# Patient Record
Sex: MALE | Race: WHITE | NOT HISPANIC OR LATINO | Employment: OTHER | URBAN - METROPOLITAN AREA
[De-identification: names, ages, dates, MRNs, and addresses within clinical notes are randomized per-mention and may not be internally consistent; named-entity substitution may affect disease eponyms.]

---

## 2017-01-23 RX ORDER — MELOXICAM 15 MG/1
TABLET ORAL
Qty: 90 TABLET | Refills: 0 | Status: SHIPPED | OUTPATIENT
Start: 2017-01-23 | End: 2017-02-09 | Stop reason: SDUPTHER

## 2017-02-09 ENCOUNTER — PATIENT MESSAGE (OUTPATIENT)
Dept: ORTHOPEDICS | Facility: CLINIC | Age: 72
End: 2017-02-09

## 2017-02-09 DIAGNOSIS — M75.82 TENDONITIS OF LEFT ROTATOR CUFF: Primary | ICD-10-CM

## 2017-02-09 RX ORDER — MELOXICAM 15 MG/1
15 TABLET ORAL DAILY
Qty: 90 TABLET | Refills: 0 | Status: SHIPPED | OUTPATIENT
Start: 2017-02-09 | End: 2017-02-20 | Stop reason: SDUPTHER

## 2017-02-20 DIAGNOSIS — M75.82 TENDONITIS OF LEFT ROTATOR CUFF: ICD-10-CM

## 2017-02-21 RX ORDER — MELOXICAM 15 MG/1
15 TABLET ORAL DAILY
Qty: 90 TABLET | Refills: 0 | Status: SHIPPED | OUTPATIENT
Start: 2017-02-21 | End: 2017-08-04 | Stop reason: SDUPTHER

## 2017-02-23 ENCOUNTER — TELEPHONE (OUTPATIENT)
Dept: FAMILY MEDICINE | Facility: CLINIC | Age: 72
End: 2017-02-23

## 2017-02-23 NOTE — TELEPHONE ENCOUNTER
----- Message from Bridgett Sinclair sent at 2/22/2017  1:03 PM CST -----  Contact: Bernie from [facility] [phone]  Suni called for orders for home Spirometer test to be done.

## 2017-02-23 NOTE — TELEPHONE ENCOUNTER
Per Suni with [facility] need orders for spirometry for [facility]. No med necessity req. Forwarded to Dr Winters.

## 2017-05-08 RX ORDER — TERAZOSIN 10 MG/1
CAPSULE ORAL
Qty: 90 CAPSULE | Refills: 1 | Status: SHIPPED | OUTPATIENT
Start: 2017-05-08 | End: 2017-08-16 | Stop reason: SDUPTHER

## 2017-08-02 ENCOUNTER — TELEPHONE (OUTPATIENT)
Dept: FAMILY MEDICINE | Facility: CLINIC | Age: 72
End: 2017-08-02

## 2017-08-02 DIAGNOSIS — I10 ESSENTIAL HYPERTENSION: Primary | ICD-10-CM

## 2017-08-02 DIAGNOSIS — Z12.5 SCREENING FOR PROSTATE CANCER: ICD-10-CM

## 2017-08-02 NOTE — TELEPHONE ENCOUNTER
Spoke with pt and scheduled labs for tomorrow 08/03/2017 and he is scheduled for follow up with Dr. Winters on 08/16/2017.

## 2017-08-02 NOTE — TELEPHONE ENCOUNTER
Pt came in for appt today 20 min late. Rescheduled this as he would like to have his labs prior to his check up. Please review and order. We will call pt to set up labwork.

## 2017-08-03 ENCOUNTER — LAB VISIT (OUTPATIENT)
Dept: LAB | Facility: HOSPITAL | Age: 72
End: 2017-08-03
Attending: FAMILY MEDICINE
Payer: MEDICARE

## 2017-08-03 DIAGNOSIS — I10 ESSENTIAL HYPERTENSION: ICD-10-CM

## 2017-08-03 DIAGNOSIS — Z12.5 SCREENING FOR PROSTATE CANCER: ICD-10-CM

## 2017-08-03 LAB
ALBUMIN SERPL BCP-MCNC: 3.9 G/DL
ALP SERPL-CCNC: 60 U/L
ALT SERPL W/O P-5'-P-CCNC: 29 U/L
ANION GAP SERPL CALC-SCNC: 9 MMOL/L
AST SERPL-CCNC: 25 U/L
BILIRUB SERPL-MCNC: 0.8 MG/DL
BUN SERPL-MCNC: 23 MG/DL
CALCIUM SERPL-MCNC: 9.3 MG/DL
CHLORIDE SERPL-SCNC: 110 MMOL/L
CHOLEST/HDLC SERPL: 3.7 {RATIO}
CO2 SERPL-SCNC: 22 MMOL/L
COMPLEXED PSA SERPL-MCNC: 7.1 NG/ML
CREAT SERPL-MCNC: 1 MG/DL
ERYTHROCYTE [DISTWIDTH] IN BLOOD BY AUTOMATED COUNT: 13.4 %
EST. GFR  (AFRICAN AMERICAN): >60 ML/MIN/1.73 M^2
EST. GFR  (NON AFRICAN AMERICAN): >60 ML/MIN/1.73 M^2
GLUCOSE SERPL-MCNC: 102 MG/DL
HCT VFR BLD AUTO: 42.9 %
HDL/CHOLESTEROL RATIO: 27.4 %
HDLC SERPL-MCNC: 157 MG/DL
HDLC SERPL-MCNC: 43 MG/DL
HGB BLD-MCNC: 14.7 G/DL
LDLC SERPL CALC-MCNC: 98.6 MG/DL
MCH RBC QN AUTO: 30.6 PG
MCHC RBC AUTO-ENTMCNC: 34.3 G/DL
MCV RBC AUTO: 89 FL
NONHDLC SERPL-MCNC: 114 MG/DL
PLATELET # BLD AUTO: 224 K/UL
PMV BLD AUTO: 10.6 FL
POTASSIUM SERPL-SCNC: 4.3 MMOL/L
PROT SERPL-MCNC: 7.1 G/DL
RBC # BLD AUTO: 4.81 M/UL
SODIUM SERPL-SCNC: 141 MMOL/L
TRIGL SERPL-MCNC: 77 MG/DL
WBC # BLD AUTO: 5.5 K/UL

## 2017-08-03 PROCEDURE — 36415 COLL VENOUS BLD VENIPUNCTURE: CPT | Mod: PO

## 2017-08-03 PROCEDURE — 84153 ASSAY OF PSA TOTAL: CPT

## 2017-08-03 PROCEDURE — 80053 COMPREHEN METABOLIC PANEL: CPT

## 2017-08-03 PROCEDURE — 85027 COMPLETE CBC AUTOMATED: CPT

## 2017-08-03 PROCEDURE — 80061 LIPID PANEL: CPT

## 2017-08-04 DIAGNOSIS — M75.82 TENDONITIS OF LEFT ROTATOR CUFF: ICD-10-CM

## 2017-08-04 RX ORDER — MELOXICAM 15 MG/1
15 TABLET ORAL DAILY
Qty: 90 TABLET | Refills: 0 | Status: SHIPPED | OUTPATIENT
Start: 2017-08-04 | End: 2017-10-09

## 2017-08-16 ENCOUNTER — TELEPHONE (OUTPATIENT)
Dept: FAMILY MEDICINE | Facility: CLINIC | Age: 72
End: 2017-08-16

## 2017-08-16 ENCOUNTER — OFFICE VISIT (OUTPATIENT)
Dept: FAMILY MEDICINE | Facility: CLINIC | Age: 72
End: 2017-08-16
Payer: MEDICARE

## 2017-08-16 VITALS
HEIGHT: 71 IN | TEMPERATURE: 98 F | DIASTOLIC BLOOD PRESSURE: 76 MMHG | WEIGHT: 297.81 LBS | BODY MASS INDEX: 41.69 KG/M2 | HEART RATE: 60 BPM | SYSTOLIC BLOOD PRESSURE: 132 MMHG

## 2017-08-16 DIAGNOSIS — N41.1 CHRONIC PROSTATITIS: ICD-10-CM

## 2017-08-16 DIAGNOSIS — M54.50 CHRONIC MIDLINE LOW BACK PAIN WITHOUT SCIATICA: ICD-10-CM

## 2017-08-16 DIAGNOSIS — G47.33 OSA (OBSTRUCTIVE SLEEP APNEA): ICD-10-CM

## 2017-08-16 DIAGNOSIS — G89.29 CHRONIC MIDLINE LOW BACK PAIN WITHOUT SCIATICA: ICD-10-CM

## 2017-08-16 DIAGNOSIS — E66.01 MORBID OBESITY WITH BMI OF 40.0-44.9, ADULT: ICD-10-CM

## 2017-08-16 DIAGNOSIS — Z00.00 HEALTH MAINTENANCE EXAMINATION: Primary | ICD-10-CM

## 2017-08-16 DIAGNOSIS — N40.1 BPH ASSOCIATED WITH NOCTURIA: ICD-10-CM

## 2017-08-16 DIAGNOSIS — R35.1 BPH ASSOCIATED WITH NOCTURIA: ICD-10-CM

## 2017-08-16 PROCEDURE — 99499 UNLISTED E&M SERVICE: CPT | Mod: S$PBB,,, | Performed by: FAMILY MEDICINE

## 2017-08-16 PROCEDURE — 99999 PR PBB SHADOW E&M-EST. PATIENT-LVL III: CPT | Mod: PBBFAC,,, | Performed by: FAMILY MEDICINE

## 2017-08-16 PROCEDURE — 99397 PER PM REEVAL EST PAT 65+ YR: CPT | Mod: S$GLB,,, | Performed by: FAMILY MEDICINE

## 2017-08-16 RX ORDER — DOXYCYCLINE HYCLATE 100 MG/1
100 TABLET, DELAYED RELEASE ORAL 2 TIMES DAILY
Qty: 42 TABLET | Refills: 0 | Status: SHIPPED | OUTPATIENT
Start: 2017-08-16 | End: 2017-10-12

## 2017-08-16 RX ORDER — DOXYCYCLINE 100 MG/1
100 TABLET ORAL 2 TIMES DAILY
Qty: 42 TABLET | Refills: 0 | Status: SHIPPED | OUTPATIENT
Start: 2017-08-16 | End: 2017-08-16 | Stop reason: CLARIF

## 2017-08-16 NOTE — PROGRESS NOTES
"Subjective:       Patient ID: Chinedu Roque is a 71 y.o. male.    Chief Complaint: Annual Exam (Annual check up. Lab done.)    He is here for an annual exam and follow-up of hypertension.  He has BPH and a rising PSA.  He does relate a recent episode where he had urgency and was only able to urinate a small amount.  Those symptoms resolved spontaneously.  He does have variable nocturia anywhere from one to 3 times at night.      Review of Systems   Constitutional: Negative for fatigue, fever and unexpected weight change.   HENT: Negative.    Eyes: Negative for visual disturbance.   Respiratory: Negative for cough, shortness of breath and wheezing.         Obstructive sleep apnea on CPAP.  It does help with daytime sleepiness.   Cardiovascular: Negative for chest pain, palpitations and leg swelling.   Gastrointestinal: Negative for abdominal pain, blood in stool and diarrhea.   Genitourinary: Positive for decreased urine volume and urgency. Negative for difficulty urinating and hematuria.   Musculoskeletal: Positive for back pain (chronic low back pain.  It has been helped by rhizotomy in the past.  He has a follow-up appointment as his back pain is recurring.  He also complains of bilateral knee pain.).   Skin:        No neoplasms    Neurological: Negative for weakness and numbness.       Objective:     Blood pressure 132/76, pulse 60, temperature 97.9 °F (36.6 °C), temperature source Oral, height 5' 11" (1.803 m), weight 135.1 kg (297 lb 13.5 oz).      Physical Exam   Constitutional: He appears well-developed and well-nourished.   No distress   HENT:   Nose clear. TM's wnl. No oral lesions.    Eyes: Conjunctivae and EOM are normal. Pupils are equal, round, and reactive to light.   Neck: Normal range of motion. No thyromegaly present.   Cardiovascular: Regular rhythm and normal heart sounds.    Pulmonary/Chest: Effort normal and breath sounds normal. He has no wheezes. He has no rales.   Abdominal: Soft. Bowel " sounds are normal. He exhibits no mass. There is no tenderness.   Genitourinary:   Genitourinary Comments: His prostate is enlarged and a bit soft.  Nontender.   Lymphadenopathy:     He has no cervical adenopathy.   Neurological: He is alert.   Skin:   No rash or lesions       Assessment:       1. Health maintenance examination    2. Morbid obesity with BMI of 40.0-44.9, adult    3. Chronic midline low back pain without sciatica    4. ADRIANNE (obstructive sleep apnea)    5. BPH associated with nocturia    6. Chronic prostatitis        Plan:       We discussed weight loss.  Trial of doxycycline 100 mg twice a day for 3 weeks to help with BPH and possible chronic prostatitis symptoms.

## 2017-08-16 NOTE — TELEPHONE ENCOUNTER
----- Message from Liliana Guevara sent at 8/16/2017 10:54 AM CDT -----  [facility] [phone] is calling concerning Doxycycline 100 mg. The doctor wrote for Monohydiarte and the insurance will only pay for the Hyclate. Please re-submit to:      [facility]/pharmacy #[id number] - [place] - [address] [address]  [address] [address]  Phone: [phone] Fax: [phone]

## 2017-09-21 ENCOUNTER — TELEPHONE (OUTPATIENT)
Dept: PAIN MEDICINE | Facility: CLINIC | Age: 72
End: 2017-09-21

## 2017-09-21 NOTE — TELEPHONE ENCOUNTER
----- Message from Kristin Martin sent at 9/21/2017  9:28 AM CDT -----  Contact: Patient  Chinedu, patient [phone] cell. Calling to book an appointment to have the nerves in his back burned again. Lower right back. Please advise. Thanks.

## 2017-09-27 ENCOUNTER — OFFICE VISIT (OUTPATIENT)
Dept: PAIN MEDICINE | Facility: CLINIC | Age: 72
End: 2017-09-27
Payer: MEDICARE

## 2017-09-27 VITALS
HEART RATE: 72 BPM | BODY MASS INDEX: 42.4 KG/M2 | SYSTOLIC BLOOD PRESSURE: 130 MMHG | DIASTOLIC BLOOD PRESSURE: 74 MMHG | TEMPERATURE: 98 F | RESPIRATION RATE: 18 BRPM | WEIGHT: 304 LBS

## 2017-09-27 DIAGNOSIS — M47.816 LUMBAR FACET ARTHROPATHY: ICD-10-CM

## 2017-09-27 DIAGNOSIS — M47.816 FACET ARTHROPATHY, LUMBAR: ICD-10-CM

## 2017-09-27 DIAGNOSIS — M47.816 FACET HYPERTROPHY OF LUMBAR REGION: Primary | ICD-10-CM

## 2017-09-27 DIAGNOSIS — M47.816 FACET ARTHRITIS OF LUMBAR REGION: ICD-10-CM

## 2017-09-27 DIAGNOSIS — M51.36 DDD (DEGENERATIVE DISC DISEASE), LUMBAR: ICD-10-CM

## 2017-09-27 DIAGNOSIS — M48.061 LUMBAR STENOSIS: ICD-10-CM

## 2017-09-27 PROCEDURE — 99213 OFFICE O/P EST LOW 20 MIN: CPT | Mod: S$GLB,,, | Performed by: PHYSICIAN ASSISTANT

## 2017-09-27 PROCEDURE — 3075F SYST BP GE 130 - 139MM HG: CPT | Mod: S$GLB,,, | Performed by: PHYSICIAN ASSISTANT

## 2017-09-27 PROCEDURE — 1125F AMNT PAIN NOTED PAIN PRSNT: CPT | Mod: S$GLB,,, | Performed by: PHYSICIAN ASSISTANT

## 2017-09-27 PROCEDURE — 3078F DIAST BP <80 MM HG: CPT | Mod: S$GLB,,, | Performed by: PHYSICIAN ASSISTANT

## 2017-09-27 PROCEDURE — 1159F MED LIST DOCD IN RCRD: CPT | Mod: S$GLB,,, | Performed by: PHYSICIAN ASSISTANT

## 2017-09-27 PROCEDURE — 1157F ADVNC CARE PLAN IN RCRD: CPT | Mod: S$GLB,,, | Performed by: PHYSICIAN ASSISTANT

## 2017-09-27 PROCEDURE — 99999 PR PBB SHADOW E&M-EST. PATIENT-LVL IV: CPT | Mod: PBBFAC,,, | Performed by: PHYSICIAN ASSISTANT

## 2017-09-27 PROCEDURE — 3008F BODY MASS INDEX DOCD: CPT | Mod: S$GLB,,, | Performed by: PHYSICIAN ASSISTANT

## 2017-09-27 RX ORDER — DOXYCYCLINE HYCLATE 100 MG/1
100 TABLET, DELAYED RELEASE ORAL 2 TIMES DAILY
Qty: 42 TABLET | Refills: 0 | OUTPATIENT
Start: 2017-09-27

## 2017-09-27 RX ORDER — SODIUM CHLORIDE, SODIUM LACTATE, POTASSIUM CHLORIDE, CALCIUM CHLORIDE 600; 310; 30; 20 MG/100ML; MG/100ML; MG/100ML; MG/100ML
INJECTION, SOLUTION INTRAVENOUS CONTINUOUS
Status: CANCELLED | OUTPATIENT
Start: 2017-10-17

## 2017-09-27 NOTE — PROGRESS NOTES
This note was completed with dictation software and grammatical errors may exist.    CC:Back pain    HPI: The patient is a 71-year-old man with a history of hypertension, obesity and low back pain who presents in referral from Dr. Winters for chronic right low back pain.He returns in follow-up today with back pain.  He states that he did well for about one year and reports having increasing pain over the past couple months.  The pain is located in the right low back and he describes as dull, worse with bending and improved with sitting.  He denies weakness, numbness, bladder or bowel content.  His other complaint is bilateral knee pain.    Pain intervention history: He done physical therapy in January, 2014 with moderate relief but not sustained.  He takes Relafen, tramadol, baclofen and chlorzoxazone as needed with mild relief.  He had undergone what sounds like a series of epidurals several years ago with no major relief. The patient is status post a right side L2/3, L3/4, L4/5 and L5/S1 facet joint injection on 10/28/14 with 50% relief of his back pain for 1 month.  He is status post radiofrequency ablation of the right L1, 2, 3, 4 and 5 medial branch nerves on 3/18/15 with 75% relief.  He is status post C7-T1 cervical interlaminar epidural steroid injection on 5/11/15 with 70% relief.  He is status post right L1, 2, 3, 4 and 5 medial branch radiofrequency ablation on 7/5/16 with 50% relief.     ROS:He reports back pain.  Balance of review of systems is negative.    Medical, surgical, family and social history reviewed elsewhere in record.    Medications/Allergies: See med card    Vitals:    09/27/17 1311   BP: 130/74   Pulse: 72   Resp: 18   Temp: 97.9 °F (36.6 °C)   TempSrc: Oral   Weight: (!) 137.9 kg (304 lb 0.2 oz)   PainSc:   6   PainLoc: Back         Physical exam:  Gen: A and O x3, pleasant, well-groomed  Skin: No rashes or obvious lesions  HEENT: PERRLA, no obvious deformities on ears or in canals.   CVS:  Regular rate and rhythm, normal S1 and S2, no murmurs.  Resp: Clear to auscultation bilaterally, no wheezes or rales.  Abdomen: Soft, NT/ND, normal bowel sounds present.  Musculoskeletal:  No antalgic gait.     Neuro:  Upper extremities: 5/5 strength bilaterally   Lower extremities: 5/5 strength bilaterally  Reflexes: Brachioradialis 2+, Bicep 2+, Tricep 2+. Patellar 2+, Achilles 2+.  Sensory: Intact and symmetrical to light touch and pinprick in C2-T1 dermatomes bilaterally. Intact and symmetrical to light touch and pinprick in L2-S1 dermatomes bilaterally.    Lumbar spine:  Lumbar spine:Range of motion is moderately reduced with flexion, extension and oblique extension with increased right low back pain during each maneuver, especially right oblique extension and extension.  Ramakrishna's test causes no increased pain on either side.    Supine straight leg raise negative bilaterally.  Internal and external rotation of the hip causes no increased pain on either side.  Myofascial exam: No tenderness to palpation across the lumbar paraspinous muscles.      Imagin14 Xray L-spine  There is diffuse osteopenia. Mild to moderate chronic compression fracture with anterior wedging and evidence of vertebroplasty at L2. minimal left convex curvature in the upper lumbar region. No spondylolisthesis. Mild concavity of the superior endplate of L4 which could be small compression fracture or Schmorl's node. No additional fracture.   There is moderate degenerative change within the lumbar spine with anterior osteophyte formation most prominent at L4-L5 and L2- L3 and L1 - L2, also present in the lower thoracic region with flowing ossification anteriorly suggesting diffuse idiopathic sclerotic hyperostosis. There is also mild decreased intervertebral disk space height and endplate sclerosis the lower thoracic and upper lumbar regions. Due to the degree of osseous mineralization, it is difficult to evaluate for any possible pars  defects. Moderate sclerosis suggesting facet arthropathy in the lumbar spine present and there is mild sclerosis, likely degenerative in nature involving the sacroiliac joints.    10/7/14 MRI lumbar spine: At L1/2 there is mild spinal stenosis secondary to facet hypertrophy and disc bulging.  At L2/3 there is minimal spinal stenosis secondary to retropulsion of L2 compression fracture, appearance of prior kyphoplasty present.  There is minimal disc bulging but there is also some facet hypertrophy at this level.  At L3/4 there is facet and ligamentum hypertrophy, minimal disc bulging causing mild spinal stenosis and neuroforaminal narrowing on the left greater than the right side.  At L4/5 there is moderate hypertrophic facet and ligamentum hypertrophy with mild left foraminal narrowing compared to the right side.  There is no spinal stenosis at this level.  At L5/S1 there is a broad-based disc bulge that extends to the left side more than the right side along with asymmetric left sided facet hypertrophy and ligamentum flavum hypertrophy causing moderate left foraminal stenosis.    4/15/15 outside open MRI cervical spine Premier  C3-4 posterior disc bulge producing mild bilateral foraminal narrowing  C4-5 posterior disc bulge producing mild bilateral foraminal narrowing, possible tiny annular tear in the posterior disc, anterior thecal sac deformity with no evidence of spinal stenosis  C5-6 circumferential disc bulge producing moderate to severe bilateral foraminal narrowing, effacement of the bilateral lateral recesses worse to the right, anterior thecal sac deformity with effacement of the anterior subarachnoid space, mild spinal canal stenosis  C6-7 circumferential disc bulge producing moderate to severe bilateral foraminal narrowing with mild spinal canal stenosis  C7-T1 not completely included but appears to have a circumferential disc bulge with shallow midline disc protrusion with possible mild spinal canal  stenosis and bilateral foraminal narrowing      Assessment:  The patient is a 71-year-old man with a history of hypertension, obesity and low back pain who presents in referral from Dr. Winters for chronic right low back pain.   1. Facet hypertrophy of lumbar region     2. DDD (degenerative disc disease), lumbar     3. Lumbar stenosis           Plan:  1.  I will schedule patient to repeat right L1, 2, 3, 4 and 5 medial branch radio frequency ablation.  He has done well with this in the past.  2.  I will have him follow-up with Dr. Joe for his bilateral knee pain.  3.  Follow-up in 4 weeks post procedure sooner as needed.

## 2017-10-02 DIAGNOSIS — M25.569 KNEE PAIN, UNSPECIFIED CHRONICITY, UNSPECIFIED LATERALITY: Primary | ICD-10-CM

## 2017-10-03 ENCOUNTER — OFFICE VISIT (OUTPATIENT)
Dept: ORTHOPEDICS | Facility: CLINIC | Age: 72
End: 2017-10-03
Payer: MEDICARE

## 2017-10-03 ENCOUNTER — HOSPITAL ENCOUNTER (OUTPATIENT)
Dept: RADIOLOGY | Facility: HOSPITAL | Age: 72
Discharge: HOME | End: 2017-10-03
Attending: ORTHOPAEDIC SURGERY
Payer: MEDICARE

## 2017-10-03 VITALS
SYSTOLIC BLOOD PRESSURE: 141 MMHG | HEART RATE: 54 BPM | WEIGHT: 304 LBS | DIASTOLIC BLOOD PRESSURE: 83 MMHG | BODY MASS INDEX: 42.56 KG/M2 | HEIGHT: 71 IN

## 2017-10-03 DIAGNOSIS — M25.561 ACUTE PAIN OF BOTH KNEES: ICD-10-CM

## 2017-10-03 DIAGNOSIS — M25.569 KNEE PAIN, UNSPECIFIED CHRONICITY, UNSPECIFIED LATERALITY: ICD-10-CM

## 2017-10-03 DIAGNOSIS — M21.161 GENU VARUM OF BOTH LOWER EXTREMITIES: ICD-10-CM

## 2017-10-03 DIAGNOSIS — M17.0 PRIMARY OSTEOARTHRITIS OF BOTH KNEES: Primary | ICD-10-CM

## 2017-10-03 DIAGNOSIS — M21.162 GENU VARUM OF BOTH LOWER EXTREMITIES: ICD-10-CM

## 2017-10-03 DIAGNOSIS — M25.562 ACUTE PAIN OF BOTH KNEES: ICD-10-CM

## 2017-10-03 PROCEDURE — 73564 X-RAY EXAM KNEE 4 OR MORE: CPT | Mod: TC,50,PO

## 2017-10-03 PROCEDURE — 20610 DRAIN/INJ JOINT/BURSA W/O US: CPT | Mod: 50,S$GLB,, | Performed by: ORTHOPAEDIC SURGERY

## 2017-10-03 PROCEDURE — 73564 X-RAY EXAM KNEE 4 OR MORE: CPT | Mod: 26,59,LT, | Performed by: RADIOLOGY

## 2017-10-03 PROCEDURE — 99213 OFFICE O/P EST LOW 20 MIN: CPT | Mod: 25,S$GLB,, | Performed by: ORTHOPAEDIC SURGERY

## 2017-10-03 PROCEDURE — 73564 X-RAY EXAM KNEE 4 OR MORE: CPT | Mod: 26,RT,, | Performed by: RADIOLOGY

## 2017-10-03 PROCEDURE — 99999 PR PBB SHADOW E&M-EST. PATIENT-LVL III: CPT | Mod: PBBFAC,,, | Performed by: ORTHOPAEDIC SURGERY

## 2017-10-03 RX ORDER — TRIAMCINOLONE ACETONIDE 40 MG/ML
80 INJECTION, SUSPENSION INTRA-ARTICULAR; INTRAMUSCULAR
Status: DISCONTINUED | OUTPATIENT
Start: 2017-10-03 | End: 2017-10-03 | Stop reason: HOSPADM

## 2017-10-03 RX ADMIN — TRIAMCINOLONE ACETONIDE 80 MG: 40 INJECTION, SUSPENSION INTRA-ARTICULAR; INTRAMUSCULAR at 09:10

## 2017-10-03 NOTE — PROGRESS NOTES
Subjective:          Chief Complaint: Chinedu Roque is a 71 y.o. male who had concerns including Pain of the Right Knee and Pain of the Left Knee.  Ortho New Patient Questionnaire 10/2/2017   What part of the body brings you in for your visit today?  Knee     Mr. Roque is here for evaluation of his bilateral knee pain that has been present for some time now. He has had multiple surgeries in the remote past. He has had injections in the past as well. Pain is increased with walking; stairs and rising from a seated position.      Leg Pain    The pain is present in the left knee. This is a recurrent problem. The current episode started more than 1 month ago. There has been no history of extremity trauma. The injury was the result of a action while exercising. The problem occurs daily. The problem has been unchanged. The quality of the pain is described as aching. The pain is at a severity of 4/10. Associated symptoms include an inability to bear weight, joint locking, a limited range of motion and stiffness. Pertinent negatives include no fever, itching, numbness or tingling. The symptoms are aggravated by walking. He has tried brace/corset for the symptoms. The treatment provided no relief. Physical therapy was not tried.        Past Medical History:   Diagnosis Date    Anticoagulant long-term use     ASA 81 mg    Arthritis     cervical    BPH (benign prostatic hyperplasia) 3/2/2012    Chronic left shoulder pain     Compression fracture of L2     DDD (degenerative disc disease), lumbar     HTN (hypertension) 3/2/2012    Low back pain     Morbid obesity with BMI of 40.0-44.9, adult 3/18/2014    Seasonal allergies     Sleep apnea     compliant with CPAP    Stroke 3/1986       Past Surgical History:   Procedure Laterality Date    APPENDECTOMY      EPIDURAL BLOCK INJECTION  2015    cervical    Facet Steroid injection       Pain management    HERNIA REPAIR      Ventral    KNEE SURGERY      bilateral     RADIOFREQUENCY ABLATION  2015    lumbar nerve    VERTEBROPLASTY         Family History   Problem Relation Age of Onset    COPD Father     Hypertension Brother     Kidney disease Brother     Alzheimer's disease Mother     No Known Problems Daughter     Hypertension Son     Diabetes Son      pre-diabetic    No Known Problems Daughter     No Known Problems Daughter          Current Outpatient Prescriptions:     albuterol 90 mcg/actuation inhaler, Inhale 2 puffs into the lungs every 4 (four) hours as needed for Wheezing., Disp: 1 Inhaler, Rfl: 5    aspirin (ECOTRIN) 81 MG EC tablet, Take 81 mg by mouth once daily.  , Disp: , Rfl:     azelastine (ASTELIN) 137 mcg (0.1 %) nasal spray, 2 sprays (274 mcg total) by Nasal route 2 (two) times daily as needed for Rhinitis., Disp: 30 mL, Rfl: 5    cetirizine (ZYRTEC) 10 MG tablet, Take 10 mg by mouth once daily., Disp: , Rfl:     doxycycline (DORYX) 100 MG EC tablet, Take 1 tablet (100 mg total) by mouth 2 (two) times daily., Disp: 42 tablet, Rfl: 0    glucosamine-chondroitin 500-400 mg tablet, Take 1 tablet by mouth 2 (two) times daily. , Disp: , Rfl:     losartan (COZAAR) 100 MG tablet, Take 1 tablet (100 mg total) by mouth once daily., Disp: 90 tablet, Rfl: 3    meloxicam (MOBIC) 15 MG tablet, TAKE 1 TABLET (15 MG TOTAL) BY MOUTH ONCE DAILY., Disp: 90 tablet, Rfl: 0    metoprolol tartrate (LOPRESSOR) 25 MG tablet, Take 1 tablet (25 mg total) by mouth 2 (two) times daily., Disp: 180 tablet, Rfl: 3    terazosin (HYTRIN) 10 MG capsule, Take 1 capsule (10 mg total) by mouth every evening., Disp: 90 capsule, Rfl: 3    tramadol (ULTRAM) 50 mg tablet, Take 50 mg by mouth every 6 (six) hours as needed for Pain., Disp: , Rfl:     Review of patient's allergies indicates:  No Known Allergies    Vitals:    10/03/17 0911   BP: (!) 141/83   Pulse: (!) 54       Review of Systems   Constitution: Negative for diaphoresis and fever.   HENT: Negative for ear pain, hearing  loss, nosebleeds and tinnitus.    Eyes: Negative for discharge, pain, redness and visual disturbance.   Cardiovascular: Negative for chest pain and palpitations.   Respiratory: Negative for cough, shortness of breath and wheezing.    Endocrine: Negative for polydipsia and polyuria.   Skin: Negative for itching and rash.   Musculoskeletal: Positive for back pain, joint pain, myalgias and stiffness. Negative for joint swelling and neck pain.   Gastrointestinal: Negative for constipation, diarrhea, nausea and vomiting.   Genitourinary: Negative for frequency, hematuria and urgency.   Neurological: Negative for dizziness, headaches, numbness, seizures and tingling.   Psychiatric/Behavioral: The patient is not nervous/anxious.    Allergic/Immunologic: Negative for environmental allergies.   All other systems reviewed and are negative.                  Objective:        General: Chinedu is well-developed, well-nourished, appears stated age, in no acute distress, alert and oriented to time, place and person.     General    Vitals reviewed.  Constitutional: He is oriented to person, place, and time. He appears well-developed and well-nourished. No distress.   HENT:   Head: Normocephalic and atraumatic.   Nose: Nose normal.   Eyes: Pupils are equal, round, and reactive to light.   Cardiovascular: Normal rate.    Pulmonary/Chest: Effort normal.   Neurological: He is alert and oriented to person, place, and time.   Psychiatric: He has a normal mood and affect. His behavior is normal. Judgment and thought content normal.     General Musculoskeletal Exam   Gait: antalgic       Right Knee Exam     Inspection   Erythema: absent  Scars: present  Swelling: absent  Effusion: effusion  Deformity: deformity  Bruising: absent    Tenderness   The patient is tender to palpation of the medial joint line, medial retinaculum and patella.    Crepitus   The patient has crepitus of the patella.    Range of Motion   Extension: 5   Flexion: 130      Tests   Ligament Examination Lachman: normal (-1 to 2mm) PCL-Posterior Drawer: normal (0 to 2mm)     MCL - Valgus: abnormal - grade I  LCL - Varus: abnormal - grade I  Patella   Patellar Apprehension: negative  Passive Patellar Tilt: neutral  Patellar Tracking: normal  Patellar Glide (quadrants): Lateral - 1   Medial - 2  Q-Angle at 90 degrees: normal  Patellar Grind: positive    Other   Muscle Tightness: hamstring tightness and quadriceps tightness  Sensation: normal    Left Knee Exam     Inspection   Erythema: absent  Scars: present  Swelling: absent  Effusion: absent  Deformity: present  Bruising: absent    Tenderness   The patient tender to palpation of the patella, medial retinaculum and medial joint line.    Crepitus   The patient has crepitus of the patella.    Range of Motion   Extension: 5   Flexion: 130     Tests   Stability Lachman: normal (-1 to 2mm) PCL-Posterior Drawer: normal (0 to 2mm)  MCL - Valgus: abnormal - grade I  LCL - Varus: abnormal - grade I  Patella   Patellar Apprehension: negative  Passive Patellar Tilt: neutral  Patellar Tracking: normal  Patellar Glide (Quadrants): Lateral - 1 Medial - 2  Q-Angle at 90 degrees: normal  Patellar Grind: positive    Other   Muscle Tightness: hamstring tightness and quadriceps tightness  Sensation: normal    Muscle Strength   Right Lower Extremity   Hip Abduction: 5/5   Quadriceps:  5/5   Hamstrin/5   Left Lower Extremity   Hip Abduction: 5/5   Quadriceps:  5/5   Hamstrin/5     Vascular Exam     Right Pulses  Dorsalis Pedis:      2+  Posterior Tibial:      2+        Left Pulses  Dorsalis Pedis:      2+  Posterior Tibial:      2+        Edema  Right Lower Leg: absent  Left Lower Leg: absent        Current and previous radiographic studies and results were reviewed with the patient:   The bones are osteopenic.  There is tricompartmental osteophyte formation observed in both knees.  There is moderate-severe right and moderate left medial  tibiofemoral joint space narrowing with AP standing positioning.  There is severe bilateral medial tibiofemoral joint space narrowing with PA flexion positioning of the knees.  There is left lateral patellofemoral joint space narrowing.  No left knee joint effusion.  There is a small right knee joint effusion.  There are remote imaging sequelae of Osgood-Schlatter disease at the left and right anterior tibial tubercle.  There are vascular calcifications present within the left and right popliteal fossa.      Assessment:       Encounter Diagnoses   Name Primary?    Acute pain of both knees     Primary osteoarthritis of both knees Yes    Genu varum of both lower extremities           Plan:         The patient was counseled today regarding his diagnostic study results and the different treatment options. I spent >50% of the time discussing conservative vs. Operative options with the patient as well as risks and benefits of the different options.  Bilateral knee injections  PT  Continue with Meloxicam  F/U in 6 weeks                Answers for HPI/ROS submitted by the patient on 10/2/2017   Leg pain  activity change: No  unexpected weight change: No  trouble swallowing: No  chest tightness: No  blood in stool: No  difficulty urinating: No  arthralgias: Yes  confusion: No  dysphoric mood: No  appetite change : No  sleep disturbance: No  IMMUNOCOMPROMISED: No  sinus pressure : No  food allergies: No  Radiating Pain: No

## 2017-10-03 NOTE — PROCEDURES
Large Joint Aspiration/Injection  Date/Time: 10/3/2017 9:54 AM  Performed by: IMANI GALAN  Authorized by: IMANI GALAN     Consent Done?:  Yes (Verbal)  Indications:  Pain  Procedure site marked: Yes    Timeout: Prior to procedure the correct patient, procedure, and site was verified      Location:  Knee  Site:  R knee and L knee  Prep: Patient was prepped and draped in usual sterile fashion    Ultrasonic Guidance for needle placement: No  Needle size:  22 G  Approach:  Anterolateral  Medications:  80 mg triamcinolone acetonide 40 mg/mL; 80 mg triamcinolone acetonide 40 mg/mL  Patient tolerance:  Patient tolerated the procedure well with no immediate complications

## 2017-10-06 ENCOUNTER — PATIENT MESSAGE (OUTPATIENT)
Dept: ORTHOPEDICS | Facility: CLINIC | Age: 72
End: 2017-10-06

## 2017-10-09 DIAGNOSIS — M25.569 KNEE PAIN, UNSPECIFIED CHRONICITY, UNSPECIFIED LATERALITY: Primary | ICD-10-CM

## 2017-10-09 RX ORDER — METHYLPREDNISOLONE 4 MG/1
TABLET ORAL
Qty: 1 PACKAGE | Refills: 0 | Status: SHIPPED | OUTPATIENT
Start: 2017-10-09 | End: 2017-10-12

## 2017-10-09 RX ORDER — TRAMADOL HYDROCHLORIDE 50 MG/1
50 TABLET ORAL EVERY 8 HOURS PRN
Qty: 90 TABLET | Refills: 0 | Status: SHIPPED | OUTPATIENT
Start: 2017-10-09 | End: 2020-05-18

## 2017-10-09 RX ORDER — NAPROXEN 500 MG/1
500 TABLET ORAL 2 TIMES DAILY WITH MEALS
Qty: 60 TABLET | Refills: 0 | Status: SHIPPED | OUTPATIENT
Start: 2017-10-09 | End: 2017-10-12

## 2017-10-09 NOTE — TELEPHONE ENCOUNTER
PT is not authorized by insurance yet was placed for [facility].  Patient got no relief from injection not even for a day.  Has no mechanical symptoms only pain.  Right knee I worse 7/10.  No relief with ice or NSAID.

## 2017-10-10 ENCOUNTER — PATIENT MESSAGE (OUTPATIENT)
Dept: SURGERY | Facility: HOSPITAL | Age: 72
End: 2017-10-10

## 2017-10-12 DIAGNOSIS — M51.36 DDD (DEGENERATIVE DISC DISEASE), LUMBAR: Primary | ICD-10-CM

## 2017-10-17 ENCOUNTER — HOSPITAL ENCOUNTER (OUTPATIENT)
Facility: HOSPITAL | Age: 72
Discharge: HOME | End: 2017-10-17
Attending: ANESTHESIOLOGY | Admitting: ANESTHESIOLOGY
Payer: MEDICARE

## 2017-10-17 ENCOUNTER — SURGERY (OUTPATIENT)
Age: 72
End: 2017-10-17

## 2017-10-17 ENCOUNTER — HOSPITAL ENCOUNTER (OUTPATIENT)
Dept: RADIOLOGY | Facility: HOSPITAL | Age: 72
Discharge: HOME | End: 2017-10-17
Attending: ANESTHESIOLOGY | Admitting: ANESTHESIOLOGY
Payer: MEDICARE

## 2017-10-17 DIAGNOSIS — M47.816 FACET ARTHROPATHY, LUMBAR: ICD-10-CM

## 2017-10-17 DIAGNOSIS — M47.816 FACET HYPERTROPHY OF LUMBAR REGION: Primary | ICD-10-CM

## 2017-10-17 DIAGNOSIS — M51.36 DDD (DEGENERATIVE DISC DISEASE), LUMBAR: ICD-10-CM

## 2017-10-17 DIAGNOSIS — M47.816 LUMBAR FACET ARTHROPATHY: ICD-10-CM

## 2017-10-17 PROCEDURE — 25000003 PHARM REV CODE 250: Mod: PO | Performed by: ANESTHESIOLOGY

## 2017-10-17 PROCEDURE — 64635 DESTROY LUMB/SAC FACET JNT: CPT | Mod: RT,,, | Performed by: ANESTHESIOLOGY

## 2017-10-17 PROCEDURE — 99152 MOD SED SAME PHYS/QHP 5/>YRS: CPT | Mod: ,,, | Performed by: ANESTHESIOLOGY

## 2017-10-17 PROCEDURE — 76000 FLUOROSCOPY <1 HR PHYS/QHP: CPT | Mod: TC,PO

## 2017-10-17 PROCEDURE — 64636 DESTROY L/S FACET JNT ADDL: CPT | Mod: PO | Performed by: ANESTHESIOLOGY

## 2017-10-17 PROCEDURE — 64636 DESTROY L/S FACET JNT ADDL: CPT | Mod: RT,,, | Performed by: ANESTHESIOLOGY

## 2017-10-17 PROCEDURE — 63600175 PHARM REV CODE 636 W HCPCS: Mod: PO | Performed by: ANESTHESIOLOGY

## 2017-10-17 PROCEDURE — 64635 DESTROY LUMB/SAC FACET JNT: CPT | Mod: PO | Performed by: ANESTHESIOLOGY

## 2017-10-17 RX ORDER — LIDOCAINE HYDROCHLORIDE 20 MG/ML
INJECTION, SOLUTION EPIDURAL; INFILTRATION; INTRACAUDAL; PERINEURAL
Status: DISCONTINUED | OUTPATIENT
Start: 2017-10-17 | End: 2017-10-17 | Stop reason: HOSPADM

## 2017-10-17 RX ORDER — LIDOCAINE HYDROCHLORIDE 10 MG/ML
1 INJECTION, SOLUTION EPIDURAL; INFILTRATION; INTRACAUDAL; PERINEURAL ONCE
Status: COMPLETED | OUTPATIENT
Start: 2017-10-17 | End: 2017-10-17

## 2017-10-17 RX ORDER — MIDAZOLAM HYDROCHLORIDE 2 MG/2ML
INJECTION, SOLUTION INTRAMUSCULAR; INTRAVENOUS
Status: DISCONTINUED | OUTPATIENT
Start: 2017-10-17 | End: 2017-10-17 | Stop reason: HOSPADM

## 2017-10-17 RX ORDER — FENTANYL CITRATE 50 UG/ML
INJECTION, SOLUTION INTRAMUSCULAR; INTRAVENOUS
Status: DISCONTINUED | OUTPATIENT
Start: 2017-10-17 | End: 2017-10-17 | Stop reason: HOSPADM

## 2017-10-17 RX ORDER — LIDOCAINE HYDROCHLORIDE 10 MG/ML
INJECTION, SOLUTION EPIDURAL; INFILTRATION; INTRACAUDAL; PERINEURAL
Status: DISCONTINUED | OUTPATIENT
Start: 2017-10-17 | End: 2017-10-17 | Stop reason: HOSPADM

## 2017-10-17 RX ORDER — METHYLPREDNISOLONE ACETATE 40 MG/ML
INJECTION, SUSPENSION INTRA-ARTICULAR; INTRALESIONAL; INTRAMUSCULAR; SOFT TISSUE
Status: DISCONTINUED | OUTPATIENT
Start: 2017-10-17 | End: 2017-10-17 | Stop reason: HOSPADM

## 2017-10-17 RX ORDER — SODIUM CHLORIDE, SODIUM LACTATE, POTASSIUM CHLORIDE, CALCIUM CHLORIDE 600; 310; 30; 20 MG/100ML; MG/100ML; MG/100ML; MG/100ML
INJECTION, SOLUTION INTRAVENOUS CONTINUOUS
Status: DISCONTINUED | OUTPATIENT
Start: 2017-10-17 | End: 2017-10-17

## 2017-10-17 RX ADMIN — LIDOCAINE HYDROCHLORIDE: 10 INJECTION, SOLUTION EPIDURAL; INFILTRATION; INTRACAUDAL; PERINEURAL at 12:10

## 2017-10-17 RX ADMIN — FENTANYL CITRATE 25 MCG: 50 INJECTION, SOLUTION INTRAMUSCULAR; INTRAVENOUS at 12:10

## 2017-10-17 RX ADMIN — LIDOCAINE HYDROCHLORIDE 4 ML: 20 INJECTION, SOLUTION EPIDURAL; INFILTRATION; INTRACAUDAL; PERINEURAL at 12:10

## 2017-10-17 RX ADMIN — MIDAZOLAM HYDROCHLORIDE 2 MG: 1 INJECTION, SOLUTION INTRAMUSCULAR; INTRAVENOUS at 12:10

## 2017-10-17 RX ADMIN — METHYLPREDNISOLONE ACETATE 40 MG: 40 INJECTION, SUSPENSION INTRA-ARTICULAR; INTRALESIONAL; INTRAMUSCULAR; SOFT TISSUE at 12:10

## 2017-10-17 RX ADMIN — LIDOCAINE HYDROCHLORIDE 10 ML: 10 INJECTION, SOLUTION EPIDURAL; INFILTRATION; INTRACAUDAL; PERINEURAL at 12:10

## 2017-10-17 NOTE — PLAN OF CARE
Patient tolerating oral liquids without difficulty. No apparent signs and/or symptoms of distress noted at this time. No complaints voiced at this time. Discharge instructions reviewed with patient/family/friend with good verbal feedback received. Patient ready for discharge

## 2017-10-17 NOTE — DISCHARGE INSTRUCTIONS
Home care instructions  Apply ice pack to the injection site for 20 minutes periods for the first 24 hrs for soreness/discomfort at injection site DO NOT USE HEAT FOR 24 HOURS  Keep site clean and dry for 24 hours, remove bandaid when desired  Do not drive until tomorrow  Take care when walking after a lumbar injection  Avoid strenuous activities for 2 days  Make take 2 weeks to feel the full effects   Resume home medication as prescribed today  Resume Aspirin, Plavix, or Coumadin the day after the procedure unless otherwise instructed.    SEE IMMEDIATE MEDICAL HELP FOR:  Severe increase in your usual pain or appearance of new pain  Prolonged or increasing weakness or numbness in the legs or arms  Drainage, redness, active bleeding, or increased swelling at the injection site  Temperature over 100.0 degrees F.  Headache that increases when your head is upright and decreases when you lie flat    CALL 911 OR GO DIRECTLY TO EMERGENCY DEPARTMENT FOR:  Shortness of breath, chest pain, or problems breathing          Recovery After Procedural Sedation (Adult)  You have been given medicine by vein to make you sleep during your surgery. This may have included both a pain medicine and sleeping medicine. Most of the effects have worn off. But you may still have some drowsiness for the next 6 to 8 hours.  Home care  Follow these guidelines when you get home:  · For the next 8 hours, you should be watched by a responsible adult. This person should make sure your condition is not getting worse.  · Don't drink any alcohol for the next 24 hours.  · Don't drive, operate dangerous machinery, or make important business or personal decisions during the next 24 hours.  Note: Your healthcare provider may tell you not to take any medicine by mouth for pain or sleep in the next 4 hours. These medicines may react with the medicines you were given in the hospital. This could cause a much stronger response than usual.  Follow-up care  Follow  up with your healthcare provider if you are not alert and back to your usual level of activity within 12 hours.  When to seek medical advice  Call your healthcare provider right away if any of these occur:  · Drowsiness gets worse  · Weakness or dizziness gets worse  · Repeated vomiting  · You can't be awakened   Date Last Reviewed: 10/18/2016  © 7762-0610 [employer]. [address] [place] [address], [place] [id number]. All rights reserved. This information is not intended as a substitute for professional medical care. Always follow your healthcare professional's instructions.

## 2017-10-17 NOTE — OP NOTE
PROCEDURE DATE: 10/17/2017    PROCEDURE:  Radiofrequency ablation of the right L1,2,3,4,5 medial branch nerves on the right-side utilizing fluoroscopy    DIAGNOSIS:  Lumbar facet hypertrophy    Post op Diagnosis: Same    PHYSICIAN: Riley Segura MD    MEDICATIONS INJECTED:  From a mixture of 4ml of 2% lidocaine and 40mg of methylprednisone, 1ml of this solution was injected at each level.    LOCAL ANESTHETIC USED: Lidocaine 1%, 4 ml given at each site.    SEDATION MEDICATIONS: 4mg versed, 25mcg fentanyl    ESTIMATED BLOOD LOSS:  none    COMPLICATIONS:  none    TECHNIQUE:  A time out was taken to identify patient and procedure side prior to starting the procedure. Laying in a prone position, the patient was prepped and draped in the usual sterile fashion using ChloraPrep and sterile towels.  The levels were determined under fluoroscopic guidance and then marked.  Local anesthetic was given by raising a wheal at the skin over each site and then infiltrated approximately 2cm deeper.  A 20-gauge  100 mm Handseeing Information RF needle was introduced to the anatomic location of the right L1,2,3,4,5 medial branch nerves.  Motor stimulation up to 2 Volts at each level confirmed no motor nerve involvement.  Impedance was less than 800 ohms at each level. The above noted medication was then injected slowly.  Ablation was performed per level utilizing [place] radiofrequency generator 80°C for 90 seconds. The patient tolerated the procedure well.     The patient was monitored after the procedure.  Patient was given post procedure and discharge instructions to follow at home.  The patient was discharged in a stable condition

## 2017-10-17 NOTE — DISCHARGE SUMMARY
[facility]  Discharge Note  Short Stay    Admit Date: 10/17/2017    Discharge Date: 10/17/2017    Attending Physician: Riley Segura MD     Discharge Provider: Riley Segura    Diagnoses:  Active Hospital Problems    Diagnosis  POA    *Lumbar facet arthropathy [M12.88]  Yes      Resolved Hospital Problems    Diagnosis Date Resolved POA   No resolved problems to display.       Discharged Condition: good    Final Diagnoses: Facet arthropathy, lumbar [M12.88]    Disposition: Home or Self Care    Hospital Course: no complications, uneventful    Outcome of Hospitalization, Treatment, Procedure, or Surgery:  Patient was admitted for outpatient procedure. The patient underwent procedure without complications and are discharged home    Follow up/Patient Instructions:  Follow up as scheduled/Patient has received instructions and follow up date    Medications:  Continue previous medications      Discharge Procedure Orders  Diet general     Activity as tolerated     Call MD for:  temperature >100.4     Call MD for:  severe uncontrolled pain     Call MD for:  redness, tenderness, or signs of infection (pain, swelling, redness, odor or green/yellow discharge around incision site)     Call MD for:  severe persistent headache     No dressing needed           Discharge Procedure Orders (must include Diet, Follow-up, Activity):    Discharge Procedure Orders (must include Diet, Follow-up, Activity)  Diet general     Activity as tolerated     Call MD for:  temperature >100.4     Call MD for:  severe uncontrolled pain     Call MD for:  redness, tenderness, or signs of infection (pain, swelling, redness, odor or green/yellow discharge around incision site)     Call MD for:  severe persistent headache     No dressing needed

## 2017-10-18 VITALS
OXYGEN SATURATION: 96 % | WEIGHT: 300 LBS | BODY MASS INDEX: 42 KG/M2 | HEART RATE: 65 BPM | SYSTOLIC BLOOD PRESSURE: 131 MMHG | RESPIRATION RATE: 16 BRPM | DIASTOLIC BLOOD PRESSURE: 71 MMHG | TEMPERATURE: 98 F | HEIGHT: 71 IN

## 2017-10-30 RX ORDER — TERAZOSIN 10 MG/1
CAPSULE ORAL
Qty: 90 CAPSULE | Refills: 1 | Status: SHIPPED | OUTPATIENT
Start: 2017-10-30 | End: 2017-11-22 | Stop reason: SDUPTHER

## 2017-10-30 RX ORDER — METOPROLOL TARTRATE 25 MG/1
TABLET, FILM COATED ORAL
Qty: 180 TABLET | Refills: 2 | Status: SHIPPED | OUTPATIENT
Start: 2017-10-30 | End: 2018-07-20 | Stop reason: SDUPTHER

## 2017-11-06 ENCOUNTER — CLINICAL SUPPORT (OUTPATIENT)
Dept: REHABILITATION | Facility: HOSPITAL | Age: 72
End: 2017-11-06
Attending: ORTHOPAEDIC SURGERY
Payer: MEDICARE

## 2017-11-06 DIAGNOSIS — R26.9 GAIT ABNORMALITY: ICD-10-CM

## 2017-11-06 PROCEDURE — G8978 MOBILITY CURRENT STATUS: HCPCS | Mod: CK,PN | Performed by: PHYSICAL THERAPIST

## 2017-11-06 PROCEDURE — 97162 PT EVAL MOD COMPLEX 30 MIN: CPT | Mod: PN | Performed by: PHYSICAL THERAPIST

## 2017-11-06 PROCEDURE — G8979 MOBILITY GOAL STATUS: HCPCS | Mod: CK,PN | Performed by: PHYSICAL THERAPIST

## 2017-11-06 NOTE — PLAN OF CARE
OUTPATIENT PHYSICAL THERAPY  PHYSICAL THERAPY EVALUATION    Name: Chinedu Roque  Clinic Number: [id number]    Diagnosis:   Encounter Diagnosis   Name Primary?   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; quad and hip weakness subjectively  Please eval and treat for bilateral knee pain/swelling control; ROM; strengthening and return to function  Bilateral knee injections given today  PT treatment diagnosis: Impaired functional mobility with knee pain, gait abnormality  Past Medical History:   Diagnosis Date    Anticoagulant long-term use     ASA 81 mg    Arthritis     cervical    BPH (benign prostatic hyperplasia) 3/2/2012    Chronic left shoulder pain     Compression fracture of L2     DDD (degenerative disc disease), lumbar     HTN (hypertension) 3/2/2012    Low back pain     Morbid obesity with BMI of 40.0-44.9, adult 3/18/2014    Seasonal allergies     Sleep apnea     compliant with CPAP    Stroke 3/1986     Current Outpatient Prescriptions   Medication Sig    albuterol 90 mcg/actuation inhaler Inhale 2 puffs into the lungs every 4 (four) hours as needed for Wheezing.    aspirin (ECOTRIN) 81 MG EC tablet Take 81 mg by mouth once daily.      azelastine (ASTELIN) 137 mcg (0.1 %) nasal spray 2 sprays (274 mcg total) by Nasal route 2 (two) times daily as needed for Rhinitis.    cetirizine (ZYRTEC) 10 MG tablet Take 10 mg by mouth once daily.    glucosamine-chondroitin 500-400 mg tablet Take 1 tablet by mouth 2 (two) times daily.     losartan (COZAAR) 100 MG tablet Take 1 tablet (100 mg total) by mouth once daily.    metoprolol tartrate (LOPRESSOR) 25 MG tablet TAKE 1 TABLET TWICE DAILY    terazosin (HYTRIN) 10 MG capsule Take 1 capsule (10 mg total) by mouth every evening.    terazosin (HYTRIN) 10 MG capsule TAKE ONE CAPSULE BY MOUTH EVERY EVENING    tramadol (ULTRAM) 50 mg tablet Take 1  tablet (50 mg total) by mouth every 8 (eight) hours as needed for Pain.     No current facility-administered medications for this visit.      Review of patient's allergies indicates:  No Known Allergies    History   Precautions: none noted. standard  Prior Therapy: low back pain in 2014  Nutrition:  Overweight  History of Present Illness:  Patient reported having bilateral knee pain with squatting, prolong standing/pivoting. Last flare up: 10/03/2017  Red Flags  Patient reports no history of trauma including MVA/fall; past or present cancer; fever, chills, night sweats; unexplained weight change in past 3 months; recent infection; ongoing/persistent night pain; change in bowel/bladder, and saddle anesthesia.  Chief complaint: bilateral knee pain  Social History: one story  Place of Residence (Steps/Adaptations): none noted.  Functional Deficits Leading to Referral/Nature of Injury: gait abnormality, bilateral (R>L), hip weakness, impaired transfer skills  Previous functional status includes: difficulty with cedrick/doffing LE clothing/socks  Exercise routine prior to onset : none noted  DME owned:   Work: Retired                     Job description includes:  None noted    Subjective     Pts goals: Increase mobility and be temi to put socks on.    Pain:  5/10  Onset of pain /Mechanism of Onset: 10/03/2017  Chief complaint:  R>L knee pain  Radicular symptoms:  -  Aggravating factors:  Prolong seated tasks  Easing factors:  Rest, exercises    Objective     Mental status :alert  Posture Alignment :slouched posture  Sensation: Light Touch: Intact     RANGE OF MOTION--LOWER EXTREMITIES   (R) LE: limited as follows: knee flexion 108 degrees  Hamstrings Length: 42 degrees  Ankle Dorsiflexion:   8 degrees   (L) LE: WFLs  Hamstrings Length: 40 degrees  Ankle Dorsiflexion: 5 degrees    L/E MMT Right Left Pain/Dysfunction with Movement   Hip Flexion 4/5 4/5          Hip Abduction 4-/5 4/5    Hip Adduction 4+/5 4+/5     Hip IR 4/5 4/5    Hip ER 4-/5 4/5    Knee Flexion 4-/5 4+/5    Knee Extension 4+/5 4+/5    Ankle DF 4+/5 4+/5    Ankle PF 5/5 5/5    Ankle Inversion 5/5 5/5    Ankle Eversion 5/5 5/5      Joint Mobility: (hypomobile, normal, hypermobile)  Patella: hypomobile patella (R) compared to left side    -gastroc/soleus and hamstring tissue extensibility dysfunction    Special test:  GAIT: Chinedu ambulates 50 feet with no assistive device with independently.     GAIT DEVIATIONS: decreased joni, decrease step length to RLE    Transfers:  See sit to stand test  OTHER TESTS:     Evaluation    Timed Up and Go sec    Single Limb Stance R LE     Single Limb Stance L LE     Self Selected Walking Speed  m/sec    30 second Chair Rise 10 completed Low back, decrease forward leaning     Minimum standards/norms:    TUG:  < 13.5 seconds/ Males 7.3 sec, Females 8.1 sec  SLS:  26-29 sec  Ages 20-69  Self Selected Walking Speed:  .4-.8m/sec  Limited community ambulator                                                   .8- 1.2  Community ambulator                                                    1.2 m/sec and above  Able to safely cross streets  30 Second Chair Rise:  Ages 60-64  Males 14-19   Females  12-17      Pt/family was provided educational information, including: role of PT, goals for PT, scheduling - pt verbalized understanding. Discussed insurance limitations with pt.     Exercises were reviewed and pt was able to demonstrate them prior to the end of the session. Pt received a written copy of exercises to perform at home.  See exercises under note tab: patient instructions.  Pt has no cultural, educational or language barriers to learning provided.    Treatment today also included:   CMS Impairment/Limitation/Restriction for FOTO Knee Survey  Status Limitation G-Code CMS Severity Modifier  Intake 45% 55% Current Status CK - At least 40 percent but less than 60 percent  Predicted 55% 45% Goal Status+ CK - At least 40 percent  but less than 60 percent  Assessment   This is a 71 y.o. male referred to outpatient physical therapy and presents with a medical diagnosis of   Encounter Diagnosis   Name Primary?    Gait abnormality     and demonstrates limitations as described in the problem list. Pt will benefit from physical therapy services in order to maximize pain free and/or functional use of bilateral lower extremities. The following goals were discussed with the patient and patient is in agreement with them as to be addressed in the treatment plan.   History  Co-morbidities and personal factors that may impact the plan of care Examination  Body Structures and Functions, activity limitations and participation restrictions that may impact the plan of care    Clinical Presentation   Co-morbidities:   high BMI        Personal Factors:   no deficits Body Regions:   back  lower extremities  trunk    Body Systems:   gross symmetry  ROM  strength  balance  gait  transfers  transitions  motor control        Participation Restrictions:   -community ambulation  -home management  -cedrick/doffing LE clothing     Activity limitations:   Learning and applying knowledge  no deficits    General Tasks and Commands  no deficits    Communication  no deficits    Mobility  lifting and carrying objects  walking    Self care  caring for body parts (brushing teeth, shaving, grooming)    Domestic Life  doing house work (cleaning house, washing dishes, laundry)    Interactions/Relationships  no deficits    Life Areas  no deficits    Community and Social Life  no deficits         stable and uncomplicated                      low   moderate  high Decision Making/ Complexity Score:  moderate       Problem List: pain, decreased ROM, decreased flexibility, decreased strength, postural imbalance, decreased balance and stability, decreased motor control, antalgic gait, inability to participate fully in vocation pursuits and decreased ability to fully participate in  "recreational/sports related activities.    Short Term Goals:  4 weeks  1. Pt will present with increased right knee AROM flexion to 115 degrees.   2. Pt will present with increased hip MMT by one half grade for increased ease with functional ADLs.  3. Pt will report decreased pain to </= 3/10  4. Pt will improve sit to stand transfers up to 12 times in 30" for LE mobility purposes.    Long Term Goals: 8 weeks  1. Patient will ambulate community distances independently with right knee motion: 0-115 degrees with < 5/10 pain.  2. Patient will demonstrate hip/knee MMT 4+ or > for ambulatory tasks.  3. Patient will demonstrate sit to stands 12+ in 30" for mobility purposes.  4. Patient will demonstrate LE donning/doffing independently with < difficulty.  5. Pt will be independent with HEP and self management of symptoms.     Plan     Certification Period:  11/06/2017 to 01/01/2018    Outpatient physical therapy 1-2 times week for 8 to include: Gait Training, Group Therapy, Manual Therapy, Moist Heat/ Ice, Neuromuscular Re-ed, Patient Education, Therapeutic Activites and Therapeutic Exercise Cont PT for  8 weeks. Pt may be seen by PTA as part of the rehabilitation team.     Sandeep Meier, PT, DPT      I certify the need for these services furnished under this plan of treatment and while under my care.  ____________________________________ Physician/Referring Practitioner   Date of Signature    "

## 2017-11-06 NOTE — PATIENT INSTRUCTIONS
Strengthening: Quadriceps Set    Tighten muscles on top of thighs by pushing knees down into surface. You can also put a towel roll under ankle for additional extension purposes. Hold 3 seconds.  Repeat 10 times each leg per set. Do 3 sets per session. Do 3 sessions per day.      Heel Slides    With towel around left heel, gently pull knee up with towel until stretch is felt. Hold 3 seconds.  Repeat 10 times right leg per set. Do 1 sets per session. Do 1 sessions per day.      Straight Leg Raise     With right leg straight, other leg bent, raise straight leg until knees are even. Slowly lower. Roll on your side and repeat lifting top leg up, on other side, lifting bottom leg up, and on stomach kicking back (squeezing your rear end before you kick back).  Repeat 10 times each leg per set. Do 2 sets per session. Do 1 sessions per day.       Strengthening: Hip Abductor - Resisted    Lie flat with band looped around both legs above knees, and push thighs apart, ensuring right and left move together.  Repeat 10 times per set. Do 3 sets per session. Do 3 sessions per day.      Clam Shells    Lie on side with knees bent. Raise top leg, keeping knee bent and ankles together. Do not let your hips roll back as your raise your leg.  Repeat 10 times each leg per set. Do 3 sets per session. Do 3 sessions per day.

## 2017-11-09 ENCOUNTER — TELEPHONE (OUTPATIENT)
Dept: FAMILY MEDICINE | Facility: CLINIC | Age: 72
End: 2017-11-09

## 2017-11-09 NOTE — PROGRESS NOTES
"Name: Chinedu Roque  Clinic Number: [id number]  Date of Treatment: 11/10/2017   Diagnosis:   Encounter Diagnosis   Name Primary?    Gait abnormality        Visit number: 2   Start date: 11/06/2017  POC End date: 01/01/2018    Time in: 9:00  Time Out: 10:00  Total Treatment Time: 55  1:1 Time: 30    Precautions: Hx of CVA, compression fx of L2, HTN    Subjective:    Chinedu reports continued stiffness to B knees, R more than L. Discomfort at posterior knees primarily. Patient reports their pain to be 4/10 on a 0-10 scale with 0 being no pain and 10 being the worst pain imaginable.    Objective    Chinedu received therapeutic exercises to develop strength, ROM and flexibility for 55 minutes including:     Rec Bike x 5 min level 1  Standing gastroc stretch on incline L 2  B quad set 20x5"  R heel slide with strap 10 x 5"  Sup hip add squeeze 20 x 5"  Sup bridge with TA set 2x10x3"  SL clam bilateral with RTB 2x10  SL reverse clam bilateral 2x10  SL hip abd (R) 2x10    Written Home Exercises Provided: Patient given verbal and written instruction for additional bridges, reverse clams, and hip add squeeze. See in Notes section.    Pt demo good understanding of the education provided. Chinedu demonstrated good return demonstration of activities.     Assessment:     Chinedu tolerated treatment well without onset of soreness or pain. He continues with mild stiffness of B knees R > L and weakness of gluteals, abdominals, and B LE's. Relief of R knee stiffness following heel slides. Rates pain post treatment at 2/10 with decreased stiffness overall.   Pt will continue to benefit from skilled PT intervention. Medical Necessity is demonstrated by:  Pain limits function of effected part for some activities, Unable to participate fully in daily activities and Weakness.    Patient is making good progress towards established goals.        Plan:  Continue with established Plan of Care towards PT goals.     "

## 2017-11-10 ENCOUNTER — CLINICAL SUPPORT (OUTPATIENT)
Dept: REHABILITATION | Facility: HOSPITAL | Age: 72
End: 2017-11-10
Attending: ORTHOPAEDIC SURGERY
Payer: MEDICARE

## 2017-11-10 DIAGNOSIS — R26.9 GAIT ABNORMALITY: ICD-10-CM

## 2017-11-10 PROCEDURE — 97110 THERAPEUTIC EXERCISES: CPT | Mod: PN

## 2017-11-10 NOTE — PATIENT INSTRUCTIONS
Pelvic Squeeze / Protected Hip: Adduction / Abduction        Lie on back, folded pillow between knees. Contract inner muscles to set pelvis and push knees into ball. Hold __5_ seconds. Relax, Repeat.  Repeat __10_ times. Do _2__ sessions per day.    Copyright © [employer]. All rights reserved.   Clam        Lie on side, legs bent 90°. Keep knees together and lift top foot into air. Return to other foot and repeat. Perform on both legs.   Repeat __10__ times. Do _2___ sessions per day.     https://pm.[employer]end[employer] 1699.gAuto/148     Copyright © [employer]. All rights reserved.   Bridging        Lie on back with feet shoulder width apart. Lift hips toward the ceiling keeping stomach muscles and buttocks squeezed tight. Hold __2__ seconds.  Repeat __10__ times. Do __2__ sessions per day.     https://hdtMEDIA.vendome 1699.us/356     Copyright © [employer]. All rights reserved.

## 2017-11-14 ENCOUNTER — CLINICAL SUPPORT (OUTPATIENT)
Dept: REHABILITATION | Facility: HOSPITAL | Age: 72
End: 2017-11-14
Attending: ORTHOPAEDIC SURGERY
Payer: MEDICARE

## 2017-11-14 ENCOUNTER — OFFICE VISIT (OUTPATIENT)
Dept: ORTHOPEDICS | Facility: CLINIC | Age: 72
End: 2017-11-14
Payer: MEDICARE

## 2017-11-14 VITALS
WEIGHT: 300 LBS | HEART RATE: 53 BPM | SYSTOLIC BLOOD PRESSURE: 130 MMHG | DIASTOLIC BLOOD PRESSURE: 79 MMHG | BODY MASS INDEX: 42 KG/M2 | HEIGHT: 71 IN

## 2017-11-14 DIAGNOSIS — M25.562 CHRONIC PAIN OF BOTH KNEES: ICD-10-CM

## 2017-11-14 DIAGNOSIS — M17.0 PRIMARY OSTEOARTHRITIS OF BOTH KNEES: Primary | ICD-10-CM

## 2017-11-14 DIAGNOSIS — M25.561 CHRONIC PAIN OF BOTH KNEES: ICD-10-CM

## 2017-11-14 DIAGNOSIS — R26.9 GAIT ABNORMALITY: ICD-10-CM

## 2017-11-14 DIAGNOSIS — M21.162 GENU VARUM OF BOTH LOWER EXTREMITIES: ICD-10-CM

## 2017-11-14 DIAGNOSIS — M21.161 GENU VARUM OF BOTH LOWER EXTREMITIES: ICD-10-CM

## 2017-11-14 DIAGNOSIS — G89.29 CHRONIC PAIN OF BOTH KNEES: ICD-10-CM

## 2017-11-14 PROCEDURE — 99213 OFFICE O/P EST LOW 20 MIN: CPT | Mod: S$GLB,,, | Performed by: ORTHOPAEDIC SURGERY

## 2017-11-14 PROCEDURE — 99999 PR PBB SHADOW E&M-EST. PATIENT-LVL III: CPT | Mod: PBBFAC,,, | Performed by: ORTHOPAEDIC SURGERY

## 2017-11-14 PROCEDURE — 97110 THERAPEUTIC EXERCISES: CPT | Mod: PN

## 2017-11-14 NOTE — PATIENT INSTRUCTIONS
Hamstring Stretch, Seated        Sit with one leg extended with heel down and toes in air. Sit tall and gently lean forward (keeping low back straight) until a stretch is felt in the back of the thigh. Hold, taking slow, deep, relaxed breaths. Repeat on both legs.  Hold for __7__ breaths. Repeat __3__ times each leg. Perform __3__ times a day.    Copyright © I. All rights reserved.

## 2017-11-14 NOTE — PROGRESS NOTES
"Name: Chinedu Roque  Clinic Number: [id number]  Date of Treatment: 11/14/2017   Diagnosis:   Encounter Diagnosis   Name Primary?    Gait abnormality        Visit number: 3   Start date: 11/06/2017  POC End date: 01/01/2018    Time in: 2:00  Time Out: 3:00  Total Treatment Time: 55  1:1 Time: 55    Precautions: Hx of CVA, compression fx of L2, HTN    Subjective:    Chinedu reports having continued pain and stiffness in B knees. Saw Dr. Joe earlier today, with discussion on possible gel injections into B knees and eventual knee replacements due to arthritic changes in his knees. Patient reports their pain to be 4/10 on a 0-10 scale with 0 being no pain and 10 being the worst pain imaginable.    Objective    Chinedu received therapeutic exercises to develop strength, ROM and flexibility for 55 minutes including:     Rec Bike x 5 min level 1  Standing gastroc stretch on incline L 2,  3 x 7 breath cycles  Seated HS stretch 3x7 breath cycles, each leg  B quad set (with towel under knees) 20x5"  R heel slide with strap 10 x 5"  Sup hip add squeeze 20 x 5"  Sup glut squeeze with LE's straight and in hooklying 10 x 5" each  Sup bridge with TA set 10x3" (vc for glute activation and avoidance of pain)  SL clam bilateral with RTB 2x10  SL reverse clam bilateral 2x10  SL hip abd (R) 2x10    Written Home Exercises Provided: Pt tp continue with current written HEP.    Pt demo good understanding of the education provided. Chinedu demonstrated good return demonstration of activities.     Assessment:     Pt demonstrated good tolerance to treatment this date without increase of pain or soreness in the low back and B knees. Difficulty with glute activation with first attempt of bridges, which improved following glute sets and pt was able to perform supine bridges without onset of pain. Continued weakness of core, B hips, and gluteals, and will benefit from continued treatment.   Pt will continue to benefit from skilled PT " intervention. Medical Necessity is demonstrated by:  Pain limits function of effected part for some activities, Unable to participate fully in daily activities and Weakness.    Patient is making good progress towards established goals.        Plan:  Continue with established Plan of Care towards PT goals.

## 2017-11-14 NOTE — PROGRESS NOTES
Subjective:          Chief Complaint: Chinedu Roque is a 71 y.o. male who had concerns including Pain of the Right Knee and Pain of the Left Knee.  Ortho New Patient Questionnaire 10/2/2017   What part of the body brings you in for your visit today?  Knee     Mr. Roque is here for evaluation of his bilateral knee pain that has been present for some time now. He has had multiple surgeries in the remote past. He has had injections in the past as well. Pain is increased with walking; stairs and rising from a seated position. Most recent injection did not help      Leg Pain    The pain is present in the left knee. This is a recurrent problem. The current episode started more than 1 month ago. There has been no history of extremity trauma. The injury was the result of a action while exercising. The problem occurs daily. The problem has been unchanged. The quality of the pain is described as aching. The pain is at a severity of 4/10. Associated symptoms include an inability to bear weight, joint locking, a limited range of motion and stiffness. Pertinent negatives include no fever, itching, numbness or tingling. The symptoms are aggravated by walking. He has tried brace/corset for the symptoms. The treatment provided no relief. Physical therapy was not tried.  Pain   Associated symptoms include myalgias. Pertinent negatives include no chest pain, coughing, diaphoresis, fever, headaches, joint swelling, nausea, neck pain, numbness, rash, vomiting or weakness.         Past Medical History:   Diagnosis Date    Anticoagulant long-term use     ASA 81 mg    Arthritis     cervical    BPH (benign prostatic hyperplasia) 3/2/2012    Chronic left shoulder pain     Compression fracture of L2     DDD (degenerative disc disease), lumbar     HTN (hypertension) 3/2/2012    Low back pain     Morbid obesity with BMI of 40.0-44.9, adult 3/18/2014    Seasonal allergies     Sleep apnea     compliant with CPAP    Stroke 3/1986        Past Surgical History:   Procedure Laterality Date    APPENDECTOMY      EPIDURAL BLOCK INJECTION  2015    cervical    Facet Steroid injection       Pain management    HERNIA REPAIR      [facility]    KNEE SURGERY      bilateral    RADIOFREQUENCY ABLATION  2015    lumbar nerve    VERTEBROPLASTY         Family History   Problem Relation Age of Onset    COPD Father     Hypertension Brother     Kidney disease Brother     Alzheimer's disease Mother     No Known Problems Daughter     Hypertension Son     Diabetes Son      pre-diabetic    No Known Problems Daughter     No Known Problems Daughter          Current Outpatient Prescriptions:     albuterol 90 mcg/actuation inhaler, Inhale 2 puffs into the lungs every 4 (four) hours as needed for Wheezing., Disp: 1 Inhaler, Rfl: 5    aspirin (ECOTRIN) 81 MG EC tablet, Take 81 mg by mouth once daily.  , Disp: , Rfl:     azelastine (ASTELIN) 137 mcg (0.1 %) nasal spray, 2 sprays (274 mcg total) by Nasal route 2 (two) times daily as needed for Rhinitis., Disp: 30 mL, Rfl: 5    cetirizine (ZYRTEC) 10 MG tablet, Take 10 mg by mouth once daily., Disp: , Rfl:     glucosamine-chondroitin 500-400 mg tablet, Take 1 tablet by mouth 2 (two) times daily. , Disp: , Rfl:     losartan (COZAAR) 100 MG tablet, Take 1 tablet (100 mg total) by mouth once daily., Disp: 90 tablet, Rfl: 3    metoprolol tartrate (LOPRESSOR) 25 MG tablet, TAKE 1 TABLET TWICE DAILY, Disp: 180 tablet, Rfl: 2    terazosin (HYTRIN) 10 MG capsule, Take 1 capsule (10 mg total) by mouth every evening., Disp: 90 capsule, Rfl: 3    terazosin (HYTRIN) 10 MG capsule, TAKE ONE CAPSULE BY MOUTH EVERY EVENING, Disp: 90 capsule, Rfl: 1    tramadol (ULTRAM) 50 mg tablet, Take 1 tablet (50 mg total) by mouth every 8 (eight) hours as needed for Pain., Disp: 90 tablet, Rfl: 0    Review of patient's allergies indicates:  No Known Allergies    Vitals:    11/14/17 0915   BP: 130/79   Pulse: (!) 53       Review of  Systems   Constitution: Negative for diaphoresis, fever and weakness.   HENT: Negative for ear pain, hearing loss, nosebleeds and tinnitus.    Eyes: Negative for discharge, pain, redness and visual disturbance.   Cardiovascular: Negative for chest pain and palpitations.   Respiratory: Negative for cough, shortness of breath and wheezing.    Endocrine: Negative for polydipsia and polyuria.   Skin: Negative for itching and rash.   Musculoskeletal: Positive for back pain, joint pain, myalgias and stiffness. Negative for joint swelling and neck pain.   Gastrointestinal: Negative for constipation, diarrhea, nausea and vomiting.   Genitourinary: Negative for frequency, hematuria and urgency.   Neurological: Negative for dizziness, headaches, numbness, seizures and tingling.   Psychiatric/Behavioral: The patient is not nervous/anxious.    Allergic/Immunologic: Negative for environmental allergies.   All other systems reviewed and are negative.                  Objective:        General: Chinedu is well-developed, well-nourished, appears stated age, in no acute distress, alert and oriented to time, place and person.     General    Vitals reviewed.  Constitutional: He is oriented to person, place, and time. He appears well-developed and well-nourished. No distress.   HENT:   Head: Normocephalic and atraumatic.   Nose: Nose normal.   Eyes: Pupils are equal, round, and reactive to light.   Cardiovascular: Normal rate.    Pulmonary/Chest: Effort normal.   Neurological: He is alert and oriented to person, place, and time.   Psychiatric: He has a normal mood and affect. His behavior is normal. Judgment and thought content normal.     General Musculoskeletal Exam   Gait: antalgic       Right Knee Exam     Inspection   Erythema: absent  Scars: present  Swelling: absent  Effusion: effusion  Deformity: deformity  Bruising: absent    Tenderness   The patient is tender to palpation of the medial joint line, medial retinaculum and  patella.    Crepitus   The patient has crepitus of the patella.    Range of Motion   Extension: 5   Flexion: 130     Tests   Ligament Examination Lachman: normal (-1 to 2mm) PCL-Posterior Drawer: normal (0 to 2mm)     MCL - Valgus: abnormal - grade I  LCL - Varus: abnormal - grade I  Patella   Patellar Apprehension: negative  Passive Patellar Tilt: neutral  Patellar Tracking: normal  Patellar Glide (quadrants): Lateral - 1   Medial - 2  Q-Angle at 90 degrees: normal  Patellar Grind: positive    Other   Muscle Tightness: hamstring tightness and quadriceps tightness  Sensation: normal    Comments:  Relatively stiff varus bilaterally    Left Knee Exam     Inspection   Erythema: absent  Scars: present  Swelling: absent  Effusion: absent  Deformity: present  Bruising: absent    Tenderness   The patient tender to palpation of the patella, medial retinaculum and medial joint line.    Crepitus   The patient has crepitus of the patella.    Range of Motion   Extension: 5   Flexion: 130     Tests   Stability Lachman: normal (-1 to 2mm) PCL-Posterior Drawer: normal (0 to 2mm)  MCL - Valgus: abnormal - grade I  LCL - Varus: abnormal - grade I  Patella   Patellar Apprehension: negative  Passive Patellar Tilt: neutral  Patellar Tracking: normal  Patellar Glide (Quadrants): Lateral - 1 Medial - 2  Q-Angle at 90 degrees: normal  Patellar Grind: positive    Other   Muscle Tightness: hamstring tightness and quadriceps tightness  Sensation: normal    Muscle Strength   Right Lower Extremity   Hip Abduction: 5/5   Quadriceps:  5/5   Hamstrin/5   Left Lower Extremity   Hip Abduction: 5/5   Quadriceps:  5/5   Hamstrin/5     Vascular Exam     Right Pulses  Dorsalis Pedis:      2+  Posterior Tibial:      2+        Left Pulses  Dorsalis Pedis:      2+  Posterior Tibial:      2+        Edema  Right Lower Leg: absent  Left Lower Leg: absent        Current and previous radiographic studies and results were reviewed with the patient:    The bones are osteopenic.  There is tricompartmental osteophyte formation observed in both knees.  There is moderate-severe right and moderate left medial tibiofemoral joint space narrowing with AP standing positioning.  There is severe bilateral medial tibiofemoral joint space narrowing with PA flexion positioning of the knees.  There is left lateral patellofemoral joint space narrowing.  No left knee joint effusion.  There is a small right knee joint effusion.  There are remote imaging sequelae of Osgood-Schlatter disease at the left and right anterior tibial tubercle.  There are vascular calcifications present within the left and right popliteal fossa.      Assessment:       Encounter Diagnoses   Name Primary?    Acute pain of both knees Yes    Primary osteoarthritis of both knees           Plan:         The patient was counseled today regarding his diagnostic study results and the different treatment options. I spent >50% of the time discussing conservative vs. Operative options with the patient as well as risks and benefits of the different options.  PT referral  Will continue with eval and management by pain management  F/U discussed            Answers for HPI/ROS submitted by the patient on 11/13/2017   Leg pain  activity change: No  unexpected weight change: No  rhinorrhea: No  trouble swallowing: No  chest tightness: No  blood in stool: No  difficulty urinating: Yes  arthralgias: Yes  confusion: No  dysphoric mood: No  appetite change : No  sleep disturbance: No  IMMUNOCOMPROMISED: No  sinus pressure : No  food allergies: No  Radiating Pain: No

## 2017-11-17 ENCOUNTER — CLINICAL SUPPORT (OUTPATIENT)
Dept: REHABILITATION | Facility: HOSPITAL | Age: 72
End: 2017-11-17
Attending: ORTHOPAEDIC SURGERY
Payer: MEDICARE

## 2017-11-17 DIAGNOSIS — R26.9 GAIT ABNORMALITY: ICD-10-CM

## 2017-11-17 PROCEDURE — 97110 THERAPEUTIC EXERCISES: CPT | Mod: PN

## 2017-11-17 NOTE — PROGRESS NOTES
"Name: Chinedu Roque  Clinic Number: [id number]  Date of Treatment: 11/17/2017   Diagnosis:   Encounter Diagnosis   Name Primary?    Gait abnormality        Visit number: 4   Start date: 11/06/2017  POC End date: 01/01/2018    Time in: 10:02  Time Out: 10:58  Total Treatment Time: 55  1:1 Time: 25    Precautions: Hx of CVA, compression fx of L2, HTN    Subjective:    Chinedu reports having mild increase of discomfort in the L more than R knee. Patient reports their pain to be 3/10 on a 0-10 scale with 0 being no pain and 10 being the worst pain imaginable.    Objective    Chinedu received therapeutic exercises to develop strength, ROM and flexibility for 55 minutes including:     Rec Bike x 5 min level 1  Standing gastroc stretch on incline L 2,  2x10 breath cycles  Seated HS stretch 2x10 breath cycles, each leg  B quad set (with towel under knees) 20x5"  R heel slide with strap 10 x 5"  Sup hip add squeeze 20 x 5"  Sup glut squeeze with LE's straight and in hooklying 10 x 5" each  Individual glute squeeze 10 x 5" L and R with legs straight  Sup bridge with TA set 2x10x3" (vc for glute activation and avoidance of pain)  SL clam bilateral with RTB 2x10  SL reverse clam bilateral 2x10  SL hip abd (R) 2x10    Written Home Exercises Provided: Pt to continue with current written HEP, emphasis on core and gluteal activation during daily activities for reduced low back pain.    Pt demo good understanding of the education provided. Chinedu demonstrated good return demonstration of activities.     Assessment:     Chinedu tolerated treatment well this date overall. Improved glute activation with supine exercises, especially bridges. No complaints of increased pain or soreness in the low back and B knees. Decreased stiffness in the back yet continued pain reported. He presents with continued weakness of core, B hips gluteals yet is making slow improvements. Pt will benefit from continued treatment.   Pt will continue to benefit " from skilled PT intervention. Medical Necessity is demonstrated by:  Pain limits function of effected part for some activities, Unable to participate fully in daily activities and Weakness.    Patient is making good progress towards established goals.        Plan:  Continue with established Plan of Care towards PT goals.

## 2017-11-20 NOTE — PROGRESS NOTES
"Name: Chinedu Roque  Clinic Number: [id number]  Date of Treatment: 11/21/2017   Diagnosis:   Encounter Diagnosis   Name Primary?    Gait abnormality        Visit number: 5   Start date: 11/06/2017  POC End date: 01/01/2018    Time in: 9:05  Time Out: 10:02  Total Treatment Time: 55  1:1 Time: 30    Precautions: Hx of CVA, compression fx of L2, HTN    Subjective:    Chinedu reports increased low back pain this date. States pain in his knees is more tolerable. Patient reports their lumbar pain to be 4/10 on a 0-10 scale with 0 being no pain and 10 being the worst pain imaginable.    Objective    Chinedu received therapeutic exercises to develop strength, ROM and flexibility for 55 minutes including:     Rec Bike x 7 min level 3  Standing gastroc stretch on incline L 2,  2x10 breath cycles  Standing glut squeeze 20 x 5" each  Standing Individual glute squeeze 10 x 5" L and R 15 x 5"  Seated HS stretch 2x10 breath cycles, each leg  Standing mini squat with UE assist 2x10  B quad set (with towel under knees) 20x5"  R heel slide with strap 10 x 5"  Sup hip add squeeze 20 x 5"  Sup bridge with TA set 2x10x3" (vc for glute activation and avoidance of pain)  SL clam bilateral with RTB 3x10  SL reverse clam bilateral 3x10  SL hip abd (R) 2x10    Written Home Exercises Provided: Pt to continue with current written HEP, emphasis on core and gluteal activation during daily activities for reduced low back pain.    Pt demo good understanding of the education provided. Chinedu demonstrated good return demonstration of activities.     Assessment:     Pt with good tolerance to treatment without increased pain however reports continued lumbar pain post treatment. Strength and mobility of B LEs slowly improving yet continues with weakness of core and gluteals. No pain rating given post treatment. Pt will benefit from continued treatment.   Pt will continue to benefit from skilled PT intervention. Medical Necessity is demonstrated by:  " Pain limits function of effected part for some activities, Unable to participate fully in daily activities and Weakness.    Patient is making good progress towards established goals.        Plan:  Continue with established Plan of Care towards PT goals.

## 2017-11-21 ENCOUNTER — CLINICAL SUPPORT (OUTPATIENT)
Dept: REHABILITATION | Facility: HOSPITAL | Age: 72
End: 2017-11-21
Attending: ORTHOPAEDIC SURGERY
Payer: MEDICARE

## 2017-11-21 ENCOUNTER — OFFICE VISIT (OUTPATIENT)
Dept: PAIN MEDICINE | Facility: CLINIC | Age: 72
End: 2017-11-21
Payer: MEDICARE

## 2017-11-21 VITALS
RESPIRATION RATE: 18 BRPM | TEMPERATURE: 98 F | BODY MASS INDEX: 41.23 KG/M2 | DIASTOLIC BLOOD PRESSURE: 74 MMHG | WEIGHT: 295.63 LBS | HEART RATE: 76 BPM | SYSTOLIC BLOOD PRESSURE: 130 MMHG

## 2017-11-21 DIAGNOSIS — R26.9 GAIT ABNORMALITY: ICD-10-CM

## 2017-11-21 DIAGNOSIS — M47.816 FACET HYPERTROPHY OF LUMBAR REGION: Primary | ICD-10-CM

## 2017-11-21 DIAGNOSIS — M51.36 DDD (DEGENERATIVE DISC DISEASE), LUMBAR: ICD-10-CM

## 2017-11-21 PROCEDURE — 97110 THERAPEUTIC EXERCISES: CPT | Mod: PN

## 2017-11-21 PROCEDURE — 99999 PR PBB SHADOW E&M-EST. PATIENT-LVL III: CPT | Mod: PBBFAC,,, | Performed by: PHYSICIAN ASSISTANT

## 2017-11-21 PROCEDURE — 99213 OFFICE O/P EST LOW 20 MIN: CPT | Mod: S$GLB,,, | Performed by: PHYSICIAN ASSISTANT

## 2017-11-21 NOTE — PROGRESS NOTES
This note was completed with dictation software and grammatical errors may exist.    CC:Back pain    HPI: The patient is a 71-year-old man with a history of hypertension, obesity and low back pain who presents in referral from Dr. Winters for chronic right low back pain.  He is status post right L1, 2, 3, 4 and 5 medial branch radiofrequency ablation on 10/17/17 with about 50% relief.  He complains of right low back pain radiating along the right lateral waist.  He is currently doing physical therapy for his back and knees.  The pain is worse with bending and walking, improved with sitting.  He has some random pain at night as well.  He denies weakness, numbness, bladder or bowel incontinence.    Pain intervention history: He done physical therapy in January, 2014 with moderate relief but not sustained.  He takes Relafen, tramadol, baclofen and chlorzoxazone as needed with mild relief.  He had undergone what sounds like a series of epidurals several years ago with no major relief. The patient is status post a right side L2/3, L3/4, L4/5 and L5/S1 facet joint injection on 10/28/14 with 50% relief of his back pain for 1 month.  He is status post radiofrequency ablation of the right L1, 2, 3, 4 and 5 medial branch nerves on 3/18/15 with 75% relief.  He is status post C7-T1 cervical interlaminar epidural steroid injection on 5/11/15 with 70% relief.  He is status post right L1, 2, 3, 4 and 5 medial branch radiofrequency ablation on 7/5/16 with 50% relief.   He is status post right L1, 2, 3, 4 and 5 medial branch radiofrequency ablation on 10/17/17 with about 50% relief.    ROS:He reports back pain.  Balance of review of systems is negative.    Medical, surgical, family and social history reviewed elsewhere in record.    Medications/Allergies: See med card    Vitals:    11/21/17 1259   BP: 130/74   Pulse: 76   Resp: 18   Temp: 97.7 °F (36.5 °C)   TempSrc: Oral   Weight: 134.1 kg (295 lb 10.2 oz)   PainSc:   4   PainLoc:  Back         Physical exam:  Gen: A and O x3, pleasant, well-groomed  Skin: No rashes or obvious lesions  HEENT: PERRLA, no obvious deformities on ears or in canals.   CVS: Regular rate and rhythm, normal S1 and S2, no murmurs.  Resp: Clear to auscultation bilaterally, no wheezes or rales.  Abdomen: Soft, NT/ND, normal bowel sounds present.  Musculoskeletal:  No antalgic gait.     Neuro:  Upper extremities: 5/5 strength bilaterally   Lower extremities: 5/5 strength bilaterally  Reflexes: Brachioradialis 2+, Bicep 2+, Tricep 2+. Patellar 2+, Achilles 2+.  Sensory: Intact and symmetrical to light touch and pinprick in C2-T1 dermatomes bilaterally. Intact and symmetrical to light touch and pinprick in L2-S1 dermatomes bilaterally.    Lumbar spine:  Lumbar spine:Range of motion is moderately reduced with flexion, extension and oblique extension with increased right low back pain during each maneuver, especially right oblique extension and extension.  Ramakrishna's test causes no increased pain on either side.    Supine straight leg raise negative bilaterally.  Internal and external rotation of the hip causes no increased pain on either side.  Myofascial exam: No tenderness to palpation across the lumbar paraspinous muscles.      Imagin14 Xray L-spine  There is diffuse osteopenia. Mild to moderate chronic compression fracture with anterior wedging and evidence of vertebroplasty at L2. minimal left convex curvature in the upper lumbar region. No spondylolisthesis. Mild concavity of the superior endplate of L4 which could be small compression fracture or Schmorl's node. No additional fracture.   There is moderate degenerative change within the lumbar spine with anterior osteophyte formation most prominent at L4-L5 and L2- L3 and L1 - L2, also present in the lower thoracic region with flowing ossification anteriorly suggesting diffuse idiopathic sclerotic hyperostosis. There is also mild decreased intervertebral disk  space height and endplate sclerosis the lower thoracic and upper lumbar regions. Due to the degree of osseous mineralization, it is difficult to evaluate for any possible pars defects. Moderate sclerosis suggesting facet arthropathy in the lumbar spine present and there is mild sclerosis, likely degenerative in nature involving the sacroiliac joints.    10/7/14 MRI lumbar spine: At L1/2 there is mild spinal stenosis secondary to facet hypertrophy and disc bulging.  At L2/3 there is minimal spinal stenosis secondary to retropulsion of L2 compression fracture, appearance of prior kyphoplasty present.  There is minimal disc bulging but there is also some facet hypertrophy at this level.  At L3/4 there is facet and ligamentum hypertrophy, minimal disc bulging causing mild spinal stenosis and neuroforaminal narrowing on the left greater than the right side.  At L4/5 there is moderate hypertrophic facet and ligamentum hypertrophy with mild left foraminal narrowing compared to the right side.  There is no spinal stenosis at this level.  At L5/S1 there is a broad-based disc bulge that extends to the left side more than the right side along with asymmetric left sided facet hypertrophy and ligamentum flavum hypertrophy causing moderate left foraminal stenosis.    4/15/15 outside open MRI cervical spine [facility]  C3-4 posterior disc bulge producing mild bilateral foraminal narrowing  C4-5 posterior disc bulge producing mild bilateral foraminal narrowing, possible tiny annular tear in the posterior disc, anterior thecal sac deformity with no evidence of spinal stenosis  C5-6 circumferential disc bulge producing moderate to severe bilateral foraminal narrowing, effacement of the bilateral lateral recesses worse to the right, anterior thecal sac deformity with effacement of the anterior subarachnoid space, mild spinal canal stenosis  C6-7 circumferential disc bulge producing moderate to severe bilateral foraminal narrowing with  mild spinal canal stenosis  C7-T1 not completely included but appears to have a circumferential disc bulge with shallow midline disc protrusion with possible mild spinal canal stenosis and bilateral foraminal narrowing      Assessment:  The patient is a 71-year-old man with a history of hypertension, obesity and low back pain who presents in referral from Dr. Winters for chronic right low back pain.   1. Facet hypertrophy of lumbar region     2. DDD (degenerative disc disease), lumbar           Plan:  1.  The patient had relief following the right L1, 2, 3, 4 and 5 medial branch RFA but states that it is not feel as though it helped as much as it did in the past.  He will give this another few weeks and call me if he does not continue to improve.  If he calls, I will order a new lumbar spine MRI and he would like to have this done at [facility].  We may then consider epidural steroid injections.  Otherwise if he continues to improve, he will follow-up on an as-needed basis.

## 2017-11-22 ENCOUNTER — OFFICE VISIT (OUTPATIENT)
Dept: FAMILY MEDICINE | Facility: CLINIC | Age: 72
End: 2017-11-22
Payer: MEDICARE

## 2017-11-22 VITALS
SYSTOLIC BLOOD PRESSURE: 138 MMHG | HEIGHT: 71 IN | WEIGHT: 296.31 LBS | DIASTOLIC BLOOD PRESSURE: 80 MMHG | BODY MASS INDEX: 41.48 KG/M2 | TEMPERATURE: 98 F

## 2017-11-22 DIAGNOSIS — E66.01 MORBID OBESITY WITH BMI OF 40.0-44.9, ADULT: Primary | ICD-10-CM

## 2017-11-22 DIAGNOSIS — N40.1 BENIGN PROSTATIC HYPERPLASIA WITH NOCTURIA: ICD-10-CM

## 2017-11-22 DIAGNOSIS — N41.1 CHRONIC PROSTATITIS: ICD-10-CM

## 2017-11-22 DIAGNOSIS — I10 ESSENTIAL HYPERTENSION: ICD-10-CM

## 2017-11-22 DIAGNOSIS — R35.1 BENIGN PROSTATIC HYPERPLASIA WITH NOCTURIA: ICD-10-CM

## 2017-11-22 LAB
BILIRUB UR QL STRIP: NEGATIVE
CLARITY UR REFRACT.AUTO: CLEAR
COLOR UR AUTO: YELLOW
GLUCOSE UR QL STRIP: NEGATIVE
HGB UR QL STRIP: ABNORMAL
KETONES UR QL STRIP: NEGATIVE
LEUKOCYTE ESTERASE UR QL STRIP: NEGATIVE
MICROSCOPIC COMMENT: NORMAL
NITRITE UR QL STRIP: NEGATIVE
PH UR STRIP: 6 [PH] (ref 5–8)
PROT UR QL STRIP: NEGATIVE
RBC #/AREA URNS AUTO: 2 /HPF (ref 0–4)
SP GR UR STRIP: 1.01 (ref 1–1.03)
URN SPEC COLLECT METH UR: ABNORMAL
UROBILINOGEN UR STRIP-ACNC: NEGATIVE EU/DL
WBC #/AREA URNS AUTO: 0 /HPF (ref 0–5)

## 2017-11-22 PROCEDURE — 81001 URINALYSIS AUTO W/SCOPE: CPT

## 2017-11-22 PROCEDURE — 99214 OFFICE O/P EST MOD 30 MIN: CPT | Mod: S$GLB,,, | Performed by: FAMILY MEDICINE

## 2017-11-22 PROCEDURE — 99999 PR PBB SHADOW E&M-EST. PATIENT-LVL III: CPT | Mod: PBBFAC,,, | Performed by: FAMILY MEDICINE

## 2017-11-22 PROCEDURE — 99499 UNLISTED E&M SERVICE: CPT | Mod: S$PBB,,, | Performed by: FAMILY MEDICINE

## 2017-11-22 PROCEDURE — 87086 URINE CULTURE/COLONY COUNT: CPT

## 2017-11-22 RX ORDER — SULFAMETHOXAZOLE AND TRIMETHOPRIM 800; 160 MG/1; MG/1
1 TABLET ORAL 2 TIMES DAILY
Qty: 60 TABLET | Refills: 0 | Status: SHIPPED | OUTPATIENT
Start: 2017-11-22 | End: 2017-11-29

## 2017-11-22 RX ORDER — FINASTERIDE 5 MG/1
5 TABLET, FILM COATED ORAL DAILY
Qty: 90 TABLET | Refills: 3 | Status: SHIPPED | OUTPATIENT
Start: 2017-11-22 | End: 2018-11-09 | Stop reason: SDUPTHER

## 2017-11-22 NOTE — PROGRESS NOTES
"Subjective:       Patient ID: Chinedu Roque is a 71 y.o. male.    Chief Complaint: Nocturia (Urinary frequency mainly at night. Minimal output. No burn w/ urination. )    2 weeks of urinary odor. Increased frequency and nocturia 3-4 per night. Weak stream. No dysuria or pelvic pain. No nausea or fever. Similar sx in Aug improved with Doxy for 3 weeks. Hx of BPH and takes Hytrin 10 mg. BP has been ok at home. Brings arm and wrist cuff. Arm correlates. Wrist reads 157/93.       Review of Systems   Constitutional: Negative for fever.   Respiratory: Negative for shortness of breath.    Cardiovascular: Negative for chest pain.   Musculoskeletal: Positive for back pain.        Back improved with rhizotomy but still some pain.        Objective:     Blood pressure 138/80, temperature 98.4 °F (36.9 °C), temperature source Oral, height 5' 11" (1.803 m), weight 134.4 kg (296 lb 4.8 oz).      Physical Exam   Constitutional:   Obese in no distress.    Cardiovascular: Normal rate and regular rhythm.    Pulmonary/Chest: Effort normal and breath sounds normal.   Abdominal: Soft. Bowel sounds are normal. There is no tenderness.   Genitourinary:   Genitourinary Comments: Prostate enlarged and sl boggy. Not tender. Hard to reach. No nodules.        Assessment:       1. Morbid obesity with BMI of 40.0-44.9, adult    2. Essential hypertension    3. Benign prostatic hyperplasia with nocturia    4. Chronic prostatitis        Plan:       Trial bactrim ds bid 1 month. Proscar 5 mg. May need urology eval. Monitor bp. Reviewed recent lab.      "

## 2017-11-24 ENCOUNTER — CLINICAL SUPPORT (OUTPATIENT)
Dept: REHABILITATION | Facility: HOSPITAL | Age: 72
End: 2017-11-24
Attending: ORTHOPAEDIC SURGERY
Payer: MEDICARE

## 2017-11-24 DIAGNOSIS — R26.9 GAIT ABNORMALITY: ICD-10-CM

## 2017-11-24 LAB — BACTERIA UR CULT: NORMAL

## 2017-11-24 PROCEDURE — 97110 THERAPEUTIC EXERCISES: CPT | Mod: PN

## 2017-11-24 NOTE — PROGRESS NOTES
"Name: Chinedu Roque  Clinic Number: [id number]  Date of Treatment: 11/24/2017   Diagnosis:   Encounter Diagnosis   Name Primary?    Gait abnormality        Visit number: 6  Start date: 11/06/2017  POC End date: 01/01/2018    Time in: 10:02  Time Out: 11:00  Total Treatment Time: 55  1:1 Time: 30    Precautions: Hx of CVA, compression fx of L2, HTN    Subjective:    Chinedu reports increased low back pain this date. States pain in his knees is more tolerable. Patient reports their lumbar pain to be mild/10 on a 0-10 scale with 0 being no pain and 10 being the worst pain imaginable.    Objective    Chinedu received therapeutic exercises to develop strength, ROM and flexibility for 55 minutes including:     Rec Bike x 5 min level 3  Seated HS stretch 2x10 breath cycles, each leg  Standing gastroc stretch on incline L 2,  2x10 breath cycles  Standing glut squeeze 20 x 5" each  Standing Individual glute squeeze 10 x 5" L and R 15 x 5"  Standing mini squat with UE assist 3x10  Supine SAQ with red bolster 3x10 alternating  B quad set (with towel under knees) 20x5"  R heel slide with strap 10 x 5"  Sup hip add squeeze 20 x 5"  Sup bridge with TA set 2x10x3" (vc for glute activation and avoidance of pain)  SL clam bilateral with RTB 3x10  SL reverse clam bilateral 3x10 (NP)  SL hip abd (R) 2x10    Written Home Exercises Provided: Pt to continue with current written HEP, emphasis on core and gluteal activation during daily activities for reduced low back pain.    Pt demo good understanding of the education provided. Chinedu demonstrated good return demonstration of activities.     Assessment:     Chinedu demonstrated overall good tolerance to treatment this date without onset of pain. Relief felt in posterior R LE following SAQ on bolster. Continued but decreased lumbar pain reported post treatment. LE and hip strength is slowly improving. Pt will benefit from continued treatment.   Pt will continue to benefit from skilled PT " intervention. Medical Necessity is demonstrated by:  Pain limits function of effected part for some activities, Unable to participate fully in daily activities and Weakness.    Patient is making good progress towards established goals.        Plan:  Continue with established Plan of Care towards PT goals.

## 2017-11-28 ENCOUNTER — CLINICAL SUPPORT (OUTPATIENT)
Dept: REHABILITATION | Facility: HOSPITAL | Age: 72
End: 2017-11-28
Attending: ORTHOPAEDIC SURGERY
Payer: MEDICARE

## 2017-11-28 ENCOUNTER — PATIENT MESSAGE (OUTPATIENT)
Dept: FAMILY MEDICINE | Facility: CLINIC | Age: 72
End: 2017-11-28

## 2017-11-28 DIAGNOSIS — R26.9 GAIT ABNORMALITY: ICD-10-CM

## 2017-11-28 PROCEDURE — 97110 THERAPEUTIC EXERCISES: CPT | Mod: PN | Performed by: PHYSICAL THERAPIST

## 2017-11-28 NOTE — PROGRESS NOTES
"Name: Chinedu Roque  Clinic Number: [id number]  Date of Treatment: 11/28/2017   Diagnosis:   Encounter Diagnosis   Name Primary?    Gait abnormality        Visit number: 6  Start date: 11/06/2017  POC End date: 01/01/2018    Time in: 1000  Time Out: 1100  Total Treatment Time: 55    Precautions: Hx of CVA, compression fx of L2, HTN    Subjective:    Chinedu reports having bilateral knee stiffness and may have to see the MD due to prostate issues.  Patient reports their lumbar pain to be mild/10 on a 0-10 scale with 0 being no pain and 10 being the worst pain imaginable.    Objective    Chinedu received therapeutic exercises to develop strength, ROM and flexibility for 55 minutes including:     Rec Bike x 5 min level 3  Seated HS stretch 2x10 breath cycles, each leg  Standing gastroc stretch on incline L 2,  2x10 breath cycles    B quad set (with towel under knees) 3/10  R heel slide with strap 10 x 5"  Sup hip add squeeze 20 x 5"  Sup bridge with TA set 2x10x3" (vc for glute activation and avoidance of pain)  SL clam bilateral with RTB 3x10    -shuttle:  Bilateral 4b 3/10, single 2b 3/10    Chair rise testing:     Written Home Exercises Provided: Pt to continue with current written HEP, emphasis on core and gluteal activation during daily activities for reduced low back pain.    Pt demo good understanding of the education provided. Chinedu demonstrated good return demonstration of activities.     Assessment:   -AROM: right knee flexion= 110 degrees  -sit to stand:  11 times/30"  -gait abnormality    Chinedu demonstrated overall good tolerance to treatment this date without onset of pain. Relief felt in posterior R LE following SAQ on bolster. Continued but decreased lumbar pain reported post treatment. LE and hip strength is slowly improving. Pt will benefit from continued treatment.   Pt will continue to benefit from skilled PT intervention. Medical Necessity is demonstrated by:  Pain limits function of effected " "part for some activities, Unable to participate fully in daily activities and Weakness.    Patient is making good progress towards established goals.  Short Term Goals:  4 weeks  1. Pt will present with increased right knee AROM flexion to 115 degrees.   2. Pt will present with increased hip MMT by one half grade for increased ease with functional ADLs. Ongoing  3. Pt will report decreased pain to </= 3/10. Ongoing  4. Pt will improve sit to stand transfers up to 12 times in 30" for LE mobility purposes.    Plan:  Sandeep Meier PTPT/PTA conference(face to face) on 11/28/2017 with Gloria Chambers on POC/progression.    Sandeep Meier PT    Continue with established Plan of Care towards PT goals.   "

## 2017-11-29 ENCOUNTER — INITIAL CONSULT (OUTPATIENT)
Dept: UROLOGY | Facility: CLINIC | Age: 72
End: 2017-11-29
Payer: MEDICARE

## 2017-11-29 ENCOUNTER — OFFICE VISIT (OUTPATIENT)
Dept: FAMILY MEDICINE | Facility: CLINIC | Age: 72
End: 2017-11-29
Payer: MEDICARE

## 2017-11-29 VITALS
HEART RATE: 63 BPM | HEIGHT: 71 IN | SYSTOLIC BLOOD PRESSURE: 143 MMHG | BODY MASS INDEX: 41.05 KG/M2 | DIASTOLIC BLOOD PRESSURE: 81 MMHG | WEIGHT: 293.19 LBS

## 2017-11-29 VITALS
WEIGHT: 292.31 LBS | HEIGHT: 71 IN | BODY MASS INDEX: 40.92 KG/M2 | TEMPERATURE: 98 F | DIASTOLIC BLOOD PRESSURE: 78 MMHG | SYSTOLIC BLOOD PRESSURE: 138 MMHG

## 2017-11-29 DIAGNOSIS — R33.9 URINARY RETENTION: ICD-10-CM

## 2017-11-29 DIAGNOSIS — R35.0 INCREASED URINARY FREQUENCY: ICD-10-CM

## 2017-11-29 DIAGNOSIS — R35.1 BENIGN PROSTATIC HYPERPLASIA WITH NOCTURIA: Primary | ICD-10-CM

## 2017-11-29 DIAGNOSIS — R39.15 URINARY URGENCY: ICD-10-CM

## 2017-11-29 DIAGNOSIS — R33.9 URINARY RETENTION: Primary | ICD-10-CM

## 2017-11-29 DIAGNOSIS — N40.1 BENIGN PROSTATIC HYPERPLASIA WITH NOCTURIA: Primary | ICD-10-CM

## 2017-11-29 DIAGNOSIS — N13.8 ENLARGED PROSTATE WITH URINARY OBSTRUCTION: ICD-10-CM

## 2017-11-29 DIAGNOSIS — N40.1 ENLARGED PROSTATE WITH URINARY OBSTRUCTION: ICD-10-CM

## 2017-11-29 LAB
BILIRUB SERPL-MCNC: ABNORMAL MG/DL
BLOOD URINE, POC: ABNORMAL
COLOR, POC UA: YELLOW
GLUCOSE UR QL STRIP: ABNORMAL
KETONES UR QL STRIP: ABNORMAL
LEUKOCYTE ESTERASE URINE, POC: ABNORMAL
NITRITE, POC UA: ABNORMAL
PH, POC UA: 5
PROTEIN, POC: ABNORMAL
SPECIFIC GRAVITY, POC UA: 1.01
UROBILINOGEN, POC UA: ABNORMAL

## 2017-11-29 PROCEDURE — 99999 PR PBB SHADOW E&M-EST. PATIENT-LVL III: CPT | Mod: PBBFAC,,, | Performed by: UROLOGY

## 2017-11-29 PROCEDURE — 99999 PR PBB SHADOW E&M-EST. PATIENT-LVL IV: CPT | Mod: PBBFAC,,, | Performed by: FAMILY MEDICINE

## 2017-11-29 PROCEDURE — 99204 OFFICE O/P NEW MOD 45 MIN: CPT | Mod: 25,S$GLB,, | Performed by: UROLOGY

## 2017-11-29 PROCEDURE — 99213 OFFICE O/P EST LOW 20 MIN: CPT | Mod: S$GLB,,, | Performed by: FAMILY MEDICINE

## 2017-11-29 PROCEDURE — 81002 URINALYSIS NONAUTO W/O SCOPE: CPT | Mod: S$GLB,,, | Performed by: UROLOGY

## 2017-11-29 RX ORDER — TAMSULOSIN HYDROCHLORIDE 0.4 MG/1
0.4 CAPSULE ORAL DAILY
Qty: 30 CAPSULE | Refills: 11 | Status: SHIPPED | OUTPATIENT
Start: 2017-11-29 | End: 2018-10-30 | Stop reason: SDUPTHER

## 2017-11-29 RX ORDER — TERAZOSIN 10 MG/1
20 CAPSULE ORAL NIGHTLY
Qty: 180 CAPSULE | Refills: 3 | Status: SHIPPED | OUTPATIENT
Start: 2017-11-29 | End: 2018-10-31 | Stop reason: ALTCHOICE

## 2017-11-29 NOTE — PROGRESS NOTES
Subjective:       Patient ID: Chinedu Roque is a 71 y.o. male.    Chief Complaint: Urinary Retention    HPI     71 with difficulty emptying.  He complains of worsening urinary frequency and urgency.  He had painful urination and bad urine odor.  Seen by Dr. Winters.  Given a course of Bactrim.  Urine culture is negative.  This has been a long term problem but getting worse.  He takes Hytrin 10mg for years.  He denies gross hematuria.  Also given addition of Finasteride last week.  He has a long history of elevated PSA and previous distant benign biopsy.  He has no family history of prostate cancer.  Urine dipstick shows negative for nitrites, leukocytes, glucose, protein, positive for trace blood.   ml.  Cath placed an drained 600 cc    Component PSA, SCREEN   Latest Ref Rng & Units 0.00 - 4.00 ng/mL   8/3/2017 7.1 (H)   6/25/2016 6.7 (H)   4/23/2016 7.9 (H)   4/11/2015 5.3 (H)   4/2/2014 5.4 (H)   3/28/2013 5.41 (H)   9/8/2011 6.0 (H)   3/23/2011 5.63 (H)   3/22/2010 4.5 (H)     Past Medical History:   Diagnosis Date    Anticoagulant long-term use     ASA 81 mg    Arthritis     cervical    BPH (benign prostatic hyperplasia) 3/2/2012    Chronic left shoulder pain     Compression fracture of L2     DDD (degenerative disc disease), lumbar     HTN (hypertension) 3/2/2012    Low back pain     Morbid obesity with BMI of 40.0-44.9, adult 3/18/2014    Seasonal allergies     Sleep apnea     compliant with CPAP    Stroke 3/1986     Past Surgical History:   Procedure Laterality Date    APPENDECTOMY      EPIDURAL BLOCK INJECTION  2015    cervical    Facet Steroid injection       Pain management    HERNIA REPAIR      [facility]    KNEE SURGERY      bilateral    RADIOFREQUENCY ABLATION  2015    lumbar nerve    VERTEBROPLASTY         Current Outpatient Prescriptions:     albuterol 90 mcg/actuation inhaler, Inhale 2 puffs into the lungs every 4 (four) hours as needed for Wheezing., Disp: 1 Inhaler, Rfl:  5    aspirin (ECOTRIN) 81 MG EC tablet, Take 81 mg by mouth once daily.  , Disp: , Rfl:     azelastine (ASTELIN) 137 mcg (0.1 %) nasal spray, 2 sprays (274 mcg total) by Nasal route 2 (two) times daily as needed for Rhinitis., Disp: 30 mL, Rfl: 5    cetirizine (ZYRTEC) 10 MG tablet, Take 10 mg by mouth once daily., Disp: , Rfl:     finasteride (PROSCAR) 5 mg tablet, Take 1 tablet (5 mg total) by mouth once daily., Disp: 90 tablet, Rfl: 3    glucosamine-chondroitin 500-400 mg tablet, Take 1 tablet by mouth 2 (two) times daily. , Disp: , Rfl:     losartan (COZAAR) 100 MG tablet, Take 1 tablet (100 mg total) by mouth once daily., Disp: 90 tablet, Rfl: 3    metoprolol tartrate (LOPRESSOR) 25 MG tablet, TAKE 1 TABLET TWICE DAILY, Disp: 180 tablet, Rfl: 2    sulfamethoxazole-trimethoprim 800-160mg (BACTRIM DS) 800-160 mg Tab, Take 1 tablet by mouth 2 (two) times daily., Disp: 60 tablet, Rfl: 0    terazosin (HYTRIN) 10 MG capsule, Take 2 capsules (20 mg total) by mouth every evening., Disp: 180 capsule, Rfl: 3    tramadol (ULTRAM) 50 mg tablet, Take 1 tablet (50 mg total) by mouth every 8 (eight) hours as needed for Pain., Disp: 90 tablet, Rfl: 0    tamsulosin (FLOMAX) 0.4 mg Cp24, Take 1 capsule (0.4 mg total) by mouth once daily., Disp: 30 capsule, Rfl: 11        Review of Systems   Constitutional: Negative for fever.   Eyes: Negative for visual disturbance.   Respiratory: Negative for shortness of breath.    Cardiovascular: Negative for chest pain.   Gastrointestinal: Negative for nausea.   Genitourinary: Positive for dysuria and frequency. Negative for hematuria.   Musculoskeletal: Negative for gait problem.   Skin: Negative for rash.   Neurological: Negative for seizures.   Psychiatric/Behavioral: Negative for confusion.       Objective:      Physical Exam   Constitutional: He is oriented to person, place, and time. He appears well-developed and well-nourished.   HENT:   Head: Normocephalic and atraumatic.    Eyes: Conjunctivae are normal.   Cardiovascular: Normal rate.    Pulmonary/Chest: Effort normal.   Abdominal: Soft. He exhibits no distension and no mass. There is no tenderness.   Genitourinary: Penis normal.   Musculoskeletal: Normal range of motion. He exhibits no edema.   Neurological: He is alert and oriented to person, place, and time.   Skin: Skin is warm and dry. No rash noted.   Psychiatric: He has a normal mood and affect.   Vitals reviewed.      Assessment:       1. Urinary retention    2. Enlarged prostate with urinary obstruction    3. Urinary urgency    4. Increased urinary frequency        Plan:       Urinary retention  -     POCT URINE DIPSTICK WITHOUT MICROSCOPE    Enlarged prostate with urinary obstruction    Urinary urgency    Increased urinary frequency    Other orders  -     tamsulosin (FLOMAX) 0.4 mg Cp24; Take 1 capsule (0.4 mg total) by mouth once daily.  Dispense: 30 capsule; Refill: 11      Thakkar in today.  Complete Bactrim.  Add Flomax.  RTC next week for voiding trial

## 2017-11-29 NOTE — TELEPHONE ENCOUNTER
----- Message from Danielle Huerta sent at 11/29/2017 10:24 AM CST -----  Contact: Vida- wife  Chinedu is now at a point to he is having trouble voiding. Please call back for an appointment today. Please call back [phone],.  Thanks!

## 2017-11-29 NOTE — LETTER
November 29, 2017      Tevin Winters MD  [address]  [facility] [address]           [facility]  [address] [facility] [address] [place] [address]  Phone: [phone]          Patient: Chinedu Roque   MR Number: [id number]   YOB: 1945   Date of Visit: 11/29/2017       Dear Dr. Tevin Winters:    Thank you for referring Chinedu Roque to me for evaluation. Attached you will find relevant portions of my assessment and plan of care.    If you have questions, please do not hesitate to call me. I look forward to following Chinedu Roque along with you.    Sincerely,    ZENON Rangel MD    Enclosure  CC:  No Recipients    If you would like to receive this communication electronically, please contact [email or web address] or [phone] to request more information on [employer] Link access.    For providers and/or their staff who would like to refer a patient to [facility], please contact us through our one-stop-shop provider referral line, [facility], at [phone].    If you feel you have received this communication in error or would no longer like to receive these types of communications, please e-mail [email or web address]

## 2017-11-29 NOTE — PROGRESS NOTES
"Subjective:       Patient ID: Chinedu Roque is a 71 y.o. male.    Chief Complaint: Urinary Retention (Minimal urination x 24. Pt has to strain to urinate. Pt d/c'd Bactrim last night as this may be a side effect)    Follow-up BPH with frequent urination with small amounts.  He felt on the verge of not being able to urinate at all 2 days ago.  He is not having any urinary pain and no fever.  He is having to urinate every 1-2 hours during the day and every hour at night.  He was started on Bactrim last week.  He had a little bit of redness and itching to his penis but that resolved.  He was also started on finasteride.  He has been taking terazosin 10 mg for quite some time.      Review of Systems   Constitutional: Negative for fever and unexpected weight change.   Respiratory: Negative for shortness of breath, wheezing and stridor.    Cardiovascular: Negative for chest pain and leg swelling.       Objective:     Blood pressure 138/78, temperature 98.3 °F (36.8 °C), temperature source Oral, height 5' 11" (1.803 m), weight 132.6 kg (292 lb 5.3 oz).      Physical Exam   Constitutional:   He is overweight and in no distress.   Cardiovascular: Normal rate and regular rhythm.    Pulmonary/Chest: Effort normal and breath sounds normal.       Assessment:       1. Benign prostatic hyperplasia with nocturia    2. Urinary retention        Plan:       Increase terazosin to 20 mg at night.  Continue Bactrim.  Pelvic ultrasound and urology consult.  CMP and PSA and CBC ordered.  We discussed precautions and possible reason to visit the emergency room.     "

## 2017-11-30 ENCOUNTER — TELEPHONE (OUTPATIENT)
Dept: UROLOGY | Facility: CLINIC | Age: 72
End: 2017-11-30

## 2017-11-30 NOTE — TELEPHONE ENCOUNTER
----- Message from Bridgett Sinclair sent at 11/30/2017 11:48 AM CST -----  Contact: pt [phone]  Call placed to pod patient  Called to say his cathater is too short and pulling he is asking for a call back from the nurse.

## 2017-11-30 NOTE — TELEPHONE ENCOUNTER
Reviewed how to change from overnight bag to leg bag with wife.  She also reports that urine appears dark, encouraged to increase fluid intake.

## 2017-11-30 NOTE — TELEPHONE ENCOUNTER
----- Message from Dara Johnson sent at 11/30/2017  9:21 AM CST -----  Call [phone] ... Pt was seen yesterday / having a problem with changing cath bag

## 2017-11-30 NOTE — TELEPHONE ENCOUNTER
Spoke to pt and instructed him to not put the catheter into the state lock on his leg. That way it is not pulling on his penis. I also informed him about the ky jelly he can put on the tip of his penis where the catheter goes in to help with it being able to glide.

## 2017-12-01 ENCOUNTER — TELEPHONE (OUTPATIENT)
Dept: UROLOGY | Facility: CLINIC | Age: 72
End: 2017-12-01

## 2017-12-01 NOTE — TELEPHONE ENCOUNTER
----- Message from Michael Ortiz sent at 12/1/2017 10:36 AM CST -----  Contact: self   Patient want to speak with a nurse regarding his catheter that was put in last week, please call back at [phone] (home)

## 2017-12-01 NOTE — TELEPHONE ENCOUNTER
Spoke to pt and instructed him not to push the connection tubing of the catheter and the leg bag or night bag on so tight. Pt is stating it is hard to change the bags. Pt verbalized understanding. I instructed him not to put neosporin on the conection end of the leg bag to where it goes into the catheter. Pt verbalized understanding.

## 2017-12-06 ENCOUNTER — CLINICAL SUPPORT (OUTPATIENT)
Dept: UROLOGY | Facility: CLINIC | Age: 72
End: 2017-12-06
Payer: MEDICARE

## 2017-12-06 ENCOUNTER — OFFICE VISIT (OUTPATIENT)
Dept: UROLOGY | Facility: CLINIC | Age: 72
End: 2017-12-06
Payer: MEDICARE

## 2017-12-06 VITALS
WEIGHT: 287.69 LBS | DIASTOLIC BLOOD PRESSURE: 80 MMHG | HEIGHT: 71 IN | HEART RATE: 80 BPM | BODY MASS INDEX: 40.27 KG/M2 | SYSTOLIC BLOOD PRESSURE: 130 MMHG

## 2017-12-06 DIAGNOSIS — R33.9 URINARY RETENTION: Primary | ICD-10-CM

## 2017-12-06 DIAGNOSIS — N13.8 ENLARGED PROSTATE WITH URINARY OBSTRUCTION: ICD-10-CM

## 2017-12-06 DIAGNOSIS — N40.1 ENLARGED PROSTATE WITH URINARY OBSTRUCTION: ICD-10-CM

## 2017-12-06 DIAGNOSIS — R97.20 ELEVATED PSA: ICD-10-CM

## 2017-12-06 LAB — POC RESIDUAL URINE VOLUME: 72 ML (ref 0–100)

## 2017-12-06 PROCEDURE — 99999 PR PBB SHADOW E&M-EST. PATIENT-LVL III: CPT | Mod: PBBFAC,,, | Performed by: UROLOGY

## 2017-12-06 PROCEDURE — 99999 PR PBB SHADOW E&M-EST. PATIENT-LVL II: CPT | Mod: PBBFAC,,,

## 2017-12-06 PROCEDURE — 99213 OFFICE O/P EST LOW 20 MIN: CPT | Mod: S$GLB,,, | Performed by: UROLOGY

## 2017-12-06 PROCEDURE — 51798 US URINE CAPACITY MEASURE: CPT | Mod: S$GLB,,, | Performed by: UROLOGY

## 2017-12-06 NOTE — PROGRESS NOTES
Subjective:       Patient ID: Chinedu Roque is a 71 y.o. male.    Chief Complaint: Follow-up and Urinary Retention    HPI     71 with difficulty emptying.  Had urinary retention last week. Cath placed an drained 600 cc.  Thakkar was removed this morning and he has voiding 3 times.  PVR is 72 ml.  He is taking Flomax, finasteride and Bactrim.  He has a history of elevated PSA but his most recent PSA is acutely elevated to 22.1.  Blood drawn before catheterization.      Review of Systems   Constitutional: Negative for fever.   Genitourinary: Negative for dysuria and hematuria.       Objective:      Physical Exam   Constitutional: He is oriented to person, place, and time. He appears well-developed and well-nourished.   Pulmonary/Chest: Effort normal.   Abdominal: Soft.   Neurological: He is alert and oriented to person, place, and time.   Skin: No rash noted.   Psychiatric: He has a normal mood and affect.   Vitals reviewed.      Assessment:       1. Urinary retention    2. Enlarged prostate with urinary obstruction    3. Elevated PSA        Plan:       Urinary retention  -     POCT Bladder Scan    Enlarged prostate with urinary obstruction    Elevated PSA  -     Prostate Specific Antigen, Diagnostic; Future; Expected date: 12/27/2017      f/u 2-3 weeks for repeat PVR and will repeat PSA at that time.  Complete Bactrim and continue Flomax and Finasteride.

## 2017-12-06 NOTE — PROGRESS NOTES
Pt arrived to office for a voiding trial today per dr orders. Pt was placed on the table and draped appropriately. The syringe removed 10 mL of sterile water from the catheter balloon. The catheter was then removed and the pt was able to sit up and get dressed. Pt tolerated procedure well.  Pt was instructed not to drink a lot and try to urinate ever 2 hours. Pt verbalized understanding. Pt has an office visit scheduled for this afternoon.

## 2017-12-12 ENCOUNTER — CLINICAL SUPPORT (OUTPATIENT)
Dept: REHABILITATION | Facility: HOSPITAL | Age: 72
End: 2017-12-12
Attending: ORTHOPAEDIC SURGERY
Payer: MEDICARE

## 2017-12-12 DIAGNOSIS — R26.9 GAIT ABNORMALITY: ICD-10-CM

## 2017-12-12 PROCEDURE — 97110 THERAPEUTIC EXERCISES: CPT | Mod: PN

## 2017-12-12 NOTE — PROGRESS NOTES
"Name: Chinedu Roque  Clinic Number: [id number]  Date of Treatment: 12/12/2017   Diagnosis:   Encounter Diagnosis   Name Primary?    Gait abnormality        Visit number: 8  Start date: 11/06/2017  POC End date: 01/01/2018    Time in: 10:00  Time Out: 11:00  Total Treatment Time: 55    Precautions: Hx of CVA, compression fx of L2, HTN    Subjective:    Chinedu reports mild increase of low back stiffness. States he has also been having some prostate issues, and may need several restroom breaks this visit. Patient reports their lumbar and knee pain to be mild/10 on a 0-10 scale with 0 being no pain and 10 being the worst pain imaginable.    Objective    Chinedu received therapeutic exercises to develop strength, ROM and flexibility for 55 minutes including:     Rec Bike x 5 min level 3  Seated HS stretch 2x10 breath cycles, each leg  Standing gastroc stretch on incline L 2,  2x10 breath cycles  Sidesteps in // bars with YTB x 3 laps  B quad set (with towel under knees) 3/10  R heel slide with strap 10 x 5"  Sup hip add squeeze 20 x 5"  Sup bridge with TA set 2x10x3" (vc for glute activation and avoidance of pain)  SL clam bilateral with RTB 3x10    -shuttle:  Bilateral 4b 3/10, single 2b 3/10    Written Home Exercises Provided: Pt to continue with current written HEP, emphasis on core and gluteal activation during daily activities for reduced low back pain.    Pt demo good understanding of the education provided. Chinedu demonstrated good return demonstration of activities.     Assessment:   Chinedu tolerated treatment well this date without onset of pain or soreness in B knees. B hip strength is slowly improving with sidelying exercises and was able to tolerate sidesteps in // bars. Mild weakness of quads with shuttle and glut med with clamshells. Good LE stability with dynamic movement of sidesteps.   Pt will continue to benefit from skilled PT intervention. Medical Necessity is demonstrated by:  Pain limits function " "of effected part for some activities, Unable to participate fully in daily activities and Weakness.    Patient is making good progress towards established goals.  Short Term Goals:  4 weeks  1. Pt will present with increased right knee AROM flexion to 115 degrees.   2. Pt will present with increased hip MMT by one half grade for increased ease with functional ADLs. Ongoing  3. Pt will report decreased pain to </= 3/10. Ongoing  4. Pt will improve sit to stand transfers up to 12 times in 30" for LE mobility purposes.    Plan:    Continue with established Plan of Care towards PT goals.   "

## 2017-12-15 ENCOUNTER — CLINICAL SUPPORT (OUTPATIENT)
Dept: REHABILITATION | Facility: HOSPITAL | Age: 72
End: 2017-12-15
Attending: ORTHOPAEDIC SURGERY
Payer: MEDICARE

## 2017-12-15 DIAGNOSIS — R26.9 GAIT ABNORMALITY: ICD-10-CM

## 2017-12-15 PROCEDURE — 97110 THERAPEUTIC EXERCISES: CPT | Mod: PN | Performed by: PHYSICAL THERAPIST

## 2017-12-15 RX ORDER — LOSARTAN POTASSIUM 100 MG/1
100 TABLET ORAL DAILY
Qty: 90 TABLET | Refills: 3 | Status: SHIPPED | OUTPATIENT
Start: 2017-12-15 | End: 2018-12-01 | Stop reason: SDUPTHER

## 2017-12-15 NOTE — PROGRESS NOTES
"Name: Chinedu Roque  Clinic Number: [id number]  Date of Treatment: 12/15/2017   Diagnosis:   Encounter Diagnosis   Name Primary?    Gait abnormality        Visit number: 9  Start date: 11/06/2017  POC End date: 01/01/2018    Time in: 1000  Time Out: 1055  Total Treatment Time: 50    Precautions: Hx of CVA, compression fx of L2, HTN    Subjective:    Chinedu reported f/u with MD the other day for prostate problems. Patient reports their lumbar and knee pain to be mild/10 on a 0-10 scale with 0 being no pain and 10 being the worst pain imaginable.    Objective    Chinedu received therapeutic exercises to develop strength, ROM and flexibility for 50 minutes including: (30 minutes one on one)    Rec Bike x 5 min level 3  Seated HS stretch 2x10 breath cycles, each leg  Standing gastroc stretch on incline L 2,  2x10 breath cycles  B quad set (with towel under knees) 3/10  R heel slide with strap 10 x 5"  Sup hip add squeeze 20 x 5"  SL clam bilateral with BTB 3 x10    -shuttle:  Bilateral 4b 3/10 with hip band (green band), single 2b 3/10    Written Home Exercises Provided: Pt to continue with current written HEP, emphasis on core and gluteal activation during daily activities for reduced low back pain.    Pt demo good understanding of the education provided. Chinedu demonstrated good return demonstration of activities.     Assessment:   - Resolving prostate pain noted.  - No increase in s/s.    Pt will continue to benefit from skilled PT intervention. Medical Necessity is demonstrated by:  Pain limits function of effected part for some activities, Unable to participate fully in daily activities and Weakness.    Patient is making good progress towards established goals.  Short Term Goals:  4 weeks  1. Pt will present with increased right knee AROM flexion to 115 degrees.   2. Pt will present with increased hip MMT by one half grade for increased ease with functional ADLs. Ongoing  3. Pt will report decreased pain to </= " "3/10. Ongoing  4. Pt will improve sit to stand transfers up to 12 times in 30" for LE mobility purposes.    Plan:    Continue with established Plan of Care towards PT goals.   "

## 2017-12-19 ENCOUNTER — CLINICAL SUPPORT (OUTPATIENT)
Dept: REHABILITATION | Facility: HOSPITAL | Age: 72
End: 2017-12-19
Attending: ORTHOPAEDIC SURGERY
Payer: MEDICARE

## 2017-12-19 DIAGNOSIS — R26.9 GAIT ABNORMALITY: ICD-10-CM

## 2017-12-19 PROCEDURE — 97110 THERAPEUTIC EXERCISES: CPT | Mod: PN

## 2017-12-19 NOTE — PROGRESS NOTES
"Name: Chinedu Roque  Clinic Number: [id number]  Date of Treatment: 12/19/2017   Diagnosis:   Encounter Diagnosis   Name Primary?    Gait abnormality        Visit number: 10  Start date: 11/06/2017  POC End date: 01/01/2018    Time in: 1:57  Time Out: 2:54  Total Treatment Time: 55    Precautions: Hx of CVA, compression fx of L2, HTN    Subjective:    Chinedu reported f/u with MD the other day for prostate problems. Patient reports their lumbar and knee pain to be mild/10 on a 0-10 scale with 0 being no pain and 10 being the worst pain imaginable.    Objective    Chinedu received therapeutic exercises to develop strength, ROM and flexibility for 55 minutes including:     Rec Bike x 5 min level 3  Seated HS stretch 2x10 breath cycles, each leg  Seated lumbar flexion stretch toward center and toward L, 5 x 10" each  Standing gastroc stretch on incline L 2,  2x10 breath cycles  Sidesteps in gym x 25 feet each direction with YTB  B quad set (with towel under knees) 20 x 5" each  R heel slide with strap 10 x 5"  Sup hip add squeeze 20 x 5"  SL clam bilateral with BTB 3 x10    -shuttle:  Bilateral 4b 3/10 with hip band (green band), single 2b 3/10    Written Home Exercises Provided: Pt to continue with current written HEP, may perform lumbar flexion stretch at home with use of wheeled office chair for movement.    Pt demo good understanding of the education provided. Chinedu demonstrated good return demonstration of activities.     Assessment:   Chinedu demonstrated good tolerance to treatment this date without increased soreness or pain. Relief of lumbar pain following seated lumbar flexion stretch. Muscle fatigue of R LE noted post treatment. LE and core strength slowly improving. Requires occasional verbal cues for TA activation with SL exercises and resisted sidesteps.  Pt will continue to benefit from skilled PT intervention. Medical Necessity is demonstrated by:  Pain limits function of effected part for some " "activities, Unable to participate fully in daily activities and Weakness.    Patient is making good progress towards established goals.  Short Term Goals:  4 weeks  1. Pt will present with increased right knee AROM flexion to 115 degrees.   2. Pt will present with increased hip MMT by one half grade for increased ease with functional ADLs. Ongoing  3. Pt will report decreased pain to </= 3/10. Ongoing  4. Pt will improve sit to stand transfers up to 12 times in 30" for LE mobility purposes.    Plan:    Continue with established Plan of Care towards PT goals.   "

## 2017-12-20 ENCOUNTER — OFFICE VISIT (OUTPATIENT)
Dept: UROLOGY | Facility: CLINIC | Age: 72
End: 2017-12-20
Payer: MEDICARE

## 2017-12-20 VITALS
WEIGHT: 290.13 LBS | HEART RATE: 68 BPM | BODY MASS INDEX: 40.62 KG/M2 | DIASTOLIC BLOOD PRESSURE: 75 MMHG | HEIGHT: 71 IN | RESPIRATION RATE: 16 BRPM | SYSTOLIC BLOOD PRESSURE: 132 MMHG

## 2017-12-20 DIAGNOSIS — N40.1 ENLARGED PROSTATE WITH URINARY OBSTRUCTION: ICD-10-CM

## 2017-12-20 DIAGNOSIS — R97.20 ELEVATED PSA: ICD-10-CM

## 2017-12-20 DIAGNOSIS — N13.8 ENLARGED PROSTATE WITH URINARY OBSTRUCTION: ICD-10-CM

## 2017-12-20 DIAGNOSIS — R33.9 URINARY RETENTION: Primary | ICD-10-CM

## 2017-12-20 LAB
BILIRUB SERPL-MCNC: NEGATIVE MG/DL
BLOOD URINE, POC: NEGATIVE
COLOR, POC UA: YELLOW
GLUCOSE UR QL STRIP: NEGATIVE
KETONES UR QL STRIP: NEGATIVE
LEUKOCYTE ESTERASE URINE, POC: NEGATIVE
NITRITE, POC UA: NEGATIVE
PH, POC UA: 5
POC RESIDUAL URINE VOLUME: 214 ML (ref 0–100)
PROTEIN, POC: NEGATIVE
SPECIFIC GRAVITY, POC UA: 1
UROBILINOGEN, POC UA: NEGATIVE

## 2017-12-20 PROCEDURE — 99213 OFFICE O/P EST LOW 20 MIN: CPT | Mod: 25,S$GLB,, | Performed by: UROLOGY

## 2017-12-20 PROCEDURE — 51798 US URINE CAPACITY MEASURE: CPT | Mod: S$GLB,,, | Performed by: UROLOGY

## 2017-12-20 PROCEDURE — 99999 PR PBB SHADOW E&M-EST. PATIENT-LVL III: CPT | Mod: PBBFAC,,, | Performed by: UROLOGY

## 2017-12-20 PROCEDURE — 81002 URINALYSIS NONAUTO W/O SCOPE: CPT | Mod: S$GLB,,, | Performed by: UROLOGY

## 2017-12-20 NOTE — PROGRESS NOTES
Subjective:       Patient ID: Chinedu Roque is a 71 y.o. male.    Chief Complaint: Urinary Retention    HPI     71 with difficulty emptying.  Had urinary retention last month. Cath placed an drained 600 cc.  Thakkar was removed 2 weeks ago and he says he is now voiding without difficulty and is back to baseline PVR is 214 ml.  He is taking Flomax, finasteride and is nearly completed a course of Bactrim.  He has a history of elevated PSA but his most recent PSA is acutely elevated to 22.1 but the blood was drawn before catheterization.      Review of Systems   Constitutional: Negative for fever.   Genitourinary: Negative for dysuria and hematuria.       Objective:      Physical Exam   Constitutional: He is oriented to person, place, and time. He appears well-developed and well-nourished.   Pulmonary/Chest: Effort normal.   Abdominal: Soft.   Neurological: He is alert and oriented to person, place, and time.   Skin: No rash noted.   Psychiatric: He has a normal mood and affect.   Vitals reviewed.      Assessment:       1. Urinary retention    2. Enlarged prostate with urinary obstruction    3. Elevated PSA        Plan:       Urinary retention  -     POCT URINE DIPSTICK WITHOUT MICROSCOPE  -     POCT Bladder Scan    Enlarged prostate with urinary obstruction    Elevated PSA      Repeat PSA.  RTC 6 months

## 2017-12-26 ENCOUNTER — CLINICAL SUPPORT (OUTPATIENT)
Dept: REHABILITATION | Facility: HOSPITAL | Age: 72
End: 2017-12-26
Attending: ORTHOPAEDIC SURGERY
Payer: MEDICARE

## 2017-12-26 DIAGNOSIS — R26.9 GAIT ABNORMALITY: ICD-10-CM

## 2017-12-26 PROCEDURE — 97110 THERAPEUTIC EXERCISES: CPT | Mod: PN

## 2017-12-26 NOTE — PROGRESS NOTES
"Name: Chinedu Roque  Clinic Number: [id number]  Date of Treatment: 12/26/2017   Diagnosis:   Encounter Diagnosis   Name Primary?    Gait abnormality        Visit number: 11  Start date: 11/06/2017  POC End date: 01/01/2018    Time in: 9:59  Time Out: 11:00  Total Treatment Time: 55    Precautions: Hx of CVA, compression fx of L2, HTN    Subjective:    Chinedu reports continued stiffness and pain in the R knee as well as pain in the low back. Patient reports their lumbar pain at mild but constant, and knee pain to be 5/10 on a 0-10 scale with 0 being no pain and 10 being the worst pain imaginable.    Objective    Chinedu received therapeutic exercises to develop strength, ROM and flexibility for 55 minutes including:     Rec Bike x 5 min level 3, seat 12  Standing gastroc stretch on incline L 2,  2x10 breath cycles  Sidesteps in gym x 25 feet each direction with YTB  -shuttle: Bilateral 4b 3/10 with hip band (green band), single 2.5b 3/10 (alternating)  Seated HS stretch 2x10 breath cycles, each leg  Seated lumbar flexion stretch toward center and toward L, 5 x 3 breath cyclels each  B quad set (with towel under knees) 20 x 5" each  R heel slide with strap 2 x 10 x 5"  Sup hip add squeeze 20 x 5"  SL clam bilateral with BTB 3 x10    Written Home Exercises Provided: Pt to continue with current written HEP    Pt demo good understanding of the education provided. Chinedu demonstrated good return demonstration of activities.     Assessment:   Patient with overall good tolerance to treatment this date without increased soreness or pain. Decreased stiffness of R knee reported post tx. Core and R LE strength continues to improve with supine and standing exercises.  Pt will continue to benefit from skilled PT intervention. Medical Necessity is demonstrated by:  Pain limits function of effected part for some activities, Unable to participate fully in daily activities and Weakness.    Patient is making good progress towards " "established goals.  Short Term Goals:  4 weeks  1. Pt will present with increased right knee AROM flexion to 115 degrees.   2. Pt will present with increased hip MMT by one half grade for increased ease with functional ADLs. Ongoing  3. Pt will report decreased pain to </= 3/10. Ongoing  4. Pt will improve sit to stand transfers up to 12 times in 30" for LE mobility purposes.    Plan:    Continue with established Plan of Care towards PT goals.   "

## 2017-12-29 ENCOUNTER — CLINICAL SUPPORT (OUTPATIENT)
Dept: REHABILITATION | Facility: HOSPITAL | Age: 72
End: 2017-12-29
Attending: ORTHOPAEDIC SURGERY
Payer: MEDICARE

## 2017-12-29 DIAGNOSIS — R26.9 GAIT ABNORMALITY: ICD-10-CM

## 2017-12-29 PROCEDURE — 97110 THERAPEUTIC EXERCISES: CPT | Mod: PN

## 2017-12-29 NOTE — PROGRESS NOTES
"Name: Chinedu Roque  Clinic Number: [id number]  Date of Treatment: 12/29/2017   Diagnosis:   Encounter Diagnosis   Name Primary?    Gait abnormality        Visit number: 12  Start date: 11/06/2017  POC End date: 01/01/2018    Time in: 10:01  Time Out: 11:00  Total Treatment Time: 55  1:1 Time: 30    Precautions: Hx of CVA, compression fx of L2, HTN    Subjective:    Chinedu reports continued R knee pain but states it is not much worse than usual. No change in low back symptoms as well. He rates his current R knee pain to be 5/10 on a 0-10 scale with 0 being no pain and 10 being the worst pain imaginable.    Objective    Chinedu received therapeutic exercises to develop strength, ROM and flexibility for 55 minutes including:     Rec Bike x 5 min level 3, seat 12  Standing gastroc stretch on incline L 2,  2x10 breath cycles  Sidesteps in gym 2 x 25 feet each direction with YTB  -shuttle: Bilateral 4b 3/10, single 2.5b 3/10 (alternating)  Seated HS stretch 2x10 breath cycles, each leg  Seated lumbar flexion stretch with stability ball, toward center and toward L, 5 x 5 breath cycles each  B quad set (with towel under knees) 20 x 5" each  R heel slide with strap 2 x 10 x 5" (NP)  Sup hip add squeeze 20 x 5"  SL clam bilateral with BTB 3 x10    Written Home Exercises Provided: Pt to continue with current written HEP    Pt demo good understanding of the education provided. Chinedu demonstrated good return demonstration of activities.     Assessment:   Chinedu tolerated treatment well overall this date without onset of pain or soreness. Continued pain reported in R knee without relief. Good R quad activation during quad sets. Relief of lumbar pain noted following seated lumbar flexion stretch with stability ball. Mild B hip and core weakness continues however is improving slowly.   Pt will continue to benefit from skilled PT intervention. Medical Necessity is demonstrated by:  Pain limits function of effected part for some " "activities, Unable to participate fully in daily activities and Weakness.    Patient is making good progress towards established goals.  Short Term Goals:  4 weeks  1. Pt will present with increased right knee AROM flexion to 115 degrees.   2. Pt will present with increased hip MMT by one half grade for increased ease with functional ADLs. Ongoing  3. Pt will report decreased pain to </= 3/10. Ongoing  4. Pt will improve sit to stand transfers up to 12 times in 30" for LE mobility purposes.    Plan:    Continue with established Plan of Care towards PT goals.   "

## 2018-01-03 ENCOUNTER — LAB VISIT (OUTPATIENT)
Dept: LAB | Facility: HOSPITAL | Age: 73
End: 2018-01-03
Attending: UROLOGY
Payer: MEDICARE

## 2018-01-03 DIAGNOSIS — R97.20 ELEVATED PSA: ICD-10-CM

## 2018-01-03 LAB — COMPLEXED PSA SERPL-MCNC: 4.6 NG/ML

## 2018-01-03 PROCEDURE — 36415 COLL VENOUS BLD VENIPUNCTURE: CPT | Mod: PO

## 2018-01-03 PROCEDURE — 84153 ASSAY OF PSA TOTAL: CPT

## 2018-01-04 ENCOUNTER — TELEPHONE (OUTPATIENT)
Dept: ORTHOPEDICS | Facility: CLINIC | Age: 73
End: 2018-01-04

## 2018-01-04 DIAGNOSIS — M17.0 PRIMARY OSTEOARTHRITIS OF BOTH KNEES: ICD-10-CM

## 2018-01-04 DIAGNOSIS — G89.29 CHRONIC PAIN OF BOTH KNEES: Primary | ICD-10-CM

## 2018-01-04 DIAGNOSIS — M25.561 CHRONIC PAIN OF BOTH KNEES: Primary | ICD-10-CM

## 2018-01-04 DIAGNOSIS — M21.161 GENU VARUM OF BOTH LOWER EXTREMITIES: ICD-10-CM

## 2018-01-04 DIAGNOSIS — M21.162 GENU VARUM OF BOTH LOWER EXTREMITIES: ICD-10-CM

## 2018-01-04 DIAGNOSIS — M25.562 CHRONIC PAIN OF BOTH KNEES: Primary | ICD-10-CM

## 2018-04-23 RX ORDER — TERAZOSIN 10 MG/1
CAPSULE ORAL
Qty: 90 CAPSULE | Refills: 1 | Status: SHIPPED | OUTPATIENT
Start: 2018-04-23 | End: 2018-06-04

## 2018-04-30 ENCOUNTER — OFFICE VISIT (OUTPATIENT)
Dept: ORTHOPEDICS | Facility: CLINIC | Age: 73
End: 2018-04-30
Payer: MEDICARE

## 2018-04-30 VITALS — HEIGHT: 71 IN | BODY MASS INDEX: 40.6 KG/M2 | WEIGHT: 290 LBS

## 2018-04-30 DIAGNOSIS — M25.561 CHRONIC PAIN OF BOTH KNEES: ICD-10-CM

## 2018-04-30 DIAGNOSIS — G89.29 CHRONIC PAIN OF BOTH KNEES: ICD-10-CM

## 2018-04-30 DIAGNOSIS — R97.20 ELEVATED PSA: Primary | ICD-10-CM

## 2018-04-30 DIAGNOSIS — M17.0 PRIMARY OSTEOARTHRITIS OF BOTH KNEES: ICD-10-CM

## 2018-04-30 DIAGNOSIS — M25.562 CHRONIC PAIN OF BOTH KNEES: ICD-10-CM

## 2018-04-30 PROCEDURE — 99999 PR PBB SHADOW E&M-EST. PATIENT-LVL II: CPT | Mod: PBBFAC,,, | Performed by: ORTHOPAEDIC SURGERY

## 2018-04-30 PROCEDURE — 99499 UNLISTED E&M SERVICE: CPT | Mod: S$GLB,,, | Performed by: ORTHOPAEDIC SURGERY

## 2018-04-30 PROCEDURE — 99214 OFFICE O/P EST MOD 30 MIN: CPT | Mod: S$GLB,,, | Performed by: ORTHOPAEDIC SURGERY

## 2018-04-30 NOTE — PROGRESS NOTES
HISTORY OF PRESENT ILLNESS:  Chinedu Roque, 72 years old, complaining of   bilateral knee pain, has had this now for years off and on.  Had a cortisone   shot several months ago without much relief.  Aching pain, up to 5/10 on the   pain scale, made worse with activity and relieved with rest.    Exam shows tenderness at the joint line.  No signs of infection.  No   instability.  Does have well-healed scars from previous surgeries.    X-rays show degenerative changes of the knee with joint space narrowing.    ASSESSMENT:  Bilateral knee arthrosis.    PLAN:  We will schedule him for bilateral Euflexxa injections and get this done   once this is approved.      PBB/HN  dd: 04/30/2018 14:52:13 (CDT)  td: 05/01/2018 09:59:11 (CDT)  Doc ID   #[id number]  Job ID #[id number]    CC:     Further History  Aching pain  Worse with activity  Relieved with rest  No other associated symptoms  No other radiation    Further Exam  Alert and oriented  Pleasant  Contralateral limb has appropriate range of motion for age and condition  Contralateral limb has appropriate strength for age and condition  Contralateral limb has appropriate stability  for age and condition  No adenopathy  Pulses are appropriate for current condition  Skin is intact        Chief Complaint    Chief Complaint   Patient presents with    Left Knee - Pain    Right Knee - Pain       HPI  Chinedu Roque is a 72 y.o.  male who presents with       Past Medical History  Past Medical History:   Diagnosis Date    Anticoagulant long-term use     ASA 81 mg    Arthritis     cervical    BPH (benign prostatic hyperplasia) 3/2/2012    Chronic left shoulder pain     Compression fracture of L2     DDD (degenerative disc disease), lumbar     HTN (hypertension) 3/2/2012    Low back pain     Morbid obesity with BMI of 40.0-44.9, adult 3/18/2014    Seasonal allergies     Sleep apnea     compliant with CPAP    Stroke 3/1986       Past Surgical History  Past Surgical History:    Procedure Laterality Date    APPENDECTOMY      EPIDURAL BLOCK INJECTION  2015    cervical    Facet Steroid injection       Pain management    HERNIA REPAIR      Ventral    KNEE SURGERY      bilateral    RADIOFREQUENCY ABLATION  2015    lumbar nerve    VERTEBROPLASTY         Medications  Current Outpatient Prescriptions   Medication Sig    albuterol 90 mcg/actuation inhaler Inhale 2 puffs into the lungs every 4 (four) hours as needed for Wheezing.    aspirin (ECOTRIN) 81 MG EC tablet Take 81 mg by mouth once daily.      azelastine (ASTELIN) 137 mcg (0.1 %) nasal spray 2 sprays (274 mcg total) by Nasal route 2 (two) times daily as needed for Rhinitis.    cetirizine (ZYRTEC) 10 MG tablet Take 10 mg by mouth once daily.    finasteride (PROSCAR) 5 mg tablet Take 1 tablet (5 mg total) by mouth once daily.    glucosamine-chondroitin 500-400 mg tablet Take 1 tablet by mouth 2 (two) times daily.     losartan (COZAAR) 100 MG tablet TAKE 1 TABLET (100 MG TOTAL) BY MOUTH ONCE DAILY.    metoprolol tartrate (LOPRESSOR) 25 MG tablet TAKE 1 TABLET TWICE DAILY    tamsulosin (FLOMAX) 0.4 mg Cp24 Take 1 capsule (0.4 mg total) by mouth once daily.    terazosin (HYTRIN) 10 MG capsule Take 2 capsules (20 mg total) by mouth every evening.    terazosin (HYTRIN) 10 MG capsule TAKE ONE CAPSULE BY MOUTH EVERY EVENING    tramadol (ULTRAM) 50 mg tablet Take 1 tablet (50 mg total) by mouth every 8 (eight) hours as needed for Pain.     No current facility-administered medications for this visit.        Allergies  Review of patient's allergies indicates:  No Known Allergies    Family History  Family History   Problem Relation Age of Onset    COPD Father     Hypertension Brother     Kidney disease Brother     Alzheimer's disease Mother     No Known Problems Daughter     Hypertension Son     Diabetes Son      pre-diabetic    No Known Problems Daughter     No Known Problems Daughter        Social History  Social History      Social History    Marital status:      Spouse name: N/A    Number of children: N/A    Years of education: N/A     Occupational History    Not on file.     Social History Main Topics    Smoking status: Former Smoker     Packs/day: 1.50     Years: 24.00     Start date: 10/21/1959     Quit date: 3/19/1983    Smokeless tobacco: Never Used    Alcohol use No    Drug use: No    Sexual activity: Not on file     Other Topics Concern    Not on file     Social History Narrative    Retired - Worked for the [employer].                Review of Systems     Constitutional: Negative    HENT: Negative  Eyes: Negative  Respiratory: Negative  Cardiovascular: Negative  Musculoskeletal: HPI  Skin: Negative  Neurological: Negative  Hematological: Negative  Endocrine: Negative                 Physical Exam    There were no vitals filed for this visit.  Body mass index is 40.45 kg/m².  Physical Examination:     General appearance -  well appearing, and in no distress  Mental status - awake  Neck - supple  Chest -  symmetric air entry  Heart - normal rate   Abdomen - soft      Assessment     1. Chronic pain of both knees    2. Primary osteoarthritis of both knees          Plan

## 2018-05-02 NOTE — PROGRESS NOTES
Patient, Chinedu Roque (MRN #[id number]), presented with a recorded BMI of 40.45 kg/m^2 consistent with the definition of morbid obesity (ICD-10 E66.01). The patient's morbid obesity was monitored, evaluated, addressed and/or treated. This addendum to the medical record is made on 05/02/2018.

## 2018-05-14 ENCOUNTER — OFFICE VISIT (OUTPATIENT)
Dept: ORTHOPEDICS | Facility: CLINIC | Age: 73
End: 2018-05-14
Payer: MEDICARE

## 2018-05-14 VITALS — HEIGHT: 71 IN | WEIGHT: 290 LBS | BODY MASS INDEX: 40.6 KG/M2

## 2018-05-14 DIAGNOSIS — M17.11 OSTEOARTHRITIS OF RIGHT KNEE, UNSPECIFIED OSTEOARTHRITIS TYPE: ICD-10-CM

## 2018-05-14 DIAGNOSIS — M25.562 CHRONIC PAIN OF BOTH KNEES: ICD-10-CM

## 2018-05-14 DIAGNOSIS — M25.561 CHRONIC PAIN OF BOTH KNEES: ICD-10-CM

## 2018-05-14 DIAGNOSIS — G89.29 CHRONIC PAIN OF BOTH KNEES: ICD-10-CM

## 2018-05-14 DIAGNOSIS — M17.12 OSTEOARTHRITIS OF LEFT KNEE, UNSPECIFIED OSTEOARTHRITIS TYPE: Primary | ICD-10-CM

## 2018-05-14 PROCEDURE — 99999 PR PBB SHADOW E&M-EST. PATIENT-LVL III: CPT | Mod: PBBFAC,,, | Performed by: ORTHOPAEDIC SURGERY

## 2018-05-14 PROCEDURE — 20610 DRAIN/INJ JOINT/BURSA W/O US: CPT | Mod: 50,S$GLB,, | Performed by: ORTHOPAEDIC SURGERY

## 2018-05-14 PROCEDURE — 99499 UNLISTED E&M SERVICE: CPT | Mod: S$GLB,,, | Performed by: ORTHOPAEDIC SURGERY

## 2018-05-14 NOTE — PROCEDURES
Large Joint Aspiration/Injection  Date/Time: 5/14/2018 10:46 AM  Performed by: DALILA DOVE  Authorized by: DALILA DOVE     Consent Done?:  Yes (Verbal)  Indications:  Pain  Procedure site marked: Yes      Location:  Knee  Site:  R knee and L knee  Ultrasonic Guidance for needle placement: No  Needle size:  22 G  Approach:  Anterolateral  Medications:  20 mg sodium hyaluronate (EUFLEXXA) 10 mg/mL(mw 2.4 -3.6 million); 20 mg sodium hyaluronate (EUFLEXXA) 10 mg/mL(mw 2.4 -3.6 million)  Patient tolerance:  Patient tolerated the procedure well with no immediate complications

## 2018-05-21 ENCOUNTER — OFFICE VISIT (OUTPATIENT)
Dept: ORTHOPEDICS | Facility: CLINIC | Age: 73
End: 2018-05-21
Payer: MEDICARE

## 2018-05-21 VITALS — HEIGHT: 71 IN | BODY MASS INDEX: 40.6 KG/M2 | WEIGHT: 290 LBS

## 2018-05-21 DIAGNOSIS — M17.0 PRIMARY OSTEOARTHRITIS OF BOTH KNEES: ICD-10-CM

## 2018-05-21 DIAGNOSIS — G89.29 CHRONIC PAIN OF BOTH KNEES: Primary | ICD-10-CM

## 2018-05-21 DIAGNOSIS — M25.562 CHRONIC PAIN OF BOTH KNEES: Primary | ICD-10-CM

## 2018-05-21 DIAGNOSIS — M25.561 CHRONIC PAIN OF BOTH KNEES: Primary | ICD-10-CM

## 2018-05-21 PROCEDURE — 99999 PR PBB SHADOW E&M-EST. PATIENT-LVL III: CPT | Mod: PBBFAC,,, | Performed by: ORTHOPAEDIC SURGERY

## 2018-05-21 PROCEDURE — 20610 DRAIN/INJ JOINT/BURSA W/O US: CPT | Mod: 50,S$GLB,, | Performed by: ORTHOPAEDIC SURGERY

## 2018-05-21 PROCEDURE — 99499 UNLISTED E&M SERVICE: CPT | Mod: S$GLB,,, | Performed by: ORTHOPAEDIC SURGERY

## 2018-05-21 NOTE — PROCEDURES
Large Joint Aspiration/Injection  Date/Time: 5/21/2018 11:16 AM  Performed by: DALILA DOVE  Authorized by: DALILA DOVE     Consent Done?:  Yes (Verbal)  Indications:  Pain  Procedure site marked: Yes      Location:  Knee  Site:  R knee and L knee  Ultrasonic Guidance for needle placement: No  Needle size:  22 G  Approach:  Anterolateral  Medications:  20 mg sodium hyaluronate (EUFLEXXA) 10 mg/mL(mw 2.4 -3.6 million); 20 mg sodium hyaluronate (EUFLEXXA) 10 mg/mL(mw 2.4 -3.6 million)  Patient tolerance:  Patient tolerated the procedure well with no immediate complications

## 2018-05-28 ENCOUNTER — OFFICE VISIT (OUTPATIENT)
Dept: ORTHOPEDICS | Facility: CLINIC | Age: 73
End: 2018-05-28
Payer: MEDICARE

## 2018-05-28 VITALS — WEIGHT: 290 LBS | HEIGHT: 71 IN | BODY MASS INDEX: 40.6 KG/M2

## 2018-05-28 DIAGNOSIS — M25.562 CHRONIC PAIN OF BOTH KNEES: Primary | ICD-10-CM

## 2018-05-28 DIAGNOSIS — M17.0 PRIMARY OSTEOARTHRITIS OF BOTH KNEES: ICD-10-CM

## 2018-05-28 DIAGNOSIS — M25.561 CHRONIC PAIN OF BOTH KNEES: Primary | ICD-10-CM

## 2018-05-28 DIAGNOSIS — G89.29 CHRONIC PAIN OF BOTH KNEES: Primary | ICD-10-CM

## 2018-05-28 PROCEDURE — 99499 UNLISTED E&M SERVICE: CPT | Mod: S$GLB,,, | Performed by: ORTHOPAEDIC SURGERY

## 2018-05-28 PROCEDURE — 20610 DRAIN/INJ JOINT/BURSA W/O US: CPT | Mod: 50,S$GLB,, | Performed by: ORTHOPAEDIC SURGERY

## 2018-05-28 PROCEDURE — 99999 PR PBB SHADOW E&M-EST. PATIENT-LVL III: CPT | Mod: PBBFAC,,, | Performed by: ORTHOPAEDIC SURGERY

## 2018-05-28 PROCEDURE — 97760 ORTHOTIC MGMT&TRAING 1ST ENC: CPT | Mod: GP,S$GLB,, | Performed by: ORTHOPAEDIC SURGERY

## 2018-05-28 NOTE — PROGRESS NOTES
We performed a custom orthotic/brace fitting, adjusting and training with the patient. The patient demonstrated understanding and proper care. This was performed for 15 minutes.

## 2018-05-28 NOTE — PROCEDURES
Large Joint Aspiration/Injection  Date/Time: 5/28/2018 10:29 AM  Performed by: DALILA DOVE  Authorized by: DALILA DOVE     Consent Done?:  Yes (Verbal)  Indications:  Pain  Procedure site marked: Yes      Location:  Knee  Site:  R knee and L knee  Ultrasonic Guidance for needle placement: No  Needle size:  22 G  Approach:  Anterolateral  Medications:  20 mg sodium hyaluronate (EUFLEXXA) 10 mg/mL(mw 2.4 -3.6 million); 20 mg sodium hyaluronate (EUFLEXXA) 10 mg/mL(mw 2.4 -3.6 million)  Patient tolerance:  Patient tolerated the procedure well with no immediate complications

## 2018-05-31 ENCOUNTER — LAB VISIT (OUTPATIENT)
Dept: LAB | Facility: HOSPITAL | Age: 73
End: 2018-05-31
Attending: UROLOGY
Payer: MEDICARE

## 2018-05-31 DIAGNOSIS — R97.20 ELEVATED PSA: ICD-10-CM

## 2018-05-31 LAB — COMPLEXED PSA SERPL-MCNC: 4.1 NG/ML

## 2018-05-31 PROCEDURE — 36415 COLL VENOUS BLD VENIPUNCTURE: CPT | Mod: PO

## 2018-05-31 PROCEDURE — 84153 ASSAY OF PSA TOTAL: CPT

## 2018-06-04 ENCOUNTER — OFFICE VISIT (OUTPATIENT)
Dept: UROLOGY | Facility: CLINIC | Age: 73
End: 2018-06-04
Payer: MEDICARE

## 2018-06-04 VITALS
SYSTOLIC BLOOD PRESSURE: 146 MMHG | HEIGHT: 71 IN | HEART RATE: 71 BPM | DIASTOLIC BLOOD PRESSURE: 86 MMHG | BODY MASS INDEX: 42.28 KG/M2 | WEIGHT: 302 LBS

## 2018-06-04 DIAGNOSIS — R33.9 URINARY RETENTION: Primary | ICD-10-CM

## 2018-06-04 DIAGNOSIS — N40.1 ENLARGED PROSTATE WITH URINARY OBSTRUCTION: ICD-10-CM

## 2018-06-04 DIAGNOSIS — R97.20 ELEVATED PSA: ICD-10-CM

## 2018-06-04 DIAGNOSIS — N13.8 ENLARGED PROSTATE WITH URINARY OBSTRUCTION: ICD-10-CM

## 2018-06-04 LAB
BILIRUB SERPL-MCNC: NORMAL MG/DL
BLOOD URINE, POC: NORMAL
COLOR, POC UA: YELLOW
GLUCOSE UR QL STRIP: NORMAL
KETONES UR QL STRIP: NORMAL
LEUKOCYTE ESTERASE URINE, POC: NORMAL
NITRITE, POC UA: NORMAL
PH, POC UA: 6
PROTEIN, POC: NORMAL
SPECIFIC GRAVITY, POC UA: 1.02
UROBILINOGEN, POC UA: NORMAL

## 2018-06-04 PROCEDURE — 3077F SYST BP >= 140 MM HG: CPT | Mod: CPTII,S$GLB,, | Performed by: UROLOGY

## 2018-06-04 PROCEDURE — 99999 PR PBB SHADOW E&M-EST. PATIENT-LVL III: CPT | Mod: PBBFAC,,, | Performed by: UROLOGY

## 2018-06-04 PROCEDURE — 99214 OFFICE O/P EST MOD 30 MIN: CPT | Mod: 25,S$GLB,, | Performed by: UROLOGY

## 2018-06-04 PROCEDURE — 81002 URINALYSIS NONAUTO W/O SCOPE: CPT | Mod: S$GLB,,, | Performed by: UROLOGY

## 2018-06-04 PROCEDURE — 3079F DIAST BP 80-89 MM HG: CPT | Mod: CPTII,S$GLB,, | Performed by: UROLOGY

## 2018-06-04 NOTE — PROGRESS NOTES
Subjective:       Patient ID: Chinedu Roque is a 72 y.o. male.    Chief Complaint: Follow-up    HPI     72 with BPH and elevated PSA.  He had urinary retention last year. Cath placed an drained 600 cc.  He is now taking Flomax and Finasteride and he is without difficulty.  He has no bothersome symptoms.  He has a history of elevated PSA but his most recent PSA is decreased to 4.1 and likely reflects the addition of Finasteride.      Urine dipstick shows negative for all components.  PVR 98 ml    Component PSA, SCREEN PSA DIAGNOSTIC   Latest Ref Rng & Units 0.00 - 4.00 ng/mL 0.00 - 4.00 ng/mL   5/31/2018  4.1 (H)   1/3/2018  4.6 (H)   11/29/2017  22.1 (H)   8/3/2017 7.1 (H)    6/25/2016  6.7 (H)   4/23/2016 7.9 (H)    4/11/2015 5.3 (H)    4/2/2014  5.4 (H)   3/28/2013 5.41 (H)        Current Outpatient Prescriptions:     aspirin (ECOTRIN) 81 MG EC tablet, Take 81 mg by mouth once daily.  , Disp: , Rfl:     finasteride (PROSCAR) 5 mg tablet, Take 1 tablet (5 mg total) by mouth once daily., Disp: 90 tablet, Rfl: 3    losartan (COZAAR) 100 MG tablet, TAKE 1 TABLET (100 MG TOTAL) BY MOUTH ONCE DAILY., Disp: 90 tablet, Rfl: 3    metoprolol tartrate (LOPRESSOR) 25 MG tablet, TAKE 1 TABLET TWICE DAILY, Disp: 180 tablet, Rfl: 2    tamsulosin (FLOMAX) 0.4 mg Cp24, Take 1 capsule (0.4 mg total) by mouth once daily., Disp: 30 capsule, Rfl: 11    terazosin (HYTRIN) 10 MG capsule, Take 2 capsules (20 mg total) by mouth every evening. (Patient taking differently: Take 20 mg by mouth every evening. ), Disp: 180 capsule, Rfl: 3    tramadol (ULTRAM) 50 mg tablet, Take 1 tablet (50 mg total) by mouth every 8 (eight) hours as needed for Pain., Disp: 90 tablet, Rfl: 0      Review of Systems   Constitutional: Negative for fever.   Genitourinary: Negative for dysuria and hematuria.       Objective:      Physical Exam   Constitutional: He is oriented to person, place, and time. He appears well-developed and well-nourished.    HENT:   Head: Normocephalic and atraumatic.   Eyes: Conjunctivae are normal.   Cardiovascular: Normal rate.    Pulmonary/Chest: Effort normal.   Genitourinary: Rectal exam shows no mass and anal tone normal. Prostate is enlarged (50g, s/s/a). Prostate is not tender.   Musculoskeletal: Normal range of motion.   Neurological: He is alert and oriented to person, place, and time.   Skin: Skin is warm and dry. No rash noted.   Psychiatric: He has a normal mood and affect.   Vitals reviewed.      Assessment:       1. Urinary retention    2. Enlarged prostate with urinary obstruction    3. Elevated PSA        Plan:       Urinary retention  -     POCT URINE DIPSTICK WITHOUT MICROSCOPE    Enlarged prostate with urinary obstruction    Elevated PSA      Continue Flomax and Finasteride.  RTC 6 months with repeat PSA.

## 2018-07-20 RX ORDER — METOPROLOL TARTRATE 25 MG/1
TABLET, FILM COATED ORAL
Qty: 180 TABLET | Refills: 0 | Status: SHIPPED | OUTPATIENT
Start: 2018-07-20 | End: 2018-10-15 | Stop reason: SDUPTHER

## 2018-08-28 DIAGNOSIS — M25.512 BILATERAL SHOULDER PAIN, UNSPECIFIED CHRONICITY: Primary | ICD-10-CM

## 2018-08-28 DIAGNOSIS — M25.511 BILATERAL SHOULDER PAIN, UNSPECIFIED CHRONICITY: Primary | ICD-10-CM

## 2018-08-31 ENCOUNTER — OFFICE VISIT (OUTPATIENT)
Dept: ORTHOPEDICS | Facility: CLINIC | Age: 73
End: 2018-08-31
Payer: MEDICARE

## 2018-08-31 ENCOUNTER — HOSPITAL ENCOUNTER (OUTPATIENT)
Dept: RADIOLOGY | Facility: HOSPITAL | Age: 73
Discharge: HOME | End: 2018-08-31
Attending: ORTHOPAEDIC SURGERY
Payer: MEDICARE

## 2018-08-31 VITALS — HEIGHT: 71 IN | BODY MASS INDEX: 42.28 KG/M2 | WEIGHT: 302 LBS

## 2018-08-31 DIAGNOSIS — M25.511 CHRONIC PAIN OF BOTH SHOULDERS: Primary | ICD-10-CM

## 2018-08-31 DIAGNOSIS — M77.8 SHOULDER TENDONITIS, RIGHT: ICD-10-CM

## 2018-08-31 DIAGNOSIS — M25.512 BILATERAL SHOULDER PAIN, UNSPECIFIED CHRONICITY: ICD-10-CM

## 2018-08-31 DIAGNOSIS — M77.8 SHOULDER TENDONITIS, LEFT: ICD-10-CM

## 2018-08-31 DIAGNOSIS — M19.011 PRIMARY OSTEOARTHRITIS OF BOTH SHOULDERS: ICD-10-CM

## 2018-08-31 DIAGNOSIS — G89.29 CHRONIC PAIN OF BOTH SHOULDERS: Primary | ICD-10-CM

## 2018-08-31 DIAGNOSIS — M25.512 CHRONIC PAIN OF BOTH SHOULDERS: Primary | ICD-10-CM

## 2018-08-31 DIAGNOSIS — M25.511 BILATERAL SHOULDER PAIN, UNSPECIFIED CHRONICITY: ICD-10-CM

## 2018-08-31 DIAGNOSIS — M19.012 PRIMARY OSTEOARTHRITIS OF BOTH SHOULDERS: ICD-10-CM

## 2018-08-31 PROCEDURE — 73030 X-RAY EXAM OF SHOULDER: CPT | Mod: 26,LT,, | Performed by: RADIOLOGY

## 2018-08-31 PROCEDURE — 99999 PR PBB SHADOW E&M-EST. PATIENT-LVL II: CPT | Mod: PBBFAC,,, | Performed by: ORTHOPAEDIC SURGERY

## 2018-08-31 PROCEDURE — 20610 DRAIN/INJ JOINT/BURSA W/O US: CPT | Mod: PBBFAC,PN | Performed by: ORTHOPAEDIC SURGERY

## 2018-08-31 PROCEDURE — 99214 OFFICE O/P EST MOD 30 MIN: CPT | Mod: 25,S$PBB,, | Performed by: ORTHOPAEDIC SURGERY

## 2018-08-31 PROCEDURE — 73030 X-RAY EXAM OF SHOULDER: CPT | Mod: TC,50,PO

## 2018-08-31 PROCEDURE — 73030 X-RAY EXAM OF SHOULDER: CPT | Mod: 26,RT,, | Performed by: RADIOLOGY

## 2018-08-31 RX ORDER — TRIAMCINOLONE ACETONIDE 40 MG/ML
80 INJECTION, SUSPENSION INTRA-ARTICULAR; INTRAMUSCULAR
Status: DISCONTINUED | OUTPATIENT
Start: 2018-08-31 | End: 2018-08-31 | Stop reason: HOSPADM

## 2018-08-31 RX ADMIN — TRIAMCINOLONE ACETONIDE 80 MG: 40 INJECTION, SUSPENSION INTRA-ARTICULAR; INTRAMUSCULAR at 10:08

## 2018-08-31 NOTE — PROCEDURES
Large Joint Aspiration/Injection: L subacromial bursa  Date/Time: 8/31/2018 10:34 AM  Performed by: Michelet Covarrubias MD  Authorized by: Michelet Covarrubias MD     Consent Done?:  Yes (Verbal)  Indications:  Pain  Procedure site marked: Yes    Timeout: Prior to procedure the correct patient, procedure, and site was verified      Location:  Shoulder  Site:  L subacromial bursa  Prep: Patient was prepped and draped in usual sterile fashion    Ultrasonic Guidance for needle placement: No  Needle size:  21 G  Approach:  Posterior  Medications:  80 mg triamcinolone acetonide 40 mg/mL  Patient tolerance:  Patient tolerated the procedure well with no immediate complications

## 2018-08-31 NOTE — PROGRESS NOTES
Chinedu Roque, 72 years old, complaining of bilateral shoulder pain, had an   injection a few years ago into his left shoulder, responded well, requesting an   injection today.  Rates the pain is up to 8/10 on the pain scale.    PHYSICAL EXAMINATION:  Shows positive Neer and Avalos impingement sign.  Cuff   strength is weak and painful.    X-rays show degenerative changes.    ASSESSMENT:  Bilateral shoulder pain, degenerative disease.    PLAN:  Kenalog injection to both shoulders.  Encouraged strengthening exercise   over time.  Follow up as needed.      PBB/HN  dd: 08/31/2018 11:11:43 (CDT)  td: 09/01/2018 05:06:44 (CDT)  Doc ID   #[id number]  Job ID #[id number]    CC:     Further History  Aching pain  Worse with activity  Relieved with rest  No other associated symptoms  No other radiation    Further Exam  Alert and oriented  Pleasant  Contralateral limb has appropriate range of motion for age and condition  Contralateral limb has appropriate strength for age and condition  Contralateral limb has appropriate stability  for age and condition  No adenopathy  Pulses are appropriate for current condition  Skin is intact        Chief Complaint    Chief Complaint   Patient presents with    Right Shoulder - Pain    Left Shoulder - Pain       HPI  Chinedu Roque is a 72 y.o.  male who presents with       Past Medical History  Past Medical History:   Diagnosis Date    Anticoagulant long-term use     ASA 81 mg    Arthritis     cervical    BPH (benign prostatic hyperplasia) 3/2/2012    Chronic left shoulder pain     Compression fracture of L2     DDD (degenerative disc disease), lumbar     HTN (hypertension) 3/2/2012    Low back pain     Morbid obesity with BMI of 40.0-44.9, adult 3/18/2014    Seasonal allergies     Sleep apnea     compliant with CPAP    Stroke 3/1986       Past Surgical History  Past Surgical History:   Procedure Laterality Date    APPENDECTOMY      EPIDURAL BLOCK INJECTION  2015    cervical     Facet Steroid injection       Pain management    HERNIA REPAIR      Ventral    KNEE SURGERY      bilateral    RADIOFREQUENCY ABLATION  2015    lumbar nerve    VERTEBROPLASTY         Medications  Current Outpatient Medications   Medication Sig    aspirin (ECOTRIN) 81 MG EC tablet Take 81 mg by mouth once daily.      finasteride (PROSCAR) 5 mg tablet Take 1 tablet (5 mg total) by mouth once daily.    losartan (COZAAR) 100 MG tablet TAKE 1 TABLET (100 MG TOTAL) BY MOUTH ONCE DAILY.    metoprolol tartrate (LOPRESSOR) 25 MG tablet TAKE 1 TABLET TWICE DAILY    tamsulosin (FLOMAX) 0.4 mg Cp24 Take 1 capsule (0.4 mg total) by mouth once daily.    terazosin (HYTRIN) 10 MG capsule Take 2 capsules (20 mg total) by mouth every evening. (Patient taking differently: Take 20 mg by mouth every evening. )    tramadol (ULTRAM) 50 mg tablet Take 1 tablet (50 mg total) by mouth every 8 (eight) hours as needed for Pain.     No current facility-administered medications for this visit.      Facility-Administered Medications Ordered in Other Visits   Medication    sodium hyaluronate (EUFLEXXA) 10 mg/mL(mw 2.4 -3.6 million) Syrg 20 mg    sodium hyaluronate (EUFLEXXA) 10 mg/mL(mw 2.4 -3.6 million) Syrg 20 mg       Allergies  Review of patient's allergies indicates:  No Known Allergies    Family History  Family History   Problem Relation Age of Onset    COPD Father     Hypertension Brother     Kidney disease Brother     Alzheimer's disease Mother     No Known Problems Daughter     Hypertension Son     Diabetes Son         pre-diabetic    No Known Problems Daughter     No Known Problems Daughter        Social History  Social History     Socioeconomic History    Marital status:      Spouse name: Not on file    Number of children: Not on file    Years of education: Not on file    Highest education level: Not on file   Social Needs    Financial resource strain: Not on file    Food insecurity - worry: Not on  file    Food insecurity - inability: Not on file    Transportation needs - medical: Not on file    Transportation needs - non-medical: Not on file   Occupational History    Not on file   Tobacco Use    Smoking status: Former Smoker     Packs/day: 1.50     Years: 24.00     Pack years: 36.00     Start date: 10/21/1959     Last attempt to quit: 3/19/1983     Years since quittin.4    Smokeless tobacco: Never Used   Substance and Sexual Activity    Alcohol use: No    Drug use: No    Sexual activity: Not on file   Other Topics Concern    Not on file   Social History Narrative    Retired - Worked for the [email or web address].                Review of Systems     Constitutional: Negative    HENT: Negative  Eyes: Negative  Respiratory: Negative  Cardiovascular: Negative  Musculoskeletal: HPI  Skin: Negative  Neurological: Negative  Hematological: Negative  Endocrine: Negative                 Physical Exam    There were no vitals filed for this visit.  Body mass index is 42.12 kg/m².  Physical Examination:     General appearance -  well appearing, and in no distress  Mental status - awake  Neck - supple  Chest -  symmetric air entry  Heart - normal rate   Abdomen - soft      Assessment     1. Chronic pain of both shoulders    2. Primary osteoarthritis of both shoulders    3. Shoulder tendonitis, left    4. Shoulder tendonitis, right          Plan

## 2018-08-31 NOTE — PROCEDURES
Large Joint Aspiration/Injection: R subacromial bursa  Date/Time: 8/31/2018 10:14 AM  Performed by: Michelet Covarrubias MD  Authorized by: Michelet Covarrubias MD     Consent Done?:  Yes (Verbal)  Indications:  Pain  Procedure site marked: Yes    Timeout: Prior to procedure the correct patient, procedure, and site was verified      Location:  Shoulder  Site:  R subacromial bursa  Prep: Patient was prepped and draped in usual sterile fashion    Ultrasonic Guidance for needle placement: No  Needle size:  21 G  Approach:  Posterior  Medications:  80 mg triamcinolone acetonide 40 mg/mL  Patient tolerance:  Patient tolerated the procedure well with no immediate complications

## 2018-10-03 ENCOUNTER — OFFICE VISIT (OUTPATIENT)
Dept: FAMILY MEDICINE | Facility: CLINIC | Age: 73
End: 2018-10-03
Payer: MEDICARE

## 2018-10-03 VITALS
SYSTOLIC BLOOD PRESSURE: 136 MMHG | RESPIRATION RATE: 18 BRPM | TEMPERATURE: 98 F | BODY MASS INDEX: 41.63 KG/M2 | OXYGEN SATURATION: 96 % | HEIGHT: 71 IN | DIASTOLIC BLOOD PRESSURE: 76 MMHG | HEART RATE: 83 BPM | WEIGHT: 297.38 LBS

## 2018-10-03 DIAGNOSIS — B34.9 VIRAL ILLNESS: ICD-10-CM

## 2018-10-03 DIAGNOSIS — I10 ESSENTIAL HYPERTENSION: ICD-10-CM

## 2018-10-03 DIAGNOSIS — E66.01 MORBID OBESITY WITH BMI OF 40.0-44.9, ADULT: ICD-10-CM

## 2018-10-03 DIAGNOSIS — E78.5 HYPERLIPIDEMIA, UNSPECIFIED HYPERLIPIDEMIA TYPE: ICD-10-CM

## 2018-10-03 DIAGNOSIS — R05.9 COUGH: Primary | ICD-10-CM

## 2018-10-03 DIAGNOSIS — Z86.73 HISTORY OF STROKE: ICD-10-CM

## 2018-10-03 DIAGNOSIS — J42 CHRONIC BRONCHITIS, UNSPECIFIED CHRONIC BRONCHITIS TYPE: ICD-10-CM

## 2018-10-03 PROCEDURE — 99214 OFFICE O/P EST MOD 30 MIN: CPT | Mod: PBBFAC,PO | Performed by: NURSE PRACTITIONER

## 2018-10-03 PROCEDURE — 99499 UNLISTED E&M SERVICE: CPT | Mod: S$GLB,,, | Performed by: NURSE PRACTITIONER

## 2018-10-03 PROCEDURE — 1101F PT FALLS ASSESS-DOCD LE1/YR: CPT | Mod: CPTII,,, | Performed by: NURSE PRACTITIONER

## 2018-10-03 PROCEDURE — 99999 PR PBB SHADOW E&M-EST. PATIENT-LVL IV: CPT | Mod: PBBFAC,,, | Performed by: NURSE PRACTITIONER

## 2018-10-03 PROCEDURE — 99214 OFFICE O/P EST MOD 30 MIN: CPT | Mod: S$PBB,,, | Performed by: NURSE PRACTITIONER

## 2018-10-03 PROCEDURE — 3078F DIAST BP <80 MM HG: CPT | Mod: CPTII,,, | Performed by: NURSE PRACTITIONER

## 2018-10-03 PROCEDURE — 3075F SYST BP GE 130 - 139MM HG: CPT | Mod: CPTII,,, | Performed by: NURSE PRACTITIONER

## 2018-10-03 NOTE — PROGRESS NOTES
Subjective:       Patient ID: Chinedu Roque is a 72 y.o. male.    Chief Complaint: Cough (productive, green yellow, taking mucinex not helping, 3 or 4 days) and Headache (3 or 4 days, chest discomfort, a little weak)    Here today with cough   Patient of Dr Winters and this is the first time I am seeing him in clinic.      Cough   This is a new problem. The current episode started in the past 7 days. The problem has been gradually worsening. The cough is productive of sputum and productive of purulent sputum. Associated symptoms include headaches, nasal congestion, postnasal drip and rhinorrhea. Pertinent negatives include no chest pain, chills, ear congestion, ear pain, fever, heartburn, hemoptysis, myalgias, rash, sore throat, shortness of breath, sweats, weight loss or wheezing. The symptoms are aggravated by lying down. Treatments tried: mucinex      Vitals:    10/03/18 1241   BP: 136/76   Pulse: 83   Resp: 18   Temp: 97.7 °F (36.5 °C)     Review of Systems   Constitutional: Positive for fatigue. Negative for activity change, appetite change, chills, diaphoresis, fever and weight loss.   HENT: Positive for congestion, postnasal drip and rhinorrhea. Negative for dental problem, drooling, ear pain, hearing loss, mouth sores, nosebleeds, sinus pressure, sinus pain, sneezing and sore throat.    Eyes: Negative.    Respiratory: Positive for cough. Negative for hemoptysis, choking, chest tightness, shortness of breath and wheezing.    Cardiovascular: Negative.  Negative for chest pain.   Gastrointestinal: Negative.  Negative for abdominal pain, diarrhea, heartburn and nausea.   Endocrine: Negative.    Genitourinary: Negative.  Negative for dysuria and hematuria.   Musculoskeletal: Negative.  Negative for arthralgias, back pain, gait problem, joint swelling and myalgias.   Skin: Negative.  Negative for color change and rash.   Allergic/Immunologic: Negative.    Neurological: Positive for headaches. Negative for  numbness.   Hematological: Negative.    Psychiatric/Behavioral: Negative.        Past Medical History:   Diagnosis Date    Anticoagulant long-term use     ASA 81 mg    Arthritis     cervical    BPH (benign prostatic hyperplasia) 3/2/2012    Chronic left shoulder pain     Compression fracture of L2     DDD (degenerative disc disease), lumbar     HTN (hypertension) 3/2/2012    Low back pain     Morbid obesity with BMI of 40.0-44.9, adult 3/18/2014    Seasonal allergies     Sleep apnea     compliant with CPAP    Stroke 3/1986    Stroke 3/2/2012    1986      Objective:      Physical Exam   Constitutional: He is oriented to person, place, and time. He appears well-developed and well-nourished.   HENT:   Head: Normocephalic and atraumatic.   Right Ear: Hearing, tympanic membrane, external ear and ear canal normal.   Left Ear: Hearing, tympanic membrane, external ear and ear canal normal.   Nose: Mucosal edema and rhinorrhea present. Right sinus exhibits no maxillary sinus tenderness and no frontal sinus tenderness. Left sinus exhibits no maxillary sinus tenderness and no frontal sinus tenderness.   Mouth/Throat: Uvula is midline, oropharynx is clear and moist and mucous membranes are normal.   Eyes: Conjunctivae and EOM are normal. Pupils are equal, round, and reactive to light.   Neck: Normal range of motion. Neck supple.   Cardiovascular: Normal rate, regular rhythm, normal heart sounds and intact distal pulses. Exam reveals no friction rub.   No murmur heard.  Pulmonary/Chest: Effort normal and breath sounds normal. No stridor. No respiratory distress. He has no wheezes.   Abdominal: Soft. Bowel sounds are normal. He exhibits no distension. There is no tenderness.   Musculoskeletal: Normal range of motion.   Neurological: He is alert and oriented to person, place, and time.   Skin: Skin is warm and dry.   Psychiatric: He has a normal mood and affect. His behavior is normal. Judgment and thought content  normal.   Nursing note and vitals reviewed.      Assessment:       1. Cough    2. Viral illness    3. Essential hypertension    4. Hyperlipidemia, unspecified hyperlipidemia type    5. Morbid obesity with BMI of 40.0-44.9, adult    6. History of stroke    7. Chronic bronchitis, unspecified chronic bronchitis type        Plan:       Cough  Viral illness  Discussed to continue the mucinex   Call if any issues worsen     Essential hypertension  Stable on meds     Hyperlipidemia, unspecified hyperlipidemia type  Stable on meds     Morbid obesity with BMI of 40.0-44.9, adult  Monitored     History of stroke  Stable - monitoring chol and HTN     Chronic bronchitis, unspecified chronic bronchitis type  Stable             Increase water intake - continue mucinex  Any changes in symptoms in 48 hr -call back

## 2018-10-15 DIAGNOSIS — I10 ESSENTIAL HYPERTENSION: Primary | ICD-10-CM

## 2018-10-15 RX ORDER — TERAZOSIN 10 MG/1
CAPSULE ORAL
Qty: 90 CAPSULE | Refills: 0 | Status: SHIPPED | OUTPATIENT
Start: 2018-10-15 | End: 2018-10-23 | Stop reason: SDUPTHER

## 2018-10-16 RX ORDER — METOPROLOL TARTRATE 25 MG/1
TABLET, FILM COATED ORAL
Qty: 180 TABLET | Refills: 0 | Status: SHIPPED | OUTPATIENT
Start: 2018-10-16 | End: 2018-10-23 | Stop reason: SDUPTHER

## 2018-10-19 ENCOUNTER — LAB VISIT (OUTPATIENT)
Dept: LAB | Facility: HOSPITAL | Age: 73
End: 2018-10-19
Attending: FAMILY MEDICINE
Payer: MEDICARE

## 2018-10-19 DIAGNOSIS — I10 ESSENTIAL HYPERTENSION: ICD-10-CM

## 2018-10-19 LAB
ALBUMIN SERPL BCP-MCNC: 3.8 G/DL
ALP SERPL-CCNC: 59 U/L
ALT SERPL W/O P-5'-P-CCNC: 35 U/L
ANION GAP SERPL CALC-SCNC: 10 MMOL/L
AST SERPL-CCNC: 29 U/L
BILIRUB SERPL-MCNC: 0.8 MG/DL
BUN SERPL-MCNC: 15 MG/DL
CALCIUM SERPL-MCNC: 8.7 MG/DL
CHLORIDE SERPL-SCNC: 105 MMOL/L
CHOLEST SERPL-MCNC: 188 MG/DL
CHOLEST/HDLC SERPL: 3.8 {RATIO}
CO2 SERPL-SCNC: 22 MMOL/L
CREAT SERPL-MCNC: 1 MG/DL
EST. GFR  (AFRICAN AMERICAN): >60 ML/MIN/1.73 M^2
EST. GFR  (NON AFRICAN AMERICAN): >60 ML/MIN/1.73 M^2
GLUCOSE SERPL-MCNC: 80 MG/DL
HDLC SERPL-MCNC: 49 MG/DL
HDLC SERPL: 26.1 %
LDLC SERPL CALC-MCNC: 118.8 MG/DL
NONHDLC SERPL-MCNC: 139 MG/DL
POTASSIUM SERPL-SCNC: 3.9 MMOL/L
PROT SERPL-MCNC: 7.1 G/DL
SODIUM SERPL-SCNC: 137 MMOL/L
TRIGL SERPL-MCNC: 101 MG/DL

## 2018-10-19 PROCEDURE — 80061 LIPID PANEL: CPT

## 2018-10-19 PROCEDURE — 36415 COLL VENOUS BLD VENIPUNCTURE: CPT | Mod: PN

## 2018-10-19 PROCEDURE — 80053 COMPREHEN METABOLIC PANEL: CPT

## 2018-10-24 RX ORDER — TERAZOSIN 10 MG/1
CAPSULE ORAL
Qty: 90 CAPSULE | Refills: 0 | Status: SHIPPED | OUTPATIENT
Start: 2018-10-24 | End: 2018-10-31 | Stop reason: SDUPTHER

## 2018-10-24 RX ORDER — METOPROLOL TARTRATE 25 MG/1
TABLET, FILM COATED ORAL
Qty: 180 TABLET | Refills: 0 | Status: SHIPPED | OUTPATIENT
Start: 2018-10-24 | End: 2018-10-31 | Stop reason: ALTCHOICE

## 2018-10-30 RX ORDER — TAMSULOSIN HYDROCHLORIDE 0.4 MG/1
0.4 CAPSULE ORAL DAILY
Qty: 30 CAPSULE | Refills: 11 | Status: SHIPPED | OUTPATIENT
Start: 2018-10-30 | End: 2019-12-03 | Stop reason: SDUPTHER

## 2018-10-31 ENCOUNTER — OFFICE VISIT (OUTPATIENT)
Dept: FAMILY MEDICINE | Facility: CLINIC | Age: 73
End: 2018-10-31
Payer: MEDICARE

## 2018-10-31 VITALS
BODY MASS INDEX: 42.62 KG/M2 | HEART RATE: 80 BPM | HEIGHT: 71 IN | OXYGEN SATURATION: 96 % | TEMPERATURE: 98 F | WEIGHT: 304.44 LBS

## 2018-10-31 DIAGNOSIS — E66.01 MORBID OBESITY WITH BMI OF 40.0-44.9, ADULT: ICD-10-CM

## 2018-10-31 DIAGNOSIS — R35.1 BENIGN PROSTATIC HYPERPLASIA WITH NOCTURIA: ICD-10-CM

## 2018-10-31 DIAGNOSIS — I10 ESSENTIAL HYPERTENSION: ICD-10-CM

## 2018-10-31 DIAGNOSIS — Z00.00 HEALTH MAINTENANCE EXAMINATION: Primary | ICD-10-CM

## 2018-10-31 DIAGNOSIS — Z12.11 COLON CANCER SCREENING: ICD-10-CM

## 2018-10-31 DIAGNOSIS — N40.1 BENIGN PROSTATIC HYPERPLASIA WITH NOCTURIA: ICD-10-CM

## 2018-10-31 PROBLEM — M25.562 ACUTE PAIN OF BOTH KNEES: Status: RESOLVED | Noted: 2017-10-03 | Resolved: 2018-10-31

## 2018-10-31 PROBLEM — M25.561 ACUTE PAIN OF BOTH KNEES: Status: RESOLVED | Noted: 2017-10-03 | Resolved: 2018-10-31

## 2018-10-31 PROCEDURE — 99215 OFFICE O/P EST HI 40 MIN: CPT | Mod: PBBFAC,PN | Performed by: FAMILY MEDICINE

## 2018-10-31 PROCEDURE — 99214 OFFICE O/P EST MOD 30 MIN: CPT | Mod: S$PBB,,, | Performed by: FAMILY MEDICINE

## 2018-10-31 PROCEDURE — 99999 PR PBB SHADOW E&M-EST. PATIENT-LVL V: CPT | Mod: PBBFAC,,, | Performed by: FAMILY MEDICINE

## 2018-10-31 RX ORDER — METOPROLOL SUCCINATE 100 MG/1
100 TABLET, EXTENDED RELEASE ORAL DAILY
Qty: 90 TABLET | Refills: 3 | Status: SHIPPED | OUTPATIENT
Start: 2018-10-31 | End: 2019-10-10 | Stop reason: SDUPTHER

## 2018-10-31 NOTE — PROGRESS NOTES
"Subjective:       Patient ID: Chinedu Roque is a 72 y.o. male.    Chief Complaint: Annual Exam    Annual exam.  He has hypertension.  Home blood pressures running 148/75.  He has gained some weight.  He had a visit early this month for an upper respiratory infection.  His cough lasted about 1 and half weeks.  He has BPH with trouble urinating.  He is on Flomax but also continues on to raise is an.  I am not sure if that is wise.  His blood pressure has not been well controlled.  I reviewed his diet.  For breakfast he has raisin bran, milk, banana.  Sometimes he eats lunch at the [employer].  He does not have [place] very often.  He likes meat and potatoes for supper.  He does like vegetables as well.  He has some follow-up appointment scheduled with Dr. Segura as well as for sleep medicine.      Review of Systems   Constitutional: Positive for unexpected weight change. Negative for fatigue and fever.   HENT: Negative.    Eyes: Negative for visual disturbance.   Respiratory: Negative for cough, shortness of breath and wheezing.    Cardiovascular: Negative for chest pain, palpitations and leg swelling.   Gastrointestinal: Negative for abdominal pain, blood in stool and diarrhea.   Genitourinary: Negative for difficulty urinating and hematuria.   Musculoskeletal: Positive for arthralgias (He had both shoulders injected in August.  His left 1 is doing okay but he is still bothered by the right shoulder pain).   Skin:        No neoplasms    Neurological: Negative for weakness and numbness.       Objective:     Pulse 80, temperature 98 °F (36.7 °C), temperature source Oral, height 5' 11" (1.803 m), weight (!) 138.1 kg (304 lb 7.3 oz), SpO2 96 %.      Physical Exam   Constitutional:   He is morbidly obese and in no distress   Neck: No thyromegaly present.   Cardiovascular: Normal rate and regular rhythm.   No carotid bruits.  No heart murmur.   Pulmonary/Chest: Effort normal and breath sounds normal. No respiratory " distress.   Musculoskeletal: He exhibits edema (1+).   Lymphadenopathy:     He has no cervical adenopathy.   Neurological: He is alert.       Assessment:       1. Health maintenance examination    2. Colon cancer screening    3. Essential hypertension    4. Benign prostatic hyperplasia with nocturia    5. Morbid obesity with BMI of 40.0-44.9, adult        Plan:       Stop terazocin.  Increase metoprolol to extended release 100 mg.  Colonoscopy for follow-up of tubular adenoma.    follow-up later this year. I reviewed his lab work with him.

## 2018-11-09 RX ORDER — FINASTERIDE 5 MG/1
5 TABLET, FILM COATED ORAL DAILY
Qty: 90 TABLET | Refills: 3 | Status: SHIPPED | OUTPATIENT
Start: 2018-11-09 | End: 2019-10-29 | Stop reason: SDUPTHER

## 2018-11-12 ENCOUNTER — OFFICE VISIT (OUTPATIENT)
Dept: ORTHOPEDICS | Facility: CLINIC | Age: 73
End: 2018-11-12
Payer: MEDICARE

## 2018-11-12 VITALS — WEIGHT: 300 LBS | BODY MASS INDEX: 42 KG/M2 | HEIGHT: 71 IN

## 2018-11-12 DIAGNOSIS — M25.511 CHRONIC PAIN OF BOTH SHOULDERS: Primary | ICD-10-CM

## 2018-11-12 DIAGNOSIS — M77.8 SHOULDER TENDONITIS, RIGHT: ICD-10-CM

## 2018-11-12 DIAGNOSIS — M25.512 CHRONIC PAIN OF BOTH SHOULDERS: Primary | ICD-10-CM

## 2018-11-12 DIAGNOSIS — M77.8 SHOULDER TENDONITIS, LEFT: ICD-10-CM

## 2018-11-12 DIAGNOSIS — G89.29 CHRONIC PAIN OF BOTH SHOULDERS: Primary | ICD-10-CM

## 2018-11-12 PROCEDURE — 1101F PT FALLS ASSESS-DOCD LE1/YR: CPT | Mod: CPTII,S$GLB,, | Performed by: ORTHOPAEDIC SURGERY

## 2018-11-12 PROCEDURE — 99214 OFFICE O/P EST MOD 30 MIN: CPT | Mod: 25,S$GLB,, | Performed by: ORTHOPAEDIC SURGERY

## 2018-11-12 PROCEDURE — 20610 DRAIN/INJ JOINT/BURSA W/O US: CPT | Mod: RT,S$GLB,, | Performed by: ORTHOPAEDIC SURGERY

## 2018-11-12 PROCEDURE — 99999 PR PBB SHADOW E&M-EST. PATIENT-LVL II: CPT | Mod: PBBFAC,,, | Performed by: ORTHOPAEDIC SURGERY

## 2018-11-12 RX ORDER — TRIAMCINOLONE ACETONIDE 40 MG/ML
80 INJECTION, SUSPENSION INTRA-ARTICULAR; INTRAMUSCULAR
Status: DISCONTINUED | OUTPATIENT
Start: 2018-11-12 | End: 2018-11-12 | Stop reason: HOSPADM

## 2018-11-12 RX ADMIN — TRIAMCINOLONE ACETONIDE 80 MG: 40 INJECTION, SUSPENSION INTRA-ARTICULAR; INTRAMUSCULAR at 01:11

## 2018-11-12 NOTE — PROGRESS NOTES
72 years old, recurrent pain in his right shoulder.  We have given him an   injection a few months ago with only short-term relief in the right shoulder.    Left shoulder seems to be doing well from a previous injection in the past,   difficulty with overactivity.     Exam today shows positive Neer and Avalos impingement sign, weak and painful   cuff strength.    ASSESSMENT:  Cuff tendinitis, probable cuff tear in this 72-year-old.    PLAN:  Kenalog injection into the subacromial space of the right shoulder, home   exercise program.  Follow up as needed.      PBB/HN  dd: 11/12/2018 13:27:58 (CST)  td: 11/13/2018 01:44:28 (CST)  Doc ID   #[id number]  Job ID #[id number]    CC:     Further History  Aching pain  Worse with activity  Relieved with rest  No other associated symptoms  No other radiation    Further Exam  Alert and oriented  Pleasant  Contralateral limb has appropriate range of motion for age and condition  Contralateral limb has appropriate strength for age and condition  Contralateral limb has appropriate stability  for age and condition  No adenopathy  Pulses are appropriate for current condition  Skin is intact        Chief Complaint    Chief Complaint   Patient presents with    Right Shoulder - Pain    Left Shoulder - Pain       HPI  Chinedu Roque is a 72 y.o.  male who presents with       Past Medical History  Past Medical History:   Diagnosis Date    Anticoagulant long-term use     ASA 81 mg    Arthritis     cervical    BPH (benign prostatic hyperplasia) 3/2/2012    Chronic left shoulder pain     Compression fracture of L2     DDD (degenerative disc disease), lumbar     HTN (hypertension) 3/2/2012    Low back pain     Morbid obesity with BMI of 40.0-44.9, adult 3/18/2014    Seasonal allergies     Sleep apnea     compliant with CPAP    Stroke 3/1986    Stroke 3/2/2012    1986        Past Surgical History  Past Surgical History:   Procedure Laterality Date    APPENDECTOMY      COLONOSCOPY  N/A 3/26/2013    Performed [facility] ENDO    EPIDURAL BLOCK INJECTION  2015    cervical    Facet Steroid injection       Pain management    HERNIA REPAIR      [facility]    INJECTION-FACET R. Lumbar L2/3, L3/4, L4/5, L5/S1 Right 10/28/2014    [facility] Riley [facility] at [facility] OR    INJECTION-STEROID-EPIDURAL-CERVICAL N/A 5/11/2015    [facility] OR    KNEE SURGERY      bilateral    RADIOFREQUENCY ABLATION  2015    lumbar nerve    RADIOFREQUENCY THERMOCOAGULATION (RFTC)-NERVE-MEDIAN BRANCH-LUMBAR L1,2,3,4,5 Right 7/5/2016    [facility] OR    RADIOFREQUENCY THERMOCOAGULATION (RFTC)-NERVE-MEDIAN BRANCH-LUMBAR L1,2,3,4,5 Right 3/18/2015    [facility] OR    RADIOFREQUENCY THERMOCOAGULATION (RFTC)-NERVE-MEDIAN BRANCH-LUMBAR L1,L2,L3,L4,L5 Right 10/17/2017    [facility] OR    VERTEBROPLASTY         Medications  Current Outpatient Medications   Medication Sig    aspirin (ECOTRIN) 81 MG EC tablet Take 81 mg by mouth once daily.      finasteride (PROSCAR) 5 mg tablet TAKE 1 TABLET (5 MG TOTAL) BY MOUTH ONCE DAILY.    FLUZONE HIGH-DOSE 2018-19, PF, 180 mcg/0.5 mL vaccine ADM 0.5ML IM UTD    losartan (COZAAR) 100 MG tablet TAKE 1 TABLET (100 MG TOTAL) BY MOUTH ONCE DAILY.    metoprolol succinate (TOPROL-XL) 100 MG 24 hr tablet Take 1 tablet (100 mg total) by mouth once daily.    tamsulosin (FLOMAX) 0.4 mg Cap TAKE 1 CAPSULE (0.4 MG TOTAL) BY MOUTH ONCE DAILY.    tramadol (ULTRAM) 50 mg tablet Take 1 tablet (50 mg total) by mouth every 8 (eight) hours as needed for Pain.     No current facility-administered medications for this visit.      Facility-Administered Medications Ordered in Other Visits   Medication    sodium hyaluronate (EUFLEXXA) 10 mg/mL(mw 2.4 -3.6 million) Syrg 20 mg    sodium hyaluronate (EUFLEXXA) 10 mg/mL(mw 2.4 -3.6 million) Syrg  20 mg       Allergies  Review of patient's allergies indicates:  No Known Allergies    Family History  Family History   Problem Relation Age of Onset    COPD Father     Hypertension Brother     Kidney disease Brother     Alzheimer's disease Mother     No Known Problems Daughter     Hypertension Son     Diabetes Son         pre-diabetic    No Known Problems Daughter     No Known Problems Daughter        Social History  Social History     Socioeconomic History    Marital status:      Spouse name: Not on file    Number of children: Not on file    Years of education: Not on file    Highest education level: Not on file   Social Needs    Financial resource strain: Not on file    Food insecurity - worry: Not on file    Food insecurity - inability: Not on file    Transportation needs - medical: Not on file    Transportation needs - non-medical: Not on file   Occupational History    Not on file   Tobacco Use    Smoking status: Former Smoker     Packs/day: 1.50     Years: 24.00     Pack years: 36.00     Start date: 10/21/1959     Last attempt to quit: 3/19/1983     Years since quittin.6    Smokeless tobacco: Never Used   Substance and Sexual Activity    Alcohol use: No    Drug use: No    Sexual activity: Not on file   Other Topics Concern    Not on file   Social History Narrative    Retired - Worked for the [employer].                Review of Systems     Constitutional: Negative    HENT: Negative  Eyes: Negative  Respiratory: Negative  Cardiovascular: Negative  Musculoskeletal: HPI  Skin: Negative  Neurological: Negative  Hematological: Negative  Endocrine: Negative                 Physical Exam    There were no vitals filed for this visit.  Body mass index is 41.84 kg/m².  Physical Examination:     General appearance -  well appearing, and in no distress  Mental status - awake  Neck - supple  Chest -  symmetric air entry  Heart - normal rate   Abdomen - soft      Assessment     1. Chronic  pain of both shoulders    2. Shoulder tendonitis, right    3. Shoulder tendonitis, left          Plan

## 2018-11-12 NOTE — PROCEDURES
Large Joint Aspiration/Injection: R subacromial bursa  Date/Time: 11/12/2018 1:23 PM  Performed by: Michelet Covarrubias MD  Authorized by: Michelet Covarrubias MD     Consent Done?:  Yes (Verbal)  Indications:  Pain  Procedure site marked: Yes    Timeout: Prior to procedure the correct patient, procedure, and site was verified      Location:  Shoulder  Site:  R subacromial bursa  Prep: Patient was prepped and draped in usual sterile fashion    Ultrasonic Guidance for needle placement: No  Needle size:  21 G  Approach:  Posterior  Medications:  80 mg triamcinolone acetonide 40 mg/mL  Patient tolerance:  Patient tolerated the procedure well with no immediate complications

## 2018-11-18 ENCOUNTER — TELEPHONE (OUTPATIENT)
Dept: GASTROENTEROLOGY | Facility: CLINIC | Age: 73
End: 2018-11-18

## 2018-12-01 RX ORDER — LOSARTAN POTASSIUM 100 MG/1
100 TABLET ORAL DAILY
Qty: 90 TABLET | Refills: 3 | Status: SHIPPED | OUTPATIENT
Start: 2018-12-01 | End: 2019-12-24 | Stop reason: SDUPTHER

## 2018-12-03 ENCOUNTER — OFFICE VISIT (OUTPATIENT)
Dept: UROLOGY | Facility: CLINIC | Age: 73
End: 2018-12-03
Payer: MEDICARE

## 2018-12-03 ENCOUNTER — LAB VISIT (OUTPATIENT)
Dept: LAB | Facility: HOSPITAL | Age: 73
End: 2018-12-03
Attending: UROLOGY
Payer: MEDICARE

## 2018-12-03 VITALS — HEIGHT: 71 IN | BODY MASS INDEX: 41.06 KG/M2 | WEIGHT: 293.31 LBS

## 2018-12-03 DIAGNOSIS — R97.20 ELEVATED PSA: ICD-10-CM

## 2018-12-03 DIAGNOSIS — R97.20 ELEVATED PSA: Primary | ICD-10-CM

## 2018-12-03 DIAGNOSIS — R35.1 NOCTURIA MORE THAN TWICE PER NIGHT: ICD-10-CM

## 2018-12-03 DIAGNOSIS — N13.8 ENLARGED PROSTATE WITH URINARY OBSTRUCTION: ICD-10-CM

## 2018-12-03 DIAGNOSIS — N40.1 ENLARGED PROSTATE WITH URINARY OBSTRUCTION: ICD-10-CM

## 2018-12-03 LAB
BILIRUB SERPL-MCNC: ABNORMAL MG/DL
BLOOD URINE, POC: ABNORMAL
COLOR, POC UA: YELLOW
COMPLEXED PSA SERPL-MCNC: 4.2 NG/ML
GLUCOSE UR QL STRIP: ABNORMAL
KETONES UR QL STRIP: ABNORMAL
LEUKOCYTE ESTERASE URINE, POC: ABNORMAL
NITRITE, POC UA: ABNORMAL
PH, POC UA: 7.5
PROTEIN, POC: ABNORMAL
SPECIFIC GRAVITY, POC UA: 1.01
UROBILINOGEN, POC UA: ABNORMAL

## 2018-12-03 PROCEDURE — 84153 ASSAY OF PSA TOTAL: CPT

## 2018-12-03 PROCEDURE — 1101F PT FALLS ASSESS-DOCD LE1/YR: CPT | Mod: CPTII,S$GLB,, | Performed by: UROLOGY

## 2018-12-03 PROCEDURE — 99214 OFFICE O/P EST MOD 30 MIN: CPT | Mod: 25,S$GLB,, | Performed by: UROLOGY

## 2018-12-03 PROCEDURE — 81002 URINALYSIS NONAUTO W/O SCOPE: CPT | Mod: S$GLB,,, | Performed by: UROLOGY

## 2018-12-03 PROCEDURE — 36415 COLL VENOUS BLD VENIPUNCTURE: CPT | Mod: PO

## 2018-12-03 PROCEDURE — 99999 PR PBB SHADOW E&M-EST. PATIENT-LVL II: CPT | Mod: PBBFAC,,, | Performed by: UROLOGY

## 2018-12-03 NOTE — PROGRESS NOTES
Subjective:       Patient ID: Chinedu Roque is a 72 y.o. male.    Chief Complaint: Follow-up    HPI     72 with BPH and elevated PSA.  He had urinary retention last year.  Cath placed and drained 600 cc of urine.  He is now taking Flomax and Finasteride and he is voiding without difficulty.  He has no bothersome symptoms.   He has nocturia x2-3.  He has a history of elevated PSA but his most recent PSA is decreased to 4.1 and likely reflects the addition of Finasteride.   Nocturia every 2 hours.  Daytime symptoms are not as bad.      Urine dipstick shows negative for all components.    Review of Systems   Constitutional: Negative for fever.   Genitourinary: Negative for dysuria and hematuria.       Objective:      Physical Exam   Constitutional: He is oriented to person, place, and time. He appears well-developed and well-nourished.   HENT:   Head: Normocephalic and atraumatic.   Eyes: Conjunctivae are normal.   Cardiovascular: Normal rate.   Pulmonary/Chest: Effort normal.   Genitourinary: Rectal exam shows no mass and anal tone normal. Prostate is enlarged (50g, s/s/a). Prostate is not tender.   Musculoskeletal: Normal range of motion.   Neurological: He is alert and oriented to person, place, and time.   Skin: Skin is warm and dry. No rash noted.   Psychiatric: He has a normal mood and affect.   Vitals reviewed.      Assessment:       1. Elevated PSA    2. Enlarged prostate with urinary obstruction    3. Nocturia more than twice per night        Plan:       Elevated PSA  -     POCT urine dipstick without microscope  -     Prostate Specific Antigen, Diagnostic; Future; Expected date: 12/03/2018    Enlarged prostate with urinary obstruction    Nocturia more than twice per night      Continue Flomax and Finasteride.  Update PSA today.

## 2018-12-07 ENCOUNTER — TELEPHONE (OUTPATIENT)
Dept: FAMILY MEDICINE | Facility: CLINIC | Age: 73
End: 2018-12-07

## 2018-12-07 NOTE — TELEPHONE ENCOUNTER
----- Message from Michael Ortiz sent at 12/7/2018  1:19 PM CST -----  Contact: [facility], Pt need another mask and tune for cpap machine, please send over a medical necessity form and clinical notes and order. Please fax to [phone] or call back at [phone]

## 2018-12-19 ENCOUNTER — OFFICE VISIT (OUTPATIENT)
Dept: FAMILY MEDICINE | Facility: CLINIC | Age: 73
End: 2018-12-19
Payer: MEDICARE

## 2018-12-19 VITALS
DIASTOLIC BLOOD PRESSURE: 72 MMHG | BODY MASS INDEX: 41.51 KG/M2 | HEART RATE: 74 BPM | TEMPERATURE: 98 F | HEIGHT: 71 IN | WEIGHT: 296.5 LBS | SYSTOLIC BLOOD PRESSURE: 132 MMHG

## 2018-12-19 DIAGNOSIS — E66.01 MORBID OBESITY WITH BMI OF 40.0-44.9, ADULT: ICD-10-CM

## 2018-12-19 DIAGNOSIS — I10 ESSENTIAL HYPERTENSION: Primary | ICD-10-CM

## 2018-12-19 DIAGNOSIS — M54.50 CHRONIC MIDLINE LOW BACK PAIN WITHOUT SCIATICA: ICD-10-CM

## 2018-12-19 DIAGNOSIS — G89.29 CHRONIC MIDLINE LOW BACK PAIN WITHOUT SCIATICA: ICD-10-CM

## 2018-12-19 DIAGNOSIS — N40.1 BENIGN PROSTATIC HYPERPLASIA WITH NOCTURIA: ICD-10-CM

## 2018-12-19 DIAGNOSIS — R35.1 BENIGN PROSTATIC HYPERPLASIA WITH NOCTURIA: ICD-10-CM

## 2018-12-19 PROCEDURE — 99999 PR PBB SHADOW E&M-EST. PATIENT-LVL III: CPT | Mod: PBBFAC,,, | Performed by: FAMILY MEDICINE

## 2018-12-19 PROCEDURE — 99214 OFFICE O/P EST MOD 30 MIN: CPT | Mod: S$GLB,,, | Performed by: FAMILY MEDICINE

## 2018-12-19 PROCEDURE — 1101F PT FALLS ASSESS-DOCD LE1/YR: CPT | Mod: CPTII,S$GLB,, | Performed by: FAMILY MEDICINE

## 2018-12-19 PROCEDURE — 3078F DIAST BP <80 MM HG: CPT | Mod: CPTII,S$GLB,, | Performed by: FAMILY MEDICINE

## 2018-12-19 PROCEDURE — 3075F SYST BP GE 130 - 139MM HG: CPT | Mod: CPTII,S$GLB,, | Performed by: FAMILY MEDICINE

## 2018-12-19 NOTE — PROGRESS NOTES
"Subjective:       Patient ID: Chinedu Roque is a 72 y.o. male.    Chief Complaint: Hypertension (HTN f/u)    I had increased his metoprolol XL to 100 mg.  He brings in a log of blood pressure readings ranging between 120 and 139.  He continues with BPH with urinary frequency and nocturia.  He plans to follow-up with urology again next year.  His physical activity is restricted by back pain. He plans to follow up with Dr. Segura.  He also has plans for right rotator cuff surgery.  He is due for colonoscopy to follow up on tubular adenoma.  He will schedule that early next year.      Review of Systems   Constitutional: Positive for unexpected weight change.   Respiratory: Negative for shortness of breath and wheezing.    Cardiovascular: Negative for chest pain, palpitations and leg swelling.   Musculoskeletal: Positive for arthralgias and back pain.       Objective:     Blood pressure 132/72, pulse 74, temperature 98.2 °F (36.8 °C), temperature source Oral, height 5' 11" (1.803 m), weight 134.5 kg (296 lb 8.3 oz).      Physical Exam   Constitutional:   He is overweight and in no distress.   Cardiovascular: Normal rate and regular rhythm.   Pulmonary/Chest: Effort normal and breath sounds normal.   Musculoskeletal: He exhibits no edema.   Neurological: He is alert.       Assessment:       1. Essential hypertension    2. Benign prostatic hyperplasia with nocturia    3. Chronic midline low back pain without sciatica    4. Morbid obesity with BMI of 40.0-44.9, adult        Plan:       We discussed losing weight.    continue metoprolol  mg.  Follow up with me in 3 months.  "

## 2019-01-25 ENCOUNTER — OFFICE VISIT (OUTPATIENT)
Dept: ORTHOPEDICS | Facility: CLINIC | Age: 74
End: 2019-01-25
Payer: MEDICARE

## 2019-01-25 VITALS — BODY MASS INDEX: 41.44 KG/M2 | WEIGHT: 296 LBS | HEIGHT: 71 IN

## 2019-01-25 DIAGNOSIS — M77.8 SHOULDER TENDONITIS, RIGHT: Primary | ICD-10-CM

## 2019-01-25 DIAGNOSIS — M25.511 CHRONIC RIGHT SHOULDER PAIN: ICD-10-CM

## 2019-01-25 DIAGNOSIS — G89.29 CHRONIC RIGHT SHOULDER PAIN: ICD-10-CM

## 2019-01-25 PROCEDURE — 99999 PR PBB SHADOW E&M-EST. PATIENT-LVL III: CPT | Mod: PBBFAC,,, | Performed by: ORTHOPAEDIC SURGERY

## 2019-01-25 PROCEDURE — 99213 PR OFFICE/OUTPT VISIT, EST, LEVL III, 20-29 MIN: ICD-10-PCS | Mod: S$GLB,,, | Performed by: ORTHOPAEDIC SURGERY

## 2019-01-25 PROCEDURE — 99999 PR PBB SHADOW E&M-EST. PATIENT-LVL III: ICD-10-PCS | Mod: PBBFAC,,, | Performed by: ORTHOPAEDIC SURGERY

## 2019-01-25 PROCEDURE — 1101F PT FALLS ASSESS-DOCD LE1/YR: CPT | Mod: CPTII,S$GLB,, | Performed by: ORTHOPAEDIC SURGERY

## 2019-01-25 PROCEDURE — 99213 OFFICE O/P EST LOW 20 MIN: CPT | Mod: S$GLB,,, | Performed by: ORTHOPAEDIC SURGERY

## 2019-01-25 PROCEDURE — 99499 RISK ADDL DX/OHS AUDIT: ICD-10-PCS | Mod: S$GLB,,, | Performed by: ORTHOPAEDIC SURGERY

## 2019-01-25 PROCEDURE — 99499 UNLISTED E&M SERVICE: CPT | Mod: S$GLB,,, | Performed by: ORTHOPAEDIC SURGERY

## 2019-01-25 PROCEDURE — 1101F PR PT FALLS ASSESS DOC 0-1 FALLS W/OUT INJ PAST YR: ICD-10-PCS | Mod: CPTII,S$GLB,, | Performed by: ORTHOPAEDIC SURGERY

## 2019-01-25 NOTE — PROGRESS NOTES
Patient, Chinedu Roque (MRN #[id number]), presented with a recorded BMI of 41.28 kg/m^2 consistent with the definition of morbid obesity (ICD-10 E66.01). The patient's morbid obesity was monitored, evaluated, addressed and/or treated. This addendum to the medical record is made on 01/25/2019.

## 2019-01-25 NOTE — PROGRESS NOTES
HISTORY OF PRESENT ILLNESS:  Mr. Roque is 73 years old, right shoulder pain,   injections that we gave him lasted for a week's time into that right shoulder.    Presumably he has a rotator cuff tear.  At this point, we are going to see if we   can get symptoms improved with therapy, try and avoid surgery in this   73-year-old, get him set up with physical therapy for rotator cuff tear which is   his likely diagnosis.  If he fails this course, we will go ahead and get an MRI   of his shoulder and then consider arthroscopic rotator cuff repair.      MARIA T/CAMILLE  dd: 01/25/2019 14:00:46 (CST)  td: 01/26/2019 05:10:14 (CST)  Doc ID   #[id number]  Job ID #[id number]    CC:

## 2019-02-27 ENCOUNTER — OFFICE VISIT (OUTPATIENT)
Dept: FAMILY MEDICINE | Facility: CLINIC | Age: 74
End: 2019-02-27
Payer: MEDICARE

## 2019-02-27 VITALS
TEMPERATURE: 99 F | SYSTOLIC BLOOD PRESSURE: 152 MMHG | DIASTOLIC BLOOD PRESSURE: 84 MMHG | HEART RATE: 70 BPM | BODY MASS INDEX: 42.27 KG/M2 | WEIGHT: 301.94 LBS | HEIGHT: 71 IN

## 2019-02-27 DIAGNOSIS — I87.2 VENOUS INSUFFICIENCY OF BOTH LOWER EXTREMITIES: ICD-10-CM

## 2019-02-27 DIAGNOSIS — L02.419 CELLULITIS AND ABSCESS OF LEG: Primary | ICD-10-CM

## 2019-02-27 DIAGNOSIS — L03.119 CELLULITIS AND ABSCESS OF LEG: Primary | ICD-10-CM

## 2019-02-27 DIAGNOSIS — E66.01 MORBID OBESITY WITH BMI OF 40.0-44.9, ADULT: ICD-10-CM

## 2019-02-27 PROCEDURE — 99213 OFFICE O/P EST LOW 20 MIN: CPT | Mod: S$GLB,,, | Performed by: FAMILY MEDICINE

## 2019-02-27 PROCEDURE — 99999 PR PBB SHADOW E&M-EST. PATIENT-LVL III: ICD-10-PCS | Mod: PBBFAC,,, | Performed by: FAMILY MEDICINE

## 2019-02-27 PROCEDURE — 99499 RISK ADDL DX/OHS AUDIT: ICD-10-PCS | Mod: S$GLB,,, | Performed by: FAMILY MEDICINE

## 2019-02-27 PROCEDURE — 99499 UNLISTED E&M SERVICE: CPT | Mod: S$GLB,,, | Performed by: FAMILY MEDICINE

## 2019-02-27 PROCEDURE — 3079F DIAST BP 80-89 MM HG: CPT | Mod: CPTII,S$GLB,, | Performed by: FAMILY MEDICINE

## 2019-02-27 PROCEDURE — 99999 PR PBB SHADOW E&M-EST. PATIENT-LVL III: CPT | Mod: PBBFAC,,, | Performed by: FAMILY MEDICINE

## 2019-02-27 PROCEDURE — 3077F PR MOST RECENT SYSTOLIC BLOOD PRESSURE >= 140 MM HG: ICD-10-PCS | Mod: CPTII,S$GLB,, | Performed by: FAMILY MEDICINE

## 2019-02-27 PROCEDURE — 99213 PR OFFICE/OUTPT VISIT, EST, LEVL III, 20-29 MIN: ICD-10-PCS | Mod: S$GLB,,, | Performed by: FAMILY MEDICINE

## 2019-02-27 PROCEDURE — 1101F PT FALLS ASSESS-DOCD LE1/YR: CPT | Mod: CPTII,S$GLB,, | Performed by: FAMILY MEDICINE

## 2019-02-27 PROCEDURE — 3077F SYST BP >= 140 MM HG: CPT | Mod: CPTII,S$GLB,, | Performed by: FAMILY MEDICINE

## 2019-02-27 PROCEDURE — 1101F PR PT FALLS ASSESS DOC 0-1 FALLS W/OUT INJ PAST YR: ICD-10-PCS | Mod: CPTII,S$GLB,, | Performed by: FAMILY MEDICINE

## 2019-02-27 PROCEDURE — 3079F PR MOST RECENT DIASTOLIC BLOOD PRESSURE 80-89 MM HG: ICD-10-PCS | Mod: CPTII,S$GLB,, | Performed by: FAMILY MEDICINE

## 2019-02-27 RX ORDER — AMOXICILLIN AND CLAVULANATE POTASSIUM 875; 125 MG/1; MG/1
1 TABLET, FILM COATED ORAL 2 TIMES DAILY
Qty: 14 TABLET | Refills: 0 | Status: SHIPPED | OUTPATIENT
Start: 2019-02-27 | End: 2019-03-06

## 2019-02-27 NOTE — PROGRESS NOTES
"Subjective:       Patient ID: Chinedu Roque is a 73 y.o. male.    Chief Complaint: Laceration (Poss infection puncture from rooster)    Mr Che presents complaining of R-shin pain and swelling for last two days, since being scratched by Rooster the night before last (2/25). Reports pain, swelling, tenderness around site and tenderness on shin. Denies arthralgias, Fever/chills.       Review of Systems   Constitutional: Negative for activity change and unexpected weight change.   HENT: Negative for hearing loss, rhinorrhea, sore throat and trouble swallowing.    Eyes: Negative for discharge and visual disturbance.   Respiratory: Negative for chest tightness and wheezing.    Cardiovascular: Negative for chest pain and palpitations.   Gastrointestinal: Negative for blood in stool, constipation, diarrhea and vomiting.   Endocrine: Negative for polydipsia and polyuria.   Genitourinary: Negative for difficulty urinating, hematuria and urgency.   Musculoskeletal: Negative for joint swelling and neck pain.   Skin: Positive for wound (1cm abrasion on dorsal aspect of Tibia).   Neurological: Negative for weakness and headaches.   Psychiatric/Behavioral: Negative for confusion and dysphoric mood.       Objective:     Blood pressure (!) 152/84, pulse 70, temperature 98.7 °F (37.1 °C), temperature source Oral, height 5' 11" (1.803 m), weight (!) 137 kg (301 lb 14.7 oz).      Physical Exam   Constitutional: He is oriented to person, place, and time. He appears well-developed and well-nourished. No distress.   HENT:   Head: Normocephalic and atraumatic.   Neck: Normal range of motion. Neck supple. No JVD present.   Cardiovascular: Normal rate, regular rhythm, normal heart sounds and intact distal pulses.   No murmur heard.  Pulmonary/Chest: Effort normal and breath sounds normal. No respiratory distress. He has no wheezes. He exhibits no tenderness.   Abdominal: Soft. Bowel sounds are normal. There is no tenderness. "   Musculoskeletal: Normal range of motion. He exhibits no edema or deformity.   Neurological: He is alert and oriented to person, place, and time.   Skin: Skin is warm and dry. No rash noted. He is not diaphoretic. There is erythema (Distal R-lower extremity diffusely erythematous).            Assessment:       1. Cellulitis and abscess of leg    2. Morbid obesity with BMI of 40.0-44.9, adult    3. Venous insufficiency of both lower extremities        Plan:       Rooster Scratch: Cellulitis of leg  - Augmentin 875 BID X7days

## 2019-04-01 ENCOUNTER — OFFICE VISIT (OUTPATIENT)
Dept: PAIN MEDICINE | Facility: CLINIC | Age: 74
End: 2019-04-01
Payer: MEDICARE

## 2019-04-01 VITALS
RESPIRATION RATE: 18 BRPM | WEIGHT: 303.81 LBS | OXYGEN SATURATION: 96 % | DIASTOLIC BLOOD PRESSURE: 75 MMHG | TEMPERATURE: 96 F | HEART RATE: 70 BPM | BODY MASS INDEX: 42.37 KG/M2 | SYSTOLIC BLOOD PRESSURE: 151 MMHG

## 2019-04-01 DIAGNOSIS — M51.36 DDD (DEGENERATIVE DISC DISEASE), LUMBAR: ICD-10-CM

## 2019-04-01 DIAGNOSIS — M47.816 LUMBAR SPONDYLOSIS: Primary | ICD-10-CM

## 2019-04-01 PROCEDURE — 99999 PR PBB SHADOW E&M-EST. PATIENT-LVL IV: CPT | Mod: PBBFAC,,, | Performed by: PHYSICIAN ASSISTANT

## 2019-04-01 PROCEDURE — 1101F PT FALLS ASSESS-DOCD LE1/YR: CPT | Mod: CPTII,S$GLB,, | Performed by: PHYSICIAN ASSISTANT

## 2019-04-01 PROCEDURE — 99999 PR PBB SHADOW E&M-EST. PATIENT-LVL IV: ICD-10-PCS | Mod: PBBFAC,,, | Performed by: PHYSICIAN ASSISTANT

## 2019-04-01 PROCEDURE — 3078F PR MOST RECENT DIASTOLIC BLOOD PRESSURE < 80 MM HG: ICD-10-PCS | Mod: CPTII,S$GLB,, | Performed by: PHYSICIAN ASSISTANT

## 2019-04-01 PROCEDURE — 1101F PR PT FALLS ASSESS DOC 0-1 FALLS W/OUT INJ PAST YR: ICD-10-PCS | Mod: CPTII,S$GLB,, | Performed by: PHYSICIAN ASSISTANT

## 2019-04-01 PROCEDURE — 3077F PR MOST RECENT SYSTOLIC BLOOD PRESSURE >= 140 MM HG: ICD-10-PCS | Mod: CPTII,S$GLB,, | Performed by: PHYSICIAN ASSISTANT

## 2019-04-01 PROCEDURE — 3078F DIAST BP <80 MM HG: CPT | Mod: CPTII,S$GLB,, | Performed by: PHYSICIAN ASSISTANT

## 2019-04-01 PROCEDURE — 99213 OFFICE O/P EST LOW 20 MIN: CPT | Mod: S$GLB,,, | Performed by: PHYSICIAN ASSISTANT

## 2019-04-01 PROCEDURE — 99213 PR OFFICE/OUTPT VISIT, EST, LEVL III, 20-29 MIN: ICD-10-PCS | Mod: S$GLB,,, | Performed by: PHYSICIAN ASSISTANT

## 2019-04-01 PROCEDURE — 3077F SYST BP >= 140 MM HG: CPT | Mod: CPTII,S$GLB,, | Performed by: PHYSICIAN ASSISTANT

## 2019-04-01 RX ORDER — SODIUM CHLORIDE, SODIUM LACTATE, POTASSIUM CHLORIDE, CALCIUM CHLORIDE 600; 310; 30; 20 MG/100ML; MG/100ML; MG/100ML; MG/100ML
INJECTION, SOLUTION INTRAVENOUS CONTINUOUS
Status: CANCELLED | OUTPATIENT
Start: 2019-04-11

## 2019-04-01 NOTE — H&P (VIEW-ONLY)
This note was completed with dictation software and grammatical errors may exist.    CC:Back pain    HPI: The patient is a 73-year-old man with a history of hypertension, obesity and low back pain who presents in referral from Dr. Winters for chronic right low back pain.  He returns in follow-up today with right low back pain. Reports having excellent relief following the last radiofrequency ablation giving him a little over 1 year of relief.  His pain returned in November and is located in the right lumbar region radiating laterally along the right iliac crest.  He describes his pain as dull and aching, sometimes sharp.  He states that is worse with standing and improved with sitting.  He denies weakness, numbness, bladder or bowel incontinence.    Pain intervention history: He done physical therapy in January, 2014 with moderate relief but not sustained.  He takes Relafen, tramadol, baclofen and chlorzoxazone as needed with mild relief.  He had undergone what sounds like a series of epidurals several years ago with no major relief. The patient is status post a right side L2/3, L3/4, L4/5 and L5/S1 facet joint injection on 10/28/14 with 50% relief of his back pain for 1 month.  He is status post radiofrequency ablation of the right L1, 2, 3, 4 and 5 medial branch nerves on 3/18/15 with 75% relief.  He is status post C7-T1 cervical interlaminar epidural steroid injection on 5/11/15 with 70% relief.  He is status post right L1, 2, 3, 4 and 5 medial branch radiofrequency ablation on 7/5/16 with 50% relief.   He is status post right L1, 2, 3, 4 and 5 medial branch radiofrequency ablation on 10/17/17 with about 50% relief additionally, later reported almost complete relief lasting a year.    ROS:He reports back pain.  Balance of review of systems is negative.    Medical, surgical, family and social history reviewed elsewhere in record.    Medications/Allergies: See med card    Vitals:    04/01/19 1015   BP: (!) 151/75    Pulse: 70   Resp: 18   Temp: 96.4 °F (35.8 °C)   TempSrc: Oral   SpO2: 96%   Weight: (!) 137.8 kg (303 lb 12.7 oz)   PainSc:   6   PainLoc: Back         Physical exam:  Gen: A and O x3, pleasant, well-groomed  Skin: No rashes or obvious lesions  HEENT: PERRLA, no obvious deformities on ears or in canals.   CVS: Regular rate and rhythm, normal S1 and S2, no murmurs.  Resp: Clear to auscultation bilaterally, no wheezes or rales.  Abdomen: Soft, NT/ND, normal bowel sounds present.  Musculoskeletal:  No antalgic gait.     Neuro:  Upper extremities: 5/5 strength bilaterally   Lower extremities: 5/5 strength bilaterally  Reflexes: Brachioradialis 2+, Bicep 2+, Tricep 2+. Patellar 2+, Achilles 2+.  Sensory: Intact and symmetrical to light touch and pinprick in C2-T1 dermatomes bilaterally. Intact and symmetrical to light touch and pinprick in L2-S1 dermatomes bilaterally.    Lumbar spine:  Lumbar spine:Range of motion is moderately reduced with flexion, extension and oblique extension with increased right low back pain during each maneuver, especially right oblique extension and extension.  Ramakrishna's test causes no increased pain on either side.    Supine straight leg raise negative bilaterally.  Internal and external rotation of the hip causes no increased pain on either side.  Myofascial exam: No tenderness to palpation across the lumbar paraspinous muscles.      Imagin14 Xray L-spine  There is diffuse osteopenia. Mild to moderate chronic compression fracture with anterior wedging and evidence of vertebroplasty at L2. minimal left convex curvature in the upper lumbar region. No spondylolisthesis. Mild concavity of the superior endplate of L4 which could be small compression fracture or Schmorl's node. No additional fracture.   There is moderate degenerative change within the lumbar spine with anterior osteophyte formation most prominent at L4-L5 and L2- L3 and L1 - L2, also present in the lower thoracic region  with flowing ossification anteriorly suggesting diffuse idiopathic sclerotic hyperostosis. There is also mild decreased intervertebral disk space height and endplate sclerosis the lower thoracic and upper lumbar regions. Due to the degree of osseous mineralization, it is difficult to evaluate for any possible pars defects. Moderate sclerosis suggesting facet arthropathy in the lumbar spine present and there is mild sclerosis, likely degenerative in nature involving the sacroiliac joints.    10/7/14 MRI lumbar spine: At L1/2 there is mild spinal stenosis secondary to facet hypertrophy and disc bulging.  At L2/3 there is minimal spinal stenosis secondary to retropulsion of L2 compression fracture, appearance of prior kyphoplasty present.  There is minimal disc bulging but there is also some facet hypertrophy at this level.  At L3/4 there is facet and ligamentum hypertrophy, minimal disc bulging causing mild spinal stenosis and neuroforaminal narrowing on the left greater than the right side.  At L4/5 there is moderate hypertrophic facet and ligamentum hypertrophy with mild left foraminal narrowing compared to the right side.  There is no spinal stenosis at this level.  At L5/S1 there is a broad-based disc bulge that extends to the left side more than the right side along with asymmetric left sided facet hypertrophy and ligamentum flavum hypertrophy causing moderate left foraminal stenosis.    4/15/15 outside open MRI cervical spine Premier  C3-4 posterior disc bulge producing mild bilateral foraminal narrowing  C4-5 posterior disc bulge producing mild bilateral foraminal narrowing, possible tiny annular tear in the posterior disc, anterior thecal sac deformity with no evidence of spinal stenosis  C5-6 circumferential disc bulge producing moderate to severe bilateral foraminal narrowing, effacement of the bilateral lateral recesses worse to the right, anterior thecal sac deformity with effacement of the anterior  subarachnoid space, mild spinal canal stenosis  C6-7 circumferential disc bulge producing moderate to severe bilateral foraminal narrowing with mild spinal canal stenosis  C7-T1 not completely included but appears to have a circumferential disc bulge with shallow midline disc protrusion with possible mild spinal canal stenosis and bilateral foraminal narrowing      Assessment:  The patient is a 73-year-old man with a history of hypertension, obesity and low back pain who presents in referral from Dr. Winters for chronic right low back pain.   1. Lumbar spondylosis     2. DDD (degenerative disc disease), lumbar           Plan:  1.  I will schedule the patient for right L1, 2, 3, 4 and 5 medial branch radiofrequency ablation.  He has done well with this in the past.  2.  Follow-up in 4 weeks postprocedure sooner as needed.

## 2019-04-10 ENCOUNTER — PATIENT MESSAGE (OUTPATIENT)
Dept: SURGERY | Facility: HOSPITAL | Age: 74
End: 2019-04-10

## 2019-04-10 DIAGNOSIS — M51.36 DDD (DEGENERATIVE DISC DISEASE), LUMBAR: Primary | ICD-10-CM

## 2019-04-10 RX ORDER — ACETAMINOPHEN 500 MG
500 TABLET ORAL EVERY 6 HOURS PRN
COMMUNITY

## 2019-04-11 ENCOUNTER — HOSPITAL ENCOUNTER (OUTPATIENT)
Dept: RADIOLOGY | Facility: HOSPITAL | Age: 74
Discharge: HOME | End: 2019-04-11
Attending: ANESTHESIOLOGY | Admitting: ANESTHESIOLOGY
Payer: MEDICARE

## 2019-04-11 ENCOUNTER — HOSPITAL ENCOUNTER (OUTPATIENT)
Facility: HOSPITAL | Age: 74
Discharge: HOME | End: 2019-04-11
Attending: ANESTHESIOLOGY | Admitting: ANESTHESIOLOGY
Payer: MEDICARE

## 2019-04-11 DIAGNOSIS — M47.816 LUMBAR SPONDYLOSIS: Primary | ICD-10-CM

## 2019-04-11 DIAGNOSIS — M51.36 DDD (DEGENERATIVE DISC DISEASE), LUMBAR: ICD-10-CM

## 2019-04-11 PROCEDURE — 76000 FLUOROSCOPY <1 HR PHYS/QHP: CPT | Mod: TC,PO

## 2019-04-11 PROCEDURE — 64635 DESTROY LUMB/SAC FACET JNT: CPT | Mod: ,,, | Performed by: ANESTHESIOLOGY

## 2019-04-11 PROCEDURE — 64636 DESTROY L/S FACET JNT ADDL: CPT | Mod: ,,, | Performed by: ANESTHESIOLOGY

## 2019-04-11 PROCEDURE — 64636 DESTROY L/S FACET JNT ADDL: CPT | Mod: 50,PO | Performed by: ANESTHESIOLOGY

## 2019-04-11 PROCEDURE — 63600175 PHARM REV CODE 636 W HCPCS: Mod: PO | Performed by: ANESTHESIOLOGY

## 2019-04-11 PROCEDURE — 64635 DESTROY LUMB/SAC FACET JNT: CPT | Mod: PO | Performed by: ANESTHESIOLOGY

## 2019-04-11 PROCEDURE — 64636 PR DESTROY L/S FACET JNT ADDL: ICD-10-PCS | Mod: ,,, | Performed by: ANESTHESIOLOGY

## 2019-04-11 PROCEDURE — 99152 PR MOD CONSCIOUS SEDATION, SAME PHYS, 5+ YRS, FIRST 15 MIN: ICD-10-PCS | Mod: ,,, | Performed by: ANESTHESIOLOGY

## 2019-04-11 PROCEDURE — 64635 PR DESTROY LUMB/SAC FACET JNT: ICD-10-PCS | Mod: ,,, | Performed by: ANESTHESIOLOGY

## 2019-04-11 PROCEDURE — 25000003 PHARM REV CODE 250: Mod: PO | Performed by: ANESTHESIOLOGY

## 2019-04-11 PROCEDURE — 99152 MOD SED SAME PHYS/QHP 5/>YRS: CPT | Mod: ,,, | Performed by: ANESTHESIOLOGY

## 2019-04-11 RX ORDER — METHYLPREDNISOLONE ACETATE 40 MG/ML
INJECTION, SUSPENSION INTRA-ARTICULAR; INTRALESIONAL; INTRAMUSCULAR; SOFT TISSUE
Status: DISCONTINUED | OUTPATIENT
Start: 2019-04-11 | End: 2019-04-11 | Stop reason: HOSPADM

## 2019-04-11 RX ORDER — LIDOCAINE HYDROCHLORIDE 20 MG/ML
INJECTION, SOLUTION EPIDURAL; INFILTRATION; INTRACAUDAL; PERINEURAL
Status: DISCONTINUED | OUTPATIENT
Start: 2019-04-11 | End: 2019-04-11 | Stop reason: HOSPADM

## 2019-04-11 RX ORDER — MIDAZOLAM HYDROCHLORIDE 2 MG/2ML
INJECTION, SOLUTION INTRAMUSCULAR; INTRAVENOUS
Status: DISCONTINUED | OUTPATIENT
Start: 2019-04-11 | End: 2019-04-11 | Stop reason: HOSPADM

## 2019-04-11 RX ORDER — LIDOCAINE HYDROCHLORIDE 10 MG/ML
INJECTION, SOLUTION EPIDURAL; INFILTRATION; INTRACAUDAL; PERINEURAL
Status: DISCONTINUED | OUTPATIENT
Start: 2019-04-11 | End: 2019-04-11 | Stop reason: HOSPADM

## 2019-04-11 RX ORDER — SODIUM CHLORIDE, SODIUM LACTATE, POTASSIUM CHLORIDE, CALCIUM CHLORIDE 600; 310; 30; 20 MG/100ML; MG/100ML; MG/100ML; MG/100ML
INJECTION, SOLUTION INTRAVENOUS CONTINUOUS
Status: DISCONTINUED | OUTPATIENT
Start: 2019-04-11 | End: 2019-04-11 | Stop reason: HOSPADM

## 2019-04-11 RX ORDER — LIDOCAINE HYDROCHLORIDE 10 MG/ML
1 INJECTION INFILTRATION; PERINEURAL ONCE
Status: COMPLETED | OUTPATIENT
Start: 2019-04-11 | End: 2019-04-11

## 2019-04-11 RX ORDER — FENTANYL CITRATE 50 UG/ML
INJECTION, SOLUTION INTRAMUSCULAR; INTRAVENOUS
Status: DISCONTINUED | OUTPATIENT
Start: 2019-04-11 | End: 2019-04-11 | Stop reason: HOSPADM

## 2019-04-11 RX ADMIN — SODIUM CHLORIDE, SODIUM LACTATE, POTASSIUM CHLORIDE, AND CALCIUM CHLORIDE: .6; .31; .03; .02 INJECTION, SOLUTION INTRAVENOUS at 12:04

## 2019-04-11 RX ADMIN — LIDOCAINE HYDROCHLORIDE: 10 INJECTION, SOLUTION EPIDURAL; INFILTRATION; INTRACAUDAL; PERINEURAL at 12:04

## 2019-04-11 NOTE — DISCHARGE INSTRUCTIONS
Home care instructions  Apply ice pack to the injection site for 20 minutes periods for the first 24 hrs for soreness/discomfort at injection site DO NOT USE HEAT FOR 24 HOURS  Keep site clean and dry for 24 hours, remove bandaid when desired  Do not drive until tomorrow  Take care when walking after a lumbar injection  Avoid strenuous activities for 2 days  Make take 2 weeks to feel the full effects   Resume home medication as prescribed today  Resume Aspirin, Plavix, or Coumadin the day after the procedure unless otherwise instructed.    SEE IMMEDIATE MEDICAL HELP FOR:  Severe increase in your usual pain or appearance of new pain  Prolonged or increasing weakness or numbness in the legs or arms  Drainage, redness, active bleeding, or increased swelling at the injection site  Temperature over 100.0 degrees F.  Headache that increases when your head is upright and decreases when you lie flat    CALL 911 OR GO DIRECTLY TO EMERGENCY DEPARTMENT FOR:  Shortness of breath, chest pain, or problems breathing          Recovery After Procedural Sedation (Adult)  You have been given medicine by vein to make you sleep during your surgery. This may have included both a pain medicine and sleeping medicine. Most of the effects have worn off. But you may still have some drowsiness for the next 6 to 8 hours.  Home care  Follow these guidelines when you get home:  · For the next 8 hours, you should be watched by a responsible adult. This person should make sure your condition is not getting worse.  · Don't drink any alcohol for the next 24 hours.  · Don't drive, operate dangerous machinery, or make important business or personal decisions during the next 24 hours.  Note: Your healthcare provider may tell you not to take any medicine by mouth for pain or sleep in the next 4 hours. These medicines may react with the medicines you were given in the hospital. This could cause a much stronger response than usual.  Follow-up care  Follow  up with your healthcare provider if you are not alert and back to your usual level of activity within 12 hours.  When to seek medical advice  Call your healthcare provider right away if any of these occur:  · Drowsiness gets worse  · Weakness or dizziness gets worse  · Repeated vomiting  · You can't be awakened   Date Last Reviewed: 10/18/2016  © 3696-8281 [employer]. [address] [place] [address], [place] [id number]. All rights reserved. This information is not intended as a substitute for professional medical care. Always follow your healthcare professional's instructions.

## 2019-04-11 NOTE — OP NOTE
PROCEDURE DATE: 4/11/2019    PROCEDURE:  Radiofrequency ablation of the right L1,2,3,4,5 medial branch nerves on the right-side utilizing fluoroscopy    DIAGNOSIS:  Lumbar spondylosis    Post op Diagnosis: Same    PHYSICIAN: Riley Segura MD    MEDICATIONS INJECTED:  From a mixture of 4ml of 2% lidocaine and 40mg of methylprednisone, 1ml of this solution was injected at each level.    LOCAL ANESTHETIC USED: Lidocaine 1%, 4 ml given at each site.    SEDATION MEDICATIONS: 4mg versed, 25mcg fentanyl    ESTIMATED BLOOD LOSS:  none    COMPLICATIONS:  none    TECHNIQUE:  A time out was taken to identify patient and procedure side prior to starting the procedure. Laying in a prone position, the patient was prepped and draped in the usual sterile fashion using ChloraPrep and sterile towels.  The levels were determined under fluoroscopic guidance and then marked.  Local anesthetic was given by raising a wheal at the skin over each site and then infiltrated approximately 2cm deeper.  A 20-gauge  100 mm [employer] RF needle was introduced to the anatomic location of the right L1,2,3,4,5 medial branch nerves.  Motor stimulation up to 2 Volts at each level confirmed no motor nerve involvement.  Impedance was less than 800 ohms at each level. The above noted medication was then injected slowly.  Ablation was performed per level utilizing Rasheed radiofrequency generator 80°C for 90 seconds. The patient tolerated the procedure well.     The patient was monitored after the procedure.  Patient was given post procedure and discharge instructions to follow at home.  The patient was discharged in a stable condition

## 2019-04-11 NOTE — DISCHARGE SUMMARY
[facility]  Discharge Note  Short Stay    Admit Date: 4/11/2019    Discharge Date: 4/11/2019    Attending Physician: Riley Sgeura MD     Discharge Provider: Riley Segura    Diagnoses:  Active Hospital Problems    Diagnosis  POA    *Lumbar spondylosis [M47.816]  Yes      Resolved Hospital Problems   No resolved problems to display.       Discharged Condition: good    Final Diagnoses: Lumbar spondylosis [M47.816]    Disposition: Home or Self Care    Hospital Course: no complications, uneventful    Outcome of Hospitalization, Treatment, Procedure, or Surgery:  Patient was admitted for outpatient procedure. The patient underwent procedure without complications and are discharged home    Follow up/Patient Instructions:  Follow up as scheduled in Pain Management clinic in 3-4 weeks/Patient has received instructions and follow up date and time    Medications:  Continue previous medications    Discharge Procedure Orders   Call MD for:  temperature >100.4     Call MD for:  severe uncontrolled pain     Call MD for:  redness, tenderness, or signs of infection (pain, swelling, redness, odor or green/yellow discharge around incision site)     Call MD for:  severe persistent headache     No dressing needed         Discharge Procedure Orders (must include Diet, Follow-up, Activity):   Discharge Procedure Orders (must include Diet, Follow-up, Activity)   Call MD for:  temperature >100.4     Call MD for:  severe uncontrolled pain     Call MD for:  redness, tenderness, or signs of infection (pain, swelling, redness, odor or green/yellow discharge around incision site)     Call MD for:  severe persistent headache     No dressing needed

## 2019-04-12 VITALS
HEIGHT: 71 IN | RESPIRATION RATE: 16 BRPM | SYSTOLIC BLOOD PRESSURE: 139 MMHG | HEART RATE: 66 BPM | TEMPERATURE: 98 F | WEIGHT: 300 LBS | BODY MASS INDEX: 42 KG/M2 | DIASTOLIC BLOOD PRESSURE: 82 MMHG | OXYGEN SATURATION: 95 %

## 2019-05-08 ENCOUNTER — OFFICE VISIT (OUTPATIENT)
Dept: PAIN MEDICINE | Facility: CLINIC | Age: 74
End: 2019-05-08
Payer: MEDICARE

## 2019-05-08 VITALS
DIASTOLIC BLOOD PRESSURE: 84 MMHG | HEIGHT: 71 IN | HEART RATE: 69 BPM | SYSTOLIC BLOOD PRESSURE: 130 MMHG | WEIGHT: 304.69 LBS | BODY MASS INDEX: 42.65 KG/M2

## 2019-05-08 DIAGNOSIS — M47.816 LUMBAR SPONDYLOSIS: ICD-10-CM

## 2019-05-08 DIAGNOSIS — M51.36 DDD (DEGENERATIVE DISC DISEASE), LUMBAR: Primary | ICD-10-CM

## 2019-05-08 PROCEDURE — 99999 PR PBB SHADOW E&M-EST. PATIENT-LVL III: ICD-10-PCS | Mod: PBBFAC,,, | Performed by: PHYSICIAN ASSISTANT

## 2019-05-08 PROCEDURE — 99213 PR OFFICE/OUTPT VISIT, EST, LEVL III, 20-29 MIN: ICD-10-PCS | Mod: S$GLB,,, | Performed by: PHYSICIAN ASSISTANT

## 2019-05-08 PROCEDURE — 99213 OFFICE O/P EST LOW 20 MIN: CPT | Mod: S$GLB,,, | Performed by: PHYSICIAN ASSISTANT

## 2019-05-08 PROCEDURE — 1101F PR PT FALLS ASSESS DOC 0-1 FALLS W/OUT INJ PAST YR: ICD-10-PCS | Mod: CPTII,S$GLB,, | Performed by: PHYSICIAN ASSISTANT

## 2019-05-08 PROCEDURE — 3079F PR MOST RECENT DIASTOLIC BLOOD PRESSURE 80-89 MM HG: ICD-10-PCS | Mod: CPTII,S$GLB,, | Performed by: PHYSICIAN ASSISTANT

## 2019-05-08 PROCEDURE — 3079F DIAST BP 80-89 MM HG: CPT | Mod: CPTII,S$GLB,, | Performed by: PHYSICIAN ASSISTANT

## 2019-05-08 PROCEDURE — 1101F PT FALLS ASSESS-DOCD LE1/YR: CPT | Mod: CPTII,S$GLB,, | Performed by: PHYSICIAN ASSISTANT

## 2019-05-08 PROCEDURE — 3075F SYST BP GE 130 - 139MM HG: CPT | Mod: CPTII,S$GLB,, | Performed by: PHYSICIAN ASSISTANT

## 2019-05-08 PROCEDURE — 99999 PR PBB SHADOW E&M-EST. PATIENT-LVL III: CPT | Mod: PBBFAC,,, | Performed by: PHYSICIAN ASSISTANT

## 2019-05-08 PROCEDURE — 3075F PR MOST RECENT SYSTOLIC BLOOD PRESS GE 130-139MM HG: ICD-10-PCS | Mod: CPTII,S$GLB,, | Performed by: PHYSICIAN ASSISTANT

## 2019-05-08 NOTE — H&P (VIEW-ONLY)
This note was completed with dictation software and grammatical errors may exist.    CC:Back pain    HPI: The patient is a 73-year-old man with a history of hypertension, obesity and low back pain who presents in referral from Dr. Winters for chronic right low back pain.  He is status post right L1, 2, 3, 4 and 5 medial branch radiofrequency ablation on 04/11/2019 with mild relief.  He does not feel that the ablation helped like it did in the past.  He continues to have sharp right low back pain radiating across the right upper buttock and right iliac crest.  This is significantly worse at night if he had an active day.  He does have some relief with ice.  He denies weakness, numbness, bladder or bowel incontinence.    Pain intervention history: He done physical therapy in January, 2014 with moderate relief but not sustained.  He takes Relafen, tramadol, baclofen and chlorzoxazone as needed with mild relief.  He had undergone what sounds like a series of epidurals several years ago with no major relief. The patient is status post a right side L2/3, L3/4, L4/5 and L5/S1 facet joint injection on 10/28/14 with 50% relief of his back pain for 1 month.  He is status post radiofrequency ablation of the right L1, 2, 3, 4 and 5 medial branch nerves on 3/18/15 with 75% relief.  He is status post C7-T1 cervical interlaminar epidural steroid injection on 5/11/15 with 70% relief.  He is status post right L1, 2, 3, 4 and 5 medial branch radiofrequency ablation on 7/5/16 with 50% relief.   He is status post right L1, 2, 3, 4 and 5 medial branch radiofrequency ablation on 10/17/17 with about 50% relief additionally, later reported almost complete relief lasting a year.  He is status post right L1, 2, 3, 4 and 5 medial branch radiofrequency ablation on 04/11/2019 with mild relief.     ROS:He reports back pain.  Balance of review of systems is negative.    Medical, surgical, family and social history reviewed elsewhere in  "record.    Medications/Allergies: See med card    Vitals:    19 1319   BP: 130/84   Pulse: 69   Weight: (!) 138.2 kg (304 lb 10.8 oz)   Height: 5' 11" (1.803 m)   PainSc:   5   PainLoc: Back         Physical exam:  Gen: A and O x3, pleasant, well-groomed  Skin: No rashes or obvious lesions  HEENT: PERRLA, no obvious deformities on ears or in canals.   CVS: Regular rate and rhythm, normal S1 and S2, no murmurs.  Resp: Clear to auscultation bilaterally, no wheezes or rales.  Abdomen: Soft, NT/ND, normal bowel sounds present.  Musculoskeletal:  No antalgic gait.     Neuro:  Upper extremities: 5/5 strength bilaterally   Lower extremities: 5/5 strength bilaterally  Reflexes: Brachioradialis 2+, Bicep 2+, Tricep 2+. Patellar 2+, Achilles 2+.  Sensory: Intact and symmetrical to light touch and pinprick in C2-T1 dermatomes bilaterally. Intact and symmetrical to light touch and pinprick in L2-S1 dermatomes bilaterally.    Lumbar spine:  Lumbar spine:Range of motion is moderately reduced with flexion, extension and oblique extension with increased right low back pain during each maneuver, especially right oblique extension and extension.  Ramakrishna's test causes no increased pain on either side.    Supine straight leg raise negative bilaterally.  Internal and external rotation of the hip causes no increased pain on either side.  Myofascial exam: No tenderness to palpation across the lumbar paraspinous muscles.      Imagin14 Xray L-spine  There is diffuse osteopenia. Mild to moderate chronic compression fracture with anterior wedging and evidence of vertebroplasty at L2. minimal left convex curvature in the upper lumbar region. No spondylolisthesis. Mild concavity of the superior endplate of L4 which could be small compression fracture or Schmorl's node. No additional fracture.   There is moderate degenerative change within the lumbar spine with anterior osteophyte formation most prominent at L4-L5 and L2- L3 and " L1 - L2, also present in the lower thoracic region with flowing ossification anteriorly suggesting diffuse idiopathic sclerotic hyperostosis. There is also mild decreased intervertebral disk space height and endplate sclerosis the lower thoracic and upper lumbar regions. Due to the degree of osseous mineralization, it is difficult to evaluate for any possible pars defects. Moderate sclerosis suggesting facet arthropathy in the lumbar spine present and there is mild sclerosis, likely degenerative in nature involving the sacroiliac joints.    10/7/14 MRI lumbar spine: At L1/2 there is mild spinal stenosis secondary to facet hypertrophy and disc bulging.  At L2/3 there is minimal spinal stenosis secondary to retropulsion of L2 compression fracture, appearance of prior kyphoplasty present.  There is minimal disc bulging but there is also some facet hypertrophy at this level.  At L3/4 there is facet and ligamentum hypertrophy, minimal disc bulging causing mild spinal stenosis and neuroforaminal narrowing on the left greater than the right side.  At L4/5 there is moderate hypertrophic facet and ligamentum hypertrophy with mild left foraminal narrowing compared to the right side.  There is no spinal stenosis at this level.  At L5/S1 there is a broad-based disc bulge that extends to the left side more than the right side along with asymmetric left sided facet hypertrophy and ligamentum flavum hypertrophy causing moderate left foraminal stenosis.    4/15/15 outside open MRI cervical spine Premier  C3-4 posterior disc bulge producing mild bilateral foraminal narrowing  C4-5 posterior disc bulge producing mild bilateral foraminal narrowing, possible tiny annular tear in the posterior disc, anterior thecal sac deformity with no evidence of spinal stenosis  C5-6 circumferential disc bulge producing moderate to severe bilateral foraminal narrowing, effacement of the bilateral lateral recesses worse to the right, anterior thecal  sac deformity with effacement of the anterior subarachnoid space, mild spinal canal stenosis  C6-7 circumferential disc bulge producing moderate to severe bilateral foraminal narrowing with mild spinal canal stenosis  C7-T1 not completely included but appears to have a circumferential disc bulge with shallow midline disc protrusion with possible mild spinal canal stenosis and bilateral foraminal narrowing      Assessment:  The patient is a 73-year-old man with a history of hypertension, obesity and low back pain who presents in referral from Dr. Winters for chronic right low back pain.   1. DDD (degenerative disc disease), lumbar  MRI Lumbar Spine Without Contrast    MRI Lumbar Spine Without Contrast   2. Lumbar spondylosis           Plan:  1.  Since he did not have relief and had as been several years since he had an MRI I will order a new lumbar spine MRI for further evaluation.  He would like this to be open and he will have it done at [facility].  He will drop off the disc and we will call him with results and recommendations.  Consideration can be made for an LEATHA and/or physical therapy.  He had done some physical therapy at our facility in [place] without any significant relief a couple years ago.

## 2019-05-08 NOTE — PROGRESS NOTES
This note was completed with dictation software and grammatical errors may exist.    CC:Back pain    HPI: The patient is a 73-year-old man with a history of hypertension, obesity and low back pain who presents in referral from Dr. Winters for chronic right low back pain.  He is status post right L1, 2, 3, 4 and 5 medial branch radiofrequency ablation on 04/11/2019 with mild relief.  He does not feel that the ablation helped like it did in the past.  He continues to have sharp right low back pain radiating across the right upper buttock and right iliac crest.  This is significantly worse at night if he had an active day.  He does have some relief with ice.  He denies weakness, numbness, bladder or bowel incontinence.    Pain intervention history: He done physical therapy in January, 2014 with moderate relief but not sustained.  He takes Relafen, tramadol, baclofen and chlorzoxazone as needed with mild relief.  He had undergone what sounds like a series of epidurals several years ago with no major relief. The patient is status post a right side L2/3, L3/4, L4/5 and L5/S1 facet joint injection on 10/28/14 with 50% relief of his back pain for 1 month.  He is status post radiofrequency ablation of the right L1, 2, 3, 4 and 5 medial branch nerves on 3/18/15 with 75% relief.  He is status post C7-T1 cervical interlaminar epidural steroid injection on 5/11/15 with 70% relief.  He is status post right L1, 2, 3, 4 and 5 medial branch radiofrequency ablation on 7/5/16 with 50% relief.   He is status post right L1, 2, 3, 4 and 5 medial branch radiofrequency ablation on 10/17/17 with about 50% relief additionally, later reported almost complete relief lasting a year.  He is status post right L1, 2, 3, 4 and 5 medial branch radiofrequency ablation on 04/11/2019 with mild relief.     ROS:He reports back pain.  Balance of review of systems is negative.    Medical, surgical, family and social history reviewed elsewhere in  "record.    Medications/Allergies: See med card    Vitals:    19 1319   BP: 130/84   Pulse: 69   Weight: (!) 138.2 kg (304 lb 10.8 oz)   Height: 5' 11" (1.803 m)   PainSc:   5   PainLoc: Back         Physical exam:  Gen: A and O x3, pleasant, well-groomed  Skin: No rashes or obvious lesions  HEENT: PERRLA, no obvious deformities on ears or in canals.   CVS: Regular rate and rhythm, normal S1 and S2, no murmurs.  Resp: Clear to auscultation bilaterally, no wheezes or rales.  Abdomen: Soft, NT/ND, normal bowel sounds present.  Musculoskeletal:  No antalgic gait.     Neuro:  Upper extremities: 5/5 strength bilaterally   Lower extremities: 5/5 strength bilaterally  Reflexes: Brachioradialis 2+, Bicep 2+, Tricep 2+. Patellar 2+, Achilles 2+.  Sensory: Intact and symmetrical to light touch and pinprick in C2-T1 dermatomes bilaterally. Intact and symmetrical to light touch and pinprick in L2-S1 dermatomes bilaterally.    Lumbar spine:  Lumbar spine:Range of motion is moderately reduced with flexion, extension and oblique extension with increased right low back pain during each maneuver, especially right oblique extension and extension.  Ramakrishna's test causes no increased pain on either side.    Supine straight leg raise negative bilaterally.  Internal and external rotation of the hip causes no increased pain on either side.  Myofascial exam: No tenderness to palpation across the lumbar paraspinous muscles.      Imagin14 Xray L-spine  There is diffuse osteopenia. Mild to moderate chronic compression fracture with anterior wedging and evidence of vertebroplasty at L2. minimal left convex curvature in the upper lumbar region. No spondylolisthesis. Mild concavity of the superior endplate of L4 which could be small compression fracture or Schmorl's node. No additional fracture.   There is moderate degenerative change within the lumbar spine with anterior osteophyte formation most prominent at L4-L5 and L2- L3 and " L1 - L2, also present in the lower thoracic region with flowing ossification anteriorly suggesting diffuse idiopathic sclerotic hyperostosis. There is also mild decreased intervertebral disk space height and endplate sclerosis the lower thoracic and upper lumbar regions. Due to the degree of osseous mineralization, it is difficult to evaluate for any possible pars defects. Moderate sclerosis suggesting facet arthropathy in the lumbar spine present and there is mild sclerosis, likely degenerative in nature involving the sacroiliac joints.    10/7/14 MRI lumbar spine: At L1/2 there is mild spinal stenosis secondary to facet hypertrophy and disc bulging.  At L2/3 there is minimal spinal stenosis secondary to retropulsion of L2 compression fracture, appearance of prior kyphoplasty present.  There is minimal disc bulging but there is also some facet hypertrophy at this level.  At L3/4 there is facet and ligamentum hypertrophy, minimal disc bulging causing mild spinal stenosis and neuroforaminal narrowing on the left greater than the right side.  At L4/5 there is moderate hypertrophic facet and ligamentum hypertrophy with mild left foraminal narrowing compared to the right side.  There is no spinal stenosis at this level.  At L5/S1 there is a broad-based disc bulge that extends to the left side more than the right side along with asymmetric left sided facet hypertrophy and ligamentum flavum hypertrophy causing moderate left foraminal stenosis.    4/15/15 outside open MRI cervical spine Premier  C3-4 posterior disc bulge producing mild bilateral foraminal narrowing  C4-5 posterior disc bulge producing mild bilateral foraminal narrowing, possible tiny annular tear in the posterior disc, anterior thecal sac deformity with no evidence of spinal stenosis  C5-6 circumferential disc bulge producing moderate to severe bilateral foraminal narrowing, effacement of the bilateral lateral recesses worse to the right, anterior thecal  sac deformity with effacement of the anterior subarachnoid space, mild spinal canal stenosis  C6-7 circumferential disc bulge producing moderate to severe bilateral foraminal narrowing with mild spinal canal stenosis  C7-T1 not completely included but appears to have a circumferential disc bulge with shallow midline disc protrusion with possible mild spinal canal stenosis and bilateral foraminal narrowing      Assessment:  The patient is a 73-year-old man with a history of hypertension, obesity and low back pain who presents in referral from Dr. Winters for chronic right low back pain.   1. DDD (degenerative disc disease), lumbar  MRI Lumbar Spine Without Contrast    MRI Lumbar Spine Without Contrast   2. Lumbar spondylosis           Plan:  1.  Since he did not have relief and had as been several years since he had an MRI I will order a new lumbar spine MRI for further evaluation.  He would like this to be open and he will have it done at [facility].  He will drop off the disc and we will call him with results and recommendations.  Consideration can be made for an LEATHA and/or physical therapy.  He had done some physical therapy at our facility in [place] without any significant relief a couple years ago.

## 2019-05-20 ENCOUNTER — TELEPHONE (OUTPATIENT)
Dept: PAIN MEDICINE | Facility: CLINIC | Age: 74
End: 2019-05-20

## 2019-05-20 NOTE — TELEPHONE ENCOUNTER
----- Message from Gypsy Blevins sent at 5/20/2019 10:45 AM CDT -----  Contact: patient  Type: Needs Medical Advice    Who Called:  patient  Best Call Back Number:[phone]  Additional Information: Patient states he would like to discuss the outcome of proceure he had last month.  Patient states the office was suppose to call and check on him but no one has, Please call to advise. Thanks!

## 2019-05-22 ENCOUNTER — TELEPHONE (OUTPATIENT)
Dept: PAIN MEDICINE | Facility: CLINIC | Age: 74
End: 2019-05-22

## 2019-05-22 DIAGNOSIS — M54.16 LUMBAR RADICULOPATHY: Primary | ICD-10-CM

## 2019-05-22 RX ORDER — ALPRAZOLAM 0.5 MG/1
1 TABLET, ORALLY DISINTEGRATING ORAL ONCE AS NEEDED
Status: CANCELLED | OUTPATIENT
Start: 2019-06-05 | End: 2030-10-31

## 2019-05-22 NOTE — TELEPHONE ENCOUNTER
----- Message from Martin Tamayo sent at 5/22/2019  3:50 PM CDT -----  Contact: Patient  Type:  Patient Returning Call    Who Called:  Patient  Who Left Message for Patient:  Asia  Does the patient know what this is regarding?:  MRI Results  Best Call Back Number:  [phone]

## 2019-05-22 NOTE — TELEPHONE ENCOUNTER
Please let the patient know I reviewed his new lumbar spine MRI and he has right-sided foraminal stenosis at L2-3, L3-4, L4-5 and L5-S1 any of which that could be causing his symptoms.  I suggest scheduling an L5/S1 interlaminar LEATHA to the right to cover each level with 4 week follow-up.

## 2019-05-22 NOTE — TELEPHONE ENCOUNTER
Spoke with patient and reviewed the MRI results. He verbalized understanding. The lumbar LEATHA has been scheduled for 6/5 with a 4 week follow up. Pre-op instructions were reviewed and sent to patient.

## 2019-06-04 DIAGNOSIS — M51.36 DDD (DEGENERATIVE DISC DISEASE), LUMBAR: Primary | ICD-10-CM

## 2019-06-05 ENCOUNTER — HOSPITAL ENCOUNTER (OUTPATIENT)
Facility: HOSPITAL | Age: 74
Discharge: HOME | End: 2019-06-05
Attending: ANESTHESIOLOGY | Admitting: ANESTHESIOLOGY
Payer: MEDICARE

## 2019-06-05 ENCOUNTER — HOSPITAL ENCOUNTER (OUTPATIENT)
Dept: RADIOLOGY | Facility: HOSPITAL | Age: 74
Discharge: HOME | End: 2019-06-05
Attending: ANESTHESIOLOGY | Admitting: ANESTHESIOLOGY
Payer: MEDICARE

## 2019-06-05 DIAGNOSIS — M54.16 LUMBAR RADICULOPATHY: Primary | ICD-10-CM

## 2019-06-05 DIAGNOSIS — M51.36 DDD (DEGENERATIVE DISC DISEASE), LUMBAR: ICD-10-CM

## 2019-06-05 PROCEDURE — 62323 NJX INTERLAMINAR LMBR/SAC: CPT | Mod: PO | Performed by: ANESTHESIOLOGY

## 2019-06-05 PROCEDURE — 76000 FLUOROSCOPY <1 HR PHYS/QHP: CPT | Mod: TC,PO

## 2019-06-05 PROCEDURE — 62323 NJX INTERLAMINAR LMBR/SAC: CPT | Mod: ,,, | Performed by: ANESTHESIOLOGY

## 2019-06-05 PROCEDURE — 25000003 PHARM REV CODE 250: Mod: PO | Performed by: ANESTHESIOLOGY

## 2019-06-05 PROCEDURE — 63600175 PHARM REV CODE 636 W HCPCS: Mod: PO | Performed by: ANESTHESIOLOGY

## 2019-06-05 PROCEDURE — 62323 PR INJ LUMBAR/SACRAL, W/IMAGING GUIDANCE: ICD-10-PCS | Mod: ,,, | Performed by: ANESTHESIOLOGY

## 2019-06-05 PROCEDURE — A4216 STERILE WATER/SALINE, 10 ML: HCPCS | Mod: PO | Performed by: ANESTHESIOLOGY

## 2019-06-05 PROCEDURE — 25500020 PHARM REV CODE 255: Mod: PO | Performed by: ANESTHESIOLOGY

## 2019-06-05 RX ORDER — SODIUM CHLORIDE 9 MG/ML
INJECTION, SOLUTION INTRAMUSCULAR; INTRAVENOUS; SUBCUTANEOUS
Status: DISCONTINUED | OUTPATIENT
Start: 2019-06-05 | End: 2019-06-05 | Stop reason: HOSPADM

## 2019-06-05 RX ORDER — LIDOCAINE HYDROCHLORIDE 10 MG/ML
INJECTION, SOLUTION EPIDURAL; INFILTRATION; INTRACAUDAL; PERINEURAL
Status: DISCONTINUED | OUTPATIENT
Start: 2019-06-05 | End: 2019-06-05 | Stop reason: HOSPADM

## 2019-06-05 RX ORDER — ALPRAZOLAM 0.5 MG/1
1 TABLET, ORALLY DISINTEGRATING ORAL ONCE AS NEEDED
Status: DISCONTINUED | OUTPATIENT
Start: 2019-06-05 | End: 2019-06-05 | Stop reason: HOSPADM

## 2019-06-05 RX ORDER — METHYLPREDNISOLONE ACETATE 80 MG/ML
INJECTION, SUSPENSION INTRA-ARTICULAR; INTRALESIONAL; INTRAMUSCULAR; SOFT TISSUE
Status: DISCONTINUED | OUTPATIENT
Start: 2019-06-05 | End: 2019-06-05 | Stop reason: HOSPADM

## 2019-06-05 NOTE — OP NOTE

## 2019-06-06 VITALS
OXYGEN SATURATION: 96 % | WEIGHT: 300 LBS | DIASTOLIC BLOOD PRESSURE: 84 MMHG | TEMPERATURE: 97 F | HEART RATE: 69 BPM | SYSTOLIC BLOOD PRESSURE: 153 MMHG | HEIGHT: 71 IN | RESPIRATION RATE: 16 BRPM | BODY MASS INDEX: 42 KG/M2

## 2019-07-03 ENCOUNTER — OFFICE VISIT (OUTPATIENT)
Dept: PAIN MEDICINE | Facility: CLINIC | Age: 74
End: 2019-07-03
Payer: MEDICARE

## 2019-07-03 VITALS
DIASTOLIC BLOOD PRESSURE: 70 MMHG | TEMPERATURE: 96 F | WEIGHT: 306.31 LBS | RESPIRATION RATE: 18 BRPM | BODY MASS INDEX: 42.72 KG/M2 | OXYGEN SATURATION: 96 % | SYSTOLIC BLOOD PRESSURE: 124 MMHG | HEART RATE: 67 BPM

## 2019-07-03 DIAGNOSIS — M51.36 DDD (DEGENERATIVE DISC DISEASE), LUMBAR: ICD-10-CM

## 2019-07-03 DIAGNOSIS — M54.16 LUMBAR RADICULOPATHY: Primary | ICD-10-CM

## 2019-07-03 PROCEDURE — 3074F SYST BP LT 130 MM HG: CPT | Mod: CPTII,S$GLB,, | Performed by: PHYSICIAN ASSISTANT

## 2019-07-03 PROCEDURE — 1101F PT FALLS ASSESS-DOCD LE1/YR: CPT | Mod: CPTII,S$GLB,, | Performed by: PHYSICIAN ASSISTANT

## 2019-07-03 PROCEDURE — 99212 OFFICE O/P EST SF 10 MIN: CPT | Mod: S$GLB,,, | Performed by: PHYSICIAN ASSISTANT

## 2019-07-03 PROCEDURE — 99212 PR OFFICE/OUTPT VISIT, EST, LEVL II, 10-19 MIN: ICD-10-PCS | Mod: S$GLB,,, | Performed by: PHYSICIAN ASSISTANT

## 2019-07-03 PROCEDURE — 3074F PR MOST RECENT SYSTOLIC BLOOD PRESSURE < 130 MM HG: ICD-10-PCS | Mod: CPTII,S$GLB,, | Performed by: PHYSICIAN ASSISTANT

## 2019-07-03 PROCEDURE — 3078F PR MOST RECENT DIASTOLIC BLOOD PRESSURE < 80 MM HG: ICD-10-PCS | Mod: CPTII,S$GLB,, | Performed by: PHYSICIAN ASSISTANT

## 2019-07-03 PROCEDURE — 99999 PR PBB SHADOW E&M-EST. PATIENT-LVL III: CPT | Mod: PBBFAC,,, | Performed by: PHYSICIAN ASSISTANT

## 2019-07-03 PROCEDURE — 3078F DIAST BP <80 MM HG: CPT | Mod: CPTII,S$GLB,, | Performed by: PHYSICIAN ASSISTANT

## 2019-07-03 PROCEDURE — 1101F PR PT FALLS ASSESS DOC 0-1 FALLS W/OUT INJ PAST YR: ICD-10-PCS | Mod: CPTII,S$GLB,, | Performed by: PHYSICIAN ASSISTANT

## 2019-07-03 PROCEDURE — 99999 PR PBB SHADOW E&M-EST. PATIENT-LVL III: ICD-10-PCS | Mod: PBBFAC,,, | Performed by: PHYSICIAN ASSISTANT

## 2019-07-03 NOTE — PROGRESS NOTES
This note was completed with dictation software and grammatical errors may exist.    CC:Back pain    HPI: The patient is a 73-year-old man with a history of hypertension, obesity and low back pain who presents in referral from Dr. Winters for chronic right low back pain.  He is status post L5/S1 interlaminar epidural steroid injection on 06/05/2019 with 80% relief.  He is pleased with the results and reports that he only has right low back pain if he overdoes it.  He denies weakness, numbness, bladder or bowel incontinence.    Pain intervention history: He done physical therapy in January, 2014 with moderate relief but not sustained.  He takes Relafen, tramadol, baclofen and chlorzoxazone as needed with mild relief.  He had undergone what sounds like a series of epidurals several years ago with no major relief. The patient is status post a right side L2/3, L3/4, L4/5 and L5/S1 facet joint injection on 10/28/14 with 50% relief of his back pain for 1 month.  He is status post radiofrequency ablation of the right L1, 2, 3, 4 and 5 medial branch nerves on 3/18/15 with 75% relief.  He is status post C7-T1 cervical interlaminar epidural steroid injection on 5/11/15 with 70% relief.  He is status post right L1, 2, 3, 4 and 5 medial branch radiofrequency ablation on 7/5/16 with 50% relief.   He is status post right L1, 2, 3, 4 and 5 medial branch radiofrequency ablation on 10/17/17 with about 50% relief additionally, later reported almost complete relief lasting a year.  He is status post right L1, 2, 3, 4 and 5 medial branch radiofrequency ablation on 04/11/2019 with mild relief.   He is status post L5/S1 interlaminar epidural steroid injection on 06/05/2019 with 80% relief.    ROS:He reports back pain.  Balance of review of systems is negative.    Medical, surgical, family and social history reviewed elsewhere in record.    Medications/Allergies: See med card    Vitals:    07/03/19 1318   BP: 124/70   Pulse: 67   Resp: 18    Temp: 96.4 °F (35.8 °C)   TempSrc: Oral   SpO2: 96%   Weight: (!) 139 kg (306 lb 5.3 oz)   PainSc:   4   PainLoc: Back         Physical exam:  Gen: A and O x3, pleasant, well-groomed  Skin: No rashes or obvious lesions  HEENT: PERRLA, no obvious deformities on ears or in canals.   CVS: Regular rate and rhythm, normal S1 and S2, no murmurs.  Resp: Clear to auscultation bilaterally, no wheezes or rales.  Abdomen: Soft, NT/ND, normal bowel sounds present.  Musculoskeletal:  No antalgic gait.     Neuro:  Upper extremities: 5/5 strength bilaterally   Lower extremities: 5/5 strength bilaterally  Reflexes: Brachioradialis 2+, Bicep 2+, Tricep 2+. Patellar 2+, Achilles 2+.  Sensory: Intact and symmetrical to light touch and pinprick in C2-T1 dermatomes bilaterally. Intact and symmetrical to light touch and pinprick in L2-S1 dermatomes bilaterally.    Lumbar spine:  Lumbar spine:Range of motion is moderately reduced with flexion, extension and oblique extension without increased pain.  Ramakrishna's test causes no increased pain on either side.    Supine straight leg raise negative bilaterally.  Internal and external rotation of the hip causes no increased pain on either side.  Myofascial exam: No tenderness to palpation across the lumbar paraspinous muscles.      Imagin14 Xray L-spine  There is diffuse osteopenia. Mild to moderate chronic compression fracture with anterior wedging and evidence of vertebroplasty at L2. minimal left convex curvature in the upper lumbar region. No spondylolisthesis. Mild concavity of the superior endplate of L4 which could be small compression fracture or Schmorl's node. No additional fracture.   There is moderate degenerative change within the lumbar spine with anterior osteophyte formation most prominent at L4-L5 and L2- L3 and L1 - L2, also present in the lower thoracic region with flowing ossification anteriorly suggesting diffuse idiopathic sclerotic hyperostosis. There is also mild  decreased intervertebral disk space height and endplate sclerosis the lower thoracic and upper lumbar regions. Due to the degree of osseous mineralization, it is difficult to evaluate for any possible pars defects. Moderate sclerosis suggesting facet arthropathy in the lumbar spine present and there is mild sclerosis, likely degenerative in nature involving the sacroiliac joints.    10/7/14 MRI lumbar spine: At L1/2 there is mild spinal stenosis secondary to facet hypertrophy and disc bulging.  At L2/3 there is minimal spinal stenosis secondary to retropulsion of L2 compression fracture, appearance of prior kyphoplasty present.  There is minimal disc bulging but there is also some facet hypertrophy at this level.  At L3/4 there is facet and ligamentum hypertrophy, minimal disc bulging causing mild spinal stenosis and neuroforaminal narrowing on the left greater than the right side.  At L4/5 there is moderate hypertrophic facet and ligamentum hypertrophy with mild left foraminal narrowing compared to the right side.  There is no spinal stenosis at this level.  At L5/S1 there is a broad-based disc bulge that extends to the left side more than the right side along with asymmetric left sided facet hypertrophy and ligamentum flavum hypertrophy causing moderate left foraminal stenosis.    4/15/15 outside open MRI cervical spine Premier  C3-4 posterior disc bulge producing mild bilateral foraminal narrowing  C4-5 posterior disc bulge producing mild bilateral foraminal narrowing, possible tiny annular tear in the posterior disc, anterior thecal sac deformity with no evidence of spinal stenosis  C5-6 circumferential disc bulge producing moderate to severe bilateral foraminal narrowing, effacement of the bilateral lateral recesses worse to the right, anterior thecal sac deformity with effacement of the anterior subarachnoid space, mild spinal canal stenosis  C6-7 circumferential disc bulge producing moderate to severe  bilateral foraminal narrowing with mild spinal canal stenosis  C7-T1 not completely included but appears to have a circumferential disc bulge with shallow midline disc protrusion with possible mild spinal canal stenosis and bilateral foraminal narrowing      Assessment:  The patient is a 73-year-old man with a history of hypertension, obesity and low back pain who presents in referral from Dr. Winters for chronic right low back pain.   1. Lumbar radiculopathy     2. DDD (degenerative disc disease), lumbar           Plan:  1.  The patient almost complete relief following the L5/S1 interlaminar LEATHA to the right.  This can be repeated in the future if necessary.  He will follow-up as needed.

## 2019-07-24 ENCOUNTER — PATIENT MESSAGE (OUTPATIENT)
Dept: UROLOGY | Facility: CLINIC | Age: 74
End: 2019-07-24

## 2019-07-24 DIAGNOSIS — R97.20 ELEVATED PSA: Primary | ICD-10-CM

## 2019-07-26 ENCOUNTER — OFFICE VISIT (OUTPATIENT)
Dept: UROLOGY | Facility: CLINIC | Age: 74
End: 2019-07-26
Payer: MEDICARE

## 2019-07-26 ENCOUNTER — LAB VISIT (OUTPATIENT)
Dept: LAB | Facility: HOSPITAL | Age: 74
End: 2019-07-26
Attending: UROLOGY
Payer: MEDICARE

## 2019-07-26 VITALS
SYSTOLIC BLOOD PRESSURE: 129 MMHG | HEIGHT: 71 IN | WEIGHT: 306.69 LBS | DIASTOLIC BLOOD PRESSURE: 84 MMHG | BODY MASS INDEX: 42.94 KG/M2 | HEART RATE: 68 BPM

## 2019-07-26 DIAGNOSIS — R97.20 ELEVATED PSA: ICD-10-CM

## 2019-07-26 DIAGNOSIS — R97.20 ELEVATED PSA: Primary | ICD-10-CM

## 2019-07-26 DIAGNOSIS — N30.90 CYSTITIS WITHOUT HEMATURIA: ICD-10-CM

## 2019-07-26 LAB
BILIRUB SERPL-MCNC: ABNORMAL MG/DL
BLOOD URINE, POC: ABNORMAL
COLOR, POC UA: ABNORMAL
COMPLEXED PSA SERPL-MCNC: 3.7 NG/ML (ref 0–4)
GLUCOSE UR QL STRIP: ABNORMAL
KETONES UR QL STRIP: ABNORMAL
LEUKOCYTE ESTERASE URINE, POC: ABNORMAL
NITRITE, POC UA: ABNORMAL
PH, POC UA: 5.5
PROTEIN, POC: ABNORMAL
SPECIFIC GRAVITY, POC UA: 1010
UROBILINOGEN, POC UA: ABNORMAL

## 2019-07-26 PROCEDURE — 81002 URINALYSIS NONAUTO W/O SCOPE: CPT | Mod: S$GLB,,, | Performed by: UROLOGY

## 2019-07-26 PROCEDURE — 99214 PR OFFICE/OUTPT VISIT, EST, LEVL IV, 30-39 MIN: ICD-10-PCS | Mod: 25,S$GLB,, | Performed by: UROLOGY

## 2019-07-26 PROCEDURE — 3074F PR MOST RECENT SYSTOLIC BLOOD PRESSURE < 130 MM HG: ICD-10-PCS | Mod: CPTII,S$GLB,, | Performed by: UROLOGY

## 2019-07-26 PROCEDURE — 99214 OFFICE O/P EST MOD 30 MIN: CPT | Mod: 25,S$GLB,, | Performed by: UROLOGY

## 2019-07-26 PROCEDURE — 84153 ASSAY OF PSA TOTAL: CPT

## 2019-07-26 PROCEDURE — 1101F PR PT FALLS ASSESS DOC 0-1 FALLS W/OUT INJ PAST YR: ICD-10-PCS | Mod: CPTII,S$GLB,, | Performed by: UROLOGY

## 2019-07-26 PROCEDURE — 87086 URINE CULTURE/COLONY COUNT: CPT

## 2019-07-26 PROCEDURE — 99999 PR PBB SHADOW E&M-EST. PATIENT-LVL III: CPT | Mod: PBBFAC,,, | Performed by: UROLOGY

## 2019-07-26 PROCEDURE — 3079F DIAST BP 80-89 MM HG: CPT | Mod: CPTII,S$GLB,, | Performed by: UROLOGY

## 2019-07-26 PROCEDURE — 87088 URINE BACTERIA CULTURE: CPT

## 2019-07-26 PROCEDURE — 99999 PR PBB SHADOW E&M-EST. PATIENT-LVL III: ICD-10-PCS | Mod: PBBFAC,,, | Performed by: UROLOGY

## 2019-07-26 PROCEDURE — 3079F PR MOST RECENT DIASTOLIC BLOOD PRESSURE 80-89 MM HG: ICD-10-PCS | Mod: CPTII,S$GLB,, | Performed by: UROLOGY

## 2019-07-26 PROCEDURE — 3074F SYST BP LT 130 MM HG: CPT | Mod: CPTII,S$GLB,, | Performed by: UROLOGY

## 2019-07-26 PROCEDURE — 81002 POCT URINE DIPSTICK WITHOUT MICROSCOPE: ICD-10-PCS | Mod: S$GLB,,, | Performed by: UROLOGY

## 2019-07-26 PROCEDURE — 36415 COLL VENOUS BLD VENIPUNCTURE: CPT | Mod: PO

## 2019-07-26 PROCEDURE — 1101F PT FALLS ASSESS-DOCD LE1/YR: CPT | Mod: CPTII,S$GLB,, | Performed by: UROLOGY

## 2019-07-26 RX ORDER — CIPROFLOXACIN 500 MG/1
500 TABLET ORAL 2 TIMES DAILY
Qty: 20 TABLET | Refills: 0 | Status: SHIPPED | OUTPATIENT
Start: 2019-07-26 | End: 2019-08-05

## 2019-07-26 NOTE — PROGRESS NOTES
Subjective:       Patient ID: Chinedu Roque is a 73 y.o. male.    Chief Complaint: Elevated PSA (6 month follow up)    HPI     72 with BPH and elevated PSA.  He had urinary retention last year. Cath placed an drained 600 cc.  He is now taking Flomax and Finasteride and he is without difficulty.  He has no bothersome symptoms.  He has a history of elevated PSA but his most recent PSA is decreased to 4.1 and likely reflects the addition of Finasteride.    PSA today is still pending.  He complains of a bad urine odor.  He denies hematuria and dysuria.  His urine suggested infection.  Urine dipstick shows negative for red blood cells, glucose, protein, positive for nitrites, trace leukocytes.    Component PSA, SCREEN PSA DIAGNOSTIC   Latest Ref Rng & Units 0.00 - 4.00 ng/mL 0.00 - 4.00 ng/mL   5/31/2018   4.1 (H)   1/3/2018   4.6 (H)   11/29/2017   22.1 (H)   8/3/2017 7.1 (H)     6/25/2016   6.7 (H)   4/23/2016 7.9 (H)     4/11/2015 5.3 (H)     4/2/2014   5.4 (H)   3/28/2013 5.41 (H)         Review of Systems   Constitutional: Negative for fever.   Genitourinary: Negative for dysuria and hematuria.       Objective:      Physical Exam   Constitutional: He is oriented to person, place, and time. He appears well-developed and well-nourished.   HENT:   Head: Normocephalic and atraumatic.   Eyes: Conjunctivae are normal.   Cardiovascular: Normal rate.   Pulmonary/Chest: Effort normal.   Genitourinary: Rectal exam shows no mass and anal tone normal. Prostate is enlarged (40g, s/s/a). Prostate is not tender.   Musculoskeletal: Normal range of motion.   Neurological: He is alert and oriented to person, place, and time.   Skin: Skin is warm and dry. No rash noted.   Psychiatric: He has a normal mood and affect.   Vitals reviewed.      Assessment:       1. Elevated PSA    2. Cystitis without hematuria        Plan:       Elevated PSA  -     POCT URINE DIPSTICK WITHOUT MICROSCOPE    Cystitis without hematuria  -     Urine  culture    Other orders  -     ciprofloxacin HCl (CIPRO) 500 MG tablet; Take 1 tablet (500 mg total) by mouth 2 (two) times daily. for 10 days  Dispense: 20 tablet; Refill: 0

## 2019-07-28 LAB — BACTERIA UR CULT: ABNORMAL

## 2019-08-27 ENCOUNTER — PATIENT MESSAGE (OUTPATIENT)
Dept: FAMILY MEDICINE | Facility: CLINIC | Age: 74
End: 2019-08-27

## 2019-09-03 ENCOUNTER — PATIENT MESSAGE (OUTPATIENT)
Dept: PAIN MEDICINE | Facility: CLINIC | Age: 74
End: 2019-09-03

## 2019-09-04 ENCOUNTER — PATIENT MESSAGE (OUTPATIENT)
Dept: PAIN MEDICINE | Facility: CLINIC | Age: 74
End: 2019-09-04

## 2019-09-04 DIAGNOSIS — M54.16 LUMBAR RADICULOPATHY: Primary | ICD-10-CM

## 2019-09-04 RX ORDER — ALPRAZOLAM 0.5 MG/1
1 TABLET, ORALLY DISINTEGRATING ORAL ONCE AS NEEDED
Status: CANCELLED | OUTPATIENT
Start: 2019-09-13 | End: 2031-02-08

## 2019-09-04 NOTE — TELEPHONE ENCOUNTER
Spoke with the patient and he takes aspirin as a preventative and questionnaire reviewed with the patient.

## 2019-09-12 ENCOUNTER — PATIENT MESSAGE (OUTPATIENT)
Dept: SURGERY | Facility: HOSPITAL | Age: 74
End: 2019-09-12

## 2019-09-12 DIAGNOSIS — M51.36 DDD (DEGENERATIVE DISC DISEASE), LUMBAR: Primary | ICD-10-CM

## 2019-09-13 ENCOUNTER — HOSPITAL ENCOUNTER (OUTPATIENT)
Facility: HOSPITAL | Age: 74
Discharge: HOME | End: 2019-09-13
Attending: ANESTHESIOLOGY | Admitting: ANESTHESIOLOGY
Payer: MEDICARE

## 2019-09-13 ENCOUNTER — HOSPITAL ENCOUNTER (OUTPATIENT)
Dept: RADIOLOGY | Facility: HOSPITAL | Age: 74
Discharge: HOME | End: 2019-09-13
Attending: ANESTHESIOLOGY | Admitting: ANESTHESIOLOGY
Payer: MEDICARE

## 2019-09-13 DIAGNOSIS — M51.36 DDD (DEGENERATIVE DISC DISEASE), LUMBAR: ICD-10-CM

## 2019-09-13 DIAGNOSIS — M54.16 LUMBAR RADICULOPATHY: Primary | ICD-10-CM

## 2019-09-13 PROCEDURE — A4216 STERILE WATER/SALINE, 10 ML: HCPCS | Mod: PO | Performed by: ANESTHESIOLOGY

## 2019-09-13 PROCEDURE — 62323 NJX INTERLAMINAR LMBR/SAC: CPT | Mod: PO | Performed by: ANESTHESIOLOGY

## 2019-09-13 PROCEDURE — 62323 PR INJ LUMBAR/SACRAL, W/IMAGING GUIDANCE: ICD-10-PCS | Mod: ,,, | Performed by: ANESTHESIOLOGY

## 2019-09-13 PROCEDURE — 25500020 PHARM REV CODE 255: Mod: PO | Performed by: ANESTHESIOLOGY

## 2019-09-13 PROCEDURE — 25000003 PHARM REV CODE 250: Mod: PO | Performed by: ANESTHESIOLOGY

## 2019-09-13 PROCEDURE — 76000 FLUOROSCOPY <1 HR PHYS/QHP: CPT | Mod: TC,PO

## 2019-09-13 PROCEDURE — 62323 NJX INTERLAMINAR LMBR/SAC: CPT | Mod: ,,, | Performed by: ANESTHESIOLOGY

## 2019-09-13 PROCEDURE — 63600175 PHARM REV CODE 636 W HCPCS: Mod: PO | Performed by: ANESTHESIOLOGY

## 2019-09-13 RX ORDER — METHYLPREDNISOLONE ACETATE 80 MG/ML
INJECTION, SUSPENSION INTRA-ARTICULAR; INTRALESIONAL; INTRAMUSCULAR; SOFT TISSUE
Status: DISCONTINUED | OUTPATIENT
Start: 2019-09-13 | End: 2019-09-13 | Stop reason: HOSPADM

## 2019-09-13 RX ORDER — ALPRAZOLAM 0.5 MG/1
1 TABLET, ORALLY DISINTEGRATING ORAL ONCE AS NEEDED
Status: DISCONTINUED | OUTPATIENT
Start: 2019-09-13 | End: 2019-09-13 | Stop reason: HOSPADM

## 2019-09-13 RX ORDER — LIDOCAINE HYDROCHLORIDE 10 MG/ML
INJECTION, SOLUTION EPIDURAL; INFILTRATION; INTRACAUDAL; PERINEURAL
Status: DISCONTINUED | OUTPATIENT
Start: 2019-09-13 | End: 2019-09-13 | Stop reason: HOSPADM

## 2019-09-13 RX ORDER — SODIUM CHLORIDE 9 MG/ML
INJECTION, SOLUTION INTRAMUSCULAR; INTRAVENOUS; SUBCUTANEOUS
Status: DISCONTINUED | OUTPATIENT
Start: 2019-09-13 | End: 2019-09-13 | Stop reason: HOSPADM

## 2019-09-13 NOTE — H&P
CC: Back pain    HPI: The patient is a 72yo man with a history of lumbar radiculopathy here for L5/S1 LEATHA. There are no major changes in history and physical from 7/3/19.    Past Medical History:   Diagnosis Date    Anticoagulant long-term use     ASA 81 mg    Arthritis     cervical    BPH (benign prostatic hyperplasia) 3/2/2012    Chronic left shoulder pain     Compression fracture of L2     DDD (degenerative disc disease), lumbar     HTN (hypertension) 3/2/2012    Low back pain     Morbid obesity with BMI of 40.0-44.9, adult 3/18/2014    Seasonal allergies     Sleep apnea     compliant with CPAP    Stroke 3/1986    Stroke 3/2/2012    1986        Past Surgical History:   Procedure Laterality Date    APPENDECTOMY      COLONOSCOPY N/A 3/26/2013    [facility]formed [facility]ello [facility]    EPIDURAL BLOCK INJECTION  2015    cervical    Facet Steroid injection       Pain management    HERNIA REPAIR      [facility]    INJECTION-FACET R. Lumbar L2/3, L3/4, L4/5, L5/S1 Right 10/28/2014    [facility] Riley [facility] at [facility] OR    INJECTION-STEROID-EPIDURAL-CERVICAL N/A 5/11/2015    [facility]    Injection-steroid-epidural-lumbar L5/S1 interlaminar N/A 6/5/2019    [facility] OR    KNEE SURGERY      bilateral    RADIOFREQUENCY ABLATION  2015    lumbar nerve    Radiofrequency Ablation, Nerve, Spinal, Lumbar, Medial Branch, L1,2,3,4,5 Right 4/11/2019    [facility]    RADIOFREQUENCY THERMOCOAGULATION (RFTC)-NERVE-MEDIAN BRANCH-LUMBAR L1,2,3,4,5 Right 7/5/2016    [facility] OR    RADIOFREQUENCY THERMOCOAGULATION (RFTC)-NERVE-MEDIAN BRANCH-LUMBAR L1,2,3,4,5 Right 3/18/2015    [facility] OR    RADIOFREQUENCY THERMOCOAGULATION (RFTC)-NERVE-MEDIAN BRANCH-LUMBAR L1,L2,L3,L4,L5 Right 10/17/2017    [facility] Riley CASTLE  [facility]    VERTEBROPLASTY         Family History   Problem Relation Age of Onset    COPD Father     Hypertension Brother     Kidney disease Brother     Alzheimer's disease Mother     No Known Problems Daughter     Hypertension Son     Diabetes Son         pre-diabetic    No Known Problems Daughter     No Known Problems Daughter        Social History     Socioeconomic History    Marital status:      Spouse name: Not on file    Number of children: Not on file    Years of education: Not on file    Highest education level: Not on file   Occupational History    Not on file   Social Needs    Financial resource strain: Not on file    Food insecurity:     Worry: Not on file     Inability: Not on file    Transportation needs:     Medical: Not on file     Non-medical: Not on file   Tobacco Use    Smoking status: Former Smoker     Packs/day: 1.50     Years: 24.00     Pack years: 36.00     Start date: 10/21/1959     Last attempt to quit: 3/19/1983     Years since quittin.5    Smokeless tobacco: Never Used   Substance and Sexual Activity    Alcohol use: No    Drug use: No    Sexual activity: Not on file   Lifestyle    Physical activity:     Days per week: Not on file     Minutes per session: Not on file    Stress: Not on file   Relationships    Social connections:     Talks on phone: Not on file     Gets together: Not on file     Attends [employer] service: Not on file     Active member of club or organization: Not on file     Attends meetings of clubs or organizations: Not on file     Relationship status: Not on file   Other Topics Concern    Not on file   Social History Narrative    Retired - Worked for the ra[employer].        No current facility-administered medications for this encounter.      Facility-Administered Medications Ordered in Other Encounters   Medication Dose Route Frequency Provider Last Rate Last Dose    sodium hyaluronate (EUFLEXXA) 10 mg/mL(mw 2.4 -3.6  "million) Syrg 20 mg  20 mg Intra-articular  Michelet Covarrubias MD   20 mg at 05/14/18 1046    sodium hyaluronate (EUFLEXXA) 10 mg/mL(mw 2.4 -3.6 million) Syrg 20 mg  20 mg Intra-articular  Michelet Covarrubias MD   20 mg at 05/14/18 1046       Review of patient's allergies indicates:  No Known Allergies    Vitals:    09/13/19 1509   BP: (!) 167/83   Pulse: 74   Resp: 18   Temp: 97.7 °F (36.5 °C)   TempSrc: Skin   SpO2: 96%   Weight: 136.1 kg (300 lb)   Height: 5' 11" (1.803 m)       REVIEW OF SYSTEMS:     GENERAL: No weight loss, malaise or fevers.  HEENT:  No recent changes in vision or hearing  NECK: Negative for lumps, no difficulty with swallowing.  RESPIRATORY: Negative for cough, wheezing or shortness of breath, patient denies any recent URI.  CARDIOVASCULAR: Negative for chest pain, leg swelling or palpitations.  GI: Negative for abdominal discomfort, blood in stools or black stools or change in bowel habits.  MUSCULOSKELETAL: See HPI.  SKIN: Negative for lesions, rash, and itching.  PSYCH: No suicidal or homicidal ideations, no current mood disturbances.  HEMATOLOGY/LYMPHOLOGY: Negative for prolonged bleeding, bruising easily or swollen nodes. Patient is not currently taking any anti-coagulants  ENDO: No history of diabetes or thyroid dysfunction  NEURO: No history of syncope, paralysis, seizures or tremors.All other reviewed and negative other than HPI.    Physical exam:  Gen: A and O x3, pleasant, well-groomed  Skin: No rashes or obvious lesions  HEENT: PERRLA, no obvious deformities on ears or in canals. No thyroid masses, trachea midline, no palpable lymph nodes in neck, axilla.  CVS: Regular rate and rhythm, normal S1 and S2, no murmurs.  Resp: Clear to auscultation bilaterally.  Abdomen: Soft, NT/ND, normal bowel sounds present.  Musculoskeletal/Neuro: Moving all extremities    Assessment:  Lumbar radiculopathy    Plan:  LEATHA    "

## 2019-09-13 NOTE — OP NOTE

## 2019-09-13 NOTE — DISCHARGE SUMMARY
[facility]  Discharge Note  Short Stay    Admit Date: 9/13/2019    Discharge Date: 9/13/2019    Attending Physician: Riley Segura MD     Discharge Provider: Riley Segura    Diagnoses:  Active Hospital Problems    Diagnosis  POA    *Lumbar radiculopathy [M54.16]  Yes      Resolved Hospital Problems   No resolved problems to display.       Discharged Condition: good    Final Diagnoses: Lumbar radiculopathy [M54.16]    Disposition: Home or Self Care    Hospital Course: no complications, uneventful    Outcome of Hospitalization, Treatment, Procedure, or Surgery:  Patient was admitted for outpatient procedure. The patient underwent procedure without complications and are discharged home    Follow up/Patient Instructions:  Follow up as scheduled in Pain Management clinic in 3-4 weeks/Patient has received instructions and follow up date and time    Medications:  Continue previous medications    Discharge Procedure Orders   Call MD for:  temperature >100.4     Call MD for:  severe uncontrolled pain     Call MD for:  redness, tenderness, or signs of infection (pain, swelling, redness, odor or green/yellow discharge around incision site)     Call MD for:  severe persistent headache     No dressing needed         Discharge Procedure Orders (must include Diet, Follow-up, Activity):   Discharge Procedure Orders (must include Diet, Follow-up, Activity)   Call MD for:  temperature >100.4     Call MD for:  severe uncontrolled pain     Call MD for:  redness, tenderness, or signs of infection (pain, swelling, redness, odor or green/yellow discharge around incision site)     Call MD for:  severe persistent headache     No dressing needed

## 2019-09-16 VITALS
TEMPERATURE: 98 F | RESPIRATION RATE: 16 BRPM | HEIGHT: 71 IN | WEIGHT: 300 LBS | SYSTOLIC BLOOD PRESSURE: 158 MMHG | HEART RATE: 70 BPM | OXYGEN SATURATION: 98 % | DIASTOLIC BLOOD PRESSURE: 80 MMHG | BODY MASS INDEX: 42 KG/M2

## 2019-10-10 RX ORDER — METOPROLOL SUCCINATE 100 MG/1
TABLET, EXTENDED RELEASE ORAL
Qty: 90 TABLET | Refills: 0 | Status: SHIPPED | OUTPATIENT
Start: 2019-10-10 | End: 2020-01-14

## 2019-10-29 RX ORDER — FINASTERIDE 5 MG/1
5 TABLET, FILM COATED ORAL DAILY
Qty: 90 TABLET | Refills: 0 | Status: SHIPPED | OUTPATIENT
Start: 2019-10-29 | End: 2020-01-23

## 2019-10-29 NOTE — TELEPHONE ENCOUNTER
Please approve once.   Pt last seen 2/27/19  Last refill date 11/9/18  Natan msg sent to pt advising that he is due for f/u.

## 2019-11-12 ENCOUNTER — PATIENT MESSAGE (OUTPATIENT)
Dept: UROLOGY | Facility: CLINIC | Age: 74
End: 2019-11-12

## 2019-11-13 ENCOUNTER — PATIENT MESSAGE (OUTPATIENT)
Dept: UROLOGY | Facility: CLINIC | Age: 74
End: 2019-11-13

## 2019-11-20 ENCOUNTER — LAB VISIT (OUTPATIENT)
Dept: LAB | Facility: HOSPITAL | Age: 74
End: 2019-11-20
Attending: FAMILY MEDICINE
Payer: MEDICARE

## 2019-11-20 ENCOUNTER — OFFICE VISIT (OUTPATIENT)
Dept: FAMILY MEDICINE | Facility: CLINIC | Age: 74
End: 2019-11-20
Payer: MEDICARE

## 2019-11-20 VITALS
BODY MASS INDEX: 43.49 KG/M2 | SYSTOLIC BLOOD PRESSURE: 136 MMHG | HEART RATE: 72 BPM | DIASTOLIC BLOOD PRESSURE: 84 MMHG | HEIGHT: 71 IN | TEMPERATURE: 98 F | WEIGHT: 310.63 LBS

## 2019-11-20 DIAGNOSIS — J30.1 SEASONAL ALLERGIC RHINITIS DUE TO POLLEN: Primary | ICD-10-CM

## 2019-11-20 DIAGNOSIS — N40.1 BENIGN PROSTATIC HYPERPLASIA WITH NOCTURIA: ICD-10-CM

## 2019-11-20 DIAGNOSIS — R35.1 BENIGN PROSTATIC HYPERPLASIA WITH NOCTURIA: ICD-10-CM

## 2019-11-20 DIAGNOSIS — I10 ESSENTIAL HYPERTENSION: ICD-10-CM

## 2019-11-20 DIAGNOSIS — N41.0 ACUTE PROSTATITIS: ICD-10-CM

## 2019-11-20 DIAGNOSIS — G47.33 OSA (OBSTRUCTIVE SLEEP APNEA): ICD-10-CM

## 2019-11-20 LAB
ALBUMIN SERPL BCP-MCNC: 3.7 G/DL (ref 3.5–5.2)
ALP SERPL-CCNC: 55 U/L (ref 55–135)
ALT SERPL W/O P-5'-P-CCNC: 32 U/L (ref 10–44)
ANION GAP SERPL CALC-SCNC: 7 MMOL/L (ref 8–16)
AST SERPL-CCNC: 26 U/L (ref 10–40)
BILIRUB SERPL-MCNC: 0.8 MG/DL (ref 0.1–1)
BUN SERPL-MCNC: 14 MG/DL (ref 8–23)
CALCIUM SERPL-MCNC: 8.9 MG/DL (ref 8.7–10.5)
CHLORIDE SERPL-SCNC: 108 MMOL/L (ref 95–110)
CHOLEST SERPL-MCNC: 167 MG/DL (ref 120–199)
CHOLEST/HDLC SERPL: 4 {RATIO} (ref 2–5)
CO2 SERPL-SCNC: 25 MMOL/L (ref 23–29)
CREAT SERPL-MCNC: 1.1 MG/DL (ref 0.5–1.4)
ERYTHROCYTE [DISTWIDTH] IN BLOOD BY AUTOMATED COUNT: 12.9 % (ref 11.5–14.5)
EST. GFR  (AFRICAN AMERICAN): >60 ML/MIN/1.73 M^2
EST. GFR  (NON AFRICAN AMERICAN): >60 ML/MIN/1.73 M^2
GLUCOSE SERPL-MCNC: 96 MG/DL (ref 70–110)
HCT VFR BLD AUTO: 45.8 % (ref 40–54)
HDLC SERPL-MCNC: 42 MG/DL (ref 40–75)
HDLC SERPL: 25.1 % (ref 20–50)
HGB BLD-MCNC: 14.6 G/DL (ref 14–18)
LDLC SERPL CALC-MCNC: 104.2 MG/DL (ref 63–159)
MCH RBC QN AUTO: 30.4 PG (ref 27–31)
MCHC RBC AUTO-ENTMCNC: 31.9 G/DL (ref 32–36)
MCV RBC AUTO: 95 FL (ref 82–98)
NONHDLC SERPL-MCNC: 125 MG/DL
PLATELET # BLD AUTO: 261 K/UL (ref 150–350)
PMV BLD AUTO: 10.2 FL (ref 9.2–12.9)
POTASSIUM SERPL-SCNC: 4.4 MMOL/L (ref 3.5–5.1)
PROT SERPL-MCNC: 6.6 G/DL (ref 6–8.4)
RBC # BLD AUTO: 4.81 M/UL (ref 4.6–6.2)
SODIUM SERPL-SCNC: 140 MMOL/L (ref 136–145)
TRIGL SERPL-MCNC: 104 MG/DL (ref 30–150)
WBC # BLD AUTO: 7.05 K/UL (ref 3.9–12.7)

## 2019-11-20 PROCEDURE — 36415 COLL VENOUS BLD VENIPUNCTURE: CPT | Mod: PN

## 2019-11-20 PROCEDURE — 1101F PR PT FALLS ASSESS DOC 0-1 FALLS W/OUT INJ PAST YR: ICD-10-PCS | Mod: CPTII,S$GLB,, | Performed by: FAMILY MEDICINE

## 2019-11-20 PROCEDURE — 99999 PR PBB SHADOW E&M-EST. PATIENT-LVL III: CPT | Mod: PBBFAC,,, | Performed by: FAMILY MEDICINE

## 2019-11-20 PROCEDURE — 1159F MED LIST DOCD IN RCRD: CPT | Mod: S$GLB,,, | Performed by: FAMILY MEDICINE

## 2019-11-20 PROCEDURE — 1159F PR MEDICATION LIST DOCUMENTED IN MEDICAL RECORD: ICD-10-PCS | Mod: S$GLB,,, | Performed by: FAMILY MEDICINE

## 2019-11-20 PROCEDURE — 80061 LIPID PANEL: CPT

## 2019-11-20 PROCEDURE — 3079F DIAST BP 80-89 MM HG: CPT | Mod: CPTII,S$GLB,, | Performed by: FAMILY MEDICINE

## 2019-11-20 PROCEDURE — 80053 COMPREHEN METABOLIC PANEL: CPT

## 2019-11-20 PROCEDURE — 3079F PR MOST RECENT DIASTOLIC BLOOD PRESSURE 80-89 MM HG: ICD-10-PCS | Mod: CPTII,S$GLB,, | Performed by: FAMILY MEDICINE

## 2019-11-20 PROCEDURE — 99999 PR PBB SHADOW E&M-EST. PATIENT-LVL III: ICD-10-PCS | Mod: PBBFAC,,, | Performed by: FAMILY MEDICINE

## 2019-11-20 PROCEDURE — 99214 PR OFFICE/OUTPT VISIT, EST, LEVL IV, 30-39 MIN: ICD-10-PCS | Mod: S$GLB,,, | Performed by: FAMILY MEDICINE

## 2019-11-20 PROCEDURE — 3075F PR MOST RECENT SYSTOLIC BLOOD PRESS GE 130-139MM HG: ICD-10-PCS | Mod: CPTII,S$GLB,, | Performed by: FAMILY MEDICINE

## 2019-11-20 PROCEDURE — 1125F AMNT PAIN NOTED PAIN PRSNT: CPT | Mod: S$GLB,,, | Performed by: FAMILY MEDICINE

## 2019-11-20 PROCEDURE — 99214 OFFICE O/P EST MOD 30 MIN: CPT | Mod: S$GLB,,, | Performed by: FAMILY MEDICINE

## 2019-11-20 PROCEDURE — 1101F PT FALLS ASSESS-DOCD LE1/YR: CPT | Mod: CPTII,S$GLB,, | Performed by: FAMILY MEDICINE

## 2019-11-20 PROCEDURE — 1125F PR PAIN SEVERITY QUANTIFIED, PAIN PRESENT: ICD-10-PCS | Mod: S$GLB,,, | Performed by: FAMILY MEDICINE

## 2019-11-20 PROCEDURE — 3075F SYST BP GE 130 - 139MM HG: CPT | Mod: CPTII,S$GLB,, | Performed by: FAMILY MEDICINE

## 2019-11-20 PROCEDURE — 85027 COMPLETE CBC AUTOMATED: CPT

## 2019-11-20 RX ORDER — SULFAMETHOXAZOLE AND TRIMETHOPRIM 800; 160 MG/1; MG/1
1 TABLET ORAL 2 TIMES DAILY
Qty: 60 TABLET | Refills: 0 | Status: SHIPPED | OUTPATIENT
Start: 2019-11-20 | End: 2020-09-24 | Stop reason: ALTCHOICE

## 2019-11-20 NOTE — PROGRESS NOTES
"Subjective:       Patient ID: Chinedu Roque is a 73 y.o. male.    Chief Complaint: Urinary Frequency (Urinary frequency w/ odor x 1 mo)      Two weeks of stuffy nose with postnasal drip.  It interferes with his use of his CPAP.  He also has a 1 month history of 0 0 to err in his urine and some urgency.  His nocturia of 3 times per night is stable.  He takes Proscar and Flomax.  His blood pressure has usually been well controlled.    Review of Systems   Constitutional: Positive for unexpected weight change ( he weighs more than he has previously). Negative for fever.   HENT: Positive for congestion and postnasal drip. Negative for sneezing.    Respiratory: Negative for cough.    Gastrointestinal: Negative for abdominal pain.   Genitourinary: Positive for urgency. Negative for dysuria, frequency and testicular pain.   Musculoskeletal: Positive for back pain.       Objective:     Blood pressure 136/84, pulse 72, temperature 98.2 °F (36.8 °C), temperature source Oral, height 5' 11" (1.803 m), weight (!) 140.9 kg (310 lb 10.1 oz).      Physical Exam   Constitutional:   He is overweight and in no distress.   HENT:   Nasal congestion worse on the right.  Sinus is nontender.  TMs are clear.  Throat is normal.   Cardiovascular: Normal rate and regular rhythm.   Pulmonary/Chest: Effort normal and breath sounds normal. No respiratory distress. He has no wheezes.   Genitourinary:   Genitourinary Comments: Prostate is hard to reach but seems to be a little tender and boggy.   Neurological: He is alert.       Assessment:       1. Seasonal allergic rhinitis due to pollen    2. Essential hypertension    3. Benign prostatic hyperplasia with nocturia    4. ADRIANNE (obstructive sleep apnea)    5. Acute prostatitis        Plan:        Bactrim DS b.i.d. For possible low-grade prostatitis.  Zyrtec plus Flonase for nasal symptoms.  Call back if not improved.     "

## 2019-11-20 NOTE — PATIENT INSTRUCTIONS
Zyrtec 10 mg or allegra 180 mg daily for allergy  Also Flonase 2 sprays each nostril daily  Take bactrim DS twice a day for a month.

## 2019-11-25 ENCOUNTER — OFFICE VISIT (OUTPATIENT)
Dept: FAMILY MEDICINE | Facility: CLINIC | Age: 74
End: 2019-11-25
Payer: MEDICARE

## 2019-11-25 VITALS
HEIGHT: 71 IN | RESPIRATION RATE: 16 BRPM | HEART RATE: 74 BPM | SYSTOLIC BLOOD PRESSURE: 110 MMHG | BODY MASS INDEX: 43.24 KG/M2 | WEIGHT: 308.88 LBS | DIASTOLIC BLOOD PRESSURE: 80 MMHG | TEMPERATURE: 98 F

## 2019-11-25 DIAGNOSIS — T30.4 CHEMICAL BURN: Primary | ICD-10-CM

## 2019-11-25 DIAGNOSIS — S39.012A STRAIN OF LUMBAR PARASPINOUS MUSCLE, INITIAL ENCOUNTER: ICD-10-CM

## 2019-11-25 PROCEDURE — 1125F PR PAIN SEVERITY QUANTIFIED, PAIN PRESENT: ICD-10-PCS | Mod: S$GLB,,, | Performed by: FAMILY MEDICINE

## 2019-11-25 PROCEDURE — 1159F PR MEDICATION LIST DOCUMENTED IN MEDICAL RECORD: ICD-10-PCS | Mod: S$GLB,,, | Performed by: FAMILY MEDICINE

## 2019-11-25 PROCEDURE — 3079F PR MOST RECENT DIASTOLIC BLOOD PRESSURE 80-89 MM HG: ICD-10-PCS | Mod: CPTII,S$GLB,, | Performed by: FAMILY MEDICINE

## 2019-11-25 PROCEDURE — 99999 PR PBB SHADOW E&M-EST. PATIENT-LVL IV: ICD-10-PCS | Mod: PBBFAC,,, | Performed by: FAMILY MEDICINE

## 2019-11-25 PROCEDURE — 3079F DIAST BP 80-89 MM HG: CPT | Mod: CPTII,S$GLB,, | Performed by: FAMILY MEDICINE

## 2019-11-25 PROCEDURE — 1159F MED LIST DOCD IN RCRD: CPT | Mod: S$GLB,,, | Performed by: FAMILY MEDICINE

## 2019-11-25 PROCEDURE — 1101F PR PT FALLS ASSESS DOC 0-1 FALLS W/OUT INJ PAST YR: ICD-10-PCS | Mod: CPTII,S$GLB,, | Performed by: FAMILY MEDICINE

## 2019-11-25 PROCEDURE — 99214 PR OFFICE/OUTPT VISIT, EST, LEVL IV, 30-39 MIN: ICD-10-PCS | Mod: S$GLB,,, | Performed by: FAMILY MEDICINE

## 2019-11-25 PROCEDURE — 3074F SYST BP LT 130 MM HG: CPT | Mod: CPTII,S$GLB,, | Performed by: FAMILY MEDICINE

## 2019-11-25 PROCEDURE — 1101F PT FALLS ASSESS-DOCD LE1/YR: CPT | Mod: CPTII,S$GLB,, | Performed by: FAMILY MEDICINE

## 2019-11-25 PROCEDURE — 99214 OFFICE O/P EST MOD 30 MIN: CPT | Mod: S$GLB,,, | Performed by: FAMILY MEDICINE

## 2019-11-25 PROCEDURE — 99999 PR PBB SHADOW E&M-EST. PATIENT-LVL IV: CPT | Mod: PBBFAC,,, | Performed by: FAMILY MEDICINE

## 2019-11-25 PROCEDURE — 1125F AMNT PAIN NOTED PAIN PRSNT: CPT | Mod: S$GLB,,, | Performed by: FAMILY MEDICINE

## 2019-11-25 PROCEDURE — 3074F PR MOST RECENT SYSTOLIC BLOOD PRESSURE < 130 MM HG: ICD-10-PCS | Mod: CPTII,S$GLB,, | Performed by: FAMILY MEDICINE
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
## 2019-11-25 NOTE — PATIENT INSTRUCTIONS
Continue with triple antibiotic ointment for now  If it looks like yeast and is itching, then switch to lamisil cream.

## 2019-12-04 RX ORDER — TAMSULOSIN HYDROCHLORIDE 0.4 MG/1
CAPSULE ORAL
Qty: 90 CAPSULE | Refills: 3 | Status: SHIPPED | OUTPATIENT
Start: 2019-12-04 | End: 2020-12-07

## 2019-12-24 RX ORDER — LOSARTAN POTASSIUM 100 MG/1
100 TABLET ORAL DAILY
Qty: 90 TABLET | Refills: 3 | Status: SHIPPED | OUTPATIENT
Start: 2019-12-24 | End: 2020-12-17

## 2020-01-06 ENCOUNTER — PATIENT MESSAGE (OUTPATIENT)
Dept: UROLOGY | Facility: CLINIC | Age: 75
End: 2020-01-06

## 2020-01-06 DIAGNOSIS — R97.20 ELEVATED PSA: Primary | ICD-10-CM

## 2020-01-07 ENCOUNTER — LAB VISIT (OUTPATIENT)
Dept: LAB | Facility: HOSPITAL | Age: 75
End: 2020-01-07
Attending: UROLOGY
Payer: MEDICARE

## 2020-01-07 DIAGNOSIS — R97.20 ELEVATED PSA: ICD-10-CM

## 2020-01-07 LAB — COMPLEXED PSA SERPL-MCNC: 3.3 NG/ML (ref 0–4)

## 2020-01-07 PROCEDURE — 36415 COLL VENOUS BLD VENIPUNCTURE: CPT | Mod: PO

## 2020-01-07 PROCEDURE — 84153 ASSAY OF PSA TOTAL: CPT

## 2020-01-14 RX ORDER — METOPROLOL SUCCINATE 100 MG/1
TABLET, EXTENDED RELEASE ORAL
Qty: 90 TABLET | Refills: 3 | Status: SHIPPED | OUTPATIENT
Start: 2020-01-14 | End: 2021-01-03

## 2020-01-23 RX ORDER — FINASTERIDE 5 MG/1
TABLET, FILM COATED ORAL
Qty: 90 TABLET | Refills: 3 | Status: SHIPPED | OUTPATIENT
Start: 2020-01-23 | End: 2021-01-11

## 2020-05-04 ENCOUNTER — PATIENT MESSAGE (OUTPATIENT)
Dept: PAIN MEDICINE | Facility: CLINIC | Age: 75
End: 2020-05-04

## 2020-05-06 ENCOUNTER — PATIENT MESSAGE (OUTPATIENT)
Dept: ADMINISTRATIVE | Facility: HOSPITAL | Age: 75
End: 2020-05-06

## 2020-05-14 ENCOUNTER — PATIENT OUTREACH (OUTPATIENT)
Dept: ADMINISTRATIVE | Facility: OTHER | Age: 75
End: 2020-05-14

## 2020-05-18 ENCOUNTER — TELEPHONE (OUTPATIENT)
Dept: PAIN MEDICINE | Facility: CLINIC | Age: 75
End: 2020-05-18

## 2020-05-18 ENCOUNTER — OFFICE VISIT (OUTPATIENT)
Dept: PAIN MEDICINE | Facility: CLINIC | Age: 75
End: 2020-05-18
Payer: MEDICARE

## 2020-05-18 DIAGNOSIS — M51.36 DDD (DEGENERATIVE DISC DISEASE), LUMBAR: ICD-10-CM

## 2020-05-18 DIAGNOSIS — M54.16 LUMBAR RADICULOPATHY: Primary | ICD-10-CM

## 2020-05-18 DIAGNOSIS — Z13.9 SCREENING PROCEDURE: Primary | ICD-10-CM

## 2020-05-18 DIAGNOSIS — M25.569 KNEE PAIN, UNSPECIFIED CHRONICITY, UNSPECIFIED LATERALITY: ICD-10-CM

## 2020-05-18 PROCEDURE — 1159F MED LIST DOCD IN RCRD: CPT | Mod: 95,,, | Performed by: PHYSICIAN ASSISTANT

## 2020-05-18 PROCEDURE — 99499 UNLISTED E&M SERVICE: CPT | Mod: 95,,, | Performed by: PHYSICIAN ASSISTANT

## 2020-05-18 PROCEDURE — 1101F PT FALLS ASSESS-DOCD LE1/YR: CPT | Mod: CPTII,95,, | Performed by: PHYSICIAN ASSISTANT

## 2020-05-18 PROCEDURE — 99214 OFFICE O/P EST MOD 30 MIN: CPT | Mod: 95,,, | Performed by: PHYSICIAN ASSISTANT

## 2020-05-18 PROCEDURE — 1159F PR MEDICATION LIST DOCUMENTED IN MEDICAL RECORD: ICD-10-PCS | Mod: 95,,, | Performed by: PHYSICIAN ASSISTANT

## 2020-05-18 PROCEDURE — 99499 RISK ADDL DX/OHS AUDIT: ICD-10-PCS | Mod: 95,,, | Performed by: PHYSICIAN ASSISTANT

## 2020-05-18 PROCEDURE — 1101F PR PT FALLS ASSESS DOC 0-1 FALLS W/OUT INJ PAST YR: ICD-10-PCS | Mod: CPTII,95,, | Performed by: PHYSICIAN ASSISTANT

## 2020-05-18 PROCEDURE — 99214 PR OFFICE/OUTPT VISIT, EST, LEVL IV, 30-39 MIN: ICD-10-PCS | Mod: 95,,, | Performed by: PHYSICIAN ASSISTANT

## 2020-05-18 RX ORDER — TRAMADOL HYDROCHLORIDE 50 MG/1
50 TABLET ORAL DAILY PRN
Qty: 30 TABLET | Refills: 0 | Status: SHIPPED | OUTPATIENT
Start: 2020-05-18 | End: 2022-08-20

## 2020-05-18 NOTE — TELEPHONE ENCOUNTER
Virtual visit completed.    I have reviewed the [place] Board of [employer]acy website and there are no abberancies.      Pt requested Rx for tramadol.  Last prescribed 2017, takes very rarely, took last one this month. Please send to pharmacy if you approve.

## 2020-05-18 NOTE — PROGRESS NOTES
This note was completed with dictation software and grammatical errors may exist.    The patient location is: [facility]  The chief complaint leading to consultation is: back pain  Visit type: Virtual visit with synchronous audio and video  Total time spent with patient: 12 minutes  Each patient to whom he or she provides medical services by telemedicine is:  (1) informed of the relationship between the physician and patient and the respective role of any other health care provider with respect to management of the patient; and (2) notified that he or she may decline to receive medical services by telemedicine and may withdraw from such care at any time.    CC:Back pain    HPI: The patient is a 74-year-old man with a history of hypertension, obesity and low back pain who presents in referral from Dr. Winters for chronic right low back pain.  He is status post L5/S1 interlaminar epidural steroid injection on 09/13/2019 with 50% relief lasting about 6 months.  He now reports worsening right low back pain greater than left low back pain.  He does have some radiation to the lower posterior thighs and behind his knees.  His leg pain is worse when his back pain is worse.  He describes the pain as aching, worse with any activity, standing and walking.  He has usually complete relief with sitting.  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  He denies bladder or bowel incontinence.    Pain intervention history: He done physical therapy in January, 2014 with moderate relief but not sustained.  He takes Relafen, tramadol, baclofen and chlorzoxazone as needed with mild relief.  He had undergone what sounds like a series of epidurals several years ago with no major relief. The patient is status post a right side L2/3, L3/4, L4/5 and L5/S1 facet joint injection on 10/28/14 with 50% relief of his back pain for 1 month.  He is status post radiofrequency ablation of the right L1, 2, 3,  4 and 5 medial branch nerves on 3/18/15 with 75% relief.  He is status post C7-T1 cervical interlaminar epidural steroid injection on 5/11/15 with 70% relief.  He is status post right L1, 2, 3, 4 and 5 medial branch radiofrequency ablation on 16 with 50% relief.   He is status post right L1, 2, 3, 4 and 5 medial branch radiofrequency ablation on 10/17/17 with about 50% relief additionally, later reported almost complete relief lasting a year.  He is status post right L1, 2, 3, 4 and 5 medial branch radiofrequency ablation on 2019 with mild relief.   He is status post L5/S1 interlaminar epidural steroid injection on 2019 with 80% relief.  He is status post L5/S1 interlaminar epidural steroid injection on 2019 with 50% relief lasting about 6 months.    ROS:He reports back pain.  Balance of review of systems is negative.    Medical, surgical, family and social history reviewed elsewhere in record.    Medications/Allergies: See med card    There were no vitals filed for this visit.      Physical exam:  Gen: A and O x3, pleasant, well-groomed  HEENT: PERRLA  Resp:  No increased work of breathing.    Imagin14 Xray L-spine  There is diffuse osteopenia. Mild to moderate chronic compression fracture with anterior wedging and evidence of vertebroplasty at L2. minimal left convex curvature in the upper lumbar region. No spondylolisthesis. Mild concavity of the superior endplate of L4 which could be small compression fracture or Schmorl's node. No additional fracture.   There is moderate degenerative change within the lumbar spine with anterior osteophyte formation most prominent at L4-L5 and L2- L3 and L1 - L2, also present in the lower thoracic region with flowing ossification anteriorly suggesting diffuse idiopathic sclerotic hyperostosis. There is also mild decreased intervertebral disk space height and endplate sclerosis the lower thoracic and upper lumbar regions. Due to the degree of osseous  mineralization, it is difficult to evaluate for any possible pars defects. Moderate sclerosis suggesting facet arthropathy in the lumbar spine present and there is mild sclerosis, likely degenerative in nature involving the sacroiliac joints.    10/7/14 MRI lumbar spine: At L1/2 there is mild spinal stenosis secondary to facet hypertrophy and disc bulging.  At L2/3 there is minimal spinal stenosis secondary to retropulsion of L2 compression fracture, appearance of prior kyphoplasty present.  There is minimal disc bulging but there is also some facet hypertrophy at this level.  At L3/4 there is facet and ligamentum hypertrophy, minimal disc bulging causing mild spinal stenosis and neuroforaminal narrowing on the left greater than the right side.  At L4/5 there is moderate hypertrophic facet and ligamentum hypertrophy with mild left foraminal narrowing compared to the right side.  There is no spinal stenosis at this level.  At L5/S1 there is a broad-based disc bulge that extends to the left side more than the right side along with asymmetric left sided facet hypertrophy and ligamentum flavum hypertrophy causing moderate left foraminal stenosis.    4/15/15 outside open MRI cervical spine Premier  C3-4 posterior disc bulge producing mild bilateral foraminal narrowing  C4-5 posterior disc bulge producing mild bilateral foraminal narrowing, possible tiny annular tear in the posterior disc, anterior thecal sac deformity with no evidence of spinal stenosis  C5-6 circumferential disc bulge producing moderate to severe bilateral foraminal narrowing, effacement of the bilateral lateral recesses worse to the right, anterior thecal sac deformity with effacement of the anterior subarachnoid space, mild spinal canal stenosis  C6-7 circumferential disc bulge producing moderate to severe bilateral foraminal narrowing with mild spinal canal stenosis  C7-T1 not completely included but appears to have a circumferential disc bulge with  shallow midline disc protrusion with possible mild spinal canal stenosis and bilateral foraminal narrowing      Assessment:  The patient is a 74-year-old man with a history of hypertension, obesity and low back pain who presents in referral from Dr. Winters for chronic right low back pain.   1. Lumbar radiculopathy     2. DDD (degenerative disc disease), lumbar           Plan:  1.  I will schedule the patient for an L5/S1 interlaminar epidural steroid injection to the right.  He has done well with this in the past.  It can be repeated if he has some relief but not sufficient.  He is a high risk patient with hypertension.  He takes daily aspirin and we will obtain approval to hold this medication.  2.  Follow-up in 4 weeks postprocedure or sooner as needed.

## 2020-05-18 NOTE — H&P (VIEW-ONLY)
This note was completed with dictation software and grammatical errors may exist.    The patient location is: [facility]  The chief complaint leading to consultation is: back pain  Visit type: Virtual visit with synchronous audio and video  Total time spent with patient: 12 minutes  Each patient to whom he or she provides medical services by telemedicine is:  (1) informed of the relationship between the physician and patient and the respective role of any other health care provider with respect to management of the patient; and (2) notified that he or she may decline to receive medical services by telemedicine and may withdraw from such care at any time.    CC:Back pain    HPI: The patient is a 74-year-old man with a history of hypertension, obesity and low back pain who presents in referral from Dr. Winters for chronic right low back pain.  He is status post L5/S1 interlaminar epidural steroid injection on 09/13/2019 with 50% relief lasting about 6 months.  He now reports worsening right low back pain greater than left low back pain.  He does have some radiation to the lower posterior thighs and behind his knees.  His leg pain is worse when his back pain is worse.  He describes the pain as aching, worse with any activity, standing and walking.  He has usually complete relief with sitting.  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  He denies bladder or bowel incontinence.    Pain intervention history: He done physical therapy in January, 2014 with moderate relief but not sustained.  He takes Relafen, tramadol, baclofen and chlorzoxazone as needed with mild relief.  He had undergone what sounds like a series of epidurals several years ago with no major relief. The patient is status post a right side L2/3, L3/4, L4/5 and L5/S1 facet joint injection on 10/28/14 with 50% relief of his back pain for 1 month.  He is status post radiofrequency ablation of the right L1, 2, 3,  4 and 5 medial branch nerves on 3/18/15 with 75% relief.  He is status post C7-T1 cervical interlaminar epidural steroid injection on 5/11/15 with 70% relief.  He is status post right L1, 2, 3, 4 and 5 medial branch radiofrequency ablation on 16 with 50% relief.   He is status post right L1, 2, 3, 4 and 5 medial branch radiofrequency ablation on 10/17/17 with about 50% relief additionally, later reported almost complete relief lasting a year.  He is status post right L1, 2, 3, 4 and 5 medial branch radiofrequency ablation on 2019 with mild relief.   He is status post L5/S1 interlaminar epidural steroid injection on 2019 with 80% relief.  He is status post L5/S1 interlaminar epidural steroid injection on 2019 with 50% relief lasting about 6 months.    ROS:He reports back pain.  Balance of review of systems is negative.    Medical, surgical, family and social history reviewed elsewhere in record.    Medications/Allergies: See med card    There were no vitals filed for this visit.      Physical exam:  Gen: A and O x3, pleasant, well-groomed  HEENT: PERRLA  Resp:  No increased work of breathing.    Imagin14 Xray L-spine  There is diffuse osteopenia. Mild to moderate chronic compression fracture with anterior wedging and evidence of vertebroplasty at L2. minimal left convex curvature in the upper lumbar region. No spondylolisthesis. Mild concavity of the superior endplate of L4 which could be small compression fracture or Schmorl's node. No additional fracture.   There is moderate degenerative change within the lumbar spine with anterior osteophyte formation most prominent at L4-L5 and L2- L3 and L1 - L2, also present in the lower thoracic region with flowing ossification anteriorly suggesting diffuse idiopathic sclerotic hyperostosis. There is also mild decreased intervertebral disk space height and endplate sclerosis the lower thoracic and upper lumbar regions. Due to the degree of osseous  mineralization, it is difficult to evaluate for any possible pars defects. Moderate sclerosis suggesting facet arthropathy in the lumbar spine present and there is mild sclerosis, likely degenerative in nature involving the sacroiliac joints.    10/7/14 MRI lumbar spine: At L1/2 there is mild spinal stenosis secondary to facet hypertrophy and disc bulging.  At L2/3 there is minimal spinal stenosis secondary to retropulsion of L2 compression fracture, appearance of prior kyphoplasty present.  There is minimal disc bulging but there is also some facet hypertrophy at this level.  At L3/4 there is facet and ligamentum hypertrophy, minimal disc bulging causing mild spinal stenosis and neuroforaminal narrowing on the left greater than the right side.  At L4/5 there is moderate hypertrophic facet and ligamentum hypertrophy with mild left foraminal narrowing compared to the right side.  There is no spinal stenosis at this level.  At L5/S1 there is a broad-based disc bulge that extends to the left side more than the right side along with asymmetric left sided facet hypertrophy and ligamentum flavum hypertrophy causing moderate left foraminal stenosis.    4/15/15 outside open MRI cervical spine Premier  C3-4 posterior disc bulge producing mild bilateral foraminal narrowing  C4-5 posterior disc bulge producing mild bilateral foraminal narrowing, possible tiny annular tear in the posterior disc, anterior thecal sac deformity with no evidence of spinal stenosis  C5-6 circumferential disc bulge producing moderate to severe bilateral foraminal narrowing, effacement of the bilateral lateral recesses worse to the right, anterior thecal sac deformity with effacement of the anterior subarachnoid space, mild spinal canal stenosis  C6-7 circumferential disc bulge producing moderate to severe bilateral foraminal narrowing with mild spinal canal stenosis  C7-T1 not completely included but appears to have a circumferential disc bulge with  shallow midline disc protrusion with possible mild spinal canal stenosis and bilateral foraminal narrowing      Assessment:  The patient is a 74-year-old man with a history of hypertension, obesity and low back pain who presents in referral from Dr. Winters for chronic right low back pain.   1. Lumbar radiculopathy     2. DDD (degenerative disc disease), lumbar           Plan:  1.  I will schedule the patient for an L5/S1 interlaminar epidural steroid injection to the right.  He has done well with this in the past.  It can be repeated if he has some relief but not sufficient.  He is a high risk patient with hypertension.  He takes daily aspirin and we will obtain approval to hold this medication.  2.  Follow-up in 4 weeks postprocedure or sooner as needed.

## 2020-05-19 DIAGNOSIS — M54.16 LUMBAR RADICULOPATHY: Primary | ICD-10-CM

## 2020-05-19 RX ORDER — ALPRAZOLAM 0.5 MG/1
1 TABLET, ORALLY DISINTEGRATING ORAL ONCE AS NEEDED
Status: CANCELLED | OUTPATIENT
Start: 2020-06-02 | End: 2031-10-29

## 2020-05-19 NOTE — TELEPHONE ENCOUNTER
Patient is scheduled for a lumbar epidural steroid injection with Dr. Segura on 6/2 and will need to stop aspirin 7 days before. Please advise if this is okay. Thanks.

## 2020-05-19 NOTE — TELEPHONE ENCOUNTER
Spoke with patient and the procedure has been scheduled for 6/2. Pre-op instructions were reviewed with patient.

## 2020-05-31 ENCOUNTER — LAB VISIT (OUTPATIENT)
Dept: FAMILY MEDICINE | Facility: CLINIC | Age: 75
End: 2020-05-31
Payer: MEDICARE

## 2020-05-31 DIAGNOSIS — Z13.9 SCREENING PROCEDURE: ICD-10-CM

## 2020-05-31 PROCEDURE — U0003 INFECTIOUS AGENT DETECTION BY NUCLEIC ACID (DNA OR RNA); SEVERE ACUTE RESPIRATORY SYNDROME CORONAVIRUS 2 (SARS-COV-2) (CORONAVIRUS DISEASE [COVID-19]), AMPLIFIED PROBE TECHNIQUE, MAKING USE OF HIGH THROUGHPUT TECHNOLOGIES AS DESCRIBED BY CMS-2020-01-R: HCPCS

## 2020-06-01 LAB — SARS-COV-2 RNA RESP QL NAA+PROBE: NOT DETECTED

## 2020-06-02 ENCOUNTER — HOSPITAL ENCOUNTER (OUTPATIENT)
Dept: RADIOLOGY | Facility: HOSPITAL | Age: 75
Discharge: HOME | End: 2020-06-02
Attending: ANESTHESIOLOGY | Admitting: ANESTHESIOLOGY
Payer: MEDICARE

## 2020-06-02 ENCOUNTER — HOSPITAL ENCOUNTER (OUTPATIENT)
Facility: HOSPITAL | Age: 75
Discharge: HOME | End: 2020-06-02
Attending: ANESTHESIOLOGY | Admitting: ANESTHESIOLOGY
Payer: MEDICARE

## 2020-06-02 DIAGNOSIS — M54.16 LUMBAR RADICULOPATHY: Primary | ICD-10-CM

## 2020-06-02 DIAGNOSIS — M47.817 LUMBOSACRAL SPONDYLOSIS WITHOUT MYELOPATHY: ICD-10-CM

## 2020-06-02 PROCEDURE — 63600175 PHARM REV CODE 636 W HCPCS: Mod: PO | Performed by: ANESTHESIOLOGY

## 2020-06-02 PROCEDURE — 62323 NJX INTERLAMINAR LMBR/SAC: CPT | Mod: ,,, | Performed by: ANESTHESIOLOGY

## 2020-06-02 PROCEDURE — 62323 NJX INTERLAMINAR LMBR/SAC: CPT | Mod: PO | Performed by: ANESTHESIOLOGY

## 2020-06-02 PROCEDURE — A4216 STERILE WATER/SALINE, 10 ML: HCPCS | Mod: PO | Performed by: ANESTHESIOLOGY

## 2020-06-02 PROCEDURE — 62323 PR INJ LUMBAR/SACRAL, W/IMAGING GUIDANCE: ICD-10-PCS | Mod: ,,, | Performed by: ANESTHESIOLOGY

## 2020-06-02 PROCEDURE — 76000 FLUOROSCOPY <1 HR PHYS/QHP: CPT | Mod: TC,PO

## 2020-06-02 PROCEDURE — 25000003 PHARM REV CODE 250: Mod: PO | Performed by: ANESTHESIOLOGY

## 2020-06-02 PROCEDURE — 25500020 PHARM REV CODE 255: Mod: PO | Performed by: ANESTHESIOLOGY

## 2020-06-02 RX ORDER — SODIUM CHLORIDE 9 MG/ML
INJECTION, SOLUTION INTRAMUSCULAR; INTRAVENOUS; SUBCUTANEOUS
Status: DISCONTINUED | OUTPATIENT
Start: 2020-06-02 | End: 2020-06-02 | Stop reason: HOSPADM

## 2020-06-02 RX ORDER — LIDOCAINE HYDROCHLORIDE 10 MG/ML
INJECTION, SOLUTION EPIDURAL; INFILTRATION; INTRACAUDAL; PERINEURAL
Status: DISCONTINUED | OUTPATIENT
Start: 2020-06-02 | End: 2020-06-02 | Stop reason: HOSPADM

## 2020-06-02 RX ORDER — ALPRAZOLAM 0.5 MG/1
1 TABLET, ORALLY DISINTEGRATING ORAL ONCE AS NEEDED
Status: DISCONTINUED | OUTPATIENT
Start: 2020-06-02 | End: 2020-06-02 | Stop reason: HOSPADM

## 2020-06-02 RX ORDER — METHYLPREDNISOLONE ACETATE 80 MG/ML
INJECTION, SUSPENSION INTRA-ARTICULAR; INTRALESIONAL; INTRAMUSCULAR; SOFT TISSUE
Status: DISCONTINUED | OUTPATIENT
Start: 2020-06-02 | End: 2020-06-02 | Stop reason: HOSPADM

## 2020-06-02 NOTE — OP NOTE

## 2020-06-02 NOTE — INTERVAL H&P NOTE
The patient has been examined and the H&P has been reviewed:    I concur with the findings and no changes have occurred since H&P was written.    Anesthesia/Surgery risks, benefits and alternative options discussed and understood by patient/family.    ASA 2, mallampati 2        There are no hospital problems to display for this patient.

## 2020-06-02 NOTE — DISCHARGE SUMMARY
[facility]  Discharge Note  Short Stay    Admit Date: 6/2/2020    Discharge Date: 6/2/2020    Attending Physician: Riley Segura MD     Discharge Provider: Riley Segura    Diagnoses:  Active Hospital Problems    Diagnosis  POA    *Lumbar radiculopathy [M54.16]  Yes      Resolved Hospital Problems   No resolved problems to display.       Discharged Condition: good    Final Diagnoses: Lumbar radiculopathy [M54.16]    Disposition: Home or Self Care    Hospital Course: no complications, uneventful    Outcome of Hospitalization, Treatment, Procedure, or Surgery:  Patient was admitted for outpatient procedure. The patient underwent procedure without complications and are discharged home    Follow up/Patient Instructions:  Follow up as scheduled in Pain Management clinic in 3-4 weeks/Patient has received instructions and follow up date and time    Medications:  Continue previous medications    Discharge Procedure Orders   Call MD for:  temperature >100.4     Call MD for:  severe uncontrolled pain     Call MD for:  redness, tenderness, or signs of infection (pain, swelling, redness, odor or green/yellow discharge around incision site)     Call MD for:  severe persistent headache     No dressing needed         Discharge Procedure Orders (must include Diet, Follow-up, Activity):   Discharge Procedure Orders (must include Diet, Follow-up, Activity)   Call MD for:  temperature >100.4     Call MD for:  severe uncontrolled pain     Call MD for:  redness, tenderness, or signs of infection (pain, swelling, redness, odor or green/yellow discharge around incision site)     Call MD for:  severe persistent headache     No dressing needed

## 2020-06-03 VITALS
DIASTOLIC BLOOD PRESSURE: 74 MMHG | RESPIRATION RATE: 18 BRPM | WEIGHT: 300 LBS | HEIGHT: 71 IN | SYSTOLIC BLOOD PRESSURE: 145 MMHG | BODY MASS INDEX: 42 KG/M2 | TEMPERATURE: 98 F | HEART RATE: 74 BPM | OXYGEN SATURATION: 96 %

## 2020-09-22 ENCOUNTER — PATIENT MESSAGE (OUTPATIENT)
Dept: RESEARCH | Facility: OTHER | Age: 75
End: 2020-09-22

## 2020-09-24 ENCOUNTER — OFFICE VISIT (OUTPATIENT)
Dept: FAMILY MEDICINE | Facility: CLINIC | Age: 75
End: 2020-09-24
Payer: MEDICARE

## 2020-09-24 VITALS
SYSTOLIC BLOOD PRESSURE: 130 MMHG | TEMPERATURE: 98 F | BODY MASS INDEX: 43.69 KG/M2 | HEIGHT: 71 IN | OXYGEN SATURATION: 95 % | WEIGHT: 312.06 LBS | DIASTOLIC BLOOD PRESSURE: 88 MMHG | HEART RATE: 72 BPM

## 2020-09-24 DIAGNOSIS — N41.1 CHRONIC PROSTATITIS: ICD-10-CM

## 2020-09-24 DIAGNOSIS — R39.9 LOWER URINARY TRACT SYMPTOMS: Primary | ICD-10-CM

## 2020-09-24 DIAGNOSIS — Z23 IMMUNIZATION DUE: ICD-10-CM

## 2020-09-24 PROCEDURE — 99499 RISK ADDL DX/OHS AUDIT: ICD-10-PCS | Mod: S$GLB,,, | Performed by: FAMILY MEDICINE

## 2020-09-24 PROCEDURE — 90694 VACC AIIV4 NO PRSRV 0.5ML IM: CPT | Mod: S$GLB,,, | Performed by: FAMILY MEDICINE

## 2020-09-24 PROCEDURE — 99999 PR PBB SHADOW E&M-EST. PATIENT-LVL IV: ICD-10-PCS | Mod: PBBFAC,,, | Performed by: FAMILY MEDICINE

## 2020-09-24 PROCEDURE — 99214 PR OFFICE/OUTPT VISIT, EST, LEVL IV, 30-39 MIN: ICD-10-PCS | Mod: 25,S$GLB,, | Performed by: FAMILY MEDICINE

## 2020-09-24 PROCEDURE — 1159F PR MEDICATION LIST DOCUMENTED IN MEDICAL RECORD: ICD-10-PCS | Mod: S$GLB,,, | Performed by: FAMILY MEDICINE

## 2020-09-24 PROCEDURE — 3075F SYST BP GE 130 - 139MM HG: CPT | Mod: CPTII,S$GLB,, | Performed by: FAMILY MEDICINE

## 2020-09-24 PROCEDURE — 99499 UNLISTED E&M SERVICE: CPT | Mod: S$GLB,,, | Performed by: FAMILY MEDICINE

## 2020-09-24 PROCEDURE — 3079F PR MOST RECENT DIASTOLIC BLOOD PRESSURE 80-89 MM HG: ICD-10-PCS | Mod: CPTII,S$GLB,, | Performed by: FAMILY MEDICINE

## 2020-09-24 PROCEDURE — G0008 PR ADMIN INFLUENZA VIRUS VAC: ICD-10-PCS | Mod: S$GLB,,, | Performed by: FAMILY MEDICINE

## 2020-09-24 PROCEDURE — 1159F MED LIST DOCD IN RCRD: CPT | Mod: S$GLB,,, | Performed by: FAMILY MEDICINE

## 2020-09-24 PROCEDURE — 1125F AMNT PAIN NOTED PAIN PRSNT: CPT | Mod: S$GLB,,, | Performed by: FAMILY MEDICINE

## 2020-09-24 PROCEDURE — 1101F PR PT FALLS ASSESS DOC 0-1 FALLS W/OUT INJ PAST YR: ICD-10-PCS | Mod: CPTII,S$GLB,, | Performed by: FAMILY MEDICINE

## 2020-09-24 PROCEDURE — 3079F DIAST BP 80-89 MM HG: CPT | Mod: CPTII,S$GLB,, | Performed by: FAMILY MEDICINE

## 2020-09-24 PROCEDURE — 1101F PT FALLS ASSESS-DOCD LE1/YR: CPT | Mod: CPTII,S$GLB,, | Performed by: FAMILY MEDICINE

## 2020-09-24 PROCEDURE — G0008 ADMIN INFLUENZA VIRUS VAC: HCPCS | Mod: S$GLB,,, | Performed by: FAMILY MEDICINE

## 2020-09-24 PROCEDURE — 3075F PR MOST RECENT SYSTOLIC BLOOD PRESS GE 130-139MM HG: ICD-10-PCS | Mod: CPTII,S$GLB,, | Performed by: FAMILY MEDICINE

## 2020-09-24 PROCEDURE — 99999 PR PBB SHADOW E&M-EST. PATIENT-LVL IV: CPT | Mod: PBBFAC,,, | Performed by: FAMILY MEDICINE

## 2020-09-24 PROCEDURE — 90694 FLU VACCINE - QUADRIVALENT - ADJUVANTED: ICD-10-PCS | Mod: S$GLB,,, | Performed by: FAMILY MEDICINE

## 2020-09-24 PROCEDURE — 99214 OFFICE O/P EST MOD 30 MIN: CPT | Mod: 25,S$GLB,, | Performed by: FAMILY MEDICINE

## 2020-09-24 PROCEDURE — 1125F PR PAIN SEVERITY QUANTIFIED, PAIN PRESENT: ICD-10-PCS | Mod: S$GLB,,, | Performed by: FAMILY MEDICINE

## 2020-09-24 RX ORDER — CIPROFLOXACIN 500 MG/1
500 TABLET ORAL EVERY 12 HOURS
Qty: 28 TABLET | Refills: 0 | Status: SHIPPED | OUTPATIENT
Start: 2020-09-24 | End: 2020-11-12

## 2020-09-24 NOTE — PROGRESS NOTES
Patient, Chinedu Roque (MRN #[id number]), presented with a recorded BMI of 43.52 kg/m^2 consistent with the definition of morbid obesity (ICD-10 E66.01). The patient's morbid obesity was monitored, evaluated, addressed and/or treated. This addendum to the medical record is made on 09/24/2020.

## 2020-09-24 NOTE — PROGRESS NOTES
THIS DOCUMENT WAS MADE IN PART WITH VOICE RECOGNITION SOFTWARE.  OCCASIONALLY THIS SOFTWARE WILL MISINTERPRET WORDS OR PHRASES.    Assessment and Plan:    1. Lower urinary tract symptoms  Likely secondary to 2.    2. Chronic prostatitis  Treat with 2 weeks ciprofloxacin, call if no improvement, low threshold to switch antibiotic or referred to urology  ER precautions given  - ciprofloxacin HCl (CIPRO) 500 MG tablet; Take 1 tablet (500 mg total) by mouth every 12 (twelve) hours.  Dispense: 28 tablet; Refill: 0    3. Immunization due  Influenza vaccine today        ______________________________________________________________________  Subjective:    Chief Complaint:  Chief Complaint   Patient presents with    Urinary Tract Infection     odor and slight burning ocurring x 1 month     Labs Only     pt would like to check his psa         HPI:  Chinedu is a 74 y.o. year old     Lower urinary tract symptoms  Patient complains of frequency, dysuria, hesitancy, incomplete voiding symptoms  Denies any fever, nausea, vomiting  History of chronic prostatitis with occasional flare    Health maintenance  Due for influenza vaccine    Past Medical History:  Past Medical History:   Diagnosis Date    Anticoagulant long-term use     ASA 81 mg    Arthritis     cervical    BPH (benign prostatic hyperplasia) 3/2/2012    Chronic left shoulder pain     Compression fracture of L2     DDD (degenerative disc disease), lumbar     HTN (hypertension) 3/2/2012    Hx of colonic polyps 2013    Low back pain     Morbid obesity with BMI of 40.0-44.9, adult 3/18/2014    Seasonal allergies     Sleep apnea     compliant with CPAP    Stroke 3/1986    Stroke 3/2/2012    1986        Past Surgical History:  Past Surgical History:   Procedure Laterality Date    APPENDECTOMY      EPIDURAL BLOCK INJECTION  2015    cervical    EPIDURAL STEROID INJECTION INTO LUMBAR SPINE N/A 6/5/2019    Procedure: Injection-steroid-epidural-lumbar L5/S1  interlaminar;  Surgeon: Riley Segura MD;  Location: [facility] OR;  Service: Pain Management;  Laterality: N/A;    EPIDURAL STEROID INJECTION INTO LUMBAR SPINE N/A 9/13/2019    Procedure: Injection-steroid-epidural-lumbar;  Surgeon: Riley Segura MD;  Location: [facility] OR;  Service: Pain Management;  Laterality: N/A;  L5/S1 interlaminar to the right    EPIDURAL STEROID INJECTION INTO LUMBAR SPINE N/A 6/2/2020    Procedure: Injection-steroid-epidural-lumbar L5/S1 to right;  Surgeon: Riley Segura MD;  Location: [facility] OR;  Service: Pain Management;  Laterality: N/A;    Facet Steroid injection       Pain management    HERNIA REPAIR      [facility]    KNEE SURGERY      bilateral    RADIOFREQUENCY ABLATION  2015    lumbar nerve    RADIOFREQUENCY ABLATION OF LUMBAR MEDIAL BRANCH NERVE AT SINGLE LEVEL Right 4/11/2019    Procedure: Radiofrequency Ablation, Nerve, Spinal, Lumbar, Medial Branch, L1,2,3,4,5;  Surgeon: Riley Segura MD;  Location: [facility] OR;  Service: Pain Management;  Laterality: Right;    VERTEBROPLASTY         Family History:  Family History   Problem Relation Age of Onset    COPD Father     Hypertension Brother     Kidney disease Brother     Alzheimer's disease Mother     No Known Problems Daughter     Hypertension Son     Diabetes Son         pre-diabetic    No Known Problems Daughter     No Known Problems Daughter        Social History:  Social History     Socioeconomic History    Marital status:      Spouse name: Not on file    Number of children: Not on file    Years of education: Not on file    Highest education level: Not on file   Occupational History    Not on file   Social Needs    Financial resource strain: Not on file    Food insecurity     Worry: Not on file     Inability: Not on file    Transportation needs     Medical: Not on file     Non-medical: Not on file   Tobacco Use    Smoking status: Former Smoker     Packs/day: 1.50     Years: 24.00     Pack  years: 36.00     Start date: 10/21/1959     Quit date: 3/19/1983     Years since quittin.5    Smokeless tobacco: Never Used   Substance and Sexual Activity    Alcohol use: No    Drug use: No    Sexual activity: Not on file   Lifestyle    Physical activity     Days per week: Not on file     Minutes per session: Not on file    Stress: Not on file   Relationships    Social connections     Talks on phone: Not on file     Gets together: Not on file     Attends Nondenominational service: Not on file     Active member of club or organization: Not on file     Attends meetings of clubs or organizations: Not on file     Relationship status: Not on file   Other Topics Concern    Not on file   Social History Narrative    Retired - Worked for the [employer].        Medications:  Current Outpatient Medications on File Prior to Visit   Medication Sig Dispense Refill    acetaminophen (TYLENOL) 500 MG tablet Take 1,000 mg by mouth every 6 (six) hours as needed for Pain.      aspirin (ECOTRIN) 81 MG EC tablet Take 81 mg by mouth once daily.        finasteride (PROSCAR) 5 mg tablet TAKE 1 TABLET BY MOUTH EVERY DAY 90 tablet 3    losartan (COZAAR) 100 MG tablet TAKE 1 TABLET (100 MG TOTAL) BY MOUTH ONCE DAILY. 90 tablet 3    metoprolol succinate (TOPROL-XL) 100 MG 24 hr tablet TAKE 1 TABLET BY MOUTH EVERY DAY 90 tablet 3    tamsulosin (FLOMAX) 0.4 mg Cap TAKE 1 CAPSULE BY MOUTH EVERY DAY 90 capsule 3    traMADoL (ULTRAM) 50 mg tablet Take 1 tablet (50 mg total) by mouth daily as needed for Pain. 30 tablet 0    [DISCONTINUED] sulfamethoxazole-trimethoprim 800-160mg (BACTRIM DS) 800-160 mg Tab Take 1 tablet by mouth 2 (two) times daily. 60 tablet 0     Current Facility-Administered Medications on File Prior to Visit   Medication Dose Route Frequency Provider Last Rate Last Dose    sodium hyaluronate (EUFLEXXA) 10 mg/mL(mw 2.4 -3.6 million) Syrg 20 mg  20 mg Intra-articular  Michelet Covarrubias MD   20 mg at 18 1043     "sodium hyaluronate (EUFLEXXA) 10 mg/mL(mw 2.4 -3.6 million) Syrg 20 mg  20 mg Intra-articular  Michelet Covarrubias MD   20 mg at 05/14/18 1046       Allergies:  Patient has no known allergies.    Immunizations:  Immunization History   Administered Date(s) Administered    Influenza 01/04/2008, 10/23/2009, 09/17/2013    Influenza (FLUAD) - Quadrivalent - Adjuvanted - PF *Preferred* (65+) 09/24/2020    Influenza - High Dose - PF (65 years and older) 11/10/2010, 11/01/2011, 10/23/2012, 11/06/2014, 10/06/2015, 10/25/2017, 10/03/2018, 10/19/2019    Influenza - Trivalent (ADULT) 01/04/2008, 10/23/2009, 09/17/2013    Influenza Split 09/17/2013    Pneumococcal Conjugate - 13 Valent 04/25/2016    Pneumococcal Polysaccharide - 23 Valent 10/23/2009    Tdap 11/24/2012    Zoster 11/06/2014       Review of Systems:  Review of Systems   Genitourinary: Positive for difficulty urinating, dysuria, frequency and urgency.   All other systems reviewed and are negative.      Objective:    Vitals:  Vitals:    09/24/20 1503   BP: 130/88   Pulse: 72   Temp: 98.1 °F (36.7 °C)   TempSrc: Temporal   SpO2: 95%   Weight: (!) 141.6 kg (312 lb 1 oz)   Height: 5' 11" (1.803 m)   PainSc:   4   PainLoc: Back       Physical Exam  Constitutional:       Appearance: He is well-developed.   HENT:      Head: Normocephalic and atraumatic.   Neck:      Musculoskeletal: Normal range of motion.   Pulmonary:      Effort: Pulmonary effort is normal. No respiratory distress.   Psychiatric:         Behavior: Behavior normal.         Thought Content: Thought content normal.         Judgment: Judgment normal.         Data:  No previous labs, imaging, or notes available.        Miguel Wellington MD  Family Medicine    "

## 2020-10-26 ENCOUNTER — OFFICE VISIT (OUTPATIENT)
Dept: PAIN MEDICINE | Facility: CLINIC | Age: 75
End: 2020-10-26
Payer: MEDICARE

## 2020-10-26 ENCOUNTER — TELEPHONE (OUTPATIENT)
Dept: PAIN MEDICINE | Facility: CLINIC | Age: 75
End: 2020-10-26

## 2020-10-26 VITALS
WEIGHT: 314.5 LBS | SYSTOLIC BLOOD PRESSURE: 149 MMHG | RESPIRATION RATE: 20 BRPM | HEART RATE: 60 BPM | BODY MASS INDEX: 43.86 KG/M2 | DIASTOLIC BLOOD PRESSURE: 69 MMHG | OXYGEN SATURATION: 95 % | TEMPERATURE: 97 F

## 2020-10-26 DIAGNOSIS — Z11.9 SCREENING EXAMINATION FOR INFECTIOUS DISEASE: ICD-10-CM

## 2020-10-26 DIAGNOSIS — M47.816 LUMBAR SPONDYLOSIS: ICD-10-CM

## 2020-10-26 DIAGNOSIS — M54.16 LUMBAR RADICULOPATHY: Primary | ICD-10-CM

## 2020-10-26 DIAGNOSIS — M51.36 DDD (DEGENERATIVE DISC DISEASE), LUMBAR: ICD-10-CM

## 2020-10-26 PROCEDURE — 1101F PR PT FALLS ASSESS DOC 0-1 FALLS W/OUT INJ PAST YR: ICD-10-PCS | Mod: CPTII,S$GLB,, | Performed by: PHYSICIAN ASSISTANT

## 2020-10-26 PROCEDURE — 99999 PR PBB SHADOW E&M-EST. PATIENT-LVL V: CPT | Mod: PBBFAC,,, | Performed by: PHYSICIAN ASSISTANT

## 2020-10-26 PROCEDURE — 1159F MED LIST DOCD IN RCRD: CPT | Mod: S$GLB,,, | Performed by: PHYSICIAN ASSISTANT

## 2020-10-26 PROCEDURE — 1101F PT FALLS ASSESS-DOCD LE1/YR: CPT | Mod: CPTII,S$GLB,, | Performed by: PHYSICIAN ASSISTANT

## 2020-10-26 PROCEDURE — 3077F SYST BP >= 140 MM HG: CPT | Mod: CPTII,S$GLB,, | Performed by: PHYSICIAN ASSISTANT

## 2020-10-26 PROCEDURE — 1125F AMNT PAIN NOTED PAIN PRSNT: CPT | Mod: S$GLB,,, | Performed by: PHYSICIAN ASSISTANT

## 2020-10-26 PROCEDURE — 1159F PR MEDICATION LIST DOCUMENTED IN MEDICAL RECORD: ICD-10-PCS | Mod: S$GLB,,, | Performed by: PHYSICIAN ASSISTANT

## 2020-10-26 PROCEDURE — 1125F PR PAIN SEVERITY QUANTIFIED, PAIN PRESENT: ICD-10-PCS | Mod: S$GLB,,, | Performed by: PHYSICIAN ASSISTANT

## 2020-10-26 PROCEDURE — 3078F PR MOST RECENT DIASTOLIC BLOOD PRESSURE < 80 MM HG: ICD-10-PCS | Mod: CPTII,S$GLB,, | Performed by: PHYSICIAN ASSISTANT

## 2020-10-26 PROCEDURE — 99214 OFFICE O/P EST MOD 30 MIN: CPT | Mod: S$GLB,,, | Performed by: PHYSICIAN ASSISTANT

## 2020-10-26 PROCEDURE — 99999 PR PBB SHADOW E&M-EST. PATIENT-LVL V: ICD-10-PCS | Mod: PBBFAC,,, | Performed by: PHYSICIAN ASSISTANT

## 2020-10-26 PROCEDURE — 3078F DIAST BP <80 MM HG: CPT | Mod: CPTII,S$GLB,, | Performed by: PHYSICIAN ASSISTANT

## 2020-10-26 PROCEDURE — 3077F PR MOST RECENT SYSTOLIC BLOOD PRESSURE >= 140 MM HG: ICD-10-PCS | Mod: CPTII,S$GLB,, | Performed by: PHYSICIAN ASSISTANT

## 2020-10-26 PROCEDURE — 99214 PR OFFICE/OUTPT VISIT, EST, LEVL IV, 30-39 MIN: ICD-10-PCS | Mod: S$GLB,,, | Performed by: PHYSICIAN ASSISTANT

## 2020-10-26 RX ORDER — ALPRAZOLAM 0.5 MG/1
1 TABLET, ORALLY DISINTEGRATING ORAL ONCE AS NEEDED
Status: CANCELLED | OUTPATIENT
Start: 2020-11-13 | End: 2032-04-10

## 2020-10-26 NOTE — TELEPHONE ENCOUNTER
Patient is scheduled for an epidural steroid injection with Dr. Segura and need to hold aspirin for 7 days before procedure. Please advise if this is okay. Thank you.

## 2020-10-29 NOTE — H&P (VIEW-ONLY)
This note was completed with dictation software and grammatical errors may exist.    CC:Back pain    HPI: The patient is a 74-year-old man with a history of hypertension, obesity and low back pain who presents in referral from Dr. Winters for chronic right low back pain.  He is status post L5/S1 interlaminar epidural steroid injection to the right on 06/02/2020 with minimal relief.  He complains of right low back and upper gluteal pain.  He describes the pain as sharp, worse with  activity and bending, improved with Tylenol at night and with sitting.  He denies weakness or numbness, no bladder or bowel incontinence.    Pain intervention history: He done physical therapy in January, 2014 with moderate relief but not sustained.  He takes Relafen, tramadol, baclofen and chlorzoxazone as needed with mild relief.  He had undergone what sounds like a series of epidurals several years ago with no major relief. The patient is status post a right side L2/3, L3/4, L4/5 and L5/S1 facet joint injection on 10/28/14 with 50% relief of his back pain for 1 month.  He is status post radiofrequency ablation of the right L1, 2, 3, 4 and 5 medial branch nerves on 3/18/15 with 75% relief.  He is status post C7-T1 cervical interlaminar epidural steroid injection on 5/11/15 with 70% relief.  He is status post right L1, 2, 3, 4 and 5 medial branch radiofrequency ablation on 7/5/16 with 50% relief.   He is status post right L1, 2, 3, 4 and 5 medial branch radiofrequency ablation on 10/17/17 with about 50% relief additionally, later reported almost complete relief lasting a year.  He is status post right L1, 2, 3, 4 and 5 medial branch radiofrequency ablation on 04/11/2019 with mild relief.   He is status post L5/S1 interlaminar epidural steroid injection on 06/05/2019 with 80% relief.  He is status post L5/S1 interlaminar epidural steroid injection on 09/13/2019 with 50% relief lasting about 6 months.  He is status post L5/S1 interlaminar  epidural steroid injection to the right on 2020 with minimal relief.    ROS:He reports back pain.  Balance of review of systems is negative.    Medical, surgical, family and social history reviewed elsewhere in record.    Medications/Allergies: See med card    Vitals:    10/26/20 1312   BP: (!) 149/69   Pulse: 60   Resp: 20   Temp: 97.4 °F (36.3 °C)   TempSrc: Temporal   SpO2: 95%   Weight: (!) 142.7 kg (314 lb 7.8 oz)   PainSc:   6   PainLoc: Back         Physical exam:  Gen: A and O x3, pleasant, well-groomed  Skin: No rashes or obvious lesions  HEENT: PERRLA, no obvious deformities on ears or in canals.   CVS: Regular rate and rhythm, normal S1 and S2, no murmurs.  Resp: Clear to auscultation bilaterally, no wheezes or rales.  Abdomen: Soft, NT/ND, normal bowel sounds present.  Musculoskeletal:  No antalgic gait.      Neuro:  Upper extremities: 5/5 strength bilaterally   Lower extremities: 5/5 strength bilaterally  Reflexes: Brachioradialis 2+, Bicep 2+, Tricep 2+. Patellar 2+, Achilles 2+.  Sensory: Intact and symmetrical to light touch and pinprick in C2-T1 dermatomes bilaterally. Intact and symmetrical to light touch and pinprick in L2-S1 dermatomes bilaterally.     Lumbar spine:  Lumbar spine:Range of motion is moderately reduced with flexion, extension and oblique extension with increased right low back pain during flexion.  Ramakrishna's test causes no increased pain on either side.    Supine straight leg raise causes right low back pain only.  Internal and external rotation of the hip causes no increased pain on either side.  Myofascial exam: No tenderness to palpation across the lumbar paraspinous muscles.    Imagin14 Xray L-spine  There is diffuse osteopenia. Mild to moderate chronic compression fracture with anterior wedging and evidence of vertebroplasty at L2. minimal left convex curvature in the upper lumbar region. No spondylolisthesis. Mild concavity of the superior endplate of L4 which  could be small compression fracture or Schmorl's node. No additional fracture.   There is moderate degenerative change within the lumbar spine with anterior osteophyte formation most prominent at L4-L5 and L2- L3 and L1 - L2, also present in the lower thoracic region with flowing ossification anteriorly suggesting diffuse idiopathic sclerotic hyperostosis. There is also mild decreased intervertebral disk space height and endplate sclerosis the lower thoracic and upper lumbar regions. Due to the degree of osseous mineralization, it is difficult to evaluate for any possible pars defects. Moderate sclerosis suggesting facet arthropathy in the lumbar spine present and there is mild sclerosis, likely degenerative in nature involving the sacroiliac joints.    10/7/14 MRI lumbar spine: At L1/2 there is mild spinal stenosis secondary to facet hypertrophy and disc bulging.  At L2/3 there is minimal spinal stenosis secondary to retropulsion of L2 compression fracture, appearance of prior kyphoplasty present.  There is minimal disc bulging but there is also some facet hypertrophy at this level.  At L3/4 there is facet and ligamentum hypertrophy, minimal disc bulging causing mild spinal stenosis and neuroforaminal narrowing on the left greater than the right side.  At L4/5 there is moderate hypertrophic facet and ligamentum hypertrophy with mild left foraminal narrowing compared to the right side.  There is no spinal stenosis at this level.  At L5/S1 there is a broad-based disc bulge that extends to the left side more than the right side along with asymmetric left sided facet hypertrophy and ligamentum flavum hypertrophy causing moderate left foraminal stenosis.    4/15/15 outside open MRI cervical spine Premier  C3-4 posterior disc bulge producing mild bilateral foraminal narrowing  C4-5 posterior disc bulge producing mild bilateral foraminal narrowing, possible tiny annular tear in the posterior disc, anterior thecal sac  deformity with no evidence of spinal stenosis  C5-6 circumferential disc bulge producing moderate to severe bilateral foraminal narrowing, effacement of the bilateral lateral recesses worse to the right, anterior thecal sac deformity with effacement of the anterior subarachnoid space, mild spinal canal stenosis  C6-7 circumferential disc bulge producing moderate to severe bilateral foraminal narrowing with mild spinal canal stenosis  C7-T1 not completely included but appears to have a circumferential disc bulge with shallow midline disc protrusion with possible mild spinal canal stenosis and bilateral foraminal narrowing      Assessment:  The patient is a 74-year-old man with a history of hypertension, obesity and low back pain who presents in referral from Dr. Winters for chronic right low back pain.   1. Lumbar radiculopathy  Vital signs    Verify informed consent    Notify physician     Notify physician     Notify physician (specify)    Diet NPO    Case Request Operating Room: Injection,steroid,epidural,transforaminal approach, l3/4 and l5/s1    Place in Outpatient    alprazolam ODT dissolvable tablet 1 mg   2. DDD (degenerative disc disease), lumbar     3. Lumbar spondylosis     4. Screening examination for infectious disease  COVID-19 Routine Screening         Plan:  1.  The patient has not had relief with physical therapy, radiofrequency ablation or interlaminar epidural steroid injection.  We discussed that we can try 1 more kind of procedure and I will schedule him for a right L3/4 and L5/S1 transforaminal epidural steroid injection where he has foraminal stenosis.  If this does not provide relief we may consider neurosurgical evaluation and lastly spinal cord stimulation.  2.  Follow-up in 4 weeks postprocedure sooner as needed.    Greater than 50% of this 25 minutes visit was spent counseling the patient.

## 2020-11-10 ENCOUNTER — LAB VISIT (OUTPATIENT)
Dept: FAMILY MEDICINE | Facility: CLINIC | Age: 75
End: 2020-11-10
Payer: MEDICARE

## 2020-11-10 DIAGNOSIS — Z11.9 SCREENING EXAMINATION FOR INFECTIOUS DISEASE: ICD-10-CM

## 2020-11-10 PROCEDURE — U0003 INFECTIOUS AGENT DETECTION BY NUCLEIC ACID (DNA OR RNA); SEVERE ACUTE RESPIRATORY SYNDROME CORONAVIRUS 2 (SARS-COV-2) (CORONAVIRUS DISEASE [COVID-19]), AMPLIFIED PROBE TECHNIQUE, MAKING USE OF HIGH THROUGHPUT TECHNOLOGIES AS DESCRIBED BY CMS-2020-01-R: HCPCS

## 2020-11-11 LAB — SARS-COV-2 RNA RESP QL NAA+PROBE: NOT DETECTED

## 2020-11-13 ENCOUNTER — HOSPITAL ENCOUNTER (OUTPATIENT)
Facility: HOSPITAL | Age: 75
Discharge: HOME | End: 2020-11-13
Attending: ANESTHESIOLOGY | Admitting: ANESTHESIOLOGY
Payer: MEDICARE

## 2020-11-13 ENCOUNTER — HOSPITAL ENCOUNTER (OUTPATIENT)
Dept: RADIOLOGY | Facility: HOSPITAL | Age: 75
Discharge: HOME | End: 2020-11-13
Attending: ANESTHESIOLOGY | Admitting: ANESTHESIOLOGY
Payer: MEDICARE

## 2020-11-13 DIAGNOSIS — M51.36 DDD (DEGENERATIVE DISC DISEASE), LUMBAR: ICD-10-CM

## 2020-11-13 DIAGNOSIS — M54.16 LUMBAR RADICULOPATHY: Primary | ICD-10-CM

## 2020-11-13 PROCEDURE — 76000 FLUOROSCOPY <1 HR PHYS/QHP: CPT | Mod: TC,PO

## 2020-11-13 PROCEDURE — 64484 NJX AA&/STRD TFRM EPI L/S EA: CPT | Mod: PO | Performed by: ANESTHESIOLOGY

## 2020-11-13 PROCEDURE — 25500020 PHARM REV CODE 255: Mod: PO | Performed by: ANESTHESIOLOGY

## 2020-11-13 PROCEDURE — 64484 PRA INJECT ANES/STEROID FORAMEN LUMBAR/SACRAL W IMG GUIDE ,EA ADD LEVEL: ICD-10-PCS | Mod: RT,,, | Performed by: ANESTHESIOLOGY

## 2020-11-13 PROCEDURE — 64484 NJX AA&/STRD TFRM EPI L/S EA: CPT | Mod: RT,,, | Performed by: ANESTHESIOLOGY

## 2020-11-13 PROCEDURE — 64483 NJX AA&/STRD TFRM EPI L/S 1: CPT | Mod: PO | Performed by: ANESTHESIOLOGY

## 2020-11-13 PROCEDURE — 64483 PR EPIDURAL INJ, ANES/STEROID, TRANSFORAMINAL, LUMB/SACR, SNGL LEVL: ICD-10-PCS | Mod: RT,,, | Performed by: ANESTHESIOLOGY

## 2020-11-13 PROCEDURE — 25000003 PHARM REV CODE 250: Mod: PO | Performed by: ANESTHESIOLOGY

## 2020-11-13 PROCEDURE — 64483 NJX AA&/STRD TFRM EPI L/S 1: CPT | Mod: RT,,, | Performed by: ANESTHESIOLOGY

## 2020-11-13 PROCEDURE — 63600175 PHARM REV CODE 636 W HCPCS: Mod: PO | Performed by: ANESTHESIOLOGY

## 2020-11-13 RX ORDER — METHYLPREDNISOLONE ACETATE 80 MG/ML
INJECTION, SUSPENSION INTRA-ARTICULAR; INTRALESIONAL; INTRAMUSCULAR; SOFT TISSUE
Status: DISCONTINUED | OUTPATIENT
Start: 2020-11-13 | End: 2020-11-13 | Stop reason: HOSPADM

## 2020-11-13 RX ORDER — ALPRAZOLAM 0.5 MG/1
1 TABLET, ORALLY DISINTEGRATING ORAL ONCE AS NEEDED
Status: DISCONTINUED | OUTPATIENT
Start: 2020-11-13 | End: 2020-11-13 | Stop reason: HOSPADM

## 2020-11-13 RX ORDER — BUPIVACAINE HYDROCHLORIDE 2.5 MG/ML
INJECTION, SOLUTION EPIDURAL; INFILTRATION; INTRACAUDAL
Status: DISCONTINUED | OUTPATIENT
Start: 2020-11-13 | End: 2020-11-13 | Stop reason: HOSPADM

## 2020-11-13 RX ORDER — LIDOCAINE HYDROCHLORIDE 10 MG/ML
INJECTION, SOLUTION EPIDURAL; INFILTRATION; INTRACAUDAL; PERINEURAL
Status: DISCONTINUED | OUTPATIENT
Start: 2020-11-13 | End: 2020-11-13 | Stop reason: HOSPADM

## 2020-11-13 NOTE — DISCHARGE SUMMARY
[facility]  Discharge Note  Short Stay    Procedure(s) (LRB):  Injection,steroid,epidural,transforaminal approach, l3/4 and l5/s1 (Right)    OUTCOME: Patient tolerated treatment/procedure well without complication and is now ready for discharge.    DISPOSITION: Home or Self Care    FINAL DIAGNOSIS:  Lumbar radiculopathy    FOLLOWUP: In clinic    DISCHARGE INSTRUCTIONS:    Discharge Procedure Orders   No dressing needed

## 2020-11-13 NOTE — OP NOTE
PROCEDURE DATE: 11/13/2020    PROCEDURE: Right L3/4 and L5/S1 transforaminal epidural steroid injection under fluoroscopy    DIAGNOSIS: Lumbar  Radiculopathy    Post op diagnosis: Same    PHYSICIAN: Riley Segura MD    MEDICATIONS INJECTED:  Methylprednisolone 40mg (0.5ml) and 1.5ml 0.25% bupivicaine at each nerve root.     LOCAL ANESTHETIC INJECTED:  Lidocaine 1%. 4 ml per site.    SEDATION MEDICATIONS: none    ESTIMATED BLOOD LOSS:  nonen    COMPLICATIONS:  none    TECHNIQUE:   A time-out was taken to identify patient and procedure side prior to starting the procedure. The patient was placed in a prone position, prepped and draped in the usual sterile fashion using ChloraPrep and sterile towels.  The area to be injected was determined under fluoroscopic guidance in AP and oblique view.  Local anesthetic was given by raising a wheal and going down to the hub of a 25-gauge 1.5 inch needle.  In oblique view, a 5 inch 22-gauge bent-tip spinal needle was introduced towards 6 oclock position of the pedicle of each above named nerve root level.  The needle was walked medially then hinged into the neural foramen and position was confirmed in AP and lateral views.  Omnipaque contrast dye was injected to confirm appropriate placement and that there was no vascular uptake.  After negative aspiration for blood or CSF, the medication was then injected. This was performed at the right L3/4 and L5/S1 level(s). The patient tolerated the procedure well.    The patient was monitored after the procedure.  Patient was given post procedure and discharge instructions to follow at home. The patient was discharged in a stable condition.

## 2020-11-16 VITALS
SYSTOLIC BLOOD PRESSURE: 157 MMHG | RESPIRATION RATE: 16 BRPM | TEMPERATURE: 98 F | DIASTOLIC BLOOD PRESSURE: 74 MMHG | HEART RATE: 70 BPM | HEIGHT: 71 IN | BODY MASS INDEX: 43.4 KG/M2 | WEIGHT: 310 LBS | OXYGEN SATURATION: 94 %

## 2020-12-04 ENCOUNTER — OFFICE VISIT (OUTPATIENT)
Dept: PAIN MEDICINE | Facility: CLINIC | Age: 75
End: 2020-12-04
Payer: MEDICARE

## 2020-12-04 VITALS
BODY MASS INDEX: 43.11 KG/M2 | WEIGHT: 309.06 LBS | TEMPERATURE: 98 F | OXYGEN SATURATION: 96 % | DIASTOLIC BLOOD PRESSURE: 68 MMHG | HEART RATE: 64 BPM | RESPIRATION RATE: 18 BRPM | SYSTOLIC BLOOD PRESSURE: 144 MMHG

## 2020-12-04 DIAGNOSIS — M51.36 DDD (DEGENERATIVE DISC DISEASE), LUMBAR: Primary | ICD-10-CM

## 2020-12-04 DIAGNOSIS — G89.4 CHRONIC PAIN DISORDER: ICD-10-CM

## 2020-12-04 DIAGNOSIS — M54.16 LUMBAR RADICULITIS: ICD-10-CM

## 2020-12-04 PROCEDURE — 1125F PR PAIN SEVERITY QUANTIFIED, PAIN PRESENT: ICD-10-PCS | Mod: S$GLB,,, | Performed by: PHYSICIAN ASSISTANT

## 2020-12-04 PROCEDURE — 1157F PR ADVANCE CARE PLAN OR EQUIV PRESENT IN MEDICAL RECORD: ICD-10-PCS | Mod: S$GLB,,, | Performed by: PHYSICIAN ASSISTANT

## 2020-12-04 PROCEDURE — 99999 PR PBB SHADOW E&M-EST. PATIENT-LVL V: CPT | Mod: PBBFAC,,, | Performed by: PHYSICIAN ASSISTANT

## 2020-12-04 PROCEDURE — 1159F PR MEDICATION LIST DOCUMENTED IN MEDICAL RECORD: ICD-10-PCS | Mod: S$GLB,,, | Performed by: PHYSICIAN ASSISTANT

## 2020-12-04 PROCEDURE — 3288F FALL RISK ASSESSMENT DOCD: CPT | Mod: CPTII,S$GLB,, | Performed by: PHYSICIAN ASSISTANT

## 2020-12-04 PROCEDURE — 1101F PT FALLS ASSESS-DOCD LE1/YR: CPT | Mod: CPTII,S$GLB,, | Performed by: PHYSICIAN ASSISTANT

## 2020-12-04 PROCEDURE — 99213 OFFICE O/P EST LOW 20 MIN: CPT | Mod: S$GLB,,, | Performed by: PHYSICIAN ASSISTANT

## 2020-12-04 PROCEDURE — 1125F AMNT PAIN NOTED PAIN PRSNT: CPT | Mod: S$GLB,,, | Performed by: PHYSICIAN ASSISTANT

## 2020-12-04 PROCEDURE — 3288F PR FALLS RISK ASSESSMENT DOCUMENTED: ICD-10-PCS | Mod: CPTII,S$GLB,, | Performed by: PHYSICIAN ASSISTANT

## 2020-12-04 PROCEDURE — 1157F ADVNC CARE PLAN IN RCRD: CPT | Mod: S$GLB,,, | Performed by: PHYSICIAN ASSISTANT

## 2020-12-04 PROCEDURE — 99999 PR PBB SHADOW E&M-EST. PATIENT-LVL V: ICD-10-PCS | Mod: PBBFAC,,, | Performed by: PHYSICIAN ASSISTANT

## 2020-12-04 PROCEDURE — 3078F PR MOST RECENT DIASTOLIC BLOOD PRESSURE < 80 MM HG: ICD-10-PCS | Mod: CPTII,S$GLB,, | Performed by: PHYSICIAN ASSISTANT

## 2020-12-04 PROCEDURE — 99213 PR OFFICE/OUTPT VISIT, EST, LEVL III, 20-29 MIN: ICD-10-PCS | Mod: S$GLB,,, | Performed by: PHYSICIAN ASSISTANT

## 2020-12-04 PROCEDURE — 3078F DIAST BP <80 MM HG: CPT | Mod: CPTII,S$GLB,, | Performed by: PHYSICIAN ASSISTANT

## 2020-12-04 PROCEDURE — 3077F PR MOST RECENT SYSTOLIC BLOOD PRESSURE >= 140 MM HG: ICD-10-PCS | Mod: CPTII,S$GLB,, | Performed by: PHYSICIAN ASSISTANT

## 2020-12-04 PROCEDURE — 1159F MED LIST DOCD IN RCRD: CPT | Mod: S$GLB,,, | Performed by: PHYSICIAN ASSISTANT

## 2020-12-04 PROCEDURE — 3077F SYST BP >= 140 MM HG: CPT | Mod: CPTII,S$GLB,, | Performed by: PHYSICIAN ASSISTANT

## 2020-12-04 PROCEDURE — 1101F PR PT FALLS ASSESS DOC 0-1 FALLS W/OUT INJ PAST YR: ICD-10-PCS | Mod: CPTII,S$GLB,, | Performed by: PHYSICIAN ASSISTANT

## 2020-12-07 NOTE — PROGRESS NOTES
This note was completed with dictation software and grammatical errors may exist.    CC:Back pain    HPI: The patient is a 74-year-old man with a history of hypertension, obesity and low back pain who presents in referral from Dr. Winters for chronic right low back pain.  He is status post right L3/4 and L5/S1 transforaminal epidural steroid injection on 11/13/2020 with 0% relief.  He now complains of bilateral low back pain with radiation into his left greater than right buttocks.  He is not feel that the procedures this year have helped him at all and states that he cannot do anything because of the pain.  He does have some relief at night and with sitting but he cannot do any activity without severe pain.  He denies weakness, numbness, bladder or bowel incontinence.    Pain intervention history: He done physical therapy in January, 2014 with moderate relief but not sustained.  He takes Relafen, tramadol, baclofen and chlorzoxazone as needed with mild relief.  He had undergone what sounds like a series of epidurals several years ago with no major relief. The patient is status post a right side L2/3, L3/4, L4/5 and L5/S1 facet joint injection on 10/28/14 with 50% relief of his back pain for 1 month.  He is status post radiofrequency ablation of the right L1, 2, 3, 4 and 5 medial branch nerves on 3/18/15 with 75% relief.  He is status post C7-T1 cervical interlaminar epidural steroid injection on 5/11/15 with 70% relief.  He is status post right L1, 2, 3, 4 and 5 medial branch radiofrequency ablation on 7/5/16 with 50% relief.   He is status post right L1, 2, 3, 4 and 5 medial branch radiofrequency ablation on 10/17/17 with about 50% relief additionally, later reported almost complete relief lasting a year.  He is status post right L1, 2, 3, 4 and 5 medial branch radiofrequency ablation on 04/11/2019 with mild relief.   He is status post L5/S1 interlaminar epidural steroid injection on 06/05/2019 with 80% relief.  He  is status post L5/S1 interlaminar epidural steroid injection on 2019 with 50% relief lasting about 6 months.  He is status post L5/S1 interlaminar epidural steroid injection to the right on 2020 with minimal relief.  He is status post right L3/4 and L5/S1 transforaminal epidural steroid injection on 2020 with 0% relief.     ROS:He reports back pain.  Balance of review of systems is negative.    Medical, surgical, family and social history reviewed elsewhere in record.    Medications/Allergies: See med card    Vitals:    20 1402   BP: (!) 144/68   Pulse: 64   Resp: 18   Temp: 97.7 °F (36.5 °C)   TempSrc: Temporal   SpO2: 96%   Weight: (!) 140.2 kg (309 lb 1.4 oz)   PainSc:   6   PainLoc: Back         Physical exam:  Gen: A and O x3, pleasant, well-groomed  Skin: No rashes or obvious lesions  HEENT: PERRLA, no obvious deformities on ears or in canals.   CVS: Regular rate and rhythm, normal S1 and S2, no murmurs.  Resp: Clear to auscultation bilaterally, no wheezes or rales.  Abdomen: Soft, NT/ND, normal bowel sounds present.  Musculoskeletal:  No antalgic gait.      Neuro:  Upper extremities: 5/5 strength bilaterally   Lower extremities: 5/5 strength bilaterally  Reflexes: Brachioradialis 2+, Bicep 2+, Tricep 2+. Patellar 2+, Achilles 2+.  Sensory: Intact and symmetrical to light touch and pinprick in C2-T1 dermatomes bilaterally. Intact and symmetrical to light touch and pinprick in L2-S1 dermatomes bilaterally.     Lumbar spine:  Lumbar spine:Range of motion is moderately reduced with flexion, extension and oblique extension with increased low back pain during flexion.  Ramakrishna's test causes no increased pain on either side.    Supine straight leg raise causes low back pain only on the corresponding side.  Internal and external rotation of the hip causes no increased pain on either side.  Myofascial exam: No tenderness to palpation across the lumbar paraspinous muscles.    Imagin14  Xray L-spine  There is diffuse osteopenia. Mild to moderate chronic compression fracture with anterior wedging and evidence of vertebroplasty at L2. minimal left convex curvature in the upper lumbar region. No spondylolisthesis. Mild concavity of the superior endplate of L4 which could be small compression fracture or Schmorl's node. No additional fracture.   There is moderate degenerative change within the lumbar spine with anterior osteophyte formation most prominent at L4-L5 and L2- L3 and L1 - L2, also present in the lower thoracic region with flowing ossification anteriorly suggesting diffuse idiopathic sclerotic hyperostosis. There is also mild decreased intervertebral disk space height and endplate sclerosis the lower thoracic and upper lumbar regions. Due to the degree of osseous mineralization, it is difficult to evaluate for any possible pars defects. Moderate sclerosis suggesting facet arthropathy in the lumbar spine present and there is mild sclerosis, likely degenerative in nature involving the sacroiliac joints.    10/7/14 MRI lumbar spine: At L1/2 there is mild spinal stenosis secondary to facet hypertrophy and disc bulging.  At L2/3 there is minimal spinal stenosis secondary to retropulsion of L2 compression fracture, appearance of prior kyphoplasty present.  There is minimal disc bulging but there is also some facet hypertrophy at this level.  At L3/4 there is facet and ligamentum hypertrophy, minimal disc bulging causing mild spinal stenosis and neuroforaminal narrowing on the left greater than the right side.  At L4/5 there is moderate hypertrophic facet and ligamentum hypertrophy with mild left foraminal narrowing compared to the right side.  There is no spinal stenosis at this level.  At L5/S1 there is a broad-based disc bulge that extends to the left side more than the right side along with asymmetric left sided facet hypertrophy and ligamentum flavum hypertrophy causing moderate left foraminal  stenosis.    4/15/15 outside open MRI cervical spine Premier  C3-4 posterior disc bulge producing mild bilateral foraminal narrowing  C4-5 posterior disc bulge producing mild bilateral foraminal narrowing, possible tiny annular tear in the posterior disc, anterior thecal sac deformity with no evidence of spinal stenosis  C5-6 circumferential disc bulge producing moderate to severe bilateral foraminal narrowing, effacement of the bilateral lateral recesses worse to the right, anterior thecal sac deformity with effacement of the anterior subarachnoid space, mild spinal canal stenosis  C6-7 circumferential disc bulge producing moderate to severe bilateral foraminal narrowing with mild spinal canal stenosis  C7-T1 not completely included but appears to have a circumferential disc bulge with shallow midline disc protrusion with possible mild spinal canal stenosis and bilateral foraminal narrowing      Assessment:  The patient is a 74-year-old man with a history of hypertension, obesity and low back pain who presents in referral from Dr. Winters for chronic right low back pain.   1. DDD (degenerative disc disease), lumbar  MRI Lumbar Spine Without Contrast    MRI Lumbar Spine Without Contrast    Ambulatory referral/consult to Neurosurgery   2. Chronic pain disorder     3. Lumbar radiculitis           Plan:  1.  Since he has not had relief with conservative treatment I am going to update his lumbar spine MRI.  He would like to have this done at [facility] so we will send the orders.  I have also placed a referral to Neurosurgery.  If an operation is not recommended he may do well with spinal cord stimulation and I have given him information from [facility].  If we proceed with a trial I will order a back brace with lateral support and a psychiatric evaluation.  We discussed all the risks involved and he will contact me after he sees Neurosurgery.

## 2020-12-14 ENCOUNTER — OFFICE VISIT (OUTPATIENT)
Dept: FAMILY MEDICINE | Facility: CLINIC | Age: 75
End: 2020-12-14
Payer: MEDICARE

## 2020-12-14 VITALS
WEIGHT: 308 LBS | TEMPERATURE: 98 F | BODY MASS INDEX: 43.12 KG/M2 | HEIGHT: 71 IN | HEART RATE: 68 BPM | SYSTOLIC BLOOD PRESSURE: 138 MMHG | DIASTOLIC BLOOD PRESSURE: 84 MMHG | RESPIRATION RATE: 16 BRPM

## 2020-12-14 DIAGNOSIS — I10 ESSENTIAL HYPERTENSION: ICD-10-CM

## 2020-12-14 DIAGNOSIS — R97.20 ELEVATED PSA: ICD-10-CM

## 2020-12-14 DIAGNOSIS — N41.9 PROSTATITIS, UNSPECIFIED PROSTATITIS TYPE: Primary | ICD-10-CM

## 2020-12-14 PROCEDURE — 99213 OFFICE O/P EST LOW 20 MIN: CPT | Mod: S$GLB,,, | Performed by: FAMILY MEDICINE

## 2020-12-14 PROCEDURE — 99213 PR OFFICE/OUTPT VISIT, EST, LEVL III, 20-29 MIN: ICD-10-PCS | Mod: S$GLB,,, | Performed by: FAMILY MEDICINE

## 2020-12-14 PROCEDURE — 1159F PR MEDICATION LIST DOCUMENTED IN MEDICAL RECORD: ICD-10-PCS | Mod: S$GLB,,, | Performed by: FAMILY MEDICINE

## 2020-12-14 PROCEDURE — 3075F SYST BP GE 130 - 139MM HG: CPT | Mod: CPTII,S$GLB,, | Performed by: FAMILY MEDICINE

## 2020-12-14 PROCEDURE — 1159F MED LIST DOCD IN RCRD: CPT | Mod: S$GLB,,, | Performed by: FAMILY MEDICINE

## 2020-12-14 PROCEDURE — 3079F DIAST BP 80-89 MM HG: CPT | Mod: CPTII,S$GLB,, | Performed by: FAMILY MEDICINE

## 2020-12-14 PROCEDURE — 3288F PR FALLS RISK ASSESSMENT DOCUMENTED: ICD-10-PCS | Mod: CPTII,S$GLB,, | Performed by: FAMILY MEDICINE

## 2020-12-14 PROCEDURE — 1157F PR ADVANCE CARE PLAN OR EQUIV PRESENT IN MEDICAL RECORD: ICD-10-PCS | Mod: S$GLB,,, | Performed by: FAMILY MEDICINE

## 2020-12-14 PROCEDURE — 3075F PR MOST RECENT SYSTOLIC BLOOD PRESS GE 130-139MM HG: ICD-10-PCS | Mod: CPTII,S$GLB,, | Performed by: FAMILY MEDICINE

## 2020-12-14 PROCEDURE — 1157F ADVNC CARE PLAN IN RCRD: CPT | Mod: S$GLB,,, | Performed by: FAMILY MEDICINE

## 2020-12-14 PROCEDURE — 1101F PR PT FALLS ASSESS DOC 0-1 FALLS W/OUT INJ PAST YR: ICD-10-PCS | Mod: CPTII,S$GLB,, | Performed by: FAMILY MEDICINE

## 2020-12-14 PROCEDURE — 1101F PT FALLS ASSESS-DOCD LE1/YR: CPT | Mod: CPTII,S$GLB,, | Performed by: FAMILY MEDICINE

## 2020-12-14 PROCEDURE — 1125F PR PAIN SEVERITY QUANTIFIED, PAIN PRESENT: ICD-10-PCS | Mod: S$GLB,,, | Performed by: FAMILY MEDICINE

## 2020-12-14 PROCEDURE — 3079F PR MOST RECENT DIASTOLIC BLOOD PRESSURE 80-89 MM HG: ICD-10-PCS | Mod: CPTII,S$GLB,, | Performed by: FAMILY MEDICINE

## 2020-12-14 PROCEDURE — 1125F AMNT PAIN NOTED PAIN PRSNT: CPT | Mod: S$GLB,,, | Performed by: FAMILY MEDICINE

## 2020-12-14 PROCEDURE — 3288F FALL RISK ASSESSMENT DOCD: CPT | Mod: CPTII,S$GLB,, | Performed by: FAMILY MEDICINE

## 2020-12-14 RX ORDER — CIPROFLOXACIN 500 MG/1
500 TABLET ORAL 2 TIMES DAILY
Qty: 60 TABLET | Refills: 0 | Status: SHIPPED | OUTPATIENT
Start: 2020-12-14 | End: 2021-01-13

## 2020-12-14 NOTE — PROGRESS NOTES
"Subjective:       Patient ID: Chinedu Roque is a 74 y.o. male.    Chief Complaint:  Dysuria  One week of frequent urination with urgency.  Urinating every 2 hr during the day and 2-3 times at night.  He also notes an odor.  He has a past history of prostatitis.  He was last treated with Cipro for 2 weeks in September.  He has been treated with Bactrim in the past.  He says 1 of the treatments in the past in work as well.  No fever.  He is currently being evaluated for ongoing back pain.  He has had LEATHA.  He has an MRI and neuro surgery consult scheduled.  He is under lot of stress because his wife has breast cancer.    Dysuria   This is a recurrent problem. The current episode started in the past 7 days. The problem occurs intermittently. The problem has been unchanged. The quality of the pain is described as burning. The pain is at a severity of 1/10. The patient is experiencing no pain. There has been no fever. He is not sexually active. There is no history of pyelonephritis. Associated symptoms include flank pain and urgency. He has tried increased fluids for the symptoms. The treatment provided no relief. His past medical history is significant for hypertension and recurrent UTIs.     Review of Systems   Constitutional: Negative for fever.   Genitourinary: Positive for dysuria, flank pain and urgency.         Objective:     Blood pressure 138/84, pulse 68, temperature 97.5 °F (36.4 °C), temperature source Skin, resp. rate 16, height 5' 11" (1.803 m), weight (!) 139.7 kg (307 lb 15.7 oz).      Physical Exam  Constitutional:       General: He is not in acute distress.     Appearance: He is obese.   Cardiovascular:      Rate and Rhythm: Normal rate and regular rhythm.      Heart sounds: No murmur.   Pulmonary:      Effort: No respiratory distress.      Breath sounds: No wheezing.   Genitourinary:     Comments: I can reach the lower part of the prostate which is slightly tender but not boggy.  I feel no " nodules.  Neurological:      Mental Status: He is alert.         Assessment:       1. Prostatitis, unspecified prostatitis type    2. Elevated PSA    3. Essential hypertension        Plan:       Cipro 500 b.i.d. times 30 days.  UA and culture today.

## 2020-12-21 ENCOUNTER — OFFICE VISIT (OUTPATIENT)
Dept: NEUROSURGERY | Facility: CLINIC | Age: 75
End: 2020-12-21
Payer: MEDICARE

## 2020-12-21 VITALS
SYSTOLIC BLOOD PRESSURE: 146 MMHG | HEIGHT: 71 IN | WEIGHT: 307 LBS | BODY MASS INDEX: 42.98 KG/M2 | DIASTOLIC BLOOD PRESSURE: 84 MMHG | HEART RATE: 68 BPM

## 2020-12-21 DIAGNOSIS — M54.16 LUMBAR RADICULOPATHY: Primary | ICD-10-CM

## 2020-12-21 PROCEDURE — 3288F PR FALLS RISK ASSESSMENT DOCUMENTED: ICD-10-PCS | Mod: CPTII,S$GLB,, | Performed by: NEUROLOGICAL SURGERY

## 2020-12-21 PROCEDURE — 1159F MED LIST DOCD IN RCRD: CPT | Mod: S$GLB,,, | Performed by: NEUROLOGICAL SURGERY

## 2020-12-21 PROCEDURE — 99204 OFFICE O/P NEW MOD 45 MIN: CPT | Mod: S$GLB,,, | Performed by: NEUROLOGICAL SURGERY

## 2020-12-21 PROCEDURE — 1125F PR PAIN SEVERITY QUANTIFIED, PAIN PRESENT: ICD-10-PCS | Mod: S$GLB,,, | Performed by: NEUROLOGICAL SURGERY

## 2020-12-21 PROCEDURE — 1159F PR MEDICATION LIST DOCUMENTED IN MEDICAL RECORD: ICD-10-PCS | Mod: S$GLB,,, | Performed by: NEUROLOGICAL SURGERY

## 2020-12-21 PROCEDURE — 1101F PR PT FALLS ASSESS DOC 0-1 FALLS W/OUT INJ PAST YR: ICD-10-PCS | Mod: CPTII,S$GLB,, | Performed by: NEUROLOGICAL SURGERY

## 2020-12-21 PROCEDURE — 3079F DIAST BP 80-89 MM HG: CPT | Mod: CPTII,S$GLB,, | Performed by: NEUROLOGICAL SURGERY

## 2020-12-21 PROCEDURE — 3079F PR MOST RECENT DIASTOLIC BLOOD PRESSURE 80-89 MM HG: ICD-10-PCS | Mod: CPTII,S$GLB,, | Performed by: NEUROLOGICAL SURGERY

## 2020-12-21 PROCEDURE — 1157F ADVNC CARE PLAN IN RCRD: CPT | Mod: S$GLB,,, | Performed by: NEUROLOGICAL SURGERY

## 2020-12-21 PROCEDURE — 99204 PR OFFICE/OUTPT VISIT, NEW, LEVL IV, 45-59 MIN: ICD-10-PCS | Mod: S$GLB,,, | Performed by: NEUROLOGICAL SURGERY

## 2020-12-21 PROCEDURE — 3288F FALL RISK ASSESSMENT DOCD: CPT | Mod: CPTII,S$GLB,, | Performed by: NEUROLOGICAL SURGERY

## 2020-12-21 PROCEDURE — 3077F SYST BP >= 140 MM HG: CPT | Mod: CPTII,S$GLB,, | Performed by: NEUROLOGICAL SURGERY

## 2020-12-21 PROCEDURE — 1125F AMNT PAIN NOTED PAIN PRSNT: CPT | Mod: S$GLB,,, | Performed by: NEUROLOGICAL SURGERY

## 2020-12-21 PROCEDURE — 1157F PR ADVANCE CARE PLAN OR EQUIV PRESENT IN MEDICAL RECORD: ICD-10-PCS | Mod: S$GLB,,, | Performed by: NEUROLOGICAL SURGERY

## 2020-12-21 PROCEDURE — 3077F PR MOST RECENT SYSTOLIC BLOOD PRESSURE >= 140 MM HG: ICD-10-PCS | Mod: CPTII,S$GLB,, | Performed by: NEUROLOGICAL SURGERY

## 2020-12-21 PROCEDURE — 99999 PR PBB SHADOW E&M-EST. PATIENT-LVL III: ICD-10-PCS | Mod: PBBFAC,,, | Performed by: NEUROLOGICAL SURGERY

## 2020-12-21 PROCEDURE — 1101F PT FALLS ASSESS-DOCD LE1/YR: CPT | Mod: CPTII,S$GLB,, | Performed by: NEUROLOGICAL SURGERY

## 2020-12-21 PROCEDURE — 99999 PR PBB SHADOW E&M-EST. PATIENT-LVL III: CPT | Mod: PBBFAC,,, | Performed by: NEUROLOGICAL SURGERY

## 2020-12-21 NOTE — PROGRESS NOTES
Neurosurgery History & Physical    Patient ID: Chinedu Roque is a 75 y.o. male.    Chief Complaint   Patient presents with    Back Pain     lower back pain since 2016. Did nothing to trigger back pain. Lower back and sometimes L buttocks but not down L leg. Soreness feeling       Review of Systems   Constitutional: Negative for chills, diaphoresis, fatigue and fever.   HENT: Negative for congestion, hearing loss, rhinorrhea and sore throat.    Eyes: Negative for photophobia, pain, redness and visual disturbance.   Respiratory: Negative for cough, chest tightness, shortness of breath and wheezing.    Cardiovascular: Negative for chest pain, palpitations and leg swelling.   Gastrointestinal: Negative for abdominal distention, abdominal pain, constipation, diarrhea, nausea and vomiting.   Genitourinary: Negative for difficulty urinating, dysuria, frequency, hematuria and urgency.   Musculoskeletal: Positive for back pain. Negative for gait problem, myalgias, neck pain and neck stiffness.   Skin: Negative for pallor and rash.   Neurological: Negative for dizziness, seizures, speech difficulty, weakness, light-headedness, numbness and headaches.   Psychiatric/Behavioral: Negative for confusion, hallucinations and sleep disturbance.       Past Medical History:   Diagnosis Date    Anticoagulant long-term use     ASA 81 mg    Arthritis     cervical    BPH (benign prostatic hyperplasia) 3/2/2012    Chronic left shoulder pain     Compression fracture of L2     DDD (degenerative disc disease), lumbar     HTN (hypertension) 3/2/2012    Hx of colonic polyps 2013    Low back pain     Morbid obesity with BMI of 40.0-44.9, adult 3/18/2014    Seasonal allergies     Sleep apnea     compliant with CPAP    Stroke 3/1986     Social History     Socioeconomic History    Marital status:      Spouse name: Not on file    Number of children: Not on file    Years of education: Not on file    Highest education level:  Not on file   Occupational History    Not on file   Social Needs    Financial resource strain: Not hard at all    Food insecurity     Worry: Never true     Inability: Never true    Transportation needs     Medical: No     Non-medical: No   Tobacco Use    Smoking status: Former Smoker     Packs/day: 1.50     Years: 24.00     Pack years: 36.00     Start date: 10/21/1959     Quit date: 3/19/1983     Years since quittin.7    Smokeless tobacco: Never Used   Substance and Sexual Activity    Alcohol use: No     Frequency: Never     Binge frequency: Never    Drug use: No    Sexual activity: Not on file   Lifestyle    Physical activity     Days per week: Not on file     Minutes per session: Not on file    Stress: Only a little   Relationships    Social connections     Talks on phone: Twice a week     Gets together: Once a week     Attends Ep[employer] service: Not on file     Active member of club or organization: Yes     Attends meetings of clubs or organizations: More than 4 times per year     Relationship status:    Other Topics Concern    Not on file   Social History Narrative    Retired - Worked for the [email or web address].      Family History   Problem Relation Age of Onset    COPD Father     Hypertension Brother     Kidney disease Brother     Alzheimer's disease Mother     No Known Problems Daughter     Hypertension Son     Diabetes Son         pre-diabetic    No Known Problems Daughter     No Known Problems Daughter      Review of patient's allergies indicates:  No Known Allergies    Current Outpatient Medications:     acetaminophen (TYLENOL) 500 MG tablet, Take 1,000 mg by mouth every 6 (six) hours as needed for Pain., Disp: , Rfl:     aspirin (ECOTRIN) 81 MG EC tablet, Take 81 mg by mouth once daily.  , Disp: , Rfl:     ciprofloxacin HCl (CIPRO) 500 MG tablet, Take 1 tablet (500 mg total) by mouth 2 (two) times daily., Disp: 60 tablet, Rfl: 0    finasteride (PROSCAR) 5 mg tablet, TAKE 1  "TABLET BY MOUTH EVERY DAY, Disp: 90 tablet, Rfl: 3    losartan (COZAAR) 100 MG tablet, TAKE 1 TABLET BY MOUTH EVERY DAY, Disp: 90 tablet, Rfl: 3    metoprolol succinate (TOPROL-XL) 100 MG 24 hr tablet, TAKE 1 TABLET BY MOUTH EVERY DAY, Disp: 90 tablet, Rfl: 3    tamsulosin (FLOMAX) 0.4 mg Cap, TAKE 1 CAPSULE BY MOUTH EVERY DAY, Disp: 90 capsule, Rfl: 3    traMADoL (ULTRAM) 50 mg tablet, Take 1 tablet (50 mg total) by mouth daily as needed for Pain., Disp: 30 tablet, Rfl: 0  No current facility-administered medications for this visit.     Facility-Administered Medications Ordered in Other Visits:     sodium hyaluronate (EUFLEXXA) 10 mg/mL(mw 2.4 -3.6 million) Syrg 20 mg, 20 mg, Intra-articular, , Michelet Covarrubias MD, 20 mg at 05/14/18 1046    sodium hyaluronate (EUFLEXXA) 10 mg/mL(mw 2.4 -3.6 million) Syrg 20 mg, 20 mg, Intra-articular, , Michelet Covarrubias MD, 20 mg at 05/14/18 1046  Blood pressure (!) 146/84, pulse 68, height 5' 11" (1.803 m), weight (!) 139.3 kg (307 lb).      Neurologic Exam     Mental Status   Oriented to person, place, and time.   Oriented to person.   Oriented to place.   Oriented to time.   Follows 3 step commands.   Attention: normal. Concentration: normal.   Speech: speech is normal   Level of consciousness: alert  Knowledge: consistent with education.   Able to name object. Able to read. Able to repeat. Able to write. Normal comprehension.      Cranial Nerves      CN II   Visual acuity: normal  Right visual field deficit: none  Left visual field deficit: none      CN III, IV, VI   Pupils are equal, round, and reactive to light.  Right pupil: Size: 3 mm. Shape: regular. Reactivity: brisk. Consensual response: intact.   Left pupil: Size: 3 mm. Shape: regular. Reactivity: brisk. Consensual response: intact.   CN III: no CN III palsy  CN VI: no CN VI palsy  Nystagmus: none   Diplopia: none  Ophthalmoparesis: none  Conjugate gaze: present     CN V   Right facial sensation deficit: " none  Left facial sensation deficit: none     CN VII   Right facial weakness: none  Left facial weakness: none     CN VIII   Hearing: intact     CN IX, X   CN IX normal.   CN X normal.      CN XI   Right sternocleidomastoid strength: normal  Left sternocleidomastoid strength: normal  Right trapezius strength: normal  Left trapezius strength: normal     CN XII   Fasciculations: absent  Tongue deviation: none     Motor Exam   Muscle bulk: normal  Overall muscle tone: normal  Right arm pronator drift: absent  Left arm pronator drift: absent     Strength   Right deltoid: 5/5  Left deltoid: 5/5  Right biceps: 5/5  Left biceps: 5/5  Right triceps: 5/5  Left triceps: 5/5  Right wrist flexion: 5/5  Left wrist flexion: 5/5  Right wrist extension: 5/5  Left wrist extension: 5/5  Right interossei: 5/5  Left interossei: 5/5  Right iliopsoas: 5/5  Left iliopsoas: 5/5  Right quadriceps: 5/5  Left quadriceps: 5/5  Right hamstrin/5  Left hamstrin/5  Right anterior tibial: 5/5  Left anterior tibial: 5/5  Right posterior tibial: 5/5  Left posterior tibial: 5/5  Right peroneal: 5/5  Left peroneal: 5/5  Right gastroc: 5/5  Left gastroc: 5/5     Sensory Exam   Right arm light touch: normal  Left arm light touch: normal  Right leg light touch: normal  Left leg light touch: normal     Gait, Coordination, and Reflexes      Gait  Gait: normal      Coordination   Romberg: negative  Finger to nose coordination: normal  Heel to shin coordination: normal  Tandem walking coordination: normal     Tremor   Resting tremor: absent  Intention tremor: absent  Action tremor: absent     Reflexes   Right brachioradialis: 2+  Left brachioradialis: 1+  Right biceps: 2+  Left biceps: 1+  Right triceps: 2+  Left triceps: 1+  Right patellar: 3+  Left patellar: 3+  Right achilles: 0  Left achilles: 0  Right Lindquist: absent  Left Lindquist: absent  Right ankle clonus: absent  Left ankle clonus: absent  Right plantar: normal  Left plantar:  normal         Physical Exam  Constitutional: Oriented to person, place, and time. Appears well-developed and well-nourished.   HENT:   Head: Normocephalic and atraumatic.   Eyes: Pupils are equal, round, and reactive to light.   Neck: Normal range of motion. Neck supple.   Cardiovascular: Normal rate.    Pulmonary/Chest: Effort normal.   Musculoskeletal: Normal range of motion. Exhibits no edema.   Neurological: Alert and oriented to person, place, and time. Normal Finger-Nose-Finger Test, a normal Heel to Shin Test, a normal Romberg Test and a normal Tandem Gait Test. Gait normal.   Reflex Scores:       Tricep reflexes are 2+ on the right side and 1+ on the left side.       Bicep reflexes are 2+ on the right side and 1+ on the left side.       Brachioradialis reflexes are 2+ on the right side and 1+ on the left side.       Patellar reflexes are 3+ on the right side and 3+ on the left side.       Achilles reflexes are 0 on the right side and 0 on the left side.  Skin: Skin is warm, dry and intact.   Psychiatric: Normal mood and affect. Speech is normal and behavior is normal. Judgment and thought content normal.   Nursing note and vitals reviewed.    Provider dictation:  I reviewed the imaging.   MRI Lumbar spine shows multi-level spondylosis with no severe central stenosis. There is left L5-S1 facet hypertrophy causing lateral recess stenosis.     The patient is a 75 year old male who presents for neurosurgical consultation referred by THIAGO Altamirano. Patient reports chronic axial lower back pain. He underwent multiple ESIs with Dr. Segura over the years. Patient reports following most recent LEATHA the pain has started shooting into the left buttock. He denies any improvement in lower back pain since most recent injection. The pain is worse with standing/walking and he will take tylenol with mild relief. Denies lower extremity numbness/tingling or weakness. Back pain > buttock pain. Of note, patient has  a history of lumbar compression fracture and kyphoplasty in 2006.     On exam patient has full strength and no sensory deficits. There is patella hyperreflexia, but no other signs of myelopathy on exam.     His axial lower back pain is most likely myofascial and he would not benefit from a fusion. The left sided buttock pain most likely due to left L5-S1 stenosis, but this is mild and has only been present a few weeks. Patient is not interested in surgical decompression for this at this time. He will follow-up with Dr. Segura for further conservative management.     1. Lumbar radiculopathy

## 2020-12-21 NOTE — PROGRESS NOTES
I have seen the patient, reviewed the Advanced Practice Provider's history and physical, assessment and plan. I have personally interviewed and examined the patient at bedside and interpreted the relevant imaging and lab work and I agree with the findings. I personally performed the documented services. See below for any additional comments.    Many years of chronic axial LBP treateed with multiple pain interventions and PT with transient improvement. Recently. Pain has started to radiate to left buttock but not farther. No numbness/paresthesias or weakness. Axial pain is much greater than buttock pain.     On exam, has patellar hyperreflexi but no other signs or symptoms of myelopathy. Denies neck or thoracic pain or upper extremity symptoms. No weakness or numbness. Obese.    MRI shows multilevel spondylosis without major central stenosis or deformity. Left L5-S1 facet hypertrophy and lateral recess stenosis.    I discussed that his axial low back pain is most likely primarily myofascial and would not benefit from a long segment fusion.  However, his left-sided buttock radiation is likely secondary to left L5-S1 stenosis.  The radicular symptoms are not severe and have only been present for a few weeks therefore the patient is not interested in any decompression surgery at this point.  He will follow up with Dr. Segura.

## 2020-12-21 NOTE — LETTER
December 29, 2020      THIAGO Aguayo  [address] [facility] [address] [place] [address]           [facility]  [address] [place] [address]  Phone: [phone]  Fax: [phone]          Patient: Chinedu Roque   MR Number: [id number]   YOB: 1945   Date of Visit: 12/21/2020       Dear Martin Tay:    Thank you for referring Chinedu Roque to me for evaluation. Attached you will find relevant portions of my assessment and plan of care.    If you have questions, please do not hesitate to call me. I look forward to following Chinedu Roque along with you.    Sincerely,    Marjorie Pereira MD    Enclosure  CC:  No Recipients    If you would like to receive this communication electronically, please contact [email or web address] or [phone] to request more information on [employer] Link access.    For providers and/or their staff who would like to refer a patient to [facility], please contact us through our one-stop-shop provider referral line, [facility], at [phone].    If you feel you have received this communication in error or would no longer like to receive these types of communications, please e-mail [email or web address]

## 2020-12-26 ENCOUNTER — PATIENT MESSAGE (OUTPATIENT)
Dept: PAIN MEDICINE | Facility: CLINIC | Age: 75
End: 2020-12-26

## 2020-12-29 DIAGNOSIS — M54.16 LUMBAR RADICULOPATHY: ICD-10-CM

## 2020-12-29 DIAGNOSIS — G89.4 CHRONIC PAIN SYNDROME: Primary | ICD-10-CM

## 2021-01-01 ENCOUNTER — PATIENT MESSAGE (OUTPATIENT)
Dept: PAIN MEDICINE | Facility: CLINIC | Age: 76
End: 2021-01-01

## 2021-01-04 ENCOUNTER — PATIENT MESSAGE (OUTPATIENT)
Dept: PAIN MEDICINE | Facility: CLINIC | Age: 76
End: 2021-01-04

## 2021-01-10 ENCOUNTER — IMMUNIZATION (OUTPATIENT)
Dept: FAMILY MEDICINE | Facility: CLINIC | Age: 76
End: 2021-01-10
Payer: MEDICARE

## 2021-01-10 DIAGNOSIS — Z23 NEED FOR VACCINATION: ICD-10-CM

## 2021-01-10 PROCEDURE — 91300 COVID-19, MRNA, LNP-S, PF, 30 MCG/0.3 ML DOSE VACCINE: CPT | Mod: PBBFAC | Performed by: FAMILY MEDICINE

## 2021-01-14 ENCOUNTER — OFFICE VISIT (OUTPATIENT)
Dept: PSYCHIATRY | Facility: CLINIC | Age: 76
End: 2021-01-14
Payer: MEDICARE

## 2021-01-14 DIAGNOSIS — M51.36 DDD (DEGENERATIVE DISC DISEASE), LUMBAR: ICD-10-CM

## 2021-01-14 DIAGNOSIS — E78.5 HYPERLIPIDEMIA, UNSPECIFIED HYPERLIPIDEMIA TYPE: ICD-10-CM

## 2021-01-14 DIAGNOSIS — G89.4 CHRONIC PAIN SYNDROME: ICD-10-CM

## 2021-01-14 DIAGNOSIS — M54.16 LUMBAR RADICULOPATHY: ICD-10-CM

## 2021-01-14 DIAGNOSIS — I10 ESSENTIAL HYPERTENSION: ICD-10-CM

## 2021-01-14 DIAGNOSIS — Z01.818 PREOP EXAMINATION: Primary | ICD-10-CM

## 2021-01-14 DIAGNOSIS — G47.33 OSA (OBSTRUCTIVE SLEEP APNEA): ICD-10-CM

## 2021-01-14 PROCEDURE — 90791 PR PSYCHIATRIC DIAGNOSTIC EVALUATION: ICD-10-PCS | Mod: 95,,, | Performed by: PSYCHOLOGIST

## 2021-01-14 PROCEDURE — 90791 PSYCH DIAGNOSTIC EVALUATION: CPT | Mod: 95,,, | Performed by: PSYCHOLOGIST

## 2021-01-14 PROCEDURE — 1157F ADVNC CARE PLAN IN RCRD: CPT | Mod: 95,,, | Performed by: PSYCHOLOGIST

## 2021-01-14 PROCEDURE — 1157F PR ADVANCE CARE PLAN OR EQUIV PRESENT IN MEDICAL RECORD: ICD-10-PCS | Mod: 95,,, | Performed by: PSYCHOLOGIST

## 2021-01-15 ENCOUNTER — PATIENT MESSAGE (OUTPATIENT)
Dept: PAIN MEDICINE | Facility: CLINIC | Age: 76
End: 2021-01-15

## 2021-01-15 ENCOUNTER — TELEPHONE (OUTPATIENT)
Dept: PAIN MEDICINE | Facility: CLINIC | Age: 76
End: 2021-01-15

## 2021-01-19 ENCOUNTER — CLINICAL SUPPORT (OUTPATIENT)
Dept: PAIN MEDICINE | Facility: CLINIC | Age: 76
End: 2021-01-19
Payer: MEDICARE

## 2021-01-19 DIAGNOSIS — M51.36 DDD (DEGENERATIVE DISC DISEASE), LUMBAR: ICD-10-CM

## 2021-01-19 DIAGNOSIS — G89.4 CHRONIC PAIN SYNDROME: Primary | ICD-10-CM

## 2021-01-19 DIAGNOSIS — Z13.9 SCREENING PROCEDURE: ICD-10-CM

## 2021-01-19 DIAGNOSIS — Z11.9 SCREENING EXAMINATION FOR INFECTIOUS DISEASE: Primary | ICD-10-CM

## 2021-01-19 DIAGNOSIS — G89.4 CHRONIC PAIN SYNDROME: ICD-10-CM

## 2021-01-19 PROCEDURE — 99999 PR PBB SHADOW E&M-EST. PATIENT-LVL I: ICD-10-PCS | Mod: PBBFAC,,, | Performed by: NURSE PRACTITIONER

## 2021-01-19 PROCEDURE — 99999 PR PBB SHADOW E&M-EST. PATIENT-LVL I: CPT | Mod: PBBFAC,,, | Performed by: NURSE PRACTITIONER

## 2021-01-19 PROCEDURE — 87081 CULTURE SCREEN ONLY: CPT

## 2021-01-19 RX ORDER — SODIUM CHLORIDE, SODIUM LACTATE, POTASSIUM CHLORIDE, CALCIUM CHLORIDE 600; 310; 30; 20 MG/100ML; MG/100ML; MG/100ML; MG/100ML
INJECTION, SOLUTION INTRAVENOUS CONTINUOUS
Status: CANCELLED | OUTPATIENT
Start: 2021-02-10

## 2021-01-21 LAB — MRSA SPEC QL CULT: NORMAL

## 2021-01-28 ENCOUNTER — LAB VISIT (OUTPATIENT)
Dept: LAB | Facility: HOSPITAL | Age: 76
End: 2021-01-28
Attending: FAMILY MEDICINE
Payer: MEDICARE

## 2021-01-28 DIAGNOSIS — R97.20 ELEVATED PSA: ICD-10-CM

## 2021-01-28 DIAGNOSIS — I10 ESSENTIAL HYPERTENSION: ICD-10-CM

## 2021-01-28 LAB
ALBUMIN SERPL BCP-MCNC: 3.9 G/DL (ref 3.5–5.2)
ALP SERPL-CCNC: 58 U/L (ref 55–135)
ALT SERPL W/O P-5'-P-CCNC: 32 U/L (ref 10–44)
ANION GAP SERPL CALC-SCNC: 6 MMOL/L (ref 8–16)
AST SERPL-CCNC: 27 U/L (ref 10–40)
BILIRUB SERPL-MCNC: 0.8 MG/DL (ref 0.1–1)
BUN SERPL-MCNC: 17 MG/DL (ref 8–23)
CALCIUM SERPL-MCNC: 8.8 MG/DL (ref 8.7–10.5)
CHLORIDE SERPL-SCNC: 107 MMOL/L (ref 95–110)
CHOLEST SERPL-MCNC: 158 MG/DL (ref 120–199)
CHOLEST/HDLC SERPL: 3.5 {RATIO} (ref 2–5)
CO2 SERPL-SCNC: 27 MMOL/L (ref 23–29)
CREAT SERPL-MCNC: 0.9 MG/DL (ref 0.5–1.4)
EST. GFR  (AFRICAN AMERICAN): >60 ML/MIN/1.73 M^2
EST. GFR  (NON AFRICAN AMERICAN): >60 ML/MIN/1.73 M^2
GLUCOSE SERPL-MCNC: 71 MG/DL (ref 70–110)
HDLC SERPL-MCNC: 45 MG/DL (ref 40–75)
HDLC SERPL: 28.5 % (ref 20–50)
LDLC SERPL CALC-MCNC: 98 MG/DL (ref 63–159)
NONHDLC SERPL-MCNC: 113 MG/DL
POTASSIUM SERPL-SCNC: 4.1 MMOL/L (ref 3.5–5.1)
PROT SERPL-MCNC: 6.8 G/DL (ref 6–8.4)
SODIUM SERPL-SCNC: 140 MMOL/L (ref 136–145)
TRIGL SERPL-MCNC: 75 MG/DL (ref 30–150)

## 2021-01-28 PROCEDURE — 36415 COLL VENOUS BLD VENIPUNCTURE: CPT | Mod: PN

## 2021-01-28 PROCEDURE — 84153 ASSAY OF PSA TOTAL: CPT

## 2021-01-28 PROCEDURE — 80053 COMPREHEN METABOLIC PANEL: CPT

## 2021-01-28 PROCEDURE — 80061 LIPID PANEL: CPT

## 2021-01-29 LAB — COMPLEXED PSA SERPL-MCNC: 2.4 NG/ML (ref 0–4)

## 2021-01-31 ENCOUNTER — IMMUNIZATION (OUTPATIENT)
Dept: FAMILY MEDICINE | Facility: CLINIC | Age: 76
End: 2021-01-31
Payer: MEDICARE

## 2021-01-31 DIAGNOSIS — Z23 NEED FOR VACCINATION: Primary | ICD-10-CM

## 2021-01-31 PROCEDURE — 0002A COVID-19, MRNA, LNP-S, PF, 30 MCG/0.3 ML DOSE VACCINE: CPT | Mod: PBBFAC | Performed by: INTERNAL MEDICINE

## 2021-01-31 PROCEDURE — 91300 COVID-19, MRNA, LNP-S, PF, 30 MCG/0.3 ML DOSE VACCINE: CPT | Mod: PBBFAC | Performed by: INTERNAL MEDICINE

## 2021-02-02 NOTE — TELEPHONE ENCOUNTER
----- Message from Mayito Viveros sent at 5/22/2019  3:30 PM CDT -----  Contact: pt  Type:  Patient Returning Call    Who Called:  pt  Who Left Message for Patient:  Asia  Does the patient know what this is regarding?:  MRI results   Best Call Back Number:  [phone]  Additional Information:      
Done.   
2 seconds or less

## 2021-02-04 ENCOUNTER — TELEPHONE (OUTPATIENT)
Dept: PAIN MEDICINE | Facility: CLINIC | Age: 76
End: 2021-02-04

## 2021-02-07 ENCOUNTER — LAB VISIT (OUTPATIENT)
Dept: FAMILY MEDICINE | Facility: CLINIC | Age: 76
End: 2021-02-07
Payer: MEDICARE

## 2021-02-07 DIAGNOSIS — Z13.9 SCREENING PROCEDURE: ICD-10-CM

## 2021-02-07 PROCEDURE — U0003 INFECTIOUS AGENT DETECTION BY NUCLEIC ACID (DNA OR RNA); SEVERE ACUTE RESPIRATORY SYNDROME CORONAVIRUS 2 (SARS-COV-2) (CORONAVIRUS DISEASE [COVID-19]), AMPLIFIED PROBE TECHNIQUE, MAKING USE OF HIGH THROUGHPUT TECHNOLOGIES AS DESCRIBED BY CMS-2020-01-R: HCPCS

## 2021-02-08 LAB — SARS-COV-2 RNA RESP QL NAA+PROBE: NOT DETECTED

## 2021-02-09 ENCOUNTER — ANESTHESIA EVENT (OUTPATIENT)
Dept: SURGERY | Facility: HOSPITAL | Age: 76
End: 2021-02-09
Payer: MEDICARE

## 2021-02-10 ENCOUNTER — HOSPITAL ENCOUNTER (OUTPATIENT)
Dept: RADIOLOGY | Facility: HOSPITAL | Age: 76
Discharge: HOME | End: 2021-02-10
Attending: ANESTHESIOLOGY | Admitting: ANESTHESIOLOGY
Payer: MEDICARE

## 2021-02-10 ENCOUNTER — ANESTHESIA (OUTPATIENT)
Dept: SURGERY | Facility: HOSPITAL | Age: 76
End: 2021-02-10
Payer: MEDICARE

## 2021-02-10 ENCOUNTER — HOSPITAL ENCOUNTER (OUTPATIENT)
Facility: HOSPITAL | Age: 76
Discharge: HOME | End: 2021-02-10
Attending: ANESTHESIOLOGY | Admitting: ANESTHESIOLOGY
Payer: MEDICARE

## 2021-02-10 VITALS
HEART RATE: 66 BPM | OXYGEN SATURATION: 96 % | WEIGHT: 307 LBS | DIASTOLIC BLOOD PRESSURE: 74 MMHG | TEMPERATURE: 98 F | BODY MASS INDEX: 42.98 KG/M2 | HEIGHT: 71 IN | RESPIRATION RATE: 16 BRPM | SYSTOLIC BLOOD PRESSURE: 132 MMHG

## 2021-02-10 DIAGNOSIS — M51.36 DDD (DEGENERATIVE DISC DISEASE), LUMBAR: ICD-10-CM

## 2021-02-10 DIAGNOSIS — M47.812 CERVICAL SPONDYLOSIS: ICD-10-CM

## 2021-02-10 DIAGNOSIS — M54.16 LUMBAR RADICULOPATHY: Primary | ICD-10-CM

## 2021-02-10 DIAGNOSIS — G89.4 CHRONIC PAIN SYNDROME: ICD-10-CM

## 2021-02-10 PROCEDURE — 95971 PR ANALYZE NEUROSTIM,SIMPLE/PROG: ICD-10-PCS | Mod: 59,,, | Performed by: ANESTHESIOLOGY

## 2021-02-10 PROCEDURE — C1897 LEAD, NEUROSTIM TEST KIT: HCPCS | Mod: PO | Performed by: ANESTHESIOLOGY

## 2021-02-10 PROCEDURE — 63600175 PHARM REV CODE 636 W HCPCS: Mod: PO | Performed by: ANESTHESIOLOGY

## 2021-02-10 PROCEDURE — 63650 PR PERCUT IMPLNT NEUROELECT,EPIDURAL: ICD-10-PCS | Mod: ,,, | Performed by: ANESTHESIOLOGY

## 2021-02-10 PROCEDURE — 36000707: Mod: PO | Performed by: ANESTHESIOLOGY

## 2021-02-10 PROCEDURE — 76000 FLUOROSCOPY <1 HR PHYS/QHP: CPT | Mod: TC,PO

## 2021-02-10 PROCEDURE — C1778 LEAD, NEUROSTIMULATOR: HCPCS | Mod: PO | Performed by: ANESTHESIOLOGY

## 2021-02-10 PROCEDURE — D9220A PRA ANESTHESIA: ICD-10-PCS | Mod: CRNA,,, | Performed by: NURSE ANESTHETIST, CERTIFIED REGISTERED

## 2021-02-10 PROCEDURE — 37000008 HC ANESTHESIA 1ST 15 MINUTES: Mod: PO | Performed by: ANESTHESIOLOGY

## 2021-02-10 PROCEDURE — 95971 ALYS SMPL SP/PN NPGT W/PRGRM: CPT | Mod: 59,,, | Performed by: ANESTHESIOLOGY

## 2021-02-10 PROCEDURE — D9220A PRA ANESTHESIA: Mod: CRNA,,, | Performed by: NURSE ANESTHETIST, CERTIFIED REGISTERED

## 2021-02-10 PROCEDURE — D9220A PRA ANESTHESIA: Mod: ANES,,, | Performed by: ANESTHESIOLOGY

## 2021-02-10 PROCEDURE — 25000003 PHARM REV CODE 250: Mod: PO | Performed by: NURSE ANESTHETIST, CERTIFIED REGISTERED

## 2021-02-10 PROCEDURE — 25000003 PHARM REV CODE 250: Mod: PO | Performed by: ANESTHESIOLOGY

## 2021-02-10 PROCEDURE — D9220A PRA ANESTHESIA: ICD-10-PCS | Mod: ANES,,, | Performed by: ANESTHESIOLOGY

## 2021-02-10 PROCEDURE — 71000015 HC POSTOP RECOV 1ST HR: Mod: PO | Performed by: ANESTHESIOLOGY

## 2021-02-10 PROCEDURE — 27201423 OPTIME MED/SURG SUP & DEVICES STERILE SUPPLY: Mod: PO | Performed by: ANESTHESIOLOGY

## 2021-02-10 PROCEDURE — 71000033 HC RECOVERY, INTIAL HOUR: Mod: PO | Performed by: ANESTHESIOLOGY

## 2021-02-10 PROCEDURE — 36000706: Mod: PO | Performed by: ANESTHESIOLOGY

## 2021-02-10 PROCEDURE — 63650 IMPLANT NEUROELECTRODES: CPT | Mod: ,,, | Performed by: ANESTHESIOLOGY

## 2021-02-10 PROCEDURE — 37000009 HC ANESTHESIA EA ADD 15 MINS: Mod: PO | Performed by: ANESTHESIOLOGY

## 2021-02-10 PROCEDURE — 63600175 PHARM REV CODE 636 W HCPCS: Mod: PO | Performed by: NURSE ANESTHETIST, CERTIFIED REGISTERED

## 2021-02-10 DEVICE — KIT LEAD TRIAL OCTRODE 60CM: Type: IMPLANTABLE DEVICE | Site: BACK | Status: FUNCTIONAL

## 2021-02-10 DEVICE — LEAD NEROSTIMLTR INFINION 50CM: Type: IMPLANTABLE DEVICE | Site: BACK | Status: FUNCTIONAL

## 2021-02-10 RX ORDER — PROPOFOL 10 MG/ML
VIAL (ML) INTRAVENOUS CONTINUOUS PRN
Status: DISCONTINUED | OUTPATIENT
Start: 2021-02-10 | End: 2021-02-10

## 2021-02-10 RX ORDER — OXYCODONE HYDROCHLORIDE 5 MG/1
5 TABLET ORAL
Status: DISCONTINUED | OUTPATIENT
Start: 2021-02-10 | End: 2021-02-10 | Stop reason: HOSPADM

## 2021-02-10 RX ORDER — ONDANSETRON 2 MG/ML
INJECTION INTRAMUSCULAR; INTRAVENOUS
Status: DISCONTINUED | OUTPATIENT
Start: 2021-02-10 | End: 2021-02-10

## 2021-02-10 RX ORDER — DIPHENHYDRAMINE HYDROCHLORIDE 50 MG/ML
25 INJECTION INTRAMUSCULAR; INTRAVENOUS EVERY 6 HOURS PRN
Status: DISCONTINUED | OUTPATIENT
Start: 2021-02-10 | End: 2021-02-10 | Stop reason: HOSPADM

## 2021-02-10 RX ORDER — LIDOCAINE HYDROCHLORIDE 10 MG/ML
1 INJECTION, SOLUTION EPIDURAL; INFILTRATION; INTRACAUDAL; PERINEURAL ONCE
Status: DISCONTINUED | OUTPATIENT
Start: 2021-02-10 | End: 2021-02-10 | Stop reason: HOSPADM

## 2021-02-10 RX ORDER — MEPERIDINE HYDROCHLORIDE 50 MG/ML
12.5 INJECTION INTRAMUSCULAR; INTRAVENOUS; SUBCUTANEOUS ONCE AS NEEDED
Status: DISCONTINUED | OUTPATIENT
Start: 2021-02-10 | End: 2021-02-10 | Stop reason: HOSPADM

## 2021-02-10 RX ORDER — MIDAZOLAM HYDROCHLORIDE 1 MG/ML
INJECTION INTRAMUSCULAR; INTRAVENOUS
Status: DISCONTINUED | OUTPATIENT
Start: 2021-02-10 | End: 2021-02-10

## 2021-02-10 RX ORDER — CEFAZOLIN SODIUM 2 G/50ML
2 SOLUTION INTRAVENOUS
Status: COMPLETED | OUTPATIENT
Start: 2021-02-10 | End: 2021-02-10

## 2021-02-10 RX ORDER — LIDOCAINE HCL/PF 100 MG/5ML
SYRINGE (ML) INTRAVENOUS
Status: DISCONTINUED | OUTPATIENT
Start: 2021-02-10 | End: 2021-02-10

## 2021-02-10 RX ORDER — SODIUM CHLORIDE 0.9 % (FLUSH) 0.9 %
3 SYRINGE (ML) INJECTION
Status: DISCONTINUED | OUTPATIENT
Start: 2021-02-10 | End: 2021-02-10 | Stop reason: HOSPADM

## 2021-02-10 RX ORDER — LIDOCAINE HYDROCHLORIDE 10 MG/ML
INJECTION, SOLUTION EPIDURAL; INFILTRATION; INTRACAUDAL; PERINEURAL
Status: DISCONTINUED | OUTPATIENT
Start: 2021-02-10 | End: 2021-02-10 | Stop reason: HOSPADM

## 2021-02-10 RX ORDER — FENTANYL CITRATE 50 UG/ML
INJECTION, SOLUTION INTRAMUSCULAR; INTRAVENOUS
Status: DISCONTINUED | OUTPATIENT
Start: 2021-02-10 | End: 2021-02-10

## 2021-02-10 RX ORDER — FENTANYL CITRATE 50 UG/ML
25 INJECTION, SOLUTION INTRAMUSCULAR; INTRAVENOUS EVERY 5 MIN PRN
Status: DISCONTINUED | OUTPATIENT
Start: 2021-02-10 | End: 2021-02-10 | Stop reason: HOSPADM

## 2021-02-10 RX ORDER — KETAMINE HYDROCHLORIDE 100 MG/ML
INJECTION, SOLUTION INTRAMUSCULAR; INTRAVENOUS
Status: DISCONTINUED | OUTPATIENT
Start: 2021-02-10 | End: 2021-02-10

## 2021-02-10 RX ORDER — SODIUM CHLORIDE, SODIUM LACTATE, POTASSIUM CHLORIDE, CALCIUM CHLORIDE 600; 310; 30; 20 MG/100ML; MG/100ML; MG/100ML; MG/100ML
INJECTION, SOLUTION INTRAVENOUS CONTINUOUS
Status: DISCONTINUED | OUTPATIENT
Start: 2021-02-10 | End: 2021-02-10 | Stop reason: HOSPADM

## 2021-02-10 RX ORDER — HYDROMORPHONE HYDROCHLORIDE 2 MG/ML
0.2 INJECTION, SOLUTION INTRAMUSCULAR; INTRAVENOUS; SUBCUTANEOUS EVERY 5 MIN PRN
Status: DISCONTINUED | OUTPATIENT
Start: 2021-02-10 | End: 2021-02-10 | Stop reason: HOSPADM

## 2021-02-10 RX ADMIN — FENTANYL CITRATE 50 MCG: 50 INJECTION, SOLUTION INTRAMUSCULAR; INTRAVENOUS at 12:02

## 2021-02-10 RX ADMIN — PROPOFOL 50 MCG/KG/MIN: 10 INJECTION, EMULSION INTRAVENOUS at 12:02

## 2021-02-10 RX ADMIN — ONDANSETRON 4 MG: 2 INJECTION, SOLUTION INTRAMUSCULAR; INTRAVENOUS at 12:02

## 2021-02-10 RX ADMIN — LIDOCAINE HYDROCHLORIDE 50 MG: 20 INJECTION PARENTERAL at 12:02

## 2021-02-10 RX ADMIN — CEFAZOLIN SODIUM 2 G: 2 SOLUTION INTRAVENOUS at 12:02

## 2021-02-10 RX ADMIN — SODIUM CHLORIDE, SODIUM LACTATE, POTASSIUM CHLORIDE, AND CALCIUM CHLORIDE: .6; .31; .03; .02 INJECTION, SOLUTION INTRAVENOUS at 11:02

## 2021-02-10 RX ADMIN — KETAMINE HYDROCHLORIDE 20 MG: 100 INJECTION, SOLUTION, CONCENTRATE INTRAMUSCULAR; INTRAVENOUS at 12:02

## 2021-02-10 RX ADMIN — MIDAZOLAM HYDROCHLORIDE 2 MG: 1 INJECTION, SOLUTION INTRAMUSCULAR; INTRAVENOUS at 12:02

## 2021-02-10 RX ADMIN — CEFAZOLIN SODIUM 1 G: 2 SOLUTION INTRAVENOUS at 12:02

## 2021-02-15 ENCOUNTER — OFFICE VISIT (OUTPATIENT)
Dept: PAIN MEDICINE | Facility: CLINIC | Age: 76
End: 2021-02-15
Payer: MEDICARE

## 2021-02-15 VITALS
HEART RATE: 72 BPM | RESPIRATION RATE: 18 BRPM | BODY MASS INDEX: 43 KG/M2 | DIASTOLIC BLOOD PRESSURE: 78 MMHG | WEIGHT: 308.31 LBS | SYSTOLIC BLOOD PRESSURE: 136 MMHG | OXYGEN SATURATION: 97 % | TEMPERATURE: 98 F

## 2021-02-15 DIAGNOSIS — Z01.818 PRE-OP TESTING: ICD-10-CM

## 2021-02-15 DIAGNOSIS — G89.4 CHRONIC PAIN SYNDROME: Primary | ICD-10-CM

## 2021-02-15 DIAGNOSIS — M54.16 LUMBAR RADICULITIS: ICD-10-CM

## 2021-02-15 PROCEDURE — 3288F PR FALLS RISK ASSESSMENT DOCUMENTED: ICD-10-PCS | Mod: CPTII,S$GLB,, | Performed by: ANESTHESIOLOGY

## 2021-02-15 PROCEDURE — 99999 PR PBB SHADOW E&M-EST. PATIENT-LVL V: ICD-10-PCS | Mod: PBBFAC,,, | Performed by: ANESTHESIOLOGY

## 2021-02-15 PROCEDURE — 1125F AMNT PAIN NOTED PAIN PRSNT: CPT | Mod: S$GLB,,, | Performed by: ANESTHESIOLOGY

## 2021-02-15 PROCEDURE — 1157F ADVNC CARE PLAN IN RCRD: CPT | Mod: S$GLB,,, | Performed by: ANESTHESIOLOGY

## 2021-02-15 PROCEDURE — 1125F PR PAIN SEVERITY QUANTIFIED, PAIN PRESENT: ICD-10-PCS | Mod: S$GLB,,, | Performed by: ANESTHESIOLOGY

## 2021-02-15 PROCEDURE — 99999 PR PBB SHADOW E&M-EST. PATIENT-LVL V: CPT | Mod: PBBFAC,,, | Performed by: ANESTHESIOLOGY

## 2021-02-15 PROCEDURE — 99024 PR POST-OP FOLLOW-UP VISIT: ICD-10-PCS | Mod: S$GLB,,, | Performed by: ANESTHESIOLOGY

## 2021-02-15 PROCEDURE — 1101F PT FALLS ASSESS-DOCD LE1/YR: CPT | Mod: CPTII,S$GLB,, | Performed by: ANESTHESIOLOGY

## 2021-02-15 PROCEDURE — 1157F PR ADVANCE CARE PLAN OR EQUIV PRESENT IN MEDICAL RECORD: ICD-10-PCS | Mod: S$GLB,,, | Performed by: ANESTHESIOLOGY

## 2021-02-15 PROCEDURE — 99024 POSTOP FOLLOW-UP VISIT: CPT | Mod: S$GLB,,, | Performed by: ANESTHESIOLOGY

## 2021-02-15 PROCEDURE — 3288F FALL RISK ASSESSMENT DOCD: CPT | Mod: CPTII,S$GLB,, | Performed by: ANESTHESIOLOGY

## 2021-02-15 PROCEDURE — 1101F PR PT FALLS ASSESS DOC 0-1 FALLS W/OUT INJ PAST YR: ICD-10-PCS | Mod: CPTII,S$GLB,, | Performed by: ANESTHESIOLOGY

## 2021-02-15 RX ORDER — CHLORHEXIDINE GLUCONATE 40 MG/ML
SOLUTION TOPICAL
Qty: 120 ML | Refills: 0 | Status: SHIPPED | OUTPATIENT
Start: 2021-02-15 | End: 2021-03-16

## 2021-02-15 RX ORDER — SODIUM CHLORIDE, SODIUM LACTATE, POTASSIUM CHLORIDE, CALCIUM CHLORIDE 600; 310; 30; 20 MG/100ML; MG/100ML; MG/100ML; MG/100ML
INJECTION, SOLUTION INTRAVENOUS CONTINUOUS
Status: CANCELLED | OUTPATIENT
Start: 2021-03-01

## 2021-02-26 ENCOUNTER — ANESTHESIA EVENT (OUTPATIENT)
Dept: SURGERY | Facility: HOSPITAL | Age: 76
End: 2021-02-26
Payer: MEDICARE

## 2021-02-26 ENCOUNTER — LAB VISIT (OUTPATIENT)
Dept: FAMILY MEDICINE | Facility: CLINIC | Age: 76
End: 2021-02-26
Payer: MEDICARE

## 2021-02-26 DIAGNOSIS — Z01.818 PRE-OP TESTING: ICD-10-CM

## 2021-02-26 PROCEDURE — U0005 INFEC AGEN DETEC AMPLI PROBE: HCPCS

## 2021-02-26 PROCEDURE — U0003 INFECTIOUS AGENT DETECTION BY NUCLEIC ACID (DNA OR RNA); SEVERE ACUTE RESPIRATORY SYNDROME CORONAVIRUS 2 (SARS-COV-2) (CORONAVIRUS DISEASE [COVID-19]), AMPLIFIED PROBE TECHNIQUE, MAKING USE OF HIGH THROUGHPUT TECHNOLOGIES AS DESCRIBED BY CMS-2020-01-R: HCPCS

## 2021-02-27 LAB — SARS-COV-2 RNA RESP QL NAA+PROBE: NOT DETECTED

## 2021-03-01 ENCOUNTER — ANESTHESIA (OUTPATIENT)
Dept: SURGERY | Facility: HOSPITAL | Age: 76
End: 2021-03-01
Payer: MEDICARE

## 2021-03-01 ENCOUNTER — HOSPITAL ENCOUNTER (OUTPATIENT)
Dept: RADIOLOGY | Facility: HOSPITAL | Age: 76
Discharge: HOME | End: 2021-03-01
Attending: ANESTHESIOLOGY | Admitting: ANESTHESIOLOGY
Payer: MEDICARE

## 2021-03-01 ENCOUNTER — HOSPITAL ENCOUNTER (OUTPATIENT)
Facility: HOSPITAL | Age: 76
Discharge: HOME | End: 2021-03-01
Attending: ANESTHESIOLOGY | Admitting: ANESTHESIOLOGY
Payer: MEDICARE

## 2021-03-01 DIAGNOSIS — G89.4 CHRONIC PAIN SYNDROME: Primary | ICD-10-CM

## 2021-03-01 DIAGNOSIS — M54.16 LUMBAR RADICULITIS: ICD-10-CM

## 2021-03-01 DIAGNOSIS — G89.4 CHRONIC PAIN SYNDROME: ICD-10-CM

## 2021-03-01 PROCEDURE — 25000003 PHARM REV CODE 250: Mod: PO | Performed by: ANESTHESIOLOGY

## 2021-03-01 PROCEDURE — D9220A PRA ANESTHESIA: ICD-10-PCS | Mod: ANES,,, | Performed by: ANESTHESIOLOGY

## 2021-03-01 PROCEDURE — 27201423 OPTIME MED/SURG SUP & DEVICES STERILE SUPPLY: Mod: PO | Performed by: ANESTHESIOLOGY

## 2021-03-01 PROCEDURE — 37000008 HC ANESTHESIA 1ST 15 MINUTES: Mod: PO | Performed by: ANESTHESIOLOGY

## 2021-03-01 PROCEDURE — C1787 PATIENT PROGR, NEUROSTIM: HCPCS | Mod: PO | Performed by: ANESTHESIOLOGY

## 2021-03-01 PROCEDURE — 63600175 PHARM REV CODE 636 W HCPCS: Mod: PO | Performed by: NURSE ANESTHETIST, CERTIFIED REGISTERED

## 2021-03-01 PROCEDURE — D9220A PRA ANESTHESIA: Mod: CRNA,,, | Performed by: NURSE ANESTHETIST, CERTIFIED REGISTERED

## 2021-03-01 PROCEDURE — 63600175 PHARM REV CODE 636 W HCPCS: Mod: PO | Performed by: ANESTHESIOLOGY

## 2021-03-01 PROCEDURE — 63685 INS/RPLC SPI NPG/RCVR POCKET: CPT | Mod: 59,,, | Performed by: ANESTHESIOLOGY

## 2021-03-01 PROCEDURE — 37000009 HC ANESTHESIA EA ADD 15 MINS: Mod: PO | Performed by: ANESTHESIOLOGY

## 2021-03-01 PROCEDURE — D9220A PRA ANESTHESIA: Mod: ANES,,, | Performed by: ANESTHESIOLOGY

## 2021-03-01 PROCEDURE — 27800903 OPTIME MED/SURG SUP & DEVICES OTHER IMPLANTS: Mod: PO | Performed by: ANESTHESIOLOGY

## 2021-03-01 PROCEDURE — 36000706: Mod: PO | Performed by: ANESTHESIOLOGY

## 2021-03-01 PROCEDURE — C1713 ANCHOR/SCREW BN/BN,TIS/BN: HCPCS | Mod: PO | Performed by: ANESTHESIOLOGY

## 2021-03-01 PROCEDURE — C1778 LEAD, NEUROSTIMULATOR: HCPCS | Mod: PO | Performed by: ANESTHESIOLOGY

## 2021-03-01 PROCEDURE — 63650 PR PERCUT IMPLNT NEUROELECT,EPIDURAL: ICD-10-PCS | Mod: ,,, | Performed by: ANESTHESIOLOGY

## 2021-03-01 PROCEDURE — 63685 PR IMPLANT SPINAL NEUROSTIM/RECEIVER: ICD-10-PCS | Mod: 59,,, | Performed by: ANESTHESIOLOGY

## 2021-03-01 PROCEDURE — D9220A PRA ANESTHESIA: ICD-10-PCS | Mod: CRNA,,, | Performed by: NURSE ANESTHETIST, CERTIFIED REGISTERED

## 2021-03-01 PROCEDURE — 25000003 PHARM REV CODE 250: Mod: PO | Performed by: NURSE ANESTHETIST, CERTIFIED REGISTERED

## 2021-03-01 PROCEDURE — C1822 GEN, NEURO, HF, RECHG BAT: HCPCS | Mod: PO | Performed by: ANESTHESIOLOGY

## 2021-03-01 PROCEDURE — 76000 FLUOROSCOPY <1 HR PHYS/QHP: CPT | Mod: TC,PO

## 2021-03-01 PROCEDURE — 63650 IMPLANT NEUROELECTRODES: CPT | Mod: ,,, | Performed by: ANESTHESIOLOGY

## 2021-03-01 PROCEDURE — C1820 GENERATOR NEURO RECHG BAT SY: HCPCS | Mod: PO | Performed by: ANESTHESIOLOGY

## 2021-03-01 PROCEDURE — 36000707: Mod: PO | Performed by: ANESTHESIOLOGY

## 2021-03-01 PROCEDURE — 71000015 HC POSTOP RECOV 1ST HR: Mod: PO | Performed by: ANESTHESIOLOGY

## 2021-03-01 DEVICE — ANCHOR LEAD NEROSTIMLTR CLIK X: Type: IMPLANTABLE DEVICE | Site: BACK | Status: FUNCTIONAL

## 2021-03-01 DEVICE — KIT SPECTRA WAVEWRITER IPG: Type: IMPLANTABLE DEVICE | Site: BACK | Status: FUNCTIONAL

## 2021-03-01 DEVICE — LEAD ST LINEAR 70CM: Type: IMPLANTABLE DEVICE | Site: BACK | Status: FUNCTIONAL

## 2021-03-01 RX ORDER — ONDANSETRON 2 MG/ML
INJECTION INTRAMUSCULAR; INTRAVENOUS
Status: DISCONTINUED | OUTPATIENT
Start: 2021-03-01 | End: 2021-03-01

## 2021-03-01 RX ORDER — BUPIVACAINE HYDROCHLORIDE 2.5 MG/ML
INJECTION, SOLUTION EPIDURAL; INFILTRATION; INTRACAUDAL
Status: DISCONTINUED | OUTPATIENT
Start: 2021-03-01 | End: 2021-03-01 | Stop reason: HOSPADM

## 2021-03-01 RX ORDER — LIDOCAINE HYDROCHLORIDE AND EPINEPHRINE 5; 5 MG/ML; UG/ML
INJECTION, SOLUTION INFILTRATION; PERINEURAL
Status: DISCONTINUED | OUTPATIENT
Start: 2021-03-01 | End: 2021-03-01 | Stop reason: HOSPADM

## 2021-03-01 RX ORDER — CEFAZOLIN SODIUM 2 G/50ML
2 SOLUTION INTRAVENOUS
Status: COMPLETED | OUTPATIENT
Start: 2021-03-01 | End: 2021-03-01

## 2021-03-01 RX ORDER — FENTANYL CITRATE 50 UG/ML
INJECTION, SOLUTION INTRAMUSCULAR; INTRAVENOUS
Status: DISCONTINUED | OUTPATIENT
Start: 2021-03-01 | End: 2021-03-01

## 2021-03-01 RX ORDER — PROPOFOL 10 MG/ML
VIAL (ML) INTRAVENOUS CONTINUOUS PRN
Status: DISCONTINUED | OUTPATIENT
Start: 2021-03-01 | End: 2021-03-01

## 2021-03-01 RX ORDER — KETAMINE HYDROCHLORIDE 100 MG/ML
INJECTION, SOLUTION INTRAMUSCULAR; INTRAVENOUS
Status: DISCONTINUED | OUTPATIENT
Start: 2021-03-01 | End: 2021-03-01

## 2021-03-01 RX ORDER — HYDROCODONE BITARTRATE AND ACETAMINOPHEN 7.5; 325 MG/1; MG/1
1 TABLET ORAL EVERY 6 HOURS PRN
Qty: 20 TABLET | Refills: 0 | Status: SHIPPED | OUTPATIENT
Start: 2021-03-01 | End: 2021-03-24

## 2021-03-01 RX ORDER — SODIUM CHLORIDE, SODIUM LACTATE, POTASSIUM CHLORIDE, CALCIUM CHLORIDE 600; 310; 30; 20 MG/100ML; MG/100ML; MG/100ML; MG/100ML
INJECTION, SOLUTION INTRAVENOUS CONTINUOUS
Status: DISCONTINUED | OUTPATIENT
Start: 2021-03-01 | End: 2021-03-01 | Stop reason: HOSPADM

## 2021-03-01 RX ORDER — PROPOFOL 10 MG/ML
VIAL (ML) INTRAVENOUS
Status: DISCONTINUED | OUTPATIENT
Start: 2021-03-01 | End: 2021-03-01

## 2021-03-01 RX ORDER — LIDOCAINE HCL/PF 100 MG/5ML
SYRINGE (ML) INTRAVENOUS
Status: DISCONTINUED | OUTPATIENT
Start: 2021-03-01 | End: 2021-03-01

## 2021-03-01 RX ORDER — LIDOCAINE HYDROCHLORIDE 10 MG/ML
1 INJECTION, SOLUTION EPIDURAL; INFILTRATION; INTRACAUDAL; PERINEURAL ONCE
Status: DISCONTINUED | OUTPATIENT
Start: 2021-03-01 | End: 2021-03-01 | Stop reason: HOSPADM

## 2021-03-01 RX ORDER — MIDAZOLAM HYDROCHLORIDE 1 MG/ML
INJECTION INTRAMUSCULAR; INTRAVENOUS
Status: DISCONTINUED | OUTPATIENT
Start: 2021-03-01 | End: 2021-03-01

## 2021-03-01 RX ADMIN — PROPOFOL 50 MCG/KG/MIN: 10 INJECTION, EMULSION INTRAVENOUS at 12:03

## 2021-03-01 RX ADMIN — MIDAZOLAM HYDROCHLORIDE 2 MG: 1 INJECTION, SOLUTION INTRAMUSCULAR; INTRAVENOUS at 12:03

## 2021-03-01 RX ADMIN — FENTANYL CITRATE 100 MCG: 50 INJECTION, SOLUTION INTRAMUSCULAR; INTRAVENOUS at 02:03

## 2021-03-01 RX ADMIN — SODIUM CHLORIDE, SODIUM LACTATE, POTASSIUM CHLORIDE, AND CALCIUM CHLORIDE: .6; .31; .03; .02 INJECTION, SOLUTION INTRAVENOUS at 12:03

## 2021-03-01 RX ADMIN — FENTANYL CITRATE 50 MCG: 50 INJECTION, SOLUTION INTRAMUSCULAR; INTRAVENOUS at 01:03

## 2021-03-01 RX ADMIN — FENTANYL CITRATE 50 MCG: 50 INJECTION, SOLUTION INTRAMUSCULAR; INTRAVENOUS at 12:03

## 2021-03-01 RX ADMIN — LIDOCAINE HYDROCHLORIDE 100 MG: 20 INJECTION PARENTERAL at 12:03

## 2021-03-01 RX ADMIN — CEFAZOLIN SODIUM 3 G: 2 SOLUTION INTRAVENOUS at 12:03

## 2021-03-01 RX ADMIN — ONDANSETRON 4 MG: 2 INJECTION, SOLUTION INTRAMUSCULAR; INTRAVENOUS at 12:03

## 2021-03-01 RX ADMIN — KETAMINE HYDROCHLORIDE 30 MG: 100 INJECTION, SOLUTION, CONCENTRATE INTRAMUSCULAR; INTRAVENOUS at 12:03

## 2021-03-01 RX ADMIN — PROPOFOL 50 MG: 10 INJECTION, EMULSION INTRAVENOUS at 01:03

## 2021-03-02 VITALS
TEMPERATURE: 98 F | DIASTOLIC BLOOD PRESSURE: 78 MMHG | RESPIRATION RATE: 16 BRPM | HEIGHT: 71 IN | OXYGEN SATURATION: 95 % | BODY MASS INDEX: 43.12 KG/M2 | SYSTOLIC BLOOD PRESSURE: 146 MMHG | HEART RATE: 65 BPM | WEIGHT: 308 LBS

## 2021-03-03 ENCOUNTER — PATIENT MESSAGE (OUTPATIENT)
Dept: PAIN MEDICINE | Facility: CLINIC | Age: 76
End: 2021-03-03

## 2021-03-08 ENCOUNTER — OFFICE VISIT (OUTPATIENT)
Dept: PAIN MEDICINE | Facility: CLINIC | Age: 76
End: 2021-03-08
Payer: MEDICARE

## 2021-03-08 VITALS
DIASTOLIC BLOOD PRESSURE: 81 MMHG | WEIGHT: 306.69 LBS | HEIGHT: 71 IN | BODY MASS INDEX: 42.94 KG/M2 | SYSTOLIC BLOOD PRESSURE: 153 MMHG | HEART RATE: 72 BPM

## 2021-03-08 DIAGNOSIS — G89.4 CHRONIC PAIN SYNDROME: Primary | ICD-10-CM

## 2021-03-08 DIAGNOSIS — M54.16 LUMBAR RADICULITIS: ICD-10-CM

## 2021-03-08 PROCEDURE — 1157F ADVNC CARE PLAN IN RCRD: CPT | Mod: S$GLB,,, | Performed by: PHYSICIAN ASSISTANT

## 2021-03-08 PROCEDURE — 1125F AMNT PAIN NOTED PAIN PRSNT: CPT | Mod: S$GLB,,, | Performed by: PHYSICIAN ASSISTANT

## 2021-03-08 PROCEDURE — 99999 PR PBB SHADOW E&M-EST. PATIENT-LVL III: ICD-10-PCS | Mod: PBBFAC,,, | Performed by: PHYSICIAN ASSISTANT

## 2021-03-08 PROCEDURE — 1101F PT FALLS ASSESS-DOCD LE1/YR: CPT | Mod: CPTII,S$GLB,, | Performed by: PHYSICIAN ASSISTANT

## 2021-03-08 PROCEDURE — 1157F PR ADVANCE CARE PLAN OR EQUIV PRESENT IN MEDICAL RECORD: ICD-10-PCS | Mod: S$GLB,,, | Performed by: PHYSICIAN ASSISTANT

## 2021-03-08 PROCEDURE — 99024 POSTOP FOLLOW-UP VISIT: CPT | Mod: S$GLB,,, | Performed by: PHYSICIAN ASSISTANT

## 2021-03-08 PROCEDURE — 99024 PR POST-OP FOLLOW-UP VISIT: ICD-10-PCS | Mod: S$GLB,,, | Performed by: PHYSICIAN ASSISTANT

## 2021-03-08 PROCEDURE — 1125F PR PAIN SEVERITY QUANTIFIED, PAIN PRESENT: ICD-10-PCS | Mod: S$GLB,,, | Performed by: PHYSICIAN ASSISTANT

## 2021-03-08 PROCEDURE — 1101F PR PT FALLS ASSESS DOC 0-1 FALLS W/OUT INJ PAST YR: ICD-10-PCS | Mod: CPTII,S$GLB,, | Performed by: PHYSICIAN ASSISTANT

## 2021-03-08 PROCEDURE — 99999 PR PBB SHADOW E&M-EST. PATIENT-LVL III: CPT | Mod: PBBFAC,,, | Performed by: PHYSICIAN ASSISTANT

## 2021-03-08 PROCEDURE — 3288F PR FALLS RISK ASSESSMENT DOCUMENTED: ICD-10-PCS | Mod: CPTII,S$GLB,, | Performed by: PHYSICIAN ASSISTANT

## 2021-03-08 PROCEDURE — 3288F FALL RISK ASSESSMENT DOCD: CPT | Mod: CPTII,S$GLB,, | Performed by: PHYSICIAN ASSISTANT

## 2021-03-16 ENCOUNTER — OFFICE VISIT (OUTPATIENT)
Dept: FAMILY MEDICINE | Facility: CLINIC | Age: 76
End: 2021-03-16
Payer: MEDICARE

## 2021-03-16 VITALS
HEIGHT: 71 IN | WEIGHT: 307.56 LBS | HEART RATE: 70 BPM | BODY MASS INDEX: 43.06 KG/M2 | SYSTOLIC BLOOD PRESSURE: 124 MMHG | DIASTOLIC BLOOD PRESSURE: 70 MMHG

## 2021-03-16 DIAGNOSIS — M65.4 RADIAL STYLOID TENOSYNOVITIS (DE QUERVAIN): Primary | ICD-10-CM

## 2021-03-16 PROCEDURE — 3074F SYST BP LT 130 MM HG: CPT | Mod: CPTII,S$GLB,, | Performed by: FAMILY MEDICINE

## 2021-03-16 PROCEDURE — 1101F PR PT FALLS ASSESS DOC 0-1 FALLS W/OUT INJ PAST YR: ICD-10-PCS | Mod: CPTII,S$GLB,, | Performed by: FAMILY MEDICINE

## 2021-03-16 PROCEDURE — 20605 INTERMEDIATE JOINT ASPIRATION/INJECTION: ICD-10-PCS | Mod: RT,S$GLB,, | Performed by: FAMILY MEDICINE

## 2021-03-16 PROCEDURE — 1125F PR PAIN SEVERITY QUANTIFIED, PAIN PRESENT: ICD-10-PCS | Mod: S$GLB,,, | Performed by: FAMILY MEDICINE

## 2021-03-16 PROCEDURE — 1157F ADVNC CARE PLAN IN RCRD: CPT | Mod: S$GLB,,, | Performed by: FAMILY MEDICINE

## 2021-03-16 PROCEDURE — 3288F FALL RISK ASSESSMENT DOCD: CPT | Mod: CPTII,S$GLB,, | Performed by: FAMILY MEDICINE

## 2021-03-16 PROCEDURE — 1101F PT FALLS ASSESS-DOCD LE1/YR: CPT | Mod: CPTII,S$GLB,, | Performed by: FAMILY MEDICINE

## 2021-03-16 PROCEDURE — 1157F PR ADVANCE CARE PLAN OR EQUIV PRESENT IN MEDICAL RECORD: ICD-10-PCS | Mod: S$GLB,,, | Performed by: FAMILY MEDICINE

## 2021-03-16 PROCEDURE — 99213 PR OFFICE/OUTPT VISIT, EST, LEVL III, 20-29 MIN: ICD-10-PCS | Mod: 25,S$GLB,, | Performed by: FAMILY MEDICINE

## 2021-03-16 PROCEDURE — 20605 DRAIN/INJ JOINT/BURSA W/O US: CPT | Mod: RT,S$GLB,, | Performed by: FAMILY MEDICINE

## 2021-03-16 PROCEDURE — 3078F DIAST BP <80 MM HG: CPT | Mod: CPTII,S$GLB,, | Performed by: FAMILY MEDICINE

## 2021-03-16 PROCEDURE — 99213 OFFICE O/P EST LOW 20 MIN: CPT | Mod: 25,S$GLB,, | Performed by: FAMILY MEDICINE

## 2021-03-16 PROCEDURE — 3288F PR FALLS RISK ASSESSMENT DOCUMENTED: ICD-10-PCS | Mod: CPTII,S$GLB,, | Performed by: FAMILY MEDICINE

## 2021-03-16 PROCEDURE — 1159F MED LIST DOCD IN RCRD: CPT | Mod: S$GLB,,, | Performed by: FAMILY MEDICINE

## 2021-03-16 PROCEDURE — 3078F PR MOST RECENT DIASTOLIC BLOOD PRESSURE < 80 MM HG: ICD-10-PCS | Mod: CPTII,S$GLB,, | Performed by: FAMILY MEDICINE

## 2021-03-16 PROCEDURE — 1159F PR MEDICATION LIST DOCUMENTED IN MEDICAL RECORD: ICD-10-PCS | Mod: S$GLB,,, | Performed by: FAMILY MEDICINE

## 2021-03-16 PROCEDURE — 1125F AMNT PAIN NOTED PAIN PRSNT: CPT | Mod: S$GLB,,, | Performed by: FAMILY MEDICINE

## 2021-03-16 PROCEDURE — 3074F PR MOST RECENT SYSTOLIC BLOOD PRESSURE < 130 MM HG: ICD-10-PCS | Mod: CPTII,S$GLB,, | Performed by: FAMILY MEDICINE

## 2021-03-16 RX ORDER — TRIAMCINOLONE ACETONIDE 40 MG/ML
20 INJECTION, SUSPENSION INTRA-ARTICULAR; INTRAMUSCULAR
Status: DISCONTINUED | OUTPATIENT
Start: 2021-03-16 | End: 2021-03-16 | Stop reason: HOSPADM

## 2021-03-16 RX ADMIN — TRIAMCINOLONE ACETONIDE 20 MG: 40 INJECTION, SUSPENSION INTRA-ARTICULAR; INTRAMUSCULAR at 01:03

## 2021-03-24 ENCOUNTER — HOSPITAL ENCOUNTER (OUTPATIENT)
Dept: RADIOLOGY | Facility: HOSPITAL | Age: 76
Discharge: HOME | End: 2021-03-24
Attending: FAMILY MEDICINE
Payer: MEDICARE

## 2021-03-24 ENCOUNTER — OFFICE VISIT (OUTPATIENT)
Dept: FAMILY MEDICINE | Facility: CLINIC | Age: 76
End: 2021-03-24
Payer: MEDICARE

## 2021-03-24 ENCOUNTER — PATIENT MESSAGE (OUTPATIENT)
Dept: FAMILY MEDICINE | Facility: CLINIC | Age: 76
End: 2021-03-24

## 2021-03-24 VITALS
HEART RATE: 66 BPM | DIASTOLIC BLOOD PRESSURE: 84 MMHG | WEIGHT: 307.13 LBS | HEIGHT: 71 IN | BODY MASS INDEX: 43 KG/M2 | SYSTOLIC BLOOD PRESSURE: 138 MMHG

## 2021-03-24 DIAGNOSIS — I10 ESSENTIAL HYPERTENSION: ICD-10-CM

## 2021-03-24 DIAGNOSIS — R09.89 PULSE IRREGULARITY: ICD-10-CM

## 2021-03-24 DIAGNOSIS — M65.4 RADIAL STYLOID TENOSYNOVITIS (DE QUERVAIN): Primary | ICD-10-CM

## 2021-03-24 DIAGNOSIS — M65.4 RADIAL STYLOID TENOSYNOVITIS (DE QUERVAIN): ICD-10-CM

## 2021-03-24 PROCEDURE — 73110 XR WRIST COMPLETE 3 VIEWS RIGHT: ICD-10-PCS | Mod: 26,RT,, | Performed by: RADIOLOGY

## 2021-03-24 PROCEDURE — 3288F FALL RISK ASSESSMENT DOCD: CPT | Mod: CPTII,S$GLB,, | Performed by: FAMILY MEDICINE

## 2021-03-24 PROCEDURE — 3075F SYST BP GE 130 - 139MM HG: CPT | Mod: CPTII,S$GLB,, | Performed by: FAMILY MEDICINE

## 2021-03-24 PROCEDURE — 3079F PR MOST RECENT DIASTOLIC BLOOD PRESSURE 80-89 MM HG: ICD-10-PCS | Mod: CPTII,S$GLB,, | Performed by: FAMILY MEDICINE

## 2021-03-24 PROCEDURE — 1159F MED LIST DOCD IN RCRD: CPT | Mod: S$GLB,,, | Performed by: FAMILY MEDICINE

## 2021-03-24 PROCEDURE — 3075F PR MOST RECENT SYSTOLIC BLOOD PRESS GE 130-139MM HG: ICD-10-PCS | Mod: CPTII,S$GLB,, | Performed by: FAMILY MEDICINE

## 2021-03-24 PROCEDURE — 3079F DIAST BP 80-89 MM HG: CPT | Mod: CPTII,S$GLB,, | Performed by: FAMILY MEDICINE

## 2021-03-24 PROCEDURE — 99214 OFFICE O/P EST MOD 30 MIN: CPT | Mod: S$GLB,,, | Performed by: FAMILY MEDICINE

## 2021-03-24 PROCEDURE — 1157F PR ADVANCE CARE PLAN OR EQUIV PRESENT IN MEDICAL RECORD: ICD-10-PCS | Mod: S$GLB,,, | Performed by: FAMILY MEDICINE

## 2021-03-24 PROCEDURE — 99214 PR OFFICE/OUTPT VISIT, EST, LEVL IV, 30-39 MIN: ICD-10-PCS | Mod: S$GLB,,, | Performed by: FAMILY MEDICINE

## 2021-03-24 PROCEDURE — 3288F PR FALLS RISK ASSESSMENT DOCUMENTED: ICD-10-PCS | Mod: CPTII,S$GLB,, | Performed by: FAMILY MEDICINE

## 2021-03-24 PROCEDURE — 1125F AMNT PAIN NOTED PAIN PRSNT: CPT | Mod: S$GLB,,, | Performed by: FAMILY MEDICINE

## 2021-03-24 PROCEDURE — 1125F PR PAIN SEVERITY QUANTIFIED, PAIN PRESENT: ICD-10-PCS | Mod: S$GLB,,, | Performed by: FAMILY MEDICINE

## 2021-03-24 PROCEDURE — 73110 X-RAY EXAM OF WRIST: CPT | Mod: TC,PN,RT

## 2021-03-24 PROCEDURE — 1159F PR MEDICATION LIST DOCUMENTED IN MEDICAL RECORD: ICD-10-PCS | Mod: S$GLB,,, | Performed by: FAMILY MEDICINE

## 2021-03-24 PROCEDURE — 73110 X-RAY EXAM OF WRIST: CPT | Mod: 26,RT,, | Performed by: RADIOLOGY

## 2021-03-24 PROCEDURE — 93010 ELECTROCARDIOGRAM REPORT: CPT | Mod: S$GLB,,, | Performed by: INTERNAL MEDICINE

## 2021-03-24 PROCEDURE — 1157F ADVNC CARE PLAN IN RCRD: CPT | Mod: S$GLB,,, | Performed by: FAMILY MEDICINE

## 2021-03-24 PROCEDURE — 93010 EKG 12-LEAD: ICD-10-PCS | Mod: S$GLB,,, | Performed by: INTERNAL MEDICINE

## 2021-03-24 PROCEDURE — 1101F PR PT FALLS ASSESS DOC 0-1 FALLS W/OUT INJ PAST YR: ICD-10-PCS | Mod: CPTII,S$GLB,, | Performed by: FAMILY MEDICINE

## 2021-03-24 PROCEDURE — 1101F PT FALLS ASSESS-DOCD LE1/YR: CPT | Mod: CPTII,S$GLB,, | Performed by: FAMILY MEDICINE

## 2021-03-31 ENCOUNTER — PATIENT MESSAGE (OUTPATIENT)
Dept: FAMILY MEDICINE | Facility: CLINIC | Age: 76
End: 2021-03-31

## 2021-04-09 ENCOUNTER — OFFICE VISIT (OUTPATIENT)
Dept: PAIN MEDICINE | Facility: CLINIC | Age: 76
End: 2021-04-09
Payer: MEDICARE

## 2021-04-09 VITALS
WEIGHT: 306.44 LBS | SYSTOLIC BLOOD PRESSURE: 144 MMHG | OXYGEN SATURATION: 97 % | BODY MASS INDEX: 42.74 KG/M2 | HEART RATE: 72 BPM | TEMPERATURE: 97 F | RESPIRATION RATE: 20 BRPM | DIASTOLIC BLOOD PRESSURE: 83 MMHG

## 2021-04-09 DIAGNOSIS — G89.4 CHRONIC PAIN SYNDROME: Primary | ICD-10-CM

## 2021-04-09 DIAGNOSIS — M54.16 LUMBAR RADICULITIS: ICD-10-CM

## 2021-04-09 PROCEDURE — 99999 PR PBB SHADOW E&M-EST. PATIENT-LVL IV: CPT | Mod: PBBFAC,,, | Performed by: PHYSICIAN ASSISTANT

## 2021-04-09 PROCEDURE — 99213 PR OFFICE/OUTPT VISIT, EST, LEVL III, 20-29 MIN: ICD-10-PCS | Mod: S$GLB,,, | Performed by: PHYSICIAN ASSISTANT

## 2021-04-09 PROCEDURE — 1101F PT FALLS ASSESS-DOCD LE1/YR: CPT | Mod: CPTII,S$GLB,, | Performed by: PHYSICIAN ASSISTANT

## 2021-04-09 PROCEDURE — 1159F PR MEDICATION LIST DOCUMENTED IN MEDICAL RECORD: ICD-10-PCS | Mod: S$GLB,,, | Performed by: PHYSICIAN ASSISTANT

## 2021-04-09 PROCEDURE — 3288F PR FALLS RISK ASSESSMENT DOCUMENTED: ICD-10-PCS | Mod: CPTII,S$GLB,, | Performed by: PHYSICIAN ASSISTANT

## 2021-04-09 PROCEDURE — 1125F AMNT PAIN NOTED PAIN PRSNT: CPT | Mod: S$GLB,,, | Performed by: PHYSICIAN ASSISTANT

## 2021-04-09 PROCEDURE — 1125F PR PAIN SEVERITY QUANTIFIED, PAIN PRESENT: ICD-10-PCS | Mod: S$GLB,,, | Performed by: PHYSICIAN ASSISTANT

## 2021-04-09 PROCEDURE — 1157F ADVNC CARE PLAN IN RCRD: CPT | Mod: S$GLB,,, | Performed by: PHYSICIAN ASSISTANT

## 2021-04-09 PROCEDURE — 99213 OFFICE O/P EST LOW 20 MIN: CPT | Mod: S$GLB,,, | Performed by: PHYSICIAN ASSISTANT

## 2021-04-09 PROCEDURE — 1159F MED LIST DOCD IN RCRD: CPT | Mod: S$GLB,,, | Performed by: PHYSICIAN ASSISTANT

## 2021-04-09 PROCEDURE — 1157F PR ADVANCE CARE PLAN OR EQUIV PRESENT IN MEDICAL RECORD: ICD-10-PCS | Mod: S$GLB,,, | Performed by: PHYSICIAN ASSISTANT

## 2021-04-09 PROCEDURE — 99999 PR PBB SHADOW E&M-EST. PATIENT-LVL IV: ICD-10-PCS | Mod: PBBFAC,,, | Performed by: PHYSICIAN ASSISTANT

## 2021-04-09 PROCEDURE — 3288F FALL RISK ASSESSMENT DOCD: CPT | Mod: CPTII,S$GLB,, | Performed by: PHYSICIAN ASSISTANT

## 2021-04-09 PROCEDURE — 1101F PR PT FALLS ASSESS DOC 0-1 FALLS W/OUT INJ PAST YR: ICD-10-PCS | Mod: CPTII,S$GLB,, | Performed by: PHYSICIAN ASSISTANT

## 2021-04-19 ENCOUNTER — OFFICE VISIT (OUTPATIENT)
Dept: ORTHOPEDICS | Facility: CLINIC | Age: 76
End: 2021-04-19
Payer: MEDICARE

## 2021-04-19 VITALS — DIASTOLIC BLOOD PRESSURE: 76 MMHG | HEART RATE: 65 BPM | SYSTOLIC BLOOD PRESSURE: 139 MMHG

## 2021-04-19 DIAGNOSIS — M65.4 RADIAL STYLOID TENOSYNOVITIS (DE QUERVAIN): ICD-10-CM

## 2021-04-19 PROCEDURE — 1101F PT FALLS ASSESS-DOCD LE1/YR: CPT | Mod: CPTII,S$GLB,, | Performed by: ORTHOPAEDIC SURGERY

## 2021-04-19 PROCEDURE — 3288F PR FALLS RISK ASSESSMENT DOCUMENTED: ICD-10-PCS | Mod: CPTII,S$GLB,, | Performed by: ORTHOPAEDIC SURGERY

## 2021-04-19 PROCEDURE — 99203 PR OFFICE/OUTPT VISIT, NEW, LEVL III, 30-44 MIN: ICD-10-PCS | Mod: 25,S$GLB,, | Performed by: ORTHOPAEDIC SURGERY

## 2021-04-19 PROCEDURE — 99999 PR PBB SHADOW E&M-EST. PATIENT-LVL III: CPT | Mod: PBBFAC,,, | Performed by: ORTHOPAEDIC SURGERY

## 2021-04-19 PROCEDURE — 1125F PR PAIN SEVERITY QUANTIFIED, PAIN PRESENT: ICD-10-PCS | Mod: S$GLB,,, | Performed by: ORTHOPAEDIC SURGERY

## 2021-04-19 PROCEDURE — 1157F ADVNC CARE PLAN IN RCRD: CPT | Mod: S$GLB,,, | Performed by: ORTHOPAEDIC SURGERY

## 2021-04-19 PROCEDURE — 1125F AMNT PAIN NOTED PAIN PRSNT: CPT | Mod: S$GLB,,, | Performed by: ORTHOPAEDIC SURGERY

## 2021-04-19 PROCEDURE — 3288F FALL RISK ASSESSMENT DOCD: CPT | Mod: CPTII,S$GLB,, | Performed by: ORTHOPAEDIC SURGERY

## 2021-04-19 PROCEDURE — 1159F PR MEDICATION LIST DOCUMENTED IN MEDICAL RECORD: ICD-10-PCS | Mod: S$GLB,,, | Performed by: ORTHOPAEDIC SURGERY

## 2021-04-19 PROCEDURE — 99203 OFFICE O/P NEW LOW 30 MIN: CPT | Mod: 25,S$GLB,, | Performed by: ORTHOPAEDIC SURGERY

## 2021-04-19 PROCEDURE — 1157F PR ADVANCE CARE PLAN OR EQUIV PRESENT IN MEDICAL RECORD: ICD-10-PCS | Mod: S$GLB,,, | Performed by: ORTHOPAEDIC SURGERY

## 2021-04-19 PROCEDURE — 20550 TENDON SHEATH: ICD-10-PCS | Mod: RT,S$GLB,, | Performed by: ORTHOPAEDIC SURGERY

## 2021-04-19 PROCEDURE — 1101F PR PT FALLS ASSESS DOC 0-1 FALLS W/OUT INJ PAST YR: ICD-10-PCS | Mod: CPTII,S$GLB,, | Performed by: ORTHOPAEDIC SURGERY

## 2021-04-19 PROCEDURE — 20550 NJX 1 TENDON SHEATH/LIGAMENT: CPT | Mod: RT,S$GLB,, | Performed by: ORTHOPAEDIC SURGERY

## 2021-04-19 PROCEDURE — 99999 PR PBB SHADOW E&M-EST. PATIENT-LVL III: ICD-10-PCS | Mod: PBBFAC,,, | Performed by: ORTHOPAEDIC SURGERY

## 2021-04-19 PROCEDURE — 1159F MED LIST DOCD IN RCRD: CPT | Mod: S$GLB,,, | Performed by: ORTHOPAEDIC SURGERY

## 2021-04-19 RX ORDER — TRIAMCINOLONE ACETONIDE 40 MG/ML
40 INJECTION, SUSPENSION INTRA-ARTICULAR; INTRAMUSCULAR
Status: DISCONTINUED | OUTPATIENT
Start: 2021-04-19 | End: 2021-04-19 | Stop reason: HOSPADM

## 2021-04-19 RX ADMIN — TRIAMCINOLONE ACETONIDE 40 MG: 40 INJECTION, SUSPENSION INTRA-ARTICULAR; INTRAMUSCULAR at 01:04

## 2021-05-13 NOTE — DISCHARGE SUMMARY
[facility]  Discharge Note  Short Stay    Admit Date: 6/5/2019    Discharge Date: 6/5/2019    Attending Physician: Riley Segura MD     Discharge Provider: Riley Segura    Diagnoses:  Active Hospital Problems    Diagnosis  POA    *Lumbar radiculopathy [M54.16]  Yes      Resolved Hospital Problems   No resolved problems to display.       Discharged Condition: good    Final Diagnoses: Lumbar radiculopathy [M54.16]    Disposition: Home or Self Care    Hospital Course: no complications, uneventful    Outcome of Hospitalization, Treatment, Procedure, or Surgery:  Patient was admitted for outpatient procedure. The patient underwent procedure without complications and are discharged home    Follow up/Patient Instructions:  Follow up as scheduled in Pain Management clinic in 3-4 weeks/Patient has received instructions and follow up date and time    Medications:  Continue previous medications    Discharge Procedure Orders   Call MD for:  temperature >100.4     Call MD for:  severe uncontrolled pain     Call MD for:  redness, tenderness, or signs of infection (pain, swelling, redness, odor or green/yellow discharge around incision site)     Call MD for:  severe persistent headache     No dressing needed         Discharge Procedure Orders (must include Diet, Follow-up, Activity):   Discharge Procedure Orders (must include Diet, Follow-up, Activity)   Call MD for:  temperature >100.4     Call MD for:  severe uncontrolled pain     Call MD for:  redness, tenderness, or signs of infection (pain, swelling, redness, odor or green/yellow discharge around incision site)     Call MD for:  severe persistent headache     No dressing needed          
x2/wound culture

## 2021-06-06 ENCOUNTER — PATIENT MESSAGE (OUTPATIENT)
Dept: PAIN MEDICINE | Facility: CLINIC | Age: 76
End: 2021-06-06

## 2021-06-08 ENCOUNTER — OFFICE VISIT (OUTPATIENT)
Dept: PAIN MEDICINE | Facility: CLINIC | Age: 76
End: 2021-06-08
Payer: MEDICARE

## 2021-06-08 VITALS
DIASTOLIC BLOOD PRESSURE: 60 MMHG | BODY MASS INDEX: 42.72 KG/M2 | WEIGHT: 306.31 LBS | RESPIRATION RATE: 20 BRPM | HEART RATE: 65 BPM | SYSTOLIC BLOOD PRESSURE: 123 MMHG | OXYGEN SATURATION: 95 % | TEMPERATURE: 98 F

## 2021-06-08 DIAGNOSIS — M54.16 LUMBAR RADICULITIS: ICD-10-CM

## 2021-06-08 DIAGNOSIS — G89.4 CHRONIC PAIN SYNDROME: ICD-10-CM

## 2021-06-08 DIAGNOSIS — M54.16 LUMBAR RADICULOPATHY: Primary | ICD-10-CM

## 2021-06-08 PROCEDURE — 1159F MED LIST DOCD IN RCRD: CPT | Mod: S$GLB,,, | Performed by: PHYSICIAN ASSISTANT

## 2021-06-08 PROCEDURE — 99214 PR OFFICE/OUTPT VISIT, EST, LEVL IV, 30-39 MIN: ICD-10-PCS | Mod: S$GLB,,, | Performed by: PHYSICIAN ASSISTANT

## 2021-06-08 PROCEDURE — 1159F PR MEDICATION LIST DOCUMENTED IN MEDICAL RECORD: ICD-10-PCS | Mod: S$GLB,,, | Performed by: PHYSICIAN ASSISTANT

## 2021-06-08 PROCEDURE — 3288F FALL RISK ASSESSMENT DOCD: CPT | Mod: CPTII,S$GLB,, | Performed by: PHYSICIAN ASSISTANT

## 2021-06-08 PROCEDURE — 99999 PR PBB SHADOW E&M-EST. PATIENT-LVL V: ICD-10-PCS | Mod: PBBFAC,,, | Performed by: PHYSICIAN ASSISTANT

## 2021-06-08 PROCEDURE — 1157F ADVNC CARE PLAN IN RCRD: CPT | Mod: S$GLB,,, | Performed by: PHYSICIAN ASSISTANT

## 2021-06-08 PROCEDURE — 1125F PR PAIN SEVERITY QUANTIFIED, PAIN PRESENT: ICD-10-PCS | Mod: S$GLB,,, | Performed by: PHYSICIAN ASSISTANT

## 2021-06-08 PROCEDURE — 1101F PR PT FALLS ASSESS DOC 0-1 FALLS W/OUT INJ PAST YR: ICD-10-PCS | Mod: CPTII,S$GLB,, | Performed by: PHYSICIAN ASSISTANT

## 2021-06-08 PROCEDURE — 99214 OFFICE O/P EST MOD 30 MIN: CPT | Mod: S$GLB,,, | Performed by: PHYSICIAN ASSISTANT

## 2021-06-08 PROCEDURE — 1157F PR ADVANCE CARE PLAN OR EQUIV PRESENT IN MEDICAL RECORD: ICD-10-PCS | Mod: S$GLB,,, | Performed by: PHYSICIAN ASSISTANT

## 2021-06-08 PROCEDURE — 1125F AMNT PAIN NOTED PAIN PRSNT: CPT | Mod: S$GLB,,, | Performed by: PHYSICIAN ASSISTANT

## 2021-06-08 PROCEDURE — 3288F PR FALLS RISK ASSESSMENT DOCUMENTED: ICD-10-PCS | Mod: CPTII,S$GLB,, | Performed by: PHYSICIAN ASSISTANT

## 2021-06-08 PROCEDURE — 99999 PR PBB SHADOW E&M-EST. PATIENT-LVL V: CPT | Mod: PBBFAC,,, | Performed by: PHYSICIAN ASSISTANT

## 2021-06-08 PROCEDURE — 1101F PT FALLS ASSESS-DOCD LE1/YR: CPT | Mod: CPTII,S$GLB,, | Performed by: PHYSICIAN ASSISTANT

## 2021-06-08 RX ORDER — ALPRAZOLAM 0.5 MG/1
1 TABLET, ORALLY DISINTEGRATING ORAL ONCE AS NEEDED
Status: CANCELLED | OUTPATIENT
Start: 2021-06-24 | End: 2032-11-19

## 2021-06-23 ENCOUNTER — TELEPHONE (OUTPATIENT)
Dept: PAIN MEDICINE | Facility: CLINIC | Age: 76
End: 2021-06-23

## 2021-06-24 ENCOUNTER — HOSPITAL ENCOUNTER (OUTPATIENT)
Facility: HOSPITAL | Age: 76
Discharge: HOME | End: 2021-06-24
Attending: ANESTHESIOLOGY | Admitting: ANESTHESIOLOGY
Payer: MEDICARE

## 2021-06-24 ENCOUNTER — HOSPITAL ENCOUNTER (OUTPATIENT)
Dept: RADIOLOGY | Facility: HOSPITAL | Age: 76
Discharge: HOME | End: 2021-06-24
Attending: ANESTHESIOLOGY
Payer: MEDICARE

## 2021-06-24 DIAGNOSIS — G89.4 CHRONIC PAIN SYNDROME: Primary | ICD-10-CM

## 2021-06-24 DIAGNOSIS — M54.16 LUMBAR RADICULOPATHY: ICD-10-CM

## 2021-06-24 DIAGNOSIS — G89.4 CHRONIC PAIN SYNDROME: ICD-10-CM

## 2021-06-24 PROCEDURE — 25500020 PHARM REV CODE 255: Mod: PO | Performed by: ANESTHESIOLOGY

## 2021-06-24 PROCEDURE — 25000003 PHARM REV CODE 250: Mod: PO | Performed by: ANESTHESIOLOGY

## 2021-06-24 PROCEDURE — 64483 NJX AA&/STRD TFRM EPI L/S 1: CPT | Mod: LT,,, | Performed by: ANESTHESIOLOGY

## 2021-06-24 PROCEDURE — 64483 PR EPIDURAL INJ, ANES/STEROID, TRANSFORAMINAL, LUMB/SACR, SNGL LEVL: ICD-10-PCS | Mod: LT,,, | Performed by: ANESTHESIOLOGY

## 2021-06-24 PROCEDURE — 64483 NJX AA&/STRD TFRM EPI L/S 1: CPT | Mod: PO | Performed by: ANESTHESIOLOGY

## 2021-06-24 PROCEDURE — 63600175 PHARM REV CODE 636 W HCPCS: Mod: PO | Performed by: ANESTHESIOLOGY

## 2021-06-24 PROCEDURE — 76000 FLUOROSCOPY <1 HR PHYS/QHP: CPT | Mod: TC,PO

## 2021-06-24 RX ORDER — ALPRAZOLAM 0.5 MG/1
1 TABLET, ORALLY DISINTEGRATING ORAL ONCE AS NEEDED
Status: COMPLETED | OUTPATIENT
Start: 2021-06-24 | End: 2021-06-24

## 2021-06-24 RX ORDER — BUPIVACAINE HYDROCHLORIDE 2.5 MG/ML
INJECTION, SOLUTION EPIDURAL; INFILTRATION; INTRACAUDAL
Status: DISCONTINUED | OUTPATIENT
Start: 2021-06-24 | End: 2021-06-24 | Stop reason: HOSPADM

## 2021-06-24 RX ORDER — LIDOCAINE HYDROCHLORIDE 10 MG/ML
INJECTION, SOLUTION EPIDURAL; INFILTRATION; INTRACAUDAL; PERINEURAL
Status: DISCONTINUED | OUTPATIENT
Start: 2021-06-24 | End: 2021-06-24 | Stop reason: HOSPADM

## 2021-06-24 RX ORDER — METHYLPREDNISOLONE ACETATE 40 MG/ML
INJECTION, SUSPENSION INTRA-ARTICULAR; INTRALESIONAL; INTRAMUSCULAR; SOFT TISSUE
Status: DISCONTINUED | OUTPATIENT
Start: 2021-06-24 | End: 2021-06-24 | Stop reason: HOSPADM

## 2021-06-24 RX ADMIN — ALPRAZOLAM 1 MG: 0.5 TABLET, ORALLY DISINTEGRATING ORAL at 03:06

## 2021-06-25 VITALS
HEART RATE: 69 BPM | DIASTOLIC BLOOD PRESSURE: 77 MMHG | OXYGEN SATURATION: 95 % | RESPIRATION RATE: 18 BRPM | SYSTOLIC BLOOD PRESSURE: 149 MMHG | TEMPERATURE: 98 F | HEIGHT: 71 IN | BODY MASS INDEX: 42.84 KG/M2 | WEIGHT: 306 LBS

## 2021-07-14 ENCOUNTER — PATIENT OUTREACH (OUTPATIENT)
Dept: ADMINISTRATIVE | Facility: OTHER | Age: 76
End: 2021-07-14

## 2021-07-15 ENCOUNTER — OFFICE VISIT (OUTPATIENT)
Dept: PAIN MEDICINE | Facility: CLINIC | Age: 76
End: 2021-07-15
Payer: MEDICARE

## 2021-07-15 VITALS
DIASTOLIC BLOOD PRESSURE: 67 MMHG | WEIGHT: 308.06 LBS | BODY MASS INDEX: 43.13 KG/M2 | SYSTOLIC BLOOD PRESSURE: 153 MMHG | OXYGEN SATURATION: 96 % | HEIGHT: 71 IN | HEART RATE: 49 BPM

## 2021-07-15 DIAGNOSIS — M54.16 LUMBAR RADICULITIS: ICD-10-CM

## 2021-07-15 DIAGNOSIS — G89.4 CHRONIC PAIN SYNDROME: ICD-10-CM

## 2021-07-15 DIAGNOSIS — M54.16 LUMBAR RADICULOPATHY: Primary | ICD-10-CM

## 2021-07-15 DIAGNOSIS — M51.36 DDD (DEGENERATIVE DISC DISEASE), LUMBAR: ICD-10-CM

## 2021-07-15 PROCEDURE — 99212 OFFICE O/P EST SF 10 MIN: CPT | Mod: S$GLB,,, | Performed by: PHYSICIAN ASSISTANT

## 2021-07-15 PROCEDURE — 1159F PR MEDICATION LIST DOCUMENTED IN MEDICAL RECORD: ICD-10-PCS | Mod: CPTII,S$GLB,, | Performed by: PHYSICIAN ASSISTANT

## 2021-07-15 PROCEDURE — 99999 PR PBB SHADOW E&M-EST. PATIENT-LVL III: ICD-10-PCS | Mod: PBBFAC,,, | Performed by: PHYSICIAN ASSISTANT

## 2021-07-15 PROCEDURE — 1157F ADVNC CARE PLAN IN RCRD: CPT | Mod: CPTII,S$GLB,, | Performed by: PHYSICIAN ASSISTANT

## 2021-07-15 PROCEDURE — 99212 PR OFFICE/OUTPT VISIT, EST, LEVL II, 10-19 MIN: ICD-10-PCS | Mod: S$GLB,,, | Performed by: PHYSICIAN ASSISTANT

## 2021-07-15 PROCEDURE — 3288F PR FALLS RISK ASSESSMENT DOCUMENTED: ICD-10-PCS | Mod: CPTII,S$GLB,, | Performed by: PHYSICIAN ASSISTANT

## 2021-07-15 PROCEDURE — 1157F PR ADVANCE CARE PLAN OR EQUIV PRESENT IN MEDICAL RECORD: ICD-10-PCS | Mod: CPTII,S$GLB,, | Performed by: PHYSICIAN ASSISTANT

## 2021-07-15 PROCEDURE — 99999 PR PBB SHADOW E&M-EST. PATIENT-LVL III: CPT | Mod: PBBFAC,,, | Performed by: PHYSICIAN ASSISTANT

## 2021-07-15 PROCEDURE — 1101F PT FALLS ASSESS-DOCD LE1/YR: CPT | Mod: CPTII,S$GLB,, | Performed by: PHYSICIAN ASSISTANT

## 2021-07-15 PROCEDURE — 1125F AMNT PAIN NOTED PAIN PRSNT: CPT | Mod: CPTII,S$GLB,, | Performed by: PHYSICIAN ASSISTANT

## 2021-07-15 PROCEDURE — 1125F PR PAIN SEVERITY QUANTIFIED, PAIN PRESENT: ICD-10-PCS | Mod: CPTII,S$GLB,, | Performed by: PHYSICIAN ASSISTANT

## 2021-07-15 PROCEDURE — 3288F FALL RISK ASSESSMENT DOCD: CPT | Mod: CPTII,S$GLB,, | Performed by: PHYSICIAN ASSISTANT

## 2021-07-15 PROCEDURE — 1159F MED LIST DOCD IN RCRD: CPT | Mod: CPTII,S$GLB,, | Performed by: PHYSICIAN ASSISTANT

## 2021-07-15 PROCEDURE — 1101F PR PT FALLS ASSESS DOC 0-1 FALLS W/OUT INJ PAST YR: ICD-10-PCS | Mod: CPTII,S$GLB,, | Performed by: PHYSICIAN ASSISTANT

## 2021-09-28 ENCOUNTER — OFFICE VISIT (OUTPATIENT)
Dept: FAMILY MEDICINE | Facility: CLINIC | Age: 76
End: 2021-09-28
Payer: MEDICARE

## 2021-09-28 VITALS
DIASTOLIC BLOOD PRESSURE: 78 MMHG | HEART RATE: 60 BPM | WEIGHT: 303.88 LBS | SYSTOLIC BLOOD PRESSURE: 134 MMHG | HEIGHT: 71 IN | BODY MASS INDEX: 42.54 KG/M2

## 2021-09-28 DIAGNOSIS — Z23 NEED FOR VACCINATION: ICD-10-CM

## 2021-09-28 DIAGNOSIS — M65.4 RADIAL STYLOID TENOSYNOVITIS (DE QUERVAIN): Primary | ICD-10-CM

## 2021-09-28 DIAGNOSIS — I10 ESSENTIAL HYPERTENSION: ICD-10-CM

## 2021-09-28 PROCEDURE — 1159F PR MEDICATION LIST DOCUMENTED IN MEDICAL RECORD: ICD-10-PCS | Mod: CPTII,S$GLB,, | Performed by: FAMILY MEDICINE

## 2021-09-28 PROCEDURE — 1159F MED LIST DOCD IN RCRD: CPT | Mod: CPTII,S$GLB,, | Performed by: FAMILY MEDICINE

## 2021-09-28 PROCEDURE — 3288F PR FALLS RISK ASSESSMENT DOCUMENTED: ICD-10-PCS | Mod: CPTII,S$GLB,, | Performed by: FAMILY MEDICINE

## 2021-09-28 PROCEDURE — 3078F PR MOST RECENT DIASTOLIC BLOOD PRESSURE < 80 MM HG: ICD-10-PCS | Mod: CPTII,S$GLB,, | Performed by: FAMILY MEDICINE

## 2021-09-28 PROCEDURE — 3075F SYST BP GE 130 - 139MM HG: CPT | Mod: CPTII,S$GLB,, | Performed by: FAMILY MEDICINE

## 2021-09-28 PROCEDURE — 99214 PR OFFICE/OUTPT VISIT, EST, LEVL IV, 30-39 MIN: ICD-10-PCS | Mod: 25,S$GLB,, | Performed by: FAMILY MEDICINE

## 2021-09-28 PROCEDURE — 1160F RVW MEDS BY RX/DR IN RCRD: CPT | Mod: CPTII,S$GLB,, | Performed by: FAMILY MEDICINE

## 2021-09-28 PROCEDURE — G0008 FLU VACCINE - HIGH DOSE (65+) PRESERVATIVE FREE IM: ICD-10-PCS | Mod: S$GLB,,, | Performed by: FAMILY MEDICINE

## 2021-09-28 PROCEDURE — 1101F PT FALLS ASSESS-DOCD LE1/YR: CPT | Mod: CPTII,S$GLB,, | Performed by: FAMILY MEDICINE

## 2021-09-28 PROCEDURE — 1125F PR PAIN SEVERITY QUANTIFIED, PAIN PRESENT: ICD-10-PCS | Mod: CPTII,S$GLB,, | Performed by: FAMILY MEDICINE

## 2021-09-28 PROCEDURE — 1157F PR ADVANCE CARE PLAN OR EQUIV PRESENT IN MEDICAL RECORD: ICD-10-PCS | Mod: CPTII,S$GLB,, | Performed by: FAMILY MEDICINE

## 2021-09-28 PROCEDURE — 99499 RISK ADDL DX/OHS AUDIT: ICD-10-PCS | Mod: S$GLB,,, | Performed by: FAMILY MEDICINE

## 2021-09-28 PROCEDURE — 1157F ADVNC CARE PLAN IN RCRD: CPT | Mod: CPTII,S$GLB,, | Performed by: FAMILY MEDICINE

## 2021-09-28 PROCEDURE — 96372 PR INJECTION,THERAP/PROPH/DIAG2ST, IM OR SUBCUT: ICD-10-PCS | Mod: 59,S$GLB,, | Performed by: FAMILY MEDICINE

## 2021-09-28 PROCEDURE — 3288F FALL RISK ASSESSMENT DOCD: CPT | Mod: CPTII,S$GLB,, | Performed by: FAMILY MEDICINE

## 2021-09-28 PROCEDURE — 90662 FLU VACCINE - HIGH DOSE (65+) PRESERVATIVE FREE IM: ICD-10-PCS | Mod: S$GLB,,, | Performed by: FAMILY MEDICINE

## 2021-09-28 PROCEDURE — G0008 ADMIN INFLUENZA VIRUS VAC: HCPCS | Mod: S$GLB,,, | Performed by: FAMILY MEDICINE

## 2021-09-28 PROCEDURE — 1160F PR REVIEW ALL MEDS BY PRESCRIBER/CLIN PHARMACIST DOCUMENTED: ICD-10-PCS | Mod: CPTII,S$GLB,, | Performed by: FAMILY MEDICINE

## 2021-09-28 PROCEDURE — 96372 THER/PROPH/DIAG INJ SC/IM: CPT | Mod: 59,S$GLB,, | Performed by: FAMILY MEDICINE

## 2021-09-28 PROCEDURE — 1125F AMNT PAIN NOTED PAIN PRSNT: CPT | Mod: CPTII,S$GLB,, | Performed by: FAMILY MEDICINE

## 2021-09-28 PROCEDURE — 99999 PR PBB SHADOW E&M-EST. PATIENT-LVL IV: ICD-10-PCS | Mod: PBBFAC,,, | Performed by: FAMILY MEDICINE

## 2021-09-28 PROCEDURE — 3078F DIAST BP <80 MM HG: CPT | Mod: CPTII,S$GLB,, | Performed by: FAMILY MEDICINE

## 2021-09-28 PROCEDURE — 3075F PR MOST RECENT SYSTOLIC BLOOD PRESS GE 130-139MM HG: ICD-10-PCS | Mod: CPTII,S$GLB,, | Performed by: FAMILY MEDICINE

## 2021-09-28 PROCEDURE — 99214 OFFICE O/P EST MOD 30 MIN: CPT | Mod: 25,S$GLB,, | Performed by: FAMILY MEDICINE

## 2021-09-28 PROCEDURE — 1101F PR PT FALLS ASSESS DOC 0-1 FALLS W/OUT INJ PAST YR: ICD-10-PCS | Mod: CPTII,S$GLB,, | Performed by: FAMILY MEDICINE

## 2021-09-28 PROCEDURE — 90662 IIV NO PRSV INCREASED AG IM: CPT | Mod: S$GLB,,, | Performed by: FAMILY MEDICINE

## 2021-09-28 PROCEDURE — 99999 PR PBB SHADOW E&M-EST. PATIENT-LVL IV: CPT | Mod: PBBFAC,,, | Performed by: FAMILY MEDICINE

## 2021-09-28 PROCEDURE — 99499 UNLISTED E&M SERVICE: CPT | Mod: S$GLB,,, | Performed by: FAMILY MEDICINE

## 2021-09-28 RX ORDER — HYDROCHLOROTHIAZIDE 12.5 MG/1
12.5 TABLET ORAL DAILY
Qty: 90 TABLET | Refills: 3 | Status: SHIPPED | OUTPATIENT
Start: 2021-09-28 | End: 2022-09-19

## 2021-09-28 RX ORDER — TRIAMCINOLONE ACETONIDE 40 MG/ML
20 INJECTION, SUSPENSION INTRA-ARTICULAR; INTRAMUSCULAR
Status: DISCONTINUED | OUTPATIENT
Start: 2021-09-28 | End: 2021-09-28 | Stop reason: HOSPADM

## 2021-09-28 RX ADMIN — TRIAMCINOLONE ACETONIDE 20 MG: 40 INJECTION, SUSPENSION INTRA-ARTICULAR; INTRAMUSCULAR at 08:09

## 2021-11-16 ENCOUNTER — IMMUNIZATION (OUTPATIENT)
Dept: FAMILY MEDICINE | Facility: CLINIC | Age: 76
End: 2021-11-16
Payer: MEDICARE

## 2021-11-16 DIAGNOSIS — Z23 NEED FOR VACCINATION: Primary | ICD-10-CM

## 2021-11-16 PROCEDURE — 0003A COVID-19, MRNA, LNP-S, PF, 30 MCG/0.3 ML DOSE VACCINE: CPT | Mod: PBBFAC | Performed by: FAMILY MEDICINE

## 2021-11-16 PROCEDURE — 91300 COVID-19, MRNA, LNP-S, PF, 30 MCG/0.3 ML DOSE VACCINE: CPT | Mod: PBBFAC | Performed by: FAMILY MEDICINE

## 2021-11-29 ENCOUNTER — OFFICE VISIT (OUTPATIENT)
Dept: ORTHOPEDICS | Facility: CLINIC | Age: 76
End: 2021-11-29
Payer: MEDICARE

## 2021-11-29 VITALS — WEIGHT: 303 LBS | HEIGHT: 71 IN | BODY MASS INDEX: 42.42 KG/M2

## 2021-11-29 DIAGNOSIS — M65.4 DE QUERVAIN'S TENOSYNOVITIS, RIGHT: Primary | ICD-10-CM

## 2021-11-29 PROCEDURE — 99213 OFFICE O/P EST LOW 20 MIN: CPT | Mod: 25,S$GLB,, | Performed by: ORTHOPAEDIC SURGERY

## 2021-11-29 PROCEDURE — 99213 PR OFFICE/OUTPT VISIT, EST, LEVL III, 20-29 MIN: ICD-10-PCS | Mod: 25,S$GLB,, | Performed by: ORTHOPAEDIC SURGERY

## 2021-11-29 PROCEDURE — 20550 TENDON SHEATH: ICD-10-PCS | Mod: RT,S$GLB,, | Performed by: ORTHOPAEDIC SURGERY

## 2021-11-29 PROCEDURE — 1157F ADVNC CARE PLAN IN RCRD: CPT | Mod: CPTII,S$GLB,, | Performed by: ORTHOPAEDIC SURGERY

## 2021-11-29 PROCEDURE — 20550 NJX 1 TENDON SHEATH/LIGAMENT: CPT | Mod: RT,S$GLB,, | Performed by: ORTHOPAEDIC SURGERY

## 2021-11-29 PROCEDURE — 99999 PR PBB SHADOW E&M-EST. PATIENT-LVL III: CPT | Mod: PBBFAC,,, | Performed by: ORTHOPAEDIC SURGERY

## 2021-11-29 PROCEDURE — 99999 PR PBB SHADOW E&M-EST. PATIENT-LVL III: ICD-10-PCS | Mod: PBBFAC,,, | Performed by: ORTHOPAEDIC SURGERY

## 2021-11-29 PROCEDURE — 1157F PR ADVANCE CARE PLAN OR EQUIV PRESENT IN MEDICAL RECORD: ICD-10-PCS | Mod: CPTII,S$GLB,, | Performed by: ORTHOPAEDIC SURGERY

## 2021-11-29 RX ORDER — TRIAMCINOLONE ACETONIDE 40 MG/ML
40 INJECTION, SUSPENSION INTRA-ARTICULAR; INTRAMUSCULAR
Status: DISCONTINUED | OUTPATIENT
Start: 2021-11-29 | End: 2021-11-29 | Stop reason: HOSPADM

## 2021-11-29 RX ADMIN — TRIAMCINOLONE ACETONIDE 40 MG: 40 INJECTION, SUSPENSION INTRA-ARTICULAR; INTRAMUSCULAR at 11:11

## 2021-12-23 RX ORDER — METOPROLOL SUCCINATE 100 MG/1
TABLET, EXTENDED RELEASE ORAL
Qty: 90 TABLET | Refills: 3 | Status: SHIPPED | OUTPATIENT
Start: 2021-12-23 | End: 2022-12-23

## 2022-03-07 ENCOUNTER — PATIENT MESSAGE (OUTPATIENT)
Dept: FAMILY MEDICINE | Facility: CLINIC | Age: 77
End: 2022-03-07
Payer: MEDICARE

## 2022-03-08 ENCOUNTER — PATIENT MESSAGE (OUTPATIENT)
Dept: FAMILY MEDICINE | Facility: CLINIC | Age: 77
End: 2022-03-08
Payer: MEDICARE

## 2022-03-08 RX ORDER — FINASTERIDE 5 MG/1
5 TABLET, FILM COATED ORAL DAILY
Qty: 90 TABLET | Refills: 3 | Status: SHIPPED | OUTPATIENT
Start: 2022-03-08 | End: 2023-02-28 | Stop reason: SDUPTHER

## 2022-03-08 NOTE — TELEPHONE ENCOUNTER
Care Due:                  Date            Visit Type   Department     Provider  --------------------------------------------------------------------------------                                MYCHART                              FOLLOWUP/OF  [facility]  Last Visit: 09-      FICE VISIT   MEDICINE       Tevin Winters                              MYCHART                              FOLLOWUP/OF  [facility]  Next Visit: 03-      FICE VISIT   MEDICINE       Tevin Winters                                                            Last  Test          Frequency    Reason                     Performed    Due Date  --------------------------------------------------------------------------------    CMP.........  12 months..  hydroCHLOROthiazide,       01- 01-                             losartan.................    Powered by [employer]. Reference number: [id number].   3/08/2022 8:33:56 AM CST

## 2022-03-09 ENCOUNTER — PATIENT OUTREACH (OUTPATIENT)
Dept: ADMINISTRATIVE | Facility: HOSPITAL | Age: 77
End: 2022-03-09
Payer: MEDICARE

## 2022-03-09 ENCOUNTER — PATIENT MESSAGE (OUTPATIENT)
Dept: ADMINISTRATIVE | Facility: HOSPITAL | Age: 77
End: 2022-03-09
Payer: MEDICARE

## 2022-03-09 NOTE — PROGRESS NOTES
2022 Care Everywhere updates requested and reviewed.  Immunizations reconciled. Media reports reviewed.  Duplicate HM overrides and  orders removed.  Overdue HM topic chart audit and/or requested.  Overdue lab testing linked to upcoming lab appointments if applies.  [facility] and [employer] reviewed   Lab testing       Health Maintenance Due   Topic Date Due    Hepatitis C Screening  Never done    Shingles Vaccine (2 of 3) 2015    Colonoscopy  2018    Lipid Panel  2022

## 2022-03-22 ENCOUNTER — LAB VISIT (OUTPATIENT)
Dept: LAB | Facility: HOSPITAL | Age: 77
End: 2022-03-22
Attending: FAMILY MEDICINE
Payer: MEDICARE

## 2022-03-22 ENCOUNTER — OFFICE VISIT (OUTPATIENT)
Dept: FAMILY MEDICINE | Facility: CLINIC | Age: 77
End: 2022-03-22
Payer: MEDICARE

## 2022-03-22 VITALS
WEIGHT: 314.06 LBS | BODY MASS INDEX: 43.97 KG/M2 | RESPIRATION RATE: 18 BRPM | HEART RATE: 75 BPM | SYSTOLIC BLOOD PRESSURE: 130 MMHG | OXYGEN SATURATION: 96 % | DIASTOLIC BLOOD PRESSURE: 82 MMHG | HEIGHT: 71 IN

## 2022-03-22 DIAGNOSIS — Z12.5 SCREENING FOR PROSTATE CANCER: ICD-10-CM

## 2022-03-22 DIAGNOSIS — I10 ESSENTIAL HYPERTENSION: Primary | ICD-10-CM

## 2022-03-22 DIAGNOSIS — M51.36 DDD (DEGENERATIVE DISC DISEASE), LUMBAR: ICD-10-CM

## 2022-03-22 DIAGNOSIS — G47.33 OSA (OBSTRUCTIVE SLEEP APNEA): ICD-10-CM

## 2022-03-22 DIAGNOSIS — E66.01 MORBID OBESITY WITH BMI OF 40.0-44.9, ADULT: ICD-10-CM

## 2022-03-22 DIAGNOSIS — I10 ESSENTIAL HYPERTENSION: ICD-10-CM

## 2022-03-22 DIAGNOSIS — I44.1 MOBITZ I: ICD-10-CM

## 2022-03-22 DIAGNOSIS — D12.4 ADENOMATOUS POLYP OF DESCENDING COLON: ICD-10-CM

## 2022-03-22 DIAGNOSIS — Z12.11 COLON CANCER SCREENING: ICD-10-CM

## 2022-03-22 DIAGNOSIS — M17.0 PRIMARY OSTEOARTHRITIS OF BOTH KNEES: ICD-10-CM

## 2022-03-22 LAB
ALBUMIN SERPL BCP-MCNC: 3.7 G/DL (ref 3.5–5.2)
ALP SERPL-CCNC: 51 U/L (ref 55–135)
ALT SERPL W/O P-5'-P-CCNC: 24 U/L (ref 10–44)
ANION GAP SERPL CALC-SCNC: 8 MMOL/L (ref 8–16)
AST SERPL-CCNC: 25 U/L (ref 10–40)
BILIRUB SERPL-MCNC: 0.6 MG/DL (ref 0.1–1)
BUN SERPL-MCNC: 13 MG/DL (ref 8–23)
CALCIUM SERPL-MCNC: 9.4 MG/DL (ref 8.7–10.5)
CHLORIDE SERPL-SCNC: 108 MMOL/L (ref 95–110)
CHOLEST SERPL-MCNC: 139 MG/DL (ref 120–199)
CHOLEST/HDLC SERPL: 3.7 {RATIO} (ref 2–5)
CO2 SERPL-SCNC: 26 MMOL/L (ref 23–29)
COMPLEXED PSA SERPL-MCNC: 4.5 NG/ML (ref 0–4)
CREAT SERPL-MCNC: 0.9 MG/DL (ref 0.5–1.4)
ERYTHROCYTE [DISTWIDTH] IN BLOOD BY AUTOMATED COUNT: 12.7 % (ref 11.5–14.5)
EST. GFR  (AFRICAN AMERICAN): >60 ML/MIN/1.73 M^2
EST. GFR  (NON AFRICAN AMERICAN): >60 ML/MIN/1.73 M^2
GLUCOSE SERPL-MCNC: 90 MG/DL (ref 70–110)
HCT VFR BLD AUTO: 46.2 % (ref 40–54)
HDLC SERPL-MCNC: 38 MG/DL (ref 40–75)
HDLC SERPL: 27.3 % (ref 20–50)
HGB BLD-MCNC: 15 G/DL (ref 14–18)
LDLC SERPL CALC-MCNC: 89.2 MG/DL (ref 63–159)
MCH RBC QN AUTO: 31.2 PG (ref 27–31)
MCHC RBC AUTO-ENTMCNC: 32.5 G/DL (ref 32–36)
MCV RBC AUTO: 96 FL (ref 82–98)
NONHDLC SERPL-MCNC: 101 MG/DL
PLATELET # BLD AUTO: 210 K/UL (ref 150–450)
PMV BLD AUTO: 10.4 FL (ref 9.2–12.9)
POTASSIUM SERPL-SCNC: 4.5 MMOL/L (ref 3.5–5.1)
PROT SERPL-MCNC: 6.6 G/DL (ref 6–8.4)
RBC # BLD AUTO: 4.81 M/UL (ref 4.6–6.2)
SODIUM SERPL-SCNC: 142 MMOL/L (ref 136–145)
TRIGL SERPL-MCNC: 59 MG/DL (ref 30–150)
WBC # BLD AUTO: 5.35 K/UL (ref 3.9–12.7)

## 2022-03-22 PROCEDURE — 3075F SYST BP GE 130 - 139MM HG: CPT | Mod: CPTII,S$GLB,, | Performed by: FAMILY MEDICINE

## 2022-03-22 PROCEDURE — 99213 PR OFFICE/OUTPT VISIT, EST, LEVL III, 20-29 MIN: ICD-10-PCS | Mod: S$GLB,,, | Performed by: FAMILY MEDICINE

## 2022-03-22 PROCEDURE — 1157F ADVNC CARE PLAN IN RCRD: CPT | Mod: CPTII,S$GLB,, | Performed by: FAMILY MEDICINE

## 2022-03-22 PROCEDURE — 85027 COMPLETE CBC AUTOMATED: CPT | Performed by: FAMILY MEDICINE

## 2022-03-22 PROCEDURE — 1160F RVW MEDS BY RX/DR IN RCRD: CPT | Mod: CPTII,S$GLB,, | Performed by: FAMILY MEDICINE

## 2022-03-22 PROCEDURE — 1126F AMNT PAIN NOTED NONE PRSNT: CPT | Mod: CPTII,S$GLB,, | Performed by: FAMILY MEDICINE

## 2022-03-22 PROCEDURE — 3079F PR MOST RECENT DIASTOLIC BLOOD PRESSURE 80-89 MM HG: ICD-10-PCS | Mod: CPTII,S$GLB,, | Performed by: FAMILY MEDICINE

## 2022-03-22 PROCEDURE — 36415 COLL VENOUS BLD VENIPUNCTURE: CPT | Mod: PN | Performed by: FAMILY MEDICINE

## 2022-03-22 PROCEDURE — 1126F PR PAIN SEVERITY QUANTIFIED, NO PAIN PRESENT: ICD-10-PCS | Mod: CPTII,S$GLB,, | Performed by: FAMILY MEDICINE

## 2022-03-22 PROCEDURE — 99213 OFFICE O/P EST LOW 20 MIN: CPT | Mod: S$GLB,,, | Performed by: FAMILY MEDICINE

## 2022-03-22 PROCEDURE — 3075F PR MOST RECENT SYSTOLIC BLOOD PRESS GE 130-139MM HG: ICD-10-PCS | Mod: CPTII,S$GLB,, | Performed by: FAMILY MEDICINE

## 2022-03-22 PROCEDURE — 3288F PR FALLS RISK ASSESSMENT DOCUMENTED: ICD-10-PCS | Mod: CPTII,S$GLB,, | Performed by: FAMILY MEDICINE

## 2022-03-22 PROCEDURE — 99999 PR PBB SHADOW E&M-EST. PATIENT-LVL IV: CPT | Mod: PBBFAC,,, | Performed by: FAMILY MEDICINE

## 2022-03-22 PROCEDURE — 1160F PR REVIEW ALL MEDS BY PRESCRIBER/CLIN PHARMACIST DOCUMENTED: ICD-10-PCS | Mod: CPTII,S$GLB,, | Performed by: FAMILY MEDICINE

## 2022-03-22 PROCEDURE — 1101F PT FALLS ASSESS-DOCD LE1/YR: CPT | Mod: CPTII,S$GLB,, | Performed by: FAMILY MEDICINE

## 2022-03-22 PROCEDURE — 99999 PR PBB SHADOW E&M-EST. PATIENT-LVL IV: ICD-10-PCS | Mod: PBBFAC,,, | Performed by: FAMILY MEDICINE

## 2022-03-22 PROCEDURE — 1101F PR PT FALLS ASSESS DOC 0-1 FALLS W/OUT INJ PAST YR: ICD-10-PCS | Mod: CPTII,S$GLB,, | Performed by: FAMILY MEDICINE

## 2022-03-22 PROCEDURE — 1159F MED LIST DOCD IN RCRD: CPT | Mod: CPTII,S$GLB,, | Performed by: FAMILY MEDICINE

## 2022-03-22 PROCEDURE — 3079F DIAST BP 80-89 MM HG: CPT | Mod: CPTII,S$GLB,, | Performed by: FAMILY MEDICINE

## 2022-03-22 PROCEDURE — 84153 ASSAY OF PSA TOTAL: CPT | Performed by: FAMILY MEDICINE

## 2022-03-22 PROCEDURE — 80053 COMPREHEN METABOLIC PANEL: CPT | Performed by: FAMILY MEDICINE

## 2022-03-22 PROCEDURE — 80061 LIPID PANEL: CPT | Performed by: FAMILY MEDICINE

## 2022-03-22 PROCEDURE — 1159F PR MEDICATION LIST DOCUMENTED IN MEDICAL RECORD: ICD-10-PCS | Mod: CPTII,S$GLB,, | Performed by: FAMILY MEDICINE

## 2022-03-22 PROCEDURE — 1157F PR ADVANCE CARE PLAN OR EQUIV PRESENT IN MEDICAL RECORD: ICD-10-PCS | Mod: CPTII,S$GLB,, | Performed by: FAMILY MEDICINE

## 2022-03-22 PROCEDURE — 3288F FALL RISK ASSESSMENT DOCD: CPT | Mod: CPTII,S$GLB,, | Performed by: FAMILY MEDICINE

## 2022-03-22 NOTE — PROGRESS NOTES
"Subjective:       Patient ID: Chinedu Roque is a 76 y.o. male.    Chief Complaint: Follow-up (6 month follow up)    Follow-up of hypertension and morbid obesity with obstructive sleep apnea.  He uses CPAP with pretty good results.  His blood pressure is controlled with metoprolol  mg plus losartan 100 mg plus hydrochlorothiazide 12.5 mg. He has had an elevated PSA in the past.  He has BPH and currently takes finasteride.  He is due for lab work.  He has gained weight.  He has a history of lumbar disc disease and has had spinal injections and currently has an implanted spinal stimulator.  He also has bilateral knee arthritis.  He had a colonoscopy in 2013 with a tubular adenoma.  He does have a previous history of adenomas.    Review of Systems   Constitutional: Positive for unexpected weight change. Negative for activity change, appetite change and fatigue.   Respiratory: Negative for cough and shortness of breath.    Cardiovascular: Negative for chest pain, leg swelling and claudication.   Gastrointestinal: Negative for abdominal pain and blood in stool.   Genitourinary:        Sometimes he gets an urgent need to run to the bathroom.  He is not having much nocturia.  He has seen Urology before.   Musculoskeletal: Positive for back pain.        Previous right DeQuervain's tendinitis has responded to injections.         Objective:     Blood pressure 130/82, pulse 75, resp. rate 18, height 5' 11" (1.803 m), weight (!) 142.4 kg (314 lb 0.7 oz), SpO2 96 %.      Physical Exam  Constitutional:       General: He is not in acute distress.     Appearance: He is obese.   Neck:      Vascular: No carotid bruit.   Cardiovascular:      Rate and Rhythm: Rhythm irregular.      Pulses: Normal pulses.      Heart sounds: No murmur heard.     Comments: He has a history of Mobitz 1.  His current rhythm is irregular.  Pulmonary:      Effort: No respiratory distress.      Breath sounds: No wheezing.   Abdominal:      Palpations: " There is no mass.      Tenderness: There is no abdominal tenderness.   Musculoskeletal:      Right lower leg: Edema (Trace) present.      Left lower leg: Edema ( trace) present.   Lymphadenopathy:      Cervical: No cervical adenopathy.   Neurological:      Mental Status: He is alert.         Assessment:       Problem List Items Addressed This Visit     Essential hypertension - Primary    Relevant Orders    Comprehensive Metabolic Panel    Lipid Panel    CBC Without Differential    Morbid obesity with BMI of 40.0-44.9, adult    DDD (degenerative disc disease), lumbar    ADRIANNE (obstructive sleep apnea)    Primary osteoarthritis of both knees      Other Visit Diagnoses     Mobitz I        Screening for prostate cancer        Relevant Orders    PSA, Screening    Colon cancer screening        Relevant Orders    Case Request Endoscopy: COLONOSCOPY (Completed)    Adenomatous polyp of descending colon        Relevant Orders    Case Request Endoscopy: COLONOSCOPY (Completed)          Plan:       Work on weight loss.  Lab work ordered.  Colonoscopy ordered.  Consider shingles vaccine.

## 2022-03-23 ENCOUNTER — TELEPHONE (OUTPATIENT)
Dept: GASTROENTEROLOGY | Facility: CLINIC | Age: 77
End: 2022-03-23
Payer: MEDICARE

## 2022-05-17 ENCOUNTER — TELEPHONE (OUTPATIENT)
Dept: FAMILY MEDICINE | Facility: CLINIC | Age: 77
End: 2022-05-17
Payer: MEDICARE

## 2022-05-17 NOTE — TELEPHONE ENCOUNTER
Spoke with pt, he is c/o pain on his right side for several months. Using tylenol with some relief. Pain is worse with stretching.  appt scheduled with MD to discuss

## 2022-05-17 NOTE — TELEPHONE ENCOUNTER
----- Message from Cuca Gibson sent at 5/17/2022  6:58 AM CDT -----  Contact: [phone]  Type:  Sooner Apoointment Request    Caller is requesting a sooner appointment.  Caller declined first available appointment listed below.  Caller will not accept being placed on the waitlist and is requesting a message be sent to doctor.  Name of Caller:Portal message  When is the first available appointment?6/24  Symptoms:Pain on right side, pt would like to be seen this week  Would the patient rather a call back or a response via [facility]? [facility]  Best Call Back Number:[phone]

## 2022-06-16 ENCOUNTER — OFFICE VISIT (OUTPATIENT)
Dept: FAMILY MEDICINE | Facility: CLINIC | Age: 77
End: 2022-06-16
Payer: MEDICARE

## 2022-06-16 VITALS
HEIGHT: 71 IN | BODY MASS INDEX: 41.45 KG/M2 | HEART RATE: 77 BPM | OXYGEN SATURATION: 96 % | DIASTOLIC BLOOD PRESSURE: 74 MMHG | SYSTOLIC BLOOD PRESSURE: 132 MMHG | WEIGHT: 296.06 LBS

## 2022-06-16 DIAGNOSIS — R10.9 RIGHT FLANK PAIN: Primary | ICD-10-CM

## 2022-06-16 DIAGNOSIS — B35.1 ONYCHOMYCOSIS: ICD-10-CM

## 2022-06-16 DIAGNOSIS — L60.9 NAIL COMPLAINT: ICD-10-CM

## 2022-06-16 LAB
BILIRUB SERPL-MCNC: NEGATIVE MG/DL
BLOOD URINE, POC: NEGATIVE
CLARITY, POC UA: CLEAR
COLOR, POC UA: ABNORMAL
GLUCOSE UR QL STRIP: NORMAL
KETONES UR QL STRIP: NEGATIVE
LEUKOCYTE ESTERASE URINE, POC: POSITIVE
NITRITE, POC UA: NEGATIVE
PH, POC UA: 5
PROTEIN, POC: ABNORMAL
SPECIFIC GRAVITY, POC UA: 1.02
UROBILINOGEN, POC UA: NORMAL

## 2022-06-16 PROCEDURE — 3288F PR FALLS RISK ASSESSMENT DOCUMENTED: ICD-10-PCS | Mod: CPTII,S$GLB,, | Performed by: FAMILY MEDICINE

## 2022-06-16 PROCEDURE — 3075F SYST BP GE 130 - 139MM HG: CPT | Mod: CPTII,S$GLB,, | Performed by: FAMILY MEDICINE

## 2022-06-16 PROCEDURE — 1101F PR PT FALLS ASSESS DOC 0-1 FALLS W/OUT INJ PAST YR: ICD-10-PCS | Mod: CPTII,S$GLB,, | Performed by: FAMILY MEDICINE

## 2022-06-16 PROCEDURE — 3075F PR MOST RECENT SYSTOLIC BLOOD PRESS GE 130-139MM HG: ICD-10-PCS | Mod: CPTII,S$GLB,, | Performed by: FAMILY MEDICINE

## 2022-06-16 PROCEDURE — 1159F PR MEDICATION LIST DOCUMENTED IN MEDICAL RECORD: ICD-10-PCS | Mod: CPTII,S$GLB,, | Performed by: FAMILY MEDICINE

## 2022-06-16 PROCEDURE — 99999 PR PBB SHADOW E&M-EST. PATIENT-LVL IV: CPT | Mod: PBBFAC,,, | Performed by: FAMILY MEDICINE

## 2022-06-16 PROCEDURE — 3288F FALL RISK ASSESSMENT DOCD: CPT | Mod: CPTII,S$GLB,, | Performed by: FAMILY MEDICINE

## 2022-06-16 PROCEDURE — 3078F DIAST BP <80 MM HG: CPT | Mod: CPTII,S$GLB,, | Performed by: FAMILY MEDICINE

## 2022-06-16 PROCEDURE — 81002 POCT URINE DIPSTICK WITHOUT MICROSCOPE: ICD-10-PCS | Mod: S$GLB,,, | Performed by: FAMILY MEDICINE

## 2022-06-16 PROCEDURE — 1159F MED LIST DOCD IN RCRD: CPT | Mod: CPTII,S$GLB,, | Performed by: FAMILY MEDICINE

## 2022-06-16 PROCEDURE — 1126F PR PAIN SEVERITY QUANTIFIED, NO PAIN PRESENT: ICD-10-PCS | Mod: CPTII,S$GLB,, | Performed by: FAMILY MEDICINE

## 2022-06-16 PROCEDURE — 1157F PR ADVANCE CARE PLAN OR EQUIV PRESENT IN MEDICAL RECORD: ICD-10-PCS | Mod: CPTII,S$GLB,, | Performed by: FAMILY MEDICINE

## 2022-06-16 PROCEDURE — 1160F RVW MEDS BY RX/DR IN RCRD: CPT | Mod: CPTII,S$GLB,, | Performed by: FAMILY MEDICINE

## 2022-06-16 PROCEDURE — 1160F PR REVIEW ALL MEDS BY PRESCRIBER/CLIN PHARMACIST DOCUMENTED: ICD-10-PCS | Mod: CPTII,S$GLB,, | Performed by: FAMILY MEDICINE

## 2022-06-16 PROCEDURE — 1101F PT FALLS ASSESS-DOCD LE1/YR: CPT | Mod: CPTII,S$GLB,, | Performed by: FAMILY MEDICINE

## 2022-06-16 PROCEDURE — 1157F ADVNC CARE PLAN IN RCRD: CPT | Mod: CPTII,S$GLB,, | Performed by: FAMILY MEDICINE

## 2022-06-16 PROCEDURE — 1126F AMNT PAIN NOTED NONE PRSNT: CPT | Mod: CPTII,S$GLB,, | Performed by: FAMILY MEDICINE

## 2022-06-16 PROCEDURE — 99214 OFFICE O/P EST MOD 30 MIN: CPT | Mod: S$GLB,,, | Performed by: FAMILY MEDICINE

## 2022-06-16 PROCEDURE — 99999 PR PBB SHADOW E&M-EST. PATIENT-LVL IV: ICD-10-PCS | Mod: PBBFAC,,, | Performed by: FAMILY MEDICINE

## 2022-06-16 PROCEDURE — 3078F PR MOST RECENT DIASTOLIC BLOOD PRESSURE < 80 MM HG: ICD-10-PCS | Mod: CPTII,S$GLB,, | Performed by: FAMILY MEDICINE

## 2022-06-16 PROCEDURE — 99214 PR OFFICE/OUTPT VISIT, EST, LEVL IV, 30-39 MIN: ICD-10-PCS | Mod: S$GLB,,, | Performed by: FAMILY MEDICINE

## 2022-06-16 PROCEDURE — 81002 URINALYSIS NONAUTO W/O SCOPE: CPT | Mod: S$GLB,,, | Performed by: FAMILY MEDICINE

## 2022-06-16 RX ORDER — NABUMETONE 750 MG/1
750 TABLET, FILM COATED ORAL 2 TIMES DAILY PRN
Qty: 20 TABLET | Refills: 0 | Status: SHIPPED | OUTPATIENT
Start: 2022-06-16 | End: 2022-07-13

## 2022-06-16 RX ORDER — TIZANIDINE 4 MG/1
4 TABLET ORAL EVERY 6 HOURS PRN
Qty: 20 TABLET | Refills: 2 | Status: SHIPPED | OUTPATIENT
Start: 2022-06-16 | End: 2022-07-13 | Stop reason: SDUPTHER

## 2022-06-16 NOTE — PROGRESS NOTES
THIS DOCUMENT WAS MADE IN PART WITH VOICE RECOGNITION SOFTWARE.  OCCASIONALLY THIS SOFTWARE WILL MISINTERPRET WORDS OR PHRASES.    Assessment and Plan:    1. Right flank pain  POCT urine dipstick without microscope    tiZANidine (ZANAFLEX) 4 MG tablet    nabumetone (RELAFEN) 750 MG tablet   2. Onychomycosis  Ambulatory referral/consult to Podiatry   3. Nail complaint  Ambulatory referral/consult to Podiatry       PLAN    Offered physical therapy, patient defers for now  Use anti-inflammatory short-term and muscle relaxer as needed.  Informed patient he should contact me if no resolve in 1 week and we will refer to physical therapy    Referred to Podiatry for nail care      ______________________________________________________________________  Subjective:    Chief Complaint:  Chief Complaint   Patient presents with    Follow-up     Pain right side 2 months        HPI:  Chinedu is a 76 y.o. year old     Past medical history-stroke, lumbar pain, chronic pain, hypertension, dyslipidemia, venous insufficiency, BPH, morbid obesity, cervical pain, allergies, obstructive sleep apnea    Right lower back pain  Duration 2 months; not improving or worsening ; fairly constant   Lying down improves pain  Denies any urinary symptoms   No known inciting event   No fever / focal neurologic defect    Requesting referral to Podiatry for nail care    Past Medical History:  Past Medical History:   Diagnosis Date    Anticoagulant long-term use     ASA 81 mg    Arthritis     cervical    BPH (benign prostatic hyperplasia) 3/2/2012    Chronic left shoulder pain     Compression fracture of L2     DDD (degenerative disc disease), lumbar     HTN (hypertension) 3/2/2012    Hx of colonic polyps 2013    Low back pain     Morbid obesity with BMI of 40.0-44.9, adult 3/18/2014    Seasonal allergies     Sleep apnea     compliant with CPAP    Stroke 3/1986       Past Surgical History:  Past Surgical History:   Procedure Laterality Date     APPENDECTOMY      COLONOSCOPY N/A 6/6/2022    Procedure: COLONOSCOPY;  Surgeon: Jose Bello MD;  [facility];  Service: Endoscopy;  Laterality: N/A;    EPIDURAL BLOCK INJECTION  2015    cervical    EPIDURAL STEROID INJECTION INTO LUMBAR SPINE N/A 6/5/2019    Procedure: Injection-steroid-epidural-lumbar L5/S1 interlaminar;  Surgeon: Riley Segura MD;  Location: [facility] OR;  Service: Pain Management;  Laterality: N/A;    EPIDURAL STEROID INJECTION INTO LUMBAR SPINE N/A 9/13/2019    Procedure: Injection-steroid-epidural-lumbar;  Surgeon: Riley Segura MD;  Location: [facility] OR;  Service: Pain Management;  Laterality: N/A;  L5/S1 interlaminar to the right    EPIDURAL STEROID INJECTION INTO LUMBAR SPINE N/A 6/2/2020    Procedure: Injection-steroid-epidural-lumbar L5/S1 to right;  Surgeon: Riley Segura MD;  Location: [facility] OR;  Service: Pain Management;  Laterality: N/A;    Facet Steroid injection       Pain management    HERNIA REPAIR      [facility]    INSERTION OF DORSAL COLUMN NERVE STIMULATOR FOR TRIAL N/A 2/10/2021    Procedure: INSERTION, NEUROSTIMULATOR, SPINAL CORD, DORSAL COLUMN, FOR TRIAL;  Surgeon: Riley Segura MD;  Location: [facility] OR;  Service: Pain Management;  Laterality: N/A;    INSERTION OF NEUROSTIMULATOR OF DORSAL COLUMN OF SPINAL CORD N/A 3/1/2021    Procedure: INSERTION, NEUROSTIMULATOR, SPINAL CORD, DORSAL COLUMN;  Surgeon: Riley Segura MD;  Location: [facility] OR;  Service: Pain Management;  Laterality: N/A;    KNEE SURGERY      bilateral    RADIOFREQUENCY ABLATION  2015    lumbar nerve    RADIOFREQUENCY ABLATION OF LUMBAR MEDIAL BRANCH NERVE AT SINGLE LEVEL Right 4/11/2019    Procedure: Radiofrequency Ablation, Nerve, Spinal, Lumbar, Medial Branch, L1,2,3,4,5;  Surgeon: Riley Segura MD;  Location: [facility] OR;  Service: Pain Management;  Laterality: Right;    TRANSFORAMINAL EPIDURAL INJECTION OF STEROID Right 11/13/2020    Procedure:  Injection,steroid,epidural,transforaminal approach, l3/4 and l5/s1;  Surgeon: Riley Segura MD;  [facility];  Service: Pain Management;  Laterality: Right;    TRANSFORAMINAL EPIDURAL INJECTION OF STEROID Left 2021    Procedure: Injection,steroid,epidural,transforaminal approach L5/S1;  Surgeon: Riley Segura MD;  Location: [facility] OR;  Service: Pain Management;  Laterality: Left;    VERTEBROPLASTY         Family History:  Family History   Problem Relation Age of Onset    COPD Father     Hypertension Brother     Kidney disease Brother     Alzheimer's disease Mother     No Known Problems Daughter     Hypertension Son     Diabetes Son         pre-diabetic    No Known Problems Daughter     No Known Problems Daughter        Social History:  Social History     Socioeconomic History    Marital status:    Tobacco Use    Smoking status: Former Smoker     Packs/day: 1.50     Years: 24.00     Pack years: 36.00     Start date: 10/21/1959     Quit date: 3/19/1983     Years since quittin.2    Smokeless tobacco: Never Used   Substance and Sexual Activity    Alcohol use: No    Drug use: No   Social History Narrative    Retired - Worked for the [employer].      Social Determinants of Health     Financial Resource Strain: Low Risk     Difficulty of Paying Living Expenses: Not very hard   Food Insecurity: No Food Insecurity    Worried About Running Out of Food in the Last Year: Never true    Ran Out of Food in the Last Year: Never true   Transportation Needs: No Transportation Needs    Lack of Transportation (Medical): No    Lack of Transportation (Non-Medical): No   Physical Activity: Insufficiently Active    Days of Exercise per Week: 6 days    Minutes of Exercise per Session: 20 min   Stress: No Stress Concern Present    Feeling of Stress : Only a little   Social Connections: Unknown    Frequency of Communication with Friends and Family: More than three times a week     Frequency of Social Gatherings with Friends and Family: Three times a week    Active Member of Clubs or Organizations: Yes    Attends Club or Organization Meetings: More than 4 times per year    Marital Status:    Housing Stability: Low Risk     Unable to Pay for Housing in the Last Year: No    Number of Places Lived in the Last Year: 1    Unstable Housing in the Last Year: No       Medications:  Current Outpatient Medications on File Prior to Visit   Medication Sig Dispense Refill    acetaminophen (TYLENOL) 500 MG tablet Take 1,000 mg by mouth every 6 (six) hours as needed for Pain.      aspirin (ECOTRIN) 81 MG EC tablet Take 81 mg by mouth once daily.      finasteride (PROSCAR) 5 mg tablet Take 1 tablet (5 mg total) by mouth once daily. 90 tablet 3    hydroCHLOROthiazide (HYDRODIURIL) 12.5 MG Tab Take 1 tablet (12.5 mg total) by mouth once daily. 90 tablet 3    losartan (COZAAR) 100 MG tablet TAKE 1 TABLET BY MOUTH EVERY DAY 90 tablet 3    metoprolol succinate (TOPROL-XL) 100 MG 24 hr tablet TAKE 1 TABLET BY MOUTH EVERY DAY 90 tablet 3    tamsulosin (FLOMAX) 0.4 mg Cap TAKE 1 CAPSULE BY MOUTH EVERY DAY 90 capsule 3    traMADoL (ULTRAM) 50 mg tablet Take 1 tablet (50 mg total) by mouth daily as needed for Pain. 30 tablet 0     Current Facility-Administered Medications on File Prior to Visit   Medication Dose Route Frequency Provider Last Rate Last Admin    sodium hyaluronate (EUFLEXXA) 10 mg/mL(mw 2.4 -3.6 million) Syrg 20 mg  20 mg Intra-articular  Michelet Covarrubias MD   20 mg at 05/14/18 1046    sodium hyaluronate (EUFLEXXA) 10 mg/mL(mw 2.4 -3.6 million) Syrg 20 mg  20 mg Intra-articular  Michelet Covarrubias MD   20 mg at 05/14/18 1046       Allergies:  Patient has no known allergies.    Immunizations:  Immunization History   Administered Date(s) Administered    COVID-19, MRNA, LN-S, PF (Pfizer) (Purple Cap) 01/10/2021, 01/31/2021, 11/16/2021    Influenza 01/04/2008, 10/23/2009, 09/17/2013  "   Influenza (FLUAD) - Quadrivalent - Adjuvanted - PF *Preferred* (65+) 09/24/2020    Influenza - High Dose - PF (65 years and older) 11/10/2010, 11/01/2011, 10/23/2012, 11/06/2014, 10/06/2015, 10/25/2017, 10/03/2018, 10/19/2019, 09/28/2021    Influenza - Trivalent (ADULT) 01/04/2008, 10/23/2009, 09/17/2013    Influenza Split 09/17/2013    Pneumococcal Conjugate - 13 Valent 04/25/2016    Pneumococcal Polysaccharide - 23 Valent 10/23/2009    Tdap 11/24/2012    Zoster 11/06/2014       Review of Systems:  Review of Systems   All other systems reviewed and are negative.      Objective:    Vitals:  Vitals:    06/16/22 1518   BP: 132/74   Pulse: 77   SpO2: 96%   Weight: 134.3 kg (296 lb 1.2 oz)   Height: 5' 11" (1.803 m)   PainSc: 0-No pain       Physical Exam  Vitals reviewed.   Constitutional:       General: He is not in acute distress.  HENT:      Head: Normocephalic and atraumatic.   Eyes:      Pupils: Pupils are equal, round, and reactive to light.   Cardiovascular:      Rate and Rhythm: Normal rate and regular rhythm.      Heart sounds: No murmur heard.    No friction rub.   Pulmonary:      Effort: Pulmonary effort is normal.      Breath sounds: Normal breath sounds.   Abdominal:      General: Bowel sounds are normal. There is no distension.      Palpations: Abdomen is soft.      Tenderness: There is no abdominal tenderness.   Musculoskeletal:        Arms:       Cervical back: Neck supple.   Skin:     General: Skin is warm and dry.      Findings: No rash.   Psychiatric:         Behavior: Behavior normal.             Miguel Wellington MD  Family Medicine      "

## 2022-06-29 ENCOUNTER — OFFICE VISIT (OUTPATIENT)
Dept: PODIATRY | Facility: CLINIC | Age: 77
End: 2022-06-29
Payer: MEDICARE

## 2022-06-29 DIAGNOSIS — B35.1 ONYCHOMYCOSIS: ICD-10-CM

## 2022-06-29 DIAGNOSIS — I73.9 PERIPHERAL VASCULAR DISEASE: Primary | ICD-10-CM

## 2022-06-29 DIAGNOSIS — L60.9 NAIL COMPLAINT: ICD-10-CM

## 2022-06-29 PROCEDURE — 1159F MED LIST DOCD IN RCRD: CPT | Mod: CPTII,S$GLB,, | Performed by: PODIATRIST

## 2022-06-29 PROCEDURE — 99499 RISK ADDL DX/OHS AUDIT: ICD-10-PCS | Mod: S$GLB,,, | Performed by: PODIATRIST

## 2022-06-29 PROCEDURE — 11721 PR DEBRIDEMENT OF NAILS, 6 OR MORE: ICD-10-PCS | Mod: Q9,S$GLB,, | Performed by: PODIATRIST

## 2022-06-29 PROCEDURE — 1160F RVW MEDS BY RX/DR IN RCRD: CPT | Mod: CPTII,S$GLB,, | Performed by: PODIATRIST

## 2022-06-29 PROCEDURE — 99999 PR PBB SHADOW E&M-EST. PATIENT-LVL III: ICD-10-PCS | Mod: PBBFAC,,, | Performed by: PODIATRIST

## 2022-06-29 PROCEDURE — 1157F ADVNC CARE PLAN IN RCRD: CPT | Mod: CPTII,S$GLB,, | Performed by: PODIATRIST

## 2022-06-29 PROCEDURE — 1160F PR REVIEW ALL MEDS BY PRESCRIBER/CLIN PHARMACIST DOCUMENTED: ICD-10-PCS | Mod: CPTII,S$GLB,, | Performed by: PODIATRIST

## 2022-06-29 PROCEDURE — 1101F PT FALLS ASSESS-DOCD LE1/YR: CPT | Mod: CPTII,S$GLB,, | Performed by: PODIATRIST

## 2022-06-29 PROCEDURE — 99999 PR PBB SHADOW E&M-EST. PATIENT-LVL III: CPT | Mod: PBBFAC,,, | Performed by: PODIATRIST

## 2022-06-29 PROCEDURE — 3288F PR FALLS RISK ASSESSMENT DOCUMENTED: ICD-10-PCS | Mod: CPTII,S$GLB,, | Performed by: PODIATRIST

## 2022-06-29 PROCEDURE — 1125F PR PAIN SEVERITY QUANTIFIED, PAIN PRESENT: ICD-10-PCS | Mod: CPTII,S$GLB,, | Performed by: PODIATRIST

## 2022-06-29 PROCEDURE — 99202 PR OFFICE/OUTPT VISIT, NEW, LEVL II, 15-29 MIN: ICD-10-PCS | Mod: 25,S$GLB,, | Performed by: PODIATRIST

## 2022-06-29 PROCEDURE — 1125F AMNT PAIN NOTED PAIN PRSNT: CPT | Mod: CPTII,S$GLB,, | Performed by: PODIATRIST

## 2022-06-29 PROCEDURE — 11721 DEBRIDE NAIL 6 OR MORE: CPT | Mod: Q9,S$GLB,, | Performed by: PODIATRIST

## 2022-06-29 PROCEDURE — 99202 OFFICE O/P NEW SF 15 MIN: CPT | Mod: 25,S$GLB,, | Performed by: PODIATRIST

## 2022-06-29 PROCEDURE — 1159F PR MEDICATION LIST DOCUMENTED IN MEDICAL RECORD: ICD-10-PCS | Mod: CPTII,S$GLB,, | Performed by: PODIATRIST

## 2022-06-29 PROCEDURE — 3288F FALL RISK ASSESSMENT DOCD: CPT | Mod: CPTII,S$GLB,, | Performed by: PODIATRIST

## 2022-06-29 PROCEDURE — 99499 UNLISTED E&M SERVICE: CPT | Mod: S$GLB,,, | Performed by: PODIATRIST

## 2022-06-29 PROCEDURE — 1157F PR ADVANCE CARE PLAN OR EQUIV PRESENT IN MEDICAL RECORD: ICD-10-PCS | Mod: CPTII,S$GLB,, | Performed by: PODIATRIST

## 2022-06-29 PROCEDURE — 1101F PR PT FALLS ASSESS DOC 0-1 FALLS W/OUT INJ PAST YR: ICD-10-PCS | Mod: CPTII,S$GLB,, | Performed by: PODIATRIST

## 2022-06-29 NOTE — PROGRESS NOTES
Subjective:      Patient ID: Chinedu Roque is a 76 y.o. male.    Chief Complaint: Nail Problem    Chinedu is a 76 y.o. male who presents to the clinic for evaluation and treatment of high risk feet. Chinedu has a past medical history of Anticoagulant long-term use, Arthritis, BPH (benign prostatic hyperplasia) (3/2/2012), Chronic left shoulder pain, Compression fracture of L2, DDD (degenerative disc disease), lumbar, HTN (hypertension) (3/2/2012), colonic polyps (2013), Low back pain, Morbid obesity with BMI of 40.0-44.9, adult (3/18/2014), Seasonal allergies, Sleep apnea, and Stroke (3/1986). The patient's chief complaint is long, thick toenails. This patient has documented high risk feet requiring routine maintenance secondary to peripheral vascular disease.    PCP: Miguel Wellington MD        Current shoe gear:  Casual shoes    Last encounter in this department: Visit date not found    No results found for: HGBA1C      Review of Systems   Constitutional: Negative for chills and fever.   Cardiovascular: Positive for leg swelling. Negative for claudication.   Respiratory: Negative for shortness of breath.    Skin: Positive for nail changes. Negative for itching and rash.   Musculoskeletal: Negative for muscle cramps, muscle weakness and myalgias.   Gastrointestinal: Negative for nausea and vomiting.   Neurological: Positive for numbness. Negative for focal weakness, loss of balance and paresthesias.           Objective:      Physical Exam  Constitutional:       General: He is not in acute distress.     Appearance: He is well-developed. He is not diaphoretic.   Cardiovascular:      Pulses:           Dorsalis pedis pulses are 1+ on the right side and 1+ on the left side.        Posterior tibial pulses are 1+ on the right side and 1+ on the left side.      Comments: 3 sec capillary refill time to toes 1-5 bilateral. Feet are warm to touch proximally with distal cooling b/l. There is no hair growth on the feet and  toes b/l. There is 2+ edema b/l with some varicosities present b/l.     Musculoskeletal:      Comments: Equinus noted b/l ankles with < 10 deg DF noted. MMT 5/5 in DF/PF/Inv/Ev resistance with no reproduction of pain in any direction. Passive range of motion of ankle and pedal joints is painless b/l.     Skin:     General: Skin is warm and dry.      Coloration: Skin is not pale.      Findings: No abrasion, bruising, burn, ecchymosis, erythema, laceration, lesion, petechiae or rash.      Nails: There is no clubbing.      Comments: Skin is thin shiny and atrophic bilateral    Nails 1-5 bilateral are thick 3-4 mm, long 3-6 mm, and discolored with subungual debris     Neurological:      Mental Status: He is alert and oriented to person, place, and time.      Sensory: No sensory deficit.      Motor: No tremor, atrophy or abnormal muscle tone.      Comments: Negative tinel sign bilateral.   Psychiatric:         Behavior: Behavior normal.               Assessment:       Encounter Diagnoses   Name Primary?    Onychomycosis     Nail complaint     Peripheral vascular disease Yes         Plan:       Chinedu was seen today for nail problem.    Diagnoses and all orders for this visit:    Peripheral vascular disease    Onychomycosis  -     Ambulatory referral/consult to Podiatry    Nail complaint  -     Ambulatory referral/consult to Podiatry      I counseled the patient on his conditions, their implications and medical management.        - Shoe inspection. Patient instructed on proper foot hygeine. We discussed wearing proper shoe gear, daily foot inspections, never walking without protective shoe gear, never putting sharp instruments to feet, routine podiatric nail visits every 2-3 months.      - With patient's permission, nails were aggressively reduced and debrided x 10 to their soft tissue attachment mechanically and with electric , removing all offending nail and debris. Patient relates relief following the  procedure. He will continue to monitor the areas daily, inspect his feet, wear protective shoe gear when ambulatory, moisturizer to maintain skin integrity and follow in this office in approximately 2-3 months, sooner pdenise.    Rubio Ramirez DPM

## 2022-07-01 ENCOUNTER — TELEPHONE (OUTPATIENT)
Dept: PRIMARY CARE CLINIC | Facility: CLINIC | Age: 77
End: 2022-07-01

## 2022-07-01 NOTE — TELEPHONE ENCOUNTER
Spoke to patient and he was requesting an appointment with Dr. Browne to re-establish care. Scheduled and verbalized understanding.

## 2022-07-01 NOTE — TELEPHONE ENCOUNTER
----- Message from Kishore Hernandez sent at 7/1/2022  8:08 AM CDT -----  Contact: Pt [phone]  Pt called in regards to getting an roman to [facility] care he states he has seen [place] jimenze [place] please advise

## 2022-07-13 ENCOUNTER — OFFICE VISIT (OUTPATIENT)
Dept: PRIMARY CARE CLINIC | Facility: CLINIC | Age: 77
End: 2022-07-13
Payer: MEDICARE

## 2022-07-13 VITALS
OXYGEN SATURATION: 98 % | SYSTOLIC BLOOD PRESSURE: 134 MMHG | DIASTOLIC BLOOD PRESSURE: 80 MMHG | HEIGHT: 71 IN | RESPIRATION RATE: 18 BRPM | HEART RATE: 68 BPM | BODY MASS INDEX: 41.83 KG/M2 | WEIGHT: 298.81 LBS

## 2022-07-13 DIAGNOSIS — G89.29 CHRONIC MIDLINE LOW BACK PAIN WITHOUT SCIATICA: ICD-10-CM

## 2022-07-13 DIAGNOSIS — I10 ESSENTIAL HYPERTENSION: Chronic | ICD-10-CM

## 2022-07-13 DIAGNOSIS — M51.36 DDD (DEGENERATIVE DISC DISEASE), LUMBAR: Primary | ICD-10-CM

## 2022-07-13 DIAGNOSIS — R97.20 ELEVATED PSA: ICD-10-CM

## 2022-07-13 DIAGNOSIS — M54.50 CHRONIC MIDLINE LOW BACK PAIN WITHOUT SCIATICA: ICD-10-CM

## 2022-07-13 PROCEDURE — 1160F RVW MEDS BY RX/DR IN RCRD: CPT | Mod: CPTII,S$GLB,, | Performed by: FAMILY MEDICINE

## 2022-07-13 PROCEDURE — 1157F ADVNC CARE PLAN IN RCRD: CPT | Mod: CPTII,S$GLB,, | Performed by: FAMILY MEDICINE

## 2022-07-13 PROCEDURE — 99999 PR PBB SHADOW E&M-EST. PATIENT-LVL V: CPT | Mod: PBBFAC,,, | Performed by: FAMILY MEDICINE

## 2022-07-13 PROCEDURE — 99215 OFFICE O/P EST HI 40 MIN: CPT | Mod: S$GLB,,, | Performed by: FAMILY MEDICINE

## 2022-07-13 PROCEDURE — 1157F PR ADVANCE CARE PLAN OR EQUIV PRESENT IN MEDICAL RECORD: ICD-10-PCS | Mod: CPTII,S$GLB,, | Performed by: FAMILY MEDICINE

## 2022-07-13 PROCEDURE — 3079F PR MOST RECENT DIASTOLIC BLOOD PRESSURE 80-89 MM HG: ICD-10-PCS | Mod: CPTII,S$GLB,, | Performed by: FAMILY MEDICINE

## 2022-07-13 PROCEDURE — 1101F PR PT FALLS ASSESS DOC 0-1 FALLS W/OUT INJ PAST YR: ICD-10-PCS | Mod: CPTII,S$GLB,, | Performed by: FAMILY MEDICINE

## 2022-07-13 PROCEDURE — 1160F PR REVIEW ALL MEDS BY PRESCRIBER/CLIN PHARMACIST DOCUMENTED: ICD-10-PCS | Mod: CPTII,S$GLB,, | Performed by: FAMILY MEDICINE

## 2022-07-13 PROCEDURE — 1125F PR PAIN SEVERITY QUANTIFIED, PAIN PRESENT: ICD-10-PCS | Mod: CPTII,S$GLB,, | Performed by: FAMILY MEDICINE

## 2022-07-13 PROCEDURE — 1159F MED LIST DOCD IN RCRD: CPT | Mod: CPTII,S$GLB,, | Performed by: FAMILY MEDICINE

## 2022-07-13 PROCEDURE — 1125F AMNT PAIN NOTED PAIN PRSNT: CPT | Mod: CPTII,S$GLB,, | Performed by: FAMILY MEDICINE

## 2022-07-13 PROCEDURE — 1101F PT FALLS ASSESS-DOCD LE1/YR: CPT | Mod: CPTII,S$GLB,, | Performed by: FAMILY MEDICINE

## 2022-07-13 PROCEDURE — 3288F FALL RISK ASSESSMENT DOCD: CPT | Mod: CPTII,S$GLB,, | Performed by: FAMILY MEDICINE

## 2022-07-13 PROCEDURE — 3079F DIAST BP 80-89 MM HG: CPT | Mod: CPTII,S$GLB,, | Performed by: FAMILY MEDICINE

## 2022-07-13 PROCEDURE — 99999 PR PBB SHADOW E&M-EST. PATIENT-LVL V: ICD-10-PCS | Mod: PBBFAC,,, | Performed by: FAMILY MEDICINE

## 2022-07-13 PROCEDURE — 1159F PR MEDICATION LIST DOCUMENTED IN MEDICAL RECORD: ICD-10-PCS | Mod: CPTII,S$GLB,, | Performed by: FAMILY MEDICINE

## 2022-07-13 PROCEDURE — 3075F SYST BP GE 130 - 139MM HG: CPT | Mod: CPTII,S$GLB,, | Performed by: FAMILY MEDICINE

## 2022-07-13 PROCEDURE — 3075F PR MOST RECENT SYSTOLIC BLOOD PRESS GE 130-139MM HG: ICD-10-PCS | Mod: CPTII,S$GLB,, | Performed by: FAMILY MEDICINE

## 2022-07-13 PROCEDURE — 3288F PR FALLS RISK ASSESSMENT DOCUMENTED: ICD-10-PCS | Mod: CPTII,S$GLB,, | Performed by: FAMILY MEDICINE

## 2022-07-13 PROCEDURE — 99215 PR OFFICE/OUTPT VISIT, EST, LEVL V, 40-54 MIN: ICD-10-PCS | Mod: S$GLB,,, | Performed by: FAMILY MEDICINE

## 2022-07-13 RX ORDER — LIDOCAINE 50 MG/G
1 PATCH TOPICAL DAILY
Qty: 30 PATCH | Refills: 3 | Status: ON HOLD | OUTPATIENT
Start: 2022-07-13 | End: 2022-09-22 | Stop reason: HOSPADM

## 2022-07-13 RX ORDER — CELECOXIB 200 MG/1
200 CAPSULE ORAL 2 TIMES DAILY
Qty: 30 CAPSULE | Refills: 1 | Status: ON HOLD | OUTPATIENT
Start: 2022-07-13 | End: 2022-09-22 | Stop reason: HOSPADM

## 2022-07-13 RX ORDER — TIZANIDINE 4 MG/1
4 TABLET ORAL EVERY 6 HOURS PRN
Qty: 20 TABLET | Refills: 2
Start: 2022-07-13 | End: 2022-07-23

## 2022-07-13 NOTE — ASSESSMENT & PLAN NOTE
Reviewed.  Most recent PSA was lower.  He does follow with Urology.  So not highly suspicious that this is related to his back pain but will monitor and continue to evaluate if not improving.

## 2022-07-13 NOTE — PROGRESS NOTES
THIS DOCUMENT WAS MADE IN PART WITH VOICE RECOGNITION SOFTWARE.  OCCASIONALLY THIS SOFTWARE WILL MISINTERPRET WORDS OR PHRASES.      Primary Care Provider Appointment   [facility] ([facility])  [address], [place] [address]   Ph: [phone]  Fax: [phone]      Patient ID: Chinedu Roque is a 76 y.o. male.    ASSESSMENT/PLAN by Problem List:  Problem List Items Addressed This Visit     Essential hypertension (Chronic)     Chronic condition, initially elevated today.  Repeat was borderline at 134/80.  Patient states home readings are generally in the 120s, may be exacerbated by pain.  He will continue to monitor.  Continue current medications for now           DDD (degenerative disc disease), lumbar - Primary (Chronic)    Relevant Medications    celecoxib (CELEBREX) 200 MG capsule    tiZANidine (ZANAFLEX) 4 MG tablet    LIDOcaine (LIDODERM) 5 %    Other Relevant Orders    Ambulatory referral/consult to Physical/Occupational Therapy    Ambulatory referral/consult to Pain Clinic    Low back pain     Patient has chronic low back pain, multifactorial with degenerative disc disease, history of compression fracture, etc.  he appears to have an acute exacerbation with a significant potent of muscle tightness and localized tenderness in the right mid and lower lumbar region.  He reports significant relief with the lidocaine patches so I will refill.  He had some benefit with NSAIDs but will switch to Celebrex just to reduce the risk of GI insult but still take precautions.  He may continue the tizanidine.  He is agreeable to physical therapy and I do recommend referral back to his pain physician for long-term evaluation.           Relevant Medications    LIDOcaine (LIDODERM) 5 %    Other Relevant Orders    Ambulatory referral/consult to Physical/Occupational Therapy    Ambulatory referral/consult to Pain Clinic    Elevated PSA     Reviewed.  Most recent PSA was lower.  He  does follow with Urology.  So not highly suspicious that this is related to his back pain but will monitor and continue to evaluate if not improving.                 Follow Up:  Six weeks    Fifty-two minutes of total time spent on the encounter, which includes face to face time and non-face to face time preparing to see the patient (eg, review of tests), Obtaining and/or reviewing separately obtained history, Documenting clinical information in the electronic or other health record, Independently interpreting results (not separately reported) and communicating results to the patient/family/caregiver, or Care coordination (not separately reported).    Health Maintenance       Date Due Completion Date    Hepatitis C Screening Never done ---    Shingles Vaccine (2 of 3) 01/01/2015 11/6/2014    COVID-19 Vaccine (4 - Booster for Pfizer series) 02/16/2022 11/16/2021    Influenza Vaccine (1) 09/01/2022 9/28/2021    Override on 10/16/2019: Done (Date is approx, per pt. Done at [facility])    TETANUS VACCINE 11/24/2022 11/24/2012    Lipid Panel 03/22/2023 3/22/2022          Advance Care Planning     Reviewed HPA and living will on file.           Subjective:     Chief Complaint   Patient presents with    Establish Care     I have reviewed the information entered by the ancillary staff regarding the chief complaint as well as the related history.    HPI    Patient is a/an 76 y.o.  male   RISK of ADMIT/ED: 48    Establish care, chronic low back pain but recent exacerbation for several weeks.    He saw Dr. Wellington about a month ago, felt musculoskeletal.  Urinalysis at that time was negative for blood.  And patient has had no gross hematuria, no dysuria.  He has some chronic lower urinary tract symptoms/obstructive voiding symptoms but nothing acute.  Placed on tizanidine and nabumetone.  Mild help but not resolution.  Another exacerbation last week and went to the ER on Friday.  There he states he received a steroid shot and had  'great relief' for a day or so.  But pain return.  He was also given lidocaine patches which have helped some.    Longstanding history of underlying back pain, degenerative disc disease, and associated changes.  He does have an implanted spinal stimulator but states he does not get much relief.  He has consulted with pain management but has not been seen in a while.    I did review his hypertension and other chronic conditions.  He will return for more in-depth review on these in a few weeks.    For complete problem list, past medical history, surgical history, social history, etc., see appropriate section in the electronic medical record    Review of Systems   HENT: Negative.    Respiratory: Negative.    Cardiovascular: Negative.    Gastrointestinal: Negative.    Genitourinary: Negative.    Musculoskeletal: Positive for arthralgias and back pain.   Psychiatric/Behavioral: Negative.        Objective     Physical Exam  Constitutional:       Appearance: He is obese. He is not toxic-appearing.   HENT:      Head: Normocephalic.   Eyes:      Conjunctiva/sclera: Conjunctivae normal.   Cardiovascular:      Comments: Rhythm is mostly regular with occasional pauses/irregularity  Distant heart sounds  Pulmonary:      Effort: Pulmonary effort is normal. No respiratory distress.      Breath sounds: No wheezing.   Musculoskeletal:        Back:       Comments: Limited range of motion, diffuse muscle tightness in the lumbar region.  No visible rashes or deformities.   Neurological:      Mental Status: He is alert.      Deep Tendon Reflexes:      Reflex Scores:       Patellar reflexes are 3+ on the right side and 2+ on the left side.       Achilles reflexes are 2+ on the right side and 2+ on the left side.     Comments: Antalgic gait.  Muscle strength appears grossly normal, no drop foot no visible atrophy       Vitals:    07/13/22 1517   BP: 134/80   BP Location: Left arm   Patient Position: Sitting   BP Method: Large (Manual)  "  Pulse: 68   Resp: 18   SpO2: 98%   Weight: 135.6 kg (298 lb 13.3 oz)   Height: 5' 11" (1.803 m)       RECENT LABS:    Lab Results   Component Value Date    WBC 5.35 03/22/2022    HGB 15.0 03/22/2022    HCT 46.2 03/22/2022     03/22/2022    CHOL 139 03/22/2022    TRIG 59 03/22/2022    HDL 38 (L) 03/22/2022    ALT 24 03/22/2022    AST 25 03/22/2022     03/22/2022    K 4.5 03/22/2022     03/22/2022    CREATININE 0.9 03/22/2022    BUN 13 03/22/2022    CO2 26 03/22/2022    TSH 1.300 04/23/2016    PSA 4.5 (H) 03/22/2022       Results for orders placed or performed in visit on 06/16/22   POCT urine dipstick without microscope   Result Value Ref Range    Color, UA Light Yellow     pH, UA 5     WBC, UA positive     Nitrite, UA negative     Protein, POC trace     Glucose, UA normal     Ketones, UA negative     Urobilinogen, UA normal     Bilirubin, POC negative     Blood, UA negative     Clarity, UA Clear     Spec Grav UA 1.020        "

## 2022-07-13 NOTE — ASSESSMENT & PLAN NOTE
Patient has chronic low back pain, multifactorial with degenerative disc disease, history of compression fracture, etc.  he appears to have an acute exacerbation with a significant potent of muscle tightness and localized tenderness in the right mid and lower lumbar region.  He reports significant relief with the lidocaine patches so I will refill.  He had some benefit with NSAIDs but will switch to Celebrex just to reduce the risk of GI insult but still take precautions.  He may continue the tizanidine.  He is agreeable to physical therapy and I do recommend referral back to his pain physician for long-term evaluation.

## 2022-07-13 NOTE — ASSESSMENT & PLAN NOTE
Chronic condition, initially elevated today.  Repeat was borderline at 134/80.  Patient states home readings are generally in the 120s, may be exacerbated by pain.  He will continue to monitor.  Continue current medications for now

## 2022-07-25 ENCOUNTER — CLINICAL SUPPORT (OUTPATIENT)
Dept: REHABILITATION | Facility: HOSPITAL | Age: 77
End: 2022-07-25
Attending: FAMILY MEDICINE
Payer: MEDICARE

## 2022-07-25 DIAGNOSIS — G89.29 CHRONIC BILATERAL LOW BACK PAIN WITHOUT SCIATICA: Primary | ICD-10-CM

## 2022-07-25 DIAGNOSIS — M25.60 DECREASED RANGE OF MOTION: ICD-10-CM

## 2022-07-25 DIAGNOSIS — M54.50 CHRONIC BILATERAL LOW BACK PAIN WITHOUT SCIATICA: Primary | ICD-10-CM

## 2022-07-25 DIAGNOSIS — M54.50 CHRONIC MIDLINE LOW BACK PAIN WITHOUT SCIATICA: ICD-10-CM

## 2022-07-25 DIAGNOSIS — M51.36 DDD (DEGENERATIVE DISC DISEASE), LUMBAR: ICD-10-CM

## 2022-07-25 DIAGNOSIS — M62.81 MUSCLE WEAKNESS: ICD-10-CM

## 2022-07-25 DIAGNOSIS — G89.29 CHRONIC MIDLINE LOW BACK PAIN WITHOUT SCIATICA: ICD-10-CM

## 2022-07-25 PROBLEM — R26.9 GAIT ABNORMALITY: Status: RESOLVED | Noted: 2017-11-06 | Resolved: 2022-07-25

## 2022-07-25 PROCEDURE — 97162 PT EVAL MOD COMPLEX 30 MIN: CPT | Mod: PN | Performed by: PHYSICAL THERAPIST

## 2022-07-25 PROCEDURE — 97110 THERAPEUTIC EXERCISES: CPT | Mod: PN | Performed by: PHYSICAL THERAPIST

## 2022-07-25 NOTE — PLAN OF CARE
[facility]  Physical Therapy Initial Evaluation    Name: Chinedu Roque  Clinic Number: [id number]    Therapy Diagnosis:   Encounter Diagnoses   Name Primary?    Chronic midline low back pain without sciatica     Chronic bilateral low back pain without sciatica Yes    Muscle weakness     Decreased range of motion     DDD (degenerative disc disease), lumbar      Physician: Hernando Browne, *    Physician Orders: PT Eval and Treat   Medical Diagnosis from Referral:   M54.50,G89.29 (ICD-10-CM) - Chronic midline low back pain without sciatica   M51.36 (ICD-10-CM) - DDD (degenerative disc disease), lumbar     Evaluation Date: 7/25/2022  Authorization Period Expiration: 8-22-22  Plan of Care Expiration: 9-19-22  Progress Note Due: 8-25-22  Visit # / Visits authorized: 1/ 1  FOTO: Issued Visit #: 1    MD Follow up appointment: 8-24-22    Precautions: pain stimulator, hx of L2 compression fracture, hx of RC tear/ shoulder pain L     Time In: 2:00  Time Out: 2:58  Total Billable Time: 58 minutes    Subjective   Date of onset: 2 weeks ago  History of current condition - Chinedu reports: 3 months ago his wife developed cancer and he had to take care of her and strained back lifting her.  Pt states she passed away and still pain. Pt states 2 weeks ago he turned in shower and had severe pain and had to go to ER.  Pt states he then saw Dr. Wellington and he gave medication which helped a little.  Pt states pain medication messed up stomach and made him dizzy.  Went to Dr. Browne and started on muscle relaxers and slowly improving.  Pt states has appt with Dr. Segura to get another epidural.  Pt states long history which was treated over years with epidural shots and 1 years ago in March pain stimulator.  Generally that helped for about 6 months to 1 year.  Has RC tear L shoulder but no surgery or PT he just avoids high reach and no lifting. Pt reports no residuals from stroke.       Medical  History:   Past Medical History:   Diagnosis Date    Anticoagulant long-term use     ASA 81 mg    Arthritis     cervical    BPH (benign prostatic hyperplasia) 3/2/2012    Chronic left shoulder pain     Compression fracture of L2     DDD (degenerative disc disease), lumbar     HTN (hypertension) 3/2/2012    Hx of colonic polyps     Low back pain     Morbid obesity with BMI of 40.0-44.9, adult 3/18/2014    Seasonal allergies     Sleep apnea     compliant with CPAP    Stroke 3/1986       Surgical History:   Chinedu Roque  has a past surgical history that includes Knee surgery; Appendectomy; Vertebroplasty; Hernia repair; Facet Steroid injection ; Epidural block injection (); Radiofrequency ablation (); Radiofrequency ablation of lumbar medial branch nerve at single level (Right, 2019); Epidural steroid injection into lumbar spine (N/A, 2019); Epidural steroid injection into lumbar spine (N/A, 2019); Epidural steroid injection into lumbar spine (N/A, 2020); Transforaminal epidural injection of steroid (Right, 2020); Insertion of dorsal column nerve stimulator for trial (N/A, 2/10/2021); Insertion of neurostimulator of dorsal column of spinal cord (N/A, 3/1/2021); Transforaminal epidural injection of steroid (Left, 2021); and Colonoscopy (N/A, 2022).    Medications:   Chinedu has a current medication list which includes the following prescription(s): acetaminophen, aspirin, celecoxib, diclofenac sodium, finasteride, hydrochlorothiazide, lidocaine, losartan, metoprolol succinate, tamsulosin, and tramadol, and the following Facility-Administered Medications: sodium hyaluronate (euflexxa) and sodium hyaluronate (euflexxa).    Allergies:   Review of patient's allergies indicates:  No Known Allergies     Imaging, MRI studies: unable to see findings    Prior Therapy: had PT in   Social History: lives alone, wife recently .   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 Sleeping position: L side usually sometimes back   Saddle symptoms: neg  Bowel or bladder:  neg    Pts goals: reduce pain    Objective     Postural examination in standing:  - normal lumbar lordosis  - forward head  - forward shoulders  - R hip high  - L shoulder high    Postural examination in sitting:   - normal lumbar lordosis  - forward head  - forward shoulders      Functional assessment:   - walking: decreased trunk mobility with wide base and slow joni   - sit to stand: significant use of hands  - sit to supine: severe difficulty       - supine to sit: severe difficulty   - supine to prone: not tested    Lumbar active range of motion in standing is:  - flexion - 10%                     - extension -  10%                         - left side bending -  10%         - right side bending -  10%          Pelvic positioning AR R     Flexibility testing:  - hamstrings:     90/90 test R 40 L 40           - gastrocnemius:   DF ankle R 10 degrees L 10 degrees  - piriformis: severe tightness pain with crossing leg for stretch position R>L                          - hip flexors: + tightness with limited hip ext SL        Muscle Strength  MMT R L   Hip flexion 3+/5 3+/5   Hip abduction 4-/5 4-/5   Hip extension pain with bridge very min lift          Knee extension 5/5 5/5   Knee flexion 5/5 5/5       Endurance is poor.    Lumbar Special tests:  SLR neg    Palpation: TTP over B SI joints    Joint mobility:  not tested    Sensation: Intact      CMS Impairment/Limitation/Restriction for FOTO lumbar Survey    Therapist reviewed FOTO scores for Chinedu Roque on 7/25/2022.   FOTO documents entered into [employer] - see Media section.    Limitation Score: 60%       TREATMENT   Treatment Time In: 2:35  Treatment Time Out: 2:55  Total Treatment time separate from Evaluation: 20 minutes    Chinedu received therapeutic exercises to develop strength, endurance, ROM, flexibility and core stabilization for 20 minutes including:  Pt has a pain stimulator and hx of compression fx L 2  Performed modified push/pull with pt and able to note slight + change in symptoms.    HSS supine opp knee bent towel around thigh x 5  Pain with positioning for piriformis stretch so hold  Attempted bridge led to pain so held  Glut sets x 10  Hip abd/add supine x 10  Modified push/pull     Pain level 6/10 at end of session    Home Exercises and Patient Education Provided    Education provided:   - HEP  - stretch to point of tightness not pain  - exercise in pain free zone    Written Home Exercises Provided: Yes   Exercises were reviewed and Chinedu was able to demonstrate them prior to the end of the session.  Chinedu demonstrated good  understanding of the education provided.     See EMR under Patient Instructions for exercises provided 7/25/2022.    Assessment   Chinedu is a 76 y.o. male referred to outpatient Physical Therapy with a medical diagnosis of   M54.50,G89.29 (ICD-10-CM) - Chronic midline low back pain without sciatica   M51.36 (ICD-10-CM) - DDD (degenerative disc disease), lumbar     . Pt presents with long history of LBP with severe loss of mobility with pelvic dysfunction with decreased strength    Pt prognosis is Fair.   Pt will benefit from skilled outpatient Physical Therapy to address the deficits stated above and in the chart below, provide pt/family education, and to maximize pt's level of independence.     Plan of care discussed with  patient: Yes  Pt's spiritual, cultural and educational needs considered and patient is agreeable to the plan of care and goals as stated below:     Anticipated Barriers for therapy: pt requested only 1 time per week    Medical Necessity is demonstrated by the following  History  Co-morbidities and personal factors that may impact the plan of care Co-morbidities:   advanced age and high BMI    Personal Factors:   no deficits     moderate   Examination  Body Structures and Functions, activity limitations and participation restrictions that may impact the plan of care Body Regions:   back  lower extremities  trunk    Body Systems:    ROM  strength    Participation Restrictions:   Cutting grass    Activity limitations:   Learning and applying knowledge  no deficits    General Tasks and Commands  no deficits    Communication  no deficits    Mobility  walking    Self care  no deficits    Domestic Life  shopping  cooking  doing house work (cleaning house, washing dishes, laundry)  assisting others    Interactions/Relationships  no deficits    Life Areas  no deficits    Community and Social Life  no deficits         moderate   Clinical Presentation evolving clinical presentation with changing clinical characteristics moderate   Decision Making/ Complexity Score: moderate     GOALS:   Short Term Goals:  4 weeks  Increase range of motion 25%  Increase strength 1/2 muscle grade  Improve postural awareness of pelvis to independently identify dysfunction with min assist from PT  Be able to perform HEP with minimal cueing required    Long Term Goals: 8 weeks  Increase range of motion to 30-50% of full   Improve muscle strength 1 muscle grade  Improve and stabilize proper pelvic positioning  Restore ability to ambulate with normal gait with min pain  Walking for ADL will be restored with min increased pain  Restore ability to stand for ADL with min increased pain  Restore ability to perform sit to stand transfer with mod use of  hands  Restore ability to perform yard work with cutting grass with min increased pain  Restore ability to perform ADL's and household activities independently and with min increased pain    Plan   Plan of care Certification: 7/25/2022 to 9-19-22.    Outpatient Physical Therapy 1-2 times weekly for 8 weeks to include the following interventions: Therapeutic exercises, manual therapy, neuromuscular re-education, therapeutic activities, modalities such as moist heat, ice, ultrasound and electrical stimulation if pain stimulator off will be considered and utilized as needed    Suni Jacome, PT     I certify the need for these services furnished under this plan of treatment and while under my care.    ____________________________________ Physician/Referring Practitioner                                  Date of Signature

## 2022-08-01 ENCOUNTER — TELEPHONE (OUTPATIENT)
Dept: PRIMARY CARE CLINIC | Facility: CLINIC | Age: 77
End: 2022-08-01
Payer: MEDICARE

## 2022-08-01 NOTE — TELEPHONE ENCOUNTER
----- Message from Mali Cash sent at 8/1/2022 11:11 AM CDT -----  Regarding: Call Back  Patient called in regards to scheduling an appointment with Dr. Browne. Patient says he'd eaten a hot potato which has now effected his ability to breathe, & the patient also currently has COVID. Patient wanted an appointment today, but I told him we may not be able to assist him until he can test negative for COVID. A reliable call back number for the patient is [phone]

## 2022-08-19 NOTE — PROGRESS NOTES
[facility]   Physical Therapy Progress and Treatment Note     Name: Chinedu Roque  Clinic Number: [id number]    Therapy Diagnosis:   Encounter Diagnoses   Name Primary?    Chronic bilateral low back pain without sciatica Yes    Muscle weakness     Decreased range of motion      Physician: Hernando Browne, *    Visit Date: 8/22/2022    Physician Orders: PT Eval and Treat   Medical Diagnosis from Referral:   M54.50,G89.29 (ICD-10-CM) - Chronic midline low back pain without sciatica   M51.36 (ICD-10-CM) - DDD (degenerative disc disease), lumbar      Evaluation Date: 7/25/2022  Authorization Period Expiration: 9-22-22  Plan of Care Expiration: 9-19-22  Progress Note Due: 9-19-22  Visit # / Visits authorized: 2/21  FOTO: Issued Visit #: 1     MD Follow up appointment: 8-24-22     Precautions: pain stimulator, hx of L2 compression fracture, hx of RC tear/ shoulder pain L      PTA Visit #: 0/5     Time In: 8:57  Time Out: 10:00  Total Billable Time: 40 Minutes + ES    SUBJECTIVE     Pt reports: he had COVID and has been to ER 2 times since last visit due to back pain.  Pt went to ER this past weekend and got steroid injection and injection gave some relief. Pt states PT never problem. Pt states he continues to work in yard Pt states riding on tractor last time with bumping ride   Pt was not compliant with home exercise program.  Response to previous treatment: no change in pain  Functional change: none    Pain: 9/10 at worst 10/10 at best 5/10  Initial eval Current 7/10, worst 10/10, best 5/10     Location: bilateral LB    OBJECTIVE     Functional assessment:   - walking: decreased trunk mobility with wide base and slow joni   - sit to stand: significant use of hands  - sit to supine: severe difficulty       - supine to sit: severe difficulty   - supine to prone: not tested     Lumbar active range of motion in standing is: same as IE   - flexion - 10%                     - extension  -  10%                         - left side bending -  10%         - right side bending -  10%           Pelvic positioning same as IE AR R     Flexibility testing:  - hamstrings:     90/90 test (NP) at IE R 40 L 40           - gastrocnemius:   DF ankle R 10 degrees L 10 degrees  - piriformis: severe tightness pain with crossing leg for stretch position R>L                          - hip flexors: + tightness with limited hip ext SL         Muscle Strength same as IE   MMT R L   Hip flexion 3+/5 3+/5   Hip abduction 4-/5 4-/5   Hip extension pain with bridge very min lift               Knee extension 5/5 5/5   Knee flexion 5/5 5/5         Endurance is poor.      Palpation: TTP over B SI joints     Joint mobility: not tested        FOTO not performed due to severe pain         Treatment     Chinedu received the treatments listed below:      Chinedu received therapeutic exercises to develop strength, endurance, ROM, flexibility and core stabilization for 20 minutes including:  Pt has a pain stimulator and hx of compression fx L 2  PAIN STIMULATOR IS OFF    Hold DUE TO PAIN TRYING TO GET IN POSITIONHSS supine opp knee bent towel around thigh x 5  Pain with positioning for piriformis stretch so hold  Attempted bridge led to pain so held  Glut sets x 10   Quad sets x 10  Attempted ankle pumps pain so held   TA isometrics supine with leg straight which is more comfortable position for pt   HOLD due to pain Hip abd/add supine x 10  Modified push/pull x 3  Pt had increased pain trying to perform on his own so hold unless absolutely needed       manual therapy techniques: Soft tissue Mobilization were applied to the: iliopsoas R supine and R QL and lumbar paraspinals for 10 minutes, including:  STM    direct contact modalities after being cleared for contraindications: Pain stimulator off  Ultrasound  for pain control and to decrease inflammation @ continuous duty cycle, 1 Mhz, applied to R QL SL, intensity = 1.8 w/cm2 for 8  minutes. 2 min prep and verified where battery was and avoided that area    supervised modalities after being cleared for contradictions: Pain stimulator off  IFC electrical stimulation for pain control and to decrease muscle tightness and was applied to QL SL verified where battery was and avoided that area,   frequency = ,  @ 40% scan,  for 15 minutes with ice SL .       Patient Education and Home Exercises     Home Exercises Provided and Patient Education Provided     Education provided:   - HEP  Instructed pt in spine and pelvic girdle anatomy and biomechanics and effect of exercise to promote normal positioning, motion and to decrease pain and why to try push/pull if having pain  - exercise in pain free zone      Written Home Exercises Provided: yes. Exercises were reviewed and Chinedu was able to demonstrate them prior to the end of the session.  Chinedu demonstrated good  understanding of the education provided. See EMR under Patient Instructions for exercises provided during therapy sessions    ASSESSMENT     Pt has just returned for 2nd visit to PT after IE about 1 month ago.  Pt has not been able to do ex due to severe pain.  Pt with pelvic dysfunction which responded to ex but was unable to perform without assistance from PT due to strain of ex with LUE.  Pt with slight decrease in pain with modalities and manual therapy and may be able to tolerate min isometric ex and understands to try push/push as able clement if feeling pain and unable to get relief.      Chinedu Is not progressing well towards his goals.   Pt prognosis is Fair.     Pt will continue to benefit from skilled outpatient physical therapy to address the goals listed in the initial evaluation, provide pt/family education and to maximize pt's level of independence in the home and community environment.     Pt's spiritual, cultural and educational needs considered and pt agreeable to plan of care and goals.     Anticipated barriers to  physical therapy: pt requested 1 time a week    GOALS:   Short Term Goals:  4 weeks  Increase range of motion 25%  Increase strength 1/2 muscle grade  Improve postural awareness of pelvis to independently identify dysfunction with min assist from PT  Be able to perform HEP with minimal cueing required     Long Term Goals: 8 weeks  Increase range of motion to 30-50% of full   Improve muscle strength 1 muscle grade  Improve and stabilize proper pelvic positioning  Restore ability to ambulate with normal gait with min pain  Walking for ADL will be restored with min increased pain  Restore ability to stand for ADL with min increased pain  Restore ability to perform sit to stand transfer with mod use of hands  Restore ability to perform yard work with cutting grass with min increased pain  Restore ability to perform ADL's and household activities independently and with min increased pain       PLAN   Continue with established plan of care towards PT goals.      Assess response to modification of program      Suni Jacome, PT

## 2022-08-22 ENCOUNTER — CLINICAL SUPPORT (OUTPATIENT)
Dept: REHABILITATION | Facility: HOSPITAL | Age: 77
End: 2022-08-22
Payer: MEDICARE

## 2022-08-22 ENCOUNTER — OFFICE VISIT (OUTPATIENT)
Dept: PRIMARY CARE CLINIC | Facility: CLINIC | Age: 77
End: 2022-08-22
Payer: MEDICARE

## 2022-08-22 VITALS
HEIGHT: 71 IN | BODY MASS INDEX: 41.95 KG/M2 | SYSTOLIC BLOOD PRESSURE: 120 MMHG | HEART RATE: 56 BPM | DIASTOLIC BLOOD PRESSURE: 74 MMHG | WEIGHT: 299.63 LBS

## 2022-08-22 DIAGNOSIS — M54.16 LUMBAR RADICULOPATHY: ICD-10-CM

## 2022-08-22 DIAGNOSIS — M62.81 MUSCLE WEAKNESS: ICD-10-CM

## 2022-08-22 DIAGNOSIS — M54.50 CHRONIC BILATERAL LOW BACK PAIN WITHOUT SCIATICA: Primary | ICD-10-CM

## 2022-08-22 DIAGNOSIS — G89.29 CHRONIC BILATERAL LOW BACK PAIN WITHOUT SCIATICA: Primary | ICD-10-CM

## 2022-08-22 DIAGNOSIS — M47.816 LUMBAR SPONDYLOSIS: Primary | ICD-10-CM

## 2022-08-22 DIAGNOSIS — M25.60 DECREASED RANGE OF MOTION: ICD-10-CM

## 2022-08-22 DIAGNOSIS — M54.9 DORSALGIA, UNSPECIFIED: ICD-10-CM

## 2022-08-22 DIAGNOSIS — I70.0 ABDOMINAL AORTIC ATHEROSCLEROSIS: ICD-10-CM

## 2022-08-22 PROCEDURE — 3078F DIAST BP <80 MM HG: CPT | Mod: CPTII,S$GLB,, | Performed by: FAMILY MEDICINE

## 2022-08-22 PROCEDURE — 99214 PR OFFICE/OUTPT VISIT, EST, LEVL IV, 30-39 MIN: ICD-10-PCS | Mod: S$GLB,,, | Performed by: FAMILY MEDICINE

## 2022-08-22 PROCEDURE — 1125F AMNT PAIN NOTED PAIN PRSNT: CPT | Mod: CPTII,S$GLB,, | Performed by: FAMILY MEDICINE

## 2022-08-22 PROCEDURE — 1159F PR MEDICATION LIST DOCUMENTED IN MEDICAL RECORD: ICD-10-PCS | Mod: CPTII,S$GLB,, | Performed by: FAMILY MEDICINE

## 2022-08-22 PROCEDURE — 97110 THERAPEUTIC EXERCISES: CPT | Mod: PN | Performed by: PHYSICAL THERAPIST

## 2022-08-22 PROCEDURE — 3288F FALL RISK ASSESSMENT DOCD: CPT | Mod: CPTII,S$GLB,, | Performed by: FAMILY MEDICINE

## 2022-08-22 PROCEDURE — 1101F PR PT FALLS ASSESS DOC 0-1 FALLS W/OUT INJ PAST YR: ICD-10-PCS | Mod: CPTII,S$GLB,, | Performed by: FAMILY MEDICINE

## 2022-08-22 PROCEDURE — 99999 PR PBB SHADOW E&M-EST. PATIENT-LVL IV: ICD-10-PCS | Mod: PBBFAC,,, | Performed by: FAMILY MEDICINE

## 2022-08-22 PROCEDURE — 97140 MANUAL THERAPY 1/> REGIONS: CPT | Mod: PN | Performed by: PHYSICAL THERAPIST

## 2022-08-22 PROCEDURE — 99999 PR PBB SHADOW E&M-EST. PATIENT-LVL IV: CPT | Mod: PBBFAC,,, | Performed by: FAMILY MEDICINE

## 2022-08-22 PROCEDURE — 1157F PR ADVANCE CARE PLAN OR EQUIV PRESENT IN MEDICAL RECORD: ICD-10-PCS | Mod: CPTII,S$GLB,, | Performed by: FAMILY MEDICINE

## 2022-08-22 PROCEDURE — 99214 OFFICE O/P EST MOD 30 MIN: CPT | Mod: S$GLB,,, | Performed by: FAMILY MEDICINE

## 2022-08-22 PROCEDURE — 3078F PR MOST RECENT DIASTOLIC BLOOD PRESSURE < 80 MM HG: ICD-10-PCS | Mod: CPTII,S$GLB,, | Performed by: FAMILY MEDICINE

## 2022-08-22 PROCEDURE — 3288F PR FALLS RISK ASSESSMENT DOCUMENTED: ICD-10-PCS | Mod: CPTII,S$GLB,, | Performed by: FAMILY MEDICINE

## 2022-08-22 PROCEDURE — 1160F RVW MEDS BY RX/DR IN RCRD: CPT | Mod: CPTII,S$GLB,, | Performed by: FAMILY MEDICINE

## 2022-08-22 PROCEDURE — 1159F MED LIST DOCD IN RCRD: CPT | Mod: CPTII,S$GLB,, | Performed by: FAMILY MEDICINE

## 2022-08-22 PROCEDURE — 1101F PT FALLS ASSESS-DOCD LE1/YR: CPT | Mod: CPTII,S$GLB,, | Performed by: FAMILY MEDICINE

## 2022-08-22 PROCEDURE — 1157F ADVNC CARE PLAN IN RCRD: CPT | Mod: CPTII,S$GLB,, | Performed by: FAMILY MEDICINE

## 2022-08-22 PROCEDURE — 1160F PR REVIEW ALL MEDS BY PRESCRIBER/CLIN PHARMACIST DOCUMENTED: ICD-10-PCS | Mod: CPTII,S$GLB,, | Performed by: FAMILY MEDICINE

## 2022-08-22 PROCEDURE — 97035 APP MDLTY 1+ULTRASOUND EA 15: CPT | Mod: PN | Performed by: PHYSICAL THERAPIST

## 2022-08-22 PROCEDURE — 97014 ELECTRIC STIMULATION THERAPY: CPT | Mod: PN | Performed by: PHYSICAL THERAPIST

## 2022-08-22 PROCEDURE — 3074F SYST BP LT 130 MM HG: CPT | Mod: CPTII,S$GLB,, | Performed by: FAMILY MEDICINE

## 2022-08-22 PROCEDURE — 3074F PR MOST RECENT SYSTOLIC BLOOD PRESSURE < 130 MM HG: ICD-10-PCS | Mod: CPTII,S$GLB,, | Performed by: FAMILY MEDICINE

## 2022-08-22 PROCEDURE — 1125F PR PAIN SEVERITY QUANTIFIED, PAIN PRESENT: ICD-10-PCS | Mod: CPTII,S$GLB,, | Performed by: FAMILY MEDICINE

## 2022-08-22 NOTE — PLAN OF CARE
[facility]   Physical Therapy Progress and Treatment Note     Name: Chinedu Roque  Clinic Number: [id number]    Therapy Diagnosis:   Encounter Diagnoses   Name Primary?    Chronic bilateral low back pain without sciatica Yes    Muscle weakness     Decreased range of motion      Physician: Hernando Browne, *    Visit Date: 8/22/2022    Physician Orders: PT Eval and Treat   Medical Diagnosis from Referral:   M54.50,G89.29 (ICD-10-CM) - Chronic midline low back pain without sciatica   M51.36 (ICD-10-CM) - DDD (degenerative disc disease), lumbar      Evaluation Date: 7/25/2022  Authorization Period Expiration: 9-22-22  Plan of Care Expiration: 9-19-22  Progress Note Due: 9-19-22  Visit # / Visits authorized: 2/21  FOTO: Issued Visit #: 1     MD Follow up appointment: 8-24-22     Precautions: pain stimulator, hx of L2 compression fracture, hx of RC tear/ shoulder pain L      PTA Visit #: 0/5     Time In: 8:57  Time Out: 10:00  Total Billable Time: 40 Minutes + ES    SUBJECTIVE     Pt reports: he had COVID and has been to ER 2 times since last visit due to back pain.  Pt went to ER this past weekend and got steroid injection and injection gave some relief. Pt states PT never problem. Pt states he continues to work in yard Pt states riding on tractor last time with bumping ride   Pt was not compliant with home exercise program.  Response to previous treatment: no change in pain  Functional change: none    Pain: 9/10 at worst 10/10 at best 5/10  Initial eval Current 7/10, worst 10/10, best 5/10     Location: bilateral LB    OBJECTIVE     Functional assessment:   - walking: decreased trunk mobility with wide base and slow joni   - sit to stand: significant use of hands  - sit to supine: severe difficulty       - supine to sit: severe difficulty   - supine to prone: not tested     Lumbar active range of motion in standing is: same as IE   - flexion - 10%                     - extension  -  10%                         - left side bending -  10%         - right side bending -  10%           Pelvic positioning same as IE AR R     Flexibility testing:  - hamstrings:     90/90 test (NP) at IE R 40 L 40           - gastrocnemius:   DF ankle R 10 degrees L 10 degrees  - piriformis: severe tightness pain with crossing leg for stretch position R>L                          - hip flexors: + tightness with limited hip ext SL         Muscle Strength same as IE   MMT R L   Hip flexion 3+/5 3+/5   Hip abduction 4-/5 4-/5   Hip extension pain with bridge very min lift               Knee extension 5/5 5/5   Knee flexion 5/5 5/5         Endurance is poor.      Palpation: TTP over B SI joints     Joint mobility: not tested        FOTO not performed due to severe pain         Treatment     Chinedu received the treatments listed below:      Chinedu received therapeutic exercises to develop strength, endurance, ROM, flexibility and core stabilization for 20 minutes including:  Pt has a pain stimulator and hx of compression fx L 2  PAIN STIMULATOR IS OFF    Hold DUE TO PAIN TRYING TO GET IN POSITIONHSS supine opp knee bent towel around thigh x 5  Pain with positioning for piriformis stretch so hold  Attempted bridge led to pain so held  Glut sets x 10   Quad sets x 10  Attempted ankle pumps pain so held   TA isometrics supine with leg straight which is more comfortable position for pt   HOLD due to pain Hip abd/add supine x 10  Modified push/pull x 3  Pt had increased pain trying to perform on his own so hold unless absolutely needed       manual therapy techniques: Soft tissue Mobilization were applied to the: iliopsoas R supine and R QL and lumbar paraspinals for 10 minutes, including:  STM    direct contact modalities after being cleared for contraindications: Pain stimulator off  Ultrasound  for pain control and to decrease inflammation @ continuous duty cycle, 1 Mhz, applied to R QL SL, intensity = 1.8 w/cm2 for 8  minutes. 2 min prep and verified where battery was and avoided that area    supervised modalities after being cleared for contradictions: Pain stimulator off  IFC electrical stimulation for pain control and to decrease muscle tightness and was applied to QL SL verified where battery was and avoided that area,   frequency = ,  @ 40% scan,  for 15 minutes with ice SL .       Patient Education and Home Exercises     Home Exercises Provided and Patient Education Provided     Education provided:   - HEP  Instructed pt in spine and pelvic girdle anatomy and biomechanics and effect of exercise to promote normal positioning, motion and to decrease pain and why to try push/pull if having pain  - exercise in pain free zone      Written Home Exercises Provided: yes. Exercises were reviewed and Chinedu was able to demonstrate them prior to the end of the session.  Chinedu demonstrated good  understanding of the education provided. See EMR under Patient Instructions for exercises provided during therapy sessions    ASSESSMENT     Pt has just returned for 2nd visit to PT after IE about 1 month ago.  Pt has not been able to do ex due to severe pain.  Pt with pelvic dysfunction which responded to ex but was unable to perform without assistance from PT due to strain of ex with LUE.  Pt with slight decrease in pain with modalities and manual therapy and may be able to tolerate min isometric ex and understands to try push/push as able clement if feeling pain and unable to get relief.      Chinedu Is not progressing well towards his goals.   Pt prognosis is Fair.     Pt will continue to benefit from skilled outpatient physical therapy to address the goals listed in the initial evaluation, provide pt/family education and to maximize pt's level of independence in the home and community environment.     Pt's spiritual, cultural and educational needs considered and pt agreeable to plan of care and goals.     Anticipated barriers to  physical therapy: pt requested 1 time a week    GOALS:   Short Term Goals:  4 weeks  Increase range of motion 25%  Increase strength 1/2 muscle grade  Improve postural awareness of pelvis to independently identify dysfunction with min assist from PT  Be able to perform HEP with minimal cueing required     Long Term Goals: 8 weeks  Increase range of motion to 30-50% of full   Improve muscle strength 1 muscle grade  Improve and stabilize proper pelvic positioning  Restore ability to ambulate with normal gait with min pain  Walking for ADL will be restored with min increased pain  Restore ability to stand for ADL with min increased pain  Restore ability to perform sit to stand transfer with mod use of hands  Restore ability to perform yard work with cutting grass with min increased pain  Restore ability to perform ADL's and household activities independently and with min increased pain       PLAN   Continue with established plan of care towards PT goals.      Assess response to modification of program      Suni Jacome, PT

## 2022-08-22 NOTE — ASSESSMENT & PLAN NOTE
Reviewed the findings on CT scan.  He remains on aspirin daily.  Currently not on a statin medication.  I think he will benefit from this.  Will discuss in more detail at his next appointment when he is not suffering from his acute symptoms

## 2022-08-22 NOTE — PROGRESS NOTES
THIS DOCUMENT WAS MADE IN PART WITH VOICE RECOGNITION SOFTWARE.  OCCASIONALLY THIS SOFTWARE WILL MISINTERPRET WORDS OR PHRASES.      Primary Care Provider Appointment   [facility] ([facility])  [address], [place] [address]   Ph: [phone]  Fax: [phone]      Patient ID: Chinedu Roque is a 76 y.o. male.    Chinedu was seen today for back pain and abdominal pain.    Diagnoses and all orders for this visit:    Lumbar spondylosis  Dorsalgia, unspecified  -     CT Lumbar Spine Without Contrast; Future  Lumbar radiculopathy  Patient reports sudden worsening of right lower back pain with continuous radiation into the right inguinal area.  He has been to the emergency room twice in the last 2-3 months because of back pain and once in between because of COVID infection.  He has an appointment with his pain physician.  Currently he is taking a pain medicine from the emergency room but he states he does not know what it is he does think the hydrocodone but this is really the only thing I can find he says it helps.  He says tramadol has not helped which she has received previously from Pain Management.      He is in physical therapy but really has only been to a few sessions so far and will continue.      He says the pain gets severe at times and is only relieved by the above-mentioned medication that I believe his hydrocodone.  He cannot have an MRI of his back because of his spinal stimulator.  So I think is reasonable to proceed with a CT scan in advance of his upcoming pain management appointment.    Abdominal aortic atherosclerosis  Reviewed findings on CT scan.  Continue aspirin.  When he is recovered from his acute symptoms I will want to discuss beginning a statin medication.      Follow Up:  Two months      Health Maintenance       Date Due Completion Date    Hepatitis C Screening Never done ---    Shingles Vaccine (2 of 3) 01/01/2015 11/6/2014    COVID-19  "Vaccine (4 - Booster for Pfizer series) 02/16/2022 11/16/2021    Influenza Vaccine (1) 09/01/2022 9/28/2021    Override on 10/16/2019: Done (Date is approx, per pt. Done at [facility])    TETANUS VACCINE 11/24/2022 11/24/2012    Lipid Panel 03/22/2023 3/22/2022          Subjective:     Chief Complaint   Patient presents with    Back Pain     Lower back pain    Abdominal Pain     I have reviewed the information entered by the ancillary staff regarding the chief complaint as well as the related history.    HPI    Patient is a/an 76 y.o.  male   RISK of ADMIT/ED: 54    Only few sessions PT b/c covid, then back to ER    Pain right lower back to lower abdomen and groin  No rash,   CT abdomen reviewed, no acute internal abdominal prior    For complete problem list, past medical history, surgical history, social history, etc., see appropriate section in the electronic medical record    Review of Systems   HENT: Negative.    Respiratory: Negative.    Cardiovascular: Negative.    Musculoskeletal: Positive for arthralgias and back pain.       Objective     Physical Exam  Constitutional:       General: He is not in acute distress.     Appearance: He is well-developed. He is obese. He is not diaphoretic.   HENT:      Head: Normocephalic and atraumatic.   Eyes:      General: No scleral icterus.     Conjunctiva/sclera: Conjunctivae normal.   Cardiovascular:      Rate and Rhythm: Normal rate and regular rhythm.   Pulmonary:      Effort: Pulmonary effort is normal. No respiratory distress.   Abdominal:      Comments: Abdomen is obese but no signs of acute abdomen.   Neurological:      Mental Status: He is alert and oriented to person, place, and time.      Coordination: Coordination normal.   Psychiatric:         Behavior: Behavior normal.       Vitals:    08/22/22 1542   BP: 120/74   BP Location: Right arm   Patient Position: Sitting   BP Method: Large (Manual)   Pulse: (!) 56   Weight: 135.9 kg (299 lb 9.7 oz)   Height: 5' 11" (1.803 " m)       RECENT LABS:    Lab Results   Component Value Date    WBC 5.35 03/22/2022    HGB 15.0 03/22/2022    HCT 46.2 03/22/2022     03/22/2022    CHOL 139 03/22/2022    TRIG 59 03/22/2022    HDL 38 (L) 03/22/2022    ALT 24 03/22/2022    AST 25 03/22/2022     03/22/2022    K 4.5 03/22/2022     03/22/2022    CREATININE 0.9 03/22/2022    BUN 13 03/22/2022    CO2 26 03/22/2022    TSH 1.300 04/23/2016    PSA 4.5 (H) 03/22/2022       Results for orders placed or performed during the hospital encounter of 08/20/22   POCT CBC   Result Value Ref Range    POC WBC 4.7 3.9 - 12.7 K/uL    POC GRA% 55.8 38.0 - 73.0 %    POC MID% 8.5 4.0 - 15.0 %    POC LYM% 35.7 18.0 - 48.0 %    POC Gran# 2.6 1.8 - 7.7 K/uL    POC MID# 0.5 0.3 - 1.0 K/uL    POC LYM# 1.6 1.0 - 4.8 K/uL    POC RBC 4.72 4.60 - 6.20 M/uL    POC HGB 14.8 14.0 - 18.0 g/dL    POC MCV 89.9 82.0 - 98.0 fl    POC HCT 42.4 40.0 - 54.0 %    POC MCH 31.4 (H) 27.0 - 31.0 pg    POC MCHC 35.0 32.0 - 36.0 g/dL    POC RDW% 12.5 11.5 - 14.5 %    POC  150 - 450 K/uL    POC MPV 7.9 (L) 9.2 - 12.9 fl   POCT CMP   Result Value Ref Range    POC Sodium 143 128 - 145 mmol/L    POC Potassium 4.1 3.6 - 5.1 mmol/L    POC Chloride 107 98 - 108 mmol/L    POC CO2 28 18 - 33 mmol/L    POC Glucose 111 73 - 118 mg/dL    POC BUN 14 7 - 22 mg/dL    POC Creatinine 1.0 0.6 - 1.2 mg/dL    Calcium, POC 9.6 8.0 - 10.3 mg/dL    Albumin, POC 3.7 3.3 - 5.5 g/dL    POC ALT 22 10 - 47 U/L    POC AST 27 11 - 38 U/L    Alkaline Phosphatase, POC 61 53 - 128 U/L    POC TOTAL PROTEIN 7.1 6.4 - 8.1 g/dL    POC TOTAL BILIRUBIN 1.1 0.2 - 1.6 mg/dL    POC eGFR >60 61 - 2,000 mL/min    POC Hemolysis 0     Specimen Type BLNK

## 2022-08-31 ENCOUNTER — TELEPHONE (OUTPATIENT)
Dept: PAIN MEDICINE | Facility: CLINIC | Age: 77
End: 2022-08-31
Payer: MEDICARE

## 2022-08-31 ENCOUNTER — OFFICE VISIT (OUTPATIENT)
Dept: PAIN MEDICINE | Facility: CLINIC | Age: 77
End: 2022-08-31
Payer: MEDICARE

## 2022-08-31 ENCOUNTER — HOSPITAL ENCOUNTER (OUTPATIENT)
Dept: RADIOLOGY | Facility: HOSPITAL | Age: 77
Discharge: HOME | End: 2022-08-31
Attending: ANESTHESIOLOGY
Payer: MEDICARE

## 2022-08-31 VITALS
BODY MASS INDEX: 41.79 KG/M2 | HEIGHT: 71 IN | SYSTOLIC BLOOD PRESSURE: 118 MMHG | WEIGHT: 298.5 LBS | HEART RATE: 66 BPM | DIASTOLIC BLOOD PRESSURE: 58 MMHG

## 2022-08-31 DIAGNOSIS — G89.29 CHRONIC MIDLINE LOW BACK PAIN WITHOUT SCIATICA: ICD-10-CM

## 2022-08-31 DIAGNOSIS — M51.36 DDD (DEGENERATIVE DISC DISEASE), LUMBAR: ICD-10-CM

## 2022-08-31 DIAGNOSIS — M79.675 GREAT TOE PAIN, LEFT: ICD-10-CM

## 2022-08-31 DIAGNOSIS — M54.16 RIGHT LUMBAR RADICULOPATHY: ICD-10-CM

## 2022-08-31 DIAGNOSIS — M54.50 CHRONIC MIDLINE LOW BACK PAIN WITHOUT SCIATICA: ICD-10-CM

## 2022-08-31 DIAGNOSIS — M79.675 GREAT TOE PAIN, LEFT: Primary | ICD-10-CM

## 2022-08-31 PROCEDURE — 73660 X-RAY EXAM OF TOE(S): CPT | Mod: 26,LT,, | Performed by: RADIOLOGY

## 2022-08-31 PROCEDURE — 73660 X-RAY EXAM OF TOE(S): CPT | Mod: TC,PO,LT

## 2022-08-31 PROCEDURE — 3078F DIAST BP <80 MM HG: CPT | Mod: CPTII,S$GLB,, | Performed by: ANESTHESIOLOGY

## 2022-08-31 PROCEDURE — 1101F PT FALLS ASSESS-DOCD LE1/YR: CPT | Mod: CPTII,S$GLB,, | Performed by: ANESTHESIOLOGY

## 2022-08-31 PROCEDURE — 1101F PR PT FALLS ASSESS DOC 0-1 FALLS W/OUT INJ PAST YR: ICD-10-PCS | Mod: CPTII,S$GLB,, | Performed by: ANESTHESIOLOGY

## 2022-08-31 PROCEDURE — 3074F SYST BP LT 130 MM HG: CPT | Mod: CPTII,S$GLB,, | Performed by: ANESTHESIOLOGY

## 2022-08-31 PROCEDURE — 99214 OFFICE O/P EST MOD 30 MIN: CPT | Mod: S$GLB,,, | Performed by: ANESTHESIOLOGY

## 2022-08-31 PROCEDURE — 3078F PR MOST RECENT DIASTOLIC BLOOD PRESSURE < 80 MM HG: ICD-10-PCS | Mod: CPTII,S$GLB,, | Performed by: ANESTHESIOLOGY

## 2022-08-31 PROCEDURE — 1125F AMNT PAIN NOTED PAIN PRSNT: CPT | Mod: CPTII,S$GLB,, | Performed by: ANESTHESIOLOGY

## 2022-08-31 PROCEDURE — 3288F PR FALLS RISK ASSESSMENT DOCUMENTED: ICD-10-PCS | Mod: CPTII,S$GLB,, | Performed by: ANESTHESIOLOGY

## 2022-08-31 PROCEDURE — 1125F PR PAIN SEVERITY QUANTIFIED, PAIN PRESENT: ICD-10-PCS | Mod: CPTII,S$GLB,, | Performed by: ANESTHESIOLOGY

## 2022-08-31 PROCEDURE — 3074F PR MOST RECENT SYSTOLIC BLOOD PRESSURE < 130 MM HG: ICD-10-PCS | Mod: CPTII,S$GLB,, | Performed by: ANESTHESIOLOGY

## 2022-08-31 PROCEDURE — 99214 PR OFFICE/OUTPT VISIT, EST, LEVL IV, 30-39 MIN: ICD-10-PCS | Mod: S$GLB,,, | Performed by: ANESTHESIOLOGY

## 2022-08-31 PROCEDURE — 1157F PR ADVANCE CARE PLAN OR EQUIV PRESENT IN MEDICAL RECORD: ICD-10-PCS | Mod: CPTII,S$GLB,, | Performed by: ANESTHESIOLOGY

## 2022-08-31 PROCEDURE — 1159F MED LIST DOCD IN RCRD: CPT | Mod: CPTII,S$GLB,, | Performed by: ANESTHESIOLOGY

## 2022-08-31 PROCEDURE — 99999 PR PBB SHADOW E&M-EST. PATIENT-LVL III: ICD-10-PCS | Mod: PBBFAC,,, | Performed by: ANESTHESIOLOGY

## 2022-08-31 PROCEDURE — 1159F PR MEDICATION LIST DOCUMENTED IN MEDICAL RECORD: ICD-10-PCS | Mod: CPTII,S$GLB,, | Performed by: ANESTHESIOLOGY

## 2022-08-31 PROCEDURE — 73660 XR TOE 2 OR MORE VIEWS LEFT: ICD-10-PCS | Mod: 26,LT,, | Performed by: RADIOLOGY

## 2022-08-31 PROCEDURE — 3288F FALL RISK ASSESSMENT DOCD: CPT | Mod: CPTII,S$GLB,, | Performed by: ANESTHESIOLOGY

## 2022-08-31 PROCEDURE — 99999 PR PBB SHADOW E&M-EST. PATIENT-LVL III: CPT | Mod: PBBFAC,,, | Performed by: ANESTHESIOLOGY

## 2022-08-31 PROCEDURE — 1157F ADVNC CARE PLAN IN RCRD: CPT | Mod: CPTII,S$GLB,, | Performed by: ANESTHESIOLOGY

## 2022-08-31 RX ORDER — HYDROCODONE BITARTRATE AND ACETAMINOPHEN 7.5; 325 MG/1; MG/1
1 TABLET ORAL EVERY 6 HOURS PRN
Qty: 20 TABLET | Refills: 0 | Status: SHIPPED | OUTPATIENT
Start: 2022-08-31 | End: 2022-10-07 | Stop reason: SDUPTHER

## 2022-08-31 RX ORDER — TIZANIDINE 4 MG/1
TABLET ORAL
Status: ON HOLD | COMMUNITY
Start: 2022-08-23 | End: 2022-09-22 | Stop reason: HOSPADM

## 2022-08-31 NOTE — PROGRESS NOTES
This note was completed with dictation software and grammatical errors may exist.    CC:Back pain    HPI: The patient is a 76 -year-old man with a history of hypertension, obesity and low back pain who presents in referral from Dr. Winters for chronic right low back pain.  The patient returns in follow-up today for back pain.  He states that he is currently having pain radiating from the back into the right groin, right leg.  Previously his pain was more located into the left leg and that side is doing fine with the current settings of the spinal cord stimulator.  Never had right leg pain so he does not have coverage of the leg at this point.  He also reports injuring his left great toe recently, has a laceration but more importantly the entire toes purple and he thinks it may be broken.      Pain intervention history: He done physical therapy in January, 2014 with moderate relief but not sustained.  He takes Relafen, tramadol, baclofen and chlorzoxazone as needed with mild relief.  He had undergone what sounds like a series of epidurals several years ago with no major relief. The patient is status post a right side L2/3, L3/4, L4/5 and L5/S1 facet joint injection on 10/28/14 with 50% relief of his back pain for 1 month.  He is status post radiofrequency ablation of the right L1, 2, 3, 4 and 5 medial branch nerves on 3/18/15 with 75% relief.  He is status post C7-T1 cervical interlaminar epidural steroid injection on 5/11/15 with 70% relief.  He is status post right L1, 2, 3, 4 and 5 medial branch radiofrequency ablation on 7/5/16 with 50% relief.   He is status post right L1, 2, 3, 4 and 5 medial branch radiofrequency ablation on 10/17/17 with about 50% relief additionally, later reported almost complete relief lasting a year.  He is status post right L1, 2, 3, 4 and 5 medial branch radiofrequency ablation on 04/11/2019 with mild relief.   He is status post L5/S1 interlaminar epidural steroid injection on 06/05/2019  "with 80% relief.  He is status post L5/S1 interlaminar epidural steroid injection on 09/13/2019 with 50% relief lasting about 6 months.  He is status post L5/S1 interlaminar epidural steroid injection to the right on 06/02/2020 with minimal relief.  He is status post right L3/4 and L5/S1 transforaminal epidural steroid injection on 11/13/2020 with 0% relief.   He is status post left L5/S1 transforaminal epidural steroid injection on 06/24/2021 with 50% relief.     Spine surgeries:    Antineuropathics:  NSAIDs:  Physical therapy:  Antidepressants:  Muscle relaxers:  Opioids:  Antiplatelets/Anticoagulants:            ROS:He reports back pain.  Balance of review of systems is negative.    Medical, surgical, family and social history reviewed elsewhere in record.    Medications/Allergies: See med card    Vitals:    08/31/22 0818   BP: (!) 118/58   Pulse: 66   Weight: 135.4 kg (298 lb 8.1 oz)   Height: 5' 11" (1.803 m)   PainSc: 10-Worst pain ever   PainLoc: Back           Physical exam:  Gen: A and O x3, pleasant, well-groomed  Skin: No rashes or obvious lesions  HEENT: PERRLA, no obvious deformities on ears or in canals.   CVS: Regular rate and rhythm, normal S1 and S2, no murmurs.  Resp: Clear to auscultation bilaterally, no wheezes or rales.  Abdomen: Soft, NT/ND, normal bowel sounds present.  Musculoskeletal:  Does not put full weight on left foot due to left great toe pain    Neuro:  Lower extremities: 5/5 strength bilaterally  Reflexes: Patellar 1+, Achilles 0+ bilaterally.  Sensory:  Intact and symmetrical to light touch and pinprick in L2-S1 dermatomes bilaterally.    Lumbar spine:  Lumbar spine: ROM is moderately reduced with flexion extension and oblique extension with increased pain on both flexion and extension.    Ramakrishna's test causes no increased pain on either side.    Supine straight leg raise is positive for right leg pain.  Internal and external rotation of the hip causes no right groin pain.    " Myofascial exam: No tenderness to palpation across lumbar paraspinous muscles.      Imagin14 Xray L-spine  There is diffuse osteopenia. Mild to moderate chronic compression fracture with anterior wedging and evidence of vertebroplasty at L2. minimal left convex curvature in the upper lumbar region. No spondylolisthesis. Mild concavity of the superior endplate of L4 which could be small compression fracture or Schmorl's node. No additional fracture.   There is moderate degenerative change within the lumbar spine with anterior osteophyte formation most prominent at L4-L5 and L2- L3 and L1 - L2, also present in the lower thoracic region with flowing ossification anteriorly suggesting diffuse idiopathic sclerotic hyperostosis. There is also mild decreased intervertebral disk space height and endplate sclerosis the lower thoracic and upper lumbar regions. Due to the degree of osseous mineralization, it is difficult to evaluate for any possible pars defects. Moderate sclerosis suggesting facet arthropathy in the lumbar spine present and there is mild sclerosis, likely degenerative in nature involving the sacroiliac joints.    10/7/14 MRI lumbar spine: At L1/2 there is mild spinal stenosis secondary to facet hypertrophy and disc bulging.  At L2/3 there is minimal spinal stenosis secondary to retropulsion of L2 compression fracture, appearance of prior kyphoplasty present.  There is minimal disc bulging but there is also some facet hypertrophy at this level.  At L3/4 there is facet and ligamentum hypertrophy, minimal disc bulging causing mild spinal stenosis and neuroforaminal narrowing on the left greater than the right side.  At L4/5 there is moderate hypertrophic facet and ligamentum hypertrophy with mild left foraminal narrowing compared to the right side.  There is no spinal stenosis at this level.  At L5/S1 there is a broad-based disc bulge that extends to the left side more than the right side along with  asymmetric left sided facet hypertrophy and ligamentum flavum hypertrophy causing moderate left foraminal stenosis.    4/15/15 outside open MRI cervical spine Premier  C3-4 posterior disc bulge producing mild bilateral foraminal narrowing  C4-5 posterior disc bulge producing mild bilateral foraminal narrowing, possible tiny annular tear in the posterior disc, anterior thecal sac deformity with no evidence of spinal stenosis  C5-6 circumferential disc bulge producing moderate to severe bilateral foraminal narrowing, effacement of the bilateral lateral recesses worse to the right, anterior thecal sac deformity with effacement of the anterior subarachnoid space, mild spinal canal stenosis  C6-7 circumferential disc bulge producing moderate to severe bilateral foraminal narrowing with mild spinal canal stenosis  C7-T1 not completely included but appears to have a circumferential disc bulge with shallow midline disc protrusion with possible mild spinal canal stenosis and bilateral foraminal narrowing    CT L-spine:  No acute fracture or traumatic subluxation.  Alignment is relatively maintained.  Vertebroplasty changes are at L2.  There is mild, chronic appearing loss of height of the L4 vertebral body.  There is partial osseous fusion at L2-3.  Multilevel facet hypertrophy, osteophyte formation and disc space narrowing are present.   L1-2: Facet hypertrophy with mild right neural foraminal and spinal canal stenosis.   L2-3: Posterior disc osteophyte and facet hypertrophy results in mild to moderate right and mild left neural foraminal stenosis with mild to moderate spinal canal stenosis.   L3-4: Posterior disc osteophyte and facet hypertrophy results in moderate bilateral neural foraminal stenosis with mild to moderate spinal canal stenosis.   L4-5: Broad-based disc osteophyte and facet hypertrophy results in severe left and moderate right neural foraminal stenosis with mild to moderate spinal canal stenosis.   L5-S1:  Posterior disc osteophyte eccentric to the left and facet hypertrophy results in moderate to severe bilateral neural foraminal stenosis with mild to moderate spinal canal stenosis.  Paravertebral soft tissues are normal.  Spinal cord stimulator wires into the spinal canal at the T12-L1 level.    Assessment:  The patient is a 76-year-old man with a history of hypertension, obesity and low back pain who presents in referral from Dr. Winters for chronic right low back pain.   1. Great toe pain, left  X-Ray Toe 2 or More Views Left      2. Right lumbar radiculopathy        3. Chronic midline low back pain without sciatica  Ambulatory referral/consult to Pain Clinic      4. DDD (degenerative disc disease), lumbar  Ambulatory referral/consult to Pain Clinic            Plan:  1.  We discussed that his current pain is likely due to right L5/S1 severe foraminal narrowing causing radicular pain from the back and into the groin.  We discussed that since he previously had his spinal cord stimulator controlling left leg pain, we will contact a representative and see if he can control the right groin pain.  This is a [employer] system.  I am also going to see if this is MRI compatible.  If it is not, we likely could be able to change out the existing IPG for the new 1 and it would be MRI compatible.    2.  If he is not having relief with the stimulator, I will set up an L5/S1 transforaminal injection.  3. We discussed that his back pain may be a combination of this radicular pain but also facet arthropathy, it has been several years since his last RFA.    4. For his left great toe pain, he has significant ecchymoses, possible fracture I am going to get an x-ray of his left toe and call him with the results.  I instructed him to continue washing this and keep it dry.    5.  I have sent hydrocodone to the pharmacy he takes sparingly.

## 2022-08-31 NOTE — TELEPHONE ENCOUNTER
Please let the patient know that his toe does appear to have a fracture but it does not seem to be displaced in a way that would cause any issue, probably just needs to let it heal on its own.  Nonetheless I am going to run it by 1 of our podiatrists to make sure

## 2022-08-31 NOTE — H&P (VIEW-ONLY)
This note was completed with dictation software and grammatical errors may exist.    CC:Back pain    HPI: The patient is a 76 -year-old man with a history of hypertension, obesity and low back pain who presents in referral from Dr. Winters for chronic right low back pain.  The patient returns in follow-up today for back pain.  He states that he is currently having pain radiating from the back into the right groin, right leg.  Previously his pain was more located into the left leg and that side is doing fine with the current settings of the spinal cord stimulator.  Never had right leg pain so he does not have coverage of the leg at this point.  He also reports injuring his left great toe recently, has a laceration but more importantly the entire toes purple and he thinks it may be broken.      Pain intervention history: He done physical therapy in January, 2014 with moderate relief but not sustained.  He takes Relafen, tramadol, baclofen and chlorzoxazone as needed with mild relief.  He had undergone what sounds like a series of epidurals several years ago with no major relief. The patient is status post a right side L2/3, L3/4, L4/5 and L5/S1 facet joint injection on 10/28/14 with 50% relief of his back pain for 1 month.  He is status post radiofrequency ablation of the right L1, 2, 3, 4 and 5 medial branch nerves on 3/18/15 with 75% relief.  He is status post C7-T1 cervical interlaminar epidural steroid injection on 5/11/15 with 70% relief.  He is status post right L1, 2, 3, 4 and 5 medial branch radiofrequency ablation on 7/5/16 with 50% relief.   He is status post right L1, 2, 3, 4 and 5 medial branch radiofrequency ablation on 10/17/17 with about 50% relief additionally, later reported almost complete relief lasting a year.  He is status post right L1, 2, 3, 4 and 5 medial branch radiofrequency ablation on 04/11/2019 with mild relief.   He is status post L5/S1 interlaminar epidural steroid injection on 06/05/2019  "with 80% relief.  He is status post L5/S1 interlaminar epidural steroid injection on 09/13/2019 with 50% relief lasting about 6 months.  He is status post L5/S1 interlaminar epidural steroid injection to the right on 06/02/2020 with minimal relief.  He is status post right L3/4 and L5/S1 transforaminal epidural steroid injection on 11/13/2020 with 0% relief.   He is status post left L5/S1 transforaminal epidural steroid injection on 06/24/2021 with 50% relief.     Spine surgeries:    Antineuropathics:  NSAIDs:  Physical therapy:  Antidepressants:  Muscle relaxers:  Opioids:  Antiplatelets/Anticoagulants:            ROS:He reports back pain.  Balance of review of systems is negative.    Medical, surgical, family and social history reviewed elsewhere in record.    Medications/Allergies: See med card    Vitals:    08/31/22 0818   BP: (!) 118/58   Pulse: 66   Weight: 135.4 kg (298 lb 8.1 oz)   Height: 5' 11" (1.803 m)   PainSc: 10-Worst pain ever   PainLoc: Back           Physical exam:  Gen: A and O x3, pleasant, well-groomed  Skin: No rashes or obvious lesions  HEENT: PERRLA, no obvious deformities on ears or in canals.   CVS: Regular rate and rhythm, normal S1 and S2, no murmurs.  Resp: Clear to auscultation bilaterally, no wheezes or rales.  Abdomen: Soft, NT/ND, normal bowel sounds present.  Musculoskeletal:  Does not put full weight on left foot due to left great toe pain    Neuro:  Lower extremities: 5/5 strength bilaterally  Reflexes: Patellar 1+, Achilles 0+ bilaterally.  Sensory:  Intact and symmetrical to light touch and pinprick in L2-S1 dermatomes bilaterally.    Lumbar spine:  Lumbar spine: ROM is moderately reduced with flexion extension and oblique extension with increased pain on both flexion and extension.    Ramakrishna's test causes no increased pain on either side.    Supine straight leg raise is positive for right leg pain.  Internal and external rotation of the hip causes no right groin pain.    " Myofascial exam: No tenderness to palpation across lumbar paraspinous muscles.      Imagin14 Xray L-spine  There is diffuse osteopenia. Mild to moderate chronic compression fracture with anterior wedging and evidence of vertebroplasty at L2. minimal left convex curvature in the upper lumbar region. No spondylolisthesis. Mild concavity of the superior endplate of L4 which could be small compression fracture or Schmorl's node. No additional fracture.   There is moderate degenerative change within the lumbar spine with anterior osteophyte formation most prominent at L4-L5 and L2- L3 and L1 - L2, also present in the lower thoracic region with flowing ossification anteriorly suggesting diffuse idiopathic sclerotic hyperostosis. There is also mild decreased intervertebral disk space height and endplate sclerosis the lower thoracic and upper lumbar regions. Due to the degree of osseous mineralization, it is difficult to evaluate for any possible pars defects. Moderate sclerosis suggesting facet arthropathy in the lumbar spine present and there is mild sclerosis, likely degenerative in nature involving the sacroiliac joints.    10/7/14 MRI lumbar spine: At L1/2 there is mild spinal stenosis secondary to facet hypertrophy and disc bulging.  At L2/3 there is minimal spinal stenosis secondary to retropulsion of L2 compression fracture, appearance of prior kyphoplasty present.  There is minimal disc bulging but there is also some facet hypertrophy at this level.  At L3/4 there is facet and ligamentum hypertrophy, minimal disc bulging causing mild spinal stenosis and neuroforaminal narrowing on the left greater than the right side.  At L4/5 there is moderate hypertrophic facet and ligamentum hypertrophy with mild left foraminal narrowing compared to the right side.  There is no spinal stenosis at this level.  At L5/S1 there is a broad-based disc bulge that extends to the left side more than the right side along with  asymmetric left sided facet hypertrophy and ligamentum flavum hypertrophy causing moderate left foraminal stenosis.    4/15/15 outside open MRI cervical spine Premier  C3-4 posterior disc bulge producing mild bilateral foraminal narrowing  C4-5 posterior disc bulge producing mild bilateral foraminal narrowing, possible tiny annular tear in the posterior disc, anterior thecal sac deformity with no evidence of spinal stenosis  C5-6 circumferential disc bulge producing moderate to severe bilateral foraminal narrowing, effacement of the bilateral lateral recesses worse to the right, anterior thecal sac deformity with effacement of the anterior subarachnoid space, mild spinal canal stenosis  C6-7 circumferential disc bulge producing moderate to severe bilateral foraminal narrowing with mild spinal canal stenosis  C7-T1 not completely included but appears to have a circumferential disc bulge with shallow midline disc protrusion with possible mild spinal canal stenosis and bilateral foraminal narrowing    CT L-spine:  No acute fracture or traumatic subluxation.  Alignment is relatively maintained.  Vertebroplasty changes are at L2.  There is mild, chronic appearing loss of height of the L4 vertebral body.  There is partial osseous fusion at L2-3.  Multilevel facet hypertrophy, osteophyte formation and disc space narrowing are present.   L1-2: Facet hypertrophy with mild right neural foraminal and spinal canal stenosis.   L2-3: Posterior disc osteophyte and facet hypertrophy results in mild to moderate right and mild left neural foraminal stenosis with mild to moderate spinal canal stenosis.   L3-4: Posterior disc osteophyte and facet hypertrophy results in moderate bilateral neural foraminal stenosis with mild to moderate spinal canal stenosis.   L4-5: Broad-based disc osteophyte and facet hypertrophy results in severe left and moderate right neural foraminal stenosis with mild to moderate spinal canal stenosis.   L5-S1:  Posterior disc osteophyte eccentric to the left and facet hypertrophy results in moderate to severe bilateral neural foraminal stenosis with mild to moderate spinal canal stenosis.  Paravertebral soft tissues are normal.  Spinal cord stimulator wires into the spinal canal at the T12-L1 level.    Assessment:  The patient is a 76-year-old man with a history of hypertension, obesity and low back pain who presents in referral from Dr. Winters for chronic right low back pain.   1. Great toe pain, left  X-Ray Toe 2 or More Views Left      2. Right lumbar radiculopathy        3. Chronic midline low back pain without sciatica  Ambulatory referral/consult to Pain Clinic      4. DDD (degenerative disc disease), lumbar  Ambulatory referral/consult to Pain Clinic            Plan:  1.  We discussed that his current pain is likely due to right L5/S1 severe foraminal narrowing causing radicular pain from the back and into the groin.  We discussed that since he previously had his spinal cord stimulator controlling left leg pain, we will contact a representative and see if he can control the right groin pain.  This is a [employer] system.  I am also going to see if this is MRI compatible.  If it is not, we likely could be able to change out the existing IPG for the new 1 and it would be MRI compatible.    2.  If he is not having relief with the stimulator, I will set up an L5/S1 transforaminal injection.  3. We discussed that his back pain may be a combination of this radicular pain but also facet arthropathy, it has been several years since his last RFA.    4. For his left great toe pain, he has significant ecchymoses, possible fracture I am going to get an x-ray of his left toe and call him with the results.  I instructed him to continue washing this and keep it dry.    5.  I have sent hydrocodone to the pharmacy he takes sparingly.

## 2022-09-01 ENCOUNTER — TELEPHONE (OUTPATIENT)
Dept: PAIN MEDICINE | Facility: HOSPITAL | Age: 77
End: 2022-09-01
Payer: MEDICARE

## 2022-09-01 NOTE — TELEPHONE ENCOUNTER
Let the patient know that I talk to podiatry and then suggested that he wear a postop shoe which has a hard bottom and will prevent the toe from moving and he can just wear this until it is no longer sensitive.  If he wants, we can get him 1 of those shoes here.  Otherwise there are no concerns for any other management that needs to be done for this to heal.

## 2022-09-02 NOTE — TELEPHONE ENCOUNTER
Call placed to Pt to advise per Dr. Segura, he has talked to podiatry and it was suggested that he wear a postop shoe which has a hard bottom and will prevent the toe from moving. He  can just wear this until it is no longer sensitive.  If he wants, we can get him 1 of those shoes here.  Otherwise there are no concerns for any other management that needs to be done for this to heal.    Pt verbalized understanding and would like to try it. Pt states he would like to know the cost as this may be a factor. But he would like to have the order placed.

## 2022-09-06 NOTE — TELEPHONE ENCOUNTER
Type of Procedure/Injection - Lumbar Transforaminal Epidural  L5/S1           Laterality - Right      Type of Sedation - Local      Need to hold medication - yes      Aspirin for 5 days      Clearance needed - yes      Follow up - 3 week

## 2022-09-06 NOTE — TELEPHONE ENCOUNTER
Spoke with patient and he has decided not to get the shoe at this time, his toe is feeling better. He has had the stimulator adjusted and he is still having back pain. He would like to have the injection.

## 2022-09-08 DIAGNOSIS — M54.16 LUMBAR RADICULOPATHY: Primary | ICD-10-CM

## 2022-09-08 RX ORDER — ALPRAZOLAM 0.5 MG/1
1 TABLET, ORALLY DISINTEGRATING ORAL ONCE AS NEEDED
Status: CANCELLED | OUTPATIENT
Start: 2022-09-08 | End: 2034-02-04

## 2022-09-22 ENCOUNTER — HOSPITAL ENCOUNTER (OUTPATIENT)
Dept: RADIOLOGY | Facility: HOSPITAL | Age: 77
Discharge: HOME | End: 2022-09-22
Attending: ANESTHESIOLOGY | Admitting: ANESTHESIOLOGY
Payer: MEDICARE

## 2022-09-22 ENCOUNTER — HOSPITAL ENCOUNTER (OUTPATIENT)
Facility: HOSPITAL | Age: 77
Discharge: HOME | End: 2022-09-22
Attending: ANESTHESIOLOGY | Admitting: ANESTHESIOLOGY
Payer: MEDICARE

## 2022-09-22 VITALS
HEIGHT: 71 IN | SYSTOLIC BLOOD PRESSURE: 141 MMHG | WEIGHT: 295 LBS | DIASTOLIC BLOOD PRESSURE: 78 MMHG | HEART RATE: 75 BPM | BODY MASS INDEX: 41.3 KG/M2 | RESPIRATION RATE: 16 BRPM | TEMPERATURE: 98 F | OXYGEN SATURATION: 96 %

## 2022-09-22 DIAGNOSIS — M51.36 DDD (DEGENERATIVE DISC DISEASE), LUMBAR: ICD-10-CM

## 2022-09-22 DIAGNOSIS — M54.16 LUMBAR RADICULOPATHY: ICD-10-CM

## 2022-09-22 PROCEDURE — 64483 NJX AA&/STRD TFRM EPI L/S 1: CPT | Mod: PO | Performed by: ANESTHESIOLOGY

## 2022-09-22 PROCEDURE — 64483 PR EPIDURAL INJ, ANES/STEROID, TRANSFORAMINAL, LUMB/SACR, SNGL LEVL: ICD-10-PCS | Mod: RT,,, | Performed by: ANESTHESIOLOGY

## 2022-09-22 PROCEDURE — 64483 NJX AA&/STRD TFRM EPI L/S 1: CPT | Mod: RT,,, | Performed by: ANESTHESIOLOGY

## 2022-09-22 PROCEDURE — 25500020 PHARM REV CODE 255: Mod: PO | Performed by: ANESTHESIOLOGY

## 2022-09-22 PROCEDURE — 76000 FLUOROSCOPY <1 HR PHYS/QHP: CPT | Mod: TC,PO

## 2022-09-22 PROCEDURE — 25000003 PHARM REV CODE 250: Mod: PO | Performed by: ANESTHESIOLOGY

## 2022-09-22 PROCEDURE — 63600175 PHARM REV CODE 636 W HCPCS: Mod: PO | Performed by: ANESTHESIOLOGY

## 2022-09-22 RX ORDER — ALPRAZOLAM 0.5 MG/1
1 TABLET, ORALLY DISINTEGRATING ORAL ONCE AS NEEDED
Status: COMPLETED | OUTPATIENT
Start: 2022-09-22 | End: 2022-09-22

## 2022-09-22 RX ORDER — BUPIVACAINE HYDROCHLORIDE 2.5 MG/ML
INJECTION, SOLUTION EPIDURAL; INFILTRATION; INTRACAUDAL
Status: DISCONTINUED | OUTPATIENT
Start: 2022-09-22 | End: 2022-09-22 | Stop reason: HOSPADM

## 2022-09-22 RX ORDER — METHYLPREDNISOLONE ACETATE 80 MG/ML
INJECTION, SUSPENSION INTRA-ARTICULAR; INTRALESIONAL; INTRAMUSCULAR; SOFT TISSUE
Status: DISCONTINUED | OUTPATIENT
Start: 2022-09-22 | End: 2022-09-22 | Stop reason: HOSPADM

## 2022-09-22 RX ORDER — LIDOCAINE HYDROCHLORIDE 10 MG/ML
INJECTION, SOLUTION EPIDURAL; INFILTRATION; INTRACAUDAL; PERINEURAL
Status: DISCONTINUED | OUTPATIENT
Start: 2022-09-22 | End: 2022-09-22 | Stop reason: HOSPADM

## 2022-09-22 RX ADMIN — ALPRAZOLAM 1 MG: 0.5 TABLET, ORALLY DISINTEGRATING ORAL at 02:09

## 2022-09-22 NOTE — DISCHARGE SUMMARY
[facility] - Surgery  Discharge Note  Short Stay    Procedure(s) (LRB):  Injection,steroid,epidural,transforaminal approach L5/S1 (Right)    OUTCOME: Patient tolerated treatment/procedure well without complication and is now ready for discharge.    DISPOSITION: Home or Self Care    FINAL DIAGNOSIS:  Lumbar radiculopathy    FOLLOWUP: In clinic    DISCHARGE INSTRUCTIONS:    Discharge Procedure Orders   Diet Adult Regular     No dressing needed     Notify your health care provider if you experience any of the following:  temperature >100.4     Activity as tolerated

## 2022-09-22 NOTE — OP NOTE
PROCEDURE DATE: 9/22/2022    PROCEDURE: Right L5/S1 transforaminal epidural steroid injection under fluoroscopy    DIAGNOSIS: Lumbar  Radiculopathy    Post op diagnosis: Same    PHYSICIAN: Riley Segura MD    MEDICATIONS INJECTED:  Methylprednisolone 40mg (1ml) and 1ml 0.25% bupivicaine at each nerve root.     LOCAL ANESTHETIC INJECTED:  Lidocaine 1%. 4 ml per site.    SEDATION MEDICATIONS: none    ESTIMATED BLOOD LOSS:  none    COMPLICATIONS:  none    TECHNIQUE:   A time-out was taken to identify patient and procedure side prior to starting the procedure. The patient was placed in a prone position, prepped and draped in the usual sterile fashion using ChloraPrep and sterile towels.  The area to be injected was determined under fluoroscopic guidance in AP and oblique view.  Local anesthetic was given by raising a wheal and going down to the hub of a 25-gauge 1.5 inch needle.  In oblique view, a 5 inch 22-gauge bent-tip spinal needle was introduced towards 6 oclock position of the pedicle of each above named nerve root level.  The needle was walked medially then hinged into the neural foramen and position was confirmed in AP and lateral views.  Omnipaque contrast dye was injected to confirm appropriate placement and that there was no vascular uptake.  After negative aspiration for blood or CSF, the medication was then injected. This was performed at the right L5/S1 level(s). The patient tolerated the procedure well.    The patient was monitored after the procedure.  Patient was given post procedure and discharge instructions to follow at home. The patient was discharged in a stable condition.

## 2022-10-03 ENCOUNTER — OFFICE VISIT (OUTPATIENT)
Dept: PODIATRY | Facility: CLINIC | Age: 77
End: 2022-10-03
Payer: MEDICARE

## 2022-10-03 VITALS — BODY MASS INDEX: 41.27 KG/M2 | HEIGHT: 71 IN | WEIGHT: 294.75 LBS

## 2022-10-03 DIAGNOSIS — I73.9 PERIPHERAL VASCULAR DISEASE: ICD-10-CM

## 2022-10-03 DIAGNOSIS — B35.1 ONYCHOMYCOSIS: Primary | ICD-10-CM

## 2022-10-03 PROCEDURE — 1157F ADVNC CARE PLAN IN RCRD: CPT | Mod: CPTII,S$GLB,, | Performed by: PODIATRIST

## 2022-10-03 PROCEDURE — 1126F AMNT PAIN NOTED NONE PRSNT: CPT | Mod: CPTII,S$GLB,, | Performed by: PODIATRIST

## 2022-10-03 PROCEDURE — 3288F FALL RISK ASSESSMENT DOCD: CPT | Mod: CPTII,S$GLB,, | Performed by: PODIATRIST

## 2022-10-03 PROCEDURE — 1101F PR PT FALLS ASSESS DOC 0-1 FALLS W/OUT INJ PAST YR: ICD-10-PCS | Mod: CPTII,S$GLB,, | Performed by: PODIATRIST

## 2022-10-03 PROCEDURE — 11721 PR DEBRIDEMENT OF NAILS, 6 OR MORE: ICD-10-PCS | Mod: Q9,S$GLB,, | Performed by: PODIATRIST

## 2022-10-03 PROCEDURE — 1126F PR PAIN SEVERITY QUANTIFIED, NO PAIN PRESENT: ICD-10-PCS | Mod: CPTII,S$GLB,, | Performed by: PODIATRIST

## 2022-10-03 PROCEDURE — 1159F MED LIST DOCD IN RCRD: CPT | Mod: CPTII,S$GLB,, | Performed by: PODIATRIST

## 2022-10-03 PROCEDURE — 1159F PR MEDICATION LIST DOCUMENTED IN MEDICAL RECORD: ICD-10-PCS | Mod: CPTII,S$GLB,, | Performed by: PODIATRIST

## 2022-10-03 PROCEDURE — 1160F RVW MEDS BY RX/DR IN RCRD: CPT | Mod: CPTII,S$GLB,, | Performed by: PODIATRIST

## 2022-10-03 PROCEDURE — 99999 PR PBB SHADOW E&M-EST. PATIENT-LVL III: CPT | Mod: PBBFAC,,, | Performed by: PODIATRIST

## 2022-10-03 PROCEDURE — 1160F PR REVIEW ALL MEDS BY PRESCRIBER/CLIN PHARMACIST DOCUMENTED: ICD-10-PCS | Mod: CPTII,S$GLB,, | Performed by: PODIATRIST

## 2022-10-03 PROCEDURE — 1101F PT FALLS ASSESS-DOCD LE1/YR: CPT | Mod: CPTII,S$GLB,, | Performed by: PODIATRIST

## 2022-10-03 PROCEDURE — 99999 PR PBB SHADOW E&M-EST. PATIENT-LVL III: ICD-10-PCS | Mod: PBBFAC,,, | Performed by: PODIATRIST

## 2022-10-03 PROCEDURE — 1157F PR ADVANCE CARE PLAN OR EQUIV PRESENT IN MEDICAL RECORD: ICD-10-PCS | Mod: CPTII,S$GLB,, | Performed by: PODIATRIST

## 2022-10-03 PROCEDURE — 99499 NO LOS: ICD-10-PCS | Mod: S$GLB,,, | Performed by: PODIATRIST

## 2022-10-03 PROCEDURE — 11721 DEBRIDE NAIL 6 OR MORE: CPT | Mod: Q9,S$GLB,, | Performed by: PODIATRIST

## 2022-10-03 PROCEDURE — 99499 UNLISTED E&M SERVICE: CPT | Mod: S$GLB,,, | Performed by: PODIATRIST

## 2022-10-03 PROCEDURE — 3288F PR FALLS RISK ASSESSMENT DOCUMENTED: ICD-10-PCS | Mod: CPTII,S$GLB,, | Performed by: PODIATRIST

## 2022-10-03 NOTE — PROGRESS NOTES
Subjective:      Patient ID: Chinedu Roque is a 76 y.o. male.    Chief Complaint: No chief complaint on file.    Chinedu is a 76 y.o. male who presents to the clinic for evaluation and treatment of high risk feet. Chinedu has a past medical history of Anticoagulant long-term use, Arthritis, BPH (benign prostatic hyperplasia) (3/2/2012), Chronic left shoulder pain, Compression fracture of L2, DDD (degenerative disc disease), lumbar, HTN (hypertension) (3/2/2012), colonic polyps (2013), Low back pain, Morbid obesity with BMI of 40.0-44.9, adult (3/18/2014), Seasonal allergies, Sleep apnea, and Stroke (3/1986). The patient's chief complaint is long, thick toenails. This patient has documented high risk feet requiring routine maintenance secondary to peripheral vascular disease. No acute changes since last visit    PCP: Hernando Browne MD        Current shoe gear:  Casual shoes    Last encounter in this department: Visit date not found    No results found for: HGBA1C      Review of Systems   Constitutional: Negative for chills and fever.   Cardiovascular:  Positive for leg swelling. Negative for claudication.   Respiratory:  Negative for shortness of breath.    Skin:  Positive for nail changes. Negative for itching and rash.   Musculoskeletal:  Negative for muscle cramps, muscle weakness and myalgias.   Gastrointestinal:  Negative for nausea and vomiting.   Neurological:  Positive for numbness. Negative for focal weakness, loss of balance and paresthesias.         Objective:      Physical Exam  Constitutional:       General: He is not in acute distress.     Appearance: He is well-developed. He is not diaphoretic.   Cardiovascular:      Pulses:           Dorsalis pedis pulses are 1+ on the right side and 1+ on the left side.        Posterior tibial pulses are 1+ on the right side and 1+ on the left side.      Comments: 3 sec capillary refill time to toes 1-5 bilateral. Feet are warm to touch proximally with  distal cooling b/l. There is no hair growth on the feet and toes b/l. There is 2+ edema b/l with some varicosities present b/l.     Musculoskeletal:      Comments: Equinus noted b/l ankles with < 10 deg DF noted. MMT 5/5 in DF/PF/Inv/Ev resistance with no reproduction of pain in any direction. Passive range of motion of ankle and pedal joints is painless b/l.     Skin:     General: Skin is warm and dry.      Coloration: Skin is not pale.      Findings: No abrasion, bruising, burn, ecchymosis, erythema, laceration, lesion, petechiae or rash.      Nails: There is no clubbing.      Comments: Skin is thin shiny and atrophic bilateral    Nails 1-5 bilateral are thick 3-4 mm, long 3-6 mm, and discolored with subungual debris     Neurological:      Mental Status: He is alert and oriented to person, place, and time.      Sensory: No sensory deficit.      Motor: No tremor, atrophy or abnormal muscle tone.      Comments: Negative tinel sign bilateral.   Psychiatric:         Behavior: Behavior normal.             Assessment:       Encounter Diagnoses   Name Primary?    Onychomycosis Yes    Peripheral vascular disease            Plan:       Diagnoses and all orders for this visit:    Onychomycosis    Peripheral vascular disease      I counseled the patient on his conditions, their implications and medical management.        - Shoe inspection. Patient instructed on proper foot hygeine. We discussed wearing proper shoe gear, daily foot inspections, never walking without protective shoe gear, never putting sharp instruments to feet, routine podiatric nail visits every 2-3 months.      - With patient's permission, nails were aggressively reduced and debrided x 10 to their soft tissue attachment mechanically and with electric , removing all offending nail and debris. Patient relates relief following the procedure. He will continue to monitor the areas daily, inspect his feet, wear protective shoe gear when ambulatory,  moisturizer to maintain skin integrity and follow in this office in approximately 2-3 months, sooner palmer.    Rubio Ramirez DPM

## 2022-10-07 ENCOUNTER — OFFICE VISIT (OUTPATIENT)
Dept: PAIN MEDICINE | Facility: CLINIC | Age: 77
End: 2022-10-07
Payer: MEDICARE

## 2022-10-07 ENCOUNTER — HOSPITAL ENCOUNTER (OUTPATIENT)
Dept: RADIOLOGY | Facility: HOSPITAL | Age: 77
Discharge: HOME | End: 2022-10-07
Payer: MEDICARE

## 2022-10-07 VITALS
BODY MASS INDEX: 40.27 KG/M2 | DIASTOLIC BLOOD PRESSURE: 79 MMHG | SYSTOLIC BLOOD PRESSURE: 160 MMHG | WEIGHT: 287.69 LBS | HEIGHT: 71 IN | OXYGEN SATURATION: 95 % | HEART RATE: 78 BPM

## 2022-10-07 DIAGNOSIS — M54.16 LUMBAR RADICULOPATHY: Primary | ICD-10-CM

## 2022-10-07 DIAGNOSIS — M54.16 LUMBAR RADICULOPATHY: ICD-10-CM

## 2022-10-07 DIAGNOSIS — M25.551 RIGHT HIP PAIN: Primary | ICD-10-CM

## 2022-10-07 DIAGNOSIS — M25.551 RIGHT HIP PAIN: ICD-10-CM

## 2022-10-07 DIAGNOSIS — G89.4 CHRONIC PAIN SYNDROME: ICD-10-CM

## 2022-10-07 PROCEDURE — 3288F FALL RISK ASSESSMENT DOCD: CPT | Mod: CPTII,S$GLB,,

## 2022-10-07 PROCEDURE — 3078F PR MOST RECENT DIASTOLIC BLOOD PRESSURE < 80 MM HG: ICD-10-PCS | Mod: CPTII,S$GLB,,

## 2022-10-07 PROCEDURE — 99999 PR PBB SHADOW E&M-EST. PATIENT-LVL III: CPT | Mod: PBBFAC,,,

## 2022-10-07 PROCEDURE — 3077F SYST BP >= 140 MM HG: CPT | Mod: CPTII,S$GLB,,

## 2022-10-07 PROCEDURE — 99214 OFFICE O/P EST MOD 30 MIN: CPT | Mod: S$GLB,,,

## 2022-10-07 PROCEDURE — 1159F PR MEDICATION LIST DOCUMENTED IN MEDICAL RECORD: ICD-10-PCS | Mod: CPTII,S$GLB,,

## 2022-10-07 PROCEDURE — 73502 X-RAY EXAM HIP UNI 2-3 VIEWS: CPT | Mod: 26,RT,, | Performed by: RADIOLOGY

## 2022-10-07 PROCEDURE — 1125F PR PAIN SEVERITY QUANTIFIED, PAIN PRESENT: ICD-10-PCS | Mod: CPTII,S$GLB,,

## 2022-10-07 PROCEDURE — 73502 XR HIP WITH PELVIS WHEN PERFORMED, 2 OR 3  VIEWS RIGHT: ICD-10-PCS | Mod: 26,RT,, | Performed by: RADIOLOGY

## 2022-10-07 PROCEDURE — 3077F PR MOST RECENT SYSTOLIC BLOOD PRESSURE >= 140 MM HG: ICD-10-PCS | Mod: CPTII,S$GLB,,

## 2022-10-07 PROCEDURE — 1125F AMNT PAIN NOTED PAIN PRSNT: CPT | Mod: CPTII,S$GLB,,

## 2022-10-07 PROCEDURE — 99999 PR PBB SHADOW E&M-EST. PATIENT-LVL III: ICD-10-PCS | Mod: PBBFAC,,,

## 2022-10-07 PROCEDURE — 1159F MED LIST DOCD IN RCRD: CPT | Mod: CPTII,S$GLB,,

## 2022-10-07 PROCEDURE — 73502 X-RAY EXAM HIP UNI 2-3 VIEWS: CPT | Mod: TC,FY,PO,RT

## 2022-10-07 PROCEDURE — 1157F PR ADVANCE CARE PLAN OR EQUIV PRESENT IN MEDICAL RECORD: ICD-10-PCS | Mod: CPTII,S$GLB,,

## 2022-10-07 PROCEDURE — 1101F PR PT FALLS ASSESS DOC 0-1 FALLS W/OUT INJ PAST YR: ICD-10-PCS | Mod: CPTII,S$GLB,,

## 2022-10-07 PROCEDURE — 1101F PT FALLS ASSESS-DOCD LE1/YR: CPT | Mod: CPTII,S$GLB,,

## 2022-10-07 PROCEDURE — 3288F PR FALLS RISK ASSESSMENT DOCUMENTED: ICD-10-PCS | Mod: CPTII,S$GLB,,

## 2022-10-07 PROCEDURE — 99214 PR OFFICE/OUTPT VISIT, EST, LEVL IV, 30-39 MIN: ICD-10-PCS | Mod: S$GLB,,,

## 2022-10-07 PROCEDURE — 3078F DIAST BP <80 MM HG: CPT | Mod: CPTII,S$GLB,,

## 2022-10-07 PROCEDURE — 1157F ADVNC CARE PLAN IN RCRD: CPT | Mod: CPTII,S$GLB,,

## 2022-10-07 RX ORDER — DICLOFENAC SODIUM 10 MG/G
2 GEL TOPICAL 3 TIMES DAILY
COMMUNITY
End: 2023-01-02

## 2022-10-07 RX ORDER — HYDROCODONE BITARTRATE AND ACETAMINOPHEN 7.5; 325 MG/1; MG/1
1 TABLET ORAL EVERY 8 HOURS PRN
Qty: 21 TABLET | Refills: 0 | Status: SHIPPED | OUTPATIENT
Start: 2022-10-07 | End: 2022-10-18 | Stop reason: SDUPTHER

## 2022-10-07 NOTE — PROGRESS NOTES
This note was completed with dictation software and grammatical errors may exist.    CC:Back pain    HPI: The patient is a 76 -year-old man with a history of hypertension, obesity and low back pain who presents in referral from Dr. Winters for chronic right low back pain.  He is status post right L5/S1 transforaminal LEATHA on 09/22/2022 with no relief. Today he is reporting continued severe right-sided lower back pain with radiation into his buttock and right groin.  He states he was reprogrammed by [employer] and found no significant change to his pain.  His pain continues limit his mobility interfere with his ADLs.      Pain intervention history: He done physical therapy in January, 2014 with moderate relief but not sustained.  He takes Relafen, tramadol, baclofen and chlorzoxazone as needed with mild relief.  He had undergone what sounds like a series of epidurals several years ago with no major relief. The patient is status post a right side L2/3, L3/4, L4/5 and L5/S1 facet joint injection on 10/28/14 with 50% relief of his back pain for 1 month.  He is status post radiofrequency ablation of the right L1, 2, 3, 4 and 5 medial branch nerves on 3/18/15 with 75% relief.  He is status post C7-T1 cervical interlaminar epidural steroid injection on 5/11/15 with 70% relief.  He is status post right L1, 2, 3, 4 and 5 medial branch radiofrequency ablation on 7/5/16 with 50% relief.   He is status post right L1, 2, 3, 4 and 5 medial branch radiofrequency ablation on 10/17/17 with about 50% relief additionally, later reported almost complete relief lasting a year.  He is status post right L1, 2, 3, 4 and 5 medial branch radiofrequency ablation on 04/11/2019 with mild relief.   He is status post L5/S1 interlaminar epidural steroid injection on 06/05/2019 with 80% relief.  He is status post L5/S1 interlaminar epidural steroid injection on 09/13/2019 with 50% relief lasting about 6 months.  He is status post L5/S1  "interlaminar epidural steroid injection to the right on 2020 with minimal relief.  He is status post right L3/4 and L5/S1 transforaminal epidural steroid injection on 2020 with 0% relief.   He is status post left L5/S1 transforaminal epidural steroid injection on 2021 with 50% relief. He is status post right L5/S1 transforaminal LEATHA on 2022 with no relief.    Spine surgeries:    Antineuropathics:  NSAIDs:  Physical therapy:  Antidepressants:  Muscle relaxers:  Opioids:  Antiplatelets/Anticoagulants:        ROS:He reports back pain.  Balance of review of systems is negative.    Medical, surgical, family and social history reviewed elsewhere in record.    Medications/Allergies: See med card    Vitals:    10/07/22 1546   BP: (!) 160/79   Pulse: 78   SpO2: 95%   Weight: 130.5 kg (287 lb 11.2 oz)   Height: 5' 11" (1.803 m)   PainSc:   8   PainLoc: Back             Physical exam:  Gen: A and O x3, pleasant, well-groomed  Skin: No rashes or obvious lesions  HEENT: PERRLA, no obvious deformities on ears or in canals.   CVS: Regular rate and rhythm, normal S1 and S2, no murmurs.  Resp: Clear to auscultation bilaterally, no wheezes or rales.  Abdomen: Soft, NT/ND, normal bowel sounds present.  Musculoskeletal:    Neuro:  Lower extremities: 5/5 strength bilaterally  Reflexes: Patellar 1+, Achilles 0+ bilaterally.  Sensory:  Intact and symmetrical to light touch and pinprick in L2-S1 dermatomes bilaterally.    Lumbar spine:  Lumbar spine: ROM is moderately reduced with flexion extension and oblique extension with increased pain on both flexion and extension.  Ramakrishna's test causes no increased pain on either side.    Supine straight leg raise is positive for right leg pain.  Internal and external rotation of the hip causes no right groin pain.    Myofascial exam: No tenderness to palpation across lumbar paraspinous muscles.      Imagin14 Xray L-spine  There is diffuse osteopenia. Mild to moderate " chronic compression fracture with anterior wedging and evidence of vertebroplasty at L2. minimal left convex curvature in the upper lumbar region. No spondylolisthesis. Mild concavity of the superior endplate of L4 which could be small compression fracture or Schmorl's node. No additional fracture.   There is moderate degenerative change within the lumbar spine with anterior osteophyte formation most prominent at L4-L5 and L2- L3 and L1 - L2, also present in the lower thoracic region with flowing ossification anteriorly suggesting diffuse idiopathic sclerotic hyperostosis. There is also mild decreased intervertebral disk space height and endplate sclerosis the lower thoracic and upper lumbar regions. Due to the degree of osseous mineralization, it is difficult to evaluate for any possible pars defects. Moderate sclerosis suggesting facet arthropathy in the lumbar spine present and there is mild sclerosis, likely degenerative in nature involving the sacroiliac joints.    10/7/14 MRI lumbar spine: At L1/2 there is mild spinal stenosis secondary to facet hypertrophy and disc bulging.  At L2/3 there is minimal spinal stenosis secondary to retropulsion of L2 compression fracture, appearance of prior kyphoplasty present.  There is minimal disc bulging but there is also some facet hypertrophy at this level.  At L3/4 there is facet and ligamentum hypertrophy, minimal disc bulging causing mild spinal stenosis and neuroforaminal narrowing on the left greater than the right side.  At L4/5 there is moderate hypertrophic facet and ligamentum hypertrophy with mild left foraminal narrowing compared to the right side.  There is no spinal stenosis at this level.  At L5/S1 there is a broad-based disc bulge that extends to the left side more than the right side along with asymmetric left sided facet hypertrophy and ligamentum flavum hypertrophy causing moderate left foraminal stenosis.    4/15/15 outside open MRI cervical spine  [facility]  C3-4 posterior disc bulge producing mild bilateral foraminal narrowing  C4-5 posterior disc bulge producing mild bilateral foraminal narrowing, possible tiny annular tear in the posterior disc, anterior thecal sac deformity with no evidence of spinal stenosis  C5-6 circumferential disc bulge producing moderate to severe bilateral foraminal narrowing, effacement of the bilateral lateral recesses worse to the right, anterior thecal sac deformity with effacement of the anterior subarachnoid space, mild spinal canal stenosis  C6-7 circumferential disc bulge producing moderate to severe bilateral foraminal narrowing with mild spinal canal stenosis  C7-T1 not completely included but appears to have a circumferential disc bulge with shallow midline disc protrusion with possible mild spinal canal stenosis and bilateral foraminal narrowing    CT L-spine:  No acute fracture or traumatic subluxation.  Alignment is relatively maintained.  Vertebroplasty changes are at L2.  There is mild, chronic appearing loss of height of the L4 vertebral body.  There is partial osseous fusion at L2-3.  Multilevel facet hypertrophy, osteophyte formation and disc space narrowing are present.   L1-2: Facet hypertrophy with mild right neural foraminal and spinal canal stenosis.   L2-3: Posterior disc osteophyte and facet hypertrophy results in mild to moderate right and mild left neural foraminal stenosis with mild to moderate spinal canal stenosis.   L3-4: Posterior disc osteophyte and facet hypertrophy results in moderate bilateral neural foraminal stenosis with mild to moderate spinal canal stenosis.   L4-5: Broad-based disc osteophyte and facet hypertrophy results in severe left and moderate right neural foraminal stenosis with mild to moderate spinal canal stenosis.   L5-S1: Posterior disc osteophyte eccentric to the left and facet hypertrophy results in moderate to severe bilateral neural foraminal stenosis with mild to moderate  spinal canal stenosis.  Paravertebral soft tissues are normal.  Spinal cord stimulator wires into the spinal canal at the T12-L1 level.    Assessment:  The patient is a 76-year-old man with a history of hypertension, obesity and low back pain who presents in referral from Dr. Winters for chronic right low back pain.   1. Right hip pain  X-Ray Hip 2 or 3 views Right (with Pelvis when performed)      2. Lumbar radiculopathy        3. Chronic pain syndrome                Plan:  1. He found no significant relief with right-sided L5-S1 transforaminal LEATHA.  He is still having significant pain that is limiting his quality life and interfering with his ADLs.  2. He had no significant pain with IMMANUEL nor FADIR on the right side however he may be having some pain originating from his right hip.  X-ray of right hip ordered today.  3. I believe most of his pain continues to originating from his lumbar spine as he has significant foraminal narrowing throughout his lumbar spine. will schedule him for repeat LEATHA.  4. I would like to schedule him for a right L2/3 and L3/4 transforaminal LEATHA to better target his right-sided pain.  5. If he fails to get significant relief with this we will refer him to Neurosurgery for further evaluation.  6. Follow-up 4 weeks post procedure or sooner if needed.  7. Short course of hydrocodone 7.5/325 sent to Dr. Segura today for approval.

## 2022-10-17 ENCOUNTER — TELEPHONE (OUTPATIENT)
Dept: PAIN MEDICINE | Facility: CLINIC | Age: 77
End: 2022-10-17
Payer: MEDICARE

## 2022-10-17 ENCOUNTER — PATIENT MESSAGE (OUTPATIENT)
Dept: PAIN MEDICINE | Facility: CLINIC | Age: 77
End: 2022-10-17
Payer: MEDICARE

## 2022-10-17 NOTE — TELEPHONE ENCOUNTER
Return call placed to Pt calling in regards to a time for his procedure for tomorrow. Pt advised that there was an error in scheduling and he will be placed on the schedule for 10-25-22. Pt verbalized understanding and asks to be on the waiting list if anyone cancels.

## 2022-10-17 NOTE — TELEPHONE ENCOUNTER
----- Message from Neftali Condon sent at 10/17/2022 11:31 AM CDT -----  Regarding: pt called  Name of Who is Calling: PAIGE PICHARDO [[id number]]      What is the request in detail: pt called requesting a call back he wants to know what time is procedure is on tomorrow.Please advise      Can the clinic reply by [facility]: No      What Number to Call Back if not in [facility]:[phone]

## 2022-10-18 DIAGNOSIS — M54.16 LUMBAR RADICULOPATHY: Primary | ICD-10-CM

## 2022-10-18 RX ORDER — SODIUM CHLORIDE, SODIUM LACTATE, POTASSIUM CHLORIDE, CALCIUM CHLORIDE 600; 310; 30; 20 MG/100ML; MG/100ML; MG/100ML; MG/100ML
INJECTION, SOLUTION INTRAVENOUS CONTINUOUS
Status: CANCELLED | OUTPATIENT
Start: 2022-10-18

## 2022-11-04 ENCOUNTER — PATIENT MESSAGE (OUTPATIENT)
Dept: PAIN MEDICINE | Facility: CLINIC | Age: 77
End: 2022-11-04
Payer: MEDICARE

## 2022-11-04 RX ORDER — HYDROCODONE BITARTRATE AND ACETAMINOPHEN 7.5; 325 MG/1; MG/1
1 TABLET ORAL EVERY 8 HOURS PRN
Qty: 60 TABLET | Refills: 0 | Status: SHIPPED | OUTPATIENT
Start: 2022-11-04 | End: 2022-11-15 | Stop reason: SDUPTHER

## 2022-11-04 NOTE — TELEPHONE ENCOUNTER
Pt had a L2/3, L3/4 TFESI on 10/25. I did reply that relief comes between 2-4 weeks after injections, however he is requesting pain medication or muscle relaxers. I did not pend Rx. Please advise.

## 2022-11-04 NOTE — TELEPHONE ENCOUNTER
If this is not helping, we are going to have to have him return to see Neurosurgery.  We may have to get a CT myelogram for the best imaging or we may change out his stimulator battery to the new 1 which is MRI compatible.  I will speak to 1 of the representatives

## 2022-11-15 ENCOUNTER — OFFICE VISIT (OUTPATIENT)
Dept: PAIN MEDICINE | Facility: CLINIC | Age: 77
End: 2022-11-15
Payer: MEDICARE

## 2022-11-15 VITALS
HEIGHT: 71 IN | DIASTOLIC BLOOD PRESSURE: 96 MMHG | HEART RATE: 69 BPM | WEIGHT: 305.44 LBS | SYSTOLIC BLOOD PRESSURE: 165 MMHG | BODY MASS INDEX: 42.76 KG/M2 | OXYGEN SATURATION: 96 %

## 2022-11-15 DIAGNOSIS — M54.16 RIGHT LUMBAR RADICULOPATHY: ICD-10-CM

## 2022-11-15 DIAGNOSIS — M51.36 DDD (DEGENERATIVE DISC DISEASE), LUMBAR: ICD-10-CM

## 2022-11-15 DIAGNOSIS — G89.4 CHRONIC PAIN SYNDROME: Primary | ICD-10-CM

## 2022-11-15 DIAGNOSIS — G89.4 CHRONIC PAIN SYNDROME: ICD-10-CM

## 2022-11-15 DIAGNOSIS — M54.16 LUMBAR RADICULOPATHY: Primary | ICD-10-CM

## 2022-11-15 PROCEDURE — 1125F AMNT PAIN NOTED PAIN PRSNT: CPT | Mod: CPTII,S$GLB,,

## 2022-11-15 PROCEDURE — 99214 OFFICE O/P EST MOD 30 MIN: CPT | Mod: S$GLB,,,

## 2022-11-15 PROCEDURE — 3077F PR MOST RECENT SYSTOLIC BLOOD PRESSURE >= 140 MM HG: ICD-10-PCS | Mod: CPTII,S$GLB,,

## 2022-11-15 PROCEDURE — 1157F ADVNC CARE PLAN IN RCRD: CPT | Mod: CPTII,S$GLB,,

## 2022-11-15 PROCEDURE — 3080F DIAST BP >= 90 MM HG: CPT | Mod: CPTII,S$GLB,,

## 2022-11-15 PROCEDURE — 99999 PR PBB SHADOW E&M-EST. PATIENT-LVL III: ICD-10-PCS | Mod: PBBFAC,,,

## 2022-11-15 PROCEDURE — 3077F SYST BP >= 140 MM HG: CPT | Mod: CPTII,S$GLB,,

## 2022-11-15 PROCEDURE — 1159F PR MEDICATION LIST DOCUMENTED IN MEDICAL RECORD: ICD-10-PCS | Mod: CPTII,S$GLB,,

## 2022-11-15 PROCEDURE — 1101F PR PT FALLS ASSESS DOC 0-1 FALLS W/OUT INJ PAST YR: ICD-10-PCS | Mod: CPTII,S$GLB,,

## 2022-11-15 PROCEDURE — 1125F PR PAIN SEVERITY QUANTIFIED, PAIN PRESENT: ICD-10-PCS | Mod: CPTII,S$GLB,,

## 2022-11-15 PROCEDURE — 3288F FALL RISK ASSESSMENT DOCD: CPT | Mod: CPTII,S$GLB,,

## 2022-11-15 PROCEDURE — 1157F PR ADVANCE CARE PLAN OR EQUIV PRESENT IN MEDICAL RECORD: ICD-10-PCS | Mod: CPTII,S$GLB,,

## 2022-11-15 PROCEDURE — 99214 PR OFFICE/OUTPT VISIT, EST, LEVL IV, 30-39 MIN: ICD-10-PCS | Mod: S$GLB,,,

## 2022-11-15 PROCEDURE — 3080F PR MOST RECENT DIASTOLIC BLOOD PRESSURE >= 90 MM HG: ICD-10-PCS | Mod: CPTII,S$GLB,,

## 2022-11-15 PROCEDURE — 99999 PR PBB SHADOW E&M-EST. PATIENT-LVL III: CPT | Mod: PBBFAC,,,

## 2022-11-15 PROCEDURE — 1159F MED LIST DOCD IN RCRD: CPT | Mod: CPTII,S$GLB,,

## 2022-11-15 PROCEDURE — 3288F PR FALLS RISK ASSESSMENT DOCUMENTED: ICD-10-PCS | Mod: CPTII,S$GLB,,

## 2022-11-15 PROCEDURE — 1101F PT FALLS ASSESS-DOCD LE1/YR: CPT | Mod: CPTII,S$GLB,,

## 2022-11-15 RX ORDER — HYDROCODONE BITARTRATE AND ACETAMINOPHEN 7.5; 325 MG/1; MG/1
1 TABLET ORAL EVERY 8 HOURS PRN
Qty: 60 TABLET | Refills: 0 | Status: SHIPPED | OUTPATIENT
Start: 2022-11-24 | End: 2022-12-06 | Stop reason: SDUPTHER

## 2022-11-15 RX ORDER — AMOXICILLIN 500 MG/1
500 CAPSULE ORAL 3 TIMES DAILY
COMMUNITY
Start: 2022-11-02 | End: 2023-01-02

## 2022-11-15 NOTE — PROGRESS NOTES
This note was completed with dictation software and grammatical errors may exist.    CC:Back pain    HPI: The patient is a 76 -year-old man with a history of hypertension, obesity and low back pain who presents in referral from Dr. Winters for chronic right low back pain.  He is status post right L2/3 and L3/4 transforaminal LEATHA on 10/25/2022 with no relief. Today he is reporting continued right-sided lower back pain with radiation into his right buttock and right groin.  He states his pain is constant, 5-8/10, worse in the morning when getting out of bed and only slightly alleviated throughout the day.  He has been reprogrammed by [employer] without any change to his pain.  He denies any new numbness, weakness or any changes to his bowel bladder function.      Pain intervention history: He done physical therapy in January, 2014 with moderate relief but not sustained.  He takes Relafen, tramadol, baclofen and chlorzoxazone as needed with mild relief.  He had undergone what sounds like a series of epidurals several years ago with no major relief. The patient is status post a right side L2/3, L3/4, L4/5 and L5/S1 facet joint injection on 10/28/14 with 50% relief of his back pain for 1 month.  He is status post radiofrequency ablation of the right L1, 2, 3, 4 and 5 medial branch nerves on 3/18/15 with 75% relief.  He is status post C7-T1 cervical interlaminar epidural steroid injection on 5/11/15 with 70% relief.  He is status post right L1, 2, 3, 4 and 5 medial branch radiofrequency ablation on 7/5/16 with 50% relief.   He is status post right L1, 2, 3, 4 and 5 medial branch radiofrequency ablation on 10/17/17 with about 50% relief additionally, later reported almost complete relief lasting a year.  He is status post right L1, 2, 3, 4 and 5 medial branch radiofrequency ablation on 04/11/2019 with mild relief.   He is status post L5/S1 interlaminar epidural steroid injection on 06/05/2019 with 80% relief.  He  "is status post L5/S1 interlaminar epidural steroid injection on 09/13/2019 with 50% relief lasting about 6 months.  He is status post L5/S1 interlaminar epidural steroid injection to the right on 06/02/2020 with minimal relief.  He is status post right L3/4 and L5/S1 transforaminal epidural steroid injection on 11/13/2020 with 0% relief.   He is status post left L5/S1 transforaminal epidural steroid injection on 06/24/2021 with 50% relief. He is status post right L5/S1 transforaminal LEATHA on 09/22/2022 with no relief. He is status post right L2/3 and L3/4 transforaminal LEATHA on 10/25/2022 with no relief.     Spine surgeries:    Antineuropathics:  NSAIDs:  Physical therapy:  Antidepressants:  Muscle relaxers:  Opioids:  Hydrocodone 7.5   Antiplatelets/Anticoagulants:        ROS:He reports back pain.  Balance of review of systems is negative.    Medical, surgical, family and social history reviewed elsewhere in record.    Medications/Allergies: See med card    Vitals:    11/15/22 1031   BP: (!) 165/96   Pulse: 69   SpO2: 96%   Weight: (!) 138.6 kg (305 lb 7.2 oz)   Height: 5' 11" (1.803 m)   PainSc:   6   PainLoc: Back       Physical exam:  Gen: A and O x3, pleasant, well-groomed  Skin: No rashes or obvious lesions  HEENT: PERRLA, no obvious deformities on ears or in canals.   CVS: Regular rate and rhythm, normal S1 and S2, no murmurs.  Resp: Clear to auscultation bilaterally, no wheezes or rales.  Abdomen: Soft, NT/ND, normal bowel sounds present.  Musculoskeletal:    Neuro:  Lower extremities: 5/5 strength bilaterally  Reflexes: Patellar 1+, Achilles 0+ bilaterally.  Sensory:  Intact and symmetrical to light touch and pinprick in L2-S1 dermatomes bilaterally.    Lumbar spine:  Lumbar spine: ROM is moderately reduced with flexion extension and oblique extension with increased pain on both flexion and extension.  Ramakrishna's test causes no increased pain on either side.    Supine straight leg raise is positive for right " leg pain.  Internal and external rotation of the hip causes no right groin pain.  Myofascial exam: No tenderness to palpation across lumbar paraspinous muscles.  No significant tenderness to palpation of bilateral SI joints or greater trochanteric bursa      Imagin14 Xray L-spine  There is diffuse osteopenia. Mild to moderate chronic compression fracture with anterior wedging and evidence of vertebroplasty at L2. minimal left convex curvature in the upper lumbar region. No spondylolisthesis. Mild concavity of the superior endplate of L4 which could be small compression fracture or Schmorl's node. No additional fracture.   There is moderate degenerative change within the lumbar spine with anterior osteophyte formation most prominent at L4-L5 and L2- L3 and L1 - L2, also present in the lower thoracic region with flowing ossification anteriorly suggesting diffuse idiopathic sclerotic hyperostosis. There is also mild decreased intervertebral disk space height and endplate sclerosis the lower thoracic and upper lumbar regions. Due to the degree of osseous mineralization, it is difficult to evaluate for any possible pars defects. Moderate sclerosis suggesting facet arthropathy in the lumbar spine present and there is mild sclerosis, likely degenerative in nature involving the sacroiliac joints.    10/7/14 MRI lumbar spine: At L1/2 there is mild spinal stenosis secondary to facet hypertrophy and disc bulging.  At L2/3 there is minimal spinal stenosis secondary to retropulsion of L2 compression fracture, appearance of prior kyphoplasty present.  There is minimal disc bulging but there is also some facet hypertrophy at this level.  At L3/4 there is facet and ligamentum hypertrophy, minimal disc bulging causing mild spinal stenosis and neuroforaminal narrowing on the left greater than the right side.  At L4/5 there is moderate hypertrophic facet and ligamentum hypertrophy with mild left foraminal narrowing compared to  the right side.  There is no spinal stenosis at this level.  At L5/S1 there is a broad-based disc bulge that extends to the left side more than the right side along with asymmetric left sided facet hypertrophy and ligamentum flavum hypertrophy causing moderate left foraminal stenosis.    4/15/15 outside open MRI cervical spine [facility]  C3-4 posterior disc bulge producing mild bilateral foraminal narrowing  C4-5 posterior disc bulge producing mild bilateral foraminal narrowing, possible tiny annular tear in the posterior disc, anterior thecal sac deformity with no evidence of spinal stenosis  C5-6 circumferential disc bulge producing moderate to severe bilateral foraminal narrowing, effacement of the bilateral lateral recesses worse to the right, anterior thecal sac deformity with effacement of the anterior subarachnoid space, mild spinal canal stenosis  C6-7 circumferential disc bulge producing moderate to severe bilateral foraminal narrowing with mild spinal canal stenosis  C7-T1 not completely included but appears to have a circumferential disc bulge with shallow midline disc protrusion with possible mild spinal canal stenosis and bilateral foraminal narrowing    CT L-spine:  No acute fracture or traumatic subluxation.  Alignment is relatively maintained.  Vertebroplasty changes are at L2.  There is mild, chronic appearing loss of height of the L4 vertebral body.  There is partial osseous fusion at L2-3.  Multilevel facet hypertrophy, osteophyte formation and disc space narrowing are present.   L1-2: Facet hypertrophy with mild right neural foraminal and spinal canal stenosis.   L2-3: Posterior disc osteophyte and facet hypertrophy results in mild to moderate right and mild left neural foraminal stenosis with mild to moderate spinal canal stenosis.   L3-4: Posterior disc osteophyte and facet hypertrophy results in moderate bilateral neural foraminal stenosis with mild to moderate spinal canal stenosis.   L4-5:  Broad-based disc osteophyte and facet hypertrophy results in severe left and moderate right neural foraminal stenosis with mild to moderate spinal canal stenosis.   L5-S1: Posterior disc osteophyte eccentric to the left and facet hypertrophy results in moderate to severe bilateral neural foraminal stenosis with mild to moderate spinal canal stenosis.  Paravertebral soft tissues are normal.  Spinal cord stimulator wires into the spinal canal at the T12-L1 level.    Assessment:  The patient is a 76-year-old man with a history of hypertension, obesity and low back pain who presents in referral from Dr. Winters for chronic right low back pain.   1. Lumbar radiculopathy        2. Chronic pain syndrome        3. Right lumbar radiculopathy        4. DDD (degenerative disc disease), lumbar                  Plan:  1. He found no significant relief with right-sided L2/3 and L3/4 transforaminal LEATHA.  2. At this time without significant relief from multiple right-sided transforaminal ESIs at multiple levels will refer him to Neurosurgery for further evaluation.  3. He has been reprogrammed by [employer] without a change to his pain  4. I do not believe his pain is originating from his right hip joint as on x-ray only shows mild degenerative changes and he has no significant pain with right-sided IMMANUEL or FADIR.   5. Based on Neurosurgery evaluation can consider potentially a right-sided diagnostic/therapeutic intra-articular hip injection.  6. Refill for hydrocodone 7.5/325 mg #60 tablets sent to Dr. Segura today for approval.  He reports taking this every 8 hours with good relief of his pain.  He denies any ill side effects or adverse events with his medication.        The total time spent for evaluation and management on 11/15/2022 including reviewing separately obtained history, performing a medically appropriate exam and evaluation, documenting clinical information in the health record, independently interpreting  results and communicating them to the patient/family/caregiver, and ordering medications/tests/procedures was between 30-39 minutes.

## 2022-11-27 ENCOUNTER — PATIENT MESSAGE (OUTPATIENT)
Dept: PAIN MEDICINE | Facility: CLINIC | Age: 77
End: 2022-11-27
Payer: MEDICARE

## 2022-11-28 DIAGNOSIS — M54.16 LUMBAR RADICULOPATHY: Primary | ICD-10-CM

## 2022-12-02 ENCOUNTER — OFFICE VISIT (OUTPATIENT)
Dept: URGENT CARE | Facility: CLINIC | Age: 77
End: 2022-12-02
Payer: MEDICARE

## 2022-12-02 VITALS
SYSTOLIC BLOOD PRESSURE: 156 MMHG | RESPIRATION RATE: 16 BRPM | HEIGHT: 71 IN | BODY MASS INDEX: 42.7 KG/M2 | TEMPERATURE: 98 F | DIASTOLIC BLOOD PRESSURE: 83 MMHG | WEIGHT: 305 LBS | OXYGEN SATURATION: 95 % | HEART RATE: 73 BPM

## 2022-12-02 DIAGNOSIS — J02.9 SORE THROAT: ICD-10-CM

## 2022-12-02 DIAGNOSIS — J06.9 VIRAL URI: Primary | ICD-10-CM

## 2022-12-02 DIAGNOSIS — R09.89 RUNNY NOSE: ICD-10-CM

## 2022-12-02 LAB
CTP QC/QA: YES
CTP QC/QA: YES
MOLECULAR STREP A: NEGATIVE
SARS-COV-2 AG RESP QL IA.RAPID: NEGATIVE

## 2022-12-02 PROCEDURE — 99203 OFFICE O/P NEW LOW 30 MIN: CPT | Mod: S$GLB,,, | Performed by: NURSE PRACTITIONER

## 2022-12-02 PROCEDURE — 1160F PR REVIEW ALL MEDS BY PRESCRIBER/CLIN PHARMACIST DOCUMENTED: ICD-10-PCS | Mod: CPTII,S$GLB,, | Performed by: NURSE PRACTITIONER

## 2022-12-02 PROCEDURE — 87811 SARS-COV-2 COVID19 W/OPTIC: CPT | Mod: QW,S$GLB,, | Performed by: NURSE PRACTITIONER

## 2022-12-02 PROCEDURE — 1125F AMNT PAIN NOTED PAIN PRSNT: CPT | Mod: CPTII,S$GLB,, | Performed by: NURSE PRACTITIONER

## 2022-12-02 PROCEDURE — 1125F PR PAIN SEVERITY QUANTIFIED, PAIN PRESENT: ICD-10-PCS | Mod: CPTII,S$GLB,, | Performed by: NURSE PRACTITIONER

## 2022-12-02 PROCEDURE — 87651 POCT STREP A MOLECULAR: ICD-10-PCS | Mod: QW,S$GLB,, | Performed by: NURSE PRACTITIONER

## 2022-12-02 PROCEDURE — 1157F ADVNC CARE PLAN IN RCRD: CPT | Mod: CPTII,S$GLB,, | Performed by: NURSE PRACTITIONER

## 2022-12-02 PROCEDURE — 3077F SYST BP >= 140 MM HG: CPT | Mod: CPTII,S$GLB,, | Performed by: NURSE PRACTITIONER

## 2022-12-02 PROCEDURE — 1160F RVW MEDS BY RX/DR IN RCRD: CPT | Mod: CPTII,S$GLB,, | Performed by: NURSE PRACTITIONER

## 2022-12-02 PROCEDURE — 99203 PR OFFICE/OUTPT VISIT, NEW, LEVL III, 30-44 MIN: ICD-10-PCS | Mod: S$GLB,,, | Performed by: NURSE PRACTITIONER

## 2022-12-02 PROCEDURE — 87651 STREP A DNA AMP PROBE: CPT | Mod: QW,S$GLB,, | Performed by: NURSE PRACTITIONER

## 2022-12-02 PROCEDURE — 87811 SARS CORONAVIRUS 2 ANTIGEN POCT, MANUAL READ: ICD-10-PCS | Mod: QW,S$GLB,, | Performed by: NURSE PRACTITIONER

## 2022-12-02 PROCEDURE — 1159F MED LIST DOCD IN RCRD: CPT | Mod: CPTII,S$GLB,, | Performed by: NURSE PRACTITIONER

## 2022-12-02 PROCEDURE — 3077F PR MOST RECENT SYSTOLIC BLOOD PRESSURE >= 140 MM HG: ICD-10-PCS | Mod: CPTII,S$GLB,, | Performed by: NURSE PRACTITIONER

## 2022-12-02 PROCEDURE — 1159F PR MEDICATION LIST DOCUMENTED IN MEDICAL RECORD: ICD-10-PCS | Mod: CPTII,S$GLB,, | Performed by: NURSE PRACTITIONER

## 2022-12-02 PROCEDURE — 3079F DIAST BP 80-89 MM HG: CPT | Mod: CPTII,S$GLB,, | Performed by: NURSE PRACTITIONER

## 2022-12-02 PROCEDURE — 3079F PR MOST RECENT DIASTOLIC BLOOD PRESSURE 80-89 MM HG: ICD-10-PCS | Mod: CPTII,S$GLB,, | Performed by: NURSE PRACTITIONER

## 2022-12-02 PROCEDURE — 1157F PR ADVANCE CARE PLAN OR EQUIV PRESENT IN MEDICAL RECORD: ICD-10-PCS | Mod: CPTII,S$GLB,, | Performed by: NURSE PRACTITIONER

## 2022-12-02 RX ORDER — FLUTICASONE PROPIONATE 50 MCG
1 SPRAY, SUSPENSION (ML) NASAL DAILY PRN
Qty: 16 G | Refills: 0 | Status: ON HOLD | OUTPATIENT
Start: 2022-12-02 | End: 2023-03-21 | Stop reason: HOSPADM

## 2022-12-02 NOTE — PATIENT INSTRUCTIONS
INSTRUCTIONS:  - Rest.  - Drink plenty of fluids.  - Take Tylenol and/or Ibuprofen as directed as needed for fever/pain.  Do not take more than the recommended dose.  - follow up with your PCP within the next 1-2 weeks as needed.  - You must understand that you have received an Urgent Care treatment only and that you may be released before all of your medical problems are known or treated.   - You, the patient, will arrange for follow up care as instructed.   - If your condition worsens or fails to improve we recommend that you receive another evaluation at the ER immediately or contact your PCP to discuss your concerns.   - You can call [phone] or [phone] to help schedule an appointment with the appropriate provider.     -If you smoke cigarettes, it would be beneficial for you to stop.    OVER THE COUNTER RECOMMENDATIONS/SUGGESTIONS.     Make sure to stay well hydrated.     Use Nasal Saline to mechanically move any post nasal drip from your eustachian tube or from the back of your throat.     Use warm salt water gargles to ease your throat pain. Warm salt water gargles as needed for sore throat-  1/2 tsp salt to 1 cup warm water, gargle as desired.     Use an antihistamine such as Claritin, Zyrtec or Allegra to dry you out.      Use pseudoephedrine (behind the counter) to decongest. Pseudoephedrine  30 mg up to 240 mg /day. It can raise your blood pressure and give you palpitations.     Use mucinex (guaifenisin) to break up mucous up to 2400mg/day to loosen any mucous.   The mucinex DM pill has a cough suppressant that can be sedating. It can be used at night to stop the tickle at the back of your throat.  You can use Mucinex D (it has guaifenesin and a high dose of pseudoephedrine) in the mornings to help decongest.        Use Afrin in each nare for no longer than 3 days, as it is addictive. It can also dry out your mucous membranes and cause elevated blood pressure. This is especially useful if you  are flying.     Use Flonase 1-2 sprays/nostril per day. It is a local acting steroid nasal spray, if you develop a bloody nose, stop using the medication immediately.     Sometimes Nyquil at night is beneficial to help you get some rest, however it is sedating and it does have an antihistamine, and tylenol.     Honey is a natural cough suppressant that can be used.     Tylenol up to 4,000 mg a day is safe for short periods and can be used for body aches, pain, and fever. However in high doses and prolonged use it can cause liver irritation.     Ibuprofen is a non-steroidal anti-inflammatory that can be used for body aches, pain, and fever.However it can also cause stomach irritation if over used.

## 2022-12-02 NOTE — PROGRESS NOTES
"Subjective:       Patient ID: Chinedu Roque is a 76 y.o. male.    Vitals:  height is 5' 11" (1.803 m) and weight is 138.3 kg (305 lb) (abnormal). His temperature is 98.2 °F (36.8 °C). His blood pressure is 156/83 (abnormal) and his pulse is 73. His respiration is 16 and oxygen saturation is 95%.     Chief Complaint: Sinus Problem    Patient presents today with complaints of scratchy throat & runny nose since yesterday. Patient is not currently taking anything for his symptoms. Patient is covid & flu vaccinated.     Provider note begins below:  Patient reports that his grandson currently has strep throat and he was around him yesterday.  Patient denies any fever, chills, cough, chest pain, shortness of breath, vomiting or abdominal pain.  Patient is awake and alert.  No fever.    Sinus Problem  Associated symptoms include a sore throat. Pertinent negatives include no chills, coughing, ear pain, neck pain, shortness of breath, sinus pressure or sneezing.   Constitution: Negative. Negative for chills, fatigue and fever.   HENT:  Positive for postnasal drip and sore throat. Negative for ear pain, ear discharge, facial swelling and sinus pressure.    Neck: Negative for neck pain, neck stiffness and painful lymph nodes.   Cardiovascular: Negative.  Negative for chest pain and sob on exertion.   Eyes: Negative.  Negative for eye itching, eye pain and eye redness.   Respiratory: Negative.  Negative for chest tightness, cough, shortness of breath, wheezing and asthma.    Gastrointestinal: Negative.  Negative for abdominal pain, nausea and vomiting.   Endocrine: negative. excessive thirst.   Genitourinary: Negative.  Negative for dysuria, frequency, urgency and flank pain.   Musculoskeletal: Negative.  Negative for pain, trauma, joint pain and joint swelling.   Skin: Negative.  Negative for rash, wound, lesion and hives.   Allergic/Immunologic: Negative.  Negative for eczema, asthma, hives, itching and sneezing. "   Neurological: Negative.  Negative for dizziness, passing out, disorientation and altered mental status.   Hematologic/Lymphatic: Negative.  Negative for swollen lymph nodes.   Psychiatric/Behavioral: Negative.  Negative for altered mental status, disorientation and confusion.      Objective:      Physical Exam   Constitutional: He is oriented to person, place, and time. He appears well-developed. He is cooperative.  Non-toxic appearance. He does not appear ill. No distress.   HENT:   Head: Normocephalic and atraumatic.   Ears:   Right Ear: Hearing, tympanic membrane, external ear and ear canal normal. impacted cerumen  Left Ear: Hearing, tympanic membrane, external ear and ear canal normal. impacted cerumen  Nose: Rhinorrhea present. No mucosal edema or nasal deformity. No epistaxis. Right sinus exhibits no maxillary sinus tenderness and no frontal sinus tenderness. Left sinus exhibits no maxillary sinus tenderness and no frontal sinus tenderness.   Mouth/Throat: Uvula is midline and mucous membranes are normal. No trismus in the jaw. Normal dentition. No uvula swelling. Posterior oropharyngeal erythema present. No oropharyngeal exudate or posterior oropharyngeal edema.   Eyes: Conjunctivae and lids are normal. No scleral icterus.   Neck: Trachea normal and phonation normal. Neck supple. No edema present. No erythema present. No neck rigidity present.   Cardiovascular: Normal rate, regular rhythm, normal heart sounds and normal pulses.   Pulmonary/Chest: Effort normal and breath sounds normal. No stridor. No respiratory distress. He has no decreased breath sounds. He has no wheezes. He has no rhonchi. He has no rales. He exhibits no tenderness.   Abdominal: Normal appearance. He exhibits no distension and no mass. Soft. flat abdomen There is no abdominal tenderness. There is no rebound, no guarding, no left CVA tenderness and no right CVA tenderness. No hernia.   Musculoskeletal: Normal range of motion.          General: No deformity. Normal range of motion.   Lymphadenopathy:     He has no cervical adenopathy.   Neurological: no focal deficit. He is alert and oriented to person, place, and time. He exhibits normal muscle tone. Coordination normal.   Skin: Skin is warm, dry, intact, not diaphoretic and not pale.   Psychiatric: His speech is normal and behavior is normal. Mood, judgment and thought content normal.   Nursing note and vitals reviewed.  The following results have been reviewed with the patient:  LABS-  Results for orders placed or performed in visit on 12/02/22   POCT Strep A, Molecular   Result Value Ref Range    Molecular Strep A, POC Negative Negative     Acceptable Yes    SARS Coronavirus 2 Antigen, POCT Manual Read   Result Value Ref Range    SARS Coronavirus 2 Antigen Negative Negative     Acceptable Yes         IMAGING-  No results found.        Assessment:       1. Viral URI    2. Runny nose    3. Sore throat          Plan:       FOLLOWUP  Follow up if symptoms worsen or fail to improve, for PLEASE CONTACT PCP OR CONTACT THE EMERGENCY ROOM..     PATIENT INSTRUCTIONS  Patient Instructions   INSTRUCTIONS:  - Rest.  - Drink plenty of fluids.  - Take Tylenol and/or Ibuprofen as directed as needed for fever/pain.  Do not take more than the recommended dose.  - follow up with your PCP within the next 1-2 weeks as needed.  - You must understand that you have received an Urgent Care treatment only and that you may be released before all of your medical problems are known or treated.   - You, the patient, will arrange for follow up care as instructed.   - If your condition worsens or fails to improve we recommend that you receive another evaluation at the ER immediately or contact your PCP to discuss your concerns.   - You can call [phone] or [phone] to help schedule an appointment with the appropriate provider.     -If you smoke cigarettes, it would be beneficial for  you to stop.    OVER THE COUNTER RECOMMENDATIONS/SUGGESTIONS.     Make sure to stay well hydrated.     Use Nasal Saline to mechanically move any post nasal drip from your eustachian tube or from the back of your throat.     Use warm salt water gargles to ease your throat pain. Warm salt water gargles as needed for sore throat-  1/2 tsp salt to 1 cup warm water, gargle as desired.     Use an antihistamine such as Claritin, Zyrtec or Allegra to dry you out.      Use pseudoephedrine (behind the counter) to decongest. Pseudoephedrine  30 mg up to 240 mg /day. It can raise your blood pressure and give you palpitations.     Use mucinex (guaifenisin) to break up mucous up to 2400mg/day to loosen any mucous.   The mucinex DM pill has a cough suppressant that can be sedating. It can be used at night to stop the tickle at the back of your throat.  You can use Mucinex D (it has guaifenesin and a high dose of pseudoephedrine) in the mornings to help decongest.        Use Afrin in each nare for no longer than 3 days, as it is addictive. It can also dry out your mucous membranes and cause elevated blood pressure. This is especially useful if you are flying.     Use Flonase 1-2 sprays/nostril per day. It is a local acting steroid nasal spray, if you develop a bloody nose, stop using the medication immediately.     Sometimes Nyquil at night is beneficial to help you get some rest, however it is sedating and it does have an antihistamine, and tylenol.     Honey is a natural cough suppressant that can be used.     Tylenol up to 4,000 mg a day is safe for short periods and can be used for body aches, pain, and fever. However in high doses and prolonged use it can cause liver irritation.     Ibuprofen is a non-steroidal anti-inflammatory that can be used for body aches, pain, and fever.However it can also cause stomach irritation if over used.         THANK YOU FOR ALLOWING ME TO PARTICIPATE IN YOUR HEALTHCARE,     Elijah Zuniga NP      Viral URI  -     fluticasone propionate (FLONASE) 50 mcg/actuation nasal spray; 1 spray (50 mcg total) by Each Nostril route daily as needed for Rhinitis.  Dispense: 16 g; Refill: 0    Runny nose  -     Cancel: SARS Coronavirus 2 Antigen, POCT Manual Read  -     POCT Strep A, Molecular  -     SARS Coronavirus 2 Antigen, POCT Manual Read    Sore throat

## 2022-12-07 ENCOUNTER — OFFICE VISIT (OUTPATIENT)
Dept: NEUROSURGERY | Facility: CLINIC | Age: 77
End: 2022-12-07
Payer: MEDICARE

## 2022-12-07 VITALS
WEIGHT: 305 LBS | RESPIRATION RATE: 18 BRPM | HEART RATE: 62 BPM | BODY MASS INDEX: 42.7 KG/M2 | HEIGHT: 71 IN | SYSTOLIC BLOOD PRESSURE: 136 MMHG | DIASTOLIC BLOOD PRESSURE: 86 MMHG

## 2022-12-07 DIAGNOSIS — M51.36 DDD (DEGENERATIVE DISC DISEASE), LUMBAR: ICD-10-CM

## 2022-12-07 DIAGNOSIS — G89.29 CHRONIC BILATERAL LOW BACK PAIN WITHOUT SCIATICA: Primary | ICD-10-CM

## 2022-12-07 DIAGNOSIS — M54.50 CHRONIC BILATERAL LOW BACK PAIN WITHOUT SCIATICA: Primary | ICD-10-CM

## 2022-12-07 PROCEDURE — 1157F PR ADVANCE CARE PLAN OR EQUIV PRESENT IN MEDICAL RECORD: ICD-10-PCS | Mod: CPTII,S$GLB,, | Performed by: PHYSICIAN ASSISTANT

## 2022-12-07 PROCEDURE — 1157F ADVNC CARE PLAN IN RCRD: CPT | Mod: CPTII,S$GLB,, | Performed by: PHYSICIAN ASSISTANT

## 2022-12-07 PROCEDURE — 1101F PR PT FALLS ASSESS DOC 0-1 FALLS W/OUT INJ PAST YR: ICD-10-PCS | Mod: CPTII,S$GLB,, | Performed by: PHYSICIAN ASSISTANT

## 2022-12-07 PROCEDURE — 3288F FALL RISK ASSESSMENT DOCD: CPT | Mod: CPTII,S$GLB,, | Performed by: PHYSICIAN ASSISTANT

## 2022-12-07 PROCEDURE — 99214 OFFICE O/P EST MOD 30 MIN: CPT | Mod: S$GLB,,, | Performed by: PHYSICIAN ASSISTANT

## 2022-12-07 PROCEDURE — 3288F PR FALLS RISK ASSESSMENT DOCUMENTED: ICD-10-PCS | Mod: CPTII,S$GLB,, | Performed by: PHYSICIAN ASSISTANT

## 2022-12-07 PROCEDURE — 3075F SYST BP GE 130 - 139MM HG: CPT | Mod: CPTII,S$GLB,, | Performed by: PHYSICIAN ASSISTANT

## 2022-12-07 PROCEDURE — 3075F PR MOST RECENT SYSTOLIC BLOOD PRESS GE 130-139MM HG: ICD-10-PCS | Mod: CPTII,S$GLB,, | Performed by: PHYSICIAN ASSISTANT

## 2022-12-07 PROCEDURE — 1101F PT FALLS ASSESS-DOCD LE1/YR: CPT | Mod: CPTII,S$GLB,, | Performed by: PHYSICIAN ASSISTANT

## 2022-12-07 PROCEDURE — 1125F AMNT PAIN NOTED PAIN PRSNT: CPT | Mod: CPTII,S$GLB,, | Performed by: PHYSICIAN ASSISTANT

## 2022-12-07 PROCEDURE — 1159F PR MEDICATION LIST DOCUMENTED IN MEDICAL RECORD: ICD-10-PCS | Mod: CPTII,S$GLB,, | Performed by: PHYSICIAN ASSISTANT

## 2022-12-07 PROCEDURE — 3079F PR MOST RECENT DIASTOLIC BLOOD PRESSURE 80-89 MM HG: ICD-10-PCS | Mod: CPTII,S$GLB,, | Performed by: PHYSICIAN ASSISTANT

## 2022-12-07 PROCEDURE — 1160F RVW MEDS BY RX/DR IN RCRD: CPT | Mod: CPTII,S$GLB,, | Performed by: PHYSICIAN ASSISTANT

## 2022-12-07 PROCEDURE — 1125F PR PAIN SEVERITY QUANTIFIED, PAIN PRESENT: ICD-10-PCS | Mod: CPTII,S$GLB,, | Performed by: PHYSICIAN ASSISTANT

## 2022-12-07 PROCEDURE — 3079F DIAST BP 80-89 MM HG: CPT | Mod: CPTII,S$GLB,, | Performed by: PHYSICIAN ASSISTANT

## 2022-12-07 PROCEDURE — 99214 PR OFFICE/OUTPT VISIT, EST, LEVL IV, 30-39 MIN: ICD-10-PCS | Mod: S$GLB,,, | Performed by: PHYSICIAN ASSISTANT

## 2022-12-07 PROCEDURE — 1159F MED LIST DOCD IN RCRD: CPT | Mod: CPTII,S$GLB,, | Performed by: PHYSICIAN ASSISTANT

## 2022-12-07 PROCEDURE — 1160F PR REVIEW ALL MEDS BY PRESCRIBER/CLIN PHARMACIST DOCUMENTED: ICD-10-PCS | Mod: CPTII,S$GLB,, | Performed by: PHYSICIAN ASSISTANT

## 2022-12-07 NOTE — PROGRESS NOTES
[facility] Neurosurgery [facility]  Clinic Consult     Consult Requested By: Edgardo Villalobos PA-C  PCP: Hernando Browne MD    SUBJECTIVE:     Chief Complaint:   Chief Complaint   Patient presents with    Lumbar Spine Pain (L-Spine)     Patient presents to clinic today as lumbar spine pain. Patient states 8 yrs with lumbar pain. No relief with injections. Denies any numbness/tingling.        History of Present Illness:  Chinedu Roque is a 76 y.o. male with HTN, HLD who presents for evaluation of low back pain. Patient reports onset approximately 8 years ago. He has failed to improve with multiple injections. He has a spinal cord stimulator which is not MRI compatible. He reports it does not provide him relief. The pain is present in the low back, R>L. It is worse with standing and walking. He cannot walk long distances due to the pain. He has no pain while seated. He denies leg pain. Denies weakness. He was referred by pain management after failure to improve with injections.     Pertinent and recent history, provider evaluations, imaging and data reviewed in EPIC        Past Medical History:   Diagnosis Date    Anticoagulant long-term use     ASA 81 mg    Arthritis     cervical    BPH (benign prostatic hyperplasia) 03/02/2012    Chronic left shoulder pain     Compression fracture of L2     DDD (degenerative disc disease), lumbar     HTN (hypertension) 03/02/2012    Hx of colonic polyps 2013    Low back pain     Morbid obesity with BMI of 40.0-44.9, adult 03/18/2014    Seasonal allergies     Sleep apnea     compliant with CPAP    Stroke 03/1986     Past Surgical History:   Procedure Laterality Date    APPENDECTOMY      COLONOSCOPY N/A 6/6/2022    Procedure: COLONOSCOPY;  Surgeon: Jose Bello MD;  Location: [facility];  Service: Endoscopy;  Laterality: N/A;    EPIDURAL BLOCK INJECTION  2015    cervical    EPIDURAL STEROID INJECTION INTO LUMBAR SPINE N/A 6/5/2019    Procedure:  Injection-steroid-epidural-lumbar L5/S1 interlaminar;  Surgeon: Riley Segura MD;  Location: [facility] OR;  Service: Pain Management;  Laterality: N/A;    EPIDURAL STEROID INJECTION INTO LUMBAR SPINE N/A 9/13/2019    Procedure: Injection-steroid-epidural-lumbar;  Surgeon: Riley Segura MD;  Location: [facility] OR;  Service: Pain Management;  Laterality: N/A;  L5/S1 interlaminar to the right    EPIDURAL STEROID INJECTION INTO LUMBAR SPINE N/A 6/2/2020    Procedure: Injection-steroid-epidural-lumbar L5/S1 to right;  Surgeon: Riley Segura MD;  Location: [facility] OR;  Service: Pain Management;  Laterality: N/A;    Facet Steroid injection       Pain management    HERNIA REPAIR      Ventral    INSERTION OF DORSAL COLUMN NERVE STIMULATOR FOR TRIAL N/A 2/10/2021    Procedure: INSERTION, NEUROSTIMULATOR, SPINAL CORD, DORSAL COLUMN, FOR TRIAL;  Surgeon: Riley Segura MD;  Location: [facility] OR;  Service: Pain Management;  Laterality: N/A;    INSERTION OF NEUROSTIMULATOR OF DORSAL COLUMN OF SPINAL CORD N/A 3/1/2021    Procedure: INSERTION, NEUROSTIMULATOR, SPINAL CORD, DORSAL COLUMN;  Surgeon: Riley Segura MD;  Location: [facility] OR;  Service: Pain Management;  Laterality: N/A;    KNEE SURGERY      bilateral    RADIOFREQUENCY ABLATION  2015    lumbar nerve    RADIOFREQUENCY ABLATION OF LUMBAR MEDIAL BRANCH NERVE AT SINGLE LEVEL Right 4/11/2019    Procedure: Radiofrequency Ablation, Nerve, Spinal, Lumbar, Medial Branch, L1,2,3,4,5;  Surgeon: Riley Segura MD;  Location: [facility] OR;  Service: Pain Management;  Laterality: Right;    TRANSFORAMINAL EPIDURAL INJECTION OF STEROID Right 11/13/2020    Procedure: Injection,steroid,epidural,transforaminal approach, l3/4 and l5/s1;  Surgeon: Riley Segura MD;  Location: [facility] OR;  Service: Pain Management;  Laterality: Right;    TRANSFORAMINAL EPIDURAL INJECTION OF STEROID Left 6/24/2021    Procedure: Injection,steroid,epidural,transforaminal approach L5/S1;  Surgeon:  Riley Segura MD;  Location: [facility] OR;  Service: Pain Management;  Laterality: Left;    TRANSFORAMINAL EPIDURAL INJECTION OF STEROID Right 2022    Procedure: Injection,steroid,epidural,transforaminal approach L5/S1;  Surgeon: Riley Segura MD;  Location: [facility] OR;  Service: Pain Management;  Laterality: Right;    TRANSFORAMINAL EPIDURAL INJECTION OF STEROID Right 10/25/2022    Procedure: Injection,steroid,epidural,transforaminal approach L2/3 and L3/4;  Surgeon: Riley Segura MD;  Location: [facility];  Service: Pain Management;  Laterality: Right;    VERTEBROPLASTY       Family History   Problem Relation Age of Onset    COPD Father     Hypertension Brother     Kidney disease Brother     Alzheimer's disease Mother     No Known Problems Daughter     Hypertension Son     Diabetes Son         pre-diabetic    No Known Problems Daughter     No Known Problems Daughter      Social History     Tobacco Use    Smoking status: Former     Packs/day: 1.50     Years: 24.00     Pack years: 36.00     Types: Cigarettes     Start date: 10/21/1959     Quit date: 3/19/1983     Years since quittin.7     Passive exposure: Past    Smokeless tobacco: Never   Substance Use Topics    Alcohol use: No    Drug use: No      Review of patient's allergies indicates:  No Known Allergies    Current Outpatient Medications:     acetaminophen (TYLENOL) 500 MG tablet, Take 1,000 mg by mouth every 6 (six) hours as needed for Pain., Disp: , Rfl:     amoxicillin (AMOXIL) 500 MG capsule, Take 500 mg by mouth 3 (three) times daily., Disp: , Rfl:     aspirin (ECOTRIN) 81 MG EC tablet, Take 81 mg by mouth once daily., Disp: , Rfl:     diclofenac sodium (VOLTAREN) 1 % Gel, Apply 2 g topically 3 (three) times daily., Disp: , Rfl:     finasteride (PROSCAR) 5 mg tablet, Take 1 tablet (5 mg total) by mouth once daily., Disp: 90 tablet, Rfl: 3    fluticasone propionate (FLONASE) 50 mcg/actuation nasal spray, 1 spray (50 mcg total) by Each  Nostril route daily as needed for Rhinitis., Disp: 16 g, Rfl: 0    hydroCHLOROthiazide (HYDRODIURIL) 12.5 MG Tab, TAKE 1 TABLET BY MOUTH EVERY DAY (Patient taking differently: Take 12.5 mg by mouth once daily.), Disp: 90 tablet, Rfl: 3    HYDROcodone-acetaminophen (NORCO) 7.5-325 mg per tablet, Take 1 tablet by mouth every 8 (eight) hours as needed for Pain., Disp: 60 tablet, Rfl: 0    losartan (COZAAR) 100 MG tablet, TAKE 1 TABLET BY MOUTH EVERY DAY, Disp: 90 tablet, Rfl: 3    metoprolol succinate (TOPROL-XL) 100 MG 24 hr tablet, TAKE 1 TABLET BY MOUTH EVERY DAY (Patient taking differently: Take 100 mg by mouth once daily.), Disp: 90 tablet, Rfl: 3    tamsulosin (FLOMAX) 0.4 mg Cap, TAKE 1 CAPSULE BY MOUTH EVERY DAY, Disp: 90 capsule, Rfl: 3  No current facility-administered medications for this visit.    Facility-Administered Medications Ordered in Other Visits:     sodium hyaluronate (EUFLEXXA) 10 mg/mL(mw 2.4 -3.6 million) Syrg 20 mg, 20 mg, Intra-articular, , Michelet Covarrubias MD, 20 mg at 05/14/18 1046    sodium hyaluronate (EUFLEXXA) 10 mg/mL(mw 2.4 -3.6 million) Syrg 20 mg, 20 mg, Intra-articular, , Michelet Covarrubias MD, 20 mg at 05/14/18 1046    Review of Systems:   Constitutional: no fever, chills or night sweats. No changes in weight   Eyes: no visual changes   ENT: no nasal congestion or sore throat   Respiratory: no cough or shortness of breath   Cardiovascular: no chest pain or palpitations   Gastrointestinal: no nausea or vomiting   Genitourinary: no hematuria or dysuria   Integument/Breast: no rash or pruritis   Hematologic/Lymphatic: no easy bruising or lymphadenopathy   Musculoskeletal: +back pain   Neurological: no seizures or tremors   Behavioral/Psych: no auditory or visual hallucinations   Endocrine: no heat or cold intolerance         OBJECTIVE:     Vital Signs (Most Recent):  Pulse: 62 (12/07/22 1015)  Resp: 18 (12/07/22 1015)  BP: 136/86 (12/07/22 1015)  Estimated body mass index is 42.54  "kg/m² as calculated from the following:    Height as of this encounter: 5' 11" (1.803 m).    Weight as of this encounter: 138.3 kg (305 lb).    Physical Exam:   General: well developed, well nourished, no distress   Neurologic: Alert and oriented. Thought content appropriate. GCS 15.   Head: normocephalic, atraumatic  Eyes: EOMI  Neck: trachea midline, no JVD   Cardiovascular: no LE edema  Pulmonary: normal respirations, no signs of respiratory distress  Abdomen: non-distended  Sensory: intact to light touch throughout  Skin: Skin is warm, dry and intact    Motor Strength: Moves all extremities spontaneously with good tone. No abnormal movements seen.       Deltoids Triceps Biceps Wrist Extension Wrist Flexion Hand  Interossei   Upper: R 5/5 5/5 5/5 5/5 5/5 5/5 5/5    L 5/5 5/5 5/5 5/5 5/5 5/5 5/5     Iliopsoas Quadriceps Knee  Flexion Tibialis  anterior Gastro- cnemius EHL    Lower: R 5/5 5/5 5/5 5/5 5/5 5/5     L 5/5 5/5 5/5 5/5 5/5 5/5      DTR's: 1+  Gait: normal            Diagnostic Results:  I have independently reviewed the following imaging:  CT lumbar spine with multilevel degenerative changes. Unable to visualize stenosis     ASSESSMENT/PLAN:     Chronic bilateral low back pain without sciatica  -     Ambulatory referral/consult to Neurosurgery  -     FL Myelogram Lumbar, COMPLETE  with CT to Follow; Future; Expected date: 12/07/2022  -     CT Lumbar Spine With Contrast; Future; Expected date: 12/07/2022  -     Creatinine, serum; Future; Expected date: 12/07/2022    DDD (degenerative disc disease), lumbar  -     FL Myelogram Lumbar, COMPLETE  with CT to Follow; Future; Expected date: 12/07/2022  -     CT Lumbar Spine With Contrast; Future; Expected date: 12/07/2022  -     Creatinine, serum; Future; Expected date: 12/07/2022      Chinedu Roque is a 76 y.o. male with chronic low back pain without radiculopathy. He has multilevel degeneration. He would like to discuss his surgical options. He cannot " undergo MRI due to SCS. I have ordered CT myelogram lumbar spine and dynamic x-rays. He will follow up with Dr. Puri once complete to discuss his options.     Patient verbalized understanding of plan. Encouraged to call with any questions or concerns.

## 2022-12-10 ENCOUNTER — HOSPITAL ENCOUNTER (OUTPATIENT)
Dept: RADIOLOGY | Facility: HOSPITAL | Age: 77
Discharge: HOME | End: 2022-12-10
Attending: PHYSICIAN ASSISTANT
Payer: MEDICARE

## 2022-12-10 DIAGNOSIS — G89.29 CHRONIC BILATERAL LOW BACK PAIN WITHOUT SCIATICA: ICD-10-CM

## 2022-12-10 DIAGNOSIS — M51.36 DDD (DEGENERATIVE DISC DISEASE), LUMBAR: ICD-10-CM

## 2022-12-10 DIAGNOSIS — M54.50 CHRONIC BILATERAL LOW BACK PAIN WITHOUT SCIATICA: ICD-10-CM

## 2022-12-10 PROCEDURE — 72110 X-RAY EXAM L-2 SPINE 4/>VWS: CPT | Mod: TC,FY,PO

## 2022-12-10 PROCEDURE — 72110 X-RAY EXAM L-2 SPINE 4/>VWS: CPT | Mod: 26,,, | Performed by: RADIOLOGY

## 2022-12-10 PROCEDURE — 72110 XR LUMBAR SPINE AP AND LAT WITH FLEX/EXT: ICD-10-PCS | Mod: 26,,, | Performed by: RADIOLOGY

## 2022-12-28 ENCOUNTER — OFFICE VISIT (OUTPATIENT)
Dept: NEUROSURGERY | Facility: CLINIC | Age: 77
End: 2022-12-28
Payer: MEDICARE

## 2022-12-28 VITALS — WEIGHT: 300 LBS | HEIGHT: 71 IN | BODY MASS INDEX: 42 KG/M2 | RESPIRATION RATE: 18 BRPM

## 2022-12-28 DIAGNOSIS — S32.040S COMPRESSION FRACTURE OF L4 LUMBAR VERTEBRA, SEQUELA: Primary | ICD-10-CM

## 2022-12-28 DIAGNOSIS — M51.36 DDD (DEGENERATIVE DISC DISEASE), LUMBAR: ICD-10-CM

## 2022-12-28 DIAGNOSIS — M80.00XA AGE-RELATED OSTEOPOROSIS WITH CURRENT PATHOLOGICAL FRACTURE, INITIAL ENCOUNTER: ICD-10-CM

## 2022-12-28 PROCEDURE — 1101F PT FALLS ASSESS-DOCD LE1/YR: CPT | Mod: CPTII,S$GLB,, | Performed by: STUDENT IN AN ORGANIZED HEALTH CARE EDUCATION/TRAINING PROGRAM

## 2022-12-28 PROCEDURE — 99215 OFFICE O/P EST HI 40 MIN: CPT | Mod: S$GLB,,, | Performed by: STUDENT IN AN ORGANIZED HEALTH CARE EDUCATION/TRAINING PROGRAM

## 2022-12-28 PROCEDURE — 99215 PR OFFICE/OUTPT VISIT, EST, LEVL V, 40-54 MIN: ICD-10-PCS | Mod: S$GLB,,, | Performed by: STUDENT IN AN ORGANIZED HEALTH CARE EDUCATION/TRAINING PROGRAM

## 2022-12-28 PROCEDURE — 3288F FALL RISK ASSESSMENT DOCD: CPT | Mod: CPTII,S$GLB,, | Performed by: STUDENT IN AN ORGANIZED HEALTH CARE EDUCATION/TRAINING PROGRAM

## 2022-12-28 PROCEDURE — 1159F MED LIST DOCD IN RCRD: CPT | Mod: CPTII,S$GLB,, | Performed by: STUDENT IN AN ORGANIZED HEALTH CARE EDUCATION/TRAINING PROGRAM

## 2022-12-28 PROCEDURE — 1157F PR ADVANCE CARE PLAN OR EQUIV PRESENT IN MEDICAL RECORD: ICD-10-PCS | Mod: CPTII,S$GLB,, | Performed by: STUDENT IN AN ORGANIZED HEALTH CARE EDUCATION/TRAINING PROGRAM

## 2022-12-28 PROCEDURE — 1125F PR PAIN SEVERITY QUANTIFIED, PAIN PRESENT: ICD-10-PCS | Mod: CPTII,S$GLB,, | Performed by: STUDENT IN AN ORGANIZED HEALTH CARE EDUCATION/TRAINING PROGRAM

## 2022-12-28 PROCEDURE — 1157F ADVNC CARE PLAN IN RCRD: CPT | Mod: CPTII,S$GLB,, | Performed by: STUDENT IN AN ORGANIZED HEALTH CARE EDUCATION/TRAINING PROGRAM

## 2022-12-28 PROCEDURE — 1125F AMNT PAIN NOTED PAIN PRSNT: CPT | Mod: CPTII,S$GLB,, | Performed by: STUDENT IN AN ORGANIZED HEALTH CARE EDUCATION/TRAINING PROGRAM

## 2022-12-28 PROCEDURE — 3288F PR FALLS RISK ASSESSMENT DOCUMENTED: ICD-10-PCS | Mod: CPTII,S$GLB,, | Performed by: STUDENT IN AN ORGANIZED HEALTH CARE EDUCATION/TRAINING PROGRAM

## 2022-12-28 PROCEDURE — 1101F PR PT FALLS ASSESS DOC 0-1 FALLS W/OUT INJ PAST YR: ICD-10-PCS | Mod: CPTII,S$GLB,, | Performed by: STUDENT IN AN ORGANIZED HEALTH CARE EDUCATION/TRAINING PROGRAM

## 2022-12-28 PROCEDURE — 1159F PR MEDICATION LIST DOCUMENTED IN MEDICAL RECORD: ICD-10-PCS | Mod: CPTII,S$GLB,, | Performed by: STUDENT IN AN ORGANIZED HEALTH CARE EDUCATION/TRAINING PROGRAM

## 2022-12-28 RX ORDER — CLINDAMYCIN HYDROCHLORIDE 300 MG/1
300 CAPSULE ORAL 3 TIMES DAILY
Status: ON HOLD | COMMUNITY
Start: 2022-12-19 | End: 2023-03-21 | Stop reason: HOSPADM

## 2022-12-28 NOTE — PROGRESS NOTES
[facility] Neurosurgery [facility]  Clinic Consult     Consult Requested By: No ref. provider found  PCP: Hernando Browne MD    SUBJECTIVE:     Chief Complaint:   No chief complaint on file.  Return for a evaluation after CT myelo  With back pain and limited ambulation  Hx of osteoporisis, fragility fx at L2 s/p vertebroplasty  No central stenosis  L4 compression fx, progression with incomplete sagittal plane fx  Midbody > 50 mariah    History of Present Illness:  Chinedu Roque is a 77 y.o. male with HTN, HLD who presents for evaluation of low back pain. Patient reports onset approximately 8 years ago. He has failed to improve with multiple injections. He has a spinal cord stimulator which is not MRI compatible. He reports it does not provide him relief. The pain is present in the low back, R>L. It is worse with standing and walking. He cannot walk long distances due to the pain. He has no pain while seated. He denies leg pain. Denies weakness. He was referred by pain management after failure to improve with injections.     Pertinent and recent history, provider evaluations, imaging and data reviewed in EPIC        Past Medical History:   Diagnosis Date    Anticoagulant long-term use     ASA 81 mg    Arthritis     cervical    BPH (benign prostatic hyperplasia) 03/02/2012    Chronic left shoulder pain     Compression fracture of L2     DDD (degenerative disc disease), lumbar     HTN (hypertension) 03/02/2012    Hx of colonic polyps 2013    Low back pain     Morbid obesity with BMI of 40.0-44.9, adult 03/18/2014    Seasonal allergies     Sleep apnea     compliant with CPAP    Stroke 03/1986     Past Surgical History:   Procedure Laterality Date    APPENDECTOMY      COLONOSCOPY N/A 6/6/2022    Procedure: COLONOSCOPY;  Surgeon: Jose Bello MD;  Location: [facility];  Service: Endoscopy;  Laterality: N/A;    EPIDURAL BLOCK INJECTION  2015    cervical    EPIDURAL STEROID INJECTION INTO  LUMBAR SPINE N/A 6/5/2019    Procedure: Injection-steroid-epidural-lumbar L5/S1 interlaminar;  Surgeon: Riley Segura MD;  Location: [facility] OR;  Service: Pain Management;  Laterality: N/A;    EPIDURAL STEROID INJECTION INTO LUMBAR SPINE N/A 9/13/2019    Procedure: Injection-steroid-epidural-lumbar;  Surgeon: Riley Segura MD;  Location: [facility] OR;  Service: Pain Management;  Laterality: N/A;  L5/S1 interlaminar to the right    EPIDURAL STEROID INJECTION INTO LUMBAR SPINE N/A 6/2/2020    Procedure: Injection-steroid-epidural-lumbar L5/S1 to right;  Surgeon: Riley Segura MD;  Location: [facility] OR;  Service: Pain Management;  Laterality: N/A;    Facet Steroid injection       Pain management    HERNIA REPAIR      Ventral    INSERTION OF DORSAL COLUMN NERVE STIMULATOR FOR TRIAL N/A 2/10/2021    Procedure: INSERTION, NEUROSTIMULATOR, SPINAL CORD, DORSAL COLUMN, FOR TRIAL;  Surgeon: Riley Segura MD;  Location: [facility] OR;  Service: Pain Management;  Laterality: N/A;    INSERTION OF NEUROSTIMULATOR OF DORSAL COLUMN OF SPINAL CORD N/A 3/1/2021    Procedure: INSERTION, NEUROSTIMULATOR, SPINAL CORD, DORSAL COLUMN;  Surgeon: Riley Segura MD;  Location: [facility] OR;  Service: Pain Management;  Laterality: N/A;    KNEE SURGERY      bilateral    RADIOFREQUENCY ABLATION  2015    lumbar nerve    RADIOFREQUENCY ABLATION OF LUMBAR MEDIAL BRANCH NERVE AT SINGLE LEVEL Right 4/11/2019    Procedure: Radiofrequency Ablation, Nerve, Spinal, Lumbar, Medial Branch, L1,2,3,4,5;  Surgeon: Riley Segura MD;  Location: [facility] OR;  Service: Pain Management;  Laterality: Right;    TRANSFORAMINAL EPIDURAL INJECTION OF STEROID Right 11/13/2020    Procedure: Injection,steroid,epidural,transforaminal approach, l3/4 and l5/s1;  Surgeon: Riley Segura MD;  Location: [facility] OR;  Service: Pain Management;  Laterality: Right;    TRANSFORAMINAL EPIDURAL INJECTION OF STEROID Left 6/24/2021    Procedure:  Injection,steroid,epidural,transforaminal approach L5/S1;  Surgeon: Riley Segura MD;  Location: [facility] OR;  Service: Pain Management;  Laterality: Left;    TRANSFORAMINAL EPIDURAL INJECTION OF STEROID Right 2022    Procedure: Injection,steroid,epidural,transforaminal approach L5/S1;  Surgeon: Riley Segura MD;  Location: [facility] OR;  Service: Pain Management;  Laterality: Right;    TRANSFORAMINAL EPIDURAL INJECTION OF STEROID Right 10/25/2022    Procedure: Injection,steroid,epidural,transforaminal approach L2/3 and L3/4;  Surgeon: Riley Segura MD;  Location: [facility];  Service: Pain Management;  Laterality: Right;    VERTEBROPLASTY       Family History   Problem Relation Age of Onset    COPD Father     Hypertension Brother     Kidney disease Brother     Alzheimer's disease Mother     No Known Problems Daughter     Hypertension Son     Diabetes Son         pre-diabetic    No Known Problems Daughter     No Known Problems Daughter      Social History     Tobacco Use    Smoking status: Former     Packs/day: 1.50     Years: 24.00     Pack years: 36.00     Types: Cigarettes     Start date: 10/21/1959     Quit date: 3/19/1983     Years since quittin.8     Passive exposure: Past    Smokeless tobacco: Never   Substance Use Topics    Alcohol use: No    Drug use: No      Review of patient's allergies indicates:  No Known Allergies    Current Outpatient Medications:     acetaminophen (TYLENOL) 500 MG tablet, Take 1,000 mg by mouth every 6 (six) hours as needed for Pain., Disp: , Rfl:     amoxicillin (AMOXIL) 500 MG capsule, Take 500 mg by mouth 3 (three) times daily., Disp: , Rfl:     aspirin (ECOTRIN) 81 MG EC tablet, Take 81 mg by mouth once daily., Disp: , Rfl:     diclofenac sodium (VOLTAREN) 1 % Gel, Apply 2 g topically 3 (three) times daily., Disp: , Rfl:     finasteride (PROSCAR) 5 mg tablet, Take 1 tablet (5 mg total) by mouth once daily., Disp: 90 tablet, Rfl: 3    fluticasone propionate  "(FLONASE) 50 mcg/actuation nasal spray, 1 spray (50 mcg total) by Each Nostril route daily as needed for Rhinitis., Disp: 16 g, Rfl: 0    hydroCHLOROthiazide (HYDRODIURIL) 12.5 MG Tab, TAKE 1 TABLET BY MOUTH EVERY DAY (Patient taking differently: Take 12.5 mg by mouth once daily.), Disp: 90 tablet, Rfl: 3    losartan (COZAAR) 100 MG tablet, TAKE 1 TABLET BY MOUTH EVERY DAY, Disp: 90 tablet, Rfl: 3    metoprolol succinate (TOPROL-XL) 100 MG 24 hr tablet, TAKE 1 TABLET BY MOUTH EVERY DAY, Disp: 90 tablet, Rfl: 3    tamsulosin (FLOMAX) 0.4 mg Cap, TAKE 1 CAPSULE BY MOUTH EVERY DAY, Disp: 90 capsule, Rfl: 3  No current facility-administered medications for this visit.    Facility-Administered Medications Ordered in Other Visits:     sodium hyaluronate (EUFLEXXA) 10 mg/mL(mw 2.4 -3.6 million) Syrg 20 mg, 20 mg, Intra-articular, , Michelet Covarrubias MD, 20 mg at 05/14/18 1046    sodium hyaluronate (EUFLEXXA) 10 mg/mL(mw 2.4 -3.6 million) Syrg 20 mg, 20 mg, Intra-articular, , Michelet Covarrubias MD, 20 mg at 05/14/18 1046    Review of Systems:   Constitutional: no fever, chills or night sweats. No changes in weight   Eyes: no visual changes   ENT: no nasal congestion or sore throat   Respiratory: no cough or shortness of breath   Cardiovascular: no chest pain or palpitations   Gastrointestinal: no nausea or vomiting   Genitourinary: no hematuria or dysuria   Integument/Breast: no rash or pruritis   Hematologic/Lymphatic: no easy bruising or lymphadenopathy   Musculoskeletal: +back pain   Neurological: no seizures or tremors   Behavioral/Psych: no auditory or visual hallucinations   Endocrine: no heat or cold intolerance         OBJECTIVE:     Vital Signs (Most Recent):     Estimated body mass index is 41.84 kg/m² as calculated from the following:    Height as of 12/15/22: 5' 11" (1.803 m).    Weight as of 12/15/22: 136.1 kg (300 lb).    Physical Exam:   General: well developed, well nourished, no distress   Neurologic: Alert " and oriented. Thought content appropriate. GCS 15.   Head: normocephalic, atraumatic  Eyes: EOMI  Neck: trachea midline, no JVD   Cardiovascular: no LE edema  Pulmonary: normal respirations, no signs of respiratory distress  Abdomen: non-distended  Sensory: intact to light touch throughout  Skin: Skin is warm, dry and intact    Motor Strength: Moves all extremities spontaneously with good tone. No abnormal movements seen.       Deltoids Triceps Biceps Wrist Extension Wrist Flexion Hand  Interossei   Upper: R 5/5 5/5 5/5 5/5 5/5 5/5 5/5    L 5/5 5/5 5/5 5/5 5/5 5/5 5/5     Iliopsoas Quadriceps Knee  Flexion Tibialis  anterior Gastro- cnemius EHL    Lower: R 5/5 5/5 5/5 5/5 5/5 5/5     L 5/5 5/5 5/5 5/5 5/5 5/5      DTR's: 1+  Gait: normal            Diagnostic Results:  I have independently reviewed the following imaging:  CT lumbar spine with multilevel degenerative changes. Unable to visualize stenosis     ASSESSMENT/PLAN:     Compression fracture of L4 lumbar vertebra, sequela  -     X-Ray Lumbar Spine AP And Lateral; Future; Expected date: 12/28/2022    DDD (degenerative disc disease), lumbar    Age-related osteoporosis with current pathological fracture, initial encounter          Chinedu Roque is a 77 y.o. male with chronic low back pain without radiculopathy. He has multilevel degeneration. He would like to discuss his surgical options. He cannot undergo MRI due to SCS. I have ordered CT myelogram lumbar spine and dynamic x-rays. He will follow up with Dr. Puri once complete to discuss his options.     Back pain  Osteoporosis, fragility fractures- Bone mineral density clinic- eval anabolic; high risk for progression and subsequent fx  BMI > 40  No central stenosis or deformity  Rec , Brace when upright  Repeat 4 -6 week  Poor surgical candidate due to body habitus, hx and bone mineral densisty  Neurointact with pain   Refer to pain mg  for  vertebroplasty at L4          Patient verbalized understanding  of plan. Encouraged to call with any questions or concerns.

## 2022-12-29 ENCOUNTER — TELEPHONE (OUTPATIENT)
Dept: PRIMARY CARE CLINIC | Facility: CLINIC | Age: 77
End: 2022-12-29
Payer: MEDICARE

## 2022-12-29 DIAGNOSIS — M89.9 DISORDER OF BONE: Primary | ICD-10-CM

## 2022-12-29 DIAGNOSIS — M85.88 OTHER SPECIFIED DISORDERS OF BONE DENSITY AND STRUCTURE, OTHER SITE: ICD-10-CM

## 2022-12-29 DIAGNOSIS — S32.049S CLOSED FRACTURE OF FOURTH LUMBAR VERTEBRA, UNSPECIFIED FRACTURE MORPHOLOGY, SEQUELA: ICD-10-CM

## 2022-12-29 NOTE — TELEPHONE ENCOUNTER
I entered an order for a bone density because we will need a baseline.  I do agree with the referral to Endocrinology.  Although there might be a delay on the [place], if this is urgent I would encourage him to be seen on the [place].

## 2022-12-29 NOTE — TELEPHONE ENCOUNTER
----- Message from Whitney Razo LPN sent at 12/29/2022 11:29 AM CST -----  Patient will need to be placed on Prolia. Is that something Dr Lawson will monitor? Or do I need to send to endocrin

## 2022-12-29 NOTE — TELEPHONE ENCOUNTER
Patient is requesting Prolia.  Please find out some more information for me.  Who has requested this?  Has he had a bone density?  I suspect this may be related to findings on his recent CT scan and concerns about bone density.  However Medicare does make it tricky to prescribe Prolia, especially in men so we probably need to get a baseline DEXA scan regardless.  Also depending on the concern if there is something urgent here and we need rapid bone strengthening then we sometimes need to consult with Endocrinology for possible other options such as Forteo again depending on how urgent this is.    Also I would need an appoint with the patient to discuss the risks and precautions regarding Prolia.  So get more information and let me know what we need to do.

## 2022-12-30 ENCOUNTER — HOSPITAL ENCOUNTER (OUTPATIENT)
Dept: RADIOLOGY | Facility: HOSPITAL | Age: 77
Discharge: HOME | End: 2022-12-30
Attending: FAMILY MEDICINE
Payer: MEDICARE

## 2022-12-30 ENCOUNTER — OFFICE VISIT (OUTPATIENT)
Dept: PAIN MEDICINE | Facility: CLINIC | Age: 77
End: 2022-12-30
Payer: MEDICARE

## 2022-12-30 VITALS
DIASTOLIC BLOOD PRESSURE: 73 MMHG | OXYGEN SATURATION: 96 % | HEART RATE: 63 BPM | SYSTOLIC BLOOD PRESSURE: 141 MMHG | BODY MASS INDEX: 42.25 KG/M2 | WEIGHT: 301.81 LBS | HEIGHT: 71 IN

## 2022-12-30 DIAGNOSIS — G89.4 CHRONIC PAIN SYNDROME: ICD-10-CM

## 2022-12-30 DIAGNOSIS — S32.049S CLOSED FRACTURE OF FOURTH LUMBAR VERTEBRA, UNSPECIFIED FRACTURE MORPHOLOGY, SEQUELA: ICD-10-CM

## 2022-12-30 DIAGNOSIS — G89.29 CHRONIC MIDLINE LOW BACK PAIN WITHOUT SCIATICA: ICD-10-CM

## 2022-12-30 DIAGNOSIS — M89.9 DISORDER OF BONE: ICD-10-CM

## 2022-12-30 DIAGNOSIS — S32.040A CLOSED COMPRESSION FRACTURE OF L4 VERTEBRA, INITIAL ENCOUNTER: Primary | ICD-10-CM

## 2022-12-30 DIAGNOSIS — M85.88 OTHER SPECIFIED DISORDERS OF BONE DENSITY AND STRUCTURE, OTHER SITE: ICD-10-CM

## 2022-12-30 DIAGNOSIS — M54.16 LUMBAR RADICULITIS: ICD-10-CM

## 2022-12-30 DIAGNOSIS — M54.50 CHRONIC MIDLINE LOW BACK PAIN WITHOUT SCIATICA: ICD-10-CM

## 2022-12-30 DIAGNOSIS — E66.01 MORBID OBESITY WITH BMI OF 40.0-44.9, ADULT: Chronic | ICD-10-CM

## 2022-12-30 PROCEDURE — 3077F SYST BP >= 140 MM HG: CPT | Mod: CPTII,S$GLB,, | Performed by: ANESTHESIOLOGY

## 2022-12-30 PROCEDURE — 99499 RISK ADDL DX/OHS AUDIT: ICD-10-PCS | Mod: S$GLB,,, | Performed by: ANESTHESIOLOGY

## 2022-12-30 PROCEDURE — 99214 OFFICE O/P EST MOD 30 MIN: CPT | Mod: S$GLB,,, | Performed by: ANESTHESIOLOGY

## 2022-12-30 PROCEDURE — 1125F AMNT PAIN NOTED PAIN PRSNT: CPT | Mod: CPTII,S$GLB,, | Performed by: ANESTHESIOLOGY

## 2022-12-30 PROCEDURE — 77080 DEXA BONE DENSITY SPINE HIP: ICD-10-PCS | Mod: 26,,, | Performed by: RADIOLOGY

## 2022-12-30 PROCEDURE — 1157F PR ADVANCE CARE PLAN OR EQUIV PRESENT IN MEDICAL RECORD: ICD-10-PCS | Mod: CPTII,S$GLB,, | Performed by: ANESTHESIOLOGY

## 2022-12-30 PROCEDURE — 1125F PR PAIN SEVERITY QUANTIFIED, PAIN PRESENT: ICD-10-PCS | Mod: CPTII,S$GLB,, | Performed by: ANESTHESIOLOGY

## 2022-12-30 PROCEDURE — 3288F FALL RISK ASSESSMENT DOCD: CPT | Mod: CPTII,S$GLB,, | Performed by: ANESTHESIOLOGY

## 2022-12-30 PROCEDURE — 3077F PR MOST RECENT SYSTOLIC BLOOD PRESSURE >= 140 MM HG: ICD-10-PCS | Mod: CPTII,S$GLB,, | Performed by: ANESTHESIOLOGY

## 2022-12-30 PROCEDURE — 99999 PR PBB SHADOW E&M-EST. PATIENT-LVL III: CPT | Mod: PBBFAC,,, | Performed by: ANESTHESIOLOGY

## 2022-12-30 PROCEDURE — 3078F PR MOST RECENT DIASTOLIC BLOOD PRESSURE < 80 MM HG: ICD-10-PCS | Mod: CPTII,S$GLB,, | Performed by: ANESTHESIOLOGY

## 2022-12-30 PROCEDURE — 77080 DXA BONE DENSITY AXIAL: CPT | Mod: 26,,, | Performed by: RADIOLOGY

## 2022-12-30 PROCEDURE — 99214 PR OFFICE/OUTPT VISIT, EST, LEVL IV, 30-39 MIN: ICD-10-PCS | Mod: S$GLB,,, | Performed by: ANESTHESIOLOGY

## 2022-12-30 PROCEDURE — 77080 DXA BONE DENSITY AXIAL: CPT | Mod: TC,PO

## 2022-12-30 PROCEDURE — 3078F DIAST BP <80 MM HG: CPT | Mod: CPTII,S$GLB,, | Performed by: ANESTHESIOLOGY

## 2022-12-30 PROCEDURE — 99999 PR PBB SHADOW E&M-EST. PATIENT-LVL III: ICD-10-PCS | Mod: PBBFAC,,, | Performed by: ANESTHESIOLOGY

## 2022-12-30 PROCEDURE — 1159F PR MEDICATION LIST DOCUMENTED IN MEDICAL RECORD: ICD-10-PCS | Mod: CPTII,S$GLB,, | Performed by: ANESTHESIOLOGY

## 2022-12-30 PROCEDURE — 3288F PR FALLS RISK ASSESSMENT DOCUMENTED: ICD-10-PCS | Mod: CPTII,S$GLB,, | Performed by: ANESTHESIOLOGY

## 2022-12-30 PROCEDURE — 1101F PR PT FALLS ASSESS DOC 0-1 FALLS W/OUT INJ PAST YR: ICD-10-PCS | Mod: CPTII,S$GLB,, | Performed by: ANESTHESIOLOGY

## 2022-12-30 PROCEDURE — 99499 UNLISTED E&M SERVICE: CPT | Mod: S$GLB,,, | Performed by: ANESTHESIOLOGY

## 2022-12-30 PROCEDURE — 1157F ADVNC CARE PLAN IN RCRD: CPT | Mod: CPTII,S$GLB,, | Performed by: ANESTHESIOLOGY

## 2022-12-30 PROCEDURE — 1159F MED LIST DOCD IN RCRD: CPT | Mod: CPTII,S$GLB,, | Performed by: ANESTHESIOLOGY

## 2022-12-30 PROCEDURE — 1101F PT FALLS ASSESS-DOCD LE1/YR: CPT | Mod: CPTII,S$GLB,, | Performed by: ANESTHESIOLOGY

## 2022-12-30 RX ORDER — HYDROCODONE BITARTRATE AND ACETAMINOPHEN 7.5; 325 MG/1; MG/1
1 TABLET ORAL EVERY 12 HOURS PRN
Qty: 60 TABLET | Refills: 0 | Status: SHIPPED | OUTPATIENT
Start: 2022-12-30 | End: 2023-02-20 | Stop reason: SDUPTHER

## 2022-12-30 NOTE — PROGRESS NOTES
This note was completed with dictation software and grammatical errors may exist.    CC:Back pain    HPI: The patient is a 77 -year-old man with a history of hypertension, obesity and low back pain who presents in referral from Dr. Winters for chronic right low back pain.  He returns in follow-up today for severe back pain.  Over the last several months of us treating him, he was having severe right groin pain and we suspected possible L5/S1 foraminal narrowing.  We had tried a number of injections without much relief and had referred him to Neurosurgery.  In the meantime, he states that he has only had back pain, no longer having any major pain in the legs.  He states that his pain is in the back, worse to the right side.  We had set him up with Neurosurgery and he had an x-ray done on 12/10/2022 that showed chronic L4 compression fracture which we have noted over the last several years.  However, he had a CT myelogram 5 days later that actually shows progression of the L4 compression fracture now significantly compressed centrally compared to previous images.  He was set up with lumbar support brace, states that he has pain with standing and walking, improved with sitting down, worse with movements.          Pain intervention history: He done physical therapy in January, 2014 with moderate relief but not sustained.  He takes Relafen, tramadol, baclofen and chlorzoxazone as needed with mild relief.  He had undergone what sounds like a series of epidurals several years ago with no major relief. The patient is status post a right side L2/3, L3/4, L4/5 and L5/S1 facet joint injection on 10/28/14 with 50% relief of his back pain for 1 month.  He is status post radiofrequency ablation of the right L1, 2, 3, 4 and 5 medial branch nerves on 3/18/15 with 75% relief.  He is status post C7-T1 cervical interlaminar epidural steroid injection on 5/11/15 with 70% relief.  He is status post right L1, 2, 3, 4 and 5 medial  "branch radiofrequency ablation on 7/5/16 with 50% relief.   He is status post right L1, 2, 3, 4 and 5 medial branch radiofrequency ablation on 10/17/17 with about 50% relief additionally, later reported almost complete relief lasting a year.  He is status post right L1, 2, 3, 4 and 5 medial branch radiofrequency ablation on 04/11/2019 with mild relief.   He is status post L5/S1 interlaminar epidural steroid injection on 06/05/2019 with 80% relief.  He is status post L5/S1 interlaminar epidural steroid injection on 09/13/2019 with 50% relief lasting about 6 months.  He is status post L5/S1 interlaminar epidural steroid injection to the right on 06/02/2020 with minimal relief.  He is status post right L3/4 and L5/S1 transforaminal epidural steroid injection on 11/13/2020 with 0% relief.   He is status post left L5/S1 transforaminal epidural steroid injection on 06/24/2021 with 50% relief. He is status post right L5/S1 transforaminal LEATHA on 09/22/2022 with no relief. He is status post right L2/3 and L3/4 transforaminal LAETHA on 10/25/2022 with no relief.     Spine surgeries:    Antineuropathics:  NSAIDs:  Physical therapy:  Antidepressants:  Muscle relaxers:  Opioids:  Hydrocodone 7.5   Antiplatelets/Anticoagulants:        ROS:He reports back pain.  Balance of review of systems is negative.    Medical, surgical, family and social history reviewed elsewhere in record.    Medications/Allergies: See med card    Vitals:    12/30/22 0815   BP: (!) 141/73   Pulse: 63   SpO2: 96%   Weight: (!) 136.9 kg (301 lb 13 oz)   Height: 5' 11" (1.803 m)   PainSc:   7   PainLoc: Back         Physical exam:  Gen: A and O x3, pleasant, well-groomed  Skin: No rashes or obvious lesions  HEENT: PERRLA, no obvious deformities on ears or in canals.   CVS: Regular rate and rhythm, normal S1 and S2, no murmurs.  Resp: Clear to auscultation bilaterally, no wheezes or rales.  Abdomen: Soft, NT/ND, normal bowel sounds " present.  Musculoskeletal:    Neuro:  Lower extremities: 5/5 strength bilaterally  Reflexes: Patellar 1+, Achilles 0+ bilaterally.  Sensory:  Intact and symmetrical to light touch and pinprick in L2-S1 dermatomes bilaterally.    Lumbar spine:  Lumbar spine: ROM is moderately reduced with flexion extension and oblique extension with increased pain on both flexion and extension.  There is tenderness to percussion and pressure over the lumbar spinous processes especially around L4.      Imagin14 Xray L-spine  There is diffuse osteopenia. Mild to moderate chronic compression fracture with anterior wedging and evidence of vertebroplasty at L2. minimal left convex curvature in the upper lumbar region. No spondylolisthesis. Mild concavity of the superior endplate of L4 which could be small compression fracture or Schmorl's node. No additional fracture.   There is moderate degenerative change within the lumbar spine with anterior osteophyte formation most prominent at L4-L5 and L2- L3 and L1 - L2, also present in the lower thoracic region with flowing ossification anteriorly suggesting diffuse idiopathic sclerotic hyperostosis. There is also mild decreased intervertebral disk space height and endplate sclerosis the lower thoracic and upper lumbar regions. Due to the degree of osseous mineralization, it is difficult to evaluate for any possible pars defects. Moderate sclerosis suggesting facet arthropathy in the lumbar spine present and there is mild sclerosis, likely degenerative in nature involving the sacroiliac joints.    10/7/14 MRI lumbar spine: At L1/2 there is mild spinal stenosis secondary to facet hypertrophy and disc bulging.  At L2/3 there is minimal spinal stenosis secondary to retropulsion of L2 compression fracture, appearance of prior kyphoplasty present.  There is minimal disc bulging but there is also some facet hypertrophy at this level.  At L3/4 there is facet and ligamentum hypertrophy, minimal  disc bulging causing mild spinal stenosis and neuroforaminal narrowing on the left greater than the right side.  At L4/5 there is moderate hypertrophic facet and ligamentum hypertrophy with mild left foraminal narrowing compared to the right side.  There is no spinal stenosis at this level.  At L5/S1 there is a broad-based disc bulge that extends to the left side more than the right side along with asymmetric left sided facet hypertrophy and ligamentum flavum hypertrophy causing moderate left foraminal stenosis.    4/15/15 outside open MRI cervical spine Premier  C3-4 posterior disc bulge producing mild bilateral foraminal narrowing  C4-5 posterior disc bulge producing mild bilateral foraminal narrowing, possible tiny annular tear in the posterior disc, anterior thecal sac deformity with no evidence of spinal stenosis  C5-6 circumferential disc bulge producing moderate to severe bilateral foraminal narrowing, effacement of the bilateral lateral recesses worse to the right, anterior thecal sac deformity with effacement of the anterior subarachnoid space, mild spinal canal stenosis  C6-7 circumferential disc bulge producing moderate to severe bilateral foraminal narrowing with mild spinal canal stenosis  C7-T1 not completely included but appears to have a circumferential disc bulge with shallow midline disc protrusion with possible mild spinal canal stenosis and bilateral foraminal narrowing    CT L-spine:  No acute fracture or traumatic subluxation.  Alignment is relatively maintained.  Vertebroplasty changes are at L2.  There is mild, chronic appearing loss of height of the L4 vertebral body.  There is partial osseous fusion at L2-3.  Multilevel facet hypertrophy, osteophyte formation and disc space narrowing are present.   L1-2: Facet hypertrophy with mild right neural foraminal and spinal canal stenosis.   L2-3: Posterior disc osteophyte and facet hypertrophy results in mild to moderate right and mild left neural  foraminal stenosis with mild to moderate spinal canal stenosis.   L3-4: Posterior disc osteophyte and facet hypertrophy results in moderate bilateral neural foraminal stenosis with mild to moderate spinal canal stenosis.   L4-5: Broad-based disc osteophyte and facet hypertrophy results in severe left and moderate right neural foraminal stenosis with mild to moderate spinal canal stenosis.   L5-S1: Posterior disc osteophyte eccentric to the left and facet hypertrophy results in moderate to severe bilateral neural foraminal stenosis with mild to moderate spinal canal stenosis.  Paravertebral soft tissues are normal.  Spinal cord stimulator wires into the spinal canal at the T12-L1 level.    12/15/22 CT myelogram L-spine:  T12-L1: Mild disc bulge with likely right central protrusion.  Mild right and minimal left narrowing of the lateral recesses.  No significant spinal canal or foraminal stenosis.   L1-L2: Mild disc bulge.  Mild right and minimal left narrowing of the lateral recesses.  No significant spinal canal stenosis.  Minimal right foraminal stenosis.   L2-L3: Trace retrolisthesis.  Mild disc bulge and posterior osteophytic ridging.  Mild bilateral facet hypertrophy.  Minimal narrowing of the bilateral lateral recesses.  Mild right and minimal left foraminal stenosis.   L3-L4: Trace retrolisthesis.  Mild disc bulge.  Mild left and minimal right narrowing of the lateral recesses.  No significant spinal canal stenosis.  Mild-moderate bilateral foraminal stenosis.   L4-L5: Retrolisthesis.  Uncovering the disc mild disc bulge.  Mild bilateral facet hypertrophy.  Ligamentum flavum thickening.  Mild narrowing of the bilateral lateral recesses.  Mild spinal canal stenosis.  Moderate left and mild-moderate right foraminal stenosis.   L5-S1: Mild disc bulge and posterior osteophytic ridging.  Moderate left and mild right facet hypertrophy.  Moderate left and mild right narrowing of the lateral recesses.  No significant  spinal canal stenosis.  Moderate right and mild-moderate left foraminal stenosis.   Bilateral dorsal paraspinal muscular atrophy in the lower lumbar spine.   Spinal cord stimulator leads are present entering the spinal canal at the T12-L1 interlaminar space and extending cranially above the field of view.   Layering dependent stones within the partially visualized urinary bladder, similar to prior study.  Mild-moderate atherosclerotic calcifications.   Postoperative changes of a previous L2 kyphoplasty with methylmethacrylate present.  There is loss of lumbar vertebral body height at all levels throughout the lumbar spine, progressive within the superior endplate of L4 when compared to the previous study of 12/10/2022.      Assessment:  The patient is a 77-year-old man with a history of hypertension, obesity and low back pain who presents in referral from Dr. Winters for chronic right low back pain.   1. Closed compression fracture of L4 vertebra, initial encounter        2. Chronic pain syndrome        3. Lumbar radiculitis        4. Chronic midline low back pain without sciatica        5. Morbid obesity with BMI of 40.0-44.9, adult              Plan:  1.  We discussed that he has progression of an old compression fracture at L4 and this is obviously a new pain and diagnosis than we had been treating him for over the last several months.  Nonetheless, I do think he has some pain coming from this fracture.  We reviewed the images and it is significantly compressed centrally, would be difficult to place a cannula through the center of this to reach the anterior 1/3 of the vertebral body.  Thus, I am going to see if this heals on its own over the next month, he will continue wearing a brace, I have refilled his hydrocodone.  I will have him follow up in 1 month to see how he is doing.  If anything worsens, we can always set up a kyphoplasty with the understanding that it is at significant risk for cement  extravasation.  2. We discussed the importance of weight loss, I think this is contributing to his pain.

## 2022-12-30 NOTE — TELEPHONE ENCOUNTER
Spoke with pt, scheduled appt with Dr. Browne and scheduled endocrinology appt. Mailed appt letters.

## 2023-01-04 ENCOUNTER — OFFICE VISIT (OUTPATIENT)
Dept: PODIATRY | Facility: CLINIC | Age: 78
End: 2023-01-04
Payer: MEDICARE

## 2023-01-04 VITALS — WEIGHT: 301.81 LBS | HEIGHT: 71 IN | BODY MASS INDEX: 42.25 KG/M2

## 2023-01-04 DIAGNOSIS — I73.9 PERIPHERAL VASCULAR DISEASE: ICD-10-CM

## 2023-01-04 DIAGNOSIS — B35.1 ONYCHOMYCOSIS: Primary | ICD-10-CM

## 2023-01-04 DIAGNOSIS — L60.9 NAIL COMPLAINT: ICD-10-CM

## 2023-01-04 PROCEDURE — 11721 PR DEBRIDEMENT OF NAILS, 6 OR MORE: ICD-10-PCS | Mod: Q9,S$GLB,, | Performed by: PODIATRIST

## 2023-01-04 PROCEDURE — 1157F PR ADVANCE CARE PLAN OR EQUIV PRESENT IN MEDICAL RECORD: ICD-10-PCS | Mod: CPTII,S$GLB,, | Performed by: PODIATRIST

## 2023-01-04 PROCEDURE — 1101F PR PT FALLS ASSESS DOC 0-1 FALLS W/OUT INJ PAST YR: ICD-10-PCS | Mod: CPTII,S$GLB,, | Performed by: PODIATRIST

## 2023-01-04 PROCEDURE — 3288F PR FALLS RISK ASSESSMENT DOCUMENTED: ICD-10-PCS | Mod: CPTII,S$GLB,, | Performed by: PODIATRIST

## 2023-01-04 PROCEDURE — 1160F PR REVIEW ALL MEDS BY PRESCRIBER/CLIN PHARMACIST DOCUMENTED: ICD-10-PCS | Mod: CPTII,S$GLB,, | Performed by: PODIATRIST

## 2023-01-04 PROCEDURE — 1159F PR MEDICATION LIST DOCUMENTED IN MEDICAL RECORD: ICD-10-PCS | Mod: CPTII,S$GLB,, | Performed by: PODIATRIST

## 2023-01-04 PROCEDURE — 1101F PT FALLS ASSESS-DOCD LE1/YR: CPT | Mod: CPTII,S$GLB,, | Performed by: PODIATRIST

## 2023-01-04 PROCEDURE — 1157F ADVNC CARE PLAN IN RCRD: CPT | Mod: CPTII,S$GLB,, | Performed by: PODIATRIST

## 2023-01-04 PROCEDURE — 99999 PR PBB SHADOW E&M-EST. PATIENT-LVL III: ICD-10-PCS | Mod: PBBFAC,,, | Performed by: PODIATRIST

## 2023-01-04 PROCEDURE — 11721 DEBRIDE NAIL 6 OR MORE: CPT | Mod: Q9,S$GLB,, | Performed by: PODIATRIST

## 2023-01-04 PROCEDURE — 99999 PR PBB SHADOW E&M-EST. PATIENT-LVL III: CPT | Mod: PBBFAC,,, | Performed by: PODIATRIST

## 2023-01-04 PROCEDURE — 1159F MED LIST DOCD IN RCRD: CPT | Mod: CPTII,S$GLB,, | Performed by: PODIATRIST

## 2023-01-04 PROCEDURE — 99499 UNLISTED E&M SERVICE: CPT | Mod: S$GLB,,, | Performed by: PODIATRIST

## 2023-01-04 PROCEDURE — 1160F RVW MEDS BY RX/DR IN RCRD: CPT | Mod: CPTII,S$GLB,, | Performed by: PODIATRIST

## 2023-01-04 PROCEDURE — 99499 NO LOS: ICD-10-PCS | Mod: S$GLB,,, | Performed by: PODIATRIST

## 2023-01-04 PROCEDURE — 3288F FALL RISK ASSESSMENT DOCD: CPT | Mod: CPTII,S$GLB,, | Performed by: PODIATRIST

## 2023-01-04 NOTE — PROGRESS NOTES
Subjective:      Patient ID: Chinedu Roque is a 77 y.o. male.    Chief Complaint: Nail Care      Chinedu is a 77 y.o. male who presents to the clinic for evaluation and treatment of high risk feet. Chinedu has a past medical history of Anticoagulant long-term use, Arthritis, BPH (benign prostatic hyperplasia) (03/02/2012), Chronic left shoulder pain, Compression fracture of L2, DDD (degenerative disc disease), lumbar, HTN (hypertension) (03/02/2012), colonic polyps (2013), Low back pain, Morbid obesity with BMI of 40.0-44.9, adult (03/18/2014), Seasonal allergies, Sleep apnea, and Stroke (03/1986). The patient's chief complaint is long, thick toenails. This patient has documented high risk feet requiring routine maintenance secondary to peripheral vascular disease. No acute changes since last visit    PCP: Hernando Browne MD        Current shoe gear:  Casual shoes    Last encounter in this department: Visit date not found    No results found for: HGBA1C      Review of Systems   Constitutional: Negative for chills and fever.   Cardiovascular:  Positive for leg swelling. Negative for claudication.   Respiratory:  Negative for shortness of breath.    Skin:  Positive for nail changes. Negative for itching and rash.   Musculoskeletal:  Negative for muscle cramps, muscle weakness and myalgias.   Gastrointestinal:  Negative for nausea and vomiting.   Neurological:  Positive for numbness. Negative for focal weakness, loss of balance and paresthesias.         Objective:      Physical Exam  Constitutional:       General: He is not in acute distress.     Appearance: He is well-developed. He is not diaphoretic.   Cardiovascular:      Pulses:           Dorsalis pedis pulses are 1+ on the right side and 1+ on the left side.        Posterior tibial pulses are 1+ on the right side and 1+ on the left side.      Comments: 3 sec capillary refill time to toes 1-5 bilateral. Feet are warm to touch proximally with distal cooling  b/l. There is no hair growth on the feet and toes b/l. There is 2+ edema b/l with some varicosities present b/l.     Musculoskeletal:      Comments: Equinus noted b/l ankles with < 10 deg DF noted. MMT 5/5 in DF/PF/Inv/Ev resistance with no reproduction of pain in any direction. Passive range of motion of ankle and pedal joints is painless b/l.     Skin:     General: Skin is warm and dry.      Coloration: Skin is not pale.      Findings: No abrasion, bruising, burn, ecchymosis, erythema, laceration, lesion, petechiae or rash.      Nails: There is no clubbing.      Comments: Skin is thin shiny and atrophic bilateral    Nails 1-5 bilateral are thick 3-4 mm, long 3-6 mm, and discolored with subungual debris     Neurological:      Mental Status: He is alert and oriented to person, place, and time.      Sensory: No sensory deficit.      Motor: No tremor, atrophy or abnormal muscle tone.      Comments: Negative tinel sign bilateral.   Psychiatric:         Behavior: Behavior normal.             Assessment:       Encounter Diagnoses   Name Primary?    Onychomycosis Yes    Peripheral vascular disease     Nail complaint            Plan:       Chinedu was seen today for nail care.    Diagnoses and all orders for this visit:    Onychomycosis    Peripheral vascular disease    Nail complaint      I counseled the patient on his conditions, their implications and medical management.        - Shoe inspection. Patient instructed on proper foot hygeine. We discussed wearing proper shoe gear, daily foot inspections, never walking without protective shoe gear, never putting sharp instruments to feet, routine podiatric nail visits every 2-3 months.      - With patient's permission, nails were aggressively reduced and debrided x 10 to their soft tissue attachment mechanically and with electric , removing all offending nail and debris. Patient relates relief following the procedure. He will continue to monitor the areas daily,  inspect his feet, wear protective shoe gear when ambulatory, moisturizer to maintain skin integrity and follow in this office in approximately 2-3 months, sooner pdenise.    Rubio Ramirez DPM

## 2023-01-30 ENCOUNTER — HOSPITAL ENCOUNTER (OUTPATIENT)
Dept: RADIOLOGY | Facility: HOSPITAL | Age: 78
Discharge: HOME | End: 2023-01-30
Attending: STUDENT IN AN ORGANIZED HEALTH CARE EDUCATION/TRAINING PROGRAM
Payer: MEDICARE

## 2023-01-30 ENCOUNTER — OFFICE VISIT (OUTPATIENT)
Dept: PRIMARY CARE CLINIC | Facility: CLINIC | Age: 78
End: 2023-01-30
Payer: MEDICARE

## 2023-01-30 VITALS
HEART RATE: 62 BPM | BODY MASS INDEX: 42.16 KG/M2 | RESPIRATION RATE: 18 BRPM | DIASTOLIC BLOOD PRESSURE: 82 MMHG | WEIGHT: 301.13 LBS | HEIGHT: 71 IN | OXYGEN SATURATION: 97 % | SYSTOLIC BLOOD PRESSURE: 138 MMHG

## 2023-01-30 DIAGNOSIS — E78.5 HYPERLIPIDEMIA, UNSPECIFIED HYPERLIPIDEMIA TYPE: Chronic | ICD-10-CM

## 2023-01-30 DIAGNOSIS — M51.36 DDD (DEGENERATIVE DISC DISEASE), LUMBAR: Chronic | ICD-10-CM

## 2023-01-30 DIAGNOSIS — E66.01 SEVERE OBESITY: Chronic | ICD-10-CM

## 2023-01-30 DIAGNOSIS — I10 ESSENTIAL HYPERTENSION: Primary | Chronic | ICD-10-CM

## 2023-01-30 DIAGNOSIS — I70.0 ABDOMINAL AORTIC ATHEROSCLEROSIS: Chronic | ICD-10-CM

## 2023-01-30 DIAGNOSIS — R73.09 ELEVATED GLUCOSE: ICD-10-CM

## 2023-01-30 DIAGNOSIS — Z13.1 DIABETES MELLITUS SCREENING: ICD-10-CM

## 2023-01-30 DIAGNOSIS — S32.000S COMPRESSION FRACTURE OF LUMBAR VERTEBRA, UNSPECIFIED LUMBAR VERTEBRAL LEVEL, SEQUELA: ICD-10-CM

## 2023-01-30 DIAGNOSIS — S32.040S COMPRESSION FRACTURE OF L4 LUMBAR VERTEBRA, SEQUELA: ICD-10-CM

## 2023-01-30 DIAGNOSIS — I87.2 VENOUS INSUFFICIENCY OF BOTH LOWER EXTREMITIES: ICD-10-CM

## 2023-01-30 DIAGNOSIS — M47.816 LUMBAR SPONDYLOSIS: ICD-10-CM

## 2023-01-30 PROBLEM — S32.000A COMPRESSION OF LUMBAR VERTEBRA: Status: ACTIVE | Noted: 2023-01-30

## 2023-01-30 PROBLEM — S32.000A COMPRESSION OF LUMBAR VERTEBRA: Chronic | Status: ACTIVE | Noted: 2023-01-30

## 2023-01-30 PROCEDURE — 72100 X-RAY EXAM L-S SPINE 2/3 VWS: CPT | Mod: TC,PN

## 2023-01-30 PROCEDURE — 3075F PR MOST RECENT SYSTOLIC BLOOD PRESS GE 130-139MM HG: ICD-10-PCS | Mod: CPTII,S$GLB,, | Performed by: FAMILY MEDICINE

## 2023-01-30 PROCEDURE — 99999 PR PBB SHADOW E&M-EST. PATIENT-LVL V: ICD-10-PCS | Mod: PBBFAC,,, | Performed by: FAMILY MEDICINE

## 2023-01-30 PROCEDURE — 99215 PR OFFICE/OUTPT VISIT, EST, LEVL V, 40-54 MIN: ICD-10-PCS | Mod: S$GLB,,, | Performed by: FAMILY MEDICINE

## 2023-01-30 PROCEDURE — 72100 XR LUMBAR SPINE AP AND LATERAL: ICD-10-PCS | Mod: 26,,, | Performed by: RADIOLOGY

## 2023-01-30 PROCEDURE — 99999 PR PBB SHADOW E&M-EST. PATIENT-LVL V: CPT | Mod: PBBFAC,,, | Performed by: FAMILY MEDICINE

## 2023-01-30 PROCEDURE — 3075F SYST BP GE 130 - 139MM HG: CPT | Mod: CPTII,S$GLB,, | Performed by: FAMILY MEDICINE

## 2023-01-30 PROCEDURE — 1157F ADVNC CARE PLAN IN RCRD: CPT | Mod: CPTII,S$GLB,, | Performed by: FAMILY MEDICINE

## 2023-01-30 PROCEDURE — 1101F PT FALLS ASSESS-DOCD LE1/YR: CPT | Mod: CPTII,S$GLB,, | Performed by: FAMILY MEDICINE

## 2023-01-30 PROCEDURE — 1160F RVW MEDS BY RX/DR IN RCRD: CPT | Mod: CPTII,S$GLB,, | Performed by: FAMILY MEDICINE

## 2023-01-30 PROCEDURE — 1125F PR PAIN SEVERITY QUANTIFIED, PAIN PRESENT: ICD-10-PCS | Mod: CPTII,S$GLB,, | Performed by: FAMILY MEDICINE

## 2023-01-30 PROCEDURE — 99215 OFFICE O/P EST HI 40 MIN: CPT | Mod: S$GLB,,, | Performed by: FAMILY MEDICINE

## 2023-01-30 PROCEDURE — 3079F PR MOST RECENT DIASTOLIC BLOOD PRESSURE 80-89 MM HG: ICD-10-PCS | Mod: CPTII,S$GLB,, | Performed by: FAMILY MEDICINE

## 2023-01-30 PROCEDURE — 1159F PR MEDICATION LIST DOCUMENTED IN MEDICAL RECORD: ICD-10-PCS | Mod: CPTII,S$GLB,, | Performed by: FAMILY MEDICINE

## 2023-01-30 PROCEDURE — 3288F FALL RISK ASSESSMENT DOCD: CPT | Mod: CPTII,S$GLB,, | Performed by: FAMILY MEDICINE

## 2023-01-30 PROCEDURE — 99499 UNLISTED E&M SERVICE: CPT | Mod: S$GLB,,, | Performed by: FAMILY MEDICINE

## 2023-01-30 PROCEDURE — 1159F MED LIST DOCD IN RCRD: CPT | Mod: CPTII,S$GLB,, | Performed by: FAMILY MEDICINE

## 2023-01-30 PROCEDURE — 3079F DIAST BP 80-89 MM HG: CPT | Mod: CPTII,S$GLB,, | Performed by: FAMILY MEDICINE

## 2023-01-30 PROCEDURE — 1160F PR REVIEW ALL MEDS BY PRESCRIBER/CLIN PHARMACIST DOCUMENTED: ICD-10-PCS | Mod: CPTII,S$GLB,, | Performed by: FAMILY MEDICINE

## 2023-01-30 PROCEDURE — 3288F PR FALLS RISK ASSESSMENT DOCUMENTED: ICD-10-PCS | Mod: CPTII,S$GLB,, | Performed by: FAMILY MEDICINE

## 2023-01-30 PROCEDURE — 1101F PR PT FALLS ASSESS DOC 0-1 FALLS W/OUT INJ PAST YR: ICD-10-PCS | Mod: CPTII,S$GLB,, | Performed by: FAMILY MEDICINE

## 2023-01-30 PROCEDURE — 1157F PR ADVANCE CARE PLAN OR EQUIV PRESENT IN MEDICAL RECORD: ICD-10-PCS | Mod: CPTII,S$GLB,, | Performed by: FAMILY MEDICINE

## 2023-01-30 PROCEDURE — 1125F AMNT PAIN NOTED PAIN PRSNT: CPT | Mod: CPTII,S$GLB,, | Performed by: FAMILY MEDICINE

## 2023-01-30 PROCEDURE — 72100 X-RAY EXAM L-S SPINE 2/3 VWS: CPT | Mod: 26,,, | Performed by: RADIOLOGY

## 2023-01-30 NOTE — PROGRESS NOTES
THIS DOCUMENT WAS MADE IN PART WITH VOICE RECOGNITION SOFTWARE.  OCCASIONALLY THIS SOFTWARE WILL MISINTERPRET WORDS OR PHRASES.      Primary Care Provider Appointment   [facility] ([facility])  [address], [place] [address]   Ph: [phone]  Fax: [phone]      Patient ID: Chinedu Roque is a 77 y.o. male.    ASSESSMENT/PLAN by Problem List:  Problem List Items Addressed This Visit       Essential hypertension - Primary (Chronic)    Relevant Orders    Comprehensive Metabolic Panel    CBC Auto Differential    Hyperlipidemia (Chronic)    Relevant Orders    Lipid Panel    Abdominal aortic atherosclerosis (Chronic)     Discussed and reviewed will no aneurysmal formation continue risk factor management.  Advised that a statin medication might help slow progression but he declined for now.           Severe obesity (Chronic)     Discussed that improving his health and reducing weight may help with his chronic pain.  Recommended considering something like weight watchers or similar safe plan.  He will try on his own, his daughter was here and will encourage him as well.         Venous insufficiency of both lower extremities    DDD (degenerative disc disease), lumbar (Chronic)     Ongoing back pain, consulting with pain management.  Reviewed his x-rays and discussed in detail.  Although did advise that a significant component of his pain is likely muscular and he may benefit from reconsulting therapy, losing weight, as well as light activity.  He is agreeable to resume PT but will do so cautiously and work closely with his therapist as in the past he reports therapy was painful.         Lumbar spondylosis    Relevant Orders    Ambulatory referral/consult to Physical/Occupational Therapy    Compression of lumbar vertebra (Chronic)     He appears to have chronic compression fractures of L2 and L4.  L2 previously underwent vertebroplasty.  He is still having chronic back  pain, at this point I suspect his pain is more likely from his other chronic findings including degenerative disc disease, facet arthropathy, etc..  His bone density test was normal, in fact above normal for the spine although the spurring and other factors might have influenced the image/calculated result.  Regardless I do not see obvious osteoporosis.  Does have an appointment scheduled with Endocrinology, this was made because of concerns of unprovoked compression fractures.  Although it simply could be secondary to stress on the vertebrae b/c obesity, etc..  But he will keep the appoint with Endocrinology just to make certain there is not something metabolic contributing that would need to be treated differently.          Other Visit Diagnoses       Diabetes mellitus screening        Elevated glucose        Relevant Orders    Hemoglobin A1C            Follow Up:  Four months    Forty-eight minutes of total time spent on the encounter, which includes face to face time and non-face to face time preparing to see the patient (eg, review of tests), Obtaining and/or reviewing separately obtained history, Documenting clinical information in the electronic or other health record, Independently interpreting results (not separately reported) and communicating results to the patient/family/caregiver, or Care coordination (not separately reported).    Subjective:     Chief Complaint   Patient presents with    Hypertension     F/u visit     Compression Fracture     F/u visit      I have reviewed the information entered by the ancillary staff regarding the chief complaint as well as the related history.    HPI    Patient is a/an 77 y.o.  male   RISK of ADMIT/ED: 58    Follow-up several topics including ongoing back pain.  I have reviewed his consultations with pain management and Neurosurgery.    For complete problem list, past medical history, surgical history, social history, etc., see appropriate section in the electronic  "medical record    Review of Systems   Respiratory: Negative.     Gastrointestinal: Negative.    Musculoskeletal:  Positive for arthralgias and back pain.     Objective     Physical Exam  Constitutional:       General: He is not in acute distress.     Appearance: He is well-developed. He is obese. He is not diaphoretic.   HENT:      Head: Normocephalic and atraumatic.   Eyes:      General: No scleral icterus.     Conjunctiva/sclera: Conjunctivae normal.   Cardiovascular:      Rate and Rhythm: Regular rhythm.   Pulmonary:      Effort: Pulmonary effort is normal. No respiratory distress.   Neurological:      General: No focal deficit present.      Mental Status: He is alert and oriented to person, place, and time.      Coordination: Coordination normal.   Psychiatric:         Behavior: Behavior normal.     Vitals:    01/30/23 1050   BP: 138/82   BP Location: Right arm   Patient Position: Sitting   BP Method: Large (Manual)   Pulse: 62   Resp: 18   SpO2: 97%   Weight: (!) 136.6 kg (301 lb 2.4 oz)   Height: 5' 11" (1.803 m)       RECENT LABS:    Lab Results   Component Value Date    WBC 5.88 12/15/2022    HGB 15.3 12/15/2022    HCT 44.9 12/15/2022     12/15/2022    CHOL 139 03/22/2022    TRIG 59 03/22/2022    HDL 38 (L) 03/22/2022    ALT 24 03/22/2022    AST 25 03/22/2022     03/22/2022    K 4.5 03/22/2022     03/22/2022    CREATININE 0.9 03/22/2022    BUN 13 03/22/2022    CO2 26 03/22/2022    TSH 1.300 04/23/2016    PSA 4.5 (H) 03/22/2022    INR 1.1 12/15/2022       Results for orders placed or performed during the hospital encounter of 12/15/22   Protime-INR   Result Value Ref Range    PT 13.8 11.8 - 14.7 sec    INR 1.1    APTT   Result Value Ref Range    aPTT 27.5 24.6 - 36.7 sec   CBC Without Differential   Result Value Ref Range    WBC 5.88 3.90 - 12.70 K/uL    RBC 4.87 4.60 - 6.20 M/uL    Hemoglobin 15.3 14.0 - 18.0 g/dL    Hematocrit 44.9 40.0 - 54.0 %    MCV 92 82 - 98 fL    MCH 31.4 (H) 27.0 - " 31.0 pg    MCHC 34.1 32.0 - 36.0 g/dL    RDW 12.4 11.5 - 14.5 %    Platelets 245 150 - 450 K/uL    MPV 9.4 9.2 - 12.9 fL

## 2023-01-30 NOTE — ASSESSMENT & PLAN NOTE
Discussed that improving his health and reducing weight may help with his chronic pain.  Recommended considering something like weight watchers or similar safe plan.  He will try on his own, his daughter was here and will encourage him as well.

## 2023-01-30 NOTE — ASSESSMENT & PLAN NOTE
Discussed and reviewed will no aneurysmal formation continue risk factor management.  Advised that a statin medication might help slow progression but he declined for now.

## 2023-01-30 NOTE — ASSESSMENT & PLAN NOTE
Ongoing back pain, consulting with pain management.  Reviewed his x-rays and discussed in detail.  Although did advise that a significant component of his pain is likely muscular and he may benefit from reconsulting therapy, losing weight, as well as light activity.  He is agreeable to resume PT but will do so cautiously and work closely with his therapist as in the past he reports therapy was painful.

## 2023-01-30 NOTE — ASSESSMENT & PLAN NOTE
He appears to have chronic compression fractures of L2 and L4.  L2 previously underwent vertebroplasty.  He is still having chronic back pain, at this point I suspect his pain is more likely from his other chronic findings including degenerative disc disease, facet arthropathy, etc..  His bone density test was normal, in fact above normal for the spine although the spurring and other factors might have influenced the image/calculated result.  Regardless I do not see obvious osteoporosis.  Does have an appointment scheduled with Endocrinology, this was made because of concerns of unprovoked compression fractures.  Although it simply could be secondary to stress on the vertebrae b/c obesity, etc..  But he will keep the appoint with Endocrinology just to make certain there is not something metabolic contributing that would need to be treated differently.

## 2023-02-20 ENCOUNTER — OFFICE VISIT (OUTPATIENT)
Dept: PAIN MEDICINE | Facility: CLINIC | Age: 78
End: 2023-02-20
Payer: MEDICARE

## 2023-02-20 VITALS
WEIGHT: 305.69 LBS | SYSTOLIC BLOOD PRESSURE: 168 MMHG | HEIGHT: 71 IN | BODY MASS INDEX: 42.8 KG/M2 | DIASTOLIC BLOOD PRESSURE: 87 MMHG | HEART RATE: 78 BPM

## 2023-02-20 DIAGNOSIS — M51.36 DDD (DEGENERATIVE DISC DISEASE), LUMBAR: ICD-10-CM

## 2023-02-20 DIAGNOSIS — M47.816 LUMBAR SPONDYLOSIS: Primary | ICD-10-CM

## 2023-02-20 DIAGNOSIS — G89.4 CHRONIC PAIN SYNDROME: ICD-10-CM

## 2023-02-20 DIAGNOSIS — R29.898 MUSCULAR DECONDITIONING: ICD-10-CM

## 2023-02-20 DIAGNOSIS — E66.01 MORBID OBESITY WITH BMI OF 40.0-44.9, ADULT: ICD-10-CM

## 2023-02-20 PROCEDURE — 1125F PR PAIN SEVERITY QUANTIFIED, PAIN PRESENT: ICD-10-PCS | Mod: CPTII,S$GLB,, | Performed by: ANESTHESIOLOGY

## 2023-02-20 PROCEDURE — 1125F AMNT PAIN NOTED PAIN PRSNT: CPT | Mod: CPTII,S$GLB,, | Performed by: ANESTHESIOLOGY

## 2023-02-20 PROCEDURE — 3077F SYST BP >= 140 MM HG: CPT | Mod: CPTII,S$GLB,, | Performed by: ANESTHESIOLOGY

## 2023-02-20 PROCEDURE — 99999 PR PBB SHADOW E&M-EST. PATIENT-LVL III: ICD-10-PCS | Mod: PBBFAC,,, | Performed by: ANESTHESIOLOGY

## 2023-02-20 PROCEDURE — 99214 PR OFFICE/OUTPT VISIT, EST, LEVL IV, 30-39 MIN: ICD-10-PCS | Mod: S$GLB,,, | Performed by: ANESTHESIOLOGY

## 2023-02-20 PROCEDURE — 1157F PR ADVANCE CARE PLAN OR EQUIV PRESENT IN MEDICAL RECORD: ICD-10-PCS | Mod: CPTII,S$GLB,, | Performed by: ANESTHESIOLOGY

## 2023-02-20 PROCEDURE — 1101F PR PT FALLS ASSESS DOC 0-1 FALLS W/OUT INJ PAST YR: ICD-10-PCS | Mod: CPTII,S$GLB,, | Performed by: ANESTHESIOLOGY

## 2023-02-20 PROCEDURE — 99214 OFFICE O/P EST MOD 30 MIN: CPT | Mod: S$GLB,,, | Performed by: ANESTHESIOLOGY

## 2023-02-20 PROCEDURE — 3288F PR FALLS RISK ASSESSMENT DOCUMENTED: ICD-10-PCS | Mod: CPTII,S$GLB,, | Performed by: ANESTHESIOLOGY

## 2023-02-20 PROCEDURE — 99999 PR PBB SHADOW E&M-EST. PATIENT-LVL III: CPT | Mod: PBBFAC,,, | Performed by: ANESTHESIOLOGY

## 2023-02-20 PROCEDURE — 1101F PT FALLS ASSESS-DOCD LE1/YR: CPT | Mod: CPTII,S$GLB,, | Performed by: ANESTHESIOLOGY

## 2023-02-20 PROCEDURE — 3077F PR MOST RECENT SYSTOLIC BLOOD PRESSURE >= 140 MM HG: ICD-10-PCS | Mod: CPTII,S$GLB,, | Performed by: ANESTHESIOLOGY

## 2023-02-20 PROCEDURE — 3079F DIAST BP 80-89 MM HG: CPT | Mod: CPTII,S$GLB,, | Performed by: ANESTHESIOLOGY

## 2023-02-20 PROCEDURE — 3079F PR MOST RECENT DIASTOLIC BLOOD PRESSURE 80-89 MM HG: ICD-10-PCS | Mod: CPTII,S$GLB,, | Performed by: ANESTHESIOLOGY

## 2023-02-20 PROCEDURE — 3288F FALL RISK ASSESSMENT DOCD: CPT | Mod: CPTII,S$GLB,, | Performed by: ANESTHESIOLOGY

## 2023-02-20 PROCEDURE — 1157F ADVNC CARE PLAN IN RCRD: CPT | Mod: CPTII,S$GLB,, | Performed by: ANESTHESIOLOGY

## 2023-02-20 RX ORDER — HYDROCODONE BITARTRATE AND ACETAMINOPHEN 7.5; 325 MG/1; MG/1
1 TABLET ORAL EVERY 12 HOURS PRN
Qty: 60 TABLET | Refills: 0 | Status: SHIPPED | OUTPATIENT
Start: 2023-02-20 | End: 2023-03-22

## 2023-02-20 NOTE — PROGRESS NOTES
This note was completed with dictation software and grammatical errors may exist.    CC:Back pain    HPI: The patient is a 77 -year-old man with a history of hypertension, obesity and low back pain who presents in referral from Dr. Winters for chronic right low back pain.  He returns in follow-up today for back pain, states that he is no longer having severe pain into the right groin, no longer having pain with every movement.  He had an MRI that had suggested worsening compression of his previous L4 compression fracture, recent x-ray from 01/30/2023 shows that this has not changed anymore.  Feels that the pain is worse when he 1st gets up in the morning, does better when moving around, worsens at the end of the day.  States that he does okay getting into bed around 11:00 p.m. but wakes up in about 2 in the morning with pain and has to take hydrocodone and he is able to go back to sleep.  Otherwise he does not take any hydrocodone on a regular basis.  Denies any major radiation into his legs.  He had recently seen his primary care physician, Dr. Browne who recommended physical therapy but the patient did not want to do this yet.          Pain intervention history: He done physical therapy in January, 2014 with moderate relief but not sustained.  He takes Relafen, tramadol, baclofen and chlorzoxazone as needed with mild relief.  He had undergone what sounds like a series of epidurals several years ago with no major relief. The patient is status post a right side L2/3, L3/4, L4/5 and L5/S1 facet joint injection on 10/28/14 with 50% relief of his back pain for 1 month.  He is status post radiofrequency ablation of the right L1, 2, 3, 4 and 5 medial branch nerves on 3/18/15 with 75% relief.  He is status post C7-T1 cervical interlaminar epidural steroid injection on 5/11/15 with 70% relief.  He is status post right L1, 2, 3, 4 and 5 medial branch radiofrequency ablation on 7/5/16 with 50% relief.   He is status  "post right L1, 2, 3, 4 and 5 medial branch radiofrequency ablation on 10/17/17 with about 50% relief additionally, later reported almost complete relief lasting a year.  He is status post right L1, 2, 3, 4 and 5 medial branch radiofrequency ablation on 04/11/2019 with mild relief.   He is status post L5/S1 interlaminar epidural steroid injection on 06/05/2019 with 80% relief.  He is status post L5/S1 interlaminar epidural steroid injection on 09/13/2019 with 50% relief lasting about 6 months.  He is status post L5/S1 interlaminar epidural steroid injection to the right on 06/02/2020 with minimal relief.  He is status post right L3/4 and L5/S1 transforaminal epidural steroid injection on 11/13/2020 with 0% relief.   He is status post left L5/S1 transforaminal epidural steroid injection on 06/24/2021 with 50% relief. He is status post right L5/S1 transforaminal LEATHA on 09/22/2022 with no relief. He is status post right L2/3 and L3/4 transforaminal LEATHA on 10/25/2022 with no relief.     Spine surgeries:    Antineuropathics:  NSAIDs:  Physical therapy:  Antidepressants:  Muscle relaxers:  Opioids:  Hydrocodone 7.5   Antiplatelets/Anticoagulants:        ROS:He reports back pain.  Balance of review of systems is negative.    Medical, surgical, family and social history reviewed elsewhere in record.    Medications/Allergies: See med card    Vitals:    02/20/23 1033   BP: (!) 168/87   Pulse: 78   Weight: (!) 138.6 kg (305 lb 10.7 oz)   Height: 5' 11" (1.803 m)   PainSc:   6   PainLoc: Back     Body mass index is 42.63 kg/m².        Physical exam:  Gen: A and O x3, pleasant, well-groomed  Skin: No rashes or obvious lesions  HEENT: PERRLA, no obvious deformities on ears or in canals.   CVS: Regular rate and rhythm, normal S1 and S2, no murmurs.  Resp: Clear to auscultation bilaterally, no wheezes or rales.  Abdomen: Soft, NT/ND, normal bowel sounds present.  Musculoskeletal:    Neuro:  Lower extremities: 5/5 strength " bilaterally  Reflexes: Patellar 1+, Achilles 0+ bilaterally.  Sensory:  Intact and symmetrical to light touch and pinprick in L2-S1 dermatomes bilaterally.    Lumbar spine:  Lumbar spine: ROM is moderately reduced with flexion extension and oblique extension with increased pain on only on extension especially oblique extension to the right side.  There is no significant tenderness palpation in the lumbar paraspinous muscles or over the spinous processes.      Imagin14 Xray L-spine  There is diffuse osteopenia. Mild to moderate chronic compression fracture with anterior wedging and evidence of vertebroplasty at L2. minimal left convex curvature in the upper lumbar region. No spondylolisthesis. Mild concavity of the superior endplate of L4 which could be small compression fracture or Schmorl's node. No additional fracture.   There is moderate degenerative change within the lumbar spine with anterior osteophyte formation most prominent at L4-L5 and L2- L3 and L1 - L2, also present in the lower thoracic region with flowing ossification anteriorly suggesting diffuse idiopathic sclerotic hyperostosis. There is also mild decreased intervertebral disk space height and endplate sclerosis the lower thoracic and upper lumbar regions. Due to the degree of osseous mineralization, it is difficult to evaluate for any possible pars defects. Moderate sclerosis suggesting facet arthropathy in the lumbar spine present and there is mild sclerosis, likely degenerative in nature involving the sacroiliac joints.    10/7/14 MRI lumbar spine: At L1/2 there is mild spinal stenosis secondary to facet hypertrophy and disc bulging.  At L2/3 there is minimal spinal stenosis secondary to retropulsion of L2 compression fracture, appearance of prior kyphoplasty present.  There is minimal disc bulging but there is also some facet hypertrophy at this level.  At L3/4 there is facet and ligamentum hypertrophy, minimal disc bulging causing mild  spinal stenosis and neuroforaminal narrowing on the left greater than the right side.  At L4/5 there is moderate hypertrophic facet and ligamentum hypertrophy with mild left foraminal narrowing compared to the right side.  There is no spinal stenosis at this level.  At L5/S1 there is a broad-based disc bulge that extends to the left side more than the right side along with asymmetric left sided facet hypertrophy and ligamentum flavum hypertrophy causing moderate left foraminal stenosis.    4/15/15 outside open MRI cervical spine [facility]  C3-4 posterior disc bulge producing mild bilateral foraminal narrowing  C4-5 posterior disc bulge producing mild bilateral foraminal narrowing, possible tiny annular tear in the posterior disc, anterior thecal sac deformity with no evidence of spinal stenosis  C5-6 circumferential disc bulge producing moderate to severe bilateral foraminal narrowing, effacement of the bilateral lateral recesses worse to the right, anterior thecal sac deformity with effacement of the anterior subarachnoid space, mild spinal canal stenosis  C6-7 circumferential disc bulge producing moderate to severe bilateral foraminal narrowing with mild spinal canal stenosis  C7-T1 not completely included but appears to have a circumferential disc bulge with shallow midline disc protrusion with possible mild spinal canal stenosis and bilateral foraminal narrowing    CT L-spine:  No acute fracture or traumatic subluxation.  Alignment is relatively maintained.  Vertebroplasty changes are at L2.  There is mild, chronic appearing loss of height of the L4 vertebral body.  There is partial osseous fusion at L2-3.  Multilevel facet hypertrophy, osteophyte formation and disc space narrowing are present.   L1-2: Facet hypertrophy with mild right neural foraminal and spinal canal stenosis.   L2-3: Posterior disc osteophyte and facet hypertrophy results in mild to moderate right and mild left neural foraminal stenosis with  mild to moderate spinal canal stenosis.   L3-4: Posterior disc osteophyte and facet hypertrophy results in moderate bilateral neural foraminal stenosis with mild to moderate spinal canal stenosis.   L4-5: Broad-based disc osteophyte and facet hypertrophy results in severe left and moderate right neural foraminal stenosis with mild to moderate spinal canal stenosis.   L5-S1: Posterior disc osteophyte eccentric to the left and facet hypertrophy results in moderate to severe bilateral neural foraminal stenosis with mild to moderate spinal canal stenosis.  Paravertebral soft tissues are normal.  Spinal cord stimulator wires into the spinal canal at the T12-L1 level.    12/15/22 CT myelogram L-spine:  T12-L1: Mild disc bulge with likely right central protrusion.  Mild right and minimal left narrowing of the lateral recesses.  No significant spinal canal or foraminal stenosis.   L1-L2: Mild disc bulge.  Mild right and minimal left narrowing of the lateral recesses.  No significant spinal canal stenosis.  Minimal right foraminal stenosis.   L2-L3: Trace retrolisthesis.  Mild disc bulge and posterior osteophytic ridging.  Mild bilateral facet hypertrophy.  Minimal narrowing of the bilateral lateral recesses.  Mild right and minimal left foraminal stenosis.   L3-L4: Trace retrolisthesis.  Mild disc bulge.  Mild left and minimal right narrowing of the lateral recesses.  No significant spinal canal stenosis.  Mild-moderate bilateral foraminal stenosis.   L4-L5: Retrolisthesis.  Uncovering the disc mild disc bulge.  Mild bilateral facet hypertrophy.  Ligamentum flavum thickening.  Mild narrowing of the bilateral lateral recesses.  Mild spinal canal stenosis.  Moderate left and mild-moderate right foraminal stenosis.   L5-S1: Mild disc bulge and posterior osteophytic ridging.  Moderate left and mild right facet hypertrophy.  Moderate left and mild right narrowing of the lateral recesses.  No significant spinal canal stenosis.   Moderate right and mild-moderate left foraminal stenosis.   Bilateral dorsal paraspinal muscular atrophy in the lower lumbar spine.   Spinal cord stimulator leads are present entering the spinal canal at the T12-L1 interlaminar space and extending cranially above the field of view.   Layering dependent stones within the partially visualized urinary bladder, similar to prior study.  Mild-moderate atherosclerotic calcifications.   Postoperative changes of a previous L2 kyphoplasty with methylmethacrylate present.  There is loss of lumbar vertebral body height at all levels throughout the lumbar spine, progressive within the superior endplate of L4 when compared to the previous study of 12/10/2022.    1/30/23 Xray L-spine:  A spinal stimulator device is partially imaged.  There is a mild compression fracture of L2 status post vertebroplasty.  There is a mild compression fracture of L4 without significant change.  Mild retrolisthesis of L3 on L4 is present.  There is mild loss of disc height at L2-3 and L5-S1.  Moderate lower lumbar facet arthropathy is present there is heavy calcification of the aorta.    Assessment:  The patient is a 77-year-old man with a history of hypertension, obesity and low back pain who presents in referral from Dr. Winters for chronic right low back pain.   1. Lumbar spondylosis        2. Muscular deconditioning        3. Chronic pain syndrome        4. DDD (degenerative disc disease), lumbar        5. Morbid obesity with BMI of 40.0-44.9, adult                Plan:  1. We reviewed his symptoms, I explained that I think his current pain is likely coming from his facets on the right side at L3/4 and L4/5 and L5/S1 and we can set up a medial branch block to see if radiofrequency ablation would be indicated.  He would need to repeat this 1 more time prior to RFA.  I explained that if he does not get relief, there is probably not much else to do in terms of interventions.    2. Although I explained  this might not help his pain, I do want him to do physical therapy to work on core strengthening, lower extremity strengthening so that he can get stronger, walk more and hopefully lose weight which will help his back significantly.  3.  I have given him a prescription of hydrocodone to use as needed, told him to try taking this at bedtime instead of waiting till 2 in the morning for 2 wake him up.

## 2023-02-28 ENCOUNTER — TELEPHONE (OUTPATIENT)
Dept: PAIN MEDICINE | Facility: CLINIC | Age: 78
End: 2023-02-28
Payer: MEDICARE

## 2023-02-28 ENCOUNTER — PATIENT MESSAGE (OUTPATIENT)
Dept: PRIMARY CARE CLINIC | Facility: CLINIC | Age: 78
End: 2023-02-28
Payer: MEDICARE

## 2023-02-28 RX ORDER — FINASTERIDE 5 MG/1
5 TABLET, FILM COATED ORAL DAILY
Qty: 90 TABLET | Refills: 3 | Status: SHIPPED | OUTPATIENT
Start: 2023-02-28 | End: 2023-06-02 | Stop reason: CLARIF

## 2023-02-28 NOTE — TELEPHONE ENCOUNTER
Physician - Dr Segura    Type of Procedure/Injection - Lumbar Medial Branch Block  L3/4, L4/5, and L5/S1           Laterality - Right      Type of Sedation - RNIV      Need to hold medication - no      N/A      Clearance needed - no      Follow up - phone call next day

## 2023-03-01 ENCOUNTER — TELEPHONE (OUTPATIENT)
Dept: PRIMARY CARE CLINIC | Facility: CLINIC | Age: 78
End: 2023-03-01
Payer: MEDICARE

## 2023-03-01 ENCOUNTER — OFFICE VISIT (OUTPATIENT)
Dept: ORTHOPEDICS | Facility: CLINIC | Age: 78
End: 2023-03-01
Payer: MEDICARE

## 2023-03-01 DIAGNOSIS — M79.632 PAIN OF LEFT FOREARM: Primary | ICD-10-CM

## 2023-03-01 DIAGNOSIS — G56.02 CARPAL TUNNEL SYNDROME ON LEFT: ICD-10-CM

## 2023-03-01 PROCEDURE — 99213 PR OFFICE/OUTPT VISIT, EST, LEVL III, 20-29 MIN: ICD-10-PCS | Mod: S$GLB,,, | Performed by: ORTHOPAEDIC SURGERY

## 2023-03-01 PROCEDURE — 1157F PR ADVANCE CARE PLAN OR EQUIV PRESENT IN MEDICAL RECORD: ICD-10-PCS | Mod: CPTII,S$GLB,, | Performed by: ORTHOPAEDIC SURGERY

## 2023-03-01 PROCEDURE — 1159F PR MEDICATION LIST DOCUMENTED IN MEDICAL RECORD: ICD-10-PCS | Mod: CPTII,S$GLB,, | Performed by: ORTHOPAEDIC SURGERY

## 2023-03-01 PROCEDURE — 1101F PT FALLS ASSESS-DOCD LE1/YR: CPT | Mod: CPTII,S$GLB,, | Performed by: ORTHOPAEDIC SURGERY

## 2023-03-01 PROCEDURE — 1125F PR PAIN SEVERITY QUANTIFIED, PAIN PRESENT: ICD-10-PCS | Mod: CPTII,S$GLB,, | Performed by: ORTHOPAEDIC SURGERY

## 2023-03-01 PROCEDURE — 99213 OFFICE O/P EST LOW 20 MIN: CPT | Mod: S$GLB,,, | Performed by: ORTHOPAEDIC SURGERY

## 2023-03-01 PROCEDURE — 3288F FALL RISK ASSESSMENT DOCD: CPT | Mod: CPTII,S$GLB,, | Performed by: ORTHOPAEDIC SURGERY

## 2023-03-01 PROCEDURE — 1159F MED LIST DOCD IN RCRD: CPT | Mod: CPTII,S$GLB,, | Performed by: ORTHOPAEDIC SURGERY

## 2023-03-01 PROCEDURE — 3288F PR FALLS RISK ASSESSMENT DOCUMENTED: ICD-10-PCS | Mod: CPTII,S$GLB,, | Performed by: ORTHOPAEDIC SURGERY

## 2023-03-01 PROCEDURE — 1157F ADVNC CARE PLAN IN RCRD: CPT | Mod: CPTII,S$GLB,, | Performed by: ORTHOPAEDIC SURGERY

## 2023-03-01 PROCEDURE — 99999 PR PBB SHADOW E&M-EST. PATIENT-LVL III: ICD-10-PCS | Mod: PBBFAC,,, | Performed by: ORTHOPAEDIC SURGERY

## 2023-03-01 PROCEDURE — 1101F PR PT FALLS ASSESS DOC 0-1 FALLS W/OUT INJ PAST YR: ICD-10-PCS | Mod: CPTII,S$GLB,, | Performed by: ORTHOPAEDIC SURGERY

## 2023-03-01 PROCEDURE — 99999 PR PBB SHADOW E&M-EST. PATIENT-LVL III: CPT | Mod: PBBFAC,,, | Performed by: ORTHOPAEDIC SURGERY

## 2023-03-01 PROCEDURE — 1125F AMNT PAIN NOTED PAIN PRSNT: CPT | Mod: CPTII,S$GLB,, | Performed by: ORTHOPAEDIC SURGERY

## 2023-03-01 RX ORDER — IBUPROFEN 600 MG/1
600 TABLET ORAL EVERY 6 HOURS PRN
Qty: 10 TABLET | Refills: 0 | Status: ON HOLD | OUTPATIENT
Start: 2023-03-01 | End: 2023-03-21 | Stop reason: HOSPADM

## 2023-03-01 NOTE — TELEPHONE ENCOUNTER
The patient has sent me multiple my chart messages today requesting explanations of test that were ordered by different providers.  I would expect that the provider who ordered the test would be happy to answer the question.  He should check with them.  If he cannot get in touch with them when I have a chance will be happy to look at these orders from another doctor, and help explain them to him.

## 2023-03-01 NOTE — TELEPHONE ENCOUNTER
Pt had those tests done in the ER last week and was not given any results. He is scheduled to see you tomorrow at 1020 and said that he will be here.

## 2023-03-01 NOTE — PROGRESS NOTES
3/1/2023    Chief Complaint:  Chief Complaint   Patient presents with    Left Wrist - Pain    Left Forearm - Pain       HPI:  Chinedu Roque is a 77 y.o. male, who presents to clinic today has a history of left forearm pain and numbness and tingling to his middle ring and index fingers.  States that he has had severe pain over the last couple of weeks to the left mid forearm.  Did not have any injury prior to its onset.  He has not started any new activities.  Has been to the emergency room because of the amount of pain he was having twice.  Did have a steroid injection which did decrease the pain for 1-2 days but that it has returned.  He is here today for further evaluation    PMHX:  Past Medical History:   Diagnosis Date    Anticoagulant long-term use     ASA 81 mg    Arthritis     cervical    BPH (benign prostatic hyperplasia) 03/02/2012    Chronic left shoulder pain     Compression fracture of L2     DDD (degenerative disc disease), lumbar     HTN (hypertension) 03/02/2012    Hx of colonic polyps 2013    Low back pain     Morbid obesity with BMI of 40.0-44.9, adult 03/18/2014    Seasonal allergies     Sleep apnea     compliant with CPAP    Stroke 03/1986       PSHX:  Past Surgical History:   Procedure Laterality Date    APPENDECTOMY      COLONOSCOPY N/A 6/6/2022    Procedure: COLONOSCOPY;  Surgeon: Jose Bello MD;  Location: [facility];  Service: Endoscopy;  Laterality: N/A;    EPIDURAL BLOCK INJECTION  2015    cervical    EPIDURAL STEROID INJECTION INTO LUMBAR SPINE N/A 6/5/2019    Procedure: Injection-steroid-epidural-lumbar L5/S1 interlaminar;  Surgeon: Riley Segura MD;  Location: [facility] OR;  Service: Pain Management;  Laterality: N/A;    EPIDURAL STEROID INJECTION INTO LUMBAR SPINE N/A 9/13/2019    Procedure: Injection-steroid-epidural-lumbar;  Surgeon: Riley Segura MD;  Location: [facility] OR;  Service: Pain Management;  Laterality: N/A;  L5/S1 interlaminar to the right    EPIDURAL STEROID  INJECTION INTO LUMBAR SPINE N/A 6/2/2020    Procedure: Injection-steroid-epidural-lumbar L5/S1 to right;  Surgeon: Riley Segura MD;  Location: [facility] OR;  Service: Pain Management;  Laterality: N/A;    Facet Steroid injection       Pain management    HERNIA REPAIR      Ventral    INSERTION OF DORSAL COLUMN NERVE STIMULATOR FOR TRIAL N/A 2/10/2021    Procedure: INSERTION, NEUROSTIMULATOR, SPINAL CORD, DORSAL COLUMN, FOR TRIAL;  Surgeon: Riley Segura MD;  Location: [facility] OR;  Service: Pain Management;  Laterality: N/A;    INSERTION OF NEUROSTIMULATOR OF DORSAL COLUMN OF SPINAL CORD N/A 3/1/2021    Procedure: INSERTION, NEUROSTIMULATOR, SPINAL CORD, DORSAL COLUMN;  Surgeon: Riley Segura MD;  Location: [facility] OR;  Service: Pain Management;  Laterality: N/A;    KNEE SURGERY      bilateral    RADIOFREQUENCY ABLATION  2015    lumbar nerve    RADIOFREQUENCY ABLATION OF LUMBAR MEDIAL BRANCH NERVE AT SINGLE LEVEL Right 4/11/2019    Procedure: Radiofrequency Ablation, Nerve, Spinal, Lumbar, Medial Branch, L1,2,3,4,5;  Surgeon: Riley Segura MD;  Location: [facility] OR;  Service: Pain Management;  Laterality: Right;    TRANSFORAMINAL EPIDURAL INJECTION OF STEROID Right 11/13/2020    Procedure: Injection,steroid,epidural,transforaminal approach, l3/4 and l5/s1;  Surgeon: Riley Segura MD;  Location: [facility] OR;  Service: Pain Management;  Laterality: Right;    TRANSFORAMINAL EPIDURAL INJECTION OF STEROID Left 6/24/2021    Procedure: Injection,steroid,epidural,transforaminal approach L5/S1;  Surgeon: Riley Segura MD;  Location: [facility] OR;  Service: Pain Management;  Laterality: Left;    TRANSFORAMINAL EPIDURAL INJECTION OF STEROID Right 9/22/2022    Procedure: Injection,steroid,epidural,transforaminal approach L5/S1;  Surgeon: Riley Segura MD;  Location: [facility] OR;  Service: Pain Management;  Laterality: Right;    TRANSFORAMINAL EPIDURAL INJECTION OF STEROID Right 10/25/2022    Procedure:  Injection,steroid,epidural,transforaminal approach L2/3 and L3/4;  Surgeon: Riley Segura MD;  Location: [facility];  Service: Pain Management;  Laterality: Right;    VERTEBROPLASTY         FMHX:  Family History   Problem Relation Age of Onset    COPD Father     Hypertension Brother     Kidney disease Brother     Alzheimer's disease Mother     No Known Problems Daughter     Hypertension Son     Diabetes Son         pre-diabetic    No Known Problems Daughter     No Known Problems Daughter        SOCHX:  Social History     Tobacco Use    Smoking status: Former     Packs/day: 1.50     Years: 24.00     Pack years: 36.00     Types: Cigarettes     Start date: 10/21/1959     Quit date: 3/19/1983     Years since quittin.9     Passive exposure: Past    Smokeless tobacco: Never   Substance Use Topics    Alcohol use: No       ALLERGIES:  Patient has no known allergies.    CURRENT MEDICATIONS:  Current Outpatient Medications on File Prior to Visit   Medication Sig Dispense Refill    acetaminophen (TYLENOL) 500 MG tablet Take 1,000 mg by mouth every 6 (six) hours as needed for Pain.      aspirin (ECOTRIN) 81 MG EC tablet Take 81 mg by mouth once daily.      clindamycin (CLEOCIN) 300 MG capsule Take 300 mg by mouth 3 (three) times daily.      cyclobenzaprine (FLEXERIL) 5 MG tablet Take 1 tablet (5 mg total) by mouth 3 (three) times daily as needed for Muscle spasms. 15 tablet 0    finasteride (PROSCAR) 5 mg tablet Take 1 tablet (5 mg total) by mouth once daily. 90 tablet 3    fluticasone propionate (FLONASE) 50 mcg/actuation nasal spray 1 spray (50 mcg total) by Each Nostril route daily as needed for Rhinitis. 16 g 0    hydroCHLOROthiazide (HYDRODIURIL) 12.5 MG Tab TAKE 1 TABLET BY MOUTH EVERY DAY (Patient taking differently: Take 12.5 mg by mouth once daily.) 90 tablet 3    HYDROcodone-acetaminophen (NORCO) 7.5-325 mg per tablet Take 1 tablet by mouth every 12 (twelve) hours as needed for Pain. 60 tablet 0    losartan  (COZAAR) 100 MG tablet TAKE 1 TABLET BY MOUTH EVERY DAY 90 tablet 3    metoprolol succinate (TOPROL-XL) 100 MG 24 hr tablet TAKE 1 TABLET BY MOUTH EVERY DAY 90 tablet 3    tamsulosin (FLOMAX) 0.4 mg Cap TAKE 1 CAPSULE BY MOUTH EVERY DAY 90 capsule 3     Current Facility-Administered Medications on File Prior to Visit   Medication Dose Route Frequency Provider Last Rate Last Admin    sodium hyaluronate (EUFLEXXA) 10 mg/mL(mw 2.4 -3.6 million) Syrg 20 mg  20 mg Intra-articular  Michelet Covarrubias MD   20 mg at 05/14/18 1046    sodium hyaluronate (EUFLEXXA) 10 mg/mL(mw 2.4 -3.6 million) Syrg 20 mg  20 mg Intra-articular  Michelet Covarrubias MD   20 mg at 05/14/18 1046       REVIEW OF SYSTEMS:  ROS    GENERAL PHYSICAL EXAM:   There were no vitals taken for this visit.   GEN: well developed, well nourished, no acute distress   HENT: Normocephalic, atraumatic   EYES: No discharge, conjunctiva normal   NECK: Supple, non-tender   PULM: No wheezing, no respiratory distress   CV: RRR   ABD: Soft, non-tender    ORTHO EXAM:   Examination of the left upper extremity reveals that there is no edema.  There are no major skin changes.  Palpation does produce a significant amount of tenderness over the mid forearm dorsally.  This is in the area of the common extensor musculature.  There are no masses palpated.  There is no proximal or distal tenderness.  He does report mild decreased sensation in the median distribution.  Has a negative Tinel's but a positive Durkan's test.  Has a 2+ radial pulse.  He is able to flex and extend his fingers as well as extend his wrist.  Wrist and finger extension does produce mild tenderness at the mid forearm    RADIOLOGY:   X-rays of the left forearm from 02/26/2023 have been reviewed.  There are degenerative changes but there are no fractures or dislocations.  There are no obvious bony or soft tissue masses    ASSESSMENT:   Left forearm pain with negative x-ray, left carpal tunnel  syndrome    PLAN:  1. At this time we will hold off on treatment of the carpal tunnel syndrome as it appears to be mild to moderate    2.  I will send the patient for a CT scan of the left forearm as he can not have an MRI due to nerve stimulator in his back    3. Will follow up with me as soon as the MRI is completed for discussion of further treatment options if there is any pathology noted.

## 2023-03-02 ENCOUNTER — OFFICE VISIT (OUTPATIENT)
Dept: PRIMARY CARE CLINIC | Facility: CLINIC | Age: 78
End: 2023-03-02
Payer: MEDICARE

## 2023-03-02 VITALS
RESPIRATION RATE: 18 BRPM | DIASTOLIC BLOOD PRESSURE: 72 MMHG | SYSTOLIC BLOOD PRESSURE: 138 MMHG | HEIGHT: 71 IN | WEIGHT: 307.31 LBS | BODY MASS INDEX: 43.02 KG/M2 | HEART RATE: 50 BPM | OXYGEN SATURATION: 97 %

## 2023-03-02 DIAGNOSIS — Z13.6 SCREENING FOR ISCHEMIC HEART DISEASE: Primary | ICD-10-CM

## 2023-03-02 DIAGNOSIS — R06.02 SHORTNESS OF BREATH: ICD-10-CM

## 2023-03-02 DIAGNOSIS — Z13.6 ENCOUNTER FOR SCREENING FOR CARDIOVASCULAR DISORDERS: ICD-10-CM

## 2023-03-02 DIAGNOSIS — I70.0 ABDOMINAL AORTIC ATHEROSCLEROSIS: Chronic | ICD-10-CM

## 2023-03-02 DIAGNOSIS — I10 ESSENTIAL HYPERTENSION: Primary | Chronic | ICD-10-CM

## 2023-03-02 DIAGNOSIS — M79.602 LEFT ARM PAIN: ICD-10-CM

## 2023-03-02 DIAGNOSIS — Z86.73 HISTORY OF CVA (CEREBROVASCULAR ACCIDENT): Chronic | ICD-10-CM

## 2023-03-02 DIAGNOSIS — R94.31 ABNORMAL ELECTROCARDIOGRAM: ICD-10-CM

## 2023-03-02 DIAGNOSIS — E66.01 SEVERE OBESITY: Chronic | ICD-10-CM

## 2023-03-02 DIAGNOSIS — R94.31 ABNORMAL EKG: ICD-10-CM

## 2023-03-02 PROCEDURE — 1157F PR ADVANCE CARE PLAN OR EQUIV PRESENT IN MEDICAL RECORD: ICD-10-PCS | Mod: CPTII,S$GLB,, | Performed by: FAMILY MEDICINE

## 2023-03-02 PROCEDURE — 3075F PR MOST RECENT SYSTOLIC BLOOD PRESS GE 130-139MM HG: ICD-10-PCS | Mod: CPTII,S$GLB,, | Performed by: FAMILY MEDICINE

## 2023-03-02 PROCEDURE — 1125F PR PAIN SEVERITY QUANTIFIED, PAIN PRESENT: ICD-10-PCS | Mod: CPTII,S$GLB,, | Performed by: FAMILY MEDICINE

## 2023-03-02 PROCEDURE — 3075F SYST BP GE 130 - 139MM HG: CPT | Mod: CPTII,S$GLB,, | Performed by: FAMILY MEDICINE

## 2023-03-02 PROCEDURE — 1125F AMNT PAIN NOTED PAIN PRSNT: CPT | Mod: CPTII,S$GLB,, | Performed by: FAMILY MEDICINE

## 2023-03-02 PROCEDURE — 99999 PR PBB SHADOW E&M-EST. PATIENT-LVL V: ICD-10-PCS | Mod: PBBFAC,,, | Performed by: FAMILY MEDICINE

## 2023-03-02 PROCEDURE — 3288F PR FALLS RISK ASSESSMENT DOCUMENTED: ICD-10-PCS | Mod: CPTII,S$GLB,, | Performed by: FAMILY MEDICINE

## 2023-03-02 PROCEDURE — 3078F PR MOST RECENT DIASTOLIC BLOOD PRESSURE < 80 MM HG: ICD-10-PCS | Mod: CPTII,S$GLB,, | Performed by: FAMILY MEDICINE

## 2023-03-02 PROCEDURE — 1160F RVW MEDS BY RX/DR IN RCRD: CPT | Mod: CPTII,S$GLB,, | Performed by: FAMILY MEDICINE

## 2023-03-02 PROCEDURE — 1159F PR MEDICATION LIST DOCUMENTED IN MEDICAL RECORD: ICD-10-PCS | Mod: CPTII,S$GLB,, | Performed by: FAMILY MEDICINE

## 2023-03-02 PROCEDURE — 99214 OFFICE O/P EST MOD 30 MIN: CPT | Mod: S$GLB,,, | Performed by: FAMILY MEDICINE

## 2023-03-02 PROCEDURE — 1160F PR REVIEW ALL MEDS BY PRESCRIBER/CLIN PHARMACIST DOCUMENTED: ICD-10-PCS | Mod: CPTII,S$GLB,, | Performed by: FAMILY MEDICINE

## 2023-03-02 PROCEDURE — 1101F PT FALLS ASSESS-DOCD LE1/YR: CPT | Mod: CPTII,S$GLB,, | Performed by: FAMILY MEDICINE

## 2023-03-02 PROCEDURE — 1157F ADVNC CARE PLAN IN RCRD: CPT | Mod: CPTII,S$GLB,, | Performed by: FAMILY MEDICINE

## 2023-03-02 PROCEDURE — 3288F FALL RISK ASSESSMENT DOCD: CPT | Mod: CPTII,S$GLB,, | Performed by: FAMILY MEDICINE

## 2023-03-02 PROCEDURE — 1159F MED LIST DOCD IN RCRD: CPT | Mod: CPTII,S$GLB,, | Performed by: FAMILY MEDICINE

## 2023-03-02 PROCEDURE — 3078F DIAST BP <80 MM HG: CPT | Mod: CPTII,S$GLB,, | Performed by: FAMILY MEDICINE

## 2023-03-02 PROCEDURE — 99999 PR PBB SHADOW E&M-EST. PATIENT-LVL V: CPT | Mod: PBBFAC,,, | Performed by: FAMILY MEDICINE

## 2023-03-02 PROCEDURE — 99214 PR OFFICE/OUTPT VISIT, EST, LEVL IV, 30-39 MIN: ICD-10-PCS | Mod: S$GLB,,, | Performed by: FAMILY MEDICINE

## 2023-03-02 PROCEDURE — 1101F PR PT FALLS ASSESS DOC 0-1 FALLS W/OUT INJ PAST YR: ICD-10-PCS | Mod: CPTII,S$GLB,, | Performed by: FAMILY MEDICINE

## 2023-03-02 NOTE — ASSESSMENT & PLAN NOTE
History of an elevated PSA.  Last check about one year ago.  Will discuss whether this is something he wants to continue to follow given age, current recommendations, etc..

## 2023-03-02 NOTE — ASSESSMENT & PLAN NOTE
Stable, continue to treat risk factors.  This is a risk factor for coronary artery disease which factors into my decision making to consider a stress test as discussed above

## 2023-03-02 NOTE — TELEPHONE ENCOUNTER
Spoke with Lisa in Cardiology and she was able to get pt scheduled for Nuclear Stress Test on 3/22 at 1230.    Left message with pt explaining that he is scheduled for his NST on 3/22 at 1230. Pt was advised that he cannot eat for 4 hrs before his test, he cannot have caffeine for 12 hrs before his test which includes coffee, even decaf, and chocolate. Pt was encouraged to drink water, sprite or juice. Pt was informed that I would put an appt letter in the mail for him and to call back if he has any questions.

## 2023-03-02 NOTE — PROGRESS NOTES
THIS DOCUMENT WAS MADE IN PART WITH VOICE RECOGNITION SOFTWARE.  OCCASIONALLY THIS SOFTWARE WILL MISINTERPRET WORDS OR PHRASES.      Primary Care Provider Appointment   [facility]       Patient ID: Chinedu Roque is a 77 y.o. male.    ASSESSMENT/PLAN by Problem List:  Problem List Items Addressed This Visit       Abnormal EKG    Relevant Orders    NM Myocardial Perfusion Spect Multi Pharmacologic    Nuclear Stress - Cardiology Interpreted    Shortness of breath     Patient does have some EKG changes and significant dyspnea with exertion multiple cardiac risk factors.  I do recommend nuclear stress test.  Patient is incapable of walking on treadmill secondary to arthritis in his knees as well as low back problems.  Do not feel that the left arm pain that brought him to the emergency room is cardiac in nature but I feel he has other symptoms and risk factors to justify stress test.         Left arm pain     Patient did consult with Orthopedics.  No obvious injury.  On exam today he did have a mildly positive Finkelstein test and some tenderness over the extensor muscles of the forearm.  Recommend some stretching and ice.  He will follow with orthopedics.         Essential hypertension - Primary (Chronic)     Stable and satisfactory continue current medications         Abdominal aortic atherosclerosis (Chronic)     Stable, continue to treat risk factors.  This is a risk factor for coronary artery disease which factors into my decision making to consider a stress test as discussed above         History of CVA (cerebrovascular accident) (Chronic)     No current signs or symptoms.  Patient has declined statin medications in the past.         Severe obesity (Chronic)     Continue to work on healthy diet.  If stress test is normal will recommend a gradual exercise program          Other Visit Diagnoses       Encounter for screening for cardiovascular disorders        Relevant Orders     NM Myocardial Perfusion Spect Multi Pharmacologic    Nuclear Stress - Cardiology Interpreted              Follow Up:  In a few months as scheduled.  Sooner if further problems develop  For non life-threatening, non-cardiac, or non-neurological urgent concerns I would encourage him to call our clinic 1st before going to the emergency room.  If after hours or on weekend, talked to [facility] on-call.  If anything is truly emergent then do not hesitate to go to the ER.    Subjective:     Chief Complaint   Patient presents with    Hospital Follow Up    Forearm Pain     C/o Lt forearm pain x 1 week; he has went to the ER on Friday night and again on Sunday.      I have reviewed the information entered by the ancillary staff regarding the chief complaint as well as the related history.    HPI    Patient is a/an 77 y.o.  male       emergency room follow-up.  Patient went to the ER on February 24th with left shoulder and anterior chest pain that radiated to the back.  No acute cardiac findings were noted.  Was suspected musculoskeletal.  Placed on Flexeril and Norco and an appointment was made with orthopedics.  However he return to the ER two days later with similar complaints, but worsening.  Again it was felt to be musculoskeletal and he did see Orthopedics a few days later.  At that time he was also complaining of numbness and tingling.  There was no injury prior to the symptoms developing.    Patient does not have any substernal chest pain but does have some exertional shortness of breath.  He did have an EKG in the hospital without acute ischemic changes but some abnormalities nonetheless.    For complete problem list, past medical history, surgical history, social history, etc., see appropriate section in the electronic medical record    Review of Systems   Respiratory:  Positive for shortness of breath.    Cardiovascular:  Negative for chest pain.   Gastrointestinal: Negative.    Genitourinary: Negative.   "  Musculoskeletal:  Positive for arthralgias.     Objective     Physical Exam  Constitutional:       General: He is not in acute distress.     Appearance: He is well-developed. He is obese. He is not diaphoretic.   HENT:      Head: Normocephalic and atraumatic.   Eyes:      General: No scleral icterus.     Conjunctiva/sclera: Conjunctivae normal.   Cardiovascular:      Rate and Rhythm: Normal rate and regular rhythm.      Heart sounds: No murmur heard.  Pulmonary:      Effort: Pulmonary effort is normal. No respiratory distress.      Breath sounds: No wheezing.   Neurological:      General: No focal deficit present.      Mental Status: He is alert and oriented to person, place, and time.      Coordination: Coordination normal.   Psychiatric:         Behavior: Behavior normal.     Vitals:    03/02/23 1019   BP: 138/72   BP Location: Right arm   Patient Position: Sitting   BP Method: Large (Manual)   Pulse: (!) 50   Resp: 18   SpO2: 97%   Weight: (!) 139.4 kg (307 lb 5.1 oz)   Height: 5' 11" (1.803 m)       RECENT LABS:    Lab Results   Component Value Date    WBC 5.88 12/15/2022    HGB 15.3 12/15/2022    HCT 44.9 12/15/2022     12/15/2022    CHOL 139 03/22/2022    TRIG 59 03/22/2022    HDL 38 (L) 03/22/2022    ALT 24 03/22/2022    AST 25 03/22/2022     03/22/2022    K 4.5 03/22/2022     03/22/2022    CREATININE 0.9 03/22/2022    BUN 13 03/22/2022    CO2 26 03/22/2022    TSH 1.300 04/23/2016    PSA 4.5 (H) 03/22/2022    INR 1.1 12/15/2022       Results for orders placed or performed during the hospital encounter of 12/15/22   Protime-INR   Result Value Ref Range    PT 13.8 11.8 - 14.7 sec    INR 1.1    APTT   Result Value Ref Range    aPTT 27.5 24.6 - 36.7 sec   CBC Without Differential   Result Value Ref Range    WBC 5.88 3.90 - 12.70 K/uL    RBC 4.87 4.60 - 6.20 M/uL    Hemoglobin 15.3 14.0 - 18.0 g/dL    Hematocrit 44.9 40.0 - 54.0 %    MCV 92 82 - 98 fL    MCH 31.4 (H) 27.0 - 31.0 pg    MCHC " 34.1 32.0 - 36.0 g/dL    RDW 12.4 11.5 - 14.5 %    Platelets 245 150 - 450 K/uL    MPV 9.4 9.2 - 12.9 fL

## 2023-03-02 NOTE — ASSESSMENT & PLAN NOTE
Patient does have some EKG changes and significant dyspnea with exertion multiple cardiac risk factors.  I do recommend nuclear stress test.  Patient is incapable of walking on treadmill secondary to arthritis in his knees as well as low back problems.  Do not feel that the left arm pain that brought him to the emergency room is cardiac in nature but I feel he has other symptoms and risk factors to justify stress test.

## 2023-03-02 NOTE — ASSESSMENT & PLAN NOTE
Continue to work on healthy diet.  If stress test is normal will recommend a gradual exercise program

## 2023-03-02 NOTE — ASSESSMENT & PLAN NOTE
Patient did consult with Orthopedics.  No obvious injury.  On exam today he did have a mildly positive Finkelstein test and some tenderness over the extensor muscles of the forearm.  Recommend some stretching and ice.  He will follow with orthopedics.

## 2023-03-06 ENCOUNTER — LAB VISIT (OUTPATIENT)
Dept: LAB | Facility: HOSPITAL | Age: 78
End: 2023-03-06
Attending: FAMILY MEDICINE
Payer: MEDICARE

## 2023-03-06 DIAGNOSIS — R73.09 ELEVATED GLUCOSE: ICD-10-CM

## 2023-03-06 DIAGNOSIS — E78.5 HYPERLIPIDEMIA, UNSPECIFIED HYPERLIPIDEMIA TYPE: Chronic | ICD-10-CM

## 2023-03-06 DIAGNOSIS — I10 ESSENTIAL HYPERTENSION: Chronic | ICD-10-CM

## 2023-03-06 LAB
ALBUMIN SERPL BCP-MCNC: 3.8 G/DL (ref 3.5–5.2)
ALP SERPL-CCNC: 55 U/L (ref 55–135)
ALT SERPL W/O P-5'-P-CCNC: 32 U/L (ref 10–44)
ANION GAP SERPL CALC-SCNC: 7 MMOL/L (ref 8–16)
AST SERPL-CCNC: 21 U/L (ref 10–40)
BASOPHILS # BLD AUTO: 0.03 K/UL (ref 0–0.2)
BASOPHILS NFR BLD: 0.5 % (ref 0–1.9)
BILIRUB SERPL-MCNC: 1 MG/DL (ref 0.1–1)
BUN SERPL-MCNC: 16 MG/DL (ref 8–23)
CALCIUM SERPL-MCNC: 9.1 MG/DL (ref 8.7–10.5)
CHLORIDE SERPL-SCNC: 107 MMOL/L (ref 95–110)
CHOLEST SERPL-MCNC: 159 MG/DL (ref 120–199)
CHOLEST/HDLC SERPL: 3.6 {RATIO} (ref 2–5)
CO2 SERPL-SCNC: 26 MMOL/L (ref 23–29)
CREAT SERPL-MCNC: 1 MG/DL (ref 0.5–1.4)
DIFFERENTIAL METHOD: ABNORMAL
EOSINOPHIL # BLD AUTO: 0.1 K/UL (ref 0–0.5)
EOSINOPHIL NFR BLD: 2.2 % (ref 0–8)
ERYTHROCYTE [DISTWIDTH] IN BLOOD BY AUTOMATED COUNT: 12.7 % (ref 11.5–14.5)
EST. GFR  (NO RACE VARIABLE): >60 ML/MIN/1.73 M^2
ESTIMATED AVG GLUCOSE: 117 MG/DL (ref 68–131)
GLUCOSE SERPL-MCNC: 102 MG/DL (ref 70–110)
HBA1C MFR BLD: 5.7 % (ref 4–5.6)
HCT VFR BLD AUTO: 46.6 % (ref 40–54)
HDLC SERPL-MCNC: 44 MG/DL (ref 40–75)
HDLC SERPL: 27.7 % (ref 20–50)
HGB BLD-MCNC: 15.4 G/DL (ref 14–18)
IMM GRANULOCYTES # BLD AUTO: 0.02 K/UL (ref 0–0.04)
IMM GRANULOCYTES NFR BLD AUTO: 0.3 % (ref 0–0.5)
LDLC SERPL CALC-MCNC: 98.2 MG/DL (ref 63–159)
LYMPHOCYTES # BLD AUTO: 2 K/UL (ref 1–4.8)
LYMPHOCYTES NFR BLD: 34.3 % (ref 18–48)
MCH RBC QN AUTO: 31 PG (ref 27–31)
MCHC RBC AUTO-ENTMCNC: 33 G/DL (ref 32–36)
MCV RBC AUTO: 94 FL (ref 82–98)
MONOCYTES # BLD AUTO: 0.9 K/UL (ref 0.3–1)
MONOCYTES NFR BLD: 15.2 % (ref 4–15)
NEUTROPHILS # BLD AUTO: 2.8 K/UL (ref 1.8–7.7)
NEUTROPHILS NFR BLD: 47.5 % (ref 38–73)
NONHDLC SERPL-MCNC: 115 MG/DL
NRBC BLD-RTO: 0 /100 WBC
PLATELET # BLD AUTO: 239 K/UL (ref 150–450)
PMV BLD AUTO: 10.2 FL (ref 9.2–12.9)
POTASSIUM SERPL-SCNC: 4.4 MMOL/L (ref 3.5–5.1)
PROT SERPL-MCNC: 6.7 G/DL (ref 6–8.4)
RBC # BLD AUTO: 4.96 M/UL (ref 4.6–6.2)
SODIUM SERPL-SCNC: 140 MMOL/L (ref 136–145)
TRIGL SERPL-MCNC: 84 MG/DL (ref 30–150)
WBC # BLD AUTO: 5.86 K/UL (ref 3.9–12.7)

## 2023-03-06 PROCEDURE — 85025 COMPLETE CBC W/AUTO DIFF WBC: CPT | Performed by: FAMILY MEDICINE

## 2023-03-06 PROCEDURE — 36415 COLL VENOUS BLD VENIPUNCTURE: CPT | Mod: PO | Performed by: FAMILY MEDICINE

## 2023-03-06 PROCEDURE — 83036 HEMOGLOBIN GLYCOSYLATED A1C: CPT | Performed by: FAMILY MEDICINE

## 2023-03-06 PROCEDURE — 80061 LIPID PANEL: CPT | Performed by: FAMILY MEDICINE

## 2023-03-06 PROCEDURE — 80053 COMPREHEN METABOLIC PANEL: CPT | Performed by: FAMILY MEDICINE

## 2023-03-06 NOTE — TELEPHONE ENCOUNTER
Spoke with patient and he has been scheduled for 3/21, all instructions reviewed and questions answered.

## 2023-03-10 ENCOUNTER — TELEPHONE (OUTPATIENT)
Dept: PAIN MEDICINE | Facility: CLINIC | Age: 78
End: 2023-03-10
Payer: MEDICARE

## 2023-03-10 NOTE — TELEPHONE ENCOUNTER
----- Message from Wilber Cano sent at 3/10/2023  9:37 AM CST -----  Type: Needs Medical Advice  Who Called:  Patient    Best Call Back Number: [phone]  Additional Information: Patient states that he would like a callback regarding whether his Nerve Block on 03/21/23 will interfere with his stress test on the next day.

## 2023-03-10 NOTE — TELEPHONE ENCOUNTER
Pt requesting if ok to proceed with MBB and stress test scheduled on the same day or if he should reschedule? He is scheduled for a pharmacological nuclear stress test due to EKG changes and dyspnea with exertion.

## 2023-03-13 ENCOUNTER — HOSPITAL ENCOUNTER (OUTPATIENT)
Dept: RADIOLOGY | Facility: HOSPITAL | Age: 78
Discharge: HOME | End: 2023-03-13
Attending: ORTHOPAEDIC SURGERY
Payer: MEDICARE

## 2023-03-13 DIAGNOSIS — M79.632 PAIN OF LEFT FOREARM: ICD-10-CM

## 2023-03-13 PROCEDURE — 73202: ICD-10-PCS | Mod: 26,LT,, | Performed by: RADIOLOGY

## 2023-03-13 PROCEDURE — 73202 CT UPPR EXTREMITY W/O&W/DYE: CPT | Mod: TC,PO,LT

## 2023-03-13 PROCEDURE — 25500020 PHARM REV CODE 255: Mod: PO | Performed by: ORTHOPAEDIC SURGERY

## 2023-03-13 PROCEDURE — 73202 CT UPPR EXTREMITY W/O&W/DYE: CPT | Mod: 26,LT,, | Performed by: RADIOLOGY

## 2023-03-13 RX ADMIN — IOHEXOL 75 ML: 350 INJECTION, SOLUTION INTRAVENOUS at 03:03

## 2023-03-15 ENCOUNTER — OFFICE VISIT (OUTPATIENT)
Dept: ORTHOPEDICS | Facility: CLINIC | Age: 78
End: 2023-03-15
Payer: MEDICARE

## 2023-03-15 VITALS — WEIGHT: 307 LBS | HEIGHT: 71 IN | BODY MASS INDEX: 42.98 KG/M2

## 2023-03-15 DIAGNOSIS — M79.632 PAIN IN LEFT FOREARM: Primary | ICD-10-CM

## 2023-03-15 PROCEDURE — 1125F AMNT PAIN NOTED PAIN PRSNT: CPT | Mod: CPTII,S$GLB,, | Performed by: ORTHOPAEDIC SURGERY

## 2023-03-15 PROCEDURE — 99213 PR OFFICE/OUTPT VISIT, EST, LEVL III, 20-29 MIN: ICD-10-PCS | Mod: S$GLB,,, | Performed by: ORTHOPAEDIC SURGERY

## 2023-03-15 PROCEDURE — 99999 PR PBB SHADOW E&M-EST. PATIENT-LVL III: ICD-10-PCS | Mod: PBBFAC,,, | Performed by: ORTHOPAEDIC SURGERY

## 2023-03-15 PROCEDURE — 1101F PR PT FALLS ASSESS DOC 0-1 FALLS W/OUT INJ PAST YR: ICD-10-PCS | Mod: CPTII,S$GLB,, | Performed by: ORTHOPAEDIC SURGERY

## 2023-03-15 PROCEDURE — 99213 OFFICE O/P EST LOW 20 MIN: CPT | Mod: S$GLB,,, | Performed by: ORTHOPAEDIC SURGERY

## 2023-03-15 PROCEDURE — 1159F PR MEDICATION LIST DOCUMENTED IN MEDICAL RECORD: ICD-10-PCS | Mod: CPTII,S$GLB,, | Performed by: ORTHOPAEDIC SURGERY

## 2023-03-15 PROCEDURE — 1157F PR ADVANCE CARE PLAN OR EQUIV PRESENT IN MEDICAL RECORD: ICD-10-PCS | Mod: CPTII,S$GLB,, | Performed by: ORTHOPAEDIC SURGERY

## 2023-03-15 PROCEDURE — 1101F PT FALLS ASSESS-DOCD LE1/YR: CPT | Mod: CPTII,S$GLB,, | Performed by: ORTHOPAEDIC SURGERY

## 2023-03-15 PROCEDURE — 99999 PR PBB SHADOW E&M-EST. PATIENT-LVL III: CPT | Mod: PBBFAC,,, | Performed by: ORTHOPAEDIC SURGERY

## 2023-03-15 PROCEDURE — 1125F PR PAIN SEVERITY QUANTIFIED, PAIN PRESENT: ICD-10-PCS | Mod: CPTII,S$GLB,, | Performed by: ORTHOPAEDIC SURGERY

## 2023-03-15 PROCEDURE — 3288F FALL RISK ASSESSMENT DOCD: CPT | Mod: CPTII,S$GLB,, | Performed by: ORTHOPAEDIC SURGERY

## 2023-03-15 PROCEDURE — 1159F MED LIST DOCD IN RCRD: CPT | Mod: CPTII,S$GLB,, | Performed by: ORTHOPAEDIC SURGERY

## 2023-03-15 PROCEDURE — 3288F PR FALLS RISK ASSESSMENT DOCUMENTED: ICD-10-PCS | Mod: CPTII,S$GLB,, | Performed by: ORTHOPAEDIC SURGERY

## 2023-03-15 PROCEDURE — 1157F ADVNC CARE PLAN IN RCRD: CPT | Mod: CPTII,S$GLB,, | Performed by: ORTHOPAEDIC SURGERY

## 2023-03-15 NOTE — PROGRESS NOTES
Mr Roque returns to clinic today.  Has a history of left forearm pain.  He was sent for a CT scan.  He is here today for follow-up.  States that his forearm is feeling significantly better     Physical exam: Examination left forearm wrist and hand reveals that there is no edema.  There are no skin changes.  Palpation produces only minimal tenderness over the mid forearm dorsally.  There is no other area of tenderness.  He does report grossly intact sensation and capillary refill less than 2 seconds     Radiology:  CT scan of the left forearm reveals mild fat stranding with enlargement of the ulnar nerve at the cubital tunnel.  There are no masses or other abnormalities noted     Assessment: Left forearm pain     Plan:    1. His pain is nearly completely resolved and therefore we will continue to monitor    2. Will follow up with me on a p.r.n. basis

## 2023-03-22 ENCOUNTER — CLINICAL SUPPORT (OUTPATIENT)
Dept: CARDIOLOGY | Facility: HOSPITAL | Age: 78
End: 2023-03-22
Attending: FAMILY MEDICINE
Payer: MEDICARE

## 2023-03-22 ENCOUNTER — HOSPITAL ENCOUNTER (OUTPATIENT)
Dept: RADIOLOGY | Facility: HOSPITAL | Age: 78
Discharge: HOME | End: 2023-03-22
Attending: FAMILY MEDICINE
Payer: MEDICARE

## 2023-03-22 VITALS — WEIGHT: 307 LBS | BODY MASS INDEX: 42.98 KG/M2 | HEIGHT: 71 IN

## 2023-03-22 DIAGNOSIS — Z13.6 SCREENING FOR ISCHEMIC HEART DISEASE: ICD-10-CM

## 2023-03-22 DIAGNOSIS — R06.02 SHORTNESS OF BREATH: ICD-10-CM

## 2023-03-22 DIAGNOSIS — R94.31 ABNORMAL ELECTROCARDIOGRAM: ICD-10-CM

## 2023-03-22 PROCEDURE — 93016 CV STRESS TEST SUPVJ ONLY: CPT | Mod: ,,, | Performed by: INTERNAL MEDICINE

## 2023-03-22 PROCEDURE — 78452 HT MUSCLE IMAGE SPECT MULT: CPT | Mod: 26,,, | Performed by: INTERNAL MEDICINE

## 2023-03-22 PROCEDURE — 93018 PR CARDIAC STRESS TST,INTERP/REPT ONLY: ICD-10-PCS | Mod: ,,, | Performed by: INTERNAL MEDICINE

## 2023-03-22 PROCEDURE — 93018 CV STRESS TEST I&R ONLY: CPT | Mod: ,,, | Performed by: INTERNAL MEDICINE

## 2023-03-22 PROCEDURE — 78452 NUCLEAR STRESS - CARDIOLOGY INTERPRETED (CUPID ONLY): ICD-10-PCS | Mod: 26,,, | Performed by: INTERNAL MEDICINE

## 2023-03-22 PROCEDURE — A9502 TC99M TETROFOSMIN: HCPCS | Mod: PO

## 2023-03-22 PROCEDURE — 78452 HT MUSCLE IMAGE SPECT MULT: CPT | Mod: PO

## 2023-03-22 PROCEDURE — 93017 CV STRESS TEST TRACING ONLY: CPT | Mod: PO

## 2023-03-22 PROCEDURE — 93016 NUCLEAR STRESS - CARDIOLOGY INTERPRETED (CUPID ONLY): ICD-10-PCS | Mod: ,,, | Performed by: INTERNAL MEDICINE

## 2023-03-24 LAB
CV STRESS BASE HR: 72 BPM
DIASTOLIC BLOOD PRESSURE: 70 MMHG
OHS CV CPX 85 PERCENT MAX PREDICTED HEART RATE MALE: 120
OHS CV CPX MAX PREDICTED HEART RATE: 141
OHS CV CPX PATIENT IS FEMALE: 0
OHS CV CPX PATIENT IS MALE: 1
OHS CV CPX PEAK HEAR RATE: 94 BPM
OHS CV CPX PERCENT MAX PREDICTED HEART RATE ACHIEVED: 67
OHS CV CPX RATE PRESSURE PRODUCT PRESENTING: NORMAL
SYSTOLIC BLOOD PRESSURE: 156 MMHG

## 2023-03-29 ENCOUNTER — OFFICE VISIT (OUTPATIENT)
Dept: CARDIOLOGY | Facility: CLINIC | Age: 78
End: 2023-03-29
Payer: MEDICARE

## 2023-03-29 VITALS
SYSTOLIC BLOOD PRESSURE: 151 MMHG | HEART RATE: 60 BPM | WEIGHT: 305.31 LBS | HEIGHT: 71 IN | DIASTOLIC BLOOD PRESSURE: 89 MMHG | BODY MASS INDEX: 42.74 KG/M2

## 2023-03-29 DIAGNOSIS — Z86.73 HISTORY OF CVA (CEREBROVASCULAR ACCIDENT): Primary | Chronic | ICD-10-CM

## 2023-03-29 DIAGNOSIS — G47.33 OSA (OBSTRUCTIVE SLEEP APNEA): Chronic | ICD-10-CM

## 2023-03-29 DIAGNOSIS — R06.02 SHORTNESS OF BREATH: ICD-10-CM

## 2023-03-29 DIAGNOSIS — E78.5 HYPERLIPIDEMIA, UNSPECIFIED HYPERLIPIDEMIA TYPE: Chronic | ICD-10-CM

## 2023-03-29 DIAGNOSIS — I70.0 ABDOMINAL AORTIC ATHEROSCLEROSIS: Chronic | ICD-10-CM

## 2023-03-29 DIAGNOSIS — R94.31 ABNORMAL EKG: ICD-10-CM

## 2023-03-29 DIAGNOSIS — I10 ESSENTIAL HYPERTENSION: Chronic | ICD-10-CM

## 2023-03-29 DIAGNOSIS — I87.2 VENOUS INSUFFICIENCY OF BOTH LOWER EXTREMITIES: ICD-10-CM

## 2023-03-29 PROCEDURE — 1126F PR PAIN SEVERITY QUANTIFIED, NO PAIN PRESENT: ICD-10-PCS | Mod: CPTII,S$GLB,, | Performed by: INTERNAL MEDICINE

## 2023-03-29 PROCEDURE — 99204 PR OFFICE/OUTPT VISIT, NEW, LEVL IV, 45-59 MIN: ICD-10-PCS | Mod: S$GLB,,, | Performed by: INTERNAL MEDICINE

## 2023-03-29 PROCEDURE — 3079F DIAST BP 80-89 MM HG: CPT | Mod: CPTII,S$GLB,, | Performed by: INTERNAL MEDICINE

## 2023-03-29 PROCEDURE — 99999 PR PBB SHADOW E&M-EST. PATIENT-LVL III: ICD-10-PCS | Mod: PBBFAC,,, | Performed by: INTERNAL MEDICINE

## 2023-03-29 PROCEDURE — 3288F FALL RISK ASSESSMENT DOCD: CPT | Mod: CPTII,S$GLB,, | Performed by: INTERNAL MEDICINE

## 2023-03-29 PROCEDURE — 1160F PR REVIEW ALL MEDS BY PRESCRIBER/CLIN PHARMACIST DOCUMENTED: ICD-10-PCS | Mod: CPTII,S$GLB,, | Performed by: INTERNAL MEDICINE

## 2023-03-29 PROCEDURE — 1160F RVW MEDS BY RX/DR IN RCRD: CPT | Mod: CPTII,S$GLB,, | Performed by: INTERNAL MEDICINE

## 2023-03-29 PROCEDURE — 3079F PR MOST RECENT DIASTOLIC BLOOD PRESSURE 80-89 MM HG: ICD-10-PCS | Mod: CPTII,S$GLB,, | Performed by: INTERNAL MEDICINE

## 2023-03-29 PROCEDURE — 1101F PT FALLS ASSESS-DOCD LE1/YR: CPT | Mod: CPTII,S$GLB,, | Performed by: INTERNAL MEDICINE

## 2023-03-29 PROCEDURE — 1101F PR PT FALLS ASSESS DOC 0-1 FALLS W/OUT INJ PAST YR: ICD-10-PCS | Mod: CPTII,S$GLB,, | Performed by: INTERNAL MEDICINE

## 2023-03-29 PROCEDURE — 3077F PR MOST RECENT SYSTOLIC BLOOD PRESSURE >= 140 MM HG: ICD-10-PCS | Mod: CPTII,S$GLB,, | Performed by: INTERNAL MEDICINE

## 2023-03-29 PROCEDURE — 3077F SYST BP >= 140 MM HG: CPT | Mod: CPTII,S$GLB,, | Performed by: INTERNAL MEDICINE

## 2023-03-29 PROCEDURE — 99999 PR PBB SHADOW E&M-EST. PATIENT-LVL III: CPT | Mod: PBBFAC,,, | Performed by: INTERNAL MEDICINE

## 2023-03-29 PROCEDURE — 1159F PR MEDICATION LIST DOCUMENTED IN MEDICAL RECORD: ICD-10-PCS | Mod: CPTII,S$GLB,, | Performed by: INTERNAL MEDICINE

## 2023-03-29 PROCEDURE — 1159F MED LIST DOCD IN RCRD: CPT | Mod: CPTII,S$GLB,, | Performed by: INTERNAL MEDICINE

## 2023-03-29 PROCEDURE — 3288F PR FALLS RISK ASSESSMENT DOCUMENTED: ICD-10-PCS | Mod: CPTII,S$GLB,, | Performed by: INTERNAL MEDICINE

## 2023-03-29 PROCEDURE — 1157F ADVNC CARE PLAN IN RCRD: CPT | Mod: CPTII,S$GLB,, | Performed by: INTERNAL MEDICINE

## 2023-03-29 PROCEDURE — 1157F PR ADVANCE CARE PLAN OR EQUIV PRESENT IN MEDICAL RECORD: ICD-10-PCS | Mod: CPTII,S$GLB,, | Performed by: INTERNAL MEDICINE

## 2023-03-29 PROCEDURE — 99204 OFFICE O/P NEW MOD 45 MIN: CPT | Mod: S$GLB,,, | Performed by: INTERNAL MEDICINE

## 2023-03-29 PROCEDURE — 1126F AMNT PAIN NOTED NONE PRSNT: CPT | Mod: CPTII,S$GLB,, | Performed by: INTERNAL MEDICINE

## 2023-03-29 NOTE — PROGRESS NOTES
Subjective:    Patient ID:  Chinedu Roque is a 77 y.o. male patient here for evaluation Abnormal ECG      History of Present Illness:  New patient cardiac evaluation.  History of abnormal EKG, baseline EKG with first-degree AV block, nonspecific interventricular conduction delay.  Patient is seen in the emergency room so months ago with arm pain, ACS was ruled out he was referred for further testing via nuclear imaging.  On arrival for nuclear study baseline tracings revealed intermittent second-degree AV block, regadenoson study canceled and patient referred for further evaluation.  He denies chest pain.  No syncope/presyncope.  No shortness of breath.  No prior history of known structural ischemic or arrhythmic heart disease.  Risk factors include hypertension dyslipidemia.    No diabetes mellitus, no tobacco use.  History of CA.  Remote history of CVA in the 1980s non recurrent no neurologic sequelae.  No GI bleed or blood transfusions.  No DVT PE.             Review of patient's allergies indicates:  No Known Allergies    Past Medical History:   Diagnosis Date    Anticoagulant long-term use     ASA 81 mg    Arthritis     cervical    BPH (benign prostatic hyperplasia) 03/02/2012    Chronic left shoulder pain     Compression fracture of L2     DDD (degenerative disc disease), lumbar     HTN (hypertension) 03/02/2012    Hx of colonic polyps 2013    Low back pain     Morbid obesity with BMI of 40.0-44.9, adult 03/18/2014    Seasonal allergies     Sleep apnea     compliant with CPAP    Stroke 03/1986     Past Surgical History:   Procedure Laterality Date    APPENDECTOMY      COLONOSCOPY N/A 6/6/2022    Procedure: COLONOSCOPY;  Surgeon: Jose Bello MD;  Location: [facility];  Service: Endoscopy;  Laterality: N/A;    EPIDURAL BLOCK INJECTION  2015    cervical    EPIDURAL STEROID INJECTION INTO LUMBAR SPINE N/A 6/5/2019    Procedure: Injection-steroid-epidural-lumbar L5/S1 interlaminar;  Surgeon: Riley CASTLE  MD [place];  Location: [facility];  Service: Pain Management;  Laterality: N/A;    EPIDURAL STEROID INJECTION INTO LUMBAR SPINE N/A 9/13/2019    Procedure: Injection-steroid-epidural-lumbar;  Surgeon: Riley Segura MD;  Location: [facility];  Service: Pain Management;  Laterality: N/A;  L5/S1 interlaminar to the right    EPIDURAL STEROID INJECTION INTO LUMBAR SPINE N/A 6/2/2020    Procedure: Injection-steroid-epidural-lumbar L5/S1 to right;  Surgeon: Riley Segura MD;  Location: [facility];  Service: Pain Management;  Laterality: N/A;    Facet Steroid injection       Pain management    HERNIA REPAIR      Ventral    INJECTION OF ANESTHETIC AGENT AROUND MEDIAL BRANCH NERVES INNERVATING LUMBAR FACET JOINT Right 3/21/2023    Procedure: Block-nerve-medial branch-lumbar L3/4, L4/5, L5/S1;  Surgeon: Riley Segura MD;  Location: [facility];  Service: Pain Management;  Laterality: Right;    INSERTION OF DORSAL COLUMN NERVE STIMULATOR FOR TRIAL N/A 2/10/2021    Procedure: INSERTION, NEUROSTIMULATOR, SPINAL CORD, DORSAL COLUMN, FOR TRIAL;  Surgeon: Riley Segura MD;  Location: [facility];  Service: Pain Management;  Laterality: N/A;    INSERTION OF NEUROSTIMULATOR OF DORSAL COLUMN OF SPINAL CORD N/A 3/1/2021    Procedure: INSERTION, NEUROSTIMULATOR, SPINAL CORD, DORSAL COLUMN;  Surgeon: Riley Segura MD;  Location: [facility];  Service: Pain Management;  Laterality: N/A;    KNEE SURGERY      bilateral    RADIOFREQUENCY ABLATION  2015    lumbar nerve    RADIOFREQUENCY ABLATION OF LUMBAR MEDIAL BRANCH NERVE AT SINGLE LEVEL Right 4/11/2019    Procedure: Radiofrequency Ablation, Nerve, Spinal, Lumbar, Medial Branch, L1,2,3,4,5;  Surgeon: Riley Segura MD;  Location: [facility];  Service: Pain Management;  Laterality: Right;    TRANSFORAMINAL EPIDURAL INJECTION OF STEROID Right 11/13/2020    Procedure: Injection,steroid,epidural,transforaminal approach, l3/4 and l5/s1;  Surgeon: Riley Segura MD;  Location:  [facility];  Service: Pain Management;  Laterality: Right;    TRANSFORAMINAL EPIDURAL INJECTION OF STEROID Left 2021    Procedure: Injection,steroid,epidural,transforaminal approach L5/S1;  Surgeon: Riley Segura MD;  Location: [facility];  Service: Pain Management;  Laterality: Left;    TRANSFORAMINAL EPIDURAL INJECTION OF STEROID Right 2022    Procedure: Injection,steroid,epidural,transforaminal approach L5/S1;  Surgeon: Riley Segura MD;  Location: [facility];  Service: Pain Management;  Laterality: Right;    TRANSFORAMINAL EPIDURAL INJECTION OF STEROID Right 10/25/2022    Procedure: Injection,steroid,epidural,transforaminal approach L2/3 and L3/4;  Surgeon: Riley Segura MD;  Location: [facility];  Service: Pain Management;  Laterality: Right;    VERTEBROPLASTY       Social History     Tobacco Use    Smoking status: Former     Packs/day: 1.50     Years: 24.00     Pack years: 36.00     Types: Cigarettes     Start date: 10/21/1959     Quit date: 3/19/1983     Years since quittin.0     Passive exposure: Past    Smokeless tobacco: Never   Substance Use Topics    Alcohol use: No    Drug use: No        Review of Systems:    As noted in HPI in addition         REVIEW OF SYSTEMS  Review of Systems   Constitutional: Negative for decreased appetite, diaphoresis, night sweats, weight gain and weight loss.   HENT:  Negative for nosebleeds and odynophagia.    Eyes:  Negative for double vision and photophobia.   Cardiovascular:  Negative for chest pain, claudication, cyanosis, dyspnea on exertion, irregular heartbeat, leg swelling, near-syncope, orthopnea, palpitations, paroxysmal nocturnal dyspnea and syncope.   Respiratory:  Negative for cough, hemoptysis, shortness of breath and wheezing.    Hematologic/Lymphatic: Negative for adenopathy.   Skin:  Negative for flushing, skin cancer and suspicious lesions.   Musculoskeletal:  Negative for gout, myalgias and neck pain.   Gastrointestinal:  Negative for  abdominal pain, heartburn, hematemesis and hematochezia.   Genitourinary:  Negative for bladder incontinence, hesitancy and nocturia.   Neurological:  Negative for focal weakness, headaches, light-headedness and paresthesias.   Psychiatric/Behavioral:  Negative for memory loss and substance abuse.      Objective:        Vitals:    03/29/23 1255   BP: (!) 151/89   Pulse: 60       Lab Results   Component Value Date    WBC 5.86 03/06/2023    HGB 15.4 03/06/2023    HCT 46.6 03/06/2023     03/06/2023    CHOL 159 03/06/2023    TRIG 84 03/06/2023    HDL 44 03/06/2023    ALT 32 03/06/2023    AST 21 03/06/2023     03/06/2023    K 4.4 03/06/2023     03/06/2023    CREATININE 1.0 03/06/2023    BUN 16 03/06/2023    CO2 26 03/06/2023    TSH 1.300 04/23/2016    PSA 4.5 (H) 03/22/2022    INR 1.1 12/15/2022    HGBA1C 5.7 (H) 03/06/2023      CARDIOGRAM RESULTS  No results found for this or any previous visit.        CURRENT/PREVIOUS VISIT EKG  Results for orders placed or performed during the hospital encounter of 02/23/23   EKG 12-lead (Chest Pain) Age >30    Collection Time: 02/23/23 11:19 PM    Narrative    Test Reason : R07.9,    Vent. Rate : 073 BPM     Atrial Rate : 073 BPM     P-R Int : 284 ms          QRS Dur : 124 ms      QT Int : 384 ms       P-R-T Axes : 075 -39 029 degrees     QTc Int : 423 ms    Sinus rhythm with 1st degree A-V block  Left axis deviation  Nonspecific intraventricular conduction delay  Abnormal ECG  Confirmed by Kerri Lee ([id number]) on 2/28/2023 6:42:13 PM    Referred By: ALEX   SELF           Confirmed By:Kerri Lee     No valid procedures specified.   Results for orders placed during the hospital encounter of 03/22/23    Nuclear Stress - Cardiology Interpreted    Interpretation Summary    The ECG portion of the study is abnormal but not diagnostic.    The test was stopped because the patient experienced A-V block.    2nd degree AV Block confirmed by JAMEL Agee  images obtained only.  Apical thinning noted.  No stress images.  Abnormal baseline EKG is reported.  Inconclusive study.    No valid procedures specified.    PHYSICAL EXAM  CONSTITUTIONAL: Well built, well nourished in no apparent distress  NECK: no carotid bruit, no JVD  LUNGS: CTA  CHEST WALL: no tenderness,  HEART: regular rate and rhythm, S1, S2 distant no murmur, click, rub or gallop   ABDOMEN: soft, non-tender; bowel sounds normal; no masses,  no organomegaly  EXTREMITIES: Extremities normal, no edema, no calf tenderness noted  VASCULAR EXAM: 2 PLUS UPPER AND LOWER EXT PULSES  NEURO: AAO X 3, NO ACUTE FOCAL OR LATERALIZING FINDINGS    I HAVE REVIEWED :    The vital signs, nurses notes, and all the pertinent radiology and labs.         Current Outpatient Medications   Medication Instructions    acetaminophen (TYLENOL) 1,000 mg, Oral, Every 6 hours PRN    aspirin (ECOTRIN) 81 mg, Oral, Daily,      finasteride (PROSCAR) 5 mg, Oral, Daily    hydroCHLOROthiazide (HYDRODIURIL) 12.5 MG Tab TAKE 1 TABLET BY MOUTH EVERY DAY    losartan (COZAAR) 100 MG tablet TAKE 1 TABLET BY MOUTH EVERY DAY    metoprolol succinate (TOPROL-XL) 100 MG 24 hr tablet TAKE 1 TABLET BY MOUTH EVERY DAY    tamsulosin (FLOMAX) 0.4 mg Cap TAKE 1 CAPSULE BY MOUTH EVERY DAY          Assessment:   Intermittent second-degree AV block discovered on EKG monitoring for regadenoson study which was ultimately canceled.  Hypertension, dyslipidemia  Abnormal baseline EKG with normal sinus rhythm.  First-degree AV block, nonspecific interventricular conduction delay.        Plan:   Echo, Holter monitor, CTA coronaries.          No follow-ups on file.

## 2023-04-04 ENCOUNTER — TELEPHONE (OUTPATIENT)
Dept: PRIMARY CARE CLINIC | Facility: CLINIC | Age: 78
End: 2023-04-04
Payer: MEDICARE

## 2023-04-04 NOTE — TELEPHONE ENCOUNTER
LM with pt that appt time needed to be moved down to 1020 due to Dr. Tyler being double booked. Informed pt that he could call me back if this change did not work for him. Appointment letter placed in the mail for pt.

## 2023-04-17 ENCOUNTER — OFFICE VISIT (OUTPATIENT)
Dept: PRIMARY CARE CLINIC | Facility: CLINIC | Age: 78
End: 2023-04-17
Payer: MEDICARE

## 2023-04-17 VITALS
WEIGHT: 309.5 LBS | HEIGHT: 71 IN | BODY MASS INDEX: 43.33 KG/M2 | SYSTOLIC BLOOD PRESSURE: 118 MMHG | RESPIRATION RATE: 18 BRPM | DIASTOLIC BLOOD PRESSURE: 74 MMHG | OXYGEN SATURATION: 97 % | HEART RATE: 70 BPM

## 2023-04-17 DIAGNOSIS — I10 ESSENTIAL HYPERTENSION: Primary | Chronic | ICD-10-CM

## 2023-04-17 DIAGNOSIS — R94.31 ABNORMAL EKG: ICD-10-CM

## 2023-04-17 DIAGNOSIS — M51.36 DDD (DEGENERATIVE DISC DISEASE), LUMBAR: Chronic | ICD-10-CM

## 2023-04-17 DIAGNOSIS — M47.816 FACET HYPERTROPHY OF LUMBAR REGION: ICD-10-CM

## 2023-04-17 PROCEDURE — 99214 OFFICE O/P EST MOD 30 MIN: CPT | Mod: S$GLB,,, | Performed by: FAMILY MEDICINE

## 2023-04-17 PROCEDURE — 3078F DIAST BP <80 MM HG: CPT | Mod: CPTII,S$GLB,, | Performed by: FAMILY MEDICINE

## 2023-04-17 PROCEDURE — 1157F PR ADVANCE CARE PLAN OR EQUIV PRESENT IN MEDICAL RECORD: ICD-10-PCS | Mod: CPTII,S$GLB,, | Performed by: FAMILY MEDICINE

## 2023-04-17 PROCEDURE — 1160F PR REVIEW ALL MEDS BY PRESCRIBER/CLIN PHARMACIST DOCUMENTED: ICD-10-PCS | Mod: CPTII,S$GLB,, | Performed by: FAMILY MEDICINE

## 2023-04-17 PROCEDURE — 1101F PT FALLS ASSESS-DOCD LE1/YR: CPT | Mod: CPTII,S$GLB,, | Performed by: FAMILY MEDICINE

## 2023-04-17 PROCEDURE — 3288F PR FALLS RISK ASSESSMENT DOCUMENTED: ICD-10-PCS | Mod: CPTII,S$GLB,, | Performed by: FAMILY MEDICINE

## 2023-04-17 PROCEDURE — 3074F SYST BP LT 130 MM HG: CPT | Mod: CPTII,S$GLB,, | Performed by: FAMILY MEDICINE

## 2023-04-17 PROCEDURE — 1101F PR PT FALLS ASSESS DOC 0-1 FALLS W/OUT INJ PAST YR: ICD-10-PCS | Mod: CPTII,S$GLB,, | Performed by: FAMILY MEDICINE

## 2023-04-17 PROCEDURE — 3078F PR MOST RECENT DIASTOLIC BLOOD PRESSURE < 80 MM HG: ICD-10-PCS | Mod: CPTII,S$GLB,, | Performed by: FAMILY MEDICINE

## 2023-04-17 PROCEDURE — 99999 PR PBB SHADOW E&M-EST. PATIENT-LVL IV: ICD-10-PCS | Mod: PBBFAC,,, | Performed by: FAMILY MEDICINE

## 2023-04-17 PROCEDURE — 1125F PR PAIN SEVERITY QUANTIFIED, PAIN PRESENT: ICD-10-PCS | Mod: CPTII,S$GLB,, | Performed by: FAMILY MEDICINE

## 2023-04-17 PROCEDURE — 3074F PR MOST RECENT SYSTOLIC BLOOD PRESSURE < 130 MM HG: ICD-10-PCS | Mod: CPTII,S$GLB,, | Performed by: FAMILY MEDICINE

## 2023-04-17 PROCEDURE — 99214 PR OFFICE/OUTPT VISIT, EST, LEVL IV, 30-39 MIN: ICD-10-PCS | Mod: S$GLB,,, | Performed by: FAMILY MEDICINE

## 2023-04-17 PROCEDURE — 1159F MED LIST DOCD IN RCRD: CPT | Mod: CPTII,S$GLB,, | Performed by: FAMILY MEDICINE

## 2023-04-17 PROCEDURE — 3288F FALL RISK ASSESSMENT DOCD: CPT | Mod: CPTII,S$GLB,, | Performed by: FAMILY MEDICINE

## 2023-04-17 PROCEDURE — 99999 PR PBB SHADOW E&M-EST. PATIENT-LVL IV: CPT | Mod: PBBFAC,,, | Performed by: FAMILY MEDICINE

## 2023-04-17 PROCEDURE — 1160F RVW MEDS BY RX/DR IN RCRD: CPT | Mod: CPTII,S$GLB,, | Performed by: FAMILY MEDICINE

## 2023-04-17 PROCEDURE — 1159F PR MEDICATION LIST DOCUMENTED IN MEDICAL RECORD: ICD-10-PCS | Mod: CPTII,S$GLB,, | Performed by: FAMILY MEDICINE

## 2023-04-17 PROCEDURE — 1125F AMNT PAIN NOTED PAIN PRSNT: CPT | Mod: CPTII,S$GLB,, | Performed by: FAMILY MEDICINE

## 2023-04-17 PROCEDURE — 1157F ADVNC CARE PLAN IN RCRD: CPT | Mod: CPTII,S$GLB,, | Performed by: FAMILY MEDICINE

## 2023-04-17 RX ORDER — NAPROXEN SODIUM 220 MG
220 TABLET ORAL 2 TIMES DAILY WITH MEALS
COMMUNITY
End: 2023-06-02 | Stop reason: CLARIF

## 2023-04-17 RX ORDER — NYSTATIN 100000 U/G
OINTMENT TOPICAL 2 TIMES DAILY
Qty: 15 G | Refills: 1 | Status: ON HOLD | OUTPATIENT
Start: 2023-04-17 | End: 2023-06-29

## 2023-04-17 NOTE — ASSESSMENT & PLAN NOTE
He is still having significant lower back pain.  He is consulted with pain management.  He did undergo medial branch nerve block.  He did report some improvement, but not as much as he was hoping.  He may need a 2nd procedure to show benefit before insurance would agree to an ablation.  He will reach back out to pain management.

## 2023-04-17 NOTE — PROGRESS NOTES
THIS DOCUMENT WAS MADE IN PART WITH VOICE RECOGNITION SOFTWARE.  OCCASIONALLY THIS SOFTWARE WILL MISINTERPRET WORDS OR PHRASES.      Primary Care Provider Appointment   [facility]       Patient ID: Chinedu Roque is a 77 y.o. male.    ASSESSMENT/PLAN by Problem List:    1. Essential hypertension  Assessment & Plan:  This remains stable.  Continue current medications, maintain low-sodium diet.  Try to increase physical activity as tolerated although he is limited because of his back pain.      2. DDD (degenerative disc disease), lumbar  Assessment & Plan:  He is still having significant lower back pain.  He is consulted with pain management.  He did undergo medial branch nerve block.  He did report some improvement, but not as much as he was hoping.  He may need a 2nd procedure to show benefit before insurance would agree to an ablation.  He will reach back out to pain management.      3. Facet hypertrophy of lumbar region    4. Abnormal EKG  recent abnormal EKG and atypical cardiac symptoms in a patient with high risk.  Was sent for a nuclear stress test however patient developed in AV block so the stress test was ended prematurely.  He has consulted with Cardiology.  A CTA has been scheduled.    Other orders  -     nystatin (MYCOSTATIN) ointment; Apply topically 2 (two) times daily.  Dispense: 15 g; Refill: 1         Follow Up:  3-4 months    Subjective:     Chief Complaint   Patient presents with    Hypertension     3 month f/u visit      I have reviewed the information entered by the ancillary staff regarding the chief complaint as well as the related history.    HPI    Patient is a/an 77 y.o.  male     He is here today with his daughter.  Following up with recent abnormal EKG, hypertension, and ongoing back pain.  See above for details addressed today    For complete problem list, past medical history, surgical history, social history, etc., see appropriate section in the  "electronic medical record    Review of Systems   HENT: Negative.     Respiratory: Negative.     Cardiovascular: Negative.    Gastrointestinal: Negative.    Genitourinary: Negative.    Musculoskeletal:  Positive for arthralgias and back pain.     Objective     Physical Exam  Constitutional:       General: He is not in acute distress.     Appearance: He is well-developed. He is not diaphoretic.   HENT:      Head: Normocephalic and atraumatic.   Eyes:      General: No scleral icterus.     Conjunctiva/sclera: Conjunctivae normal.   Pulmonary:      Effort: Pulmonary effort is normal. No respiratory distress.   Neurological:      General: No focal deficit present.      Mental Status: He is alert and oriented to person, place, and time.      Coordination: Coordination normal.   Psychiatric:         Behavior: Behavior normal.     Vitals:    04/17/23 1015   BP: 118/74   BP Location: Right arm   Patient Position: Sitting   BP Method: Large (Manual)   Pulse: 70   Resp: 18   SpO2: 97%   Weight: (!) 140.4 kg (309 lb 8.4 oz)   Height: 5' 11" (1.803 m)       "

## 2023-04-17 NOTE — ASSESSMENT & PLAN NOTE
This remains stable.  Continue current medications, maintain low-sodium diet.  Try to increase physical activity as tolerated although he is limited because of his back pain.

## 2023-04-20 ENCOUNTER — PATIENT MESSAGE (OUTPATIENT)
Dept: PAIN MEDICINE | Facility: CLINIC | Age: 78
End: 2023-04-20
Payer: MEDICARE

## 2023-04-28 ENCOUNTER — TELEPHONE (OUTPATIENT)
Dept: CARDIOLOGY | Facility: CLINIC | Age: 78
End: 2023-04-28
Payer: MEDICARE

## 2023-05-01 ENCOUNTER — OFFICE VISIT (OUTPATIENT)
Dept: PAIN MEDICINE | Facility: CLINIC | Age: 78
End: 2023-05-01
Payer: MEDICARE

## 2023-05-01 VITALS
WEIGHT: 305.31 LBS | SYSTOLIC BLOOD PRESSURE: 141 MMHG | HEIGHT: 71 IN | DIASTOLIC BLOOD PRESSURE: 64 MMHG | BODY MASS INDEX: 42.74 KG/M2 | HEART RATE: 56 BPM

## 2023-05-01 DIAGNOSIS — M51.36 DDD (DEGENERATIVE DISC DISEASE), LUMBAR: Primary | ICD-10-CM

## 2023-05-01 DIAGNOSIS — G89.4 CHRONIC PAIN SYNDROME: ICD-10-CM

## 2023-05-01 DIAGNOSIS — M54.16 LUMBAR RADICULITIS: ICD-10-CM

## 2023-05-01 PROCEDURE — 1157F ADVNC CARE PLAN IN RCRD: CPT | Mod: CPTII,S$GLB,, | Performed by: PHYSICIAN ASSISTANT

## 2023-05-01 PROCEDURE — 3077F PR MOST RECENT SYSTOLIC BLOOD PRESSURE >= 140 MM HG: ICD-10-PCS | Mod: CPTII,S$GLB,, | Performed by: PHYSICIAN ASSISTANT

## 2023-05-01 PROCEDURE — 3077F SYST BP >= 140 MM HG: CPT | Mod: CPTII,S$GLB,, | Performed by: PHYSICIAN ASSISTANT

## 2023-05-01 PROCEDURE — 99213 OFFICE O/P EST LOW 20 MIN: CPT | Mod: S$GLB,,, | Performed by: PHYSICIAN ASSISTANT

## 2023-05-01 PROCEDURE — 1159F PR MEDICATION LIST DOCUMENTED IN MEDICAL RECORD: ICD-10-PCS | Mod: CPTII,S$GLB,, | Performed by: PHYSICIAN ASSISTANT

## 2023-05-01 PROCEDURE — 1159F MED LIST DOCD IN RCRD: CPT | Mod: CPTII,S$GLB,, | Performed by: PHYSICIAN ASSISTANT

## 2023-05-01 PROCEDURE — 1157F PR ADVANCE CARE PLAN OR EQUIV PRESENT IN MEDICAL RECORD: ICD-10-PCS | Mod: CPTII,S$GLB,, | Performed by: PHYSICIAN ASSISTANT

## 2023-05-01 PROCEDURE — 3078F DIAST BP <80 MM HG: CPT | Mod: CPTII,S$GLB,, | Performed by: PHYSICIAN ASSISTANT

## 2023-05-01 PROCEDURE — 1125F PR PAIN SEVERITY QUANTIFIED, PAIN PRESENT: ICD-10-PCS | Mod: CPTII,S$GLB,, | Performed by: PHYSICIAN ASSISTANT

## 2023-05-01 PROCEDURE — 3078F PR MOST RECENT DIASTOLIC BLOOD PRESSURE < 80 MM HG: ICD-10-PCS | Mod: CPTII,S$GLB,, | Performed by: PHYSICIAN ASSISTANT

## 2023-05-01 PROCEDURE — 99999 PR PBB SHADOW E&M-EST. PATIENT-LVL III: CPT | Mod: PBBFAC,,, | Performed by: PHYSICIAN ASSISTANT

## 2023-05-01 PROCEDURE — 3288F PR FALLS RISK ASSESSMENT DOCUMENTED: ICD-10-PCS | Mod: CPTII,S$GLB,, | Performed by: PHYSICIAN ASSISTANT

## 2023-05-01 PROCEDURE — 99213 PR OFFICE/OUTPT VISIT, EST, LEVL III, 20-29 MIN: ICD-10-PCS | Mod: S$GLB,,, | Performed by: PHYSICIAN ASSISTANT

## 2023-05-01 PROCEDURE — 1101F PT FALLS ASSESS-DOCD LE1/YR: CPT | Mod: CPTII,S$GLB,, | Performed by: PHYSICIAN ASSISTANT

## 2023-05-01 PROCEDURE — 3288F FALL RISK ASSESSMENT DOCD: CPT | Mod: CPTII,S$GLB,, | Performed by: PHYSICIAN ASSISTANT

## 2023-05-01 PROCEDURE — 1101F PR PT FALLS ASSESS DOC 0-1 FALLS W/OUT INJ PAST YR: ICD-10-PCS | Mod: CPTII,S$GLB,, | Performed by: PHYSICIAN ASSISTANT

## 2023-05-01 PROCEDURE — 1160F RVW MEDS BY RX/DR IN RCRD: CPT | Mod: CPTII,S$GLB,, | Performed by: PHYSICIAN ASSISTANT

## 2023-05-01 PROCEDURE — 1160F PR REVIEW ALL MEDS BY PRESCRIBER/CLIN PHARMACIST DOCUMENTED: ICD-10-PCS | Mod: CPTII,S$GLB,, | Performed by: PHYSICIAN ASSISTANT

## 2023-05-01 PROCEDURE — 1125F AMNT PAIN NOTED PAIN PRSNT: CPT | Mod: CPTII,S$GLB,, | Performed by: PHYSICIAN ASSISTANT

## 2023-05-01 PROCEDURE — 99999 PR PBB SHADOW E&M-EST. PATIENT-LVL III: ICD-10-PCS | Mod: PBBFAC,,, | Performed by: PHYSICIAN ASSISTANT

## 2023-05-01 RX ORDER — GABAPENTIN 300 MG/1
300 CAPSULE ORAL 2 TIMES DAILY
Qty: 60 CAPSULE | Refills: 2 | Status: SHIPPED | OUTPATIENT
Start: 2023-05-01 | End: 2023-09-05 | Stop reason: SDUPTHER

## 2023-05-05 ENCOUNTER — CLINICAL SUPPORT (OUTPATIENT)
Dept: CARDIOLOGY | Facility: HOSPITAL | Age: 78
End: 2023-05-05
Attending: INTERNAL MEDICINE
Payer: MEDICARE

## 2023-05-05 VITALS
BODY MASS INDEX: 42.7 KG/M2 | DIASTOLIC BLOOD PRESSURE: 64 MMHG | SYSTOLIC BLOOD PRESSURE: 141 MMHG | HEIGHT: 71 IN | WEIGHT: 305 LBS

## 2023-05-05 DIAGNOSIS — Z86.73 HISTORY OF CVA (CEREBROVASCULAR ACCIDENT): ICD-10-CM

## 2023-05-05 DIAGNOSIS — I10 ESSENTIAL HYPERTENSION: ICD-10-CM

## 2023-05-05 DIAGNOSIS — E78.5 HYPERLIPIDEMIA, UNSPECIFIED HYPERLIPIDEMIA TYPE: ICD-10-CM

## 2023-05-05 DIAGNOSIS — G47.33 OSA (OBSTRUCTIVE SLEEP APNEA): ICD-10-CM

## 2023-05-05 DIAGNOSIS — R94.31 ABNORMAL EKG: ICD-10-CM

## 2023-05-05 DIAGNOSIS — R06.02 SHORTNESS OF BREATH: ICD-10-CM

## 2023-05-05 DIAGNOSIS — I70.0 ABDOMINAL AORTIC ATHEROSCLEROSIS: ICD-10-CM

## 2023-05-05 PROCEDURE — 93306 ECHO (CUPID ONLY): ICD-10-PCS | Mod: 26,,, | Performed by: INTERNAL MEDICINE

## 2023-05-05 PROCEDURE — 93306 TTE W/DOPPLER COMPLETE: CPT | Mod: 26,,, | Performed by: INTERNAL MEDICINE

## 2023-05-05 PROCEDURE — 93227 HOLTER MONITOR - 48 HOUR (CUPID ONLY): ICD-10-PCS | Mod: ,,, | Performed by: INTERNAL MEDICINE

## 2023-05-05 PROCEDURE — 93306 TTE W/DOPPLER COMPLETE: CPT | Mod: PO

## 2023-05-05 PROCEDURE — 93227 XTRNL ECG REC<48 HR R&I: CPT | Mod: ,,, | Performed by: INTERNAL MEDICINE

## 2023-05-05 PROCEDURE — 93225 XTRNL ECG REC<48 HRS REC: CPT | Mod: PO

## 2023-05-07 NOTE — PROGRESS NOTES
This note was completed with dictation software and grammatical errors may exist.    CC:Back pain    HPI: The patient is a 77 -year-old man with a history of hypertension, obesity and low back pain who presents in referral from Dr. Winters for chronic right low back pain.  He is status post right L3/4, L4/5 and L5/S1 diagnostic medial branch nerve block with 0% relief on 03/21/2023.  He returns in follow-up today with low back pain.  He states the majority pain is in his left low back but he also has some possible tenderness and pain around his IPG site.  He denies having any fever or chills, denies any radicular pain at this time.    Pain intervention history: He done physical therapy in January, 2014 with moderate relief but not sustained.  He takes Relafen, tramadol, baclofen and chlorzoxazone as needed with mild relief.  He had undergone what sounds like a series of epidurals several years ago with no major relief. The patient is status post a right side L2/3, L3/4, L4/5 and L5/S1 facet joint injection on 10/28/14 with 50% relief of his back pain for 1 month.  He is status post radiofrequency ablation of the right L1, 2, 3, 4 and 5 medial branch nerves on 3/18/15 with 75% relief.  He is status post C7-T1 cervical interlaminar epidural steroid injection on 5/11/15 with 70% relief.  He is status post right L1, 2, 3, 4 and 5 medial branch radiofrequency ablation on 7/5/16 with 50% relief.   He is status post right L1, 2, 3, 4 and 5 medial branch radiofrequency ablation on 10/17/17 with about 50% relief additionally, later reported almost complete relief lasting a year.  He is status post right L1, 2, 3, 4 and 5 medial branch radiofrequency ablation on 04/11/2019 with mild relief.   He is status post L5/S1 interlaminar epidural steroid injection on 06/05/2019 with 80% relief.  He is status post L5/S1 interlaminar epidural steroid injection on 09/13/2019 with 50% relief lasting about 6 months.  He is status post  "L5/S1 interlaminar epidural steroid injection to the right on 06/02/2020 with minimal relief.  He is status post right L3/4 and L5/S1 transforaminal epidural steroid injection on 11/13/2020 with 0% relief.   He is status post left L5/S1 transforaminal epidural steroid injection on 06/24/2021 with 50% relief. He is status post right L5/S1 transforaminal LEATHA on 09/22/2022 with no relief. He is status post right L2/3 and L3/4 transforaminal LEATHA on 10/25/2022 with no relief.  He is status post right L3/4, L4/5 and L5/S1 diagnostic medial branch nerve block with 0% relief on 03/21/2023.     Spine surgeries:    Antineuropathics:  NSAIDs:  Physical therapy:  Antidepressants:  Muscle relaxers:  Opioids:  Hydrocodone 7.5   Antiplatelets/Anticoagulants:        ROS:He reports back pain.  Balance of review of systems is negative.    Medical, surgical, family and social history reviewed elsewhere in record.    Medications/Allergies: See med card    Vitals:    05/01/23 1054   BP: (!) 141/64   Pulse: (!) 56   Weight: (!) 138.5 kg (305 lb 5.4 oz)   Height: 5' 11" (1.803 m)   PainSc:   4     Body mass index is 42.59 kg/m².        Physical exam:  Gen: A and O x3, pleasant, well-groomed  Skin: No rashes or obvious lesions  HEENT: PERRLA, no obvious deformities on ears or in canals.   CVS: Regular rate and rhythm, normal S1 and S2, no murmurs.  Resp: Clear to auscultation bilaterally, no wheezes or rales.  Abdomen: Soft, NT/ND, normal bowel sounds present.  Musculoskeletal:    Neuro:  Lower extremities: 5/5 strength bilaterally  Reflexes: Patellar 1+, Achilles 0+ bilaterally.  Sensory:  Intact and symmetrical to light touch and pinprick in L2-S1 dermatomes bilaterally.    Lumbar spine:  Lumbar spine: ROM is moderately reduced with flexion extension and oblique extension with increased pain on only on extension especially oblique extension to the right side.  Myofascial exam: He has slight tenderness to palpation diffusely around the " IPG site but also in the lower right lumbar region, slight tenderness to palpation in the left mid lumbar region.      Imagin14 Xray L-spine  There is diffuse osteopenia. Mild to moderate chronic compression fracture with anterior wedging and evidence of vertebroplasty at L2. minimal left convex curvature in the upper lumbar region. No spondylolisthesis. Mild concavity of the superior endplate of L4 which could be small compression fracture or Schmorl's node. No additional fracture.   There is moderate degenerative change within the lumbar spine with anterior osteophyte formation most prominent at L4-L5 and L2- L3 and L1 - L2, also present in the lower thoracic region with flowing ossification anteriorly suggesting diffuse idiopathic sclerotic hyperostosis. There is also mild decreased intervertebral disk space height and endplate sclerosis the lower thoracic and upper lumbar regions. Due to the degree of osseous mineralization, it is difficult to evaluate for any possible pars defects. Moderate sclerosis suggesting facet arthropathy in the lumbar spine present and there is mild sclerosis, likely degenerative in nature involving the sacroiliac joints.    10/7/14 MRI lumbar spine: At L1/2 there is mild spinal stenosis secondary to facet hypertrophy and disc bulging.  At L2/3 there is minimal spinal stenosis secondary to retropulsion of L2 compression fracture, appearance of prior kyphoplasty present.  There is minimal disc bulging but there is also some facet hypertrophy at this level.  At L3/4 there is facet and ligamentum hypertrophy, minimal disc bulging causing mild spinal stenosis and neuroforaminal narrowing on the left greater than the right side.  At L4/5 there is moderate hypertrophic facet and ligamentum hypertrophy with mild left foraminal narrowing compared to the right side.  There is no spinal stenosis at this level.  At L5/S1 there is a broad-based disc bulge that extends to the left side more  than the right side along with asymmetric left sided facet hypertrophy and ligamentum flavum hypertrophy causing moderate left foraminal stenosis.    4/15/15 outside open MRI cervical spine [facility]  C3-4 posterior disc bulge producing mild bilateral foraminal narrowing  C4-5 posterior disc bulge producing mild bilateral foraminal narrowing, possible tiny annular tear in the posterior disc, anterior thecal sac deformity with no evidence of spinal stenosis  C5-6 circumferential disc bulge producing moderate to severe bilateral foraminal narrowing, effacement of the bilateral lateral recesses worse to the right, anterior thecal sac deformity with effacement of the anterior subarachnoid space, mild spinal canal stenosis  C6-7 circumferential disc bulge producing moderate to severe bilateral foraminal narrowing with mild spinal canal stenosis  C7-T1 not completely included but appears to have a circumferential disc bulge with shallow midline disc protrusion with possible mild spinal canal stenosis and bilateral foraminal narrowing    CT L-spine:  No acute fracture or traumatic subluxation.  Alignment is relatively maintained.  Vertebroplasty changes are at L2.  There is mild, chronic appearing loss of height of the L4 vertebral body.  There is partial osseous fusion at L2-3.  Multilevel facet hypertrophy, osteophyte formation and disc space narrowing are present.   L1-2: Facet hypertrophy with mild right neural foraminal and spinal canal stenosis.   L2-3: Posterior disc osteophyte and facet hypertrophy results in mild to moderate right and mild left neural foraminal stenosis with mild to moderate spinal canal stenosis.   L3-4: Posterior disc osteophyte and facet hypertrophy results in moderate bilateral neural foraminal stenosis with mild to moderate spinal canal stenosis.   L4-5: Broad-based disc osteophyte and facet hypertrophy results in severe left and moderate right neural foraminal stenosis with mild to moderate  spinal canal stenosis.   L5-S1: Posterior disc osteophyte eccentric to the left and facet hypertrophy results in moderate to severe bilateral neural foraminal stenosis with mild to moderate spinal canal stenosis.  Paravertebral soft tissues are normal.  Spinal cord stimulator wires into the spinal canal at the T12-L1 level.    12/15/22 CT myelogram L-spine:  T12-L1: Mild disc bulge with likely right central protrusion.  Mild right and minimal left narrowing of the lateral recesses.  No significant spinal canal or foraminal stenosis.   L1-L2: Mild disc bulge.  Mild right and minimal left narrowing of the lateral recesses.  No significant spinal canal stenosis.  Minimal right foraminal stenosis.   L2-L3: Trace retrolisthesis.  Mild disc bulge and posterior osteophytic ridging.  Mild bilateral facet hypertrophy.  Minimal narrowing of the bilateral lateral recesses.  Mild right and minimal left foraminal stenosis.   L3-L4: Trace retrolisthesis.  Mild disc bulge.  Mild left and minimal right narrowing of the lateral recesses.  No significant spinal canal stenosis.  Mild-moderate bilateral foraminal stenosis.   L4-L5: Retrolisthesis.  Uncovering the disc mild disc bulge.  Mild bilateral facet hypertrophy.  Ligamentum flavum thickening.  Mild narrowing of the bilateral lateral recesses.  Mild spinal canal stenosis.  Moderate left and mild-moderate right foraminal stenosis.   L5-S1: Mild disc bulge and posterior osteophytic ridging.  Moderate left and mild right facet hypertrophy.  Moderate left and mild right narrowing of the lateral recesses.  No significant spinal canal stenosis.  Moderate right and mild-moderate left foraminal stenosis.   Bilateral dorsal paraspinal muscular atrophy in the lower lumbar spine.   Spinal cord stimulator leads are present entering the spinal canal at the T12-L1 interlaminar space and extending cranially above the field of view.   Layering dependent stones within the partially visualized  urinary bladder, similar to prior study.  Mild-moderate atherosclerotic calcifications.   Postoperative changes of a previous L2 kyphoplasty with methylmethacrylate present.  There is loss of lumbar vertebral body height at all levels throughout the lumbar spine, progressive within the superior endplate of L4 when compared to the previous study of 12/10/2022.    1/30/23 Xray L-spine:  A spinal stimulator device is partially imaged.  There is a mild compression fracture of L2 status post vertebroplasty.  There is a mild compression fracture of L4 without significant change.  Mild retrolisthesis of L3 on L4 is present.  There is mild loss of disc height at L2-3 and L5-S1.  Moderate lower lumbar facet arthropathy is present there is heavy calcification of the aorta.    Assessment:  The patient is a 77-year-old man with a history of hypertension, obesity and low back pain who presents in referral from Dr. Winters for chronic right low back pain.   1. DDD (degenerative disc disease), lumbar  Ambulatory referral/consult to Physical/Occupational Therapy      2. Lumbar radiculitis  Ambulatory referral/consult to Physical/Occupational Therapy      3. Chronic pain syndrome                Plan:  1. He did not have relief following the diagnostic medial branch nerve block so we will not repeat the blocks and proceed with radiofrequency ablation.    2. We discussed the importance of physical therapy and I completed orders for integrity.  We discussed considering aquatic therapy in the future as well.  3. I provided a prescription for gabapentin 300 mg nightly, increasing to twice a day tolerated.    4.  Follow-up in 2 months or sooner as needed.

## 2023-05-08 LAB
ASCENDING AORTA: 3.85 CM
AV INDEX (PROSTH): 1.02
AV MEAN GRADIENT: 3 MMHG
AV PEAK GRADIENT: 6 MMHG
AV VALVE AREA: 3.96 CM2
AV VELOCITY RATIO: 0.92
BSA FOR ECHO PROCEDURE: 2.63 M2
CV ECHO LV RWT: 0.32 CM
DOP CALC AO PEAK VEL: 1.2 M/S
DOP CALC AO VTI: 25.6 CM
DOP CALC LVOT AREA: 3.9 CM2
DOP CALC LVOT DIAMETER: 2.23 CM
DOP CALC LVOT PEAK VEL: 1.1 M/S
DOP CALC LVOT STROKE VOLUME: 101.5 CM3
DOP CALCLVOT PEAK VEL VTI: 26 CM
E WAVE DECELERATION TIME: 161.41 MSEC
E/A RATIO: 1.07
E/E' RATIO: 13.57 M/S
ECHO LV POSTERIOR WALL: 0.89 CM (ref 0.6–1.1)
EJECTION FRACTION: 55 %
FRACTIONAL SHORTENING: 33 % (ref 28–44)
INTERVENTRICULAR SEPTUM: 1.07 CM (ref 0.6–1.1)
LA MAJOR: 6.3 CM
LA MINOR: 5.33 CM
LA WIDTH: 4.7 CM
LEFT ATRIUM SIZE: 4.33 CM
LEFT ATRIUM VOLUME INDEX: 39.6 ML/M2
LEFT ATRIUM VOLUME: 99.89 CM3
LEFT INTERNAL DIMENSION IN SYSTOLE: 3.69 CM (ref 2.1–4)
LEFT VENTRICLE DIASTOLIC VOLUME INDEX: 58.29 ML/M2
LEFT VENTRICLE DIASTOLIC VOLUME: 146.88 ML
LEFT VENTRICLE MASS INDEX: 82 G/M2
LEFT VENTRICLE SYSTOLIC VOLUME INDEX: 23 ML/M2
LEFT VENTRICLE SYSTOLIC VOLUME: 57.85 ML
LEFT VENTRICULAR INTERNAL DIMENSION IN DIASTOLE: 5.49 CM (ref 3.5–6)
LEFT VENTRICULAR MASS: 206.96 G
LV LATERAL E/E' RATIO: 10.56 M/S
LV SEPTAL E/E' RATIO: 19 M/S
LVOT MG: 2.67 MMHG
LVOT MV: 0.77 CM/S
MV PEAK A VEL: 0.89 M/S
MV PEAK E VEL: 0.95 M/S
PISA TR MAX VEL: 2.49 M/S
PULM VEIN S/D RATIO: 1.05
PV PEAK D VEL: 0.55 M/S
PV PEAK S VEL: 0.58 M/S
RA MAJOR: 5.61 CM
RA PRESSURE: 3 MMHG
RA WIDTH: 3.9 CM
RIGHT VENTRICULAR END-DIASTOLIC DIMENSION: 4.46 CM
RIGHT VENTRICULAR LENGTH IN DIASTOLE (APICAL 4-CHAMBER VIEW): 7.16 CM
RV MID DIAMA: 2.47 CM
RV TISSUE DOPPLER FREE WALL SYSTOLIC VELOCITY 1 (APICAL 4 CHAMBER VIEW): 0.01 CM/S
SINUS: 3.76 CM
STJ: 3.58 CM
TDI LATERAL: 0.09 M/S
TDI SEPTAL: 0.05 M/S
TDI: 0.07 M/S
TR MAX PG: 25 MMHG
TRICUSPID ANNULAR PLANE SYSTOLIC EXCURSION: 3.53 CM
TV REST PULMONARY ARTERY PRESSURE: 28 MMHG

## 2023-05-12 ENCOUNTER — OFFICE VISIT (OUTPATIENT)
Dept: PODIATRY | Facility: CLINIC | Age: 78
End: 2023-05-12
Payer: MEDICARE

## 2023-05-12 VITALS — WEIGHT: 305 LBS | BODY MASS INDEX: 42.7 KG/M2 | HEIGHT: 71 IN

## 2023-05-12 DIAGNOSIS — I73.9 PERIPHERAL VASCULAR DISEASE: ICD-10-CM

## 2023-05-12 DIAGNOSIS — B35.1 ONYCHOMYCOSIS: Primary | ICD-10-CM

## 2023-05-12 PROCEDURE — 11721 DEBRIDE NAIL 6 OR MORE: CPT | Mod: Q9,S$GLB,, | Performed by: PODIATRIST

## 2023-05-12 PROCEDURE — 99999 PR PBB SHADOW E&M-EST. PATIENT-LVL III: ICD-10-PCS | Mod: PBBFAC,,, | Performed by: PODIATRIST

## 2023-05-12 PROCEDURE — 99499 UNLISTED E&M SERVICE: CPT | Mod: S$GLB,,, | Performed by: PODIATRIST

## 2023-05-12 PROCEDURE — 11721 PR DEBRIDEMENT OF NAILS, 6 OR MORE: ICD-10-PCS | Mod: Q9,S$GLB,, | Performed by: PODIATRIST

## 2023-05-12 PROCEDURE — 99999 PR PBB SHADOW E&M-EST. PATIENT-LVL III: CPT | Mod: PBBFAC,,, | Performed by: PODIATRIST

## 2023-05-12 PROCEDURE — 99499 NO LOS: ICD-10-PCS | Mod: S$GLB,,, | Performed by: PODIATRIST

## 2023-05-12 NOTE — PROGRESS NOTES
Subjective:      Patient ID: Chinedu Roque is a 77 y.o. male.    Chief Complaint: Nail Care      Chinedu is a 77 y.o. male who presents to the clinic for evaluation and treatment of high risk feet. Chinedu has a past medical history of Anticoagulant long-term use, Arthritis, BPH (benign prostatic hyperplasia) (03/02/2012), Chronic left shoulder pain, Compression fracture of L2, DDD (degenerative disc disease), lumbar, HTN (hypertension) (03/02/2012), colonic polyps (2013), Low back pain, Morbid obesity with BMI of 40.0-44.9, adult (03/18/2014), Seasonal allergies, Sleep apnea, and Stroke (03/1986). The patient's chief complaint is long, thick toenails. This patient has documented high risk feet requiring routine maintenance secondary to peripheral vascular disease. No acute changes since last visit    PCP: Hernando Browne MD        Current shoe gear:  Casual shoes    Last encounter in this department: Visit date not found    Hemoglobin A1C   Date Value Ref Range Status   03/06/2023 5.7 (H) 4.0 - 5.6 % Final     Comment:     ADA Screening Guidelines:  5.7-6.4%  Consistent with prediabetes  >or=6.5%  Consistent with diabetes    High levels of fetal hemoglobin interfere with the HbA1C  assay. Heterozygous hemoglobin variants (HbS, HgC, etc)do  not significantly interfere with this assay.   However, presence of multiple variants may affect accuracy.           Review of Systems   Constitutional: Negative for chills and fever.   Cardiovascular:  Positive for leg swelling. Negative for claudication.   Respiratory:  Negative for shortness of breath.    Skin:  Positive for nail changes. Negative for itching and rash.   Musculoskeletal:  Negative for muscle cramps, muscle weakness and myalgias.   Gastrointestinal:  Negative for nausea and vomiting.   Neurological:  Positive for numbness. Negative for focal weakness, loss of balance and paresthesias.         Objective:      Physical Exam  Constitutional:       General:  He is not in acute distress.     Appearance: He is well-developed. He is not diaphoretic.   Cardiovascular:      Pulses:           Dorsalis pedis pulses are 1+ on the right side and 1+ on the left side.        Posterior tibial pulses are 1+ on the right side and 1+ on the left side.      Comments: 3 sec capillary refill time to toes 1-5 bilateral. Feet are warm to touch proximally with distal cooling b/l. There is no hair growth on the feet and toes b/l. There is 2+ edema b/l with some varicosities present b/l.     Musculoskeletal:      Comments: Equinus noted b/l ankles with < 10 deg DF noted. MMT 5/5 in DF/PF/Inv/Ev resistance with no reproduction of pain in any direction. Passive range of motion of ankle and pedal joints is painless b/l.     Skin:     General: Skin is warm and dry.      Coloration: Skin is not pale.      Findings: No abrasion, bruising, burn, ecchymosis, erythema, laceration, lesion, petechiae or rash.      Nails: There is no clubbing.      Comments: Skin is thin shiny and atrophic bilateral    Nails 1-5 bilateral are thick 3-4 mm, long 3-6 mm, and discolored with subungual debris     Neurological:      Mental Status: He is alert and oriented to person, place, and time.      Sensory: No sensory deficit.      Motor: No tremor, atrophy or abnormal muscle tone.      Comments: Negative tinel sign bilateral.   Psychiatric:         Behavior: Behavior normal.             Assessment:       Encounter Diagnoses   Name Primary?    Onychomycosis Yes    Peripheral vascular disease              Plan:       Chinedu was seen today for nail care.    Diagnoses and all orders for this visit:    Onychomycosis    Peripheral vascular disease          I counseled the patient on his conditions, their implications and medical management.        - Shoe inspection. Patient instructed on proper foot hygeine. We discussed wearing proper shoe gear, daily foot inspections, never walking without protective shoe gear, never  putting sharp instruments to feet, routine podiatric nail visits every 2-3 months.      - With patient's permission, nails were aggressively reduced and debrided x 10 to their soft tissue attachment mechanically and with electric , removing all offending nail and debris. Patient relates relief following the procedure. He will continue to monitor the areas daily, inspect his feet, wear protective shoe gear when ambulatory, moisturizer to maintain skin integrity and follow in this office in approximately 2-3 months, sooner pdenise.    Rubio Ramirez DPM

## 2023-05-25 LAB
OHS CV EVENT MONITOR DAY: 0
OHS CV HOLTER LENGTH DECIMAL HOURS: 48
OHS CV HOLTER LENGTH HOURS: 48
OHS CV HOLTER LENGTH MINUTES: 0
OHS CV HOLTER SINUS AVERAGE HR: 62
OHS CV HOLTER SINUS MAX HR: 92
OHS CV HOLTER SINUS MIN HR: 32

## 2023-05-30 ENCOUNTER — OFFICE VISIT (OUTPATIENT)
Dept: CARDIOLOGY | Facility: CLINIC | Age: 78
End: 2023-05-30
Payer: MEDICARE

## 2023-05-30 VITALS
WEIGHT: 311.06 LBS | BODY MASS INDEX: 43.55 KG/M2 | DIASTOLIC BLOOD PRESSURE: 76 MMHG | HEART RATE: 40 BPM | HEIGHT: 71 IN | SYSTOLIC BLOOD PRESSURE: 143 MMHG

## 2023-05-30 DIAGNOSIS — Z86.73 HISTORY OF CVA (CEREBROVASCULAR ACCIDENT): Primary | Chronic | ICD-10-CM

## 2023-05-30 DIAGNOSIS — S32.000A COMPRESSION FRACTURE OF LUMBAR VERTEBRA, UNSPECIFIED LUMBAR VERTEBRAL LEVEL, INITIAL ENCOUNTER: Chronic | ICD-10-CM

## 2023-05-30 DIAGNOSIS — I10 ESSENTIAL HYPERTENSION: Chronic | ICD-10-CM

## 2023-05-30 DIAGNOSIS — G47.33 OSA (OBSTRUCTIVE SLEEP APNEA): Chronic | ICD-10-CM

## 2023-05-30 DIAGNOSIS — I70.0 ABDOMINAL AORTIC ATHEROSCLEROSIS: Chronic | ICD-10-CM

## 2023-05-30 DIAGNOSIS — I87.2 VENOUS INSUFFICIENCY OF BOTH LOWER EXTREMITIES: ICD-10-CM

## 2023-05-30 PROCEDURE — 99999 PR PBB SHADOW E&M-EST. PATIENT-LVL III: CPT | Mod: PBBFAC,,, | Performed by: INTERNAL MEDICINE

## 2023-05-30 PROCEDURE — 1126F AMNT PAIN NOTED NONE PRSNT: CPT | Mod: CPTII,S$GLB,, | Performed by: INTERNAL MEDICINE

## 2023-05-30 PROCEDURE — 1159F PR MEDICATION LIST DOCUMENTED IN MEDICAL RECORD: ICD-10-PCS | Mod: CPTII,S$GLB,, | Performed by: INTERNAL MEDICINE

## 2023-05-30 PROCEDURE — 1101F PR PT FALLS ASSESS DOC 0-1 FALLS W/OUT INJ PAST YR: ICD-10-PCS | Mod: CPTII,S$GLB,, | Performed by: INTERNAL MEDICINE

## 2023-05-30 PROCEDURE — 1159F MED LIST DOCD IN RCRD: CPT | Mod: CPTII,S$GLB,, | Performed by: INTERNAL MEDICINE

## 2023-05-30 PROCEDURE — 3078F PR MOST RECENT DIASTOLIC BLOOD PRESSURE < 80 MM HG: ICD-10-PCS | Mod: CPTII,S$GLB,, | Performed by: INTERNAL MEDICINE

## 2023-05-30 PROCEDURE — 3077F SYST BP >= 140 MM HG: CPT | Mod: CPTII,S$GLB,, | Performed by: INTERNAL MEDICINE

## 2023-05-30 PROCEDURE — 99214 PR OFFICE/OUTPT VISIT, EST, LEVL IV, 30-39 MIN: ICD-10-PCS | Mod: S$GLB,,, | Performed by: INTERNAL MEDICINE

## 2023-05-30 PROCEDURE — 3077F PR MOST RECENT SYSTOLIC BLOOD PRESSURE >= 140 MM HG: ICD-10-PCS | Mod: CPTII,S$GLB,, | Performed by: INTERNAL MEDICINE

## 2023-05-30 PROCEDURE — 3078F DIAST BP <80 MM HG: CPT | Mod: CPTII,S$GLB,, | Performed by: INTERNAL MEDICINE

## 2023-05-30 PROCEDURE — 99999 PR PBB SHADOW E&M-EST. PATIENT-LVL III: ICD-10-PCS | Mod: PBBFAC,,, | Performed by: INTERNAL MEDICINE

## 2023-05-30 PROCEDURE — 1160F RVW MEDS BY RX/DR IN RCRD: CPT | Mod: CPTII,S$GLB,, | Performed by: INTERNAL MEDICINE

## 2023-05-30 PROCEDURE — 1157F PR ADVANCE CARE PLAN OR EQUIV PRESENT IN MEDICAL RECORD: ICD-10-PCS | Mod: CPTII,S$GLB,, | Performed by: INTERNAL MEDICINE

## 2023-05-30 PROCEDURE — 1101F PT FALLS ASSESS-DOCD LE1/YR: CPT | Mod: CPTII,S$GLB,, | Performed by: INTERNAL MEDICINE

## 2023-05-30 PROCEDURE — 1126F PR PAIN SEVERITY QUANTIFIED, NO PAIN PRESENT: ICD-10-PCS | Mod: CPTII,S$GLB,, | Performed by: INTERNAL MEDICINE

## 2023-05-30 PROCEDURE — 1157F ADVNC CARE PLAN IN RCRD: CPT | Mod: CPTII,S$GLB,, | Performed by: INTERNAL MEDICINE

## 2023-05-30 PROCEDURE — 1160F PR REVIEW ALL MEDS BY PRESCRIBER/CLIN PHARMACIST DOCUMENTED: ICD-10-PCS | Mod: CPTII,S$GLB,, | Performed by: INTERNAL MEDICINE

## 2023-05-30 PROCEDURE — 3288F FALL RISK ASSESSMENT DOCD: CPT | Mod: CPTII,S$GLB,, | Performed by: INTERNAL MEDICINE

## 2023-05-30 PROCEDURE — 99214 OFFICE O/P EST MOD 30 MIN: CPT | Mod: S$GLB,,, | Performed by: INTERNAL MEDICINE

## 2023-05-30 PROCEDURE — 3288F PR FALLS RISK ASSESSMENT DOCUMENTED: ICD-10-PCS | Mod: CPTII,S$GLB,, | Performed by: INTERNAL MEDICINE

## 2023-05-30 RX ORDER — SODIUM CHLORIDE 9 MG/ML
INJECTION, SOLUTION INTRAVENOUS ONCE
Status: CANCELLED | OUTPATIENT
Start: 2023-05-30 | End: 2023-05-30

## 2023-05-30 RX ORDER — SODIUM CHLORIDE 0.9 % (FLUSH) 0.9 %
10 SYRINGE (ML) INJECTION
Status: DISCONTINUED | OUTPATIENT
Start: 2023-05-30 | End: 2023-06-19 | Stop reason: CLARIF

## 2023-05-30 NOTE — PROGRESS NOTES
Subjective:    Patient ID:  Chinedu Roque is a 77 y.o. male patient here for evaluation Results      History of Present Illness:  Cardiology follow-up.  Abnormal baseline EKG with first-degree AV block, nonspecific interventricular conduction delay.  Follow-up Holter monitor with intermittent Mobitz 2 type 1 AV block.  Patient was scheduled for Lexiscan, progressive AV block this was defaulted to CTA of the coronary arteries this reveals a calcium score 2700 with high-grade proximal LAD stenosis.    Hypertension, dyslipidemia.  Very remote history of CVA.  No neurologic sequelae.      Review of patient's allergies indicates:  No Known Allergies    Past Medical History:   Diagnosis Date    Anticoagulant long-term use     ASA 81 mg    Arthritis     cervical    BPH (benign prostatic hyperplasia) 03/02/2012    Chronic left shoulder pain     Compression fracture of L2     DDD (degenerative disc disease), lumbar     HTN (hypertension) 03/02/2012    Hx of colonic polyps 2013    Low back pain     Morbid obesity with BMI of 40.0-44.9, adult 03/18/2014    Seasonal allergies     Sleep apnea     compliant with CPAP    Stroke 03/1986     Past Surgical History:   Procedure Laterality Date    APPENDECTOMY      COLONOSCOPY N/A 6/6/2022    Procedure: COLONOSCOPY;  Surgeon: Jose Bello MD;  Location: [facility];  Service: Endoscopy;  Laterality: N/A;    EPIDURAL BLOCK INJECTION  2015    cervical    EPIDURAL STEROID INJECTION INTO LUMBAR SPINE N/A 6/5/2019    Procedure: Injection-steroid-epidural-lumbar L5/S1 interlaminar;  Surgeon: Riley Segura MD;  Location: [facility] OR;  Service: Pain Management;  Laterality: N/A;    EPIDURAL STEROID INJECTION INTO LUMBAR SPINE N/A 9/13/2019    Procedure: Injection-steroid-epidural-lumbar;  Surgeon: Riley Segura MD;  Location: [facility] OR;  Service: Pain Management;  Laterality: N/A;  L5/S1 interlaminar to the right    EPIDURAL STEROID INJECTION INTO LUMBAR SPINE N/A 6/2/2020     Procedure: Injection-steroid-epidural-lumbar L5/S1 to right;  Surgeon: Riley Segura MD;  Location: [facility];  Service: Pain Management;  Laterality: N/A;    Facet Steroid injection       Pain management    HERNIA REPAIR      Ventral    INJECTION OF ANESTHETIC AGENT AROUND MEDIAL BRANCH NERVES INNERVATING LUMBAR FACET JOINT Right 3/21/2023    Procedure: Block-nerve-medial branch-lumbar L3/4, L4/5, L5/S1;  Surgeon: Riley Segura MD;  Location: [facility];  Service: Pain Management;  Laterality: Right;    INSERTION OF DORSAL COLUMN NERVE STIMULATOR FOR TRIAL N/A 2/10/2021    Procedure: INSERTION, NEUROSTIMULATOR, SPINAL CORD, DORSAL COLUMN, FOR TRIAL;  Surgeon: Riley Segura MD;  Location: [facility];  Service: Pain Management;  Laterality: N/A;    INSERTION OF NEUROSTIMULATOR OF DORSAL COLUMN OF SPINAL CORD N/A 3/1/2021    Procedure: INSERTION, NEUROSTIMULATOR, SPINAL CORD, DORSAL COLUMN;  Surgeon: Riley Segura MD;  Location: [facility];  Service: Pain Management;  Laterality: N/A;    KNEE SURGERY      bilateral    RADIOFREQUENCY ABLATION  2015    lumbar nerve    RADIOFREQUENCY ABLATION OF LUMBAR MEDIAL BRANCH NERVE AT SINGLE LEVEL Right 4/11/2019    Procedure: Radiofrequency Ablation, Nerve, Spinal, Lumbar, Medial Branch, L1,2,3,4,5;  Surgeon: Riley Segura MD;  Location: [facility];  Service: Pain Management;  Laterality: Right;    TRANSFORAMINAL EPIDURAL INJECTION OF STEROID Right 11/13/2020    Procedure: Injection,steroid,epidural,transforaminal approach, l3/4 and l5/s1;  Surgeon: Riley Segura MD;  Location: [facility];  Service: Pain Management;  Laterality: Right;    TRANSFORAMINAL EPIDURAL INJECTION OF STEROID Left 6/24/2021    Procedure: Injection,steroid,epidural,transforaminal approach L5/S1;  Surgeon: Riley Segura MD;  Location: [facility] OR;  Service: Pain Management;  Laterality: Left;    TRANSFORAMINAL EPIDURAL INJECTION OF STEROID Right 9/22/2022    Procedure:  Injection,steroid,epidural,transforaminal approach L5/S1;  Surgeon: Riley Segura MD;  Location: [facility];  Service: Pain Management;  Laterality: Right;    TRANSFORAMINAL EPIDURAL INJECTION OF STEROID Right 10/25/2022    Procedure: Injection,steroid,epidural,transforaminal approach L2/3 and L3/4;  Surgeon: Riley Segura MD;  Location: [facility];  Service: Pain Management;  Laterality: Right;    VERTEBROPLASTY       Social History     Tobacco Use    Smoking status: Former     Packs/day: 1.50     Years: 24.00     Pack years: 36.00     Types: Cigarettes     Start date: 10/21/1959     Quit date: 3/19/1983     Years since quittin.2     Passive exposure: Past    Smokeless tobacco: Never   Substance Use Topics    Alcohol use: No    Drug use: No        Review of Systems:    As noted in HPI in addition      REVIEW OF SYSTEMS  Review of Systems   Constitutional: Negative for decreased appetite, diaphoresis, night sweats, weight gain and weight loss.   HENT:  Negative for nosebleeds and odynophagia.    Eyes:  Negative for double vision and photophobia.   Cardiovascular:  Negative for chest pain, claudication, cyanosis, dyspnea on exertion, irregular heartbeat, leg swelling, near-syncope, orthopnea, palpitations, paroxysmal nocturnal dyspnea and syncope.   Respiratory:  Negative for cough, hemoptysis, shortness of breath and wheezing.    Hematologic/Lymphatic: Negative for adenopathy.   Skin:  Negative for flushing, skin cancer and suspicious lesions.   Musculoskeletal:  Negative for gout, myalgias and neck pain.   Gastrointestinal:  Negative for abdominal pain, heartburn, hematemesis and hematochezia.   Genitourinary:  Negative for bladder incontinence, hesitancy and nocturia.   Neurological:  Negative for focal weakness, headaches, light-headedness and paresthesias.   Psychiatric/Behavioral:  Negative for memory loss and substance abuse.             Objective:        Vitals:    23 1041   BP: (!) 143/76    Pulse: (!) 40       Lab Results   Component Value Date    WBC 5.86 03/06/2023    HGB 15.4 03/06/2023    HCT 46.6 03/06/2023     03/06/2023    CHOL 159 03/06/2023    TRIG 84 03/06/2023    HDL 44 03/06/2023    ALT 32 03/06/2023    AST 21 03/06/2023     03/06/2023    K 4.4 03/06/2023     03/06/2023    CREATININE 0.90 05/09/2023    BUN 16 03/06/2023    CO2 26 03/06/2023    TSH 1.300 04/23/2016    PSA 4.5 (H) 03/22/2022    INR 1.1 12/15/2022    HGBA1C 5.7 (H) 03/06/2023        ECHOCARDIOGRAM RESULTS  Results for orders placed in visit on 05/05/23    Echo    Interpretation Summary  · Mild left atrial enlargement.  · Normal systolic function.  · The estimated ejection fraction is 55%.  · Indeterminate left ventricular diastolic function.  · Normal right ventricular size with normal right ventricular systolic function.  · Mild mitral regurgitation.  · Mild to moderate tricuspid regurgitation.  · Normal central venous pressure (3 mmHg).  · The estimated PA systolic pressure is 28 mmHg.        CURRENT/PREVIOUS VISIT EKG  Results for orders placed or performed during the hospital encounter of 02/23/23   EKG 12-lead (Chest Pain) Age >30    Collection Time: 02/23/23 11:19 PM    Narrative    Test Reason : R07.9,    Vent. Rate : 073 BPM     Atrial Rate : 073 BPM     P-R Int : 284 ms          QRS Dur : 124 ms      QT Int : 384 ms       P-R-T Axes : 075 -39 029 degrees     QTc Int : 423 ms    Sinus rhythm with 1st degree A-V block  Left axis deviation  Nonspecific intraventricular conduction delay  Abnormal ECG  Confirmed by Kerri Lee ([id number]) on 2/28/2023 6:42:13 PM    Referred By: ALEX   SELF           Confirmed By:Kerri Lee     No valid procedures specified.   Results for orders placed during the hospital encounter of 03/22/23    Nuclear Stress - Cardiology Interpreted    Interpretation Summary    The ECG portion of the study is abnormal but not diagnostic.    The test was stopped because the  patient experienced A-V block.    2nd degree AV Block confirmed by UMM Chau PA-C    Resting images obtained only.  Apical thinning noted.  No stress images.  Abnormal baseline EKG is reported.  Inconclusive study.    No valid procedures specified.    PHYSICAL EXAM  CONSTITUTIONAL: Well built, well nourished in no apparent distress  NECK: no carotid bruit, no JVD  LUNGS: CTA  CHEST WALL: no tenderness,  HEART: regular rate and rhythm, S1, S2 normal, no murmur, click, rub or gallop   ABDOMEN: soft, non-tender; bowel sounds normal; no masses,  no organomegaly  EXTREMITIES: Extremities normal, no edema, no calf tenderness noted  NEURO: AAO X 3    I HAVE REVIEWED :    The vital signs, nurses notes, and all the pertinent radiology and labs.         Current Outpatient Medications   Medication Instructions    acetaminophen (TYLENOL) 1,000 mg, Oral, Every 6 hours PRN    aspirin (ECOTRIN) 81 mg, Oral, Daily,      finasteride (PROSCAR) 5 mg, Oral, Daily    gabapentin (NEURONTIN) 300 mg, Oral, 2 times daily    hydroCHLOROthiazide (HYDRODIURIL) 12.5 MG Tab TAKE 1 TABLET BY MOUTH EVERY DAY    losartan (COZAAR) 100 MG tablet TAKE 1 TABLET BY MOUTH EVERY DAY    metoprolol succinate (TOPROL-XL) 100 MG 24 hr tablet TAKE 1 TABLET BY MOUTH EVERY DAY    naproxen sodium (ANAPROX) 220 mg, Oral, 2 times daily with meals    nystatin (MYCOSTATIN) ointment Topical (Top), 2 times daily    tamsulosin (FLOMAX) 0.4 mg Cap TAKE 1 CAPSULE BY MOUTH EVERY DAY          Assessment:   Hypertension, dyslipidemia  Remote history of CVA, no neurologic sequelae.  Abnormal baseline EKG  Abnormal CTA coronary arteries with 2700 calcium score high-grade proximal LAD stenosis.  Echo with EF normal.  History of Mobitz 2 type 1 intermittent AV block.      Plan:   Left heart catheterization to further define coronary anatomy and appropriate intervention/ treatment.          No follow-ups on file.

## 2023-05-30 NOTE — PATIENT INSTRUCTIONS
Angiogram 6/7 at 9 am    Arrive for procedure at: [facility]    You will receive a phone call from [facility] Pre-Op Department with further instructions prior to your scheduled procedure.    Notify the nurse if you are ALLERGIC TO IODINE.    FASTING: You MAY NOT have anything to eat or drink AFTER MIDNIGHT the day before your procedure. If your procedure is scheduled in the afternoon, you may have a LIGHT BREAKFAST 6-8 hours prior to your procedure.  For example: Two slices of toast; black coffee or black tea.    MEDICATIONS: You may take your regular morning medications with water. If there are any medications that you should not take, you will be instructed to hold them for that morning.    CARDIOLOGY PRE-PROCEDURE MEDICATION ORDERS:  ** Please hold any medications that are checked below:    HOLD   # OF DAYS TO HOLD  Coumadin   Consult with Coumadin Clinic   Xarelto    _DAY BEFORE & DAY OF_  Pradaxa  _ DAY BEFORE & DAY OF _  Eliquis   _ DAY BEFORE & DAY OF _  Metformin    Day before procedure & morning of procedure  Short acting insulin   Morning of procedure    CONTINUE the Following Medications   Plavix      Effient     Aspirin    WHAT TO EXPECT:    How long will the procedure take?  The procedure will take an average of 1 - 2 hours to perform.  After the procedure, you will need to lay flat for around 4 - 6 hours to minimize bleeding from the puncture site. If the wrist is accessed you will need to keep your arm still as instructed by the nurse.    When can I go home?  You may be able to be discharged home that same afternoon if there were no complications.  If you have one of the following: balloon; stent; pacemaker or defibrillator procedures, you may spend one night for observation.  Your doctor will determine your discharge based upon your progress.  The results of your procedure will be discussed with you before you are discharged.  Any further testing or procedures will be scheduled for  you either before you leave or you will be instructed to call for a future appointment.      TRANSPORTATION:  PLEASE ARRANGE TO HAVE SOMEONE DRIVE YOU HOME FOLLOWING YOUR PROCEDURE, YOU WILL NOT BE ALLOWED TO DRIVE.

## 2023-06-07 DIAGNOSIS — I25.10 CORONARY ARTERY DISEASE, UNSPECIFIED VESSEL OR LESION TYPE, UNSPECIFIED WHETHER ANGINA PRESENT, UNSPECIFIED WHETHER NATIVE OR TRANSPLANTED HEART: ICD-10-CM

## 2023-06-07 DIAGNOSIS — E11.9 DIABETES: ICD-10-CM

## 2023-06-07 DIAGNOSIS — I25.118 CORONARY ARTERY DISEASE OF NATIVE ARTERY OF NATIVE HEART WITH STABLE ANGINA PECTORIS: Primary | ICD-10-CM

## 2023-06-07 DIAGNOSIS — R09.89 BILATERAL CAROTID BRUITS: Primary | ICD-10-CM

## 2023-06-07 RX ORDER — CHLORHEXIDINE GLUCONATE ORAL RINSE 1.2 MG/ML
10 SOLUTION DENTAL
Status: CANCELLED | OUTPATIENT
Start: 2023-06-07

## 2023-06-07 RX ORDER — MUPIROCIN 20 MG/G
OINTMENT TOPICAL
Status: CANCELLED | OUTPATIENT
Start: 2023-06-07

## 2023-06-13 ENCOUNTER — OFFICE VISIT (OUTPATIENT)
Dept: CARDIOLOGY | Facility: CLINIC | Age: 78
End: 2023-06-13
Payer: MEDICARE

## 2023-06-13 ENCOUNTER — HOSPITAL ENCOUNTER (OUTPATIENT)
Dept: RADIOLOGY | Facility: HOSPITAL | Age: 78
Discharge: HOME | End: 2023-06-13
Attending: THORACIC SURGERY (CARDIOTHORACIC VASCULAR SURGERY)
Payer: MEDICARE

## 2023-06-13 VITALS
SYSTOLIC BLOOD PRESSURE: 137 MMHG | HEIGHT: 71 IN | DIASTOLIC BLOOD PRESSURE: 79 MMHG | HEART RATE: 75 BPM | WEIGHT: 304.88 LBS | BODY MASS INDEX: 42.68 KG/M2

## 2023-06-13 DIAGNOSIS — G47.33 OSA (OBSTRUCTIVE SLEEP APNEA): Chronic | ICD-10-CM

## 2023-06-13 DIAGNOSIS — E78.2 MIXED HYPERLIPIDEMIA: Chronic | ICD-10-CM

## 2023-06-13 DIAGNOSIS — R06.02 SHORTNESS OF BREATH: ICD-10-CM

## 2023-06-13 DIAGNOSIS — I10 ESSENTIAL HYPERTENSION: Chronic | ICD-10-CM

## 2023-06-13 DIAGNOSIS — R94.31 ABNORMAL EKG: ICD-10-CM

## 2023-06-13 DIAGNOSIS — I70.0 ABDOMINAL AORTIC ATHEROSCLEROSIS: Chronic | ICD-10-CM

## 2023-06-13 DIAGNOSIS — Z86.73 HISTORY OF CVA (CEREBROVASCULAR ACCIDENT): Primary | Chronic | ICD-10-CM

## 2023-06-13 DIAGNOSIS — R09.89 BILATERAL CAROTID BRUITS: ICD-10-CM

## 2023-06-13 PROCEDURE — 3288F PR FALLS RISK ASSESSMENT DOCUMENTED: ICD-10-PCS | Mod: CPTII,S$GLB,, | Performed by: INTERNAL MEDICINE

## 2023-06-13 PROCEDURE — 1101F PT FALLS ASSESS-DOCD LE1/YR: CPT | Mod: CPTII,S$GLB,, | Performed by: INTERNAL MEDICINE

## 2023-06-13 PROCEDURE — 3288F FALL RISK ASSESSMENT DOCD: CPT | Mod: CPTII,S$GLB,, | Performed by: INTERNAL MEDICINE

## 2023-06-13 PROCEDURE — 1157F ADVNC CARE PLAN IN RCRD: CPT | Mod: CPTII,S$GLB,, | Performed by: INTERNAL MEDICINE

## 2023-06-13 PROCEDURE — 93880 EXTRACRANIAL BILAT STUDY: CPT | Mod: 26,,, | Performed by: RADIOLOGY

## 2023-06-13 PROCEDURE — 1159F MED LIST DOCD IN RCRD: CPT | Mod: CPTII,S$GLB,, | Performed by: INTERNAL MEDICINE

## 2023-06-13 PROCEDURE — 1101F PR PT FALLS ASSESS DOC 0-1 FALLS W/OUT INJ PAST YR: ICD-10-PCS | Mod: CPTII,S$GLB,, | Performed by: INTERNAL MEDICINE

## 2023-06-13 PROCEDURE — 1157F PR ADVANCE CARE PLAN OR EQUIV PRESENT IN MEDICAL RECORD: ICD-10-PCS | Mod: CPTII,S$GLB,, | Performed by: INTERNAL MEDICINE

## 2023-06-13 PROCEDURE — 3075F PR MOST RECENT SYSTOLIC BLOOD PRESS GE 130-139MM HG: ICD-10-PCS | Mod: CPTII,S$GLB,, | Performed by: INTERNAL MEDICINE

## 2023-06-13 PROCEDURE — 99214 OFFICE O/P EST MOD 30 MIN: CPT | Mod: S$GLB,,, | Performed by: INTERNAL MEDICINE

## 2023-06-13 PROCEDURE — 99214 PR OFFICE/OUTPT VISIT, EST, LEVL IV, 30-39 MIN: ICD-10-PCS | Mod: S$GLB,,, | Performed by: INTERNAL MEDICINE

## 2023-06-13 PROCEDURE — 1126F PR PAIN SEVERITY QUANTIFIED, NO PAIN PRESENT: ICD-10-PCS | Mod: CPTII,S$GLB,, | Performed by: INTERNAL MEDICINE

## 2023-06-13 PROCEDURE — 1159F PR MEDICATION LIST DOCUMENTED IN MEDICAL RECORD: ICD-10-PCS | Mod: CPTII,S$GLB,, | Performed by: INTERNAL MEDICINE

## 2023-06-13 PROCEDURE — 3078F PR MOST RECENT DIASTOLIC BLOOD PRESSURE < 80 MM HG: ICD-10-PCS | Mod: CPTII,S$GLB,, | Performed by: INTERNAL MEDICINE

## 2023-06-13 PROCEDURE — 93880 US CAROTID BILATERAL: ICD-10-PCS | Mod: 26,,, | Performed by: RADIOLOGY

## 2023-06-13 PROCEDURE — 93880 EXTRACRANIAL BILAT STUDY: CPT | Mod: TC,PO

## 2023-06-13 PROCEDURE — 3078F DIAST BP <80 MM HG: CPT | Mod: CPTII,S$GLB,, | Performed by: INTERNAL MEDICINE

## 2023-06-13 PROCEDURE — 99999 PR PBB SHADOW E&M-EST. PATIENT-LVL III: CPT | Mod: PBBFAC,,, | Performed by: INTERNAL MEDICINE

## 2023-06-13 PROCEDURE — 1126F AMNT PAIN NOTED NONE PRSNT: CPT | Mod: CPTII,S$GLB,, | Performed by: INTERNAL MEDICINE

## 2023-06-13 PROCEDURE — 3075F SYST BP GE 130 - 139MM HG: CPT | Mod: CPTII,S$GLB,, | Performed by: INTERNAL MEDICINE

## 2023-06-13 PROCEDURE — 99999 PR PBB SHADOW E&M-EST. PATIENT-LVL III: ICD-10-PCS | Mod: PBBFAC,,, | Performed by: INTERNAL MEDICINE

## 2023-06-13 NOTE — PROGRESS NOTES
Subjective:    Patient ID:  Chinedu Roque is a 77 y.o. male patient here for evaluation Follow-up      History of Present Illness:  Cardiology follow-up left heart catheterization with ostial left main, high-grade proximal circ and LAD stenosis.  Patient is left dominant.  Small right coronary artery.  EF normal.  No angina.  Carotid ultrasound pending.  Cardiac hemodynamics/blood pressure heart rate stable.    No dyspnea.  No arrhythmia.             Review of patient's allergies indicates:  No Known Allergies    Past Medical History:   Diagnosis Date    Anticoagulant long-term use     ASA 81 mg    Arthritis     cervical    BPH (benign prostatic hyperplasia) 03/02/2012    Chronic left shoulder pain     Compression fracture of L2     DDD (degenerative disc disease), lumbar     HTN (hypertension) 03/02/2012    Hx of colonic polyps 2013    Low back pain     Morbid obesity with BMI of 40.0-44.9, adult 03/18/2014    Seasonal allergies     Sleep apnea     compliant with CPAP    Stroke 03/1986     Past Surgical History:   Procedure Laterality Date    ANGIOGRAM, CORONARY, WITH LEFT HEART CATHETERIZATION N/A 6/7/2023    Procedure: Left Heart Cath;  Surgeon: Edgardo Marcelino MD;  Location: [facility] CATH;  Service: Cardiology;  Laterality: N/A;    APPENDECTOMY      COLONOSCOPY N/A 6/6/2022    Procedure: COLONOSCOPY;  Surgeon: Jose Bello MD;  Location: [facility] ENDO;  Service: Endoscopy;  Laterality: N/A;    CORONARY ANGIOGRAPHY N/A 6/7/2023    Procedure: ANGIOGRAM, CORONARY ARTERY;  Surgeon: Edgardo Marcelino MD;  Location: [facility] CATH;  Service: Cardiology;  Laterality: N/A;    EPIDURAL BLOCK INJECTION  2015    cervical    EPIDURAL STEROID INJECTION INTO LUMBAR SPINE N/A 6/5/2019    Procedure: Injection-steroid-epidural-lumbar L5/S1 interlaminar;  Surgeon: Riley Segura MD;  Location: [facility] OR;  Service: Pain Management;  Laterality: N/A;    EPIDURAL STEROID INJECTION INTO LUMBAR SPINE N/A 9/13/2019    Procedure:  Injection-steroid-epidural-lumbar;  Surgeon: Riley Segura MD;  Location: [facility];  Service: Pain Management;  Laterality: N/A;  L5/S1 interlaminar to the right    EPIDURAL STEROID INJECTION INTO LUMBAR SPINE N/A 6/2/2020    Procedure: Injection-steroid-epidural-lumbar L5/S1 to right;  Surgeon: Riley Segura MD;  Location: [facility];  Service: Pain Management;  Laterality: N/A;    Facet Steroid injection       Pain management    HERNIA REPAIR      Ventral    INJECTION OF ANESTHETIC AGENT AROUND MEDIAL BRANCH NERVES INNERVATING LUMBAR FACET JOINT Right 3/21/2023    Procedure: Block-nerve-medial branch-lumbar L3/4, L4/5, L5/S1;  Surgeon: Riley Segura MD;  Location: [facility];  Service: Pain Management;  Laterality: Right;    INSERTION OF DORSAL COLUMN NERVE STIMULATOR FOR TRIAL N/A 2/10/2021    Procedure: INSERTION, NEUROSTIMULATOR, SPINAL CORD, DORSAL COLUMN, FOR TRIAL;  Surgeon: Riley Segura MD;  Location: [facility];  Service: Pain Management;  Laterality: N/A;    INSERTION OF NEUROSTIMULATOR OF DORSAL COLUMN OF SPINAL CORD N/A 3/1/2021    Procedure: INSERTION, NEUROSTIMULATOR, SPINAL CORD, DORSAL COLUMN;  Surgeon: Riley Segura MD;  Location: [facility];  Service: Pain Management;  Laterality: N/A;    KNEE SURGERY      bilateral    RADIOFREQUENCY ABLATION  2015    lumbar nerve    RADIOFREQUENCY ABLATION OF LUMBAR MEDIAL BRANCH NERVE AT SINGLE LEVEL Right 4/11/2019    Procedure: Radiofrequency Ablation, Nerve, Spinal, Lumbar, Medial Branch, L1,2,3,4,5;  Surgeon: Riley Segura MD;  Location: [facility];  Service: Pain Management;  Laterality: Right;    TRANSFORAMINAL EPIDURAL INJECTION OF STEROID Right 11/13/2020    Procedure: Injection,steroid,epidural,transforaminal approach, l3/4 and l5/s1;  Surgeon: Riley Segura MD;  Location: [facility];  Service: Pain Management;  Laterality: Right;    TRANSFORAMINAL EPIDURAL INJECTION OF STEROID Left 6/24/2021    Procedure:  Injection,steroid,epidural,transforaminal approach L5/S1;  Surgeon: Riley Segura MD;  Location: [facility] OR;  Service: Pain Management;  Laterality: Left;    TRANSFORAMINAL EPIDURAL INJECTION OF STEROID Right 2022    Procedure: Injection,steroid,epidural,transforaminal approach L5/S1;  Surgeon: Riley Segura MD;  Location: [facility] OR;  Service: Pain Management;  Laterality: Right;    TRANSFORAMINAL EPIDURAL INJECTION OF STEROID Right 10/25/2022    Procedure: Injection,steroid,epidural,transforaminal approach L2/3 and L3/4;  Surgeon: Riley Segura MD;  Location: [facility];  Service: Pain Management;  Laterality: Right;    VERTEBROPLASTY       Social History     Tobacco Use    Smoking status: Former     Packs/day: 1.50     Years: 24.00     Pack years: 36.00     Types: Cigarettes     Start date: 10/21/1959     Quit date: 3/19/1983     Years since quittin.2     Passive exposure: Past    Smokeless tobacco: Never   Substance Use Topics    Alcohol use: No    Drug use: No        Review of Systems:    As noted in HPI in addition      REVIEW OF SYSTEMS  Review of Systems   Constitutional: Negative for decreased appetite, diaphoresis, night sweats, weight gain and weight loss.   HENT:  Negative for nosebleeds and odynophagia.    Eyes:  Negative for double vision and photophobia.   Cardiovascular:  Negative for chest pain, claudication, cyanosis, dyspnea on exertion, irregular heartbeat, leg swelling, near-syncope, orthopnea, palpitations, paroxysmal nocturnal dyspnea and syncope.   Respiratory:  Negative for cough, hemoptysis, shortness of breath and wheezing.    Hematologic/Lymphatic: Negative for adenopathy.   Skin:  Negative for flushing, skin cancer and suspicious lesions.   Musculoskeletal:  Negative for gout, myalgias and neck pain.   Gastrointestinal:  Negative for abdominal pain, heartburn, hematemesis and hematochezia.   Genitourinary:  Negative for bladder incontinence, hesitancy and nocturia.    Neurological:  Negative for focal weakness, headaches, light-headedness and paresthesias.   Psychiatric/Behavioral:  Negative for memory loss and substance abuse.             Objective:        Vitals:    06/13/23 1300   BP: 137/79   Pulse: 75       Lab Results   Component Value Date    WBC 6.36 06/07/2023    HGB 14.6 06/07/2023    HCT 43.0 06/07/2023     06/07/2023    CHOL 159 03/06/2023    TRIG 84 03/06/2023    HDL 44 03/06/2023    ALT 32 03/06/2023    AST 21 03/06/2023     06/07/2023    K 4.0 06/07/2023     06/07/2023    CREATININE 0.94 06/07/2023    BUN 15 06/07/2023    CO2 26 06/07/2023    TSH 1.300 04/23/2016    PSA 4.5 (H) 03/22/2022    INR 1.1 12/15/2022    HGBA1C 5.7 (H) 03/06/2023        ECHOCARDIOGRAM RESULTS  Results for orders placed in visit on 05/05/23    Echo    Interpretation Summary  · Mild left atrial enlargement.  · Normal systolic function.  · The estimated ejection fraction is 55%.  · Indeterminate left ventricular diastolic function.  · Normal right ventricular size with normal right ventricular systolic function.  · Mild mitral regurgitation.  · Mild to moderate tricuspid regurgitation.  · Normal central venous pressure (3 mmHg).  · The estimated PA systolic pressure is 28 mmHg.        CURRENT/PREVIOUS VISIT EKG  Results for orders placed or performed during the hospital encounter of 06/07/23   EKG 12-lead    Collection Time: 06/07/23  7:10 AM    Narrative    Test Reason : I10,    Vent. Rate : 064 BPM     Atrial Rate : 064 BPM     P-R Int : 296 ms          QRS Dur : 130 ms      QT Int : 410 ms       P-R-T Axes : 058 -52 039 degrees     QTc Int : 422 ms    Sinus rhythm with sinus arrhythmia with 1st degree A-V block  Left axis deviation  Nonspecific intraventricular block  Abnormal ECG  When compared with ECG of 22-MAR-2023 14:07,  Previous ECG has undetermined rhythm, needs review  Confirmed by Edgardo Marcelino MD ([id number]) on 6/7/2023 2:01:26 PM    Referred By:  AMADO            Confirmed By:Edgardo Marcelino MD     No valid procedures specified.   Results for orders placed during the hospital encounter of 03/22/23    Nuclear Stress - Cardiology Interpreted    Interpretation Summary    The ECG portion of the study is abnormal but not diagnostic.    The test was stopped because the patient experienced A-V block.    2nd degree AV Block confirmed by UMM Chau PA-C    Resting images obtained only.  Apical thinning noted.  No stress images.  Abnormal baseline EKG is reported.  Inconclusive study.    No valid procedures specified.    PHYSICAL EXAM  CONSTITUTIONAL: Well built, well nourished in no apparent distress  NECK: no carotid bruit, no JVD  LUNGS: CTA  CHEST WALL: no tenderness,  HEART: regular rate and rhythm, S1, S2 normal, no murmur, click, rub or gallop   ABDOMEN: soft, non-tender; bowel sounds normal; no masses,  no organomegaly  EXTREMITIES: Extremities normal, +1 lower extremity edema, no calf tenderness noted  NEURO: AAO X 3    I HAVE REVIEWED :    The vital signs, nurses notes, and all the pertinent radiology and labs.         Current Outpatient Medications   Medication Instructions    acetaminophen (TYLENOL) 1,000 mg, Oral, Every 6 hours PRN    aspirin (ECOTRIN) 81 mg, Oral, Daily,      atorvastatin (LIPITOR) 20 mg, Oral, Daily    gabapentin (NEURONTIN) 300 mg, Oral, 2 times daily    hydroCHLOROthiazide (HYDRODIURIL) 12.5 MG Tab TAKE 1 TABLET BY MOUTH EVERY DAY    HYDROcodone-acetaminophen (NORCO)  mg per tablet 1 tablet, Oral, Every 8 hours PRN    HYDROcodone-acetaminophen (NORCO) 7.5-325 mg per tablet 1 tablet, Oral, Every 6 hours PRN    losartan (COZAAR) 100 MG tablet TAKE 1 TABLET BY MOUTH EVERY DAY    nystatin (MYCOSTATIN) ointment Topical (Top), 2 times daily    tamsulosin (FLOMAX) 0.4 mg Cap TAKE 1 CAPSULE BY MOUTH EVERY DAY          Assessment:   High-grade ostial left main, proximal LAD and circumflex artery disease.  EF 55%.  Hypertension, dyslipidemia  Remote  history CVA no sequelae.  Carotid ultrasound done today.      Plan:   No change current medications.  Plan per CTS.  Follow up in hospital.          No follow-ups on file.

## 2023-06-19 ENCOUNTER — TELEPHONE (OUTPATIENT)
Dept: VASCULAR SURGERY | Facility: CLINIC | Age: 78
End: 2023-06-19
Payer: MEDICARE

## 2023-06-19 NOTE — TELEPHONE ENCOUNTER
"Call placed to Mr Roque to advise of some changes to his pre-surgery orders, as his procedure has been rescheduled for a few days later. He stated He has stopped the Losartan, as instructed and was asking which medications to take the morning of his procedure. I advised that the pre-op nurses will advise him of this at his pre-op appt later this afternoon. I also inquired as to whether He is taking any blood thinners, as there are none listed in his chart, to which He replied, "No, and I already stopped taking the Aspirin on Saturday, 6/17". I asked who had instructed him to stop taking it, and He stated, "Every hospital that I've been to has had me to stop, so I just stopped taking it". I advised that Dr Clark does not stop Aspirin prior to her heart procedures, and He verbalized that He will resume taking it today. Dr Clark advised.  "

## 2023-06-23 PROBLEM — Z95.1 S/P CABG (CORONARY ARTERY BYPASS GRAFT): Status: ACTIVE | Noted: 2023-06-23

## 2023-06-23 PROBLEM — I25.10 CORONARY ARTERY DISEASE INVOLVING NATIVE CORONARY ARTERY: Status: ACTIVE | Noted: 2023-06-23

## 2023-06-26 PROBLEM — I49.9 IRREGULAR CARDIAC RHYTHM: Status: ACTIVE | Noted: 2023-06-26

## 2023-06-27 ENCOUNTER — TELEPHONE (OUTPATIENT)
Dept: CARDIOLOGY | Facility: CLINIC | Age: 78
End: 2023-06-27
Payer: MEDICARE

## 2023-06-27 DIAGNOSIS — Z95.1 S/P CABG (CORONARY ARTERY BYPASS GRAFT): Primary | ICD-10-CM

## 2023-06-27 NOTE — TELEPHONE ENCOUNTER
----- Message from Edgardo Marcelino MD sent at 6/27/2023  2:40 PM CDT -----  Regarding: RE: Request for Outpatient Cardiac Rehab Orders  Okay to order cardiac rehab.  Thanks  ----- Message -----  From: Omar Tong LPN  Sent: 6/27/2023  10:30 AM CDT  To: Edgardo Marcelino MD  Subject: FW: Request for Outpatient Cardiac Rehab Ord#      ----- Message -----  From: Margarette Pace CEP  Sent: 6/27/2023  10:06 AM CDT  To: [facility] Staff  Subject: Request for Outpatient Cardiac Rehab Orders        Your patient qualifies for Outpatient Cardiac Rehab.  If you agree, please complete order CRR4 - CARDIAC REHAB PHASE II for:      - S/P CABG (coronary artery bypass graft) [Z95.1]    Thank you!

## 2023-06-29 ENCOUNTER — TELEPHONE (OUTPATIENT)
Dept: VASCULAR SURGERY | Facility: CLINIC | Age: 78
End: 2023-06-29
Payer: MEDICARE

## 2023-06-29 NOTE — TELEPHONE ENCOUNTER
----- Message from Griselda Harrison sent at 6/29/2023  9:22 AM CDT -----  Contact: Hospital  Type:  Sooner Appointment Request    Caller is requesting a sooner appointment.  Caller declined first available appointment listed below.  Caller will not accept being placed on the waitlist and is requesting a message be sent to doctor.    Name of Caller:  St schafer  When is the first available appointment?  No Availability   Symptoms:  3 week f/u  Best Call Back Number:  [phone]    Additional Information:  St Schafer was calling to set up f/u stated to please call pt when available to set up an appt Thank you

## 2023-06-30 ENCOUNTER — PATIENT MESSAGE (OUTPATIENT)
Dept: VASCULAR SURGERY | Facility: CLINIC | Age: 78
End: 2023-06-30
Payer: MEDICARE

## 2023-06-30 PROCEDURE — G0180 MD CERTIFICATION HHA PATIENT: HCPCS | Mod: ,,, | Performed by: FAMILY MEDICINE

## 2023-06-30 PROCEDURE — G0180 PR HOME HEALTH MD CERTIFICATION: ICD-10-PCS | Mod: ,,, | Performed by: FAMILY MEDICINE

## 2023-07-03 ENCOUNTER — OFFICE VISIT (OUTPATIENT)
Dept: PRIMARY CARE CLINIC | Facility: CLINIC | Age: 78
End: 2023-07-03
Payer: MEDICARE

## 2023-07-03 VITALS
SYSTOLIC BLOOD PRESSURE: 130 MMHG | RESPIRATION RATE: 18 BRPM | HEART RATE: 90 BPM | HEIGHT: 70 IN | BODY MASS INDEX: 42.66 KG/M2 | DIASTOLIC BLOOD PRESSURE: 70 MMHG | TEMPERATURE: 98 F | WEIGHT: 298 LBS | OXYGEN SATURATION: 94 %

## 2023-07-03 DIAGNOSIS — D64.9 ANEMIA, UNSPECIFIED TYPE: ICD-10-CM

## 2023-07-03 DIAGNOSIS — I25.10 CORONARY ARTERY DISEASE, UNSPECIFIED VESSEL OR LESION TYPE, UNSPECIFIED WHETHER ANGINA PRESENT, UNSPECIFIED WHETHER NATIVE OR TRANSPLANTED HEART: Primary | ICD-10-CM

## 2023-07-03 DIAGNOSIS — E83.51 HYPOCALCEMIA: ICD-10-CM

## 2023-07-03 DIAGNOSIS — R20.2 NUMBNESS AND TINGLING IN BOTH HANDS: ICD-10-CM

## 2023-07-03 DIAGNOSIS — R20.0 NUMBNESS AND TINGLING IN BOTH HANDS: ICD-10-CM

## 2023-07-03 DIAGNOSIS — Z95.1 S/P CABG (CORONARY ARTERY BYPASS GRAFT): ICD-10-CM

## 2023-07-03 PROCEDURE — 3078F DIAST BP <80 MM HG: CPT | Mod: CPTII,S$GLB,, | Performed by: FAMILY MEDICINE

## 2023-07-03 PROCEDURE — 3075F SYST BP GE 130 - 139MM HG: CPT | Mod: CPTII,S$GLB,, | Performed by: FAMILY MEDICINE

## 2023-07-03 PROCEDURE — 1160F PR REVIEW ALL MEDS BY PRESCRIBER/CLIN PHARMACIST DOCUMENTED: ICD-10-PCS | Mod: CPTII,S$GLB,, | Performed by: FAMILY MEDICINE

## 2023-07-03 PROCEDURE — 1101F PT FALLS ASSESS-DOCD LE1/YR: CPT | Mod: CPTII,S$GLB,, | Performed by: FAMILY MEDICINE

## 2023-07-03 PROCEDURE — 3078F PR MOST RECENT DIASTOLIC BLOOD PRESSURE < 80 MM HG: ICD-10-PCS | Mod: CPTII,S$GLB,, | Performed by: FAMILY MEDICINE

## 2023-07-03 PROCEDURE — 1111F DSCHRG MED/CURRENT MED MERGE: CPT | Mod: CPTII,S$GLB,, | Performed by: FAMILY MEDICINE

## 2023-07-03 PROCEDURE — 99999 PR PBB SHADOW E&M-EST. PATIENT-LVL IV: CPT | Mod: PBBFAC,,, | Performed by: FAMILY MEDICINE

## 2023-07-03 PROCEDURE — 1125F AMNT PAIN NOTED PAIN PRSNT: CPT | Mod: CPTII,S$GLB,, | Performed by: FAMILY MEDICINE

## 2023-07-03 PROCEDURE — 1159F MED LIST DOCD IN RCRD: CPT | Mod: CPTII,S$GLB,, | Performed by: FAMILY MEDICINE

## 2023-07-03 PROCEDURE — 3288F FALL RISK ASSESSMENT DOCD: CPT | Mod: CPTII,S$GLB,, | Performed by: FAMILY MEDICINE

## 2023-07-03 PROCEDURE — 3288F PR FALLS RISK ASSESSMENT DOCUMENTED: ICD-10-PCS | Mod: CPTII,S$GLB,, | Performed by: FAMILY MEDICINE

## 2023-07-03 PROCEDURE — 3075F PR MOST RECENT SYSTOLIC BLOOD PRESS GE 130-139MM HG: ICD-10-PCS | Mod: CPTII,S$GLB,, | Performed by: FAMILY MEDICINE

## 2023-07-03 PROCEDURE — 99999 PR PBB SHADOW E&M-EST. PATIENT-LVL IV: ICD-10-PCS | Mod: PBBFAC,,, | Performed by: FAMILY MEDICINE

## 2023-07-03 PROCEDURE — 1111F PR DISCHARGE MEDS RECONCILED W/ CURRENT OUTPATIENT MED LIST: ICD-10-PCS | Mod: CPTII,S$GLB,, | Performed by: FAMILY MEDICINE

## 2023-07-03 PROCEDURE — 99214 PR OFFICE/OUTPT VISIT, EST, LEVL IV, 30-39 MIN: ICD-10-PCS | Mod: S$GLB,,, | Performed by: FAMILY MEDICINE

## 2023-07-03 PROCEDURE — 1157F ADVNC CARE PLAN IN RCRD: CPT | Mod: CPTII,S$GLB,, | Performed by: FAMILY MEDICINE

## 2023-07-03 PROCEDURE — 1125F PR PAIN SEVERITY QUANTIFIED, PAIN PRESENT: ICD-10-PCS | Mod: CPTII,S$GLB,, | Performed by: FAMILY MEDICINE

## 2023-07-03 PROCEDURE — 1157F PR ADVANCE CARE PLAN OR EQUIV PRESENT IN MEDICAL RECORD: ICD-10-PCS | Mod: CPTII,S$GLB,, | Performed by: FAMILY MEDICINE

## 2023-07-03 PROCEDURE — 1101F PR PT FALLS ASSESS DOC 0-1 FALLS W/OUT INJ PAST YR: ICD-10-PCS | Mod: CPTII,S$GLB,, | Performed by: FAMILY MEDICINE

## 2023-07-03 PROCEDURE — 1160F RVW MEDS BY RX/DR IN RCRD: CPT | Mod: CPTII,S$GLB,, | Performed by: FAMILY MEDICINE

## 2023-07-03 PROCEDURE — 99214 OFFICE O/P EST MOD 30 MIN: CPT | Mod: S$GLB,,, | Performed by: FAMILY MEDICINE

## 2023-07-03 PROCEDURE — 1159F PR MEDICATION LIST DOCUMENTED IN MEDICAL RECORD: ICD-10-PCS | Mod: CPTII,S$GLB,, | Performed by: FAMILY MEDICINE

## 2023-07-03 NOTE — PROGRESS NOTES
Primary Care Provider Appointment   [facility]       Patient ID: Chinedu Roque is a 77 y.o. male.    ASSESSMENT/PLAN by Problem List:    1. Coronary artery disease, unspecified vessel or lesion type, unspecified whether angina present, unspecified whether native or transplanted heart    2. S/P CABG (coronary artery bypass graft)    3. Anemia, unspecified type  -     CBC Auto Differential; Future; Expected date: 07/03/2023  -     FERRITIN; Future; Expected date: 07/03/2023  -     IRON AND TIBC; Future; Expected date: 07/03/2023    4. Hypocalcemia  Comments:  Normal when corrected for albumin.  Will repeat in a few weeks  Orders:  -     Comprehensive Metabolic Panel; Future; Expected date: 07/03/2023    5. Numbness and tingling in both hands  Comments:  Phalen's and Tinel's were negative but difficulty performing Phalen's correctly.  Discussed options, will monitor for now.  If worsening then neurology     Today's visit was a follow-up for recent hospitalization after CABG.  At the time of this visit he is doing well.  He will follow with his specialist and see me back in a few months, he will continue to work with home health and report any significant changes or urgent concerns.    Follow Up:  2 months        Subjective:     Chief Complaint   Patient presents with    Follow-up    Chest Pain     Pt states he has several sharp pains on the right side of chest maybe 2/3 a day. Pt states it's hard to pull up pants.    Shortness of Breath    Tingling     Pt states he has tingling in both hands.     I have reviewed the information entered by the ancillary staff regarding the chief complaint as well as the related history.    Follow-up  Associated symptoms include arthralgias, chest pain (sharp right sided with movement), fatigue and weakness (improving). Pertinent negatives include no fever.   Chest Pain   Associated symptoms include back pain, shortness of breath (improving,  exertional) and weakness (improving). Pertinent negatives include no fever.   Shortness of Breath  Associated symptoms include chest pain (sharp right sided with movement). Pertinent negatives include no fever.     Patient is a/an 77 y.o.  male     Hospital follow-up after CABG.  Reviewed hospital notes, procedures, consult, and discharge summary.    Has [facility], [phone]    Appetite good, no constipation or diarrhea  Alternating percocet and tylenol    Tingling all finger tips since his surgery.  Denies anymore proximal symptoms.  Symptoms are continuous no obvious triggering factors    Glucose elevated, on medrol pack, discussed diet    Some postoperative changes as expected including mild anemia    He does report some sharp right chest wall pains with movement.  No difficulty taking a deep breath.  This appears to be postsurgical and is improving.  He will monitor for any change or other unexpected symptoms    For complete problem list, past medical history, surgical history, social history, etc., see appropriate section in the electronic medical record    Review of Systems   Constitutional:  Positive for activity change and fatigue. Negative for fever.   HENT:  Negative for trouble swallowing.    Eyes:  Negative for visual disturbance.   Respiratory:  Positive for shortness of breath (improving, exertional).    Cardiovascular:  Positive for chest pain (sharp right sided with movement).   Gastrointestinal:  Positive for constipation.   Endocrine: Negative.    Genitourinary:  Negative for difficulty urinating.   Musculoskeletal:  Positive for arthralgias and back pain.   Neurological:  Positive for weakness (improving).     Objective     Physical Exam  Constitutional:       Appearance: He is obese. He is not ill-appearing.   HENT:      Head: Normocephalic and atraumatic.   Cardiovascular:      Rate and Rhythm: Normal rate and regular rhythm.      Heart sounds: Normal heart sounds.       "Comments: 1-2+ bilateral ankle edema  Pulmonary:      Effort: No respiratory distress.      Comments: Breath sounds are diminished.  There are some faint basilar crackles  Neurological:      Mental Status: He is alert.     Vitals:    07/03/23 1026   BP: 130/70   BP Location: Right arm   Patient Position: Sitting   BP Method: Large (Manual)   Pulse: 90   Resp: 18   Temp: 98.3 °F (36.8 °C)   TempSrc: Oral   SpO2: (!) 94%   Weight: 135.2 kg (298 lb)   Height: 5' 10" (1.778 m)       "

## 2023-07-05 PROBLEM — K92.2 GIB (GASTROINTESTINAL BLEEDING): Status: ACTIVE | Noted: 2023-07-05

## 2023-07-05 PROBLEM — I48.20 CHRONIC A-FIB: Status: ACTIVE | Noted: 2023-07-05

## 2023-07-05 PROBLEM — Z71.89 ACP (ADVANCE CARE PLANNING): Status: ACTIVE | Noted: 2023-07-05

## 2023-07-05 PROBLEM — R79.89 TROPONIN I ABOVE REFERENCE RANGE: Status: ACTIVE | Noted: 2023-07-05

## 2023-07-05 PROBLEM — I48.91 ATRIAL FIBRILLATION: Status: ACTIVE | Noted: 2023-07-05

## 2023-07-05 PROBLEM — D72.829 LEUKOCYTOSIS (LEUCOCYTOSIS): Status: ACTIVE | Noted: 2023-07-05

## 2023-07-06 PROBLEM — K62.5 BLOOD PER RECTUM: Status: RESOLVED | Noted: 2023-07-06 | Resolved: 2023-07-06

## 2023-07-06 PROBLEM — J90 PLEURAL EFFUSION ON LEFT: Status: ACTIVE | Noted: 2023-07-06

## 2023-07-06 PROBLEM — R79.89 TROPONIN I ABOVE REFERENCE RANGE: Status: RESOLVED | Noted: 2023-07-05 | Resolved: 2023-07-06

## 2023-07-06 PROBLEM — D72.829 LEUKOCYTOSIS (LEUCOCYTOSIS): Status: RESOLVED | Noted: 2023-07-05 | Resolved: 2023-07-06

## 2023-07-06 PROBLEM — K62.5 BLOOD PER RECTUM: Status: ACTIVE | Noted: 2023-07-06

## 2023-07-08 PROBLEM — J96.01 ACUTE HYPOXEMIC RESPIRATORY FAILURE: Status: ACTIVE | Noted: 2023-07-08

## 2023-07-08 PROBLEM — D62 ACUTE BLOOD LOSS ANEMIA: Status: RESOLVED | Noted: 2023-07-08 | Resolved: 2023-07-08

## 2023-07-08 PROBLEM — I26.99 ACUTE PULMONARY EMBOLISM: Status: ACTIVE | Noted: 2023-07-08

## 2023-07-08 PROBLEM — D62 ACUTE BLOOD LOSS ANEMIA: Status: ACTIVE | Noted: 2023-07-08

## 2023-07-08 PROBLEM — I50.33 ACUTE ON CHRONIC HEART FAILURE WITH PRESERVED EJECTION FRACTION (HFPEF): Status: ACTIVE | Noted: 2023-07-08

## 2023-07-09 ENCOUNTER — PATIENT MESSAGE (OUTPATIENT)
Dept: VASCULAR SURGERY | Facility: CLINIC | Age: 78
End: 2023-07-09
Payer: MEDICARE

## 2023-07-10 PROBLEM — I48.0 PAF (PAROXYSMAL ATRIAL FIBRILLATION): Status: ACTIVE | Noted: 2023-07-05

## 2023-07-12 ENCOUNTER — EXTERNAL HOME HEALTH (OUTPATIENT)
Dept: HOME HEALTH SERVICES | Facility: HOSPITAL | Age: 78
End: 2023-07-12
Payer: MEDICARE

## 2023-07-13 ENCOUNTER — DOCUMENT SCAN (OUTPATIENT)
Dept: HOME HEALTH SERVICES | Facility: HOSPITAL | Age: 78
End: 2023-07-13
Payer: MEDICARE

## 2023-07-17 ENCOUNTER — TELEPHONE (OUTPATIENT)
Dept: PRIMARY CARE CLINIC | Facility: CLINIC | Age: 78
End: 2023-07-17

## 2023-07-17 ENCOUNTER — TELEPHONE (OUTPATIENT)
Dept: VASCULAR SURGERY | Facility: CLINIC | Age: 78
End: 2023-07-17
Payer: MEDICARE

## 2023-07-17 ENCOUNTER — OFFICE VISIT (OUTPATIENT)
Dept: PRIMARY CARE CLINIC | Facility: CLINIC | Age: 78
End: 2023-07-17
Payer: MEDICARE

## 2023-07-17 DIAGNOSIS — D64.9 ANEMIA, UNSPECIFIED TYPE: ICD-10-CM

## 2023-07-17 DIAGNOSIS — I26.99 PULMONARY EMBOLISM, UNSPECIFIED CHRONICITY, UNSPECIFIED PULMONARY EMBOLISM TYPE, UNSPECIFIED WHETHER ACUTE COR PULMONALE PRESENT: Primary | ICD-10-CM

## 2023-07-17 DIAGNOSIS — R06.02 SHORTNESS OF BREATH: Primary | ICD-10-CM

## 2023-07-17 DIAGNOSIS — I26.99 ACUTE PULMONARY EMBOLISM, UNSPECIFIED PULMONARY EMBOLISM TYPE, UNSPECIFIED WHETHER ACUTE COR PULMONALE PRESENT: ICD-10-CM

## 2023-07-17 DIAGNOSIS — J96.01 ACUTE RESPIRATORY FAILURE WITH HYPOXEMIA: ICD-10-CM

## 2023-07-17 DIAGNOSIS — K92.2 GASTROINTESTINAL HEMORRHAGE, UNSPECIFIED GASTROINTESTINAL HEMORRHAGE TYPE: ICD-10-CM

## 2023-07-17 DIAGNOSIS — I25.10 CORONARY ARTERY DISEASE, UNSPECIFIED VESSEL OR LESION TYPE, UNSPECIFIED WHETHER ANGINA PRESENT, UNSPECIFIED WHETHER NATIVE OR TRANSPLANTED HEART: ICD-10-CM

## 2023-07-17 PROCEDURE — 99214 OFFICE O/P EST MOD 30 MIN: CPT | Mod: S$GLB,,, | Performed by: FAMILY MEDICINE

## 2023-07-17 PROCEDURE — 99999 PR PBB SHADOW E&M-EST. PATIENT-LVL II: CPT | Mod: PBBFAC,,, | Performed by: FAMILY MEDICINE

## 2023-07-17 PROCEDURE — 1157F ADVNC CARE PLAN IN RCRD: CPT | Mod: CPTII,S$GLB,, | Performed by: FAMILY MEDICINE

## 2023-07-17 PROCEDURE — 1111F PR DISCHARGE MEDS RECONCILED W/ CURRENT OUTPATIENT MED LIST: ICD-10-PCS | Mod: CPTII,S$GLB,, | Performed by: FAMILY MEDICINE

## 2023-07-17 PROCEDURE — 1159F PR MEDICATION LIST DOCUMENTED IN MEDICAL RECORD: ICD-10-PCS | Mod: CPTII,S$GLB,, | Performed by: FAMILY MEDICINE

## 2023-07-17 PROCEDURE — 1160F PR REVIEW ALL MEDS BY PRESCRIBER/CLIN PHARMACIST DOCUMENTED: ICD-10-PCS | Mod: CPTII,S$GLB,, | Performed by: FAMILY MEDICINE

## 2023-07-17 PROCEDURE — 1159F MED LIST DOCD IN RCRD: CPT | Mod: CPTII,S$GLB,, | Performed by: FAMILY MEDICINE

## 2023-07-17 PROCEDURE — 1160F RVW MEDS BY RX/DR IN RCRD: CPT | Mod: CPTII,S$GLB,, | Performed by: FAMILY MEDICINE

## 2023-07-17 PROCEDURE — 1157F PR ADVANCE CARE PLAN OR EQUIV PRESENT IN MEDICAL RECORD: ICD-10-PCS | Mod: CPTII,S$GLB,, | Performed by: FAMILY MEDICINE

## 2023-07-17 PROCEDURE — 99999 PR PBB SHADOW E&M-EST. PATIENT-LVL II: ICD-10-PCS | Mod: PBBFAC,,, | Performed by: FAMILY MEDICINE

## 2023-07-17 PROCEDURE — 99214 PR OFFICE/OUTPT VISIT, EST, LEVL IV, 30-39 MIN: ICD-10-PCS | Mod: S$GLB,,, | Performed by: FAMILY MEDICINE

## 2023-07-17 PROCEDURE — 1111F DSCHRG MED/CURRENT MED MERGE: CPT | Mod: CPTII,S$GLB,, | Performed by: FAMILY MEDICINE

## 2023-07-17 RX ORDER — CLOTRIMAZOLE AND BETAMETHASONE DIPROPIONATE 10; .64 MG/G; MG/G
CREAM TOPICAL 2 TIMES DAILY
Qty: 60 G | Refills: 2 | Status: ON HOLD | OUTPATIENT
Start: 2023-07-17 | End: 2023-07-28 | Stop reason: HOSPADM

## 2023-07-17 RX ORDER — CLOTRIMAZOLE AND BETAMETHASONE DIPROPIONATE 10; .64 MG/G; MG/G
CREAM TOPICAL 2 TIMES DAILY
Qty: 60 G | Refills: 2 | Status: CANCELLED | OUTPATIENT
Start: 2023-07-17

## 2023-07-17 RX ORDER — CLOTRIMAZOLE AND BETAMETHASONE DIPROPIONATE 10; .64 MG/G; MG/G
CREAM TOPICAL 2 TIMES DAILY
Qty: 30 G | Refills: 2 | Status: CANCELLED | OUTPATIENT
Start: 2023-07-17

## 2023-07-17 NOTE — TELEPHONE ENCOUNTER
----- Message from Corrina Coyne sent at 7/17/2023  8:46 AM CDT -----  Type: Needs Medical Advice  Who Called:  [facility]   Best Call Back Number: [phone]    Additional Information: Bri is calling to report pt's new symptoms pt has had a weight gain of 3 pounds in 1 day 288.4 pounds and also stated she wants to report pt's oxygen levels pt's oxygen as to date is currently first started off in low 80% and is now up to 94% with deep breathing concerned with pt not having enough oxygen please advise asap, thanks!

## 2023-07-17 NOTE — TELEPHONE ENCOUNTER
Spoke with Jonathon at [facility] and emailed orders to him at [email or web address]    Nurse will draw tomorrow and requested results be faxed over to us, as well as requested that they get dropped off at an [facility] facility.

## 2023-07-17 NOTE — TELEPHONE ENCOUNTER
----- Message from Hernando Browne MD sent at 7/17/2023 12:48 PM CDT -----  Patient was seen today, need to schedule follow-up in about 1-2 weeks also see if the home health company can collect labs this week CBC, iron and TIBC, ferritin, and BMP.  Orders entered as home health.    Company is [facility], [phone]

## 2023-07-17 NOTE — Clinical Note
Patient was seen today, need to schedule follow-up in about 1-2 weeks also see if the home health company can collect labs this week CBC, iron and TIBC, ferritin, and BMP.  Orders entered as home health.  Company is [employer], [phone]

## 2023-07-17 NOTE — TELEPHONE ENCOUNTER
----- Message from Lucy Sweeney sent at 7/17/2023 12:14 PM CDT -----  Contact: pt [phone]  Type: Needs Medical Advice  Who Called:  pts shawn Hill Call Back Number: [phone]    Additional Information: Requesting a call back to schedule a post op visit. Pls call back and advise

## 2023-07-17 NOTE — TELEPHONE ENCOUNTER
LM with pt and his daughter that he needs to schedule a f/u appt within the next week or 2 wks to see PCP.  Will set f/u remind me alert.

## 2023-07-17 NOTE — PROGRESS NOTES
Primary Care Provider Appointment   [facility]       Patient ID: Chinedu Roque is a 77 y.o. male.    ASSESSMENT/PLAN by Problem List:    1. Shortness of breath  -     FERRITIN; Future; Expected date: 07/17/2023  -     Iron and TIBC; Future; Expected date: 07/17/2023  -     CBC Auto Differential; Future; Expected date: 07/17/2023  -     Basic Metabolic Panel; Future; Expected date: 07/17/2023    2. Acute respiratory failure with hypoxemia    3. Acute pulmonary embolism, unspecified pulmonary embolism type, unspecified whether acute cor pulmonale present    4. Coronary artery disease, unspecified vessel or lesion type, unspecified whether angina present, unspecified whether native or transplanted heart    5. Gastrointestinal hemorrhage, unspecified gastrointestinal hemorrhage type    6. Anemia, unspecified type  -     Cancel: CBC Auto Differential; Future; Expected date: 07/17/2023  -     Cancel: Iron and TIBC; Future; Expected date: 07/17/2023  -     Cancel: FERRITIN; Future; Expected date: 07/17/2023  -     FERRITIN; Future; Expected date: 07/17/2023  -     Iron and TIBC; Future; Expected date: 07/17/2023  -     CBC Auto Differential; Future; Expected date: 07/17/2023      Note:  At the time of this initial progress note and when the patient was seen in clinic, we had lost power.  Unable to check him in, unable to document vital signs through the rooming report.  Unable to update medications, etc..    Discussion: He was seen today for hospital follow-up after multiple recent hospitalizations.  Discussed below.  He is still having very significant shortness of breath and very limited activity.  Minimal activity such as getting out of a car cause significant shortness of breath.  On exam today he did show evidence of hypoxia with pulse ox dropping to 88% on room air with minimal exertion, just walking partly up the hallway.  After placing him on 2 L oxygen by nasal cannula  oxygen saturation quickly increased to 95%.  He was originally 92% on room air before exertion.    We have ordered home oxygen.  I would also like to arrange repeat blood work in a couple of days to assess severity of his anemia after recent GI bleed and whether or not he may benefit iron supplementation.  Although he is having some constipation as well as reports a rash on the buttocks so we also discussed ways to help the constipation and I prescribed Lotrisone for the rash.  He will continue home health and will check on him later this week.      Follow Up:  1-2 weeks    Subjective:     Hospital follow up    HPI    Patient is a/an 77 y.o.  male       Hospital follow-up after hospital admission.  He recently underwent open heart surgery.  He is doing well upon discharge but developed a gastrointestinal bleed.  Was taken off of blood thinners.  Then developed pulmonary embolism...  Restarted on Eliquis...    For complete problem list, past medical history, surgical history, social history, etc., see appropriate section in the electronic medical record    Review of Systems   Constitutional:  Positive for activity change and fatigue.   Respiratory:  Positive for shortness of breath. Negative for cough.    Cardiovascular:  Positive for leg swelling. Negative for chest pain.   Gastrointestinal:  Positive for constipation. Negative for blood in stool.   Genitourinary: Negative.    Musculoskeletal:  Positive for arthralgias and back pain.   Skin:  Positive for rash.   Neurological:  Positive for weakness.     Objective     Physical Exam  Constitutional:       Appearance: He is obese. He is ill-appearing.   HENT:      Head: Normocephalic and atraumatic.      Nose: No congestion.   Cardiovascular:      Rate and Rhythm: Normal rate and regular rhythm.      Heart sounds: Normal heart sounds.      Comments: 1-2+ bilateral ankle edema  Notably short short of breath even with minimal activity such as trying to walk across the  exam room.  Symptoms do improve some upon resting, improved noticeably after application of 2 L oxygen by nasal cannula.  Initial pulse ox was 91-92% on room air.  With minimal exertion it dropped to 88% at which point oxygen was placed on him at 2 L per nasal cannula and this improved to 95%.  His symptoms improved but it still took several minutes to improve.  Pulmonary:      Effort: No respiratory distress.      Comments: Breath sounds are diminished.  There are some faint basilar crackles  Skin:     Coloration: Skin is pale.   Neurological:      Mental Status: He is alert.     There were no vitals filed for this visit.

## 2023-07-18 ENCOUNTER — HOSPITAL ENCOUNTER (OUTPATIENT)
Dept: RADIOLOGY | Facility: HOSPITAL | Age: 78
Discharge: HOME | End: 2023-07-18
Payer: MEDICARE

## 2023-07-18 ENCOUNTER — PATIENT MESSAGE (OUTPATIENT)
Dept: PRIMARY CARE CLINIC | Facility: CLINIC | Age: 78
End: 2023-07-18
Payer: MEDICARE

## 2023-07-18 ENCOUNTER — OFFICE VISIT (OUTPATIENT)
Dept: CARDIOLOGY | Facility: CLINIC | Age: 78
End: 2023-07-18
Payer: MEDICARE

## 2023-07-18 VITALS
HEIGHT: 70 IN | HEART RATE: 88 BPM | WEIGHT: 290.44 LBS | DIASTOLIC BLOOD PRESSURE: 64 MMHG | SYSTOLIC BLOOD PRESSURE: 126 MMHG | BODY MASS INDEX: 41.58 KG/M2

## 2023-07-18 DIAGNOSIS — I10 ESSENTIAL HYPERTENSION: ICD-10-CM

## 2023-07-18 DIAGNOSIS — Z95.1 S/P CABG (CORONARY ARTERY BYPASS GRAFT): Primary | ICD-10-CM

## 2023-07-18 DIAGNOSIS — I50.33 ACUTE ON CHRONIC HEART FAILURE WITH PRESERVED EJECTION FRACTION (HFPEF): ICD-10-CM

## 2023-07-18 DIAGNOSIS — E78.2 MIXED HYPERLIPIDEMIA: Chronic | ICD-10-CM

## 2023-07-18 DIAGNOSIS — Z95.1 S/P CABG (CORONARY ARTERY BYPASS GRAFT): ICD-10-CM

## 2023-07-18 DIAGNOSIS — R30.0 DYSURIA: Primary | ICD-10-CM

## 2023-07-18 DIAGNOSIS — I48.0 PAF (PAROXYSMAL ATRIAL FIBRILLATION): ICD-10-CM

## 2023-07-18 PROCEDURE — 3288F PR FALLS RISK ASSESSMENT DOCUMENTED: ICD-10-PCS | Mod: CPTII,S$GLB,,

## 2023-07-18 PROCEDURE — 1111F PR DISCHARGE MEDS RECONCILED W/ CURRENT OUTPATIENT MED LIST: ICD-10-PCS | Mod: CPTII,S$GLB,,

## 2023-07-18 PROCEDURE — 99999 PR PBB SHADOW E&M-EST. PATIENT-LVL IV: CPT | Mod: PBBFAC,,,

## 2023-07-18 PROCEDURE — 71046 XR CHEST PA AND LATERAL: ICD-10-PCS | Mod: 26,,, | Performed by: RADIOLOGY

## 2023-07-18 PROCEDURE — 1157F PR ADVANCE CARE PLAN OR EQUIV PRESENT IN MEDICAL RECORD: ICD-10-PCS | Mod: CPTII,S$GLB,,

## 2023-07-18 PROCEDURE — 3078F PR MOST RECENT DIASTOLIC BLOOD PRESSURE < 80 MM HG: ICD-10-PCS | Mod: CPTII,S$GLB,,

## 2023-07-18 PROCEDURE — 1111F DSCHRG MED/CURRENT MED MERGE: CPT | Mod: CPTII,S$GLB,,

## 2023-07-18 PROCEDURE — 3074F PR MOST RECENT SYSTOLIC BLOOD PRESSURE < 130 MM HG: ICD-10-PCS | Mod: CPTII,S$GLB,,

## 2023-07-18 PROCEDURE — 71046 X-RAY EXAM CHEST 2 VIEWS: CPT | Mod: 26,,, | Performed by: RADIOLOGY

## 2023-07-18 PROCEDURE — 99999 PR PBB SHADOW E&M-EST. PATIENT-LVL IV: ICD-10-PCS | Mod: PBBFAC,,,

## 2023-07-18 PROCEDURE — 99214 OFFICE O/P EST MOD 30 MIN: CPT | Mod: S$GLB,,,

## 2023-07-18 PROCEDURE — 3078F DIAST BP <80 MM HG: CPT | Mod: CPTII,S$GLB,,

## 2023-07-18 PROCEDURE — 3288F FALL RISK ASSESSMENT DOCD: CPT | Mod: CPTII,S$GLB,,

## 2023-07-18 PROCEDURE — 99214 PR OFFICE/OUTPT VISIT, EST, LEVL IV, 30-39 MIN: ICD-10-PCS | Mod: S$GLB,,,

## 2023-07-18 PROCEDURE — 3074F SYST BP LT 130 MM HG: CPT | Mod: CPTII,S$GLB,,

## 2023-07-18 PROCEDURE — 1159F PR MEDICATION LIST DOCUMENTED IN MEDICAL RECORD: ICD-10-PCS | Mod: CPTII,S$GLB,,

## 2023-07-18 PROCEDURE — 1101F PT FALLS ASSESS-DOCD LE1/YR: CPT | Mod: CPTII,S$GLB,,

## 2023-07-18 PROCEDURE — 1126F PR PAIN SEVERITY QUANTIFIED, NO PAIN PRESENT: ICD-10-PCS | Mod: CPTII,S$GLB,,

## 2023-07-18 PROCEDURE — 1157F ADVNC CARE PLAN IN RCRD: CPT | Mod: CPTII,S$GLB,,

## 2023-07-18 PROCEDURE — 1159F MED LIST DOCD IN RCRD: CPT | Mod: CPTII,S$GLB,,

## 2023-07-18 PROCEDURE — 1126F AMNT PAIN NOTED NONE PRSNT: CPT | Mod: CPTII,S$GLB,,

## 2023-07-18 PROCEDURE — 71046 X-RAY EXAM CHEST 2 VIEWS: CPT | Mod: TC,FY,PO

## 2023-07-18 PROCEDURE — 1101F PR PT FALLS ASSESS DOC 0-1 FALLS W/OUT INJ PAST YR: ICD-10-PCS | Mod: CPTII,S$GLB,,

## 2023-07-18 NOTE — PROGRESS NOTES
Subjective:    Patient ID:  Chinedu Roque is a 77 y.o. male patient here for evaluation No chief complaint on file.    History of Present Illness:     Mr. Roque is a 77 year old M who follows with Dr. Marcelino here today for a hospital follow up as below.   6/23 CABG and medistinum exploration -- bleeding  amiodarone 30 d supply for a fib RVR  7/4 a fib RVR , GI bleed with no active bleed one pRBC  7/8 acute PE (VANE was ligated so no OAC) now on eliquis taper      He continues to be markedly SOB. Saw his PCP yesterday who ordered home O2 because his RA sats were in the 80s and made worse with walking. Today in our clinic he is O2 92% room air today. Some chest soreness. No syncope. No palpations.       Most Recent Echocardiogram Results  Results for orders placed during the hospital encounter of 07/08/23    Echo    Interpretation Summary  · The left ventricle is normal in size with normal systolic function.  · The estimated ejection fraction is 60%.  · Normal right ventricular size with low normal right ventricular systolic function.  · Mild to moderate tricuspid regurgitation.  · Intermediate central venous pressure (8 mmHg).  · The estimated PA systolic pressure is 46 mmHg.  · There is mild pulmonary hypertension.  · 3 mL of intravenous Optison contrast was used during the study      Most Recent Nuclear Stress Test Results  Results for orders placed during the hospital encounter of 03/22/23    Nuclear Stress - Cardiology Interpreted    Interpretation Summary    The ECG portion of the study is abnormal but not diagnostic.    The test was stopped because the patient experienced A-V block.    2nd degree AV Block confirmed by UMM Chau PA-C    Resting images obtained only.  Apical thinning noted.  No stress images.  Abnormal baseline EKG is reported.  Inconclusive study.      Most Recent Cardiac PET Stress Test Results  No results found for this or any previous visit.      Most Recent Cardiovascular Angiogram  results  Results for orders placed during the hospital encounter of 06/07/23    Cardiac catheterization    Conclusion    The ejection fraction was calculated to be 65%.    The left ventricular end diastolic pressure was normal.    The pre-procedure left ventricular end diastolic pressure was 14.    The post-procedure left ventricular end diastolic pressure was 14.    The estimated blood loss was none.    High-grade ostial left main, ostial LAD and ostial circumflex disease as described.  Left dominant system.  Refer to CTS for surgical revascularization.  EF normal.    The procedure log was documented by No documenter listed and verified by Edgardo Marcelino MD.    Date: 6/7/2023  Time: 10:58 AM      Other Most Recent Cardiology Results  Results for orders placed during the hospital encounter of 07/08/23    CARDIAC MONITORING STRIPS      REVIEW OF SYSTEMS: As noted in HPI   CARDIOVASCULAR: No recent chest pain, palpitations, arm/neck/jaw pain, or edema.  RESPIRATORY: No recent fever, cough  : No blood in the urine  GI: No reflux, nausea, vomiting, or blood in stool.   MUSCULOSKELETAL: No falls.   NEURO: No headaches, syncope, or dizziness.  EYES: No sudden changes in vision.     Past Medical History:   Diagnosis Date    Anticoagulant long-term use     ASA 81 mg    Arthritis     cervical    BPH (benign prostatic hyperplasia) 03/02/2012    Chronic left shoulder pain     Compression fracture of L2     DDD (degenerative disc disease), lumbar     HTN (hypertension) 03/02/2012    Hx of colonic polyps 2013    Low back pain     Morbid obesity with BMI of 40.0-44.9, adult 03/18/2014    Seasonal allergies     Sleep apnea     compliant with CPAP    Stroke 03/1986     Past Surgical History:   Procedure Laterality Date    ANGIOGRAM, CORONARY, WITH LEFT HEART CATHETERIZATION N/A 06/07/2023    Procedure: Left Heart Cath;  Surgeon: Edgardo Marcelino MD;  Location: [facility] CATH;  Service: Cardiology;  Laterality: N/A;    APPENDECTOMY       CATARACT EXTRACTION EXTRACAPSULAR W/ INTRAOCULAR LENS IMPLANTATION      COLONOSCOPY N/A 06/06/2022    Procedure: COLONOSCOPY;  Surgeon: Jose Bello MD;  Location: [facility] ENDO;  Service: Endoscopy;  Laterality: N/A;    COLONOSCOPY N/A 7/6/2023    Procedure: COLONOSCOPY;  Surgeon: Sb Spaulding MD;  Location: [facility] ENDO;  Service: Endoscopy;  Laterality: N/A;    CORONARY ANGIOGRAPHY N/A 06/07/2023    Procedure: ANGIOGRAM, CORONARY ARTERY;  Surgeon: Edgardo Marcelino MD;  Location: [facility] CATH;  Service: Cardiology;  Laterality: N/A;    CORONARY ARTERY BYPASS GRAFT (CABG) N/A 6/23/2023    Procedure: CORONARY ARTERY BYPASS GRAFT (CABG) X3;  Surgeon: Pricila Clark MD;  Location: [facility] OR;  Service: Cardiothoracic;  Laterality: N/A;    ENDOSCOPIC HARVEST OF VEIN Right 6/23/2023    Procedure: HARVEST-VEIN-ENDOVASCULAR;  Surgeon: Pricila Clark MD;  Location: [facility] OR;  Service: Cardiothoracic;  Laterality: Right;    EPIDURAL BLOCK INJECTION  2015    cervical    EPIDURAL STEROID INJECTION INTO LUMBAR SPINE N/A 06/05/2019    Procedure: Injection-steroid-epidural-lumbar L5/S1 interlaminar;  Surgeon: Riley Segura MD;  Location: [facility] OR;  Service: Pain Management;  Laterality: N/A;    EPIDURAL STEROID INJECTION INTO LUMBAR SPINE N/A 09/13/2019    Procedure: Injection-steroid-epidural-lumbar;  Surgeon: Riley Segura MD;  Location: [facility] OR;  Service: Pain Management;  Laterality: N/A;  L5/S1 interlaminar to the right    EPIDURAL STEROID INJECTION INTO LUMBAR SPINE N/A 06/02/2020    Procedure: Injection-steroid-epidural-lumbar L5/S1 to right;  Surgeon: Riley Segura MD;  Location: [facility] OR;  Service: Pain Management;  Laterality: N/A;    EXCLUSION, LEFT ATRIAL APPENDAGE, OPEN, AS PART OF OPEN CHEST SURGERY N/A 6/23/2023    Procedure: EXCLUSION, LEFT ATRIAL APPENDAGE, OPEN, AS PART OF OPEN CHEST SURGERY;  Surgeon: Pricila Clark MD;  Location: [facility] OR;  Service: Cardiothoracic;  Laterality: N/A;     EXPLORATION OF MEDIASTINUM N/A 6/23/2023    Procedure: EXPLORATION, MEDIASTINUM - MEDIASTINAL RE-EXPLORATION TO CONTROL POST-OP BLEEDING;  Surgeon: Pricila Clark MD;  Location: [facility];  Service: Cardiothoracic;  Laterality: N/A;    Facet Steroid injection       Pain management    HERNIA REPAIR      Ventral    INJECTION OF ANESTHETIC AGENT AROUND MEDIAL BRANCH NERVES INNERVATING LUMBAR FACET JOINT Right 03/21/2023    Procedure: Block-nerve-medial branch-lumbar L3/4, L4/5, L5/S1;  Surgeon: Riley Segura MD;  Location: [facility];  Service: Pain Management;  Laterality: Right;    INSERTION OF DORSAL COLUMN NERVE STIMULATOR FOR TRIAL N/A 02/10/2021    Procedure: INSERTION, NEUROSTIMULATOR, SPINAL CORD, DORSAL COLUMN, FOR TRIAL;  Surgeon: Riley Segura MD;  Location: [facility];  Service: Pain Management;  Laterality: N/A;    INSERTION OF NEUROSTIMULATOR OF DORSAL COLUMN OF SPINAL CORD N/A 03/01/2021    Procedure: INSERTION, NEUROSTIMULATOR, SPINAL CORD, DORSAL COLUMN;  Surgeon: Riley Segura MD;  Location: [facility];  Service: Pain Management;  Laterality: N/A;    KNEE SURGERY      bilateral    RADIOFREQUENCY ABLATION  2015    lumbar nerve    RADIOFREQUENCY ABLATION OF LUMBAR MEDIAL BRANCH NERVE AT SINGLE LEVEL Right 04/11/2019    Procedure: Radiofrequency Ablation, Nerve, Spinal, Lumbar, Medial Branch, L1,2,3,4,5;  Surgeon: Riley Segura MD;  Location: [facility];  Service: Pain Management;  Laterality: Right;    TONSILLECTOMY      TRANSFORAMINAL EPIDURAL INJECTION OF STEROID Right 11/13/2020    Procedure: Injection,steroid,epidural,transforaminal approach, l3/4 and l5/s1;  Surgeon: Riley Segura MD;  Location: [facility] OR;  Service: Pain Management;  Laterality: Right;    TRANSFORAMINAL EPIDURAL INJECTION OF STEROID Left 06/24/2021    Procedure: Injection,steroid,epidural,transforaminal approach L5/S1;  Surgeon: Riley Segura MD;  Location: [facility] OR;  Service: Pain Management;  Laterality: Left;     TRANSFORAMINAL EPIDURAL INJECTION OF STEROID Right 2022    Procedure: Injection,steroid,epidural,transforaminal approach L5/S1;  Surgeon: Riley Segura MD;  Location: [facility];  Service: Pain Management;  Laterality: Right;    TRANSFORAMINAL EPIDURAL INJECTION OF STEROID Right 10/25/2022    Procedure: Injection,steroid,epidural,transforaminal approach L2/3 and L3/4;  Surgeon: Riley Segura MD;  Location: [facility];  Service: Pain Management;  Laterality: Right;    VERTEBROPLASTY       Social History     Tobacco Use    Smoking status: Former     Packs/day: 1.50     Years: 24.00     Pack years: 36.00     Types: Cigarettes     Start date: 10/21/1959     Quit date: 3/19/1983     Years since quittin.3     Passive exposure: Past    Smokeless tobacco: Never   Substance Use Topics    Alcohol use: No    Drug use: No         Objective      Vitals:    23 1522   BP: 126/64   Pulse: 88       LAST EKG  Results for orders placed or performed during the hospital encounter of 23   EKG 12-lead    Collection Time: 23 10:45 AM    Narrative    Test Reason : I48.0,    Vent. Rate : 080 BPM     Atrial Rate : 080 BPM     P-R Int : 232 ms          QRS Dur : 138 ms      QT Int : 416 ms       P-R-T Axes : 064 -12 101 degrees     QTc Int : 479 ms    Sinus rhythm with 1st degree A-V block  Nonspecific intraventricular block  T wave abnormality, consider lateral ischemia  Abnormal ECG  When compared with ECG of 10-JUL-2023 09:29,  Sinus rhythm has replaced Atrial fibrillation  Confirmed by CLARA DANG MD ([id number]) on 2023 4:47:33 PM    Referred By: [email or web address]   SELF           Confirmed By:CLARA DANG MD     LIPIDS - LAST 2   Lab Results   Component Value Date    CHOL 159 2023    CHOL 139 2022    HDL 44 2023    HDL 38 (L) 2022    LDLCALC 98.2 2023    LDLCALC 89.2 2022    TRIG 84 2023    TRIG 59 2022    CHOLHDL 27.7 2023    CHOLHDL 27.3  03/22/2022     CARDIAC PROFILE - LAST 2  Lab Results   Component Value Date     (H) 09/22/2010     (H) 03/22/2010    TROPONINI 0.543 (HH) 07/08/2023    TROPONINI 0.491 (HH) 07/05/2023      CBC - LAST 2  Lab Results   Component Value Date    WBC 9.73 07/18/2023    WBC 8.88 07/18/2023    RBC 3.30 (L) 07/18/2023    RBC 3.50 (L) 07/18/2023    HGB 9.4 (L) 07/18/2023    HGB 10.4 (L) 07/18/2023    HCT 30.8 (L) 07/18/2023    HCT 33.4 (L) 07/18/2023     (H) 07/18/2023     (H) 07/18/2023     Lab Results   Component Value Date    LABPT 15.7 (H) 07/08/2023    LABPT 15.0 (H) 07/08/2023    INR 1.2 07/08/2023    INR 1.2 07/08/2023    APTT 146.8 (HH) 07/08/2023    APTT 26.5 07/08/2023     CHEMISTRY - LAST 2  Lab Results   Component Value Date     (L) 07/18/2023     (L) 07/18/2023    K 5.1 07/18/2023    K 4.9 07/18/2023    CL 98 07/18/2023    CL 97 07/18/2023    CO2 21 (L) 07/18/2023    CO2 22 07/18/2023    ANIONGAP 10 07/18/2023    ANIONGAP 12 07/18/2023    BUN 15 07/18/2023    BUN 15 07/18/2023    CREATININE 1.1 07/18/2023    CREATININE 1.10 07/18/2023     (H) 07/18/2023     (H) 07/18/2023    CALCIUM 9.1 07/18/2023    CALCIUM 8.9 07/18/2023    PH 7.50 (H) 07/08/2023    PH 7.35 06/24/2023    MG 2.2 07/14/2023    MG 2.2 07/13/2023    ALBUMIN 2.6 (L) 07/18/2023    ALBUMIN 3.0 (L) 07/09/2023    PROT 6.3 07/18/2023    PROT 5.9 (L) 07/09/2023    ALKPHOS 81 07/18/2023    ALKPHOS 59 07/09/2023    ALT 36 07/18/2023    ALT 29 07/09/2023    AST 18 07/18/2023    AST 25 07/09/2023    BILITOT 0.5 07/18/2023    BILITOT 1.5 (H) 07/09/2023      ENDOCRINE - LAST 2  Lab Results   Component Value Date    HGBA1C 5.5 06/19/2023    HGBA1C 5.7 (H) 03/06/2023    TSH 1.300 04/23/2016    TSH 1.360 07/31/2014        PHYSICAL EXAM  CONSTITUTIONAL: Well built, well nourished   NECK: no carotid bruit, no JVD  LUNGS: decreased L lung base, crackles R lung base   CHEST WALL: no tenderness  HEART: regular rate  and rhythm, S1, S2 normal, no murmur, click, rub or gallop   ABDOMEN: soft, non-tender; bowel sounds normal; no masses,  no organomegaly  EXTREMITIES: Edema noted  NEURO: AAO X 3    I HAVE REVIEWED :    The vital signs, most recent cardiac testing, and most recent pertinent non-cardiology provider notes.    Current Outpatient Medications   Medication Instructions    acetaminophen (TYLENOL) 500 mg, Oral, Every 6 hours PRN    amiodarone (PACERONE) 200 mg, Oral, Daily    apixaban (ELIQUIS) 5 mg, Oral, 2 times daily    aspirin (ECOTRIN) 81 mg, Oral, Daily    atorvastatin (LIPITOR) 20 mg, Oral, Daily    b complex vitamins tablet 1 tablet, Oral, Daily    calcium citrate-vitamin D3 315-200 mg (CITRACAL+D) 315 mg-5 mcg (200 unit) per tablet 1 tablet, Oral, Daily    cephALEXin (KEFLEX) 500 mg, Oral, 3 times daily    clotrimazole-betamethasone 1-0.05% (LOTRISONE) cream Topical (Top), 2 times daily    finasteride (PROSCAR) 5 mg, Oral, Daily    furosemide (LASIX) 40 mg, Oral, Daily    gabapentin (NEURONTIN) 300 mg, Oral, 2 times daily    hydrALAZINE (APRESOLINE) 25 mg, Oral, Every 12 hours    oxyCODONE-acetaminophen (PERCOCET) 5-325 mg per tablet 1 tablet, Oral, Every 6 hours PRN    polyethylene glycol (GLYCOLAX) 17 g, Oral, Daily PRN    senna-docusate 8.6-50 mg (PERICOLACE) 8.6-50 mg per tablet 1 tablet, Oral, 2 times daily    spironolactone (ALDACTONE) 25 mg, Oral, Daily    tamsulosin (FLOMAX) 0.4 mg Cap TAKE 1 CAPSULE BY MOUTH EVERY DAY      Assessment & Plan     S/P CABG (coronary artery bypass graft)  With hypoxia now likely due to worsening pleural effusion  Will get CXR today to confirm   Take extra lasix today and double up for 4 more days afterward   He is reciving home O2 per PCP tomorrow   F/u with Pappe next week     Essential hypertension  BP ok today   Continue hydralazine 25 mg q12 hours   Continue aldactone 25 mg daily     Hyperlipidemia  Continue statin     PAF (paroxysmal atrial fibrillation)  NSR on exam    Continue eliquis PE protocol   Continue amiodarone 200 mg daily, on short term per Dr. Chaparro    Acute on chronic heart failure with preserved ejection fraction (HFpEF)  See CABG          Follow up 2 weeks      [facility]   Office: [phone]

## 2023-07-18 NOTE — PATIENT INSTRUCTIONS
Chest xray today   Minimal exertion until home O2 delivered   Increase lasix to 80 mg daily for 5 days   Wait to hear from Dr. Browne regarding blood work   ER if symptoms fail to improve   All other medications the same   See Dr. Chaparro next week

## 2023-07-19 ENCOUNTER — LAB VISIT (OUTPATIENT)
Dept: LAB | Facility: HOSPITAL | Age: 78
End: 2023-07-19
Attending: FAMILY MEDICINE
Payer: MEDICARE

## 2023-07-19 ENCOUNTER — TELEPHONE (OUTPATIENT)
Dept: PRIMARY CARE CLINIC | Facility: CLINIC | Age: 78
End: 2023-07-19
Payer: MEDICARE

## 2023-07-19 DIAGNOSIS — I97.611 POSTOPERATIVE HEMORRHAGE INVOLVING CIRCULATORY SYSTEM FOLLOWING CARDIAC BYPASS: ICD-10-CM

## 2023-07-19 DIAGNOSIS — I25.10 CORONARY ATHEROSCLEROSIS OF NATIVE CORONARY ARTERY: Primary | ICD-10-CM

## 2023-07-19 DIAGNOSIS — N39.0 URINARY TRACT INFECTION WITHOUT HEMATURIA, SITE UNSPECIFIED: Primary | ICD-10-CM

## 2023-07-19 DIAGNOSIS — E78.5 HYPERLIPEMIA: ICD-10-CM

## 2023-07-19 LAB
BILIRUB UR QL STRIP: NEGATIVE
CLARITY UR REFRACT.AUTO: CLEAR
COLOR UR AUTO: YELLOW
GLUCOSE UR QL STRIP: NEGATIVE
HGB UR QL STRIP: NEGATIVE
KETONES UR QL STRIP: NEGATIVE
LEUKOCYTE ESTERASE UR QL STRIP: NEGATIVE
NITRITE UR QL STRIP: NEGATIVE
PH UR STRIP: 5 [PH] (ref 5–8)
PROT UR QL STRIP: NEGATIVE
SP GR UR STRIP: 1.01 (ref 1–1.03)
URN SPEC COLLECT METH UR: NORMAL

## 2023-07-19 PROCEDURE — 87086 URINE CULTURE/COLONY COUNT: CPT | Performed by: FAMILY MEDICINE

## 2023-07-19 PROCEDURE — 81003 URINALYSIS AUTO W/O SCOPE: CPT | Performed by: FAMILY MEDICINE

## 2023-07-19 RX ORDER — CEPHALEXIN 500 MG/1
500 CAPSULE ORAL 3 TIMES DAILY
Qty: 21 CAPSULE | Refills: 0 | Status: ON HOLD | OUTPATIENT
Start: 2023-07-19 | End: 2023-07-28 | Stop reason: HOSPADM

## 2023-07-19 NOTE — TELEPHONE ENCOUNTER
----- Message from Sarah Petty sent at 7/19/2023  8:48 AM CDT -----  Type: Needs Medical Advice  Who Called:  [employer]     Best Call Back Number: [phone]  [facility] please advise

## 2023-07-19 NOTE — TELEPHONE ENCOUNTER
Please call to discuss lab results.  Persistent anemia, iron levels are low as well.  He may benefit from taking an iron supplement however he does have to be careful about constipation.  If he has been successful in improving the constipation then he can begin taking an iron supplement, ferrous sulfate 325 mg once a day.  If he still having significant constipation he may want to wait until that has improved.  Or reduce and take only 3 times a week until he can tolerate it.    Other labs show that his albumin/protein level is low.  This is nutritional and related to his recent surgeries, illnesses, etc..  But does indicate that he needs to increase protein intake.  Please discuss healthy sources of protein such as chicken, fish, low-fat dairy, yogurt, beans, etc..  If needed he could consider taking nutritional supplement as well.  Will discuss additional labs and tests at his follow-up next week.

## 2023-07-19 NOTE — ASSESSMENT & PLAN NOTE
With hypoxia now likely due to worsening pleural effusion  Will get CXR today to confirm   Take extra lasix today and double up for 4 more days afterward   He is reciving home O2 per PCP tomorrow   F/u with Pappe next week

## 2023-07-19 NOTE — ASSESSMENT & PLAN NOTE
NSR on exam   Continue eliquis PE protocol   Continue amiodarone 200 mg daily, on short term per Dr. Chaparro

## 2023-07-19 NOTE — TELEPHONE ENCOUNTER
"LOV: 7/17/23  NOV: 7/28/23    Spoke with pt and he reports that he started having difficulty with urinating yesterday.  Recently increased his lasix dosage and now reports that he is "forcing out his urine" as well as having burning with urination.  Informed pt that if MD orders an antibiotic that the urine specimen needs to be collected first.  Pt verbalized understanding to all.     Pt reports that HH stated that they can come out to collect a specimen.    Spoke with Jonathon at [facility] and he reports that he will send a nurse out today to collect a urine specimen.    Preferred pharmacy: [facility] on [address]    Please advise.  "

## 2023-07-19 NOTE — TELEPHONE ENCOUNTER
Addressed [facility] in another encounter.     Spoke with pt, discussed lab results, starting iron supplement, dietary changes to increase protein and antibiotic being called into [facility] for pt.  Pt verbalized understanding to all.

## 2023-07-20 ENCOUNTER — TELEPHONE (OUTPATIENT)
Dept: PRIMARY CARE CLINIC | Facility: CLINIC | Age: 78
End: 2023-07-20
Payer: MEDICARE

## 2023-07-20 ENCOUNTER — TELEPHONE (OUTPATIENT)
Dept: UROLOGY | Facility: CLINIC | Age: 78
End: 2023-07-20
Payer: MEDICARE

## 2023-07-20 ENCOUNTER — PATIENT MESSAGE (OUTPATIENT)
Dept: PRIMARY CARE CLINIC | Facility: CLINIC | Age: 78
End: 2023-07-20
Payer: MEDICARE

## 2023-07-20 ENCOUNTER — DOCUMENT SCAN (OUTPATIENT)
Dept: HOME HEALTH SERVICES | Facility: HOSPITAL | Age: 78
End: 2023-07-20
Payer: MEDICARE

## 2023-07-20 ENCOUNTER — TELEPHONE (OUTPATIENT)
Dept: CARDIOLOGY | Facility: CLINIC | Age: 78
End: 2023-07-20
Payer: MEDICARE

## 2023-07-20 DIAGNOSIS — I10 ESSENTIAL HYPERTENSION: Primary | ICD-10-CM

## 2023-07-20 DIAGNOSIS — R06.02 SHORTNESS OF BREATH: Primary | ICD-10-CM

## 2023-07-20 RX ORDER — ALBUTEROL SULFATE 90 UG/1
2 AEROSOL, METERED RESPIRATORY (INHALATION) EVERY 6 HOURS PRN
Qty: 18 G | Refills: 1 | Status: SHIPPED | OUTPATIENT
Start: 2023-07-20 | End: 2023-08-01 | Stop reason: ALTCHOICE

## 2023-07-20 NOTE — TELEPHONE ENCOUNTER
There is no evidence of infection.  Dysuria could be from other causes, I wonder if he had a catheter in place in the hospital.  The straining to urinate then I may double up on the Flomax.      If he is having severe difficulty urinating that he may have to go to the emergency room for catheter placement.      I would not place him on potassium at this time as the last potassium level was upper normal.  I do want to check potassium again in a few days and will enter the order for a bmp for home health to collect.      I also sent in a prescription for an albuterol inhaler.  Sometimes this might help with anal/rectal spasm.  [place] a try.

## 2023-07-20 NOTE — TELEPHONE ENCOUNTER
Spoke with pt's son, discussed your message, son verbalized understanding and reports that pt is back at the ER.  Pt to f/u in clinic next week.

## 2023-07-20 NOTE — TELEPHONE ENCOUNTER
Spoke with Jannet at [facility] and she reports that pt had a 2 lb weight gain in a day.  Additionally, she reports that pt is c/o muscle spasms in rectum and in groin since doubling up on Lasix which was 3-4 days ago.  Jannet informed me that pt told her that he has increased his lasix, but that he was unable to get his Potassium supplement from [facility] as they did not have a Rx of K+ for him.  Informed Jannet that I would contact [facility] and the pt for clarification.     Spoke with April at [facility] and she reports that pt does not have a Rx of Potassium or Lasix with them.  Lasix was last filled at [facility] after pt was discharged.     Pt reports that he is still having pain with urination and that he has taken 3 doses of ABX at this point.  Pt reports that Dr. Marcelino's office informed him to double up on the Lasix.     Spoke with Lisa at Dr. Marcelino's office and she informed me that Elham Chau PA-C was the provider that wanted pt to double up on the lasix for 4 days when she saw him yesterday.  Pt is scheduled for a f/u appt on 8/7/23, but Lisa is going to see if she can get the pt in sooner.    Pt's urinalysis from yesterday is normal.  Please advise.

## 2023-07-20 NOTE — TELEPHONE ENCOUNTER
Spoke w/ pt / phone informed him that he needs to go to ER for urine retention / Dr. Chavez,  pt VU

## 2023-07-20 NOTE — TELEPHONE ENCOUNTER
----- Message from Onelia Rich sent at 7/20/2023  9:05 AM CDT -----  Contact: radha  Type: Needs Medical Advice  Who Called:radha  Best Call Back Number: [phone]  Additional Information: Radha is calling the office regarding pt weight gain 2 pounds since yesterday and pt is having cramping when urinating or bowel movement. Pt does have potassium the pharmacy didn't have it available.Please call back and advise.

## 2023-07-20 NOTE — TELEPHONE ENCOUNTER
"Spoke w/ pt and Michell (nurse of Dr. Browne) pt is c/o of urine retention, Michell states he did a U/A and it came back fine, pt seen Elham Isac 7/18 and she increased his lasix d/t fluid of left lung -- SEE CXR done 7/18     As is suspected their is worsening amount of fluid on the left lung. Definitely increased your lasix to 80 mg daily for the next 4 more days. Please call the office with any concerns.   Written by Elham Chau PA-C on 7/19/2023  9:09 AM CDT      Pt also seen Elham Chau same day 7/18/23    Instructions    Chest xray today   Minimal exertion until home O2 delivered   Increase lasix to 80 mg daily for 5 days   Wait to hear from Dr. Browne regarding blood work   ER if symptoms fail to improve   All other medications the same   See Dr. Chaparro next week            6/23/23 CABG X 3       Pt states - " I feel the urge to go but nothing comes out" since he increased the lasix  Please advise  Thank you       "

## 2023-07-20 NOTE — TELEPHONE ENCOUNTER
----- Message from Kalee Higgins RN sent at 7/20/2023  2:08 PM CDT -----  Regarding: [facility] ER visit f/u 7/20/2023 Dx: Urinary Retention  Please call patient to schedule an appointment for further evaluation and treatment of his urinary retention. Thakkar Catheter placed in ER and discharged home intact.     Thanks,    Kalee Higgins RN, BSN  ED Navigator/Case Management  [phone]

## 2023-07-21 ENCOUNTER — PATIENT MESSAGE (OUTPATIENT)
Dept: UROLOGY | Facility: CLINIC | Age: 78
End: 2023-07-21
Payer: MEDICARE

## 2023-07-21 LAB — BACTERIA UR CULT: NORMAL

## 2023-07-22 PROBLEM — E87.1 HYPONATREMIA: Status: ACTIVE | Noted: 2023-07-22

## 2023-07-24 ENCOUNTER — PATIENT MESSAGE (OUTPATIENT)
Dept: VASCULAR SURGERY | Facility: CLINIC | Age: 78
End: 2023-07-24
Payer: MEDICARE

## 2023-07-24 ENCOUNTER — PATIENT MESSAGE (OUTPATIENT)
Dept: CARDIOLOGY | Facility: CLINIC | Age: 78
End: 2023-07-24

## 2023-07-24 DIAGNOSIS — Z95.1 S/P CABG (CORONARY ARTERY BYPASS GRAFT): Primary | ICD-10-CM

## 2023-07-25 ENCOUNTER — PATIENT MESSAGE (OUTPATIENT)
Dept: UROLOGY | Facility: CLINIC | Age: 78
End: 2023-07-25
Payer: MEDICARE

## 2023-08-01 ENCOUNTER — OFFICE VISIT (OUTPATIENT)
Dept: VASCULAR SURGERY | Facility: CLINIC | Age: 78
End: 2023-08-01
Payer: MEDICARE

## 2023-08-01 ENCOUNTER — HOSPITAL ENCOUNTER (OUTPATIENT)
Dept: RADIOLOGY | Facility: HOSPITAL | Age: 78
Discharge: HOME | End: 2023-08-01
Attending: THORACIC SURGERY (CARDIOTHORACIC VASCULAR SURGERY)
Payer: MEDICARE

## 2023-08-01 VITALS
BODY MASS INDEX: 41.22 KG/M2 | SYSTOLIC BLOOD PRESSURE: 111 MMHG | DIASTOLIC BLOOD PRESSURE: 59 MMHG | HEIGHT: 70 IN | HEART RATE: 78 BPM | WEIGHT: 287.94 LBS

## 2023-08-01 DIAGNOSIS — Z95.1 S/P CABG (CORONARY ARTERY BYPASS GRAFT): ICD-10-CM

## 2023-08-01 DIAGNOSIS — Z95.1 HX OF CABG: Primary | ICD-10-CM

## 2023-08-01 DIAGNOSIS — J90 PLEURAL EFFUSION: ICD-10-CM

## 2023-08-01 PROCEDURE — 71046 X-RAY EXAM CHEST 2 VIEWS: CPT | Mod: TC,FY,PO

## 2023-08-01 PROCEDURE — 71046 XR CHEST PA AND LATERAL: ICD-10-PCS | Mod: 26,,, | Performed by: RADIOLOGY

## 2023-08-01 PROCEDURE — 71046 X-RAY EXAM CHEST 2 VIEWS: CPT | Mod: 26,,, | Performed by: RADIOLOGY

## 2023-08-01 PROCEDURE — 1126F AMNT PAIN NOTED NONE PRSNT: CPT | Mod: CPTII,S$GLB,, | Performed by: THORACIC SURGERY (CARDIOTHORACIC VASCULAR SURGERY)

## 2023-08-01 PROCEDURE — 3074F SYST BP LT 130 MM HG: CPT | Mod: CPTII,S$GLB,, | Performed by: THORACIC SURGERY (CARDIOTHORACIC VASCULAR SURGERY)

## 2023-08-01 PROCEDURE — 1126F PR PAIN SEVERITY QUANTIFIED, NO PAIN PRESENT: ICD-10-PCS | Mod: CPTII,S$GLB,, | Performed by: THORACIC SURGERY (CARDIOTHORACIC VASCULAR SURGERY)

## 2023-08-01 PROCEDURE — 3074F PR MOST RECENT SYSTOLIC BLOOD PRESSURE < 130 MM HG: ICD-10-PCS | Mod: CPTII,S$GLB,, | Performed by: THORACIC SURGERY (CARDIOTHORACIC VASCULAR SURGERY)

## 2023-08-01 PROCEDURE — 99999 PR PBB SHADOW E&M-EST. PATIENT-LVL III: ICD-10-PCS | Mod: PBBFAC,,, | Performed by: THORACIC SURGERY (CARDIOTHORACIC VASCULAR SURGERY)

## 2023-08-01 PROCEDURE — 3078F PR MOST RECENT DIASTOLIC BLOOD PRESSURE < 80 MM HG: ICD-10-PCS | Mod: CPTII,S$GLB,, | Performed by: THORACIC SURGERY (CARDIOTHORACIC VASCULAR SURGERY)

## 2023-08-01 PROCEDURE — 99024 POSTOP FOLLOW-UP VISIT: CPT | Mod: S$GLB,,, | Performed by: THORACIC SURGERY (CARDIOTHORACIC VASCULAR SURGERY)

## 2023-08-01 PROCEDURE — 1159F MED LIST DOCD IN RCRD: CPT | Mod: CPTII,S$GLB,, | Performed by: THORACIC SURGERY (CARDIOTHORACIC VASCULAR SURGERY)

## 2023-08-01 PROCEDURE — 3288F PR FALLS RISK ASSESSMENT DOCUMENTED: ICD-10-PCS | Mod: CPTII,S$GLB,, | Performed by: THORACIC SURGERY (CARDIOTHORACIC VASCULAR SURGERY)

## 2023-08-01 PROCEDURE — 3078F DIAST BP <80 MM HG: CPT | Mod: CPTII,S$GLB,, | Performed by: THORACIC SURGERY (CARDIOTHORACIC VASCULAR SURGERY)

## 2023-08-01 PROCEDURE — 99999 PR PBB SHADOW E&M-EST. PATIENT-LVL III: CPT | Mod: PBBFAC,,, | Performed by: THORACIC SURGERY (CARDIOTHORACIC VASCULAR SURGERY)

## 2023-08-01 PROCEDURE — 1157F ADVNC CARE PLAN IN RCRD: CPT | Mod: CPTII,S$GLB,, | Performed by: THORACIC SURGERY (CARDIOTHORACIC VASCULAR SURGERY)

## 2023-08-01 PROCEDURE — 3288F FALL RISK ASSESSMENT DOCD: CPT | Mod: CPTII,S$GLB,, | Performed by: THORACIC SURGERY (CARDIOTHORACIC VASCULAR SURGERY)

## 2023-08-01 PROCEDURE — 1101F PT FALLS ASSESS-DOCD LE1/YR: CPT | Mod: CPTII,S$GLB,, | Performed by: THORACIC SURGERY (CARDIOTHORACIC VASCULAR SURGERY)

## 2023-08-01 PROCEDURE — 1157F PR ADVANCE CARE PLAN OR EQUIV PRESENT IN MEDICAL RECORD: ICD-10-PCS | Mod: CPTII,S$GLB,, | Performed by: THORACIC SURGERY (CARDIOTHORACIC VASCULAR SURGERY)

## 2023-08-01 PROCEDURE — 99024 PR POST-OP FOLLOW-UP VISIT: ICD-10-PCS | Mod: S$GLB,,, | Performed by: THORACIC SURGERY (CARDIOTHORACIC VASCULAR SURGERY)

## 2023-08-01 PROCEDURE — 1101F PR PT FALLS ASSESS DOC 0-1 FALLS W/OUT INJ PAST YR: ICD-10-PCS | Mod: CPTII,S$GLB,, | Performed by: THORACIC SURGERY (CARDIOTHORACIC VASCULAR SURGERY)

## 2023-08-01 PROCEDURE — 1159F PR MEDICATION LIST DOCUMENTED IN MEDICAL RECORD: ICD-10-PCS | Mod: CPTII,S$GLB,, | Performed by: THORACIC SURGERY (CARDIOTHORACIC VASCULAR SURGERY)

## 2023-08-01 RX ORDER — SPIRONOLACTONE 25 MG/1
25 TABLET ORAL DAILY
COMMUNITY
End: 2023-11-06 | Stop reason: SDUPTHER

## 2023-08-01 NOTE — PROGRESS NOTES
8/1/2023...    The patient is status post CABG x3 and ligation of the left atrial appendage 06/23/2023.  His postop course was complicated by excessive chest tube drainage requiring re-exploration.  No source of bleeding was identified.      The patient was admitted to the intensive care unit for hemorrhoidal bleeding on 06/29/2023.  He underwent colonoscopy which was essentially negative except for the hemorrhoids.      He was discharged and readmitted a day later with dyspnea, lower extremity edema, DVTs and bilateral pulmonary emboli.  He was initiated on anticoagulant therapy with Eliquis and discharged 6 days later.      He was readmitted again 07/22/2023 with shortness of breath and a huge left pleural effusion.  He underwent two left-sided thoracenteses as an inpatient.  He was discharged 07/28/2023.      CXR 07/28/2023 demonstrates a residual small-moderate size left pleural effusion.    The patient visits the office today accompanied by his daughter for his first post CABG visit.    He is sitting in a wheelchair but states he has been ambulating progressively with a walker and only uses a wheelchair for doctor visits.  He has a good response to his 40 mg daily Lasix and his lower extremity edema is improving.  Today, he denies any shortness of breath or chest discomfort at all.  He remains a little weak and fatigued due to all of his hospitalizations.    On physical exam, breath sounds are diminished on the left side.  The right side is clear.  The sternotomy wound has healed beautifully.  The bone is solid.  Has 1 to 2+ pitting edema bilaterally.  The EVH sites are healed.    CXR today demonstrates an enlarging, moderate-size left pleural effusion.  This will need thoracentesis.    I instructed the patient to stop taking Eliquis-last dose AM 08/01/2023     I have contacted the pulmonology office and requested a left thoracentesis.  The patient is already scheduled to see the nurse practitioner on Thursday  08/03/2023.    I will see the patient back in one month with a pre-clinic CXR.

## 2023-08-02 ENCOUNTER — DOCUMENT SCAN (OUTPATIENT)
Dept: HOME HEALTH SERVICES | Facility: HOSPITAL | Age: 78
End: 2023-08-02
Payer: MEDICARE

## 2023-08-03 ENCOUNTER — OFFICE VISIT (OUTPATIENT)
Dept: PRIMARY CARE CLINIC | Facility: CLINIC | Age: 78
End: 2023-08-03
Payer: MEDICARE

## 2023-08-03 VITALS
BODY MASS INDEX: 40.65 KG/M2 | OXYGEN SATURATION: 95 % | HEIGHT: 70 IN | SYSTOLIC BLOOD PRESSURE: 128 MMHG | DIASTOLIC BLOOD PRESSURE: 66 MMHG | TEMPERATURE: 98 F | WEIGHT: 283.94 LBS | RESPIRATION RATE: 18 BRPM | HEART RATE: 78 BPM

## 2023-08-03 DIAGNOSIS — J90 RECURRENT LEFT PLEURAL EFFUSION: ICD-10-CM

## 2023-08-03 DIAGNOSIS — I10 ESSENTIAL HYPERTENSION: ICD-10-CM

## 2023-08-03 DIAGNOSIS — Z95.1 S/P CABG (CORONARY ARTERY BYPASS GRAFT): Primary | ICD-10-CM

## 2023-08-03 DIAGNOSIS — I26.99 ACUTE PULMONARY EMBOLISM WITHOUT ACUTE COR PULMONALE, UNSPECIFIED PULMONARY EMBOLISM TYPE: ICD-10-CM

## 2023-08-03 DIAGNOSIS — R33.9 URINARY RETENTION: ICD-10-CM

## 2023-08-03 PROCEDURE — 1101F PR PT FALLS ASSESS DOC 0-1 FALLS W/OUT INJ PAST YR: ICD-10-PCS | Mod: CPTII,S$GLB,, | Performed by: FAMILY MEDICINE

## 2023-08-03 PROCEDURE — 99999 PR PBB SHADOW E&M-EST. PATIENT-LVL V: ICD-10-PCS | Mod: PBBFAC,,, | Performed by: FAMILY MEDICINE

## 2023-08-03 PROCEDURE — 1159F PR MEDICATION LIST DOCUMENTED IN MEDICAL RECORD: ICD-10-PCS | Mod: CPTII,S$GLB,, | Performed by: FAMILY MEDICINE

## 2023-08-03 PROCEDURE — 1126F PR PAIN SEVERITY QUANTIFIED, NO PAIN PRESENT: ICD-10-PCS | Mod: CPTII,S$GLB,, | Performed by: FAMILY MEDICINE

## 2023-08-03 PROCEDURE — 3288F PR FALLS RISK ASSESSMENT DOCUMENTED: ICD-10-PCS | Mod: CPTII,S$GLB,, | Performed by: FAMILY MEDICINE

## 2023-08-03 PROCEDURE — 1160F PR REVIEW ALL MEDS BY PRESCRIBER/CLIN PHARMACIST DOCUMENTED: ICD-10-PCS | Mod: CPTII,S$GLB,, | Performed by: FAMILY MEDICINE

## 2023-08-03 PROCEDURE — 1160F RVW MEDS BY RX/DR IN RCRD: CPT | Mod: CPTII,S$GLB,, | Performed by: FAMILY MEDICINE

## 2023-08-03 PROCEDURE — 1157F ADVNC CARE PLAN IN RCRD: CPT | Mod: CPTII,S$GLB,, | Performed by: FAMILY MEDICINE

## 2023-08-03 PROCEDURE — 1159F MED LIST DOCD IN RCRD: CPT | Mod: CPTII,S$GLB,, | Performed by: FAMILY MEDICINE

## 2023-08-03 PROCEDURE — 3074F SYST BP LT 130 MM HG: CPT | Mod: CPTII,S$GLB,, | Performed by: FAMILY MEDICINE

## 2023-08-03 PROCEDURE — 3078F PR MOST RECENT DIASTOLIC BLOOD PRESSURE < 80 MM HG: ICD-10-PCS | Mod: CPTII,S$GLB,, | Performed by: FAMILY MEDICINE

## 2023-08-03 PROCEDURE — 3078F DIAST BP <80 MM HG: CPT | Mod: CPTII,S$GLB,, | Performed by: FAMILY MEDICINE

## 2023-08-03 PROCEDURE — 3074F PR MOST RECENT SYSTOLIC BLOOD PRESSURE < 130 MM HG: ICD-10-PCS | Mod: CPTII,S$GLB,, | Performed by: FAMILY MEDICINE

## 2023-08-03 PROCEDURE — 1126F AMNT PAIN NOTED NONE PRSNT: CPT | Mod: CPTII,S$GLB,, | Performed by: FAMILY MEDICINE

## 2023-08-03 PROCEDURE — 1111F DSCHRG MED/CURRENT MED MERGE: CPT | Mod: CPTII,S$GLB,, | Performed by: FAMILY MEDICINE

## 2023-08-03 PROCEDURE — 3288F FALL RISK ASSESSMENT DOCD: CPT | Mod: CPTII,S$GLB,, | Performed by: FAMILY MEDICINE

## 2023-08-03 PROCEDURE — 1101F PT FALLS ASSESS-DOCD LE1/YR: CPT | Mod: CPTII,S$GLB,, | Performed by: FAMILY MEDICINE

## 2023-08-03 PROCEDURE — 1111F PR DISCHARGE MEDS RECONCILED W/ CURRENT OUTPATIENT MED LIST: ICD-10-PCS | Mod: CPTII,S$GLB,, | Performed by: FAMILY MEDICINE

## 2023-08-03 PROCEDURE — 99214 PR OFFICE/OUTPT VISIT, EST, LEVL IV, 30-39 MIN: ICD-10-PCS | Mod: S$GLB,,, | Performed by: FAMILY MEDICINE

## 2023-08-03 PROCEDURE — 1157F PR ADVANCE CARE PLAN OR EQUIV PRESENT IN MEDICAL RECORD: ICD-10-PCS | Mod: CPTII,S$GLB,, | Performed by: FAMILY MEDICINE

## 2023-08-03 PROCEDURE — 99999 PR PBB SHADOW E&M-EST. PATIENT-LVL V: CPT | Mod: PBBFAC,,, | Performed by: FAMILY MEDICINE

## 2023-08-03 PROCEDURE — 99214 OFFICE O/P EST MOD 30 MIN: CPT | Mod: S$GLB,,, | Performed by: FAMILY MEDICINE

## 2023-08-03 NOTE — PROGRESS NOTES
THIS DOCUMENT WAS MADE IN PART WITH VOICE RECOGNITION SOFTWARE.  OCCASIONALLY THIS SOFTWARE WILL MISINTERPRET WORDS OR PHRASES.      Primary Care Provider Appointment   [facility] 63 [facility]       Patient ID: Chinedu Roque is a 77 y.o. male.    ASSESSMENT/PLAN by Problem List:    1. S/P CABG (coronary artery bypass graft)    2. Essential hypertension    3. Acute pulmonary embolism without acute cor pulmonale, unspecified pulmonary embolism type    4. Recurrent left pleural effusion    5. Urinary retention     He looks very good today.  Breathing is improving.  He is not needing supplemental oxygen on a regular basis.  He is scheduled to have the left pleural effusion drained again.  Recommend gradually increasing physical activity.  Maintain low-salt diet.  Elevate feet to help with swelling.  He is not having any difficulty urinating at this point and will monitor.  Follow back with me in four weeks but report any problems should they occur right away.    Subjective:     Chief Complaint   Patient presents with    Follow-up     I have reviewed the information entered by the ancillary staff regarding the chief complaint as well as the related history.    HPI    Patient is a/an 77 y.o.  male       Hospital follow-up.  This gentleman underwent CABG in June and has had several challenges since.  Including GI bleed, pulmonary embolism, urinary retention, recurrent pleural effusions.  The most recent admission was secondary to urinary retention but had progressive shortness of breath and found to have large pleural effusion, underwent thoracentesis.  Discharged on July 28th.  Since then he has followed back with Cardiothoracic surgery, repeat chest x-ray shows small-to-moderate size left pleural effusion, CT surgery did state this will need thoracentesis.  He will need to temporarily stop the Eliquis.  And will follow with pulmonology for the procedure..  He is had improvement in his shortness  "of breath.  Slowly regaining strength.  He is requiring Lasix for diuresis.  He does have one to 2+ persistent edema.      For complete problem list, past medical history, surgical history, social history, etc., see appropriate section in the electronic medical record    Review of Systems   HENT: Negative.     Respiratory:  Positive for shortness of breath (Improving).    Cardiovascular:  Positive for leg swelling.   Genitourinary:  Negative for difficulty urinating and dysuria.   Psychiatric/Behavioral: Negative.  Negative for dysphoric mood.        Objective     Physical Exam  Vitals:    08/03/23 0800   BP: 128/66   BP Location: Left arm   Patient Position: Sitting   BP Method: Large (Manual)   Pulse: 78   Resp: 18   Temp: 98.2 °F (36.8 °C)   TempSrc: Oral   SpO2: 95%   Weight: 128.8 kg (283 lb 15.2 oz)   Height: 5' 10" (1.778 m)       "

## 2023-08-04 ENCOUNTER — DOCUMENT SCAN (OUTPATIENT)
Dept: HOME HEALTH SERVICES | Facility: HOSPITAL | Age: 78
End: 2023-08-04
Payer: MEDICARE

## 2023-08-07 ENCOUNTER — OFFICE VISIT (OUTPATIENT)
Dept: CARDIOLOGY | Facility: CLINIC | Age: 78
End: 2023-08-07
Payer: MEDICARE

## 2023-08-07 VITALS
HEART RATE: 78 BPM | SYSTOLIC BLOOD PRESSURE: 126 MMHG | HEIGHT: 70 IN | BODY MASS INDEX: 41.78 KG/M2 | WEIGHT: 291.88 LBS | DIASTOLIC BLOOD PRESSURE: 66 MMHG

## 2023-08-07 DIAGNOSIS — M51.36 DDD (DEGENERATIVE DISC DISEASE), LUMBAR: Primary | Chronic | ICD-10-CM

## 2023-08-07 DIAGNOSIS — Z86.73 HISTORY OF CVA (CEREBROVASCULAR ACCIDENT): ICD-10-CM

## 2023-08-07 DIAGNOSIS — G47.33 OSA (OBSTRUCTIVE SLEEP APNEA): ICD-10-CM

## 2023-08-07 DIAGNOSIS — I10 ESSENTIAL HYPERTENSION: ICD-10-CM

## 2023-08-07 DIAGNOSIS — I25.10 CORONARY ARTERY DISEASE INVOLVING NATIVE CORONARY ARTERY OF NATIVE HEART WITHOUT ANGINA PECTORIS: ICD-10-CM

## 2023-08-07 DIAGNOSIS — E78.2 MIXED HYPERLIPIDEMIA: Chronic | ICD-10-CM

## 2023-08-07 DIAGNOSIS — I48.0 PAF (PAROXYSMAL ATRIAL FIBRILLATION): ICD-10-CM

## 2023-08-07 DIAGNOSIS — Z95.1 S/P CABG (CORONARY ARTERY BYPASS GRAFT): ICD-10-CM

## 2023-08-07 PROCEDURE — 1157F PR ADVANCE CARE PLAN OR EQUIV PRESENT IN MEDICAL RECORD: ICD-10-PCS | Mod: CPTII,S$GLB,, | Performed by: INTERNAL MEDICINE

## 2023-08-07 PROCEDURE — 1126F AMNT PAIN NOTED NONE PRSNT: CPT | Mod: CPTII,S$GLB,, | Performed by: INTERNAL MEDICINE

## 2023-08-07 PROCEDURE — 99214 OFFICE O/P EST MOD 30 MIN: CPT | Mod: S$GLB,,, | Performed by: INTERNAL MEDICINE

## 2023-08-07 PROCEDURE — 1157F ADVNC CARE PLAN IN RCRD: CPT | Mod: CPTII,S$GLB,, | Performed by: INTERNAL MEDICINE

## 2023-08-07 PROCEDURE — 1159F MED LIST DOCD IN RCRD: CPT | Mod: CPTII,S$GLB,, | Performed by: INTERNAL MEDICINE

## 2023-08-07 PROCEDURE — 1160F RVW MEDS BY RX/DR IN RCRD: CPT | Mod: CPTII,S$GLB,, | Performed by: INTERNAL MEDICINE

## 2023-08-07 PROCEDURE — 3288F PR FALLS RISK ASSESSMENT DOCUMENTED: ICD-10-PCS | Mod: CPTII,S$GLB,, | Performed by: INTERNAL MEDICINE

## 2023-08-07 PROCEDURE — 99999 PR PBB SHADOW E&M-EST. PATIENT-LVL IV: CPT | Mod: PBBFAC,,, | Performed by: INTERNAL MEDICINE

## 2023-08-07 PROCEDURE — 1111F DSCHRG MED/CURRENT MED MERGE: CPT | Mod: CPTII,S$GLB,, | Performed by: INTERNAL MEDICINE

## 2023-08-07 PROCEDURE — 3074F SYST BP LT 130 MM HG: CPT | Mod: CPTII,S$GLB,, | Performed by: INTERNAL MEDICINE

## 2023-08-07 PROCEDURE — 1160F PR REVIEW ALL MEDS BY PRESCRIBER/CLIN PHARMACIST DOCUMENTED: ICD-10-PCS | Mod: CPTII,S$GLB,, | Performed by: INTERNAL MEDICINE

## 2023-08-07 PROCEDURE — 99214 PR OFFICE/OUTPT VISIT, EST, LEVL IV, 30-39 MIN: ICD-10-PCS | Mod: S$GLB,,, | Performed by: INTERNAL MEDICINE

## 2023-08-07 PROCEDURE — 1101F PR PT FALLS ASSESS DOC 0-1 FALLS W/OUT INJ PAST YR: ICD-10-PCS | Mod: CPTII,S$GLB,, | Performed by: INTERNAL MEDICINE

## 2023-08-07 PROCEDURE — 3074F PR MOST RECENT SYSTOLIC BLOOD PRESSURE < 130 MM HG: ICD-10-PCS | Mod: CPTII,S$GLB,, | Performed by: INTERNAL MEDICINE

## 2023-08-07 PROCEDURE — 3288F FALL RISK ASSESSMENT DOCD: CPT | Mod: CPTII,S$GLB,, | Performed by: INTERNAL MEDICINE

## 2023-08-07 PROCEDURE — 1111F PR DISCHARGE MEDS RECONCILED W/ CURRENT OUTPATIENT MED LIST: ICD-10-PCS | Mod: CPTII,S$GLB,, | Performed by: INTERNAL MEDICINE

## 2023-08-07 PROCEDURE — 3078F DIAST BP <80 MM HG: CPT | Mod: CPTII,S$GLB,, | Performed by: INTERNAL MEDICINE

## 2023-08-07 PROCEDURE — 1126F PR PAIN SEVERITY QUANTIFIED, NO PAIN PRESENT: ICD-10-PCS | Mod: CPTII,S$GLB,, | Performed by: INTERNAL MEDICINE

## 2023-08-07 PROCEDURE — 1159F PR MEDICATION LIST DOCUMENTED IN MEDICAL RECORD: ICD-10-PCS | Mod: CPTII,S$GLB,, | Performed by: INTERNAL MEDICINE

## 2023-08-07 PROCEDURE — 3078F PR MOST RECENT DIASTOLIC BLOOD PRESSURE < 80 MM HG: ICD-10-PCS | Mod: CPTII,S$GLB,, | Performed by: INTERNAL MEDICINE

## 2023-08-07 PROCEDURE — 99999 PR PBB SHADOW E&M-EST. PATIENT-LVL IV: ICD-10-PCS | Mod: PBBFAC,,, | Performed by: INTERNAL MEDICINE

## 2023-08-07 PROCEDURE — 1101F PT FALLS ASSESS-DOCD LE1/YR: CPT | Mod: CPTII,S$GLB,, | Performed by: INTERNAL MEDICINE

## 2023-08-07 NOTE — PROGRESS NOTES
Subjective:    Patient ID:  Chinedu Roque is a 77 y.o. male patient here for evaluation Follow-up      History of Present Illness:  Cardiology follow-up.  Coronary disease.  Status post three-vessel CABG June 2023 ANISH to LAD.  Vein graft to obtuse marginal 2 and 3.  Left atrial appendage isolation procedure.  Postop mediastinal bleeding requiring re-exploration, postop GI bleed with anemia, atrial fibrillation preoperatively, perioperative PE.  Patient on chronic oral anticoagulation with Eliquis 5 b.i.d..  Recurrent left pleural effusion, status post thoracentesis this past Friday.  No angina.  No arrhythmia.  Significant resting dyspnea.  No PND orthopnea.  Lower extremity edema without change.  Remains on Lasix 40 daily.            Review of patient's allergies indicates:  No Known Allergies    Past Medical History:   Diagnosis Date    Anticoagulant long-term use     ASA 81 mg    Arthritis     cervical    BPH (benign prostatic hyperplasia) 03/02/2012    Chronic left shoulder pain     Compression fracture of L2     DDD (degenerative disc disease), lumbar     HTN (hypertension) 03/02/2012    Hx of colonic polyps 2013    Low back pain     Morbid obesity with BMI of 40.0-44.9, adult 03/18/2014    Seasonal allergies     Sleep apnea     compliant with CPAP    Stroke 03/1986     Past Surgical History:   Procedure Laterality Date    ANGIOGRAM, CORONARY, WITH LEFT HEART CATHETERIZATION N/A 06/07/2023    Procedure: Left Heart Cath;  Surgeon: Edgardo Marcelino MD;  Location: [facility] CATH;  Service: Cardiology;  Laterality: N/A;    APPENDECTOMY      CATARACT EXTRACTION EXTRACAPSULAR W/ INTRAOCULAR LENS IMPLANTATION      COLONOSCOPY N/A 06/06/2022    Procedure: COLONOSCOPY;  Surgeon: Jose Bello MD;  Location: [facility] ENDO;  Service: Endoscopy;  Laterality: N/A;    COLONOSCOPY N/A 7/6/2023    Procedure: COLONOSCOPY;  Surgeon: Sb Spaulidng MD;  Location: [facility] ENDO;  Service: Endoscopy;  Laterality: N/A;    CORONARY  ANGIOGRAPHY N/A 06/07/2023    Procedure: ANGIOGRAM, CORONARY ARTERY;  Surgeon: Edgardo Marcelino MD;  Location: [facility] CATH;  Service: Cardiology;  Laterality: N/A;    CORONARY ARTERY BYPASS GRAFT (CABG) N/A 6/23/2023    Procedure: CORONARY ARTERY BYPASS GRAFT (CABG) X3;  Surgeon: Pricila Clark MD;  Location: [facility] OR;  Service: Cardiothoracic;  Laterality: N/A;    ENDOSCOPIC HARVEST OF VEIN Right 6/23/2023    Procedure: HARVEST-VEIN-ENDOVASCULAR;  Surgeon: Pricila Clark MD;  Location: [facility] OR;  Service: Cardiothoracic;  Laterality: Right;    EPIDURAL BLOCK INJECTION  2015    cervical    EPIDURAL STEROID INJECTION INTO LUMBAR SPINE N/A 06/05/2019    Procedure: Injection-steroid-epidural-lumbar L5/S1 interlaminar;  Surgeon: Riley Segura MD;  Location: [facility] OR;  Service: Pain Management;  Laterality: N/A;    EPIDURAL STEROID INJECTION INTO LUMBAR SPINE N/A 09/13/2019    Procedure: Injection-steroid-epidural-lumbar;  Surgeon: Riley Segura MD;  Location: [facility] OR;  Service: Pain Management;  Laterality: N/A;  L5/S1 interlaminar to the right    EPIDURAL STEROID INJECTION INTO LUMBAR SPINE N/A 06/02/2020    Procedure: Injection-steroid-epidural-lumbar L5/S1 to right;  Surgeon: Riley Segura MD;  Location: [facility] OR;  Service: Pain Management;  Laterality: N/A;    EXCLUSION, LEFT ATRIAL APPENDAGE, OPEN, AS PART OF OPEN CHEST SURGERY N/A 6/23/2023    Procedure: EXCLUSION, LEFT ATRIAL APPENDAGE, OPEN, AS PART OF OPEN CHEST SURGERY;  Surgeon: Pricila Clark MD;  Location: [facility] OR;  Service: Cardiothoracic;  Laterality: N/A;    EXPLORATION OF MEDIASTINUM N/A 6/23/2023    Procedure: EXPLORATION, MEDIASTINUM - MEDIASTINAL RE-EXPLORATION TO CONTROL POST-OP BLEEDING;  Surgeon: Pricila Clark MD;  Location: [facility] OR;  Service: Cardiothoracic;  Laterality: N/A;    Facet Steroid injection       Pain management    HERNIA REPAIR      Ventral    INJECTION OF ANESTHETIC AGENT AROUND MEDIAL BRANCH NERVES INNERVATING LUMBAR FACET  JOINT Right 03/21/2023    Procedure: Block-nerve-medial branch-lumbar L3/4, L4/5, L5/S1;  Surgeon: Riley Segura MD;  Location: [facility];  Service: Pain Management;  Laterality: Right;    INSERTION OF DORSAL COLUMN NERVE STIMULATOR FOR TRIAL N/A 02/10/2021    Procedure: INSERTION, NEUROSTIMULATOR, SPINAL CORD, DORSAL COLUMN, FOR TRIAL;  Surgeon: Riley Segura MD;  Location: [facility];  Service: Pain Management;  Laterality: N/A;    INSERTION OF NEUROSTIMULATOR OF DORSAL COLUMN OF SPINAL CORD N/A 03/01/2021    Procedure: INSERTION, NEUROSTIMULATOR, SPINAL CORD, DORSAL COLUMN;  Surgeon: Riley Segura MD;  Location: [facility];  Service: Pain Management;  Laterality: N/A;    KNEE SURGERY      bilateral    RADIOFREQUENCY ABLATION  2015    lumbar nerve    RADIOFREQUENCY ABLATION OF LUMBAR MEDIAL BRANCH NERVE AT SINGLE LEVEL Right 04/11/2019    Procedure: Radiofrequency Ablation, Nerve, Spinal, Lumbar, Medial Branch, L1,2,3,4,5;  Surgeon: Riley Segura MD;  Location: [facility];  Service: Pain Management;  Laterality: Right;    TONSILLECTOMY      TRANSFORAMINAL EPIDURAL INJECTION OF STEROID Right 11/13/2020    Procedure: Injection,steroid,epidural,transforaminal approach, l3/4 and l5/s1;  Surgeon: Riley Segura MD;  Location: [facility];  Service: Pain Management;  Laterality: Right;    TRANSFORAMINAL EPIDURAL INJECTION OF STEROID Left 06/24/2021    Procedure: Injection,steroid,epidural,transforaminal approach L5/S1;  Surgeon: Riley Segura MD;  Location: [facility];  Service: Pain Management;  Laterality: Left;    TRANSFORAMINAL EPIDURAL INJECTION OF STEROID Right 09/22/2022    Procedure: Injection,steroid,epidural,transforaminal approach L5/S1;  Surgeon: Riley Segura MD;  Location: [facility];  Service: Pain Management;  Laterality: Right;    TRANSFORAMINAL EPIDURAL INJECTION OF STEROID Right 10/25/2022    Procedure: Injection,steroid,epidural,transforaminal approach L2/3 and L3/4;  Surgeon:  Riley Segura MD;  Location: [facility];  Service: Pain Management;  Laterality: Right;    VERTEBROPLASTY       Social History     Tobacco Use    Smoking status: Former     Current packs/day: 0.00     Average packs/day: 1.5 packs/day for 24.0 years (36.0 ttl pk-yrs)     Types: Cigarettes     Start date: 10/21/1959     Quit date: 3/19/1983     Years since quittin.4     Passive exposure: Past    Smokeless tobacco: Never   Substance Use Topics    Alcohol use: No    Drug use: No        Review of Systems:    As noted in HPI in addition      REVIEW OF SYSTEMS  Review of Systems   Constitutional: Negative for decreased appetite, diaphoresis, night sweats, weight gain and weight loss.   HENT:  Negative for nosebleeds and odynophagia.    Eyes:  Negative for double vision and photophobia.   Cardiovascular:  Negative for chest pain, claudication, cyanosis, dyspnea on exertion, irregular heartbeat, leg swelling, near-syncope, orthopnea, palpitations, paroxysmal nocturnal dyspnea and syncope.   Respiratory:  Negative for cough, hemoptysis, shortness of breath and wheezing.    Hematologic/Lymphatic: Negative for adenopathy.   Skin:  Negative for flushing, skin cancer and suspicious lesions.   Musculoskeletal:  Negative for gout, myalgias and neck pain.   Gastrointestinal:  Negative for abdominal pain, heartburn, hematemesis and hematochezia.   Genitourinary:  Negative for bladder incontinence, hesitancy and nocturia.   Neurological:  Negative for focal weakness, headaches, light-headedness and paresthesias.   Psychiatric/Behavioral:  Negative for memory loss and substance abuse.               Objective:        Vitals:    23 1427   BP: 126/66   Pulse: 78       Lab Results   Component Value Date    WBC 5.25 2023    HGB 9.3 (L) 2023    HCT 29.7 (L) 2023     (H) 2023    CHOL 159 2023    TRIG 84 2023    HDL 44 2023    ALT 34 2023    AST 37 2023     (L)  07/28/2023    K 3.7 07/28/2023    CL 92 (L) 07/28/2023    CREATININE 1.18 07/28/2023    BUN 14 07/28/2023    CO2 33 (H) 07/28/2023    TSH 1.300 04/23/2016    PSA 4.5 (H) 03/22/2022    INR 1.1 08/04/2023    HGBA1C 5.5 06/19/2023        ECHOCARDIOGRAM RESULTS  Results for orders placed during the hospital encounter of 07/08/23    Echo    Interpretation Summary  · The left ventricle is normal in size with normal systolic function.  · The estimated ejection fraction is 60%.  · Normal right ventricular size with low normal right ventricular systolic function.  · Mild to moderate tricuspid regurgitation.  · Intermediate central venous pressure (8 mmHg).  · The estimated PA systolic pressure is 46 mmHg.  · There is mild pulmonary hypertension.  · 3 mL of intravenous Optison contrast was used during the study        CURRENT/PREVIOUS VISIT EKG  Results for orders placed or performed during the hospital encounter of 07/22/23   EKG 12-lead    Collection Time: 07/22/23  7:51 PM    Narrative    Test Reason : R06.02,    Vent. Rate : 085 BPM     Atrial Rate : 000 BPM     P-R Int : 000 ms          QRS Dur : 102 ms      QT Int : 374 ms       P-R-T Axes : 000 -37 025 degrees     QTc Int : 445 ms    Baseline Artifact    Repeat ECG with appropriate lead placement if clinically indicated    Confirmed by Isa SIMS MD, Paul F. ([id number]) on 7/25/2023 5:55:37 AM    Referred By: [email or web address]   SELF           Confirmed By:Trino Moss III, MD     No valid procedures specified.   Results for orders placed during the hospital encounter of 03/22/23    Nuclear Stress - Cardiology Interpreted    Interpretation Summary    The ECG portion of the study is abnormal but not diagnostic.    The test was stopped because the patient experienced A-V block.    2nd degree AV Block confirmed by [facility] Ch[facility] PA-C    Resting images obtained only.  Apical thinning noted.  No stress images.  Abnormal baseline EKG is reported.  Inconclusive study.    No valid  procedures specified.    PHYSICAL EXAM  CONSTITUTIONAL: Well built, well nourished in no apparent distress  NECK: no carotid bruit, no JVD  LUNGS:  Decreased breath sounds left base.  CHEST WALL: no tenderness,  HEART: regular rate and rhythm, S1, S2 normal, no murmur, click, rub or gallop   ABDOMEN: soft, non-tender; bowel sounds normal; no masses,  no organomegaly  EXTREMITIES: Extremities normal, +1-2 2 lower extremity edema, no calf tenderness noted  NEURO: AAO X 3    I HAVE REVIEWED :    The vital signs, nurses notes, and all the pertinent radiology and labs.         Current Outpatient Medications   Medication Instructions    acetaminophen (TYLENOL) 500 mg, Oral, Every 6 hours PRN    amiodarone (PACERONE) 200 mg, Oral, Daily    apixaban (ELIQUIS) 5 mg, Oral, 2 times daily    aspirin (ECOTRIN) 81 mg, Oral, Daily    atorvastatin (LIPITOR) 20 mg, Oral, Daily    b complex vitamins tablet 1 tablet, Oral, Daily    calcium citrate-vitamin D3 315-200 mg (CITRACAL+D) 315 mg-5 mcg (200 unit) per tablet 1 tablet, Oral, Daily    colchicine (COLCRYS) 0.6 mg, Oral, Daily    finasteride (PROSCAR) 5 mg, Oral, Daily    furosemide (LASIX) 40 mg, Oral, Daily    furosemide (LASIX) 20 mg, Oral, Nightly PRN, Take nightly PRN weight gain of 2 lbs in 24 hours or 3 lbs in 72 hours    gabapentin (NEURONTIN) 300 mg, Oral, 2 times daily    hydrALAZINE (APRESOLINE) 25 mg, Oral, Every 12 hours    methylPREDNISolone (MEDROL DOSEPACK) 4 mg tablet use as directed    oxyCODONE-acetaminophen (PERCOCET) 5-325 mg per tablet 1 tablet, Oral, Every 6 hours PRN    polyethylene glycol (GLYCOLAX) 17 g, Oral, Daily PRN    potassium chloride SA (K-DUR,KLOR-CON) 20 MEQ tablet 40 mEq, Oral, Daily    senna-docusate 8.6-50 mg (PERICOLACE) 8.6-50 mg per tablet 1 tablet, Oral, 2 times daily    spironolactone (ALDACTONE) 25 mg, Oral, Daily    tamsulosin (FLOMAX) 0.4 mg Cap TAKE 1 CAPSULE BY MOUTH EVERY DAY          Assessment:   CABG times three-vessel, left  atrial appendage ligation.  June 2022  EF 60%.  No significant valvular heart issues.  Perioperative atrial fibrillation, complicating mediastinal bleed requiring reexploration.  Complicating GI bleed, anemia.  Complicating perioperative pulmonary embolism.  Left pleural effusion.  Last thoracentesis this past Friday with removal 1.5 L. currently under surveillance.        Plan:   Meds reviewed and reconciled.  Recommend no changes.  Serial labs.  Plan per CTS.  Follow-up 3 months        No follow-ups on file.

## 2023-08-08 ENCOUNTER — PATIENT MESSAGE (OUTPATIENT)
Dept: PRIMARY CARE CLINIC | Facility: CLINIC | Age: 78
End: 2023-08-08
Payer: MEDICARE

## 2023-08-08 ENCOUNTER — DOCUMENT SCAN (OUTPATIENT)
Dept: HOME HEALTH SERVICES | Facility: HOSPITAL | Age: 78
End: 2023-08-08
Payer: MEDICARE

## 2023-08-09 ENCOUNTER — TELEPHONE (OUTPATIENT)
Dept: VASCULAR SURGERY | Facility: CLINIC | Age: 78
End: 2023-08-09
Payer: MEDICARE

## 2023-08-09 ENCOUNTER — DOCUMENT SCAN (OUTPATIENT)
Dept: HOME HEALTH SERVICES | Facility: HOSPITAL | Age: 78
End: 2023-08-09
Payer: MEDICARE

## 2023-08-09 NOTE — TELEPHONE ENCOUNTER
Spoke with Mr Roque's daughter Nina and advised that I am cancelling the pre-visit CXR on 8/29 with Dr Clark, as He has one ordered on 8/18 prior to his visit with Pulmonary that Dr Clark stated she will review.

## 2023-08-09 NOTE — TELEPHONE ENCOUNTER
Spoke with pt's daughter, and she will contact [facility] for a refill of finasteride.

## 2023-08-14 ENCOUNTER — PATIENT MESSAGE (OUTPATIENT)
Dept: PRIMARY CARE CLINIC | Facility: CLINIC | Age: 78
End: 2023-08-14
Payer: MEDICARE

## 2023-08-14 ENCOUNTER — TELEPHONE (OUTPATIENT)
Dept: CARDIOLOGY | Facility: CLINIC | Age: 78
End: 2023-08-14
Payer: MEDICARE

## 2023-08-14 NOTE — TELEPHONE ENCOUNTER
----- Message from Isma Berrios sent at 8/14/2023  3:14 PM CDT -----  Type: Needs Medical Advice  Who Called:  Ally Crane  Symptoms (please be specific):  said she need to speak to the dr--said she have a concern of the med furosemide (LASIX) 20 MG tablet that pt is on--please call and advise  Best Call Back Number: [phone]  Additional Information: thank you

## 2023-08-14 NOTE — TELEPHONE ENCOUNTER
Please advise: HH nurse called with concerns of fluid build up; she saw him on Friday and his lungs were clear and feet moderately swollen. She saw pt today and feet have increase swelling and lungs are diminished on the left. Pt reports a 2-3 pound weight gain over the weekend. He took an extra 10mg of lasix, but has not urinated as much as he usually does.  Lasix is ordered as 40mg daily and 20mg prn

## 2023-08-15 NOTE — TELEPHONE ENCOUNTER
Spoke to Ally and advised per Dr Young (Take extra 40 mg of Lasix for 3 days.  Follow-up with Dr. Marcelino next week if possible)  Appt scheduled for 8/23

## 2023-08-15 NOTE — TELEPHONE ENCOUNTER
If symptoms are persisting or progressing today then he may want to test for COVID, either come in or do a home test as we do have specific treatment for that.  Otherwise symptomatic treatment for a virus as you suggested and watch for worsening or worrisome symptoms

## 2023-08-15 NOTE — TELEPHONE ENCOUNTER
Pt reports that the he started taking Flonase and Mucinex and that he is doing better today.  Pt was encouraged to try taking Zyrtec or Claritin to assist with post nasal drip and pt reports that he will purchase some today.  Pt was also informed that if his symptoms progressed that he could either take a home COVID test or he could come into the clinic to have one done.  Pt also informed to call us back if his symptoms do not improve or if they worsen, pt verbalized understanding to all.

## 2023-08-16 NOTE — TELEPHONE ENCOUNTER
Some yellow sputum is still normal for a viral infection.  I would recommend starting the Flonase as well.  I generally do not recommend antibiotic unless it rapidly worsens or persists for an extended period of time.  Usually they do not really help as most of these do not start as bacterial infections.  However, if continuing to worsen given everything he has been through I may proceed on the side of caution and consider antibiotic

## 2023-08-18 ENCOUNTER — HOSPITAL ENCOUNTER (OUTPATIENT)
Dept: RADIOLOGY | Facility: HOSPITAL | Age: 78
Discharge: HOME | End: 2023-08-18
Attending: INTERNAL MEDICINE
Payer: MEDICARE

## 2023-08-18 ENCOUNTER — PATIENT MESSAGE (OUTPATIENT)
Dept: VASCULAR SURGERY | Facility: CLINIC | Age: 78
End: 2023-08-18
Payer: MEDICARE

## 2023-08-18 ENCOUNTER — TELEPHONE (OUTPATIENT)
Dept: PRIMARY CARE CLINIC | Facility: CLINIC | Age: 78
End: 2023-08-18
Payer: MEDICARE

## 2023-08-18 DIAGNOSIS — J90 RECURRENT LEFT PLEURAL EFFUSION: ICD-10-CM

## 2023-08-18 PROCEDURE — 71046 X-RAY EXAM CHEST 2 VIEWS: CPT | Mod: TC,FY,PO

## 2023-08-18 PROCEDURE — 71046 X-RAY EXAM CHEST 2 VIEWS: CPT | Mod: 26,,, | Performed by: RADIOLOGY

## 2023-08-18 PROCEDURE — 71046 XR CHEST PA AND LATERAL: ICD-10-PCS | Mod: 26,,, | Performed by: RADIOLOGY

## 2023-08-18 RX ORDER — BENZONATATE 100 MG/1
100 CAPSULE ORAL 3 TIMES DAILY PRN
Qty: 28 CAPSULE | Refills: 1 | Status: SHIPPED | OUTPATIENT
Start: 2023-08-18 | End: 2023-08-31 | Stop reason: SDUPTHER

## 2023-08-18 NOTE — TELEPHONE ENCOUNTER
----- Message from Jose Sigrid sent at 8/18/2023  8:46 AM CDT -----  Regarding: advice  Contact: PAIGE PICHARDO [[id number]]  Type: Needs Medical Advice  Who Called:  Kar Power    Symptoms (please be specific):  SPO2 went from 87 to 92%; Pain on left side upon movement or coughing, pain is 7/10 on scale    How long has patient had these symptoms:  last night    Pharmacy name and phone #:    [id number] - [place] - [address] [address]  [address] [place] [id number]  Phone: [phone] Fax: [phone]    Best Call Back Number [phone]    Additional Information: Please call to advise.

## 2023-08-18 NOTE — TELEPHONE ENCOUNTER
Received a call from pt's HH nurse, she reports that pt's oxygen saturation was 87% when he first got up this morning and that it increased to 92%.  Pain on left side upon movement or coughing is 7/10.  Pt had CXR this morning and is scheduled to see Pulmonology Dept on Monday.  Informed pt that he could take some Delsym OTC and his daughter reports that she will purchase some now.    Questioned pt if he owns a pulse ox and he reports that he does.  Educated pt that if his oxygen level dropped to 92%, that he should proceed to the nearest ER.  Pt reports that his coughing is making his oxygen level low.  Pt reports that he does have portable oxygen at home that he was prescribed this by Dr. Tyler a long time ago and that he was instructed by Dr. Tyler to put the portable oxygen tank on 2L when he needs it.    Please advise.     [facility] [address]

## 2023-08-18 NOTE — TELEPHONE ENCOUNTER
X-ray suggest that the pleural effusion has reaccumulated.  I will defer treatment to pulmonology.  He may use the supplemental oxygen if this helps.  If breathing becomes worse then he needs to either contact pulmonology right away for possible thoracentesis or go to the emergency room.      If he has a mild cough he can take Tessalon Perles, however really should not take anything stronger than that.

## 2023-08-19 PROBLEM — Z79.01 ANTICOAGULATION ADEQUATE: Status: ACTIVE | Noted: 2023-08-19

## 2023-08-19 PROBLEM — R07.81 PLEURITIC CHEST PAIN: Status: ACTIVE | Noted: 2023-08-19

## 2023-08-23 ENCOUNTER — OFFICE VISIT (OUTPATIENT)
Dept: CARDIOLOGY | Facility: CLINIC | Age: 78
End: 2023-08-23
Payer: MEDICARE

## 2023-08-23 ENCOUNTER — PATIENT MESSAGE (OUTPATIENT)
Dept: VASCULAR SURGERY | Facility: CLINIC | Age: 78
End: 2023-08-23
Payer: MEDICARE

## 2023-08-23 VITALS
BODY MASS INDEX: 41.15 KG/M2 | WEIGHT: 286.81 LBS | HEART RATE: 67 BPM | DIASTOLIC BLOOD PRESSURE: 65 MMHG | SYSTOLIC BLOOD PRESSURE: 117 MMHG

## 2023-08-23 DIAGNOSIS — I87.2 VENOUS INSUFFICIENCY OF BOTH LOWER EXTREMITIES: ICD-10-CM

## 2023-08-23 DIAGNOSIS — I26.09 ACUTE PULMONARY EMBOLISM WITH ACUTE COR PULMONALE, UNSPECIFIED PULMONARY EMBOLISM TYPE: ICD-10-CM

## 2023-08-23 DIAGNOSIS — G47.33 OSA ON CPAP: ICD-10-CM

## 2023-08-23 DIAGNOSIS — I70.0 ABDOMINAL AORTIC ATHEROSCLEROSIS: Chronic | ICD-10-CM

## 2023-08-23 DIAGNOSIS — I48.0 PAF (PAROXYSMAL ATRIAL FIBRILLATION): ICD-10-CM

## 2023-08-23 DIAGNOSIS — Z95.1 S/P CABG (CORONARY ARTERY BYPASS GRAFT): ICD-10-CM

## 2023-08-23 DIAGNOSIS — Z95.1 S/P CABG (CORONARY ARTERY BYPASS GRAFT): Primary | ICD-10-CM

## 2023-08-23 DIAGNOSIS — I49.9 IRREGULAR CARDIAC RHYTHM: ICD-10-CM

## 2023-08-23 DIAGNOSIS — R97.20 ELEVATED PSA: ICD-10-CM

## 2023-08-23 DIAGNOSIS — R94.31 ABNORMAL EKG: ICD-10-CM

## 2023-08-23 DIAGNOSIS — I10 PRIMARY HYPERTENSION: ICD-10-CM

## 2023-08-23 DIAGNOSIS — K92.2 GASTROINTESTINAL HEMORRHAGE, UNSPECIFIED GASTROINTESTINAL HEMORRHAGE TYPE: ICD-10-CM

## 2023-08-23 DIAGNOSIS — E78.2 MIXED HYPERLIPIDEMIA: Primary | Chronic | ICD-10-CM

## 2023-08-23 DIAGNOSIS — Z79.01 ANTICOAGULATION ADEQUATE: ICD-10-CM

## 2023-08-23 PROCEDURE — 3074F SYST BP LT 130 MM HG: CPT | Mod: CPTII,S$GLB,, | Performed by: INTERNAL MEDICINE

## 2023-08-23 PROCEDURE — 3078F DIAST BP <80 MM HG: CPT | Mod: CPTII,S$GLB,, | Performed by: INTERNAL MEDICINE

## 2023-08-23 PROCEDURE — 1101F PT FALLS ASSESS-DOCD LE1/YR: CPT | Mod: CPTII,S$GLB,, | Performed by: INTERNAL MEDICINE

## 2023-08-23 PROCEDURE — 99999 PR PBB SHADOW E&M-EST. PATIENT-LVL III: CPT | Mod: PBBFAC,,, | Performed by: INTERNAL MEDICINE

## 2023-08-23 PROCEDURE — 99214 PR OFFICE/OUTPT VISIT, EST, LEVL IV, 30-39 MIN: ICD-10-PCS | Mod: S$GLB,,, | Performed by: INTERNAL MEDICINE

## 2023-08-23 PROCEDURE — 3288F PR FALLS RISK ASSESSMENT DOCUMENTED: ICD-10-PCS | Mod: CPTII,S$GLB,, | Performed by: INTERNAL MEDICINE

## 2023-08-23 PROCEDURE — 1111F PR DISCHARGE MEDS RECONCILED W/ CURRENT OUTPATIENT MED LIST: ICD-10-PCS | Mod: CPTII,S$GLB,, | Performed by: INTERNAL MEDICINE

## 2023-08-23 PROCEDURE — 1126F AMNT PAIN NOTED NONE PRSNT: CPT | Mod: CPTII,S$GLB,, | Performed by: INTERNAL MEDICINE

## 2023-08-23 PROCEDURE — 3078F PR MOST RECENT DIASTOLIC BLOOD PRESSURE < 80 MM HG: ICD-10-PCS | Mod: CPTII,S$GLB,, | Performed by: INTERNAL MEDICINE

## 2023-08-23 PROCEDURE — 1126F PR PAIN SEVERITY QUANTIFIED, NO PAIN PRESENT: ICD-10-PCS | Mod: CPTII,S$GLB,, | Performed by: INTERNAL MEDICINE

## 2023-08-23 PROCEDURE — 1159F PR MEDICATION LIST DOCUMENTED IN MEDICAL RECORD: ICD-10-PCS | Mod: CPTII,S$GLB,, | Performed by: INTERNAL MEDICINE

## 2023-08-23 PROCEDURE — 99999 PR PBB SHADOW E&M-EST. PATIENT-LVL III: ICD-10-PCS | Mod: PBBFAC,,, | Performed by: INTERNAL MEDICINE

## 2023-08-23 PROCEDURE — 1159F MED LIST DOCD IN RCRD: CPT | Mod: CPTII,S$GLB,, | Performed by: INTERNAL MEDICINE

## 2023-08-23 PROCEDURE — 3074F PR MOST RECENT SYSTOLIC BLOOD PRESSURE < 130 MM HG: ICD-10-PCS | Mod: CPTII,S$GLB,, | Performed by: INTERNAL MEDICINE

## 2023-08-23 PROCEDURE — 99214 OFFICE O/P EST MOD 30 MIN: CPT | Mod: S$GLB,,, | Performed by: INTERNAL MEDICINE

## 2023-08-23 PROCEDURE — 1111F DSCHRG MED/CURRENT MED MERGE: CPT | Mod: CPTII,S$GLB,, | Performed by: INTERNAL MEDICINE

## 2023-08-23 PROCEDURE — 1101F PR PT FALLS ASSESS DOC 0-1 FALLS W/OUT INJ PAST YR: ICD-10-PCS | Mod: CPTII,S$GLB,, | Performed by: INTERNAL MEDICINE

## 2023-08-23 PROCEDURE — 1157F ADVNC CARE PLAN IN RCRD: CPT | Mod: CPTII,S$GLB,, | Performed by: INTERNAL MEDICINE

## 2023-08-23 PROCEDURE — 1157F PR ADVANCE CARE PLAN OR EQUIV PRESENT IN MEDICAL RECORD: ICD-10-PCS | Mod: CPTII,S$GLB,, | Performed by: INTERNAL MEDICINE

## 2023-08-23 PROCEDURE — 3288F FALL RISK ASSESSMENT DOCD: CPT | Mod: CPTII,S$GLB,, | Performed by: INTERNAL MEDICINE

## 2023-08-23 NOTE — PROGRESS NOTES
Subjective:    Patient ID:  Chinedu Roque is a 77 y.o. male patient here for evaluation Follow-up      History of Present Illness:  Cardiology follow-up.  Status post CABG June 2023 with ANISH to the LE D, vein graft to obtuse marginal 2 and 3, left atrial appendage isolation procedure.  Perioperative mediastinal bleeding requiring redo/re-exploration.  Complicating PAF, complicating DVT PE.  Remains on chronic oral anticoagulation with Eliquis 5 b.i.d..  Recurrent left pleural effusion, thoracentesis last Friday.  Chest x-ray stable.  Cough persistent, no fever.  No angina.  No arrhythmia.             Review of patient's allergies indicates:  No Known Allergies    Past Medical History:   Diagnosis Date    Anticoagulant long-term use     ASA 81 mg    Arthritis     cervical    BPH (benign prostatic hyperplasia) 03/02/2012    Chronic left shoulder pain     Compression fracture of L2     DDD (degenerative disc disease), lumbar     HTN (hypertension) 03/02/2012    Hx of colonic polyps 2013    Low back pain     Morbid obesity with BMI of 40.0-44.9, adult 03/18/2014    Seasonal allergies     Sleep apnea     compliant with CPAP    Stroke 03/1986     Past Surgical History:   Procedure Laterality Date    ANGIOGRAM, CORONARY, WITH LEFT HEART CATHETERIZATION N/A 06/07/2023    Procedure: Left Heart Cath;  Surgeon: Edgardo Marcelino MD;  Location: [facility] CATH;  Service: Cardiology;  Laterality: N/A;    APPENDECTOMY      CATARACT EXTRACTION EXTRACAPSULAR W/ INTRAOCULAR LENS IMPLANTATION      COLONOSCOPY N/A 06/06/2022    Procedure: COLONOSCOPY;  Surgeon: Jose Bello MD;  Location: [facility] ENDO;  Service: Endoscopy;  Laterality: N/A;    COLONOSCOPY N/A 7/6/2023    Procedure: COLONOSCOPY;  Surgeon: Sb Spaulding MD;  Location: [facility] ENDO;  Service: Endoscopy;  Laterality: N/A;    CORONARY ANGIOGRAPHY N/A 06/07/2023    Procedure: ANGIOGRAM, CORONARY ARTERY;  Surgeon: Edgardo Marcelino MD;  Location: [facility] CATH;  Service:  Cardiology;  Laterality: N/A;    CORONARY ARTERY BYPASS GRAFT (CABG) N/A 6/23/2023    Procedure: CORONARY ARTERY BYPASS GRAFT (CABG) X3;  Surgeon: Pricila Clark MD;  Location: [facility] OR;  Service: Cardiothoracic;  Laterality: N/A;    ENDOSCOPIC HARVEST OF VEIN Right 6/23/2023    Procedure: HARVEST-VEIN-ENDOVASCULAR;  Surgeon: Pricila Clark MD;  Location: [facility] OR;  Service: Cardiothoracic;  Laterality: Right;    EPIDURAL BLOCK INJECTION  2015    cervical    EPIDURAL STEROID INJECTION INTO LUMBAR SPINE N/A 06/05/2019    Procedure: Injection-steroid-epidural-lumbar L5/S1 interlaminar;  Surgeon: Riley Segura MD;  Location: [facility] OR;  Service: Pain Management;  Laterality: N/A;    EPIDURAL STEROID INJECTION INTO LUMBAR SPINE N/A 09/13/2019    Procedure: Injection-steroid-epidural-lumbar;  Surgeon: Riley Segura MD;  Location: [facility] OR;  Service: Pain Management;  Laterality: N/A;  L5/S1 interlaminar to the right    EPIDURAL STEROID INJECTION INTO LUMBAR SPINE N/A 06/02/2020    Procedure: Injection-steroid-epidural-lumbar L5/S1 to right;  Surgeon: Riley Segura MD;  Location: [facility] OR;  Service: Pain Management;  Laterality: N/A;    EXCLUSION, LEFT ATRIAL APPENDAGE, OPEN, AS PART OF OPEN CHEST SURGERY N/A 6/23/2023    Procedure: EXCLUSION, LEFT ATRIAL APPENDAGE, OPEN, AS PART OF OPEN CHEST SURGERY;  Surgeon: Pricila Clark MD;  Location: [facility] OR;  Service: Cardiothoracic;  Laterality: N/A;    EXPLORATION OF MEDIASTINUM N/A 6/23/2023    Procedure: EXPLORATION, MEDIASTINUM - MEDIASTINAL RE-EXPLORATION TO CONTROL POST-OP BLEEDING;  Surgeon: Pricila Clark MD;  Location: [facility] OR;  Service: Cardiothoracic;  Laterality: N/A;    Facet Steroid injection       Pain management    HERNIA REPAIR      Ventral    INJECTION OF ANESTHETIC AGENT AROUND MEDIAL BRANCH NERVES INNERVATING LUMBAR FACET JOINT Right 03/21/2023    Procedure: Block-nerve-medial branch-lumbar L3/4, L4/5, L5/S1;  Surgeon: Riley Segura MD;  Location:  [facility];  Service: Pain Management;  Laterality: Right;    INSERTION OF DORSAL COLUMN NERVE STIMULATOR FOR TRIAL N/A 02/10/2021    Procedure: INSERTION, NEUROSTIMULATOR, SPINAL CORD, DORSAL COLUMN, FOR TRIAL;  Surgeon: Riley Segura MD;  Location: [facility] OR;  Service: Pain Management;  Laterality: N/A;    INSERTION OF NEUROSTIMULATOR OF DORSAL COLUMN OF SPINAL CORD N/A 03/01/2021    Procedure: INSERTION, NEUROSTIMULATOR, SPINAL CORD, DORSAL COLUMN;  Surgeon: Riley Segura MD;  Location: [facility] OR;  Service: Pain Management;  Laterality: N/A;    KNEE SURGERY      bilateral    RADIOFREQUENCY ABLATION  2015    lumbar nerve    RADIOFREQUENCY ABLATION OF LUMBAR MEDIAL BRANCH NERVE AT SINGLE LEVEL Right 04/11/2019    Procedure: Radiofrequency Ablation, Nerve, Spinal, Lumbar, Medial Branch, L1,2,3,4,5;  Surgeon: Riley Segura MD;  Location: [facility];  Service: Pain Management;  Laterality: Right;    TONSILLECTOMY      TRANSFORAMINAL EPIDURAL INJECTION OF STEROID Right 11/13/2020    Procedure: Injection,steroid,epidural,transforaminal approach, l3/4 and l5/s1;  Surgeon: Riley Segura MD;  Location: [facility] OR;  Service: Pain Management;  Laterality: Right;    TRANSFORAMINAL EPIDURAL INJECTION OF STEROID Left 06/24/2021    Procedure: Injection,steroid,epidural,transforaminal approach L5/S1;  Surgeon: Riley Segura MD;  Location: [facility] OR;  Service: Pain Management;  Laterality: Left;    TRANSFORAMINAL EPIDURAL INJECTION OF STEROID Right 09/22/2022    Procedure: Injection,steroid,epidural,transforaminal approach L5/S1;  Surgeon: Riley Segura MD;  Location: [facility] OR;  Service: Pain Management;  Laterality: Right;    TRANSFORAMINAL EPIDURAL INJECTION OF STEROID Right 10/25/2022    Procedure: Injection,steroid,epidural,transforaminal approach L2/3 and L3/4;  Surgeon: Riley Segura MD;  Location: [facility];  Service: Pain Management;  Laterality: Right;    VERTEBROPLASTY       Social History      Tobacco Use    Smoking status: Former     Current packs/day: 0.00     Average packs/day: 1.5 packs/day for 24.0 years (36.0 ttl pk-yrs)     Types: Cigarettes     Start date: 10/21/1959     Quit date: 3/19/1983     Years since quittin.4     Passive exposure: Past    Smokeless tobacco: Never   Substance Use Topics    Alcohol use: No    Drug use: No        Review of Systems:    As noted in HPI in addition      REVIEW OF SYSTEMS  Review of Systems   Constitutional: Negative for decreased appetite, diaphoresis, night sweats, weight gain and weight loss.   HENT:  Negative for nosebleeds and odynophagia.    Eyes:  Negative for double vision and photophobia.   Cardiovascular:  Positive for dyspnea on exertion and leg swelling. Negative for chest pain, claudication, cyanosis, irregular heartbeat, near-syncope, orthopnea, palpitations, paroxysmal nocturnal dyspnea and syncope.   Respiratory:  Positive for cough. Negative for hemoptysis, shortness of breath and wheezing.    Hematologic/Lymphatic: Negative for adenopathy.   Skin:  Negative for flushing, skin cancer and suspicious lesions.   Musculoskeletal:  Negative for gout, myalgias and neck pain.   Gastrointestinal:  Negative for abdominal pain, heartburn, hematemesis and hematochezia.   Genitourinary:  Negative for bladder incontinence, hesitancy and nocturia.   Neurological:  Negative for focal weakness, headaches, light-headedness and paresthesias.   Psychiatric/Behavioral:  Negative for memory loss and substance abuse.               Objective:        Vitals:    23 1112   BP: 117/65   Pulse: 67       Lab Results   Component Value Date    WBC 8.54 2023    HGB 9.5 (L) 2023    HCT 32.2 (L) 2023     2023    CHOL 159 2023    TRIG 84 2023    HDL 44 2023    ALT 19 2023    AST 27 2023     (L) 2023    K 3.6 2023    CL 96 2023    CREATININE 0.92 2023    BUN 14 2023     CO2 31 08/19/2023    TSH 1.300 04/23/2016    PSA 4.5 (H) 03/22/2022    INR 1.1 08/04/2023    HGBA1C 5.5 06/19/2023        ECHOCARDIOGRAM RESULTS  Results for orders placed during the hospital encounter of 07/08/23    Echo    Interpretation Summary  · The left ventricle is normal in size with normal systolic function.  · The estimated ejection fraction is 60%.  · Normal right ventricular size with low normal right ventricular systolic function.  · Mild to moderate tricuspid regurgitation.  · Intermediate central venous pressure (8 mmHg).  · The estimated PA systolic pressure is 46 mmHg.  · There is mild pulmonary hypertension.  · 3 mL of intravenous Optison contrast was used during the study        CURRENT/PREVIOUS VISIT EKG  Results for orders placed or performed during the hospital encounter of 07/22/23   EKG 12-lead    Collection Time: 08/18/23 12:56 PM    Narrative    Test Reason : R07.9,    Vent. Rate : 094 BPM     Atrial Rate : 094 BPM     P-R Int : 192 ms          QRS Dur : 116 ms      QT Int : 356 ms       P-R-T Axes : 044 -33 093 degrees     QTc Int : 445 ms    Sinus rhythm with Premature supraventricular complexes  Left axis deviation  Low voltage QRS  Cannot rule out Anterior infarct (cited on or before 18-AUG-2023)  T wave abnormality, consider lateral ischemia  Abnormal ECG  When compared with ECG of 22-JUL-2023 19:51,  Previous ECG has undetermined rhythm, needs review  Questionable change in initial forces of Anterior leads  T wave inversion now evident in Lateral leads    Referred By: [email or web address]   SELF           Confirmed By:      No valid procedures specified.   Results for orders placed during the hospital encounter of 03/22/23    Nuclear Stress - Cardiology Interpreted    Interpretation Summary    The ECG portion of the study is abnormal but not diagnostic.    The test was stopped because the patient experienced A-V block.    2nd degree AV Block confirmed by [facility] Ch[facility] PA-C    Resting images  obtained only.  Apical thinning noted.  No stress images.  Abnormal baseline EKG is reported.  Inconclusive study.    No valid procedures specified.    PHYSICAL EXAM  CONSTITUTIONAL: no apparent distress  NECK: no carotid bruit, no JVD  LUNGS:  Mildly decreased breath sounds left lung base.  CHEST WALL: no tenderness,  HEART: regular rate and rhythm, S1, S2 normal, no murmur, click, rub or gallop   ABDOMEN: soft, non-tender; bowel sounds normal; no masses,  no organomegaly  EXTREMITIES: Extremities normal, no edema, no calf tenderness noted  NEURO: AAO X 3    I HAVE REVIEWED :    The vital signs, nurses notes, and all the pertinent radiology and labs.         Current Outpatient Medications   Medication Instructions    acetaminophen (TYLENOL) 500 mg, Oral, Every 6 hours PRN    amiodarone (PACERONE) 200 mg, Oral, Daily    apixaban (ELIQUIS) 5 mg, Oral, 2 times daily    aspirin (ECOTRIN) 81 mg, Oral, Daily    atorvastatin (LIPITOR) 20 mg, Oral, Daily    b complex vitamins tablet 1 tablet, Oral, Daily    benzonatate (TESSALON) 100 mg, Oral, 3 times daily PRN    calcium citrate-vitamin D3 315-200 mg (CITRACAL+D) 315 mg-5 mcg (200 unit) per tablet 1 tablet, Oral, Daily    colchicine (gout) (COLCRYS) 0.6 mg, Oral, 2 times daily    finasteride (PROSCAR) 5 mg, Oral, Daily    furosemide (LASIX) 20 mg, Oral, Nightly PRN, Take nightly PRN weight gain of 2 lbs in 24 hours or 3 lbs in 72 hours    furosemide (LASIX) 40 mg, Oral, Daily    gabapentin (NEURONTIN) 300 mg, Oral, 2 times daily    hydrALAZINE (APRESOLINE) 25 mg, Oral, Every 12 hours    oxyCODONE-acetaminophen (PERCOCET) 5-325 mg per tablet 1 tablet, Oral, Every 6 hours PRN    polyethylene glycol (GLYCOLAX) 17 g, Oral, Daily PRN    potassium chloride SA (K-DUR,KLOR-CON) 20 MEQ tablet 40 mEq, Oral, Daily    predniSONE (DELTASONE) 10 MG tablet Take 1 tablet (10 mg total) by mouth once daily for 7 days, THEN 0.5 tablets (5 mg total) once daily for 7 days.    predniSONE  (DELTASONE) 20 MG tablet Take 2 tablets (40 mg total) by mouth once daily for 3 days, THEN 1 tablet (20 mg total) once daily for 10 days.    senna-docusate 8.6-50 mg (PERICOLACE) 8.6-50 mg per tablet 1 tablet, Oral, 2 times daily    spironolactone (ALDACTONE) 25 mg, Oral, Daily    tamsulosin (FLOMAX) 0.4 mg Cap TAKE 1 CAPSULE BY MOUTH EVERY DAY          Assessment:   CABG x3 with left atrial appendage ligation June 2023.  EF 60%.  Perioperative PE, chronic oral anticoagulation.  GI bleed resolved  Recurrent left pleural effusion status post thoracentesis/Friday, follow-up chest x-ray pending.        Plan:   Cardiac rehab.  No change current medications.  Continue oral anticoagulation, Lasix, Pacerone 200 mg daily, aspirin 81 daily, statin agent, hydralazine 25 b.i.d., spironolactone 25 daily    Labs follow-up primary care.  Cardiology follow-up 4 months      No follow-ups on file.

## 2023-08-29 ENCOUNTER — PATIENT MESSAGE (OUTPATIENT)
Dept: CARDIOLOGY | Facility: CLINIC | Age: 78
End: 2023-08-29
Payer: MEDICARE

## 2023-08-31 ENCOUNTER — PATIENT MESSAGE (OUTPATIENT)
Dept: PAIN MEDICINE | Facility: CLINIC | Age: 78
End: 2023-08-31
Payer: MEDICARE

## 2023-08-31 ENCOUNTER — OFFICE VISIT (OUTPATIENT)
Dept: PRIMARY CARE CLINIC | Facility: CLINIC | Age: 78
End: 2023-08-31
Payer: MEDICARE

## 2023-08-31 ENCOUNTER — PATIENT MESSAGE (OUTPATIENT)
Dept: VASCULAR SURGERY | Facility: CLINIC | Age: 78
End: 2023-08-31
Payer: MEDICARE

## 2023-08-31 VITALS
SYSTOLIC BLOOD PRESSURE: 132 MMHG | WEIGHT: 273.5 LBS | BODY MASS INDEX: 39.16 KG/M2 | HEIGHT: 70 IN | TEMPERATURE: 98 F | DIASTOLIC BLOOD PRESSURE: 74 MMHG | HEART RATE: 73 BPM | OXYGEN SATURATION: 97 % | RESPIRATION RATE: 18 BRPM

## 2023-08-31 DIAGNOSIS — R07.81 PLEURITIC CHEST PAIN: ICD-10-CM

## 2023-08-31 DIAGNOSIS — J90 RECURRENT LEFT PLEURAL EFFUSION: Primary | ICD-10-CM

## 2023-08-31 PROCEDURE — 3075F SYST BP GE 130 - 139MM HG: CPT | Mod: CPTII,S$GLB,, | Performed by: FAMILY MEDICINE

## 2023-08-31 PROCEDURE — 3078F DIAST BP <80 MM HG: CPT | Mod: CPTII,S$GLB,, | Performed by: FAMILY MEDICINE

## 2023-08-31 PROCEDURE — 99999 PR PBB SHADOW E&M-EST. PATIENT-LVL V: CPT | Mod: PBBFAC,,, | Performed by: FAMILY MEDICINE

## 2023-08-31 PROCEDURE — 1160F RVW MEDS BY RX/DR IN RCRD: CPT | Mod: CPTII,S$GLB,, | Performed by: FAMILY MEDICINE

## 2023-08-31 PROCEDURE — 3075F PR MOST RECENT SYSTOLIC BLOOD PRESS GE 130-139MM HG: ICD-10-PCS | Mod: CPTII,S$GLB,, | Performed by: FAMILY MEDICINE

## 2023-08-31 PROCEDURE — 3288F FALL RISK ASSESSMENT DOCD: CPT | Mod: CPTII,S$GLB,, | Performed by: FAMILY MEDICINE

## 2023-08-31 PROCEDURE — 1101F PT FALLS ASSESS-DOCD LE1/YR: CPT | Mod: CPTII,S$GLB,, | Performed by: FAMILY MEDICINE

## 2023-08-31 PROCEDURE — 99214 PR OFFICE/OUTPT VISIT, EST, LEVL IV, 30-39 MIN: ICD-10-PCS | Mod: S$GLB,,, | Performed by: FAMILY MEDICINE

## 2023-08-31 PROCEDURE — 1160F PR REVIEW ALL MEDS BY PRESCRIBER/CLIN PHARMACIST DOCUMENTED: ICD-10-PCS | Mod: CPTII,S$GLB,, | Performed by: FAMILY MEDICINE

## 2023-08-31 PROCEDURE — 1101F PR PT FALLS ASSESS DOC 0-1 FALLS W/OUT INJ PAST YR: ICD-10-PCS | Mod: CPTII,S$GLB,, | Performed by: FAMILY MEDICINE

## 2023-08-31 PROCEDURE — 1125F AMNT PAIN NOTED PAIN PRSNT: CPT | Mod: CPTII,S$GLB,, | Performed by: FAMILY MEDICINE

## 2023-08-31 PROCEDURE — 1159F MED LIST DOCD IN RCRD: CPT | Mod: CPTII,S$GLB,, | Performed by: FAMILY MEDICINE

## 2023-08-31 PROCEDURE — 1125F PR PAIN SEVERITY QUANTIFIED, PAIN PRESENT: ICD-10-PCS | Mod: CPTII,S$GLB,, | Performed by: FAMILY MEDICINE

## 2023-08-31 PROCEDURE — 99214 OFFICE O/P EST MOD 30 MIN: CPT | Mod: S$GLB,,, | Performed by: FAMILY MEDICINE

## 2023-08-31 PROCEDURE — 1157F PR ADVANCE CARE PLAN OR EQUIV PRESENT IN MEDICAL RECORD: ICD-10-PCS | Mod: CPTII,S$GLB,, | Performed by: FAMILY MEDICINE

## 2023-08-31 PROCEDURE — 1159F PR MEDICATION LIST DOCUMENTED IN MEDICAL RECORD: ICD-10-PCS | Mod: CPTII,S$GLB,, | Performed by: FAMILY MEDICINE

## 2023-08-31 PROCEDURE — 3078F PR MOST RECENT DIASTOLIC BLOOD PRESSURE < 80 MM HG: ICD-10-PCS | Mod: CPTII,S$GLB,, | Performed by: FAMILY MEDICINE

## 2023-08-31 PROCEDURE — 99999 PR PBB SHADOW E&M-EST. PATIENT-LVL V: ICD-10-PCS | Mod: PBBFAC,,, | Performed by: FAMILY MEDICINE

## 2023-08-31 PROCEDURE — 3288F PR FALLS RISK ASSESSMENT DOCUMENTED: ICD-10-PCS | Mod: CPTII,S$GLB,, | Performed by: FAMILY MEDICINE

## 2023-08-31 PROCEDURE — 1157F ADVNC CARE PLAN IN RCRD: CPT | Mod: CPTII,S$GLB,, | Performed by: FAMILY MEDICINE

## 2023-08-31 RX ORDER — BENZONATATE 100 MG/1
100 CAPSULE ORAL 3 TIMES DAILY PRN
Qty: 40 CAPSULE | Refills: 1 | Status: SHIPPED | OUTPATIENT
Start: 2023-08-31 | End: 2023-10-24

## 2023-08-31 NOTE — ASSESSMENT & PLAN NOTE
Reviewed recent emergency room visit and hospitalization.  He went back to the emergency room a few days ago because of chest pain.  Left-sided chest wall pain, x-ray showed mild worsening of the recurrent pleural effusion however he is not having significant shortness of breath.  He was placed on colchicine and prednisone.  He still has discomfort when he takes a deep breath and sometimes when he moves his left arm.  On exam I do not see any deformity of the chest wall or reproducible tenderness.  I do believe his pain is related to pleuritic pain.  It does appear that his symptoms (SOB) are well controlled with the Lasix although if this effusion increases it certainly will contribute to his shortness of breath.  So for now continue Lasix.  He will follow with his cardiovascular surgeon as scheduled.  If he has significant difficulty with breathing he will contact me.

## 2023-08-31 NOTE — PROGRESS NOTES
THIS DOCUMENT WAS MADE IN PART WITH VOICE RECOGNITION SOFTWARE.  OCCASIONALLY THIS SOFTWARE WILL MISINTERPRET WORDS OR PHRASES.      [facility]       Patient ID: Chinedu Roque is a 77 y.o. male.    ASSESSMENT/PLAN by Problem List:    1. Recurrent left pleural effusion  Assessment & Plan:  Reviewed recent emergency room visit and hospitalization.  He went back to the emergency room a few days ago because of chest pain.  Left-sided chest wall pain, x-ray showed mild worsening of the recurrent pleural effusion however he is not having significant shortness of breath.  He was placed on colchicine and prednisone.  He still has discomfort when he takes a deep breath and sometimes when he moves his left arm.  On exam I do not see any deformity of the chest wall or reproducible tenderness.  I do believe his pain is related to pleuritic pain.  It does appear that his symptoms (SOB) are well controlled with the Lasix although if this effusion increases it certainly will contribute to his shortness of breath.  So for now continue Lasix.  He will follow with his cardiovascular surgeon as scheduled.  If he has significant difficulty with breathing he will contact me.      2. Pleuritic chest pain    Other orders  -     benzonatate (TESSALON) 100 MG capsule; Take 1 capsule (100 mg total) by mouth 3 (three) times daily as needed for Cough.  Dispense: 40 capsule; Refill: 1         Follow Up:  2 weeks as scheduled      Subjective:     Chief Complaint   Patient presents with    Follow-up     I have reviewed the information entered by the ancillary staff regarding the chief complaint as well as the related history.    HPI    Patient is a/an 77 y.o.  male       I did review recent emergency room visits and hospitalizations.   This was best detailed on his most recent discharge summary as follows    HPI:   Mr. Chinedu Roque is 77-year-old male with HTN, atrial  fibrillation, recent PEs - anticoagulated with eliquis, and CAD s/p CABG 6/23/23 with recurrent left-sided pleural effusion following s/p mediastinal re-exploration and subsequently requiring 3 left-sided thoracenteses for pleural fluid reaccumulation. Experienced complicated hospital courses with hemorrhoidal bleed requiring blood transfusions.  Patient was discharged home and subsequently readmitted for hypoxemic respiratory failure with findings of multiple PEs.  Patient was started on Eliquis with findings of left sided pleural effusion which responded to aggressive diuresis.  Patient last admitted 7/22-7/28 with worsening dyspnea with findings of left pleural effusion patient underwent left thoracentesis 7/20/2023 with 1500 cc removed and 07/24/2023 with 2 L removed. Since that time patient has continued to follow up with Cardiology and Pulmonology, with additional accumulation of fluid and drainage on 8/4 and subsequent treatment with steroid taper and colcidine. 4 days ago patient started having increased dyspnea in the setting of URI symptoms with increased minimally productive cough and home lasix was increased to 40 daily to 40 bid for 3 days; however patient developed acute onset of sharp left sided chest wall pain yesterday evening with worsened by frequent cough. Continued to cough throughout night a woke up this morning with significant pain on the left chest wall and sternum when coughing. Scheduled CXR showed reaccumulation of left pleural fluid and patient advised to present to ED for evaluation for thorocentesis and possible chest tube placment. Pt denies complaints other than slight dyspnea, but denies pain at rest, diaphoresis, exertional pain, syncope.      Pulmonology evaluated patient in ED with plan to hold eliquis with lovenox bridge to thoracentesis and possible chest tube. Pt admitted and started on dose of IV lasix to begin diuresis and improve dyspnea.     For complete problem list, past  "medical history, surgical history, social history, etc., see appropriate section in the electronic medical record    Review of Systems   Constitutional:  Positive for fatigue (Improving). Negative for chills and fever.   Respiratory:  Positive for cough and shortness of breath.    Musculoskeletal:  Positive for arthralgias.   Neurological:  Positive for weakness (improving).       Objective     Physical Exam  Constitutional:       Appearance: He is obese. He is not ill-appearing.   HENT:      Head: Normocephalic and atraumatic.   Cardiovascular:      Rate and Rhythm: Normal rate and regular rhythm.      Heart sounds: Normal heart sounds.      Comments: 1+ bilateral ankle edema  Pulmonary:      Effort: No respiratory distress.      Comments: Diminished breath sounds in bases.  Particularly left side  He is on room air  Examination of the chest wall reveals no deformity, no rash  Dullness to percussion FCI up on the left side  Neurological:      Mental Status: He is alert.      Comments: Ambulatory with a cane       Vitals:    08/31/23 1526   BP: 132/74   BP Location: Right arm   Patient Position: Sitting   BP Method: Large (Manual)   Pulse: 73   Resp: 18   Temp: 97.8 °F (36.6 °C)   TempSrc: Oral   SpO2: 97%   Weight: 124 kg (273 lb 7.7 oz)   Height: 5' 9.5" (1.765 m)       "

## 2023-09-04 ENCOUNTER — PATIENT MESSAGE (OUTPATIENT)
Dept: PRIMARY CARE CLINIC | Facility: CLINIC | Age: 78
End: 2023-09-04
Payer: MEDICARE

## 2023-09-05 RX ORDER — GABAPENTIN 300 MG/1
300 CAPSULE ORAL 2 TIMES DAILY
Qty: 60 CAPSULE | Refills: 2 | Status: SHIPPED | OUTPATIENT
Start: 2023-09-05 | End: 2023-11-20 | Stop reason: SDUPTHER

## 2023-09-11 ENCOUNTER — OFFICE VISIT (OUTPATIENT)
Dept: FAMILY MEDICINE | Facility: CLINIC | Age: 78
End: 2023-09-11
Payer: MEDICARE

## 2023-09-11 VITALS
OXYGEN SATURATION: 95 % | DIASTOLIC BLOOD PRESSURE: 76 MMHG | HEART RATE: 97 BPM | WEIGHT: 280.44 LBS | BODY MASS INDEX: 40.15 KG/M2 | HEIGHT: 70 IN | SYSTOLIC BLOOD PRESSURE: 138 MMHG

## 2023-09-11 DIAGNOSIS — E66.01 MORBID OBESITY WITH BMI OF 40.0-44.9, ADULT: ICD-10-CM

## 2023-09-11 DIAGNOSIS — R63.4 WEIGHT LOSS, ABNORMAL: ICD-10-CM

## 2023-09-11 DIAGNOSIS — I65.23 BILATERAL CAROTID ARTERY STENOSIS: ICD-10-CM

## 2023-09-11 DIAGNOSIS — I10 PRIMARY HYPERTENSION: ICD-10-CM

## 2023-09-11 DIAGNOSIS — I70.0 ABDOMINAL AORTIC ATHEROSCLEROSIS: Chronic | ICD-10-CM

## 2023-09-11 DIAGNOSIS — S32.000A COMPRESSION FRACTURE OF LUMBAR VERTEBRA, UNSPECIFIED LUMBAR VERTEBRAL LEVEL, INITIAL ENCOUNTER: Chronic | ICD-10-CM

## 2023-09-11 DIAGNOSIS — E78.2 MIXED HYPERLIPIDEMIA: Chronic | ICD-10-CM

## 2023-09-11 DIAGNOSIS — I25.10 CORONARY ARTERY DISEASE INVOLVING NATIVE CORONARY ARTERY OF NATIVE HEART WITHOUT ANGINA PECTORIS: ICD-10-CM

## 2023-09-11 DIAGNOSIS — Z00.00 ENCOUNTER FOR PREVENTIVE HEALTH EXAMINATION: Primary | ICD-10-CM

## 2023-09-11 DIAGNOSIS — I48.0 PAF (PAROXYSMAL ATRIAL FIBRILLATION): ICD-10-CM

## 2023-09-11 PROBLEM — J96.01 ACUTE HYPOXEMIC RESPIRATORY FAILURE: Status: RESOLVED | Noted: 2023-07-08 | Resolved: 2023-09-11

## 2023-09-11 PROCEDURE — 99999 PR PBB SHADOW E&M-EST. PATIENT-LVL V: ICD-10-PCS | Mod: PBBFAC,,, | Performed by: NURSE PRACTITIONER

## 2023-09-11 PROCEDURE — 1157F PR ADVANCE CARE PLAN OR EQUIV PRESENT IN MEDICAL RECORD: ICD-10-PCS | Mod: CPTII,S$GLB,, | Performed by: NURSE PRACTITIONER

## 2023-09-11 PROCEDURE — 3075F SYST BP GE 130 - 139MM HG: CPT | Mod: CPTII,S$GLB,, | Performed by: NURSE PRACTITIONER

## 2023-09-11 PROCEDURE — G0439 PPPS, SUBSEQ VISIT: HCPCS | Mod: S$GLB,,, | Performed by: NURSE PRACTITIONER

## 2023-09-11 PROCEDURE — 1101F PT FALLS ASSESS-DOCD LE1/YR: CPT | Mod: CPTII,S$GLB,, | Performed by: NURSE PRACTITIONER

## 2023-09-11 PROCEDURE — 3288F PR FALLS RISK ASSESSMENT DOCUMENTED: ICD-10-PCS | Mod: CPTII,S$GLB,, | Performed by: NURSE PRACTITIONER

## 2023-09-11 PROCEDURE — 3288F FALL RISK ASSESSMENT DOCD: CPT | Mod: CPTII,S$GLB,, | Performed by: NURSE PRACTITIONER

## 2023-09-11 PROCEDURE — 99999 PR PBB SHADOW E&M-EST. PATIENT-LVL V: CPT | Mod: PBBFAC,,, | Performed by: NURSE PRACTITIONER

## 2023-09-11 PROCEDURE — 1126F PR PAIN SEVERITY QUANTIFIED, NO PAIN PRESENT: ICD-10-PCS | Mod: CPTII,S$GLB,, | Performed by: NURSE PRACTITIONER

## 2023-09-11 PROCEDURE — 3078F PR MOST RECENT DIASTOLIC BLOOD PRESSURE < 80 MM HG: ICD-10-PCS | Mod: CPTII,S$GLB,, | Performed by: NURSE PRACTITIONER

## 2023-09-11 PROCEDURE — 3078F DIAST BP <80 MM HG: CPT | Mod: CPTII,S$GLB,, | Performed by: NURSE PRACTITIONER

## 2023-09-11 PROCEDURE — 1101F PR PT FALLS ASSESS DOC 0-1 FALLS W/OUT INJ PAST YR: ICD-10-PCS | Mod: CPTII,S$GLB,, | Performed by: NURSE PRACTITIONER

## 2023-09-11 PROCEDURE — 1157F ADVNC CARE PLAN IN RCRD: CPT | Mod: CPTII,S$GLB,, | Performed by: NURSE PRACTITIONER

## 2023-09-11 PROCEDURE — 3075F PR MOST RECENT SYSTOLIC BLOOD PRESS GE 130-139MM HG: ICD-10-PCS | Mod: CPTII,S$GLB,, | Performed by: NURSE PRACTITIONER

## 2023-09-11 PROCEDURE — 1126F AMNT PAIN NOTED NONE PRSNT: CPT | Mod: CPTII,S$GLB,, | Performed by: NURSE PRACTITIONER

## 2023-09-11 PROCEDURE — G0439 PR MEDICARE ANNUAL WELLNESS SUBSEQUENT VISIT: ICD-10-PCS | Mod: S$GLB,,, | Performed by: NURSE PRACTITIONER

## 2023-09-11 NOTE — PATIENT INSTRUCTIONS
Counseling and Referral of Other Preventative  (Italic type indicates deductible and co-insurance are waived)    Patient Name: Chinedu Roque  Today's Date: 9/11/2023    Health Maintenance       Date Due Completion Date    Hepatitis C Screening Never done ---    Shingles Vaccine (2 of 3) 01/01/2015 11/6/2014    COVID-19 Vaccine (4 - Pfizer risk series) 01/11/2022 11/16/2021    TETANUS VACCINE 11/24/2022 11/24/2012    Lipid Panel 03/06/2024 3/6/2023        No orders of the defined types were placed in this encounter.      The following information is provided to all patients.  This information is to help you find resources for any of the problems found today that may be affecting your health:                Living healthy guide: www.[facility].[place].gov      Understanding Diabetes: www.diabetes.org      Eating healthy: www.cdc.gov/healthyweight      [facility] home safety checklist: www.cdc.gov/steadi/patient.html      Agency on Aging: www.goea.[place].gov      Alcoholics anonymous (AA): www.aa.org      Physical Activity: www.zaida.nih.gov/vg4phda      Tobacco use: www.quitwithusla.org

## 2023-09-11 NOTE — PROGRESS NOTES
"  Chinedu Roque presented for a  Medicare AWV and comprehensive Health Risk Assessment today. The following components were reviewed and updated:    Medical history  Family History  Social history  Allergies and Current Medications  Health Risk Assessment  Health Maintenance  Care Team         ** See Completed Assessments for Annual Wellness Visit within the encounter summary.**         The following assessments were completed:  Living Situation  CAGE  Depression Screening  Timed Get Up and Go  Whisper Test  Cognitive Function Screening      Nutrition Screening  ADL Screening  PAQ Screening    Review for Opioid Screening: Patient does not have rx for Opioids.    Review for Substance Use Disorders: Patient does not use substance.     Vitals:    09/11/23 1352   BP: 138/76   BP Location: Left arm   Patient Position: Sitting   BP Method: Medium (Manual)   Pulse: 97   SpO2: 95%   Weight: 127.2 kg (280 lb 6.8 oz)   Height: 5' 9.5" (1.765 m)     Body mass index is 40.82 kg/m².  Physical Exam  Vitals reviewed.   Constitutional:       General: He is not in acute distress.  HENT:      Head: Normocephalic.   Cardiovascular:      Rate and Rhythm: Normal rate.   Pulmonary:      Effort: Pulmonary effort is normal. No respiratory distress.   Skin:     General: Skin is warm.   Neurological:      General: No focal deficit present.      Mental Status: He is alert.   Psychiatric:         Mood and Affect: Mood normal.               Diagnoses and health risks identified today and associated recommendations/orders:    1. Encounter for preventive health examination  Reviewed health maintenance and provided recommendations    Recommend shingrix and tdap    2. Abdominal aortic atherosclerosis  Continue to monitor  Followed by Dr Marcelino  CT abd 7/5/23.      3. PAF (paroxysmal atrial fibrillation)  Continue to monitor  Followed by Dr Marcelino.      4. Bilateral carotid artery stenosis  Continue to monitor  Followed by Dr Marcelino  Carotid US " 6/13/23.      5. Morbid obesity with BMI of 40.0-44.9, adult  Continue to monitor  Followed by Hernando Browne MD   Encourage healthy food chocies.      6. Compression fracture of lumbar vertebra, unspecified lumbar vertebral level, initial encounter  Continue to monitor  Followed by Dr mcclain.      7. Coronary artery disease involving native coronary artery of native heart without angina pectoris  Continue to monitor  Followed by Dr Marcelino.      8. Primary hypertension  Continue to monitor  Followed by Hernando Browne MD .      9. Mixed hyperlipidemia  Continue to monitor  Followed by Hernando Browne MD .      10. Weight loss, abnormal  Continue to monitor  Followed by Hernando Browne MD .        Provided Chinedu with a 5-10 year written screening schedule and personal prevention plan. Recommendations were developed using the USPSTF age appropriate recommendations. Education, counseling, and referrals were provided as needed. After Visit Summary printed and given to patient which includes a list of additional screenings\tests needed.    Follow up in one year    Anastasiya Nolan NP  I offered to discuss advanced care planning, including how to pick a person who would make decisions for you if you were unable to make them for yourself, called a health care power of , and what kind of decisions you might make such as use of life sustaining treatments such as ventilators and tube feeding when faced with a life limiting illness recorded on a living will that they will need to know. (How you want to be cared for as you near the end of your natural life)     X Patient is interested in learning more about how to make advanced directives.  I provided them paperwork and offered to discuss this with them.

## 2023-09-18 ENCOUNTER — OFFICE VISIT (OUTPATIENT)
Dept: PRIMARY CARE CLINIC | Facility: CLINIC | Age: 78
End: 2023-09-18
Payer: MEDICARE

## 2023-09-18 VITALS
HEIGHT: 71 IN | HEART RATE: 84 BPM | BODY MASS INDEX: 40.14 KG/M2 | DIASTOLIC BLOOD PRESSURE: 78 MMHG | WEIGHT: 286.69 LBS | RESPIRATION RATE: 18 BRPM | OXYGEN SATURATION: 97 % | SYSTOLIC BLOOD PRESSURE: 116 MMHG | TEMPERATURE: 98 F

## 2023-09-18 DIAGNOSIS — I10 PRIMARY HYPERTENSION: Primary | ICD-10-CM

## 2023-09-18 DIAGNOSIS — J90 RECURRENT LEFT PLEURAL EFFUSION: Chronic | ICD-10-CM

## 2023-09-18 DIAGNOSIS — S80.812A ABRASION OF ANTERIOR LEFT LOWER LEG, INITIAL ENCOUNTER: ICD-10-CM

## 2023-09-18 PROBLEM — S80.811A INFECTED ABRASION OF RIGHT LEG: Status: ACTIVE | Noted: 2023-09-18

## 2023-09-18 PROBLEM — L08.9 INFECTED ABRASION OF RIGHT LEG: Status: ACTIVE | Noted: 2023-09-18

## 2023-09-18 PROCEDURE — 1126F PR PAIN SEVERITY QUANTIFIED, NO PAIN PRESENT: ICD-10-PCS | Mod: CPTII,S$GLB,, | Performed by: FAMILY MEDICINE

## 2023-09-18 PROCEDURE — 1159F PR MEDICATION LIST DOCUMENTED IN MEDICAL RECORD: ICD-10-PCS | Mod: CPTII,S$GLB,, | Performed by: FAMILY MEDICINE

## 2023-09-18 PROCEDURE — 3074F SYST BP LT 130 MM HG: CPT | Mod: CPTII,S$GLB,, | Performed by: FAMILY MEDICINE

## 2023-09-18 PROCEDURE — 99999 PR PBB SHADOW E&M-EST. PATIENT-LVL V: CPT | Mod: PBBFAC,,, | Performed by: FAMILY MEDICINE

## 2023-09-18 PROCEDURE — 1157F PR ADVANCE CARE PLAN OR EQUIV PRESENT IN MEDICAL RECORD: ICD-10-PCS | Mod: CPTII,S$GLB,, | Performed by: FAMILY MEDICINE

## 2023-09-18 PROCEDURE — 3078F DIAST BP <80 MM HG: CPT | Mod: CPTII,S$GLB,, | Performed by: FAMILY MEDICINE

## 2023-09-18 PROCEDURE — 3288F FALL RISK ASSESSMENT DOCD: CPT | Mod: CPTII,S$GLB,, | Performed by: FAMILY MEDICINE

## 2023-09-18 PROCEDURE — 99214 PR OFFICE/OUTPT VISIT, EST, LEVL IV, 30-39 MIN: ICD-10-PCS | Mod: S$GLB,,, | Performed by: FAMILY MEDICINE

## 2023-09-18 PROCEDURE — 99999 PR PBB SHADOW E&M-EST. PATIENT-LVL V: ICD-10-PCS | Mod: PBBFAC,,, | Performed by: FAMILY MEDICINE

## 2023-09-18 PROCEDURE — 1126F AMNT PAIN NOTED NONE PRSNT: CPT | Mod: CPTII,S$GLB,, | Performed by: FAMILY MEDICINE

## 2023-09-18 PROCEDURE — 3078F PR MOST RECENT DIASTOLIC BLOOD PRESSURE < 80 MM HG: ICD-10-PCS | Mod: CPTII,S$GLB,, | Performed by: FAMILY MEDICINE

## 2023-09-18 PROCEDURE — 3288F PR FALLS RISK ASSESSMENT DOCUMENTED: ICD-10-PCS | Mod: CPTII,S$GLB,, | Performed by: FAMILY MEDICINE

## 2023-09-18 PROCEDURE — 1101F PR PT FALLS ASSESS DOC 0-1 FALLS W/OUT INJ PAST YR: ICD-10-PCS | Mod: CPTII,S$GLB,, | Performed by: FAMILY MEDICINE

## 2023-09-18 PROCEDURE — 3074F PR MOST RECENT SYSTOLIC BLOOD PRESSURE < 130 MM HG: ICD-10-PCS | Mod: CPTII,S$GLB,, | Performed by: FAMILY MEDICINE

## 2023-09-18 PROCEDURE — 99214 OFFICE O/P EST MOD 30 MIN: CPT | Mod: S$GLB,,, | Performed by: FAMILY MEDICINE

## 2023-09-18 PROCEDURE — 1160F RVW MEDS BY RX/DR IN RCRD: CPT | Mod: CPTII,S$GLB,, | Performed by: FAMILY MEDICINE

## 2023-09-18 PROCEDURE — 1159F MED LIST DOCD IN RCRD: CPT | Mod: CPTII,S$GLB,, | Performed by: FAMILY MEDICINE

## 2023-09-18 PROCEDURE — 1157F ADVNC CARE PLAN IN RCRD: CPT | Mod: CPTII,S$GLB,, | Performed by: FAMILY MEDICINE

## 2023-09-18 PROCEDURE — 1160F PR REVIEW ALL MEDS BY PRESCRIBER/CLIN PHARMACIST DOCUMENTED: ICD-10-PCS | Mod: CPTII,S$GLB,, | Performed by: FAMILY MEDICINE

## 2023-09-18 PROCEDURE — 1101F PT FALLS ASSESS-DOCD LE1/YR: CPT | Mod: CPTII,S$GLB,, | Performed by: FAMILY MEDICINE

## 2023-09-18 RX ORDER — MUPIROCIN 20 MG/G
OINTMENT TOPICAL
Qty: 30 G | Refills: 2 | Status: ON HOLD | OUTPATIENT
Start: 2023-09-18 | End: 2023-11-27 | Stop reason: CLARIF

## 2023-09-18 NOTE — ASSESSMENT & PLAN NOTE
He is doing much better.  Denies any significant shortness of breath.  He is progressing with his cardiac rehab.  There is still some diminished breath sounds on the left side but does not appear to be overly symptomatic.  He will follow with his cardiovascular surgeon this week I believe but he will let me know if he has any worsening signs or symptoms.

## 2023-09-18 NOTE — ASSESSMENT & PLAN NOTE
Blood pressure looks good.  He inquired about resuming losartan however given the fact that his blood pressure looks good and he is feeling well will hold off for now and monitor.

## 2023-09-18 NOTE — ASSESSMENT & PLAN NOTE
Several small abrasions to the right shin from his dog.  I do not see any sign of infection.  There is some weeping as a result of his edema.  The wounds were treated today and he will apply Bactroban for the next couple days and watch closely.  He was advised to elevate his legs, consider resuming compression and maintain low-salt intake

## 2023-09-19 ENCOUNTER — OFFICE VISIT (OUTPATIENT)
Dept: VASCULAR SURGERY | Facility: CLINIC | Age: 78
End: 2023-09-19
Payer: MEDICARE

## 2023-09-19 ENCOUNTER — HOSPITAL ENCOUNTER (OUTPATIENT)
Dept: RADIOLOGY | Facility: HOSPITAL | Age: 78
Discharge: HOME | End: 2023-09-19
Attending: THORACIC SURGERY (CARDIOTHORACIC VASCULAR SURGERY)
Payer: MEDICARE

## 2023-09-19 VITALS
RESPIRATION RATE: 18 BRPM | HEIGHT: 71 IN | HEART RATE: 81 BPM | BODY MASS INDEX: 40.05 KG/M2 | WEIGHT: 286.06 LBS | SYSTOLIC BLOOD PRESSURE: 106 MMHG | DIASTOLIC BLOOD PRESSURE: 62 MMHG

## 2023-09-19 DIAGNOSIS — Z95.1 S/P CABG (CORONARY ARTERY BYPASS GRAFT): ICD-10-CM

## 2023-09-19 DIAGNOSIS — J90 PLEURAL EFFUSION: ICD-10-CM

## 2023-09-19 DIAGNOSIS — J90 PLEURAL EFFUSION: Primary | ICD-10-CM

## 2023-09-19 DIAGNOSIS — Z95.1 HX OF CABG: Primary | ICD-10-CM

## 2023-09-19 PROCEDURE — 3074F SYST BP LT 130 MM HG: CPT | Mod: CPTII,S$GLB,, | Performed by: THORACIC SURGERY (CARDIOTHORACIC VASCULAR SURGERY)

## 2023-09-19 PROCEDURE — 71046 XR CHEST PA AND LATERAL: ICD-10-PCS | Mod: 26,,, | Performed by: RADIOLOGY

## 2023-09-19 PROCEDURE — 1101F PR PT FALLS ASSESS DOC 0-1 FALLS W/OUT INJ PAST YR: ICD-10-PCS | Mod: CPTII,S$GLB,, | Performed by: THORACIC SURGERY (CARDIOTHORACIC VASCULAR SURGERY)

## 2023-09-19 PROCEDURE — 1159F MED LIST DOCD IN RCRD: CPT | Mod: CPTII,S$GLB,, | Performed by: THORACIC SURGERY (CARDIOTHORACIC VASCULAR SURGERY)

## 2023-09-19 PROCEDURE — 99999 PR PBB SHADOW E&M-EST. PATIENT-LVL IV: CPT | Mod: PBBFAC,,, | Performed by: THORACIC SURGERY (CARDIOTHORACIC VASCULAR SURGERY)

## 2023-09-19 PROCEDURE — 1126F PR PAIN SEVERITY QUANTIFIED, NO PAIN PRESENT: ICD-10-PCS | Mod: CPTII,S$GLB,, | Performed by: THORACIC SURGERY (CARDIOTHORACIC VASCULAR SURGERY)

## 2023-09-19 PROCEDURE — 1157F ADVNC CARE PLAN IN RCRD: CPT | Mod: CPTII,S$GLB,, | Performed by: THORACIC SURGERY (CARDIOTHORACIC VASCULAR SURGERY)

## 2023-09-19 PROCEDURE — 3288F FALL RISK ASSESSMENT DOCD: CPT | Mod: CPTII,S$GLB,, | Performed by: THORACIC SURGERY (CARDIOTHORACIC VASCULAR SURGERY)

## 2023-09-19 PROCEDURE — 1159F PR MEDICATION LIST DOCUMENTED IN MEDICAL RECORD: ICD-10-PCS | Mod: CPTII,S$GLB,, | Performed by: THORACIC SURGERY (CARDIOTHORACIC VASCULAR SURGERY)

## 2023-09-19 PROCEDURE — 71046 X-RAY EXAM CHEST 2 VIEWS: CPT | Mod: TC,FY,PO

## 2023-09-19 PROCEDURE — 1101F PT FALLS ASSESS-DOCD LE1/YR: CPT | Mod: CPTII,S$GLB,, | Performed by: THORACIC SURGERY (CARDIOTHORACIC VASCULAR SURGERY)

## 2023-09-19 PROCEDURE — 99999 PR PBB SHADOW E&M-EST. PATIENT-LVL IV: ICD-10-PCS | Mod: PBBFAC,,, | Performed by: THORACIC SURGERY (CARDIOTHORACIC VASCULAR SURGERY)

## 2023-09-19 PROCEDURE — 3078F DIAST BP <80 MM HG: CPT | Mod: CPTII,S$GLB,, | Performed by: THORACIC SURGERY (CARDIOTHORACIC VASCULAR SURGERY)

## 2023-09-19 PROCEDURE — 99024 POSTOP FOLLOW-UP VISIT: CPT | Mod: S$GLB,,, | Performed by: THORACIC SURGERY (CARDIOTHORACIC VASCULAR SURGERY)

## 2023-09-19 PROCEDURE — 1157F PR ADVANCE CARE PLAN OR EQUIV PRESENT IN MEDICAL RECORD: ICD-10-PCS | Mod: CPTII,S$GLB,, | Performed by: THORACIC SURGERY (CARDIOTHORACIC VASCULAR SURGERY)

## 2023-09-19 PROCEDURE — 99024 PR POST-OP FOLLOW-UP VISIT: ICD-10-PCS | Mod: S$GLB,,, | Performed by: THORACIC SURGERY (CARDIOTHORACIC VASCULAR SURGERY)

## 2023-09-19 PROCEDURE — 3074F PR MOST RECENT SYSTOLIC BLOOD PRESSURE < 130 MM HG: ICD-10-PCS | Mod: CPTII,S$GLB,, | Performed by: THORACIC SURGERY (CARDIOTHORACIC VASCULAR SURGERY)

## 2023-09-19 PROCEDURE — 3288F PR FALLS RISK ASSESSMENT DOCUMENTED: ICD-10-PCS | Mod: CPTII,S$GLB,, | Performed by: THORACIC SURGERY (CARDIOTHORACIC VASCULAR SURGERY)

## 2023-09-19 PROCEDURE — 1126F AMNT PAIN NOTED NONE PRSNT: CPT | Mod: CPTII,S$GLB,, | Performed by: THORACIC SURGERY (CARDIOTHORACIC VASCULAR SURGERY)

## 2023-09-19 PROCEDURE — 3078F PR MOST RECENT DIASTOLIC BLOOD PRESSURE < 80 MM HG: ICD-10-PCS | Mod: CPTII,S$GLB,, | Performed by: THORACIC SURGERY (CARDIOTHORACIC VASCULAR SURGERY)

## 2023-09-19 PROCEDURE — 71046 X-RAY EXAM CHEST 2 VIEWS: CPT | Mod: 26,,, | Performed by: RADIOLOGY

## 2023-09-19 NOTE — PROGRESS NOTES
"9/19/2023...    The patient is status post CABG x3/ligation of the left atrial appendage/re-exploration for mediastinal bleeding on 06/23/2023.    The patient has been plagued postoperatively with recurrent left pleural effusions requiring repeat thoracentesis.  He has had a deep venous thrombosis with pulmonary embolism and is chronically anticoagulated.    The patient visits the office today accompanied by his daughter.    He looks the best that I have seen him since his surgery.    He states that his activity level is nearly back at baseline.  He denies dyspnea, angina, or lower extremity edema.    On physical exam breath sounds are completely clear on the right.  There is diminished breath sounds at the left base.  The sternotomy wound has healed nicely.  The bone is solid.  He has minimal bilateral lower extremity brawny-type edema.  The EVH sites are healed.  Today, his left calf is dressed.  He suffered a scratch from his dog.    CXR today demonstrates a persistent left pleural effusion-essentially unchanged from the last CXR.    I explained, in detail, the process of "trapped lung" and the need for decortication.  I have encouraged the patient to seek yet another left thoracentesis.    Today, I have instructed him to discontinue Eliquis  We will make a pulmonology referral for a repeat left thoracentesis  I will see him again in 1 month with a pre-clinic CXR      "

## 2023-09-20 PROBLEM — I65.23 BILATERAL CAROTID ARTERY STENOSIS: Status: ACTIVE | Noted: 2023-09-20

## 2023-09-26 ENCOUNTER — OFFICE VISIT (OUTPATIENT)
Dept: CARDIOLOGY | Facility: CLINIC | Age: 78
End: 2023-09-26
Payer: MEDICARE

## 2023-09-26 VITALS
BODY MASS INDEX: 40.15 KG/M2 | HEART RATE: 49 BPM | SYSTOLIC BLOOD PRESSURE: 119 MMHG | DIASTOLIC BLOOD PRESSURE: 67 MMHG | WEIGHT: 286.81 LBS | HEIGHT: 71 IN

## 2023-09-26 DIAGNOSIS — I50.32 CHRONIC HEART FAILURE WITH PRESERVED EJECTION FRACTION (HFPEF): ICD-10-CM

## 2023-09-26 DIAGNOSIS — I70.0 ABDOMINAL AORTIC ATHEROSCLEROSIS: Primary | Chronic | ICD-10-CM

## 2023-09-26 DIAGNOSIS — Z95.1 S/P CABG (CORONARY ARTERY BYPASS GRAFT): ICD-10-CM

## 2023-09-26 DIAGNOSIS — I49.9 IRREGULAR CARDIAC RHYTHM: ICD-10-CM

## 2023-09-26 DIAGNOSIS — I10 PRIMARY HYPERTENSION: ICD-10-CM

## 2023-09-26 DIAGNOSIS — I48.0 PAF (PAROXYSMAL ATRIAL FIBRILLATION): ICD-10-CM

## 2023-09-26 PROCEDURE — 1159F MED LIST DOCD IN RCRD: CPT | Mod: CPTII,S$GLB,, | Performed by: INTERNAL MEDICINE

## 2023-09-26 PROCEDURE — 1160F PR REVIEW ALL MEDS BY PRESCRIBER/CLIN PHARMACIST DOCUMENTED: ICD-10-PCS | Mod: CPTII,S$GLB,, | Performed by: INTERNAL MEDICINE

## 2023-09-26 PROCEDURE — 99214 PR OFFICE/OUTPT VISIT, EST, LEVL IV, 30-39 MIN: ICD-10-PCS | Mod: 25,S$GLB,, | Performed by: INTERNAL MEDICINE

## 2023-09-26 PROCEDURE — 3288F FALL RISK ASSESSMENT DOCD: CPT | Mod: CPTII,S$GLB,, | Performed by: INTERNAL MEDICINE

## 2023-09-26 PROCEDURE — 99214 OFFICE O/P EST MOD 30 MIN: CPT | Mod: 25,S$GLB,, | Performed by: INTERNAL MEDICINE

## 2023-09-26 PROCEDURE — 1101F PR PT FALLS ASSESS DOC 0-1 FALLS W/OUT INJ PAST YR: ICD-10-PCS | Mod: CPTII,S$GLB,, | Performed by: INTERNAL MEDICINE

## 2023-09-26 PROCEDURE — 3078F PR MOST RECENT DIASTOLIC BLOOD PRESSURE < 80 MM HG: ICD-10-PCS | Mod: CPTII,S$GLB,, | Performed by: INTERNAL MEDICINE

## 2023-09-26 PROCEDURE — 3074F SYST BP LT 130 MM HG: CPT | Mod: CPTII,S$GLB,, | Performed by: INTERNAL MEDICINE

## 2023-09-26 PROCEDURE — 3288F PR FALLS RISK ASSESSMENT DOCUMENTED: ICD-10-PCS | Mod: CPTII,S$GLB,, | Performed by: INTERNAL MEDICINE

## 2023-09-26 PROCEDURE — 93010 ELECTROCARDIOGRAM REPORT: CPT | Mod: S$GLB,,, | Performed by: INTERNAL MEDICINE

## 2023-09-26 PROCEDURE — 1126F AMNT PAIN NOTED NONE PRSNT: CPT | Mod: CPTII,S$GLB,, | Performed by: INTERNAL MEDICINE

## 2023-09-26 PROCEDURE — 1159F PR MEDICATION LIST DOCUMENTED IN MEDICAL RECORD: ICD-10-PCS | Mod: CPTII,S$GLB,, | Performed by: INTERNAL MEDICINE

## 2023-09-26 PROCEDURE — 93010 EKG 12-LEAD: ICD-10-PCS | Mod: S$GLB,,, | Performed by: INTERNAL MEDICINE

## 2023-09-26 PROCEDURE — 3078F DIAST BP <80 MM HG: CPT | Mod: CPTII,S$GLB,, | Performed by: INTERNAL MEDICINE

## 2023-09-26 PROCEDURE — 1101F PT FALLS ASSESS-DOCD LE1/YR: CPT | Mod: CPTII,S$GLB,, | Performed by: INTERNAL MEDICINE

## 2023-09-26 PROCEDURE — 1126F PR PAIN SEVERITY QUANTIFIED, NO PAIN PRESENT: ICD-10-PCS | Mod: CPTII,S$GLB,, | Performed by: INTERNAL MEDICINE

## 2023-09-26 PROCEDURE — 99999 PR PBB SHADOW E&M-EST. PATIENT-LVL IV: CPT | Mod: PBBFAC,,, | Performed by: INTERNAL MEDICINE

## 2023-09-26 PROCEDURE — 99999 PR PBB SHADOW E&M-EST. PATIENT-LVL IV: ICD-10-PCS | Mod: PBBFAC,,, | Performed by: INTERNAL MEDICINE

## 2023-09-26 PROCEDURE — 1157F PR ADVANCE CARE PLAN OR EQUIV PRESENT IN MEDICAL RECORD: ICD-10-PCS | Mod: CPTII,S$GLB,, | Performed by: INTERNAL MEDICINE

## 2023-09-26 PROCEDURE — 1160F RVW MEDS BY RX/DR IN RCRD: CPT | Mod: CPTII,S$GLB,, | Performed by: INTERNAL MEDICINE

## 2023-09-26 PROCEDURE — 93005 ELECTROCARDIOGRAM TRACING: CPT | Mod: PO

## 2023-09-26 PROCEDURE — 3074F PR MOST RECENT SYSTOLIC BLOOD PRESSURE < 130 MM HG: ICD-10-PCS | Mod: CPTII,S$GLB,, | Performed by: INTERNAL MEDICINE

## 2023-09-26 PROCEDURE — 1157F ADVNC CARE PLAN IN RCRD: CPT | Mod: CPTII,S$GLB,, | Performed by: INTERNAL MEDICINE

## 2023-09-26 NOTE — PROGRESS NOTES
"Subjective:    Patient ID:  Chinedu Roque is a 77 y.o. male who presents for follow-up of Coronary Artery Disease      Problem List Items Addressed This Visit          Cardiac/Vascular    Abdominal aortic atherosclerosis - Primary (Chronic)    Chronic heart failure with preserved ejection fraction (HFpEF)    Irregular cardiac rhythm    PAF (paroxysmal atrial fibrillation)    Primary hypertension    S/P CABG (coronary artery bypass graft)       HPI    Patient of Dr. Marcelino needing urgent visit    Patient was last seen at [facility] he was admitted for heart failure exacerbation.  Daily weights were discussed.    On assessment today, the patient states that he is retaining fluid.    No chest pain.  Some shortness of breath.    Dry weight - Not sure what weight was when he got out of hospital   WeightThis AM - 214lb/215lb       Objective:     Vitals:    09/26/23 1308   BP: 119/67   BP Location: Right arm   Patient Position: Sitting   BP Method: Large (Automatic)   Pulse: (!) 49   Weight: 130.1 kg (286 lb 13.1 oz)   Height: 5' 10.5" (1.791 m)       BP Readings from Last 5 Encounters:   09/26/23 119/67   09/19/23 106/62   09/18/23 116/78   09/11/23 138/76   08/31/23 132/74        Physical Exam  Constitutional:       Appearance: He is well-developed.   HENT:      Head: Normocephalic and atraumatic.   Neck:      Vascular: No JVD.   Cardiovascular:      Rate and Rhythm: Normal rate and regular rhythm.      Heart sounds: Normal heart sounds. No murmur heard.     No friction rub. No gallop.   Pulmonary:      Effort: Pulmonary effort is normal. No respiratory distress.      Breath sounds: Rales (Bilateral bases) present. No wheezing.   Abdominal:      General: Bowel sounds are normal.      Palpations: Abdomen is soft.      Tenderness: There is no abdominal tenderness. There is no guarding or rebound.   Musculoskeletal:      Cervical back: Normal range of motion and neck supple.      Right lower leg: Edema " present.      Left lower leg: Edema present.   Skin:     General: Skin is warm and dry.   Neurological:      Mental Status: He is alert and oriented to person, place, and time.   Psychiatric:         Behavior: Behavior normal.             Current Outpatient Medications   Medication Instructions    acetaminophen (TYLENOL) 500 mg, Oral, Every 6 hours PRN    apixaban (ELIQUIS) 5 mg, Oral, 2 times daily    aspirin (ECOTRIN) 81 mg, Oral, Daily    atorvastatin (LIPITOR) 20 mg, Oral, Daily    b complex vitamins tablet 1 tablet, Oral, Daily    benzonatate (TESSALON) 100 mg, Oral, 3 times daily PRN    calcium citrate-vitamin D3 315-200 mg (CITRACAL+D) 315 mg-5 mcg (200 unit) per tablet 1 tablet, Oral, Daily    colchicine (gout) (COLCRYS) 0.6 mg, Oral, 2 times daily    finasteride (PROSCAR) 5 mg, Oral, Daily    furosemide (LASIX) 20 mg, Oral, Nightly PRN, Take nightly PRN weight gain of 2 lbs in 24 hours or 3 lbs in 72 hours    furosemide (LASIX) 40 mg, Oral, Daily    gabapentin (NEURONTIN) 300 mg, Oral, 2 times daily    hydrALAZINE (APRESOLINE) 25 mg, Oral, Every 12 hours    mupirocin (BACTROBAN) 2 % ointment Apply small amount each nostril daily x 7 days then 1st 5 days each months    oxyCODONE-acetaminophen (PERCOCET) 5-325 mg per tablet 1 tablet, Oral, Every 6 hours PRN    polyethylene glycol (GLYCOLAX) 17 g, Oral, Daily PRN    potassium chloride SA (K-DUR,KLOR-CON) 20 MEQ tablet 40 mEq, Oral, Daily    senna-docusate 8.6-50 mg (PERICOLACE) 8.6-50 mg per tablet 1 tablet, Oral, 2 times daily    spironolactone (ALDACTONE) 25 mg, Oral, Daily    tamsulosin (FLOMAX) 0.4 mg Cap TAKE 1 CAPSULE BY MOUTH EVERY DAY       Lipid Panel:   Lab Results   Component Value Date    CHOL 159 03/06/2023    HDL 44 03/06/2023    LDLCALC 98.2 03/06/2023    TRIG 84 03/06/2023    CHOLHDL 27.7 03/06/2023       The ASCVD Risk score (Marcus Hook DK, et al., 2019) failed to calculate for the following reasons:    The patient has a prior MI or stroke  diagnosis    All pertinent labs, imaging, and EKGs reviewed.  Patient's most recent EKG tracing was personally interpreted by this provider.    Most Recent EKG Results  Results for orders placed or performed during the hospital encounter of 08/18/23   EKG 12-lead    Collection Time: 08/28/23 11:06 PM    Narrative    Test Reason : R07.89,    Vent. Rate : 074 BPM     Atrial Rate : 074 BPM     P-R Int : 212 ms          QRS Dur : 122 ms      QT Int : 416 ms       P-R-T Axes : 068 -22 040 degrees     QTc Int : 461 ms    Sinus rhythm with sinus arrhythmia with 1st degree A-V block  Cannot rule out Anterior infarct (cited on or before 18-AUG-2023)  Abnormal ECG  When compared with ECG of 18-AUG-2023 12:56,  Premature supraventricular complexes are no longer Present  T wave inversion no longer evident in Lateral leads  Confirmed by Jorgito VEE, Tank CASTLE ([id number]) on 8/29/2023 3:56:50 PM    Referred By: [email or web address]   SELF           Confirmed By:Tank Bull MD       Most Recent Echocardiogram Results  Results for orders placed during the hospital encounter of 07/08/23    Echo    Interpretation Summary  · The left ventricle is normal in size with normal systolic function.  · The estimated ejection fraction is 60%.  · Normal right ventricular size with low normal right ventricular systolic function.  · Mild to moderate tricuspid regurgitation.  · Intermediate central venous pressure (8 mmHg).  · The estimated PA systolic pressure is 46 mmHg.  · There is mild pulmonary hypertension.  · 3 mL of intravenous Optison contrast was used during the study      Most Recent Nuclear Stress Test Results  Results for orders placed during the hospital encounter of 03/22/23    Nuclear Stress - Cardiology Interpreted    Interpretation Summary    The ECG portion of the study is abnormal but not diagnostic.    The test was stopped because the patient experienced A-V block.    2nd degree AV Block confirmed by [facility] Chau PA-C    Resting images  obtained only.  Apical thinning noted.  No stress images.  Abnormal baseline EKG is reported.  Inconclusive study.      Most Recent Cardiac PET Stress Test Results  No results found for this or any previous visit.      Most Recent Cardiovascular Angiogram results  Results for orders placed during the hospital encounter of 06/07/23    Cardiac catheterization    Conclusion    The ejection fraction was calculated to be 65%.    The left ventricular end diastolic pressure was normal.    The pre-procedure left ventricular end diastolic pressure was 14.    The post-procedure left ventricular end diastolic pressure was 14.    The estimated blood loss was none.    High-grade ostial left main, ostial LAD and ostial circumflex disease as described.  Left dominant system.  Refer to CTS for surgical revascularization.  EF normal.    The procedure log was documented by No documenter listed and verified by Edgardo Marcelino MD.    Date: 6/7/2023  Time: 10:58 AM      Other Most Recent Cardiology Results  Results for orders placed in visit on 09/25/23    CARDIAC MONITORING STRIPS        Assessment:       1. Abdominal aortic atherosclerosis    2. Chronic heart failure with preserved ejection fraction (HFpEF)    3. Irregular cardiac rhythm    4. PAF (paroxysmal atrial fibrillation)    5. Primary hypertension    6. S/P CABG (coronary artery bypass graft)         Plan:     Symptoms of volume  BP/Pulse OK today  Most recent echocardiogram reviewed personally   Volume elevated today    Continue aspirin 81 mg PO Daily   Continue Eliquis 5 mg PO BID  Continue atorvastatin 20 mg PO Daily   Continue Lasix 40 mg PO Daily, double dose to 80mg x 3 days then go back to 40mg   Continue hydralazine 25 mg PO BID  Continue spironolactone 25 mg PO Daily  Enroll in [facility] heart failure program     Continue other cardiac medications  Mediterranean Diet/Cardiovascular Exercise Program    Patient queried and all questions were  answered.    F/u in 3-6 months with Dr. Marcelino      Signed:    Kike Chavez MD  9/26/2023 8:09 AM

## 2023-10-09 PROBLEM — K92.2 GIB (GASTROINTESTINAL BLEEDING): Status: RESOLVED | Noted: 2023-07-05 | Resolved: 2023-10-09

## 2023-10-24 ENCOUNTER — HOSPITAL ENCOUNTER (OUTPATIENT)
Dept: RADIOLOGY | Facility: HOSPITAL | Age: 78
Discharge: HOME | End: 2023-10-24
Attending: THORACIC SURGERY (CARDIOTHORACIC VASCULAR SURGERY)
Payer: MEDICARE

## 2023-10-24 ENCOUNTER — OFFICE VISIT (OUTPATIENT)
Dept: VASCULAR SURGERY | Facility: CLINIC | Age: 78
End: 2023-10-24
Payer: MEDICARE

## 2023-10-24 VITALS
SYSTOLIC BLOOD PRESSURE: 129 MMHG | DIASTOLIC BLOOD PRESSURE: 69 MMHG | BODY MASS INDEX: 40.71 KG/M2 | HEART RATE: 96 BPM | WEIGHT: 290.81 LBS | HEIGHT: 71 IN

## 2023-10-24 DIAGNOSIS — Z95.1 S/P CABG (CORONARY ARTERY BYPASS GRAFT): ICD-10-CM

## 2023-10-24 DIAGNOSIS — J90 PLEURAL EFFUSION: ICD-10-CM

## 2023-10-24 DIAGNOSIS — Z95.1 HX OF CABG: Primary | ICD-10-CM

## 2023-10-24 PROCEDURE — 99024 POSTOP FOLLOW-UP VISIT: CPT | Mod: S$GLB,,, | Performed by: THORACIC SURGERY (CARDIOTHORACIC VASCULAR SURGERY)

## 2023-10-24 PROCEDURE — 3078F PR MOST RECENT DIASTOLIC BLOOD PRESSURE < 80 MM HG: ICD-10-PCS | Mod: CPTII,S$GLB,, | Performed by: THORACIC SURGERY (CARDIOTHORACIC VASCULAR SURGERY)

## 2023-10-24 PROCEDURE — 1125F PR PAIN SEVERITY QUANTIFIED, PAIN PRESENT: ICD-10-PCS | Mod: CPTII,S$GLB,, | Performed by: THORACIC SURGERY (CARDIOTHORACIC VASCULAR SURGERY)

## 2023-10-24 PROCEDURE — 99999 PR PBB SHADOW E&M-EST. PATIENT-LVL IV: ICD-10-PCS | Mod: PBBFAC,,, | Performed by: THORACIC SURGERY (CARDIOTHORACIC VASCULAR SURGERY)

## 2023-10-24 PROCEDURE — 71046 XR CHEST PA AND LATERAL: ICD-10-PCS | Mod: 26,,, | Performed by: RADIOLOGY

## 2023-10-24 PROCEDURE — 99024 PR POST-OP FOLLOW-UP VISIT: ICD-10-PCS | Mod: S$GLB,,, | Performed by: THORACIC SURGERY (CARDIOTHORACIC VASCULAR SURGERY)

## 2023-10-24 PROCEDURE — 99999 PR PBB SHADOW E&M-EST. PATIENT-LVL IV: CPT | Mod: PBBFAC,,, | Performed by: THORACIC SURGERY (CARDIOTHORACIC VASCULAR SURGERY)

## 2023-10-24 PROCEDURE — 1101F PT FALLS ASSESS-DOCD LE1/YR: CPT | Mod: CPTII,S$GLB,, | Performed by: THORACIC SURGERY (CARDIOTHORACIC VASCULAR SURGERY)

## 2023-10-24 PROCEDURE — 1159F PR MEDICATION LIST DOCUMENTED IN MEDICAL RECORD: ICD-10-PCS | Mod: CPTII,S$GLB,, | Performed by: THORACIC SURGERY (CARDIOTHORACIC VASCULAR SURGERY)

## 2023-10-24 PROCEDURE — 3074F PR MOST RECENT SYSTOLIC BLOOD PRESSURE < 130 MM HG: ICD-10-PCS | Mod: CPTII,S$GLB,, | Performed by: THORACIC SURGERY (CARDIOTHORACIC VASCULAR SURGERY)

## 2023-10-24 PROCEDURE — 1157F PR ADVANCE CARE PLAN OR EQUIV PRESENT IN MEDICAL RECORD: ICD-10-PCS | Mod: CPTII,S$GLB,, | Performed by: THORACIC SURGERY (CARDIOTHORACIC VASCULAR SURGERY)

## 2023-10-24 PROCEDURE — 1101F PR PT FALLS ASSESS DOC 0-1 FALLS W/OUT INJ PAST YR: ICD-10-PCS | Mod: CPTII,S$GLB,, | Performed by: THORACIC SURGERY (CARDIOTHORACIC VASCULAR SURGERY)

## 2023-10-24 PROCEDURE — 1125F AMNT PAIN NOTED PAIN PRSNT: CPT | Mod: CPTII,S$GLB,, | Performed by: THORACIC SURGERY (CARDIOTHORACIC VASCULAR SURGERY)

## 2023-10-24 PROCEDURE — 71046 X-RAY EXAM CHEST 2 VIEWS: CPT | Mod: 26,,, | Performed by: RADIOLOGY

## 2023-10-24 PROCEDURE — 1159F MED LIST DOCD IN RCRD: CPT | Mod: CPTII,S$GLB,, | Performed by: THORACIC SURGERY (CARDIOTHORACIC VASCULAR SURGERY)

## 2023-10-24 PROCEDURE — 3074F SYST BP LT 130 MM HG: CPT | Mod: CPTII,S$GLB,, | Performed by: THORACIC SURGERY (CARDIOTHORACIC VASCULAR SURGERY)

## 2023-10-24 PROCEDURE — 1157F ADVNC CARE PLAN IN RCRD: CPT | Mod: CPTII,S$GLB,, | Performed by: THORACIC SURGERY (CARDIOTHORACIC VASCULAR SURGERY)

## 2023-10-24 PROCEDURE — 71046 X-RAY EXAM CHEST 2 VIEWS: CPT | Mod: TC,FY,PO

## 2023-10-24 PROCEDURE — 3288F FALL RISK ASSESSMENT DOCD: CPT | Mod: CPTII,S$GLB,, | Performed by: THORACIC SURGERY (CARDIOTHORACIC VASCULAR SURGERY)

## 2023-10-24 PROCEDURE — 3288F PR FALLS RISK ASSESSMENT DOCUMENTED: ICD-10-PCS | Mod: CPTII,S$GLB,, | Performed by: THORACIC SURGERY (CARDIOTHORACIC VASCULAR SURGERY)

## 2023-10-24 PROCEDURE — 3078F DIAST BP <80 MM HG: CPT | Mod: CPTII,S$GLB,, | Performed by: THORACIC SURGERY (CARDIOTHORACIC VASCULAR SURGERY)

## 2023-10-24 RX ORDER — LOSARTAN POTASSIUM 100 MG/1
100 TABLET ORAL
COMMUNITY
Start: 2023-09-04

## 2023-10-24 RX ORDER — INFLUENZA A VIRUS A/VICTORIA/4897/2022 IVR-238 (H1N1) ANTIGEN (FORMALDEHYDE INACTIVATED), INFLUENZA A VIRUS A/DARWIN/6/2021 IVR-227 (H3N2) ANTIGEN (FORMALDEHYDE INACTIVATED), INFLUENZA B VIRUS B/AUSTRIA/1359417/2021 BVR-26 ANTIGEN (FORMALDEHYDE INACTIVATED), INFLUENZA B VIRUS B/PHUKET/3073/2013 BVR-1B ANTIGEN (FORMALDEHYDE INACTIVATED) 15; 15; 15; 15 UG/.5ML; UG/.5ML; UG/.5ML; UG/.5ML
INJECTION, SUSPENSION INTRAMUSCULAR
Status: ON HOLD | COMMUNITY
Start: 2023-09-03 | End: 2023-12-01 | Stop reason: HOSPADM

## 2023-10-24 NOTE — PROGRESS NOTES
10/24/2023...    The patient is status post CABG x3 with ligation of the left atrial appendage 06/23/2023.  He required urgent re-exploration for mediastinal hemorrhage.    Postoperatively, the patient has suffered with recurrent left pleural effusions.  Most recently, 09/28/2023, he underwent thoracentesis with Dr. Mei for 1200 mL.  The follow-up CXR was nearly completely clear!    The patient has also suffered with intermittent, recurrent lower extremity edema.  The edema is nicely responsive to Lasix therapy but was not present preoperatively.    The patient visits the office today accompanied by his daughter.  He looks pretty good! He states that he believes his pleural effusion is returning as he has some exertional dyspnea.    Otherwise, patient has returned to his baseline activity level.  He is enjoying cardiac rehab!    On physical exam, breath sounds are diminished at the left base.  The right lung is clear.  The sternotomy wound has healed completely.  The bone is solid.  I removed a silk suture from the pacing wire site today.  He has minor bilateral lower extremity edema.  The EVH sites are healed.    CXR today demonstrates recurrence of a small to moderate size left pleural effusion.  It is smaller than it was a month ago.    I will, again, refer the patient to Dr. Mei for thoracentesis.  I will see the patient back in the office in 4-6 weeks with a pre-Clinic CXR

## 2023-11-05 ENCOUNTER — PATIENT MESSAGE (OUTPATIENT)
Dept: PRIMARY CARE CLINIC | Facility: CLINIC | Age: 78
End: 2023-11-05
Payer: MEDICARE

## 2023-11-06 ENCOUNTER — PATIENT MESSAGE (OUTPATIENT)
Dept: CARDIOLOGY | Facility: CLINIC | Age: 78
End: 2023-11-06
Payer: MEDICARE

## 2023-11-06 RX ORDER — SPIRONOLACTONE 25 MG/1
25 TABLET ORAL DAILY
Qty: 90 TABLET | Refills: 1 | Status: SHIPPED | OUTPATIENT
Start: 2023-11-06

## 2023-11-06 NOTE — TELEPHONE ENCOUNTER
Dr. Browne    Pt is inquiring about spironlactone. End of July, pt was to stop spironolactone.  But pt continued med.     Pt saw NP on 8/21/20263, he did not re-order but it appeared on pt med list.  On 23/2023, Dr. Marcelino said to continue spironolactone.     Pt stated he never stopped spironolactone and now the pharmacy informed pt that this med was discontinue.    Pt is on lasix. Pt stated he didn't know he was supposed to stop the spironolactone.    Please advise.

## 2023-11-06 NOTE — TELEPHONE ENCOUNTER
Patient should continue spironolactone unless specifically told to stop by cardiology.  I sent in a prescription for him

## 2023-11-06 NOTE — TELEPHONE ENCOUNTER
Pt states that he sees Dr. Marcelino for cardiology.    Please advise about the spironolactone please. Pt has an appt with you on 11/13/2023.

## 2023-11-06 NOTE — TELEPHONE ENCOUNTER
Spoke with pt, explained that Dr. Browne refilled spironolactone. Explained that I sent a message to Dr. Marcelino's office and to make sure before he picks up the medicine and starts taking the medicine.     Pt verbalized understanding. Explained that a message was sent to Dr. Marcelino's office but encouraged pt to call and leave a message as well. Pt verbalized understanding.

## 2023-11-13 ENCOUNTER — OFFICE VISIT (OUTPATIENT)
Dept: CARDIOLOGY | Facility: CLINIC | Age: 78
End: 2023-11-13
Payer: MEDICARE

## 2023-11-13 VITALS
BODY MASS INDEX: 42.71 KG/M2 | HEART RATE: 53 BPM | WEIGHT: 298.31 LBS | HEIGHT: 70 IN | DIASTOLIC BLOOD PRESSURE: 59 MMHG | SYSTOLIC BLOOD PRESSURE: 135 MMHG

## 2023-11-13 DIAGNOSIS — G47.33 OSA ON CPAP: ICD-10-CM

## 2023-11-13 DIAGNOSIS — Z95.1 S/P CABG (CORONARY ARTERY BYPASS GRAFT): ICD-10-CM

## 2023-11-13 DIAGNOSIS — I65.23 BILATERAL CAROTID ARTERY STENOSIS: ICD-10-CM

## 2023-11-13 DIAGNOSIS — E78.2 MIXED HYPERLIPIDEMIA: Primary | Chronic | ICD-10-CM

## 2023-11-13 DIAGNOSIS — I25.10 CORONARY ARTERY DISEASE INVOLVING NATIVE CORONARY ARTERY OF NATIVE HEART WITHOUT ANGINA PECTORIS: ICD-10-CM

## 2023-11-13 DIAGNOSIS — I87.2 VENOUS INSUFFICIENCY OF BOTH LOWER EXTREMITIES: ICD-10-CM

## 2023-11-13 DIAGNOSIS — I48.0 PAF (PAROXYSMAL ATRIAL FIBRILLATION): ICD-10-CM

## 2023-11-13 DIAGNOSIS — I10 PRIMARY HYPERTENSION: ICD-10-CM

## 2023-11-13 DIAGNOSIS — I26.99 ACUTE PULMONARY EMBOLISM, UNSPECIFIED PULMONARY EMBOLISM TYPE, UNSPECIFIED WHETHER ACUTE COR PULMONALE PRESENT: ICD-10-CM

## 2023-11-13 PROCEDURE — 99214 PR OFFICE/OUTPT VISIT, EST, LEVL IV, 30-39 MIN: ICD-10-PCS | Mod: S$GLB,,, | Performed by: INTERNAL MEDICINE

## 2023-11-13 PROCEDURE — 1101F PT FALLS ASSESS-DOCD LE1/YR: CPT | Mod: CPTII,S$GLB,, | Performed by: INTERNAL MEDICINE

## 2023-11-13 PROCEDURE — 1159F MED LIST DOCD IN RCRD: CPT | Mod: CPTII,S$GLB,, | Performed by: INTERNAL MEDICINE

## 2023-11-13 PROCEDURE — 3075F SYST BP GE 130 - 139MM HG: CPT | Mod: CPTII,S$GLB,, | Performed by: INTERNAL MEDICINE

## 2023-11-13 PROCEDURE — 1157F PR ADVANCE CARE PLAN OR EQUIV PRESENT IN MEDICAL RECORD: ICD-10-PCS | Mod: CPTII,S$GLB,, | Performed by: INTERNAL MEDICINE

## 2023-11-13 PROCEDURE — 3078F PR MOST RECENT DIASTOLIC BLOOD PRESSURE < 80 MM HG: ICD-10-PCS | Mod: CPTII,S$GLB,, | Performed by: INTERNAL MEDICINE

## 2023-11-13 PROCEDURE — 3288F FALL RISK ASSESSMENT DOCD: CPT | Mod: CPTII,S$GLB,, | Performed by: INTERNAL MEDICINE

## 2023-11-13 PROCEDURE — 99999 PR PBB SHADOW E&M-EST. PATIENT-LVL III: CPT | Mod: PBBFAC,,, | Performed by: INTERNAL MEDICINE

## 2023-11-13 PROCEDURE — 1159F PR MEDICATION LIST DOCUMENTED IN MEDICAL RECORD: ICD-10-PCS | Mod: CPTII,S$GLB,, | Performed by: INTERNAL MEDICINE

## 2023-11-13 PROCEDURE — 3078F DIAST BP <80 MM HG: CPT | Mod: CPTII,S$GLB,, | Performed by: INTERNAL MEDICINE

## 2023-11-13 PROCEDURE — 1157F ADVNC CARE PLAN IN RCRD: CPT | Mod: CPTII,S$GLB,, | Performed by: INTERNAL MEDICINE

## 2023-11-13 PROCEDURE — 1126F AMNT PAIN NOTED NONE PRSNT: CPT | Mod: CPTII,S$GLB,, | Performed by: INTERNAL MEDICINE

## 2023-11-13 PROCEDURE — 99214 OFFICE O/P EST MOD 30 MIN: CPT | Mod: S$GLB,,, | Performed by: INTERNAL MEDICINE

## 2023-11-13 PROCEDURE — 1101F PR PT FALLS ASSESS DOC 0-1 FALLS W/OUT INJ PAST YR: ICD-10-PCS | Mod: CPTII,S$GLB,, | Performed by: INTERNAL MEDICINE

## 2023-11-13 PROCEDURE — 99999 PR PBB SHADOW E&M-EST. PATIENT-LVL III: ICD-10-PCS | Mod: PBBFAC,,, | Performed by: INTERNAL MEDICINE

## 2023-11-13 PROCEDURE — 3075F PR MOST RECENT SYSTOLIC BLOOD PRESS GE 130-139MM HG: ICD-10-PCS | Mod: CPTII,S$GLB,, | Performed by: INTERNAL MEDICINE

## 2023-11-13 PROCEDURE — 1126F PR PAIN SEVERITY QUANTIFIED, NO PAIN PRESENT: ICD-10-PCS | Mod: CPTII,S$GLB,, | Performed by: INTERNAL MEDICINE

## 2023-11-13 PROCEDURE — 3288F PR FALLS RISK ASSESSMENT DOCUMENTED: ICD-10-PCS | Mod: CPTII,S$GLB,, | Performed by: INTERNAL MEDICINE

## 2023-11-13 NOTE — PROGRESS NOTES
Subjective:    Patient ID:  Chinedu Roque is a 77 y.o. male patient here for evaluation Establish Care      History of Present Illness:  Cardiology follow-up.  CABG June of 2023 with ANISH to LAD, vein graft to obtuse marginal 2 and 3 and left atrial appendage ligation.    Preoperative mediastinal bleeding requiring redo exploration, resolved.  Complicating PAF, complicating DVT PE.  Patient remains on chronic oral anticoagulation with Eliquis 5 b.i.d..     Postop thoracentesis, repeat chest x-ray pending.  No cough.  No fever.  No shortness of breath.  No PND orthopnea.  Persistent lower extremity edema.  Positive weight gain.  Remains on Lasix        Review of patient's allergies indicates:  No Known Allergies    Past Medical History:   Diagnosis Date    Anticoagulant long-term use     ASA 81 mg    Arthritis     cervical    BPH (benign prostatic hyperplasia) 03/02/2012    Chronic left shoulder pain     Compression fracture of L2     DDD (degenerative disc disease), lumbar     HTN (hypertension) 03/02/2012    Hx of colonic polyps 2013    Low back pain     Morbid obesity with BMI of 40.0-44.9, adult 03/18/2014    Seasonal allergies     Sleep apnea     compliant with CPAP    Stroke 03/1986     Past Surgical History:   Procedure Laterality Date    ANGIOGRAM, CORONARY, WITH LEFT HEART CATHETERIZATION N/A 06/07/2023    Procedure: Left Heart Cath;  Surgeon: Edgardo Marcelino MD;  Location: [facility] CATH;  Service: Cardiology;  Laterality: N/A;    APPENDECTOMY      CATARACT EXTRACTION EXTRACAPSULAR W/ INTRAOCULAR LENS IMPLANTATION      COLONOSCOPY N/A 06/06/2022    Procedure: COLONOSCOPY;  Surgeon: Jose Bello MD;  Location: [facility] ENDO;  Service: Endoscopy;  Laterality: N/A;    COLONOSCOPY N/A 7/6/2023    Procedure: COLONOSCOPY;  Surgeon: Sb Spaulding MD;  Location: [facility] ENDO;  Service: Endoscopy;  Laterality: N/A;    CORONARY ANGIOGRAPHY N/A 06/07/2023    Procedure: ANGIOGRAM, CORONARY ARTERY;  Surgeon: Edgardo  CAIO Marcelino MD;  Location: [facility] CATH;  Service: Cardiology;  Laterality: N/A;    CORONARY ARTERY BYPASS GRAFT (CABG) N/A 6/23/2023    Procedure: CORONARY ARTERY BYPASS GRAFT (CABG) X3;  Surgeon: Pricila Clark MD;  Location: [facility] OR;  Service: Cardiothoracic;  Laterality: N/A;    ENDOSCOPIC HARVEST OF VEIN Right 6/23/2023    Procedure: HARVEST-VEIN-ENDOVASCULAR;  Surgeon: Pricila Clark MD;  Location: [facility] OR;  Service: Cardiothoracic;  Laterality: Right;    EPIDURAL BLOCK INJECTION  2015    cervical    EPIDURAL STEROID INJECTION INTO LUMBAR SPINE N/A 06/05/2019    Procedure: Injection-steroid-epidural-lumbar L5/S1 interlaminar;  Surgeon: Riley Segura MD;  Location: [facility] OR;  Service: Pain Management;  Laterality: N/A;    EPIDURAL STEROID INJECTION INTO LUMBAR SPINE N/A 09/13/2019    Procedure: Injection-steroid-epidural-lumbar;  Surgeon: Riley Segura MD;  Location: [facility] OR;  Service: Pain Management;  Laterality: N/A;  L5/S1 interlaminar to the right    EPIDURAL STEROID INJECTION INTO LUMBAR SPINE N/A 06/02/2020    Procedure: Injection-steroid-epidural-lumbar L5/S1 to right;  Surgeon: Riley Segura MD;  Location: [facility] OR;  Service: Pain Management;  Laterality: N/A;    EXCLUSION, LEFT ATRIAL APPENDAGE, OPEN, AS PART OF OPEN CHEST SURGERY N/A 6/23/2023    Procedure: EXCLUSION, LEFT ATRIAL APPENDAGE, OPEN, AS PART OF OPEN CHEST SURGERY;  Surgeon: Pricila Clark MD;  Location: [facility] OR;  Service: Cardiothoracic;  Laterality: N/A;    EXPLORATION OF MEDIASTINUM N/A 6/23/2023    Procedure: EXPLORATION, MEDIASTINUM - MEDIASTINAL RE-EXPLORATION TO CONTROL POST-OP BLEEDING;  Surgeon: Pricila Clark MD;  Location: [facility] OR;  Service: Cardiothoracic;  Laterality: N/A;    Facet Steroid injection       Pain management    HERNIA REPAIR      Ventral    INJECTION OF ANESTHETIC AGENT AROUND MEDIAL BRANCH NERVES INNERVATING LUMBAR FACET JOINT Right 03/21/2023    Procedure: Block-nerve-medial branch-lumbar L3/4, L4/5, L5/S1;   Surgeon: Riley Segura MD;  Location: [facility];  Service: Pain Management;  Laterality: Right;    INSERTION OF DORSAL COLUMN NERVE STIMULATOR FOR TRIAL N/A 02/10/2021    Procedure: INSERTION, NEUROSTIMULATOR, SPINAL CORD, DORSAL COLUMN, FOR TRIAL;  Surgeon: Riley Segura MD;  Location: [facility] OR;  Service: Pain Management;  Laterality: N/A;    INSERTION OF NEUROSTIMULATOR OF DORSAL COLUMN OF SPINAL CORD N/A 03/01/2021    Procedure: INSERTION, NEUROSTIMULATOR, SPINAL CORD, DORSAL COLUMN;  Surgeon: Riley Segura MD;  Location: [facility] OR;  Service: Pain Management;  Laterality: N/A;    KNEE SURGERY      bilateral    RADIOFREQUENCY ABLATION  2015    lumbar nerve    RADIOFREQUENCY ABLATION OF LUMBAR MEDIAL BRANCH NERVE AT SINGLE LEVEL Right 04/11/2019    Procedure: Radiofrequency Ablation, Nerve, Spinal, Lumbar, Medial Branch, L1,2,3,4,5;  Surgeon: Riley Segura MD;  Location: [facility] OR;  Service: Pain Management;  Laterality: Right;    TONSILLECTOMY      TRANSFORAMINAL EPIDURAL INJECTION OF STEROID Right 11/13/2020    Procedure: Injection,steroid,epidural,transforaminal approach, l3/4 and l5/s1;  Surgeon: Riley Segura MD;  Location: [facility] OR;  Service: Pain Management;  Laterality: Right;    TRANSFORAMINAL EPIDURAL INJECTION OF STEROID Left 06/24/2021    Procedure: Injection,steroid,epidural,transforaminal approach L5/S1;  Surgeon: Riley Segura MD;  Location: [facility] OR;  Service: Pain Management;  Laterality: Left;    TRANSFORAMINAL EPIDURAL INJECTION OF STEROID Right 09/22/2022    Procedure: Injection,steroid,epidural,transforaminal approach L5/S1;  Surgeon: Riley Segura MD;  Location: [facility] OR;  Service: Pain Management;  Laterality: Right;    TRANSFORAMINAL EPIDURAL INJECTION OF STEROID Right 10/25/2022    Procedure: Injection,steroid,epidural,transforaminal approach L2/3 and L3/4;  Surgeon: Riley Segura MD;  Location: [facility];  Service: Pain Management;  Laterality: Right;     VERTEBROPLASTY       Social History     Tobacco Use    Smoking status: Former     Current packs/day: 0.00     Average packs/day: 1.5 packs/day for 24.0 years (36.0 ttl pk-yrs)     Types: Cigarettes     Start date: 10/21/1959     Quit date: 3/19/1983     Years since quittin.6     Passive exposure: Past    Smokeless tobacco: Never   Substance Use Topics    Alcohol use: No    Drug use: No        Review of Systems:    As noted in HPI in addition      REVIEW OF SYSTEMS  Review of Systems   Constitutional: Negative for decreased appetite, diaphoresis, night sweats, weight gain and weight loss.   HENT:  Negative for nosebleeds and odynophagia.    Eyes:  Negative for double vision and photophobia.   Cardiovascular:  Positive for leg swelling. Negative for chest pain, claudication, cyanosis, dyspnea on exertion, irregular heartbeat, near-syncope, orthopnea, palpitations, paroxysmal nocturnal dyspnea and syncope.   Respiratory:  Negative for cough, hemoptysis, shortness of breath and wheezing.    Hematologic/Lymphatic: Negative for adenopathy.   Skin:  Negative for flushing, skin cancer and suspicious lesions.   Musculoskeletal:  Negative for gout, myalgias and neck pain.   Gastrointestinal:  Negative for abdominal pain, heartburn, hematemesis and hematochezia.   Genitourinary:  Negative for bladder incontinence, hesitancy and nocturia.   Neurological:  Negative for focal weakness, headaches, light-headedness and paresthesias.   Psychiatric/Behavioral:  Negative for memory loss and substance abuse.               Objective:        Vitals:    23 1420   BP: (!) 135/59   Pulse: (!) 53       Lab Results   Component Value Date    WBC 10.91 2023    HGB 11.0 (L) 2023    HCT 36.0 (L) 2023     (H) 2023    CHOL 159 2023    TRIG 84 2023    HDL 44 2023    ALT 25 10/09/2023    AST 32 10/09/2023     10/09/2023    K 4.4 10/09/2023     10/09/2023    CREATININE 1.13  10/09/2023    BUN 20 10/09/2023    CO2 25 10/09/2023    TSH 1.300 04/23/2016    PSA 4.5 (H) 03/22/2022    INR 1.1 08/04/2023    HGBA1C 5.5 06/19/2023        ECHOCARDIOGRAM RESULTS  Results for orders placed during the hospital encounter of 07/08/23    Echo    Interpretation Summary  · The left ventricle is normal in size with normal systolic function.  · The estimated ejection fraction is 60%.  · Normal right ventricular size with low normal right ventricular systolic function.  · Mild to moderate tricuspid regurgitation.  · Intermediate central venous pressure (8 mmHg).  · The estimated PA systolic pressure is 46 mmHg.  · There is mild pulmonary hypertension.  · 3 mL of intravenous Optison contrast was used during the study        CURRENT/PREVIOUS VISIT EKG  Results for orders placed or performed in visit on 09/26/23   IN OFFICE EKG 12-LEAD (to [place])    Collection Time: 09/26/23 12:11 PM    Narrative    Test Reason : Z00.0    Vent. Rate : 072 BPM     Atrial Rate : 088 BPM     P-R Int : 200 ms          QRS Dur : 126 ms      QT Int : 412 ms       P-R-T Axes : 069 -21 072 degrees     QTc Int : 451 ms    Sinus rhythm with marked sinus arrythmia  Nonspecific intraventricular block  Abnormal ECG  When compared with ECG of 28-AUG-2023 23:06,  Nonspecific T wave abnormality has replaced inverted T waves in Anterior  leads  Confirmed by Kathy VEE, Kike KEARNEY ([id number]) on 9/27/2023 11:33:37 AM    Referred By: VIRIDIANA STERN           Confirmed By:Kike Stern MD     No valid procedures specified.   Results for orders placed during the hospital encounter of 03/22/23    Nuclear Stress - Cardiology Interpreted    Interpretation Summary    The ECG portion of the study is abnormal but not diagnostic.    The test was stopped because the patient experienced A-V block.    2nd degree AV Block confirmed by [facility] Chau PA-C    Resting images obtained only.  Apical thinning noted.  No stress images.  Abnormal baseline EKG is reported.   Inconclusive study.    No valid procedures specified.    PHYSICAL EXAM  CONSTITUTIONAL: Well built, well nourished in no apparent distress  NECK: no carotid bruit, no JVD  LUNGS: CTA  CHEST WALL: no tenderness,  HEART: regular rate and rhythm, S1, S2 normal, no murmur, click, rub or gallop .  Quadrigeminy.  ABDOMEN: soft, non-tender; bowel sounds normal; no masses,  no organomegaly  EXTREMITIES: Extremities normal, no edema, no calf tenderness noted  NEURO: AAO X 3    I HAVE REVIEWED :    The vital signs, nurses notes, and all the pertinent radiology and labs.         Current Outpatient Medications   Medication Instructions    acetaminophen (TYLENOL) 500 mg, Oral, Every 6 hours PRN    apixaban (ELIQUIS) 5 mg, Oral, 2 times daily    aspirin (ECOTRIN) 81 mg, Oral, Daily    atorvastatin (LIPITOR) 20 mg, Oral, Daily    b complex vitamins tablet 1 tablet, Oral, Daily    calcium citrate-vitamin D3 315-200 mg (CITRACAL+D) 315 mg-5 mcg (200 unit) per tablet 1 tablet, Oral, Daily    colchicine (gout) (COLCRYS) 0.6 mg, Oral, 2 times daily    finasteride (PROSCAR) 5 mg, Oral, Daily    FLUAD QUAD 2023-24,65Y UP,,PF, 60 mcg (15 mcg x 4)/0.5 mL Syrg No dose, route, or frequency recorded.    furosemide (LASIX) 20 mg, Oral, Nightly PRN, Take nightly PRN weight gain of 2 lbs in 24 hours or 3 lbs in 72 hours    furosemide (LASIX) 40 mg, Oral, Daily    gabapentin (NEURONTIN) 300 mg, Oral, 2 times daily    hydrALAZINE (APRESOLINE) 25 mg, Oral, Every 12 hours    losartan (COZAAR) 100 mg, Oral    mupirocin (BACTROBAN) 2 % ointment Apply small amount each nostril daily x 7 days then 1st 5 days each months    polyethylene glycol (GLYCOLAX) 17 g, Oral, Daily PRN    potassium chloride SA (K-DUR,KLOR-CON) 20 MEQ tablet 40 mEq, Oral, Daily    senna-docusate 8.6-50 mg (PERICOLACE) 8.6-50 mg per tablet 1 tablet, Oral, 2 times daily    spironolactone (ALDACTONE) 25 mg, Oral, Daily    tamsulosin (FLOMAX) 0.4 mg Cap TAKE 1 CAPSULE BY MOUTH EVERY  DAY          Assessment:   CABG June 2023 with left atrial appendage isolation procedure.  Perioperative mediastinal bleeding requiring redo exploration.  Perioperative complications of PAF, DVT PE.  Remains on chronic oral anticoagulation.  EF 60%.    Pleural effusion, status post thoracentesis.  Repeat chest x-ray pending.  Lab stable.    Hypertension, dyslipidemia.    Plan:   Cardiac exam review systems stable.  Meds reviewed and reconciled.  Continue Lasix.  Loss, diet, exercise.  Labs follow-up primary care.  Four months          No follow-ups on file.

## 2023-11-16 ENCOUNTER — OFFICE VISIT (OUTPATIENT)
Dept: VASCULAR SURGERY | Facility: CLINIC | Age: 78
End: 2023-11-16
Payer: MEDICARE

## 2023-11-16 ENCOUNTER — HOSPITAL ENCOUNTER (OUTPATIENT)
Dept: RADIOLOGY | Facility: HOSPITAL | Age: 78
Discharge: HOME | End: 2023-11-16
Attending: THORACIC SURGERY (CARDIOTHORACIC VASCULAR SURGERY)
Payer: MEDICARE

## 2023-11-16 VITALS
HEART RATE: 93 BPM | DIASTOLIC BLOOD PRESSURE: 70 MMHG | WEIGHT: 292.44 LBS | SYSTOLIC BLOOD PRESSURE: 130 MMHG | HEIGHT: 70 IN | BODY MASS INDEX: 41.87 KG/M2

## 2023-11-16 DIAGNOSIS — J90 PLEURAL EFFUSION: ICD-10-CM

## 2023-11-16 DIAGNOSIS — J90 PLEURAL EFFUSION: Primary | ICD-10-CM

## 2023-11-16 DIAGNOSIS — Z95.1 HX OF CABG: ICD-10-CM

## 2023-11-16 PROCEDURE — 1125F AMNT PAIN NOTED PAIN PRSNT: CPT | Mod: CPTII,S$GLB,, | Performed by: THORACIC SURGERY (CARDIOTHORACIC VASCULAR SURGERY)

## 2023-11-16 PROCEDURE — 1157F PR ADVANCE CARE PLAN OR EQUIV PRESENT IN MEDICAL RECORD: ICD-10-PCS | Mod: CPTII,S$GLB,, | Performed by: THORACIC SURGERY (CARDIOTHORACIC VASCULAR SURGERY)

## 2023-11-16 PROCEDURE — 3075F PR MOST RECENT SYSTOLIC BLOOD PRESS GE 130-139MM HG: ICD-10-PCS | Mod: CPTII,S$GLB,, | Performed by: THORACIC SURGERY (CARDIOTHORACIC VASCULAR SURGERY)

## 2023-11-16 PROCEDURE — 1101F PT FALLS ASSESS-DOCD LE1/YR: CPT | Mod: CPTII,S$GLB,, | Performed by: THORACIC SURGERY (CARDIOTHORACIC VASCULAR SURGERY)

## 2023-11-16 PROCEDURE — 3288F PR FALLS RISK ASSESSMENT DOCUMENTED: ICD-10-PCS | Mod: CPTII,S$GLB,, | Performed by: THORACIC SURGERY (CARDIOTHORACIC VASCULAR SURGERY)

## 2023-11-16 PROCEDURE — 71046 XR CHEST PA AND LATERAL: ICD-10-PCS | Mod: 26,,, | Performed by: RADIOLOGY

## 2023-11-16 PROCEDURE — 99999 PR PBB SHADOW E&M-EST. PATIENT-LVL III: CPT | Mod: PBBFAC,,, | Performed by: THORACIC SURGERY (CARDIOTHORACIC VASCULAR SURGERY)

## 2023-11-16 PROCEDURE — 3075F SYST BP GE 130 - 139MM HG: CPT | Mod: CPTII,S$GLB,, | Performed by: THORACIC SURGERY (CARDIOTHORACIC VASCULAR SURGERY)

## 2023-11-16 PROCEDURE — 71046 X-RAY EXAM CHEST 2 VIEWS: CPT | Mod: 26,,, | Performed by: RADIOLOGY

## 2023-11-16 PROCEDURE — 3078F PR MOST RECENT DIASTOLIC BLOOD PRESSURE < 80 MM HG: ICD-10-PCS | Mod: CPTII,S$GLB,, | Performed by: THORACIC SURGERY (CARDIOTHORACIC VASCULAR SURGERY)

## 2023-11-16 PROCEDURE — 71046 X-RAY EXAM CHEST 2 VIEWS: CPT | Mod: TC,FY,PO

## 2023-11-16 PROCEDURE — 99024 PR POST-OP FOLLOW-UP VISIT: ICD-10-PCS | Mod: S$GLB,,, | Performed by: THORACIC SURGERY (CARDIOTHORACIC VASCULAR SURGERY)

## 2023-11-16 PROCEDURE — 3078F DIAST BP <80 MM HG: CPT | Mod: CPTII,S$GLB,, | Performed by: THORACIC SURGERY (CARDIOTHORACIC VASCULAR SURGERY)

## 2023-11-16 PROCEDURE — 1101F PR PT FALLS ASSESS DOC 0-1 FALLS W/OUT INJ PAST YR: ICD-10-PCS | Mod: CPTII,S$GLB,, | Performed by: THORACIC SURGERY (CARDIOTHORACIC VASCULAR SURGERY)

## 2023-11-16 PROCEDURE — 1159F PR MEDICATION LIST DOCUMENTED IN MEDICAL RECORD: ICD-10-PCS | Mod: CPTII,S$GLB,, | Performed by: THORACIC SURGERY (CARDIOTHORACIC VASCULAR SURGERY)

## 2023-11-16 PROCEDURE — 1159F MED LIST DOCD IN RCRD: CPT | Mod: CPTII,S$GLB,, | Performed by: THORACIC SURGERY (CARDIOTHORACIC VASCULAR SURGERY)

## 2023-11-16 PROCEDURE — 1125F PR PAIN SEVERITY QUANTIFIED, PAIN PRESENT: ICD-10-PCS | Mod: CPTII,S$GLB,, | Performed by: THORACIC SURGERY (CARDIOTHORACIC VASCULAR SURGERY)

## 2023-11-16 PROCEDURE — 1157F ADVNC CARE PLAN IN RCRD: CPT | Mod: CPTII,S$GLB,, | Performed by: THORACIC SURGERY (CARDIOTHORACIC VASCULAR SURGERY)

## 2023-11-16 PROCEDURE — 99999 PR PBB SHADOW E&M-EST. PATIENT-LVL III: ICD-10-PCS | Mod: PBBFAC,,, | Performed by: THORACIC SURGERY (CARDIOTHORACIC VASCULAR SURGERY)

## 2023-11-16 PROCEDURE — 99024 POSTOP FOLLOW-UP VISIT: CPT | Mod: S$GLB,,, | Performed by: THORACIC SURGERY (CARDIOTHORACIC VASCULAR SURGERY)

## 2023-11-16 PROCEDURE — 3288F FALL RISK ASSESSMENT DOCD: CPT | Mod: CPTII,S$GLB,, | Performed by: THORACIC SURGERY (CARDIOTHORACIC VASCULAR SURGERY)

## 2023-11-16 NOTE — PROGRESS NOTES
11/16/2023...    The patient underwent CABG x3 with ligation of the left atrial appendage 06/23/2023.  He developed postoperative bleeding and required re-exploration.    The patient's postoperative course has been prolonged.  He developed pulmonary emboli and is anticoagulated with Eliquis.    He has been plagued with recurrent left pleural effusions and has had several left-sided thoracenteses-the most recent one was 10/30/2023 for 500 mL of serosanguineous fluid.  The follow-up CXR was nearly completely clear.    The patient visits the office today accompanied by his daughter-in-law.     He offers no cardiopulmonary complaints!  He is at his baseline activity level and enjoys his activities of daily living.    On physical exam, breath sounds are somewhat diminished at the left base.  The right lung is completely clear.  The sternotomy wound has healed.  The bone is solid.    CXR today is nearly completely clear-there is a tiny left pleural effusion.    I will see the patient back in the office in 2 months with a pre clinic CXR to follow-up on the recurrent left pleural effusions.

## 2023-11-17 DIAGNOSIS — J90 RECURRENT PLEURAL EFFUSION: Primary | ICD-10-CM

## 2023-11-20 ENCOUNTER — TELEPHONE (OUTPATIENT)
Dept: PRIMARY CARE CLINIC | Facility: CLINIC | Age: 78
End: 2023-11-20

## 2023-11-20 ENCOUNTER — OFFICE VISIT (OUTPATIENT)
Dept: PRIMARY CARE CLINIC | Facility: CLINIC | Age: 78
End: 2023-11-20
Payer: MEDICARE

## 2023-11-20 VITALS
RESPIRATION RATE: 18 BRPM | TEMPERATURE: 98 F | SYSTOLIC BLOOD PRESSURE: 128 MMHG | BODY MASS INDEX: 42.9 KG/M2 | WEIGHT: 299.63 LBS | OXYGEN SATURATION: 95 % | HEIGHT: 70 IN | HEART RATE: 86 BPM | DIASTOLIC BLOOD PRESSURE: 74 MMHG

## 2023-11-20 DIAGNOSIS — G62.9 PERIPHERAL POLYNEUROPATHY: Primary | ICD-10-CM

## 2023-11-20 DIAGNOSIS — J90 RECURRENT LEFT PLEURAL EFFUSION: ICD-10-CM

## 2023-11-20 DIAGNOSIS — M25.561 CHRONIC PAIN OF BOTH KNEES: ICD-10-CM

## 2023-11-20 DIAGNOSIS — I10 PRIMARY HYPERTENSION: ICD-10-CM

## 2023-11-20 DIAGNOSIS — I25.10 CORONARY ARTERY DISEASE INVOLVING NATIVE CORONARY ARTERY OF NATIVE HEART WITHOUT ANGINA PECTORIS: ICD-10-CM

## 2023-11-20 DIAGNOSIS — G89.29 CHRONIC PAIN OF BOTH KNEES: ICD-10-CM

## 2023-11-20 DIAGNOSIS — E53.8 DEFICIENCY OF OTHER SPECIFIED B GROUP VITAMINS: ICD-10-CM

## 2023-11-20 DIAGNOSIS — M25.562 CHRONIC PAIN OF BOTH KNEES: ICD-10-CM

## 2023-11-20 PROBLEM — I26.99 ACUTE PULMONARY EMBOLISM: Status: RESOLVED | Noted: 2023-07-08 | Resolved: 2023-11-20

## 2023-11-20 PROCEDURE — 1160F RVW MEDS BY RX/DR IN RCRD: CPT | Mod: CPTII,S$GLB,, | Performed by: FAMILY MEDICINE

## 2023-11-20 PROCEDURE — 1125F AMNT PAIN NOTED PAIN PRSNT: CPT | Mod: CPTII,S$GLB,, | Performed by: FAMILY MEDICINE

## 2023-11-20 PROCEDURE — 1160F PR REVIEW ALL MEDS BY PRESCRIBER/CLIN PHARMACIST DOCUMENTED: ICD-10-PCS | Mod: CPTII,S$GLB,, | Performed by: FAMILY MEDICINE

## 2023-11-20 PROCEDURE — 1123F PR ADV CARE PLAN DISCUSSED, PLAN OR SURROGATE DOCUMENTED: ICD-10-PCS | Mod: CPTII,S$GLB,, | Performed by: FAMILY MEDICINE

## 2023-11-20 PROCEDURE — 99999 PR PBB SHADOW E&M-EST. PATIENT-LVL V: ICD-10-PCS | Mod: PBBFAC,,, | Performed by: FAMILY MEDICINE

## 2023-11-20 PROCEDURE — 1159F PR MEDICATION LIST DOCUMENTED IN MEDICAL RECORD: ICD-10-PCS | Mod: CPTII,S$GLB,, | Performed by: FAMILY MEDICINE

## 2023-11-20 PROCEDURE — 1101F PT FALLS ASSESS-DOCD LE1/YR: CPT | Mod: CPTII,S$GLB,, | Performed by: FAMILY MEDICINE

## 2023-11-20 PROCEDURE — 1123F ACP DISCUSS/DSCN MKR DOCD: CPT | Mod: CPTII,S$GLB,, | Performed by: FAMILY MEDICINE

## 2023-11-20 PROCEDURE — 3074F SYST BP LT 130 MM HG: CPT | Mod: CPTII,S$GLB,, | Performed by: FAMILY MEDICINE

## 2023-11-20 PROCEDURE — 99215 OFFICE O/P EST HI 40 MIN: CPT | Mod: S$GLB,,, | Performed by: FAMILY MEDICINE

## 2023-11-20 PROCEDURE — 1159F MED LIST DOCD IN RCRD: CPT | Mod: CPTII,S$GLB,, | Performed by: FAMILY MEDICINE

## 2023-11-20 PROCEDURE — 1101F PR PT FALLS ASSESS DOC 0-1 FALLS W/OUT INJ PAST YR: ICD-10-PCS | Mod: CPTII,S$GLB,, | Performed by: FAMILY MEDICINE

## 2023-11-20 PROCEDURE — 1125F PR PAIN SEVERITY QUANTIFIED, PAIN PRESENT: ICD-10-PCS | Mod: CPTII,S$GLB,, | Performed by: FAMILY MEDICINE

## 2023-11-20 PROCEDURE — 3078F PR MOST RECENT DIASTOLIC BLOOD PRESSURE < 80 MM HG: ICD-10-PCS | Mod: CPTII,S$GLB,, | Performed by: FAMILY MEDICINE

## 2023-11-20 PROCEDURE — 99215 PR OFFICE/OUTPT VISIT, EST, LEVL V, 40-54 MIN: ICD-10-PCS | Mod: S$GLB,,, | Performed by: FAMILY MEDICINE

## 2023-11-20 PROCEDURE — 99999 PR PBB SHADOW E&M-EST. PATIENT-LVL V: CPT | Mod: PBBFAC,,, | Performed by: FAMILY MEDICINE

## 2023-11-20 PROCEDURE — 3288F FALL RISK ASSESSMENT DOCD: CPT | Mod: CPTII,S$GLB,, | Performed by: FAMILY MEDICINE

## 2023-11-20 PROCEDURE — 3288F PR FALLS RISK ASSESSMENT DOCUMENTED: ICD-10-PCS | Mod: CPTII,S$GLB,, | Performed by: FAMILY MEDICINE

## 2023-11-20 PROCEDURE — 3074F PR MOST RECENT SYSTOLIC BLOOD PRESSURE < 130 MM HG: ICD-10-PCS | Mod: CPTII,S$GLB,, | Performed by: FAMILY MEDICINE

## 2023-11-20 PROCEDURE — 3078F DIAST BP <80 MM HG: CPT | Mod: CPTII,S$GLB,, | Performed by: FAMILY MEDICINE

## 2023-11-20 RX ORDER — GABAPENTIN 300 MG/1
CAPSULE ORAL
Qty: 90 CAPSULE | Refills: 4 | Status: SHIPPED | OUTPATIENT
Start: 2023-11-20

## 2023-11-20 NOTE — ASSESSMENT & PLAN NOTE
He continues to improve after his surgery.  He is progressing with cardiac rehab.  Shortness of breath has greatly improved and he has not needed the supplemental oxygen and would like to discontinue this.  I agree that it is reasonable to do so.  He will contact the company that supplied this and let them know they will likely then reach out to me for an official order.  Or he can contact us with the name with the company and we can call for him.  He will continuing cardiac rehab and I will see him back in three months.

## 2023-11-20 NOTE — TELEPHONE ENCOUNTER
----- Message from Sabrina Montiel sent at 11/20/2023  4:08 PM CST -----  Regarding: letter  Pt needs a faxed letter sent over to [facility] stating that it is okay to  the tanks from his home.    Thanks   Sabrina

## 2023-11-20 NOTE — PROGRESS NOTES
Primary Care Provider Appointment   [facility]       Patient ID: Chinedu Roque is a 77 y.o. male.    ASSESSMENT/PLAN by Problem List:    1. Peripheral polyneuropathy  Assessment & Plan:  New complaint today, has been present for several months if not longer.  Findings are fairly symmetrical along the bottom of his feet.  He is had some mild impaired glucose but not diabetes.  Will check some additional labs.  For now will increase the evening dose of gabapentin.  We did discuss the possibility of neurology consultation if we can not find any identifiable or treatable cause if he desires.  Although many cases may end up being idiopathic.    Orders:  -     CBC Auto Differential; Future; Expected date: 11/20/2023  -     Vitamin B12; Future; Expected date: 11/20/2023  -     Protein Electrophoresis, Serum; Future; Expected date: 11/20/2023  -     TSH; Future; Expected date: 11/20/2023    2. Primary hypertension  Assessment & Plan:  Stable and satisfactory.  Continue current medications.    Orders:  -     TSH; Future; Expected date: 11/20/2023  -     Uric Acid; Future; Expected date: 11/20/2023    3. Recurrent left pleural effusion    4. Deficiency of other specified B group vitamins  -     Vitamin B12; Future; Expected date: 11/20/2023    5. Chronic pain of both knees  -     Ambulatory referral/consult to Orthopedics; Future; Expected date: 11/27/2023    6. Coronary artery disease involving native coronary artery of native heart without angina pectoris  Assessment & Plan:  He continues to improve after his surgery.  He is progressing with cardiac rehab.  Shortness of breath has greatly improved and he has not needed the supplemental oxygen and would like to discontinue this.  I agree that it is reasonable to do so.  He will contact the company that supplied this and let them know they will likely then reach out to me for an official order.  Or he can contact us with the name  with the company and we can call for him.  He will continuing cardiac rehab and I will see him back in three months.      Other orders  -     gabapentin (NEURONTIN) 300 MG capsule; Take one in am, and two in PM  Dispense: 90 capsule; Refill: 4       He also mentioned stopping the gout medicine.  He is referring to the colchicine which was prescribed after his surgery.  .  He said the cost is just too high.  To his knowledge he has no history of gout and this was prescribed to help with the recurrent effusion.  So I feel that this is fine but will go ahead and check a uric acid level as a precaution    Follow Up:  Three months    Forty-five minutes of total time spent on the encounter, time includes face to face time, and some or all of the following: review of chart, lab, imaging, consultant notes, ER, hospital, documentation, care coordination, etc.    Health Maintenance         Date Due Completion Date    Hepatitis C Screening Never done ---    RSV Vaccine (Age 60+) (1 - 1-dose 60+ series) Never done ---    TETANUS VACCINE 11/24/2022 11/24/2012    COVID-19 Vaccine (4 - 2023-24 season) 09/01/2023 11/16/2021    Shingles Vaccine (3 of 3) 11/29/2023 10/4/2023    Lipid Panel 03/06/2024 3/6/2023            Advance Care Planning     Date: 11/20/2023    Reviewed documents on file.  Conferred with patient and his daughter and they report no changes.    Living Will  Power of   I initiated the process of voluntary advance care planning today and explained the importance of this process to the patient.  I introduced the concept of advance directives to the patient, as well. Then the patient received detailed information about the importance of designating a Health Care Power of  (HCPOA). He was also instructed to communicate with this person about their wishes for future healthcare, should he become sick and lose decision-making capacity. The patient has previously appointed a HCPOA. After our discussion,  "the patient has decided to complete a HCPOA and has appointed his daughter, health care agent:  Daughter's Jade    Information on file under media section/living will/power-of-             Subjective:     Chief Complaint   Patient presents with    Follow-up    Knee Pain    Back Pain    Skin Tags     I have reviewed the information entered by the ancillary staff regarding the chief complaint as well as the related history.    HPI    Patient is a/an 77 y.o.  male     Follow-up, continuing to improve with cardiac rehab overall doing well.  But does complain feet hurt night, numb at times, new over last several months.    Also increasing knee pain now that he is becoming more active.  Would like to consult with Orthopedics    For complete problem list, past medical history, surgical history, social history, etc., see appropriate section in the electronic medical record    Review of Systems   Respiratory: Negative.     Cardiovascular: Negative.    Gastrointestinal: Negative.    Genitourinary: Negative.    Musculoskeletal:  Positive for arthralgias.   Neurological:  Positive for numbness.       Objective     Physical Exam  Constitutional:       Appearance: He is obese. He is not ill-appearing.   HENT:      Head: Normocephalic and atraumatic.   Cardiovascular:      Rate and Rhythm: Normal rate and regular rhythm.      Heart sounds: Normal heart sounds.      Comments: 1+ bilateral ankle edema  Pulmonary:      Effort: No respiratory distress.      Comments: Diminished bilateral breath sounds more so on the left but otherwise this appears improved since last examined  Neurological:      Mental Status: He is alert.      Comments: Ambulatory       Vitals:    11/20/23 1348   BP: 128/74   BP Location: Right arm   Patient Position: Sitting   BP Method: Large (Manual)   Pulse: 86   Resp: 18   Temp: 98.3 °F (36.8 °C)   TempSrc: Oral   SpO2: 95%   Weight: 135.9 kg (299 lb 9.7 oz)   Height: 5' 10" (1.778 m) "           THIS DOCUMENT WAS MADE IN PART WITH VOICE RECOGNITION SOFTWARE.  OCCASIONALLY THIS SOFTWARE WILL MISINTERPRET WORDS OR PHRASES.

## 2023-11-20 NOTE — ASSESSMENT & PLAN NOTE
New complaint today, has been present for several months if not longer.  Findings are fairly symmetrical along the bottom of his feet.  He is had some mild impaired glucose but not diabetes.  Will check some additional labs.  For now will increase the evening dose of gabapentin.  We did discuss the possibility of neurology consultation if we can not find any identifiable or treatable cause if he desires.  Although many cases may end up being idiopathic.

## 2023-11-20 NOTE — TELEPHONE ENCOUNTER
Spoke to pt, he stated that he would like to have oxygen tanks picked up.   He stated he has not used the oxygen in over 3 months.     Dr. Browne  Can you send an order to cancel pt's oxygen at home and so the tanks can be picked up.

## 2023-11-21 ENCOUNTER — TELEPHONE (OUTPATIENT)
Dept: PRIMARY CARE CLINIC | Facility: CLINIC | Age: 78
End: 2023-11-21

## 2023-11-21 DIAGNOSIS — D64.9 ANEMIA, UNSPECIFIED TYPE: Primary | ICD-10-CM

## 2023-11-21 NOTE — TELEPHONE ENCOUNTER
Spoke with pt, discussed your message in its entirety, pt verbalized understanding.   Pt will get labs done at [facility] on or around 12/11.

## 2023-11-21 NOTE — TELEPHONE ENCOUNTER
Please call regarding labs.  I did send a detailed explanation through my chart for him to review.  But I do want to follow-up with the anemia.  Please repeat a CBC and iron studies within a few weeks.

## 2023-11-21 NOTE — TELEPHONE ENCOUNTER
Okay to discontinue home oxygen.  I am not certain how to enter that order in the computer but maybe you can send them this message.

## 2023-11-27 PROBLEM — Z01.810 PREOP CARDIOVASCULAR EXAM: Status: ACTIVE | Noted: 2023-07-05

## 2023-11-27 PROBLEM — K81.0 ACUTE CHOLECYSTITIS: Status: ACTIVE | Noted: 2023-11-27

## 2023-11-27 PROBLEM — J90 PLEURAL EFFUSION ON LEFT: Status: ACTIVE | Noted: 2023-11-27

## 2023-11-27 RX ORDER — TAMSULOSIN HYDROCHLORIDE 0.4 MG/1
CAPSULE ORAL
Qty: 90 CAPSULE | Refills: 3 | Status: SHIPPED | OUTPATIENT
Start: 2023-11-27

## 2023-11-30 DIAGNOSIS — M25.561 CHRONIC PAIN OF BOTH KNEES: Primary | ICD-10-CM

## 2023-11-30 DIAGNOSIS — M25.562 CHRONIC PAIN OF BOTH KNEES: Primary | ICD-10-CM

## 2023-11-30 DIAGNOSIS — G89.29 CHRONIC PAIN OF BOTH KNEES: Primary | ICD-10-CM

## 2023-12-01 ENCOUNTER — TELEPHONE (OUTPATIENT)
Dept: PRIMARY CARE CLINIC | Facility: CLINIC | Age: 78
End: 2023-12-01
Payer: MEDICARE

## 2023-12-01 NOTE — TELEPHONE ENCOUNTER
----- Message from Faith Bolden sent at 12/1/2023 10:11 AM CST -----  Contact: Pt [phone]  Pt is being discharged from [facility] on 12/1/23 Please call to schedule a hospital f/u.    Thank you

## 2023-12-01 NOTE — TELEPHONE ENCOUNTER
Spoke with pt, scheduled for HFU initially on 12/11 and realized that he is unavailable due to a 's luncheon.  Pt scheduled for HFU on 12/12 at 1400.  Pt did not want to be scheduled with Dr. Rausch or another provider as Dr. Tyler will be out of the office next week.

## 2023-12-04 ENCOUNTER — OFFICE VISIT (OUTPATIENT)
Dept: ORTHOPEDICS | Facility: CLINIC | Age: 78
End: 2023-12-04
Payer: MEDICARE

## 2023-12-04 ENCOUNTER — HOSPITAL ENCOUNTER (OUTPATIENT)
Dept: RADIOLOGY | Facility: HOSPITAL | Age: 78
Discharge: HOME | End: 2023-12-04
Attending: ORTHOPAEDIC SURGERY
Payer: MEDICARE

## 2023-12-04 ENCOUNTER — PATIENT MESSAGE (OUTPATIENT)
Dept: CARDIOLOGY | Facility: CLINIC | Age: 78
End: 2023-12-04
Payer: MEDICARE

## 2023-12-04 VITALS — HEIGHT: 70 IN | BODY MASS INDEX: 38.94 KG/M2 | WEIGHT: 272 LBS

## 2023-12-04 DIAGNOSIS — G89.29 CHRONIC PAIN OF BOTH KNEES: ICD-10-CM

## 2023-12-04 DIAGNOSIS — M25.562 CHRONIC PAIN OF BOTH KNEES: ICD-10-CM

## 2023-12-04 DIAGNOSIS — M25.561 CHRONIC PAIN OF BOTH KNEES: ICD-10-CM

## 2023-12-04 DIAGNOSIS — M17.0 OSTEOARTHRITIS OF BOTH KNEES, UNSPECIFIED OSTEOARTHRITIS TYPE: Primary | ICD-10-CM

## 2023-12-04 PROCEDURE — 20610 DRAIN/INJ JOINT/BURSA W/O US: CPT | Mod: 50,S$GLB,, | Performed by: ORTHOPAEDIC SURGERY

## 2023-12-04 PROCEDURE — 3288F PR FALLS RISK ASSESSMENT DOCUMENTED: ICD-10-PCS | Mod: CPTII,S$GLB,, | Performed by: ORTHOPAEDIC SURGERY

## 2023-12-04 PROCEDURE — 1126F PR PAIN SEVERITY QUANTIFIED, NO PAIN PRESENT: ICD-10-PCS | Mod: CPTII,S$GLB,, | Performed by: ORTHOPAEDIC SURGERY

## 2023-12-04 PROCEDURE — 99204 OFFICE O/P NEW MOD 45 MIN: CPT | Mod: 25,S$GLB,, | Performed by: ORTHOPAEDIC SURGERY

## 2023-12-04 PROCEDURE — 1157F PR ADVANCE CARE PLAN OR EQUIV PRESENT IN MEDICAL RECORD: ICD-10-PCS | Mod: CPTII,S$GLB,, | Performed by: ORTHOPAEDIC SURGERY

## 2023-12-04 PROCEDURE — 1111F DSCHRG MED/CURRENT MED MERGE: CPT | Mod: CPTII,S$GLB,, | Performed by: ORTHOPAEDIC SURGERY

## 2023-12-04 PROCEDURE — 1101F PR PT FALLS ASSESS DOC 0-1 FALLS W/OUT INJ PAST YR: ICD-10-PCS | Mod: CPTII,S$GLB,, | Performed by: ORTHOPAEDIC SURGERY

## 2023-12-04 PROCEDURE — 99999 PR PBB SHADOW E&M-EST. PATIENT-LVL III: ICD-10-PCS | Mod: PBBFAC,,, | Performed by: ORTHOPAEDIC SURGERY

## 2023-12-04 PROCEDURE — 99204 PR OFFICE/OUTPT VISIT, NEW, LEVL IV, 45-59 MIN: ICD-10-PCS | Mod: 25,S$GLB,, | Performed by: ORTHOPAEDIC SURGERY

## 2023-12-04 PROCEDURE — 1157F ADVNC CARE PLAN IN RCRD: CPT | Mod: CPTII,S$GLB,, | Performed by: ORTHOPAEDIC SURGERY

## 2023-12-04 PROCEDURE — 73562 X-RAY EXAM OF KNEE 3: CPT | Mod: TC,50,PO

## 2023-12-04 PROCEDURE — 1159F MED LIST DOCD IN RCRD: CPT | Mod: CPTII,S$GLB,, | Performed by: ORTHOPAEDIC SURGERY

## 2023-12-04 PROCEDURE — 1126F AMNT PAIN NOTED NONE PRSNT: CPT | Mod: CPTII,S$GLB,, | Performed by: ORTHOPAEDIC SURGERY

## 2023-12-04 PROCEDURE — 73562 X-RAY EXAM OF KNEE 3: CPT | Mod: 26,50,, | Performed by: RADIOLOGY

## 2023-12-04 PROCEDURE — 3288F FALL RISK ASSESSMENT DOCD: CPT | Mod: CPTII,S$GLB,, | Performed by: ORTHOPAEDIC SURGERY

## 2023-12-04 PROCEDURE — 20610 LARGE JOINT ASPIRATION/INJECTION: BILATERAL KNEE: ICD-10-PCS | Mod: 50,S$GLB,, | Performed by: ORTHOPAEDIC SURGERY

## 2023-12-04 PROCEDURE — 1101F PT FALLS ASSESS-DOCD LE1/YR: CPT | Mod: CPTII,S$GLB,, | Performed by: ORTHOPAEDIC SURGERY

## 2023-12-04 PROCEDURE — 1111F PR DISCHARGE MEDS RECONCILED W/ CURRENT OUTPATIENT MED LIST: ICD-10-PCS | Mod: CPTII,S$GLB,, | Performed by: ORTHOPAEDIC SURGERY

## 2023-12-04 PROCEDURE — 1159F PR MEDICATION LIST DOCUMENTED IN MEDICAL RECORD: ICD-10-PCS | Mod: CPTII,S$GLB,, | Performed by: ORTHOPAEDIC SURGERY

## 2023-12-04 PROCEDURE — 99999 PR PBB SHADOW E&M-EST. PATIENT-LVL III: CPT | Mod: PBBFAC,,, | Performed by: ORTHOPAEDIC SURGERY

## 2023-12-04 PROCEDURE — 73562 XR KNEE ORTHO BILAT: ICD-10-PCS | Mod: 26,50,, | Performed by: RADIOLOGY

## 2023-12-04 RX ORDER — TRIAMCINOLONE ACETONIDE 40 MG/ML
40 INJECTION, SUSPENSION INTRA-ARTICULAR; INTRAMUSCULAR
Status: DISCONTINUED | OUTPATIENT
Start: 2023-12-04 | End: 2023-12-04 | Stop reason: HOSPADM

## 2023-12-04 RX ADMIN — TRIAMCINOLONE ACETONIDE 40 MG: 40 INJECTION, SUSPENSION INTRA-ARTICULAR; INTRAMUSCULAR at 08:12

## 2023-12-04 NOTE — PROCEDURES
Large Joint Aspiration/Injection: bilateral knee    Date/Time: 12/4/2023 8:30 AM    Performed by: Michelet Covarrubias MD  Authorized by: Michelet Covarrubias MD    Consent Done?:  Yes (Verbal)  Timeout: prior to procedure the correct patient, procedure, and site was verified    Prep: patient was prepped and draped in usual sterile fashion      Details:  Needle Size:  21 G  Approach:  Anterolateral  Location:  Knee  Laterality:  Bilateral  Site:  Bilateral knee  Medications (Right):  40 mg triamcinolone acetonide 40 mg/mL  Medications (Left):  40 mg triamcinolone acetonide 40 mg/mL  Patient tolerance:  Patient tolerated the procedure well with no immediate complications

## 2023-12-04 NOTE — PROGRESS NOTES
77 years old bilateral knee pain for several years time worse with activity aching pain 3 on good days 5 on bad days had injections years ago that provided relief requesting injection today into his knees    Exam shows no signs infection does have tenderness the joint line     X-rays show bone-on-bone arthritis    Assessment:  Bilateral knee arthrosis     Plan:  Bilateral knee Kenalog injections, encourage strengthening and weight loss over time, follow-up as needed

## 2023-12-12 ENCOUNTER — OFFICE VISIT (OUTPATIENT)
Dept: PRIMARY CARE CLINIC | Facility: CLINIC | Age: 78
End: 2023-12-12
Payer: MEDICARE

## 2023-12-12 VITALS
DIASTOLIC BLOOD PRESSURE: 64 MMHG | RESPIRATION RATE: 18 BRPM | BODY MASS INDEX: 41.97 KG/M2 | SYSTOLIC BLOOD PRESSURE: 130 MMHG | TEMPERATURE: 98 F | WEIGHT: 293.19 LBS | OXYGEN SATURATION: 98 % | HEIGHT: 70 IN

## 2023-12-12 DIAGNOSIS — D50.9 IRON DEFICIENCY ANEMIA, UNSPECIFIED IRON DEFICIENCY ANEMIA TYPE: Primary | ICD-10-CM

## 2023-12-12 PROCEDURE — 1160F RVW MEDS BY RX/DR IN RCRD: CPT | Mod: CPTII,S$GLB,, | Performed by: FAMILY MEDICINE

## 2023-12-12 PROCEDURE — 99999 PR PBB SHADOW E&M-EST. PATIENT-LVL V: ICD-10-PCS | Mod: PBBFAC,,, | Performed by: FAMILY MEDICINE

## 2023-12-12 PROCEDURE — 1101F PR PT FALLS ASSESS DOC 0-1 FALLS W/OUT INJ PAST YR: ICD-10-PCS | Mod: CPTII,S$GLB,, | Performed by: FAMILY MEDICINE

## 2023-12-12 PROCEDURE — 1159F PR MEDICATION LIST DOCUMENTED IN MEDICAL RECORD: ICD-10-PCS | Mod: CPTII,S$GLB,, | Performed by: FAMILY MEDICINE

## 2023-12-12 PROCEDURE — 1111F PR DISCHARGE MEDS RECONCILED W/ CURRENT OUTPATIENT MED LIST: ICD-10-PCS | Mod: CPTII,S$GLB,, | Performed by: FAMILY MEDICINE

## 2023-12-12 PROCEDURE — 3075F SYST BP GE 130 - 139MM HG: CPT | Mod: CPTII,S$GLB,, | Performed by: FAMILY MEDICINE

## 2023-12-12 PROCEDURE — 1157F ADVNC CARE PLAN IN RCRD: CPT | Mod: CPTII,S$GLB,, | Performed by: FAMILY MEDICINE

## 2023-12-12 PROCEDURE — 1157F PR ADVANCE CARE PLAN OR EQUIV PRESENT IN MEDICAL RECORD: ICD-10-PCS | Mod: CPTII,S$GLB,, | Performed by: FAMILY MEDICINE

## 2023-12-12 PROCEDURE — 3078F PR MOST RECENT DIASTOLIC BLOOD PRESSURE < 80 MM HG: ICD-10-PCS | Mod: CPTII,S$GLB,, | Performed by: FAMILY MEDICINE

## 2023-12-12 PROCEDURE — 1111F DSCHRG MED/CURRENT MED MERGE: CPT | Mod: CPTII,S$GLB,, | Performed by: FAMILY MEDICINE

## 2023-12-12 PROCEDURE — 99214 OFFICE O/P EST MOD 30 MIN: CPT | Mod: S$GLB,,, | Performed by: FAMILY MEDICINE

## 2023-12-12 PROCEDURE — 3078F DIAST BP <80 MM HG: CPT | Mod: CPTII,S$GLB,, | Performed by: FAMILY MEDICINE

## 2023-12-12 PROCEDURE — 3288F PR FALLS RISK ASSESSMENT DOCUMENTED: ICD-10-PCS | Mod: CPTII,S$GLB,, | Performed by: FAMILY MEDICINE

## 2023-12-12 PROCEDURE — 1126F PR PAIN SEVERITY QUANTIFIED, NO PAIN PRESENT: ICD-10-PCS | Mod: CPTII,S$GLB,, | Performed by: FAMILY MEDICINE

## 2023-12-12 PROCEDURE — 99999 PR PBB SHADOW E&M-EST. PATIENT-LVL V: CPT | Mod: PBBFAC,,, | Performed by: FAMILY MEDICINE

## 2023-12-12 PROCEDURE — 3075F PR MOST RECENT SYSTOLIC BLOOD PRESS GE 130-139MM HG: ICD-10-PCS | Mod: CPTII,S$GLB,, | Performed by: FAMILY MEDICINE

## 2023-12-12 PROCEDURE — 1126F AMNT PAIN NOTED NONE PRSNT: CPT | Mod: CPTII,S$GLB,, | Performed by: FAMILY MEDICINE

## 2023-12-12 PROCEDURE — 3288F FALL RISK ASSESSMENT DOCD: CPT | Mod: CPTII,S$GLB,, | Performed by: FAMILY MEDICINE

## 2023-12-12 PROCEDURE — 99214 PR OFFICE/OUTPT VISIT, EST, LEVL IV, 30-39 MIN: ICD-10-PCS | Mod: S$GLB,,, | Performed by: FAMILY MEDICINE

## 2023-12-12 PROCEDURE — 1159F MED LIST DOCD IN RCRD: CPT | Mod: CPTII,S$GLB,, | Performed by: FAMILY MEDICINE

## 2023-12-12 PROCEDURE — 1101F PT FALLS ASSESS-DOCD LE1/YR: CPT | Mod: CPTII,S$GLB,, | Performed by: FAMILY MEDICINE

## 2023-12-12 PROCEDURE — 1160F PR REVIEW ALL MEDS BY PRESCRIBER/CLIN PHARMACIST DOCUMENTED: ICD-10-PCS | Mod: CPTII,S$GLB,, | Performed by: FAMILY MEDICINE

## 2023-12-12 RX ORDER — NYSTATIN 100000 U/G
CREAM TOPICAL 2 TIMES DAILY
Qty: 60 G | Refills: 1 | Status: SHIPPED | OUTPATIENT
Start: 2023-12-12

## 2023-12-12 NOTE — PROGRESS NOTES
Primary Care Provider Appointment   [facility]       Patient ID: Chinedu Roque is a 77 y.o. male.    ASSESSMENT/PLAN by Problem List:    1. Iron deficiency anemia, unspecified iron deficiency anemia type  -     CBC Auto Differential; Future; Expected date: 12/12/2023  -     Ferritin; Future; Expected date: 12/12/2023  -     Iron and TIBC; Future; Expected date: 12/12/2023    Other orders  -     nystatin (MYCOSTATIN) cream; Apply topically 2 (two) times daily.  Dispense: 60 g; Refill: 1       Doing well after recent hospitalization and cholecystectomy.  There is a mild iron-deficiency anemia, although the hemoglobin has improved.  Will start iron replacement slowly initially ferrous sulfate 325 mg every other day, if tolerated increase to once a day.  Discussed the appropriate way to take medication.  Watch for constipation.  If any difficulty he will let me know.  Repeat labs in 4-6 weeks.      Also there is a mildly erythematous rash near the umbilicus, suspect cutaneous candidiasis, will start with nystatin however if there is any worsening he will let me know.  His other surgical wounds look good.    Follow Up:  Four weeks      Subjective:     Chief Complaint   Patient presents with    Follow-up     I have reviewed the information entered by the ancillary staff regarding the chief complaint as well as the related history.    Follow-up  Associated symptoms include abdominal pain (Mild right-sided abdominal tenderness but improving) and arthralgias. Pertinent negatives include no chills, fever, nausea or vomiting.       Patient is a/an 77 y.o.  male     Hospital follow-up.  Was admitted with cholecystitis, status post laparoscopic cholecystectomy, doing well.  He is eating well.  He denies any diarrhea or constipation.  No fever or chills.  Drains have been removed, discharge from surgery clinic.    For complete problem list, past medical history, surgical history, social  "history, etc., see appropriate section in the electronic medical record    Review of Systems   Constitutional:  Positive for activity change. Negative for chills and fever.   HENT: Negative.     Respiratory: Negative.     Cardiovascular: Negative.    Gastrointestinal:  Positive for abdominal pain (Mild right-sided abdominal tenderness but improving). Negative for blood in stool, constipation, diarrhea, nausea and vomiting.   Genitourinary: Negative.    Musculoskeletal:  Positive for arthralgias and back pain.       Objective     Physical Exam  Constitutional:       Appearance: He is obese. He is not ill-appearing.   HENT:      Head: Normocephalic and atraumatic.   Cardiovascular:      Rate and Rhythm: Normal rate and regular rhythm.      Heart sounds: Normal heart sounds.      Comments: 1+ bilateral ankle edema  Pulmonary:      Effort: No respiratory distress.      Comments: Diminished breath sounds left base, scattered crackles on the left side  Abdominal:      Comments: Most of the surgical wounds are scabbed and healing.  One in the right upper quadrant there is still mild opening of the skin with small amount of clear/bloody drainage.  The wound appears to be healing and is superficial.  The surgical wound near the umbilicus is scabbed over and healing there is some mild surrounding light erythema in the folds more suggestive of Candida infection than cellulitis   Neurological:      Mental Status: He is alert.      Comments: Ambulatory       Vitals:    12/12/23 1357   BP: 130/64   BP Location: Left arm   Patient Position: Sitting   BP Method: Large (Manual)   Resp: 18   Temp: 98 °F (36.7 °C)   TempSrc: Oral   SpO2: 98%   Weight: 133 kg (293 lb 3.4 oz)   Height: 5' 10" (1.778 m)           THIS DOCUMENT WAS MADE IN PART WITH VOICE RECOGNITION SOFTWARE.  OCCASIONALLY THIS SOFTWARE WILL MISINTERPRET WORDS OR PHRASES.    "

## 2024-01-10 ENCOUNTER — TELEPHONE (OUTPATIENT)
Dept: CARDIOLOGY | Facility: CLINIC | Age: 79
End: 2024-01-10
Payer: MEDICARE

## 2024-01-10 NOTE — TELEPHONE ENCOUNTER
[facility] seeking clearance for dental procedure.   Pt taking Eliquis and Asprin 81.  Please advise of any special directions as far as holding of medications prior to procedure.

## 2024-01-17 ENCOUNTER — TELEPHONE (OUTPATIENT)
Dept: VASCULAR SURGERY | Facility: CLINIC | Age: 79
End: 2024-01-17
Payer: MEDICARE

## 2024-01-17 ENCOUNTER — OFFICE VISIT (OUTPATIENT)
Dept: PRIMARY CARE CLINIC | Facility: CLINIC | Age: 79
End: 2024-01-17
Payer: MEDICARE

## 2024-01-17 VITALS
WEIGHT: 287.06 LBS | BODY MASS INDEX: 41.1 KG/M2 | RESPIRATION RATE: 18 BRPM | TEMPERATURE: 98 F | DIASTOLIC BLOOD PRESSURE: 72 MMHG | HEART RATE: 90 BPM | SYSTOLIC BLOOD PRESSURE: 136 MMHG | HEIGHT: 70 IN | OXYGEN SATURATION: 97 %

## 2024-01-17 DIAGNOSIS — I48.0 PAF (PAROXYSMAL ATRIAL FIBRILLATION): ICD-10-CM

## 2024-01-17 DIAGNOSIS — Z12.5 PROSTATE CANCER SCREENING: ICD-10-CM

## 2024-01-17 DIAGNOSIS — I70.0 ABDOMINAL AORTIC ATHEROSCLEROSIS: ICD-10-CM

## 2024-01-17 DIAGNOSIS — R20.2 NUMBNESS AND TINGLING IN BOTH HANDS: ICD-10-CM

## 2024-01-17 DIAGNOSIS — D50.9 IRON DEFICIENCY ANEMIA, UNSPECIFIED IRON DEFICIENCY ANEMIA TYPE: Primary | ICD-10-CM

## 2024-01-17 DIAGNOSIS — E66.01 MORBID OBESITY WITH BMI OF 40.0-44.9, ADULT: ICD-10-CM

## 2024-01-17 DIAGNOSIS — I25.118 CORONARY ARTERY DISEASE OF NATIVE ARTERY OF NATIVE HEART WITH STABLE ANGINA PECTORIS: ICD-10-CM

## 2024-01-17 DIAGNOSIS — R20.0 NUMBNESS AND TINGLING IN BOTH HANDS: ICD-10-CM

## 2024-01-17 DIAGNOSIS — I50.32 CHRONIC HEART FAILURE WITH PRESERVED EJECTION FRACTION (HFPEF): ICD-10-CM

## 2024-01-17 PROCEDURE — 99214 OFFICE O/P EST MOD 30 MIN: CPT | Mod: S$GLB,,, | Performed by: FAMILY MEDICINE

## 2024-01-17 PROCEDURE — 99999 PR PBB SHADOW E&M-EST. PATIENT-LVL V: CPT | Mod: PBBFAC,,, | Performed by: FAMILY MEDICINE

## 2024-01-17 NOTE — ASSESSMENT & PLAN NOTE
His weight has been stable.  He is following a low-sodium diet.  He does remain on furosemide.  He should still continue to limit sodium, monitor daily weights and report any change in symptoms.   Postoperative  Instructions:      Weightbearing or Lifting: You  are  not  allowed  to  lift  any  weight  or  bear  any  weight  on  the  surgical  extremity  until  cleared  by  your  surgeon. Dressing  instructions:    Keep  your  dressing  and/or  splint  clean  and  dry  at  all  times. It  will  be  removed  at  your  first  post-operative  appointment. Your  stitches  will  be  removed  at  this  visit. Showering  Instructions:  May  shower  But keep surgical dressing clean and dry until removed as explained above. Pain  Control:  - You  have  been  given  a  prescription  to  be  taken  as  directed  for  post-operative  pain  control. In  addition,  elevate  the  operative  extremity  above  the  heart  at  all  times  to  prevent  swelling  and  throbbing  pain. - If you develop constipation while taking narcotic pain medications (Norco, Hydrocodone, Percocet, Oxycodone, Dilaudid, Hydromorphone) take  over-the-counter  Colace,  100mg  by  mouth  twice  a  Day. - Nausea  is  a  common  side  effect  of  many  pain  medications. You  will  want  to  eat something  before  taking  your  pain  medicine  to  help  prevent  Nausea. - If  you  are  taking  a  prescription  pain  medication  that  contains  acetaminophen,  we  recommend  that  you  do  not  take  additional  over  the  counter  acetaminophen  (Tylenol®). Other  pain  relieving  options:   - Using  a  cold  pack  to  ice  the  affected  area  a  few  times  a  day  (15  to  20  minutes  at  a  time)  can  help  to  relieve  pain,  reduce  swelling  and  bruising.      - Elevation  of  the  affected  area  can  also  help  to  reduce  pain  and  swelling. Did  you  receive  a  nerve  Block? A  nerve  block  can  provide  pain  relief  for  one  hour  to  two  days  after  your  surgery.   As  long  as  the  nerve  block  is  working,  you  will  experience  little  or  no  sensation  in  the  area  the surgeon  operated  on. As  the  nerve  block  wears  off,  you  will  begin  to  experience  pain  or  discomfort. It  is  very  important  that  you  begin  taking  your  prescribed  pain  medication  before  the  nerve  block  fully  wears  off. The first sign that the nerve block is wearing off is tingling in your fingers. Treating  your  pain  at  the  first  sign  of  the  block  wearing  off  will  ensure  your  pain  is  better  controlled  and  more  tolerable  when  full-sensation  returns. Do  not  wait  until  the  pain  is  intolerable,  as  the  medicine  will  be  less  effective. It  is  better  to  treat  pain  in  advance  than  to  try  and  catch  up. General  Anesthesia or Sedation:      If  you  did  not  receive  a  nerve  block  during  your  surgery,  you  will  need  to  start  taking  your  pain  medication  shortly  after  your  surgery  and  should  continue  to  do  so  as  prescribed  by  your  surgeon. Please  call  [phone]  with any concern and ask to speak with Yessica Sánchez. Concerning problems include:      -  Excessive  redness  of  the  incisions      -  Drainage  for  more  than  2  Days after surgery or any foul smelling drainage  -  Fever  of  more  than  101.5  F      Please  call  [phone]  if  you  do  not  receive  or  are  unsure  of  your  first  follow-up  appointment. You  should  see  the  doctor  10-14  days  after  your  Surgery. Thank you for choosing me and [address] [place] [address] for your care. I will go above and beyond to ensure you receive the best care possible. Navid White MD, PhD      ACTIVITY  · As tolerated and as directed by your doctor. · Bathe or shower as directed by your doctor. DIET  · Clear liquids until no nausea or vomiting; then light diet for the first day. · Advance to regular diet on second day, unless your doctor orders otherwise. · If nausea and vomiting continues, call your doctor. After general anesthesia or intravenous sedation, for 24 hours or while taking prescription Narcotics:  · Limit your activities  · Do not drive and operate hazardous machinery  · Do not make important personal or business decisions  · Do  not drink alcoholic beverages  · If you have not urinated within 8 hours after discharge, please contact your surgeon on call. *  Please give a list of your current medications to your Primary Care Provider. *  Please update this list whenever your medications are discontinued, doses are      changed, or new medications (including over-the-counter products) are added. *  Please carry medication information at all times in case of emergency situations. These are general instructions for a healthy lifestyle:    No smoking/ No tobacco products/ Avoid exposure to second hand smoke    Surgeon General's Warning:  Quitting smoking now greatly reduces serious risk to your health. Obesity, smoking, and sedentary lifestyle greatly increases your risk for illness    A healthy diet, regular physical exercise & weight monitoring are important for maintaining a healthy lifestyle    You may be retaining fluid if you have a history of heart failure or if you experience any of the following symptoms:  Weight gain of 3 pounds or more overnight or 5 pounds in a week, increased swelling in our hands or feet or shortness of breath while lying flat in bed. Please call your doctor as soon as you notice any of these symptoms; do not wait until your next office visit. Recognize signs and symptoms of STROKE:    F-face looks uneven    A-arms unable to move or move unevenly    S-speech slurred or non-existent    T-time-call 911 as soon as signs and symptoms begin-DO NOT go       Back to bed or wait to see if you get better-TIME IS BRAIN.

## 2024-01-17 NOTE — TELEPHONE ENCOUNTER
Spoke w/ pt to confirm appt for tomorrow and inform of new location @ [address] [facility] [address] Pt verbalized understanding.

## 2024-01-17 NOTE — ASSESSMENT & PLAN NOTE
He also reports numbness of both 5th digits of the hand.  Typically with activity or using his hands.  He states after his surgery was involving most of both hands but is gradually improving.  On exam he did not have any worrisome neurological deficits.  We did discuss possibilities.  Including referral to Neurology, EMG, etc..  Right now it has not causing any limitation just some minor annoyance.  Although if it worsens or if he changes his mind I would recommend neurology referral.

## 2024-01-17 NOTE — PROGRESS NOTES
CARDIOTHORACIC SURGERY    Subjective:     Chief Complaint  Follow up of recurrent pleural effusions     HPI  Chinedu Roque is a 78 y.o. male with a history of Mobitz type 1 second-degree heart block who underwent CABG x 3 with ligation of the left atrial appendage/re-exploration for mediastinal bleeding on 6/23/23 with Pricila Clark MD. Postoperatively, patient experienced DVTs and bilateral pulmonary emboli requiring chronic anticoagulation with Eliquis. He also developed recurrent left pleural effusions requiring repeat thoracenteses.     Today, he is here for follow up on his recurrent left pleural effusions. Most recent thoracentesis was performed on 10/30/23 from which 500 mL of serosanguineous fluid was retrieved. At his follow up clinic visit, CXR was nearly completely clear with only minimal L pleural effusion identified. No thoracentesis was recommended at that time.    Patient visits office today with his wife. Has no cardiopulmonary complaints. States previous symptoms of dyspnea have resolved. He notes full recovery to baseline since his most recent thoracentesis on 10/30/23.    The following portions of the patient's history were reviewed and updated as appropriate: allergies, current medications, past family history, past medical history, past social history, past surgical history and problem list.    Patient Active Problem List   Diagnosis    Primary hypertension    Hyperlipidemia    BPH (benign prostatic hyperplasia)    Allergic rhinitis due to pollen    History of CVA (cerebrovascular accident)    Low back pain    Morbid obesity with BMI of 40.0-44.9, adult    Lumbosacral spondylosis without myelopathy    DDD (degenerative disc disease), lumbar    Lumbar stenosis    ADRIANNE on CPAP    Cervicalgia    DDD (degenerative disc disease), cervical    Arthritis of left acromioclavicular joint    Degeneration of cervical intervertebral disc    Cervical spondylosis    Osteoarthritis of left glenohumeral joint     Facet hypertrophy of lumbar region    Genu varum of both lower extremities    Primary osteoarthritis of both knees    Lumbar facet arthropathy    Venous insufficiency of both lower extremities    Lumbar spondylosis    Lumbar radiculopathy    Chronic pain syndrome    Elevated PSA    Muscle weakness    Decreased range of motion    Chronic bilateral low back pain without sciatica    Abdominal aortic atherosclerosis    Compression of lumbar vertebra    Abnormal EKG    Shortness of breath    Left arm pain    Coronary artery disease    S/P CABG (coronary artery bypass graft)    Irregular cardiac rhythm    PAF (paroxysmal atrial fibrillation)    Pleural effusion    Chronic heart failure with preserved ejection fraction (HFpEF)    Hyponatremia    Pleuritic chest pain    Anticoagulation adequate    Abrasion of anterior left lower leg    Bilateral carotid artery stenosis    Peripheral polyneuropathy    Acute cholecystitis    Pleural effusion on left    Iron deficiency anemia    Prostate cancer screening    Numbness and tingling in both hands     Past Medical History:   Diagnosis Date    Anticoagulant long-term use     ASA 81 mg    Arthritis     cervical    BPH (benign prostatic hyperplasia) 03/02/2012    Chronic left shoulder pain     Compression fracture of L2     DDD (degenerative disc disease), lumbar     HTN (hypertension) 03/02/2012    Hx of colonic polyps 2013    Low back pain     Morbid obesity with BMI of 40.0-44.9, adult 03/18/2014    Seasonal allergies     Sleep apnea     compliant with CPAP    Stroke 03/1986     Outpatient Encounter Medications as of 1/18/2024   Medication Sig Dispense Refill    acetaminophen (TYLENOL) 500 MG tablet Take 500 mg by mouth every 6 (six) hours as needed for Pain.      apixaban (ELIQUIS) 5 mg Tab Take 1 tablet (5 mg total) by mouth 2 (two) times daily. 60 tablet 10    aspirin (ECOTRIN) 81 MG EC tablet Take 1 tablet (81 mg total) by mouth once daily. 30 tablet 11    atorvastatin  (LIPITOR) 20 MG tablet Take 1 tablet (20 mg total) by mouth once daily. 90 tablet 3    b complex vitamins tablet Take 1 tablet by mouth once daily.      calcium citrate-vitamin D3 315-200 mg (CITRACAL+D) 315 mg-5 mcg (200 unit) per tablet Take 1 tablet by mouth once daily.      colchicine, gout, (COLCRYS) 0.6 mg tablet Take 1 tablet (0.6 mg total) by mouth 2 (two) times daily. 60 tablet 11    finasteride (PROSCAR) 5 mg tablet Take 5 mg by mouth once daily.      furosemide (LASIX) 20 MG tablet Take 1 tablet (20 mg total) by mouth nightly as needed (weight gain). Take nightly PRN weight gain of 2 lbs in 24 hours or 3 lbs in 72 hours 30 tablet 0    furosemide (LASIX) 40 MG tablet Take 1 tablet (40 mg total) by mouth once daily. 30 tablet 0    gabapentin (NEURONTIN) 300 MG capsule Take one in am, and two in PM 90 capsule 4    hydrALAZINE (APRESOLINE) 25 MG tablet Take 1 tablet (25 mg total) by mouth every 12 (twelve) hours. 60 tablet 11    losartan (COZAAR) 100 MG tablet Take 100 mg by mouth.      nystatin (MYCOSTATIN) cream Apply topically 2 (two) times daily. 60 g 1    polyethylene glycol (GLYCOLAX) 17 gram PwPk Take 17 g by mouth daily as needed. (Patient taking differently: Take 17 g by mouth once daily.) 30 packet 1    senna-docusate 8.6-50 mg (PERICOLACE) 8.6-50 mg per tablet Take 1 tablet by mouth 2 (two) times daily. 30 tablet 1    spironolactone (ALDACTONE) 25 MG tablet Take 1 tablet (25 mg total) by mouth once daily. 90 tablet 1    tamsulosin (FLOMAX) 0.4 mg Cap TAKE 1 CAPSULE BY MOUTH EVERY DAY 90 capsule 3     Facility-Administered Encounter Medications as of 1/18/2024   Medication Dose Route Frequency Provider Last Rate Last Admin    sodium hyaluronate (EUFLEXXA) 10 mg/mL(mw 2.4 -3.6 million) Syrg 20 mg  20 mg Intra-articular  Michelet Covarrubias MD   20 mg at 05/14/18 1046    sodium hyaluronate (EUFLEXXA) 10 mg/mL(mw 2.4 -3.6 million) Syrg 20 mg  20 mg Intra-articular  Michelet Covarrubias MD   20 mg at  05/14/18 1046      Review of patient's allergies indicates:  No Known Allergies  Past Surgical History:   Procedure Laterality Date    ANGIOGRAM, CORONARY, WITH LEFT HEART CATHETERIZATION N/A 06/07/2023    Procedure: Left Heart Cath;  Surgeon: Edgardo Marcelino MD;  Location: [facility] CATH;  Service: Cardiology;  Laterality: N/A;    APPENDECTOMY      CATARACT EXTRACTION EXTRACAPSULAR W/ INTRAOCULAR LENS IMPLANTATION      COLONOSCOPY N/A 06/06/2022    Procedure: COLONOSCOPY;  Surgeon: Jose Bello MD;  Location: [facility] ENDO;  Service: Endoscopy;  Laterality: N/A;    COLONOSCOPY N/A 07/06/2023    Procedure: COLONOSCOPY;  Surgeon: Sb Spaulding MD;  Location: [facility] ENDO;  Service: Endoscopy;  Laterality: N/A;    CORONARY ANGIOGRAPHY N/A 06/07/2023    Procedure: ANGIOGRAM, CORONARY ARTERY;  Surgeon: Edgardo Marcelino MD;  [facility];  Service: Cardiology;  Laterality: N/A;    CORONARY ARTERY BYPASS GRAFT      CORONARY ARTERY BYPASS GRAFT (CABG) N/A 06/23/2023    Procedure: CORONARY ARTERY BYPASS GRAFT (CABG) X3;  Surgeon: Pricila Clark MD;  Location: [facility] OR;  Service: Cardiothoracic;  Laterality: N/A;    ENDOSCOPIC HARVEST OF VEIN Right 06/23/2023    Procedure: HARVEST-VEIN-ENDOVASCULAR;  Surgeon: Pricila Clark MD;  Location: [facility] OR;  Service: Cardiothoracic;  Laterality: Right;    EPIDURAL BLOCK INJECTION  2015    cervical    EPIDURAL STEROID INJECTION INTO LUMBAR SPINE N/A 06/05/2019    Procedure: Injection-steroid-epidural-lumbar L5/S1 interlaminar;  Surgeon: Riley Segura MD;  Location: [facility] OR;  Service: Pain Management;  Laterality: N/A;    EPIDURAL STEROID INJECTION INTO LUMBAR SPINE N/A 09/13/2019    Procedure: Injection-steroid-epidural-lumbar;  Surgeon: Riley Segura MD;  Location: [facility] OR;  Service: Pain Management;  Laterality: N/A;  L5/S1 interlaminar to the right    EPIDURAL STEROID INJECTION INTO LUMBAR SPINE N/A 06/02/2020    Procedure: Injection-steroid-epidural-lumbar L5/S1 to  right;  Surgeon: Riley Segura MD;  Location: [facility] OR;  Service: Pain Management;  Laterality: N/A;    EXCLUSION, LEFT ATRIAL APPENDAGE, OPEN, AS PART OF OPEN CHEST SURGERY N/A 06/23/2023    Procedure: EXCLUSION, LEFT ATRIAL APPENDAGE, OPEN, AS PART OF OPEN CHEST SURGERY;  Surgeon: Pricila Clark MD;  [facility];  Service: Cardiothoracic;  Laterality: N/A;    EXPLORATION OF MEDIASTINUM N/A 06/23/2023    Procedure: EXPLORATION, MEDIASTINUM - MEDIASTINAL RE-EXPLORATION TO CONTROL POST-OP BLEEDING;  Surgeon: Pricila Clark MD;  [facility];  Service: Cardiothoracic;  Laterality: N/A;    Facet Steroid injection       Pain management    HERNIA REPAIR      Ventral    INJECTION OF ANESTHETIC AGENT AROUND MEDIAL BRANCH NERVES INNERVATING LUMBAR FACET JOINT Right 03/21/2023    Procedure: Block-nerve-medial branch-lumbar L3/4, L4/5, L5/S1;  Surgeon: Riley Segura MD;  Location: [facility];  Service: Pain Management;  Laterality: Right;    INSERTION OF DORSAL COLUMN NERVE STIMULATOR FOR TRIAL N/A 02/10/2021    Procedure: INSERTION, NEUROSTIMULATOR, SPINAL CORD, DORSAL COLUMN, FOR TRIAL;  Surgeon: Riley Segura MD;  Location: [facility] OR;  Service: Pain Management;  Laterality: N/A;    INSERTION OF NEUROSTIMULATOR OF DORSAL COLUMN OF SPINAL CORD N/A 03/01/2021    Procedure: INSERTION, NEUROSTIMULATOR, SPINAL CORD, DORSAL COLUMN;  Surgeon: Riley Segura MD;  Location: [facility] OR;  Service: Pain Management;  Laterality: N/A;    KNEE SURGERY      bilateral    LAPAROSCOPIC CHOLECYSTECTOMY N/A 11/29/2023    Procedure: CHOLECYSTECTOMY, LAPAROSCOPIC;  Surgeon: Louisa Leo MD;  [facility];  Service: General;  Laterality: N/A;    RADIOFREQUENCY ABLATION  2015    lumbar nerve    RADIOFREQUENCY ABLATION OF LUMBAR MEDIAL BRANCH NERVE AT SINGLE LEVEL Right 04/11/2019    Procedure: Radiofrequency Ablation, Nerve, Spinal, Lumbar, Medial Branch, L1,2,3,4,5;  Surgeon: Riley Segura MD;  Location:  [facility] OR;  Service: Pain Management;  Laterality: Right;    TONSILLECTOMY      TRANSFORAMINAL EPIDURAL INJECTION OF STEROID Right 2020    Procedure: Injection,steroid,epidural,transforaminal approach, l3/4 and l5/s1;  Surgeon: Riley Segura MD;  Location: [facility];  Service: Pain Management;  Laterality: Right;    TRANSFORAMINAL EPIDURAL INJECTION OF STEROID Left 2021    Procedure: Injection,steroid,epidural,transforaminal approach L5/S1;  Surgeon: Riley Segura MD;  Location: [facility];  Service: Pain Management;  Laterality: Left;    TRANSFORAMINAL EPIDURAL INJECTION OF STEROID Right 2022    Procedure: Injection,steroid,epidural,transforaminal approach L5/S1;  Surgeon: Riley Segura MD;  Location: [facility];  Service: Pain Management;  Laterality: Right;    TRANSFORAMINAL EPIDURAL INJECTION OF STEROID Right 10/25/2022    Procedure: Injection,steroid,epidural,transforaminal approach L2/3 and L3/4;  Surgeon: Riley Segura MD;  Location: [facility];  Service: Pain Management;  Laterality: Right;    VERTEBROPLASTY       Family History   Problem Relation Age of Onset    COPD Father     Hypertension Brother     Kidney disease Brother     Alzheimer's disease Mother     No Known Problems Daughter     Hypertension Son     Diabetes Son         pre-diabetic    No Known Problems Daughter     No Known Problems Daughter      Social History     Tobacco Use    Smoking status: Former     Current packs/day: 0.00     Average packs/day: 1.5 packs/day for 24.0 years (36.0 ttl pk-yrs)     Types: Cigarettes     Start date: 10/21/1959     Quit date: 3/19/1983     Years since quittin.8     Passive exposure: Past    Smokeless tobacco: Never   Substance Use Topics    Alcohol use: No    Drug use: No     Review of Systems   Constitutional:  Negative for appetite change, chills, fever and unexpected weight change.   HENT:  Negative for hearing loss, rhinorrhea and sore throat.    Eyes:  Negative for  visual disturbance.   Respiratory:  Negative for cough, shortness of breath and wheezing.    Cardiovascular:  Negative for chest pain, palpitations and leg swelling.   Gastrointestinal:  Negative for abdominal pain.   Endocrine: Negative for cold intolerance, heat intolerance and polydipsia.   Genitourinary:  Negative for dysuria, frequency and hematuria.   Musculoskeletal:  Negative for arthralgias, joint swelling, myalgias and neck stiffness.   Skin:  Negative for color change and rash.   Neurological:  Negative for seizures and syncope.   Hematological:  Negative for adenopathy. Does not bruise/bleed easily.   Psychiatric/Behavioral:  Negative for confusion and dysphoric mood. The patient is not nervous/anxious.            Objective:        Physical Exam  General: Well-developed, well-nourished, NAD  Heart: RR&R, no murmurs   Lungs:  diminished at left lung base   Abdomen:  Benign  Extremities: No LE edema        Imaging  CXR on 1/18/24: unchanged small left pleural effusion    Assessment:     1. Recurrent pleural effusion            Plan:       - Patient doing well following most recent thoracentesis. Denies any further dyspnea.   - CXR with minimal L pleural effusion today  - Will discharge patient from our service today. Return to clinic as needed.

## 2024-01-17 NOTE — ASSESSMENT & PLAN NOTE
He is tolerating oral iron supplementation.  He will stop by the lab to repeat blood work within the next 1-2 weeks.  Anticipate roughly two months of oral iron supplementation depending on results

## 2024-01-17 NOTE — PROGRESS NOTES
Primary Care Provider Appointment   [facility]       Patient ID: Chinedu Roque is a 78 y.o. male.    ASSESSMENT/PLAN by Problem List:    1. Iron deficiency anemia, unspecified iron deficiency anemia type  Assessment & Plan:  He is tolerating oral iron supplementation.  He will stop by the lab to repeat blood work within the next 1-2 weeks.  Anticipate roughly two months of oral iron supplementation depending on results      2. Coronary artery disease of native artery of native heart with stable angina pectoris  Assessment & Plan:  Patient is doing well.  He has completed cardiac rehab and is more active around his house and his family's house.  Encouraged regular light activity on a daily basis.    Orders:  -     Lipid Panel; Future; Expected date: 01/17/2024  -     Comprehensive Metabolic Panel; Future; Expected date: 01/17/2024    3. PAF (paroxysmal atrial fibrillation)  Assessment & Plan:  His rhythm was regular today.  He does remain on apixaban per Cardiology.  He will discuss further with Cardiology upon follow-up.      4. Chronic heart failure with preserved ejection fraction (HFpEF)  Assessment & Plan:  His weight has been stable.  He is following a low-sodium diet.  He does remain on furosemide.  He should still continue to limit sodium, monitor daily weights and report any change in symptoms.      5. Abdominal aortic atherosclerosis  Assessment & Plan:  Noted on previous imaging.  This is a chronic condition.  We are treating this by treating his other coexisting cardiac risk factors including blood pressure, lipids, etc..  We will monitor periodically.      6. Morbid obesity with BMI of 40.0-44.9, adult  Assessment & Plan:  We did discuss nutrition and continuing regular exercise.      7. Prostate cancer screening  Assessment & Plan:  He inquired about checking a PSA.  We discussed current recommendations regarding prostate cancer screening, risk, benefit, etc..   After discussing this he has elected not to proceed with a PSA at this time but if he changes his mind he can let me know.      8. Numbness and tingling in both hands  Assessment & Plan:  He also reports numbness of both 5th digits of the hand.  Typically with activity or using his hands.  He states after his surgery was involving most of both hands but is gradually improving.  On exam he did not have any worrisome neurological deficits.  We did discuss possibilities.  Including referral to Neurology, EMG, etc..  Right now it has not causing any limitation just some minor annoyance.  Although if it worsens or if he changes his mind I would recommend neurology referral.             Follow Up:  Two months    Thirty-four minutes of total time spent on the encounter, time includes face to face time, and some or all of the following: review of chart, lab, imaging, consultant notes, ER, hospital, documentation, care coordination, etc.    Health Maintenance         Date Due Completion Date    RSV Vaccine (Age 60+ and Pregnant patients) (1 - 1-dose 60+ series) Never done ---    TETANUS VACCINE 11/24/2022 11/24/2012    COVID-19 Vaccine (4 - 2023-24 season) 09/01/2023 11/16/2021    Lipid Panel 03/06/2024 3/6/2023        Discussed recommended vaccinations.      Subjective:     Chief Complaint   Patient presents with    Follow-up    Numbness     Pt states he has tingling and numbness in both pinky fingers and it's hard for him to .     I have reviewed the information entered by the ancillary staff regarding the chief complaint as well as the related history.    HPI    Patient is a/an 78 y.o.  male     Follow-up several topics.  He has been doing well since last seen.  More active, graduated from cardiac rehab.  Although as he is becoming more active he does report increase in his back pain.  He has known lumbar degenerative disc disease.    He also reports numbness of both 5th digits of the hand.  He states after his  "surgery was involving most of both hands but is gradually improving.  On exam he did not have any worrisome neurological deficits.  We did discuss possibilities.  Including referral to Neurology, EMG, etc..  Right now it has not causing any limitation just some minor annoyance.  Although if it worsens or if he changes his mind I would recommend neurology referral.    For complete problem list, past medical history, surgical history, social history, etc., see appropriate section in the electronic medical record    Review of Systems   Constitutional:  Negative for chills and fever.   HENT: Negative.     Respiratory: Negative.     Cardiovascular: Negative.    Gastrointestinal:  Positive for abdominal pain (Still has occasional mild discomfort in the upper right abdomen but improving). Negative for blood in stool, constipation, diarrhea, nausea and vomiting.   Genitourinary: Negative.    Musculoskeletal:  Positive for arthralgias and back pain.   Neurological:  Positive for numbness.       Objective     Physical Exam  Constitutional:       Appearance: He is obese. He is not ill-appearing.   HENT:      Head: Normocephalic and atraumatic.   Cardiovascular:      Rate and Rhythm: Normal rate and regular rhythm.      Heart sounds: Normal heart sounds.      Comments: 1+ bilateral ankle edema  Pulmonary:      Effort: No respiratory distress.      Comments: Diminished breath sounds left base, scattered crackles on the left side  Abdominal:      Comments: Obese but otherwise benign   Neurological:      Mental Status: He is alert.      Comments: Ambulatory  Patient appears to have symmetrical strength and movement of both upper extremities.       Vitals:    01/17/24 1352   BP: 136/72   BP Location: Left arm   Patient Position: Sitting   BP Method: Large (Manual)   Pulse: 90   Resp: 18   Temp: 98 °F (36.7 °C)   TempSrc: Oral   SpO2: 97%   Weight: 130.2 kg (287 lb 0.6 oz)   Height: 5' 9.5" (1.765 m)           THIS DOCUMENT WAS MADE " IN PART WITH VOICE RECOGNITION SOFTWARE.  OCCASIONALLY THIS SOFTWARE WILL MISINTERPRET WORDS OR PHRASES.

## 2024-01-17 NOTE — ASSESSMENT & PLAN NOTE
His rhythm was regular today.  He does remain on apixaban per Cardiology.  He will discuss further with Cardiology upon follow-up.

## 2024-01-17 NOTE — ASSESSMENT & PLAN NOTE
He inquired about checking a PSA.  We discussed current recommendations regarding prostate cancer screening, risk, benefit, etc..  After discussing this he has elected not to proceed with a PSA at this time but if he changes his mind he can let me know.

## 2024-01-17 NOTE — ASSESSMENT & PLAN NOTE
Patient is doing well.  He has completed cardiac rehab and is more active around his house and his family's house.  Encouraged regular light activity on a daily basis.

## 2024-01-17 NOTE — ASSESSMENT & PLAN NOTE
Noted on previous imaging.  This is a chronic condition.  We are treating this by treating his other coexisting cardiac risk factors including blood pressure, lipids, etc..  We will monitor periodically.

## 2024-01-18 ENCOUNTER — HOSPITAL ENCOUNTER (OUTPATIENT)
Dept: RADIOLOGY | Facility: HOSPITAL | Age: 79
Discharge: HOME | End: 2024-01-18
Attending: THORACIC SURGERY (CARDIOTHORACIC VASCULAR SURGERY)
Payer: MEDICARE

## 2024-01-18 ENCOUNTER — OFFICE VISIT (OUTPATIENT)
Dept: VASCULAR SURGERY | Facility: CLINIC | Age: 79
End: 2024-01-18
Payer: MEDICARE

## 2024-01-18 VITALS
RESPIRATION RATE: 18 BRPM | DIASTOLIC BLOOD PRESSURE: 82 MMHG | SYSTOLIC BLOOD PRESSURE: 132 MMHG | HEIGHT: 69 IN | WEIGHT: 286.63 LBS | BODY MASS INDEX: 42.45 KG/M2

## 2024-01-18 DIAGNOSIS — J90 RECURRENT PLEURAL EFFUSION: ICD-10-CM

## 2024-01-18 DIAGNOSIS — J90 RECURRENT PLEURAL EFFUSION: Primary | ICD-10-CM

## 2024-01-18 PROCEDURE — 71046 X-RAY EXAM CHEST 2 VIEWS: CPT | Mod: TC,FY,PO

## 2024-01-18 PROCEDURE — 99999 PR PBB SHADOW E&M-EST. PATIENT-LVL III: CPT | Mod: PBBFAC,,,

## 2024-01-18 PROCEDURE — 71046 X-RAY EXAM CHEST 2 VIEWS: CPT | Mod: 26,,, | Performed by: RADIOLOGY

## 2024-01-18 PROCEDURE — 99024 POSTOP FOLLOW-UP VISIT: CPT | Mod: S$GLB,,,

## 2024-01-24 ENCOUNTER — OFFICE VISIT (OUTPATIENT)
Dept: CARDIOLOGY | Facility: CLINIC | Age: 79
End: 2024-01-24
Payer: MEDICARE

## 2024-01-24 VITALS
HEIGHT: 69 IN | WEIGHT: 289.25 LBS | HEART RATE: 76 BPM | DIASTOLIC BLOOD PRESSURE: 75 MMHG | SYSTOLIC BLOOD PRESSURE: 138 MMHG | BODY MASS INDEX: 42.84 KG/M2

## 2024-01-24 DIAGNOSIS — I48.0 PAF (PAROXYSMAL ATRIAL FIBRILLATION): ICD-10-CM

## 2024-01-24 DIAGNOSIS — I87.2 VENOUS INSUFFICIENCY OF BOTH LOWER EXTREMITIES: ICD-10-CM

## 2024-01-24 DIAGNOSIS — Z95.1 S/P CABG (CORONARY ARTERY BYPASS GRAFT): ICD-10-CM

## 2024-01-24 DIAGNOSIS — I25.10 CORONARY ARTERY DISEASE INVOLVING NATIVE CORONARY ARTERY OF NATIVE HEART WITHOUT ANGINA PECTORIS: ICD-10-CM

## 2024-01-24 DIAGNOSIS — Z86.73 HISTORY OF CVA (CEREBROVASCULAR ACCIDENT): ICD-10-CM

## 2024-01-24 DIAGNOSIS — I70.0 ABDOMINAL AORTIC ATHEROSCLEROSIS: Chronic | ICD-10-CM

## 2024-01-24 DIAGNOSIS — E66.01 MORBID OBESITY WITH BMI OF 40.0-44.9, ADULT: ICD-10-CM

## 2024-01-24 DIAGNOSIS — I65.23 BILATERAL CAROTID ARTERY STENOSIS: ICD-10-CM

## 2024-01-24 DIAGNOSIS — E78.2 MIXED HYPERLIPIDEMIA: Primary | Chronic | ICD-10-CM

## 2024-01-24 PROCEDURE — 99214 OFFICE O/P EST MOD 30 MIN: CPT | Mod: S$GLB,,, | Performed by: INTERNAL MEDICINE

## 2024-01-24 PROCEDURE — 93010 ELECTROCARDIOGRAM REPORT: CPT | Mod: S$GLB,,, | Performed by: INTERNAL MEDICINE

## 2024-01-24 PROCEDURE — 93005 ELECTROCARDIOGRAM TRACING: CPT | Mod: PO

## 2024-01-24 PROCEDURE — 99999 PR PBB SHADOW E&M-EST. PATIENT-LVL III: CPT | Mod: PBBFAC,,, | Performed by: INTERNAL MEDICINE

## 2024-01-24 NOTE — PROGRESS NOTES
Subjective:    Patient ID:  Chinedu Roque is a 78 y.o. male patient here for evaluation Follow-up      History of Present Illness:  Cardiology follow-up.  CAD.  CABG June 2023 with ANISH to LAD, vein graft to obtuse marginal 2 and 3.  Left atrial appendage ligation.  Postop bleeding requiring rehab exploration, resolved.    Patient finished rehab, recent released by CTS.    Remains on chronic oral anticoagulation, PAF/DVT/PE.             Review of patient's allergies indicates:  No Known Allergies    Past Medical History:   Diagnosis Date    Anticoagulant long-term use     ASA 81 mg    Arthritis     cervical    BPH (benign prostatic hyperplasia) 03/02/2012    Chronic left shoulder pain     Compression fracture of L2     DDD (degenerative disc disease), lumbar     HTN (hypertension) 03/02/2012    Hx of colonic polyps 2013    Low back pain     Morbid obesity with BMI of 40.0-44.9, adult 03/18/2014    Seasonal allergies     Sleep apnea     compliant with CPAP    Stroke 03/1986     Past Surgical History:   Procedure Laterality Date    ANGIOGRAM, CORONARY, WITH LEFT HEART CATHETERIZATION N/A 06/07/2023    Procedure: Left Heart Cath;  Surgeon: Edgardo Marcelino MD;  Location: [facility] CATH;  Service: Cardiology;  Laterality: N/A;    APPENDECTOMY      CATARACT EXTRACTION EXTRACAPSULAR W/ INTRAOCULAR LENS IMPLANTATION      COLONOSCOPY N/A 06/06/2022    Procedure: COLONOSCOPY;  Surgeon: Jose Bello MD;  Location: [facility] ENDO;  Service: Endoscopy;  Laterality: N/A;    COLONOSCOPY N/A 07/06/2023    Procedure: COLONOSCOPY;  Surgeon: Sb Spaulding MD;  Location: [facility] ENDO;  Service: Endoscopy;  Laterality: N/A;    CORONARY ANGIOGRAPHY N/A 06/07/2023    Procedure: ANGIOGRAM, CORONARY ARTERY;  Surgeon: Edgardo Marcelino MD;  Location: [facility] CATH;  Service: Cardiology;  Laterality: N/A;    CORONARY ARTERY BYPASS GRAFT      CORONARY ARTERY BYPASS GRAFT (CABG) N/A 06/23/2023    Procedure: CORONARY ARTERY BYPASS GRAFT (CABG)  X3;  Surgeon: Pricila Clark MD;  Location: [facility] OR;  Service: Cardiothoracic;  Laterality: N/A;    ENDOSCOPIC HARVEST OF VEIN Right 06/23/2023    Procedure: HARVEST-VEIN-ENDOVASCULAR;  Surgeon: Pricila Clark MD;  Location: [facility] OR;  Service: Cardiothoracic;  Laterality: Right;    EPIDURAL BLOCK INJECTION  2015    cervical    EPIDURAL STEROID INJECTION INTO LUMBAR SPINE N/A 06/05/2019    Procedure: Injection-steroid-epidural-lumbar L5/S1 interlaminar;  Surgeon: Riley Segura MD;  Location: [facility] OR;  Service: Pain Management;  Laterality: N/A;    EPIDURAL STEROID INJECTION INTO LUMBAR SPINE N/A 09/13/2019    Procedure: Injection-steroid-epidural-lumbar;  Surgeon: Riley Segura MD;  Location: [facility] OR;  Service: Pain Management;  Laterality: N/A;  L5/S1 interlaminar to the right    EPIDURAL STEROID INJECTION INTO LUMBAR SPINE N/A 06/02/2020    Procedure: Injection-steroid-epidural-lumbar L5/S1 to right;  Surgeon: Riley Segura MD;  Location: [facility] OR;  Service: Pain Management;  Laterality: N/A;    EXCLUSION, LEFT ATRIAL APPENDAGE, OPEN, AS PART OF OPEN CHEST SURGERY N/A 06/23/2023    Procedure: EXCLUSION, LEFT ATRIAL APPENDAGE, OPEN, AS PART OF OPEN CHEST SURGERY;  Surgeon: Pricila Clark MD;  Location: [facility] OR;  Service: Cardiothoracic;  Laterality: N/A;    EXPLORATION OF MEDIASTINUM N/A 06/23/2023    Procedure: EXPLORATION, MEDIASTINUM - MEDIASTINAL RE-EXPLORATION TO CONTROL POST-OP BLEEDING;  Surgeon: Pricila Clark MD;  Location: [facility] OR;  Service: Cardiothoracic;  Laterality: N/A;    Facet Steroid injection       Pain management    HERNIA REPAIR      Ventral    INJECTION OF ANESTHETIC AGENT AROUND MEDIAL BRANCH NERVES INNERVATING LUMBAR FACET JOINT Right 03/21/2023    Procedure: Block-nerve-medial branch-lumbar L3/4, L4/5, L5/S1;  Surgeon: Riley Segura MD;  Location: [facility];  Service: Pain Management;  Laterality: Right;    INSERTION OF DORSAL COLUMN NERVE STIMULATOR FOR TRIAL N/A 02/10/2021     Procedure: INSERTION, NEUROSTIMULATOR, SPINAL CORD, DORSAL COLUMN, FOR TRIAL;  Surgeon: Riley Segura MD;  Location: [facility] OR;  Service: Pain Management;  Laterality: N/A;    INSERTION OF NEUROSTIMULATOR OF DORSAL COLUMN OF SPINAL CORD N/A 03/01/2021    Procedure: INSERTION, NEUROSTIMULATOR, SPINAL CORD, DORSAL COLUMN;  Surgeon: Riley Segura MD;  Location: [facility] OR;  Service: Pain Management;  Laterality: N/A;    KNEE SURGERY      bilateral    LAPAROSCOPIC CHOLECYSTECTOMY N/A 11/29/2023    Procedure: CHOLECYSTECTOMY, LAPAROSCOPIC;  Surgeon: Louisa Leo MD;  Location: [facility] OR;  Service: General;  Laterality: N/A;    RADIOFREQUENCY ABLATION  2015    lumbar nerve    RADIOFREQUENCY ABLATION OF LUMBAR MEDIAL BRANCH NERVE AT SINGLE LEVEL Right 04/11/2019    Procedure: Radiofrequency Ablation, Nerve, Spinal, Lumbar, Medial Branch, L1,2,3,4,5;  Surgeon: Riley Segura MD;  Location: [facility] OR;  Service: Pain Management;  Laterality: Right;    TONSILLECTOMY      TRANSFORAMINAL EPIDURAL INJECTION OF STEROID Right 11/13/2020    Procedure: Injection,steroid,epidural,transforaminal approach, l3/4 and l5/s1;  Surgeon: Riley Segura MD;  Location: [facility] OR;  Service: Pain Management;  Laterality: Right;    TRANSFORAMINAL EPIDURAL INJECTION OF STEROID Left 06/24/2021    Procedure: Injection,steroid,epidural,transforaminal approach L5/S1;  Surgeon: Riley Segura MD;  Location: [facility] OR;  Service: Pain Management;  Laterality: Left;    TRANSFORAMINAL EPIDURAL INJECTION OF STEROID Right 09/22/2022    Procedure: Injection,steroid,epidural,transforaminal approach L5/S1;  Surgeon: Riley Segura MD;  Location: [facility] OR;  Service: Pain Management;  Laterality: Right;    TRANSFORAMINAL EPIDURAL INJECTION OF STEROID Right 10/25/2022    Procedure: Injection,steroid,epidural,transforaminal approach L2/3 and L3/4;  Surgeon: Riley Segura MD;  Location: [facility];  Service: Pain Management;  Laterality:  Right;    VERTEBROPLASTY       Social History     Tobacco Use    Smoking status: Former     Current packs/day: 0.00     Average packs/day: 1.5 packs/day for 24.0 years (36.0 ttl pk-yrs)     Types: Cigarettes     Start date: 10/21/1959     Quit date: 3/19/1983     Years since quittin.8     Passive exposure: Past    Smokeless tobacco: Never   Substance Use Topics    Alcohol use: No    Drug use: No        Review of Systems:    As noted in HPI in addition      REVIEW OF SYSTEMS  Review of Systems   Constitutional: Negative for decreased appetite, diaphoresis, night sweats, weight gain and weight loss.   HENT:  Negative for nosebleeds and odynophagia.    Eyes:  Negative for double vision and photophobia.   Cardiovascular:  Negative for chest pain, claudication, cyanosis, dyspnea on exertion, irregular heartbeat, leg swelling, near-syncope, orthopnea, palpitations, paroxysmal nocturnal dyspnea and syncope.   Respiratory:  Negative for cough, hemoptysis, shortness of breath and wheezing.    Hematologic/Lymphatic: Negative for adenopathy.   Skin:  Negative for flushing, skin cancer and suspicious lesions.   Musculoskeletal:  Negative for gout, myalgias and neck pain.   Gastrointestinal:  Negative for abdominal pain, heartburn, hematemesis and hematochezia.   Genitourinary:  Negative for bladder incontinence, hesitancy and nocturia.   Neurological:  Negative for focal weakness, headaches, light-headedness and paresthesias.   Psychiatric/Behavioral:  Negative for memory loss and substance abuse.               Objective:        Vitals:    24 1101   BP: 138/75   Pulse: 76       Lab Results   Component Value Date    WBC 6.93 2023    HGB 10.8 (L) 2023    HCT 36.8 (L) 2023     (H) 2023    CHOL 159 2023    TRIG 84 2023    HDL 44 2023    ALT 24 2023    AST 34 2023     (L) 2023    K 3.9 2023    CL 99 2023    CREATININE 0.91 2023     BUN 17 12/01/2023    CO2 23 12/01/2023    TSH 0.890 11/20/2023    PSA 4.5 (H) 03/22/2022    INR 1.1 08/04/2023    HGBA1C 5.5 06/19/2023        ECHOCARDIOGRAM RESULTS  Results for orders placed during the hospital encounter of 07/08/23    Echo    Interpretation Summary  · The left ventricle is normal in size with normal systolic function.  · The estimated ejection fraction is 60%.  · Normal right ventricular size with low normal right ventricular systolic function.  · Mild to moderate tricuspid regurgitation.  · Intermediate central venous pressure (8 mmHg).  · The estimated PA systolic pressure is 46 mmHg.  · There is mild pulmonary hypertension.  · 3 mL of intravenous Optison contrast was used during the study        CURRENT/PREVIOUS VISIT EKG  Results for orders placed or performed during the hospital encounter of 11/27/23   EKG 12-lead    Collection Time: 11/27/23  3:20 AM    Narrative    Test Reason : R10.13,    Vent. Rate : 082 BPM     Atrial Rate : 082 BPM     P-R Int : 268 ms          QRS Dur : 118 ms      QT Int : 382 ms       P-R-T Axes : 106 -39 047 degrees     QTc Int : 446 ms    Sinus rhythm with 1st degree A-V block  Left axis deviation  Incomplete right bundle branch block  Cannot rule out Anterior infarct (cited on or before 26-NOV-2023)  Abnormal ECG  When compared with ECG of 26-NOV-2023 17:25,  Premature atrial complexes are no longer Present  Confirmed by CLARA DANG MD ([id number]) on 11/28/2023 1:58:14 PM    Referred By: [email or web address]   SELF           Confirmed By:CLARA DANG MD     No valid procedures specified.   Results for orders placed during the hospital encounter of 03/22/23    Nuclear Stress - Cardiology Interpreted    Interpretation Summary    The ECG portion of the study is abnormal but not diagnostic.    The test was stopped because the patient experienced A-V block.    2nd degree AV Block confirmed by [facility] Chau PA-C    Resting images obtained only.  Apical thinning noted.  No  stress images.  Abnormal baseline EKG is reported.  Inconclusive study.    No valid procedures specified.    PHYSICAL EXAM  CONSTITUTIONAL: Well built, well nourished in no apparent distress  NECK: no carotid bruit, no JVD  LUNGS: CTA  CHEST WALL: no tenderness,  HEART:  Irregular irregular rhythm.  Variable intensity S1-S2.  ABDOMEN: soft, non-tender; bowel sounds normal; no masses,  no organomegaly  EXTREMITIES: Extremities normal, 2+ lower extremity edema, no calf tenderness noted  NEURO: AAO X 3    I HAVE REVIEWED :    The vital signs, nurses notes, and all the pertinent radiology and labs.         Current Outpatient Medications   Medication Instructions    acetaminophen (TYLENOL) 500 mg, Oral, Every 6 hours PRN    apixaban (ELIQUIS) 5 mg, Oral, 2 times daily    aspirin (ECOTRIN) 81 mg, Oral, Daily    atorvastatin (LIPITOR) 20 mg, Oral, Daily    b complex vitamins tablet 1 tablet, Oral, Daily    calcium citrate-vitamin D3 315-200 mg (CITRACAL+D) 315 mg-5 mcg (200 unit) per tablet 1 tablet, Oral, Daily    colchicine (COLCRYS) 0.6 mg, Oral, 2 times daily    finasteride (PROSCAR) 5 mg, Oral, Daily    furosemide (LASIX) 20 mg, Oral, Nightly PRN, Take nightly PRN weight gain of 2 lbs in 24 hours or 3 lbs in 72 hours    furosemide (LASIX) 40 mg, Oral, Daily    gabapentin (NEURONTIN) 300 MG capsule Take one in am, and two in PM    hydrALAZINE (APRESOLINE) 25 mg, Oral, Every 12 hours    losartan (COZAAR) 100 mg, Oral    nystatin (MYCOSTATIN) cream Topical (Top), 2 times daily    polyethylene glycol (GLYCOLAX) 17 g, Oral, Daily PRN    senna-docusate 8.6-50 mg (PERICOLACE) 8.6-50 mg per tablet 1 tablet, Oral, 2 times daily    spironolactone (ALDACTONE) 25 mg, Oral, Daily    tamsulosin (FLOMAX) 0.4 mg Cap TAKE 1 CAPSULE BY MOUTH EVERY DAY          Assessment:   CABG x3 June 2023.  Left atrial appendage ligation.  EF 60%.  Perioperative AFib, DVT/PE.  Mediastinal bleeding, resolved, re-exploration, non recurrent.  Chest  x-ray 01/24/2024 stable.  Hypertension, dyslipidemia.    Plan:     EKG today.  Continue medications.  No change.  Risk factor modification.  Follow up again in about 4 months.  Labs follow-up primary care.    No follow-ups on file.

## 2024-01-30 RX ORDER — FINASTERIDE 5 MG/1
5 TABLET, FILM COATED ORAL DAILY
Qty: 90 TABLET | Refills: 3 | Status: SHIPPED | OUTPATIENT
Start: 2024-01-30

## 2024-01-30 NOTE — TELEPHONE ENCOUNTER
Refill Routing Note   Medication(s) are not appropriate for processing by [facility] for the following reason(s):        No active prescription written by provider:     [facility] action(s):  Defer      Medication Therapy Plan:         Appointments  past 12m or future 3m with PCP    Date Provider   Last Visit   1/17/2024 Hernando Browne MD   Next Visit   2/20/2024 Hernando Browne MD   ED visits in past 90 days: 2        Note composed:10:24 AM 01/30/2024

## 2024-01-30 NOTE — TELEPHONE ENCOUNTER
No care due was identified.  [facility] Embedded Care Due Messages. Reference number: [id number].   1/30/2024 10:15:35 AM CST

## 2024-02-20 ENCOUNTER — OFFICE VISIT (OUTPATIENT)
Dept: PRIMARY CARE CLINIC | Facility: CLINIC | Age: 79
End: 2024-02-20
Payer: MEDICARE

## 2024-02-20 VITALS
BODY MASS INDEX: 41.93 KG/M2 | WEIGHT: 292.88 LBS | TEMPERATURE: 98 F | DIASTOLIC BLOOD PRESSURE: 78 MMHG | OXYGEN SATURATION: 97 % | RESPIRATION RATE: 18 BRPM | SYSTOLIC BLOOD PRESSURE: 134 MMHG | HEIGHT: 70 IN

## 2024-02-20 DIAGNOSIS — D50.9 IRON DEFICIENCY ANEMIA, UNSPECIFIED IRON DEFICIENCY ANEMIA TYPE: ICD-10-CM

## 2024-02-20 DIAGNOSIS — I10 PRIMARY HYPERTENSION: Primary | ICD-10-CM

## 2024-02-20 DIAGNOSIS — E66.01 MORBID OBESITY WITH BMI OF 40.0-44.9, ADULT: ICD-10-CM

## 2024-02-20 PROBLEM — I65.23 BILATERAL CAROTID ARTERY STENOSIS: Chronic | Status: ACTIVE | Noted: 2023-09-20

## 2024-02-20 PROCEDURE — 99999 PR PBB SHADOW E&M-EST. PATIENT-LVL V: CPT | Mod: PBBFAC,,, | Performed by: FAMILY MEDICINE

## 2024-02-20 PROCEDURE — 99214 OFFICE O/P EST MOD 30 MIN: CPT | Mod: S$GLB,,, | Performed by: FAMILY MEDICINE

## 2024-02-20 NOTE — ASSESSMENT & PLAN NOTE
Much improved.  Continue iron replacement through the end of this month then discontinue.  Will check labs before I see him back in one month

## 2024-02-20 NOTE — ASSESSMENT & PLAN NOTE
Average is satisfactory perhaps borderline.  Continue current medication.  He has been exercising, unfortunately weight is going up some of this is rebounding after surgery but did discuss healthy nutrition, reducing unhealthy calories and continuing to increase exercise.  Keeps sodium low.  Keep follow-up in one month as scheduled

## 2024-02-20 NOTE — PROGRESS NOTES
Primary Care Provider Appointment   [facility]       Patient ID: Chinedu Roque is a 78 y.o. male.    ASSESSMENT/PLAN by Problem List:    1. Primary hypertension  Assessment & Plan:  Average is satisfactory perhaps borderline.  Continue current medication.  He has been exercising, unfortunately weight is going up some of this is rebounding after surgery but did discuss healthy nutrition, reducing unhealthy calories and continuing to increase exercise.  Keeps sodium low.  Keep follow-up in one month as scheduled    Orders:  -     CBC Auto Differential; Future; Expected date: 02/20/2024  -     Comprehensive Metabolic Panel; Future; Expected date: 02/20/2024    2. Iron deficiency anemia, unspecified iron deficiency anemia type  Assessment & Plan:  Much improved.  Continue iron replacement through the end of this month then discontinue.  Will check labs before I see him back in one month      3. Morbid obesity with BMI of 40.0-44.9, adult  Assessment & Plan:  As he is recovering from multiple surgeries, his weight is starting to go up.  Reinforced healthy nutrition.  Continue regular exercise.           Follow Up:  Four weeks      Subjective:     Chief Complaint   Patient presents with    Follow-up     I have reviewed the information entered by the ancillary staff regarding the chief complaint as well as the related history.    HPI    Patient is a/an 78 y.o.  male     Continues to do well.  In fact he is back driving and doing most things on his own.  He presented alone today.  Feels good with exercise, doing combination of cardio as well as some strength training.  Has completed cardiac rehab.  He does state his weight is going up.  There is room to improve diet.  He does have mild swelling but denies significant swelling.    For complete problem list, past medical history, surgical history, social history, etc., see appropriate section in the electronic medical  "record    Review of Systems   Constitutional:  Positive for unexpected weight change. Negative for chills and fever.   Respiratory: Negative.     Cardiovascular:  Positive for leg swelling (mild ankle swelling).   Gastrointestinal: Negative.        Objective     Physical Exam  Constitutional:       Appearance: He is obese. He is not ill-appearing.   HENT:      Head: Normocephalic and atraumatic.   Cardiovascular:      Rate and Rhythm: Normal rate and regular rhythm.      Heart sounds: Normal heart sounds.      Comments: 1+ bilateral ankle edema  Pulmonary:      Effort: No respiratory distress.      Comments: Mildly diminished breath sounds, overall improving  Abdominal:      Comments: Obese but otherwise benign   Neurological:      Mental Status: He is alert.      Comments: Ambulatory  Patient appears to have symmetrical strength and movement of both upper extremities.       Vitals:    02/20/24 1434 02/20/24 1514   BP: (!) 142/82 134/78   BP Location: Right arm    Patient Position: Sitting    BP Method: Large (Manual)    Resp: 18    Temp: 98.2 °F (36.8 °C)    TempSrc: Oral    SpO2: 97%    Weight: 132.9 kg (292 lb 14.1 oz)    Height: 5' 10" (1.778 m)            THIS DOCUMENT WAS MADE IN PART WITH VOICE RECOGNITION SOFTWARE.  OCCASIONALLY THIS SOFTWARE WILL MISINTERPRET WORDS OR PHRASES.  "

## 2024-02-21 ENCOUNTER — OFFICE VISIT (OUTPATIENT)
Dept: PODIATRY | Facility: CLINIC | Age: 79
End: 2024-02-21
Payer: MEDICARE

## 2024-02-21 VITALS — WEIGHT: 293 LBS | BODY MASS INDEX: 41.95 KG/M2 | HEIGHT: 70 IN

## 2024-02-21 DIAGNOSIS — I73.9 PERIPHERAL VASCULAR DISEASE: ICD-10-CM

## 2024-02-21 DIAGNOSIS — G60.9 IDIOPATHIC PERIPHERAL NEUROPATHY: ICD-10-CM

## 2024-02-21 DIAGNOSIS — B35.1 ONYCHOMYCOSIS: Primary | ICD-10-CM

## 2024-02-21 PROCEDURE — 11721 DEBRIDE NAIL 6 OR MORE: CPT | Mod: Q9,S$GLB,, | Performed by: PODIATRIST

## 2024-02-21 PROCEDURE — 99999 PR PBB SHADOW E&M-EST. PATIENT-LVL III: CPT | Mod: PBBFAC,,, | Performed by: PODIATRIST

## 2024-02-21 PROCEDURE — 99499 UNLISTED E&M SERVICE: CPT | Mod: S$GLB,,, | Performed by: PODIATRIST

## 2024-02-21 NOTE — PROGRESS NOTES
Subjective:      Patient ID: Chinedu Roque is a 78 y.o. male.    Chief Complaint: Nail Care      Chinedu is a 78 y.o. male who presents to the clinic for evaluation and treatment of high risk feet. Chinedu has a past medical history of Anticoagulant long-term use, Arthritis, BPH (benign prostatic hyperplasia) (03/02/2012), Chronic left shoulder pain, Compression fracture of L2, DDD (degenerative disc disease), lumbar, HTN (hypertension) (03/02/2012), colonic polyps (2013), Low back pain, Morbid obesity with BMI of 40.0-44.9, adult (03/18/2014), Seasonal allergies, Sleep apnea, and Stroke (03/1986). The patient's chief complaint is long, thick toenails. This patient has documented high risk feet requiring routine maintenance secondary to peripheral vascular disease. No acute pedal changes since last visit, does have some leathery feeling on the ball of the foot bilateral with walking    PCP: Hernando Browne MD        Current shoe gear:  Casual shoes    Last encounter in this department: Visit date not found    Hemoglobin A1C   Date Value Ref Range Status   06/19/2023 5.5 0.0 - 5.6 % Final     Comment:     Reference Interval:  5.0 - 5.6 Normal   5.7 - 6.4 High Risk   > 6.5 Diabetic      Hgb A1c results are standardized based on the (NGSP) National   Glycohemoglobin Standardization Program.      Hemoglobin A1C levels are related to mean serum/plasma glucose   during the preceding 2-3 months.        03/06/2023 5.7 (H) 4.0 - 5.6 % Final     Comment:     ADA Screening Guidelines:  5.7-6.4%  Consistent with prediabetes  >or=6.5%  Consistent with diabetes    High levels of fetal hemoglobin interfere with the HbA1C  assay. Heterozygous hemoglobin variants (HbS, HgC, etc)do  not significantly interfere with this assay.   However, presence of multiple variants may affect accuracy.           Review of Systems   Constitutional: Negative for chills and fever.   Cardiovascular:  Positive for leg swelling. Negative for  claudication.   Respiratory:  Negative for shortness of breath.    Skin:  Positive for nail changes. Negative for itching and rash.   Musculoskeletal:  Negative for muscle cramps, muscle weakness and myalgias.   Gastrointestinal:  Negative for nausea and vomiting.   Neurological:  Positive for numbness. Negative for focal weakness, loss of balance and paresthesias.           Objective:      Physical Exam  Constitutional:       General: He is not in acute distress.     Appearance: He is well-developed. He is not diaphoretic.   Cardiovascular:      Pulses:           Dorsalis pedis pulses are 1+ on the right side and 1+ on the left side.        Posterior tibial pulses are 1+ on the right side and 1+ on the left side.      Comments: 3 sec capillary refill time to toes 1-5 bilateral. Feet are warm to touch proximally with distal cooling b/l. There is no hair growth on the feet and toes b/l. There is 2+ edema b/l with some varicosities present b/l.     Musculoskeletal:      Comments: Equinus noted b/l ankles with < 10 deg DF noted. MMT 5/5 in DF/PF/Inv/Ev resistance with no reproduction of pain in any direction. Passive range of motion of ankle and pedal joints is painless b/l.     Skin:     General: Skin is warm and dry.      Coloration: Skin is not pale.      Findings: No abrasion, bruising, burn, ecchymosis, erythema, laceration, lesion, petechiae or rash.      Nails: There is no clubbing.      Comments: Skin is thin shiny and atrophic bilateral    Nails 1-5 bilateral are thick 3-4 mm, long 3-6 mm, and discolored with subungual debris     Neurological:      Mental Status: He is alert and oriented to person, place, and time.      Sensory: No sensory deficit.      Motor: No tremor, atrophy or abnormal muscle tone.      Comments: Negative tinel sign bilateral.   Psychiatric:         Behavior: Behavior normal.               Assessment:       Encounter Diagnoses   Name Primary?    Onychomycosis Yes    Peripheral vascular  disease                Plan:       Chinedu was seen today for nail care.    Diagnoses and all orders for this visit:    Onychomycosis    Peripheral vascular disease            I counseled the patient on his conditions, their implications and medical management.        - Shoe inspection. Patient instructed on proper foot hygeine. We discussed wearing proper shoe gear, daily foot inspections, never walking without protective shoe gear, never putting sharp instruments to feet, routine podiatric nail visits every 3 months.      - With patient's permission, nails were aggressively reduced and debrided x 10 to their soft tissue attachment mechanically and with electric , removing all offending nail and debris. Patient relates relief following the procedure. He will continue to monitor the areas daily, inspect his feet, wear protective shoe gear when ambulatory, moisturizer to maintain skin integrity and follow in this office in approximately 2-3 months, sooner p.r.n.    Patient will obtain over the counter arch supports and wear them in shoes whenever possible.  Athletic shoes intended for walking or running are usually best.      Rubio Ramirez DPM

## 2024-03-04 ENCOUNTER — HOSPITAL ENCOUNTER (OUTPATIENT)
Dept: RADIOLOGY | Facility: HOSPITAL | Age: 79
Discharge: HOME | End: 2024-03-04
Attending: NURSE PRACTITIONER
Payer: MEDICARE

## 2024-03-04 DIAGNOSIS — M25.551 PAIN OF RIGHT HIP: ICD-10-CM

## 2024-03-04 PROCEDURE — 73502 X-RAY EXAM HIP UNI 2-3 VIEWS: CPT | Mod: TC,FY,PO,RT

## 2024-03-04 PROCEDURE — 73502 X-RAY EXAM HIP UNI 2-3 VIEWS: CPT | Mod: 26,RT,, | Performed by: RADIOLOGY

## 2024-03-06 ENCOUNTER — OFFICE VISIT (OUTPATIENT)
Dept: PRIMARY CARE CLINIC | Facility: CLINIC | Age: 79
End: 2024-03-06
Payer: MEDICARE

## 2024-03-06 VITALS
HEIGHT: 70 IN | DIASTOLIC BLOOD PRESSURE: 74 MMHG | OXYGEN SATURATION: 97 % | WEIGHT: 305.13 LBS | HEART RATE: 47 BPM | BODY MASS INDEX: 43.68 KG/M2 | SYSTOLIC BLOOD PRESSURE: 138 MMHG

## 2024-03-06 DIAGNOSIS — I88.0 MESENTERIC ADENITIS: ICD-10-CM

## 2024-03-06 DIAGNOSIS — R10.31 RIGHT LOWER QUADRANT ABDOMINAL PAIN: Primary | ICD-10-CM

## 2024-03-06 PROCEDURE — 99213 OFFICE O/P EST LOW 20 MIN: CPT | Mod: S$GLB,,, | Performed by: FAMILY MEDICINE

## 2024-03-06 PROCEDURE — 99999 PR PBB SHADOW E&M-EST. PATIENT-LVL IV: CPT | Mod: PBBFAC,,, | Performed by: FAMILY MEDICINE

## 2024-03-06 NOTE — PROGRESS NOTES
Primary Care Provider Appointment   [facility]       Patient ID: Chinedu Roque is a 78 y.o. male.    ASSESSMENT/PLAN by Problem List:    1. Right lower quadrant abdominal pain    2. Mesenteric adenitis     Patient presented with severe right lower quadrant pain that had been progressing over few days.  He went to the emergency room yesterday.  CT scan suggested mesenteric adenitis.  CBC revealed normal white blood cells.  Patient is still having some right lower quadrant pain but it is significantly improved.  He has not running fever or showing signs of worsening and nothing to suggest acute bacterial infection or acute abdomen.  So will continue along conservative approach.  He was given prescription for oxycodone in the emergency room.  If pain is significant he can take this but should watch for constipation and should be minimizing usage at this point especially if improving.  He will keep his follow-up with me in two weeks if there is any worsening such as fever, worsening abdominal pain at this point or other concern he will let us know immediately.    Follow Up:  Two weeks      Subjective:     Chief Complaint   Patient presents with    Follow-up     Hospital follow up from 3/5/24     Abdominal Pain     Right lower quadrant      I have reviewed the information entered by the ancillary staff regarding the chief complaint as well as the related history.    Follow-up  Associated symptoms include abdominal pain. Pertinent negatives include no nausea or vomiting.   Abdominal Pain  Pertinent negatives include no constipation, diarrhea, nausea or vomiting.       Patient is a/an 78 y.o.  male     Emergency room follow-up.  Patient reports he about three or four days of progressively worsening abdominal pain escalating into severe sharp right lower abdominal pain that required him to go to the emergency room yesterday.  He reports no fever, no diarrhea no constipation no  "blood in his stool no nausea or vomiting.  No rashes.  CBC revealed normal white blood cells.  CT scan suggested possible mesenteric adenitis, no evidence of acute abdomen    Today's says he feels significantly better still has right lower abdominal pain that fluctuates in intensity but typically ranging 4/10 or less.  Yesterday it was 20/10    For complete problem list, past medical history, surgical history, social history, etc., see appropriate section in the electronic medical record    Review of Systems   HENT: Negative.     Respiratory: Negative.     Cardiovascular: Negative.    Gastrointestinal:  Positive for abdominal pain. Negative for blood in stool, constipation, diarrhea, nausea and vomiting.   Genitourinary: Negative.        Objective     Physical Exam  Constitutional:       Appearance: He is obese. He is not ill-appearing.   HENT:      Head: Normocephalic and atraumatic.   Cardiovascular:      Rate and Rhythm: Regular rhythm. Bradycardia present.      Heart sounds: Normal heart sounds.      Comments: 1+ bilateral ankle edema  Pulmonary:      Effort: No respiratory distress.      Comments: Mildly diminished breath sounds bilateral bases but continuing to improve  Abdominal:      Tenderness: There is no right CVA tenderness or left CVA tenderness.      Comments: There is right lower quadrant tenderness with palpation, there is no guarding or rebound.  Bowel sounds are present.  Abdominal obesity reduces sensitivity of the exam but there is no evidence of mass.  No rashes   Neurological:      Mental Status: He is alert.      Comments: Ambulatory using a cane       Vitals:    03/06/24 1258   BP: 138/74   Pulse: (!) 47   SpO2: 97%   Weight: (!) 138.4 kg (305 lb 1.9 oz)   Height: 5' 10" (1.778 m)           THIS DOCUMENT WAS MADE IN PART WITH VOICE RECOGNITION SOFTWARE.  OCCASIONALLY THIS SOFTWARE WILL MISINTERPRET WORDS OR PHRASES.    "

## 2024-03-21 ENCOUNTER — OFFICE VISIT (OUTPATIENT)
Dept: PRIMARY CARE CLINIC | Facility: CLINIC | Age: 79
End: 2024-03-21
Payer: MEDICARE

## 2024-03-21 VITALS
HEART RATE: 45 BPM | OXYGEN SATURATION: 97 % | BODY MASS INDEX: 43.98 KG/M2 | DIASTOLIC BLOOD PRESSURE: 78 MMHG | WEIGHT: 307.19 LBS | HEIGHT: 70 IN | SYSTOLIC BLOOD PRESSURE: 132 MMHG

## 2024-03-21 DIAGNOSIS — R10.9 RIGHT LATERAL ABDOMINAL PAIN: Primary | ICD-10-CM

## 2024-03-21 DIAGNOSIS — I88.0 MESENTERIC ADENITIS: ICD-10-CM

## 2024-03-21 DIAGNOSIS — R73.09 ELEVATED GLUCOSE: ICD-10-CM

## 2024-03-21 PROCEDURE — 99999 PR PBB SHADOW E&M-EST. PATIENT-LVL III: CPT | Mod: PBBFAC,,, | Performed by: FAMILY MEDICINE

## 2024-03-21 PROCEDURE — 99214 OFFICE O/P EST MOD 30 MIN: CPT | Mod: S$GLB,,, | Performed by: FAMILY MEDICINE

## 2024-03-21 RX ORDER — OXYCODONE AND ACETAMINOPHEN 5; 325 MG/1; MG/1
1 TABLET ORAL EVERY 6 HOURS PRN
Qty: 20 TABLET | Refills: 0 | Status: SHIPPED | OUTPATIENT
Start: 2024-03-21

## 2024-03-21 NOTE — PROGRESS NOTES
Primary Care Provider Appointment   [facility] [place]       Patient ID: Chinedu Roque is a 78 y.o. male.    ASSESSMENT/PLAN by Problem List:    1. Right lateral abdominal pain  -     CT Abdomen Pelvis With IV Contrast Routine Oral Contrast; Future; Expected date: 03/21/2024  -     Basic Metabolic Panel; Future; Expected date: 03/21/2024  -     CBC Auto Differential; Future; Expected date: 03/21/2024    2. Mesenteric adenitis  -     oxyCODONE-acetaminophen (PERCOCET) 5-325 mg per tablet; Take 1 tablet by mouth every 6 (six) hours as needed for Pain.  Dispense: 20 tablet; Refill: 0    3. Elevated glucose  -     Hemoglobin A1C; Future; Expected date: 03/21/2024     Discussion: Patient presented to the emergency room a few weeks ago with significant right sided abdominal pain.  CT scan suggested mesenteric adenitis.  He was discharged with medication for pain.  I saw him a day or two later and he was improving however today he reports the pain is persistent and still severe at times.  He reports no nausea or vomiting.  No change in appetite.  No fever.  He does not report any constipation, no diarrhea, or blood in his stool.  On exam he has right-sided abdominal pain in the lower quadrant and mid area.  No guarding or rebound and bowel sounds are present but somewhat hypoactive.  Given abnormal findings on previous CT with persistent symptoms I do feel this needs to be repeated so will schedule repeat CT.  If this is improved or unchanged then must consider degenerative disc disease and possible radiculopathy as a cause of his symptoms.  Also I am noticing increase in swelling without any cardiac decompensation.  Strongly reinforced the need to limit sodium, work on calorie reduction.    Follow Up:  Two weeks    Subjective:     Chief Complaint   Patient presents with    Follow-up     2 month follow up     I have reviewed the information entered by the ancillary staff regarding the  chief complaint as well as the related history.    HPI    Patient is a/an 78 y.o.  male     Follow-up abdominal pain.  After initial improvement, reports symptoms have worsened again and are persistent.  See above for detailed explanation    For complete problem list, past medical history, surgical history, social history, etc., see appropriate section in the electronic medical record    Review of Systems   Constitutional:  Negative for chills and fever.   HENT: Negative.  Negative for voice change.    Respiratory:  Negative for shortness of breath and wheezing.    Cardiovascular:  Negative for chest pain and leg swelling.   Gastrointestinal:  Positive for abdominal distention. Negative for blood in stool, constipation, diarrhea, nausea and vomiting.   Genitourinary: Negative.  Negative for dysuria and hematuria.   Musculoskeletal:  Positive for arthralgias and back pain.       Objective     Physical Exam  Constitutional:       Appearance: He is obese. He is not ill-appearing.   HENT:      Head: Normocephalic and atraumatic.   Cardiovascular:      Rate and Rhythm: Regular rhythm. Bradycardia present.      Heart sounds: Normal heart sounds.      Comments: 2+ bilateral ankle edema  Pulmonary:      Effort: No respiratory distress.      Comments: Mildly diminished breath sounds bilateral bases but continuing to improve  Abdominal:      Tenderness: There is no right CVA tenderness or left CVA tenderness.      Comments: There is tenderness in the right mid and lower abdominal area.  No appreciable rebound tenderness.  Bowel sounds are present but somewhat quiet.  There are no rashes.  No obvious masses but abdominal obesity severely limits the sensitivity of the exam   Neurological:      Mental Status: He is alert.      Comments: Ambulatory using a cane       Vitals:    03/21/24 1435   BP: 132/78   BP Location: Left arm   Patient Position: Sitting   BP Method: Large (Manual)   Pulse: (!) 45   SpO2: 97%   Weight: (!) 139.4  "kg (307 lb 3.4 oz)   Height: 5' 10" (1.778 m)           THIS DOCUMENT WAS MADE IN PART WITH VOICE RECOGNITION SOFTWARE.  OCCASIONALLY THIS SOFTWARE WILL MISINTERPRET WORDS OR PHRASES.    "

## 2024-03-27 ENCOUNTER — HOSPITAL ENCOUNTER (OUTPATIENT)
Dept: RADIOLOGY | Facility: HOSPITAL | Age: 79
Discharge: HOME | End: 2024-03-27
Attending: FAMILY MEDICINE
Payer: MEDICARE

## 2024-03-27 DIAGNOSIS — R10.9 RIGHT LATERAL ABDOMINAL PAIN: ICD-10-CM

## 2024-03-27 PROCEDURE — 74177 CT ABD & PELVIS W/CONTRAST: CPT | Mod: TC

## 2024-03-27 PROCEDURE — 25500020 PHARM REV CODE 255: Performed by: FAMILY MEDICINE

## 2024-03-27 RX ADMIN — IOHEXOL 100 ML: 350 INJECTION, SOLUTION INTRAVENOUS at 12:03

## 2024-04-04 ENCOUNTER — TELEPHONE (OUTPATIENT)
Dept: PAIN MEDICINE | Facility: CLINIC | Age: 79
End: 2024-04-04
Payer: MEDICARE

## 2024-04-11 ENCOUNTER — OFFICE VISIT (OUTPATIENT)
Dept: PRIMARY CARE CLINIC | Facility: CLINIC | Age: 79
End: 2024-04-11
Payer: MEDICARE

## 2024-04-11 VITALS
OXYGEN SATURATION: 97 % | BODY MASS INDEX: 43.23 KG/M2 | TEMPERATURE: 98 F | DIASTOLIC BLOOD PRESSURE: 78 MMHG | SYSTOLIC BLOOD PRESSURE: 128 MMHG | HEART RATE: 60 BPM | RESPIRATION RATE: 18 BRPM | WEIGHT: 301.25 LBS

## 2024-04-11 DIAGNOSIS — M54.14 THORACIC RADICULOPATHY: ICD-10-CM

## 2024-04-11 DIAGNOSIS — I88.0 MESENTERIC ADENITIS: ICD-10-CM

## 2024-04-11 DIAGNOSIS — I87.2 VENOUS INSUFFICIENCY OF BOTH LOWER EXTREMITIES: ICD-10-CM

## 2024-04-11 DIAGNOSIS — I50.32 CHRONIC HEART FAILURE WITH PRESERVED EJECTION FRACTION (HFPEF): Primary | Chronic | ICD-10-CM

## 2024-04-11 DIAGNOSIS — R06.02 SHORTNESS OF BREATH: ICD-10-CM

## 2024-04-11 PROCEDURE — 99999 PR PBB SHADOW E&M-EST. PATIENT-LVL V: CPT | Mod: PBBFAC,,, | Performed by: FAMILY MEDICINE

## 2024-04-11 PROCEDURE — 3078F DIAST BP <80 MM HG: CPT | Mod: CPTII,S$GLB,, | Performed by: FAMILY MEDICINE

## 2024-04-11 PROCEDURE — 99214 OFFICE O/P EST MOD 30 MIN: CPT | Mod: S$GLB,,, | Performed by: FAMILY MEDICINE

## 2024-04-11 PROCEDURE — 3288F FALL RISK ASSESSMENT DOCD: CPT | Mod: CPTII,S$GLB,, | Performed by: FAMILY MEDICINE

## 2024-04-11 PROCEDURE — 1160F RVW MEDS BY RX/DR IN RCRD: CPT | Mod: CPTII,S$GLB,, | Performed by: FAMILY MEDICINE

## 2024-04-11 PROCEDURE — 1125F AMNT PAIN NOTED PAIN PRSNT: CPT | Mod: CPTII,S$GLB,, | Performed by: FAMILY MEDICINE

## 2024-04-11 PROCEDURE — 3074F SYST BP LT 130 MM HG: CPT | Mod: CPTII,S$GLB,, | Performed by: FAMILY MEDICINE

## 2024-04-11 PROCEDURE — 1101F PT FALLS ASSESS-DOCD LE1/YR: CPT | Mod: CPTII,S$GLB,, | Performed by: FAMILY MEDICINE

## 2024-04-11 PROCEDURE — 1159F MED LIST DOCD IN RCRD: CPT | Mod: CPTII,S$GLB,, | Performed by: FAMILY MEDICINE

## 2024-04-11 PROCEDURE — 1157F ADVNC CARE PLAN IN RCRD: CPT | Mod: CPTII,S$GLB,, | Performed by: FAMILY MEDICINE

## 2024-04-11 RX ORDER — TIZANIDINE 2 MG/1
TABLET ORAL
Qty: 30 TABLET | Refills: 1 | Status: SHIPPED | OUTPATIENT
Start: 2024-04-11 | End: 2024-05-21 | Stop reason: SDUPTHER

## 2024-04-11 NOTE — ASSESSMENT & PLAN NOTE
Patient has mild leg swelling.  Does have some venous insufficiency changes and does report intermittent blisters.  At this time there no open wounds but there are some healing eschars.  Discussed the importance of wearing compression, keeping salt low, elevating feet when not active.  He will report any significant worsening.  At this time continue current dose of Lasix however we may have to look at adjusting this if not improving but want him to continue to do a good job with keeping salt intake low, becoming more active and using compression

## 2024-04-11 NOTE — PROGRESS NOTES
Primary Care Provider Appointment   [facility]       Patient ID: Chinedu Roque is a 78 y.o. male.    ASSESSMENT/PLAN by Problem List:    1. Chronic heart failure with preserved ejection fraction (HFpEF)  Assessment & Plan:    Patient does report mild dyspnea with exertion since last seen.  Has intermittent swelling but not worse than before.  Denies significant orthopnea.  Will check a BNP as a precaution.  His weight is down about 6 lb since last seen he has been watching his salt better but must continue to do so and monitor daily weights.  Will also check a chest x-ray because of worsening symptoms after he had been experiencing steady improvement over several months      2. Shortness of breath  -     B-TYPE NATRIURETIC PEPTIDE; Future; Expected date: 04/11/2024  -     X-Ray Chest PA And Lateral; Future; Expected date: 04/11/2024    3. Mesenteric adenitis  Comments:  CT scan showed improvement.  Abdominal pain persists but is more localizing to the side and lower back I suspect thoracic radiculopathy    4. Thoracic radiculopathy  Assessment & Plan:   Patient was having right-sided mid and lower abdominal pain.  Went to the emergency room, diagnosed with mesenteric adenitis.  Symptoms started to improve but then worsened again.  Repeat CT scan showed improvement of the mesenteric adenitis.  Pain is now present in the right side and lower thoracic area I suspect most of his current symptoms were likely related to his back, likely thoracic degenerative disc disease and radiculopathy.  Offered PT but he declined states he is going to start doing his home exercises.  He does have a follow-up scheduled with his pain specialist.  Will prescribe Zanaflex but do not recommend continuing narcotics.      5. Venous insufficiency of both lower extremities  Assessment & Plan:   Patient has mild leg swelling.  Does have some venous insufficiency changes and does report intermittent  blisters.  At this time there no open wounds but there are some healing eschars.  Discussed the importance of wearing compression, keeping salt low, elevating feet when not active.  He will report any significant worsening.  At this time continue current dose of Lasix however we may have to look at adjusting this if not improving but want him to continue to do a good job with keeping salt intake low, becoming more active and using compression      Other orders  -     tiZANidine (ZANAFLEX) 2 MG tablet; Take 1-2 po TID prn muscle spasm  Dispense: 30 tablet; Refill: 1         Follow Up:   four weeks      Subjective:     Chief Complaint   Patient presents with    Follow-up    Blister     Pt states he has blisters on his legs that appear, burst and heal then resurface.     Back Pain    Chest Pain    Shortness of Breath     Pt states anything he does strenuous he can hardly catch his breath.      I have reviewed the information entered by the ancillary staff regarding the chief complaint as well as the related history.    HPI    Patient is a/an 78 y.o.  male      Follow-up abdominal pain and other.  He does report some improvement, abdominal pain is now localizing to the right side and more toward the back, see above    For complete problem list, past medical history, surgical history, social history, etc., see appropriate section in the electronic medical record    Review of Systems   HENT: Negative.     Respiratory:  Positive for shortness of breath.    Cardiovascular:  Positive for leg swelling. Negative for chest pain.   Genitourinary: Negative.    Musculoskeletal:  Positive for arthralgias.   Neurological:  Positive for numbness.       Objective     Physical Exam  Constitutional:       Appearance: He is obese. He is not ill-appearing.   HENT:      Head: Normocephalic and atraumatic.   Cardiovascular:      Rate and Rhythm: Regular rhythm. Bradycardia present.      Heart sounds: Normal heart sounds.      Comments: 2+  bilateral ankle edema, no blistering or open wounds, some scabbing and dry skin  Pulmonary:      Effort: No respiratory distress.      Comments: Mildly diminished breath sounds bilateral bases   Abdominal:      Tenderness: There is no right CVA tenderness or left CVA tenderness.      Comments:  Mild right mid abdominal pain but no guarding or rebound, bowel sounds are present.   Musculoskeletal:      Comments:  With rotation of the thoracic region there is some reproducible tenderness on the right side.  Mild tenderness over the right lower ribs lateral abdomen   Neurological:      Mental Status: He is alert.      Comments: Ambulatory using a cane       Vitals:    04/11/24 1308   BP: 128/78   BP Location: Left arm   Patient Position: Sitting   BP Method: Large (Manual)   Pulse: 60   Resp: 18   Temp: 98.3 °F (36.8 °C)   TempSrc: Oral   SpO2: 97%   Weight: (!) 136.7 kg (301 lb 4.1 oz)           THIS DOCUMENT WAS MADE IN PART WITH VOICE RECOGNITION SOFTWARE.  OCCASIONALLY THIS SOFTWARE WILL MISINTERPRET WORDS OR PHRASES.

## 2024-04-11 NOTE — ASSESSMENT & PLAN NOTE
[facility] Medicine  History & Physical    Patient Name: Alicia Toussaint  MRN: [id number]  Patient Class: OP- Observation  Admission Date: 2/2/2024  Attending Physician: Tai Sanchez MD   Primary Care Provider: Ranjit Sweeney MD         Patient information was obtained from patient, past medical records, and ER records.     Subjective:     Principal Problem:Enteritis    Chief Complaint:   Chief Complaint   Patient presents with    Diarrhea     Endorses diarrhea since Tuesday. States that she has been taking OTC meds with no relief. Endorses loose dark stools. Denies korin blood in stool.        HPI: Ms. Alicia Toussaint is a 69 y.o. female, with PMH of prediabetes, who [place] [facility] [place] due to diarrhea and abdominal pain. She states the pain began on Tuesday, was associated with watery diarrhea, and she attributed it to something she ate. She took Pepto-Bismol and the symptoms resolved. She was able to eat on Wednesday, but has had reduced appetite. On Thursday she ate some chicken, and the pain and diarrhea returned. She has tried taking imodium, but symptoms were only relieved temporarily. She denies sick contacts, recent travel, consumption of raw/undercooked foods. Yesterday she had an appointment with another doctor in her PCP's office, where she reported she had 6 watery bowel movements with the final one occurring at 1330 yesterday, and being stopped by imodium. She reported associated abdominal/gas pain, and mild nausea without vomiting. She states she has been tolerating water, and has had a subjective fever. She denied headache, chills, night sweats, lightheadedness, dizziness. She was evaluated in the ED with labs showing no leukocytosis or left shift.  A metabolic panel showed no significant abnormalities. A procalcitonin was mildly elevated at 0.25. UA was without UTI. A CT of the abdomen and pelvis showed findings concerning for enteritis of the small bowel,    Patient does report mild dyspnea with exertion since last seen.  Has intermittent swelling but not worse than before.  Denies significant orthopnea.  Will check a BNP as a precaution.  His weight is down about 6 lb since last seen he has been watching his salt better but must continue to do so and monitor daily weights.  Will also check a chest x-ray because of worsening symptoms after he had been experiencing steady improvement over several months   and mild intrahepatic biliary ductal dilatation. She was placed on observation.     Past Medical History:   Diagnosis Date    Abnormal weight loss 7/23/2017    Arthritis 12/30/2018    Complication of anesthesia 2009    colonoscopy-cardiac arrest from anes    Elevated TSH 7/23/2017    Hypercholesteremia 7/23/2017    Osteoporosis, post-menopausal     Vitamin D deficiency 7/23/2017       Past Surgical History:   Procedure Laterality Date    COLONOSCOPY  2016    KNEE ARTHROPLASTY  1985    TUBAL LIGATION         Review of patient's allergies indicates:   Allergen Reactions    Aspirin Swelling    Augmentin [amoxicillin-pot clavulanate] Diarrhea    Tessalon [benzonatate] Rash       No current facility-administered medications on file prior to encounter.     Current Outpatient Medications on File Prior to Encounter   Medication Sig    ascorbic acid, vitamin C, (VITAMIN C) 100 MG tablet Take 100 mg by mouth once daily.    calcium carbonate 1250 MG capsule Take 1,250 mg by mouth 2 (two) times daily with meals.    CYANOCOBALAMIN, VITAMIN B-12, (VITAMIN B-12 ORAL) Take 2,500 mg by mouth every morning. OTC    diclofenac sodium (VOLTAREN) 1 % Gel Apply 2 g topically 4 (four) times daily.    ergocalciferol, vitamin D2, (VITAMIN D ORAL) Take by mouth.    fish oil-omega-3 fatty acids 300-1,000 mg capsule Take 2 g by mouth once daily.    multivitamin (THERAGRAN) per tablet Take 1 tablet by mouth once daily.       Family History       Problem Relation (Age of Onset)    Cancer Sister, Maternal Grandmother    Coronary artery disease Father    Heart attack Brother    No Known Problems Daughter, Son    Ovarian cancer Sister (45)    Parkinsonism Mother    Stroke Paternal Grandfather          Tobacco Use    Smoking status: Never    Smokeless tobacco: Never   Substance and Sexual Activity    Alcohol use: Yes     Comment: 1/2 glass wine occasionally    Drug use: No    Sexual activity: Yes     Partners: Male     Birth control/protection:  Post-menopausal     Review of Systems   Constitutional:  Positive for appetite change. Negative for activity change, chills, diaphoresis and fever (subjective).   Respiratory:  Negative for cough, shortness of breath and wheezing.    Cardiovascular:  Negative for chest pain and palpitations.   Gastrointestinal:  Positive for abdominal pain, diarrhea and nausea. Negative for abdominal distention, constipation and vomiting.   Genitourinary:  Negative for decreased urine volume, dysuria, flank pain, frequency, hematuria and urgency.   Musculoskeletal:  Negative for arthralgias, myalgias, neck pain and neck stiffness.   Skin:  Negative for color change, pallor and rash.   Neurological:  Negative for dizziness, weakness, light-headedness and headaches.   Psychiatric/Behavioral:  Negative for agitation and confusion.      Objective:     Vital Signs (Most Recent):  Temp: 97.6 °F (36.4 °C) (02/03/24 0602)  Pulse: 66 (02/03/24 0601)  Resp: 16 (02/03/24 0601)  BP: 129/63 (02/03/24 0602)  SpO2: 99 % (02/03/24 0601) Vital Signs (24h Range):  Temp:  [97.6 °F (36.4 °C)-97.8 °F (36.6 °C)] 97.6 °F (36.4 °C)  Pulse:  [41-77] 66  Resp:  [16-19] 16  SpO2:  [99 %-100 %] 99 %  BP: (129-144)/(63-76) 129/63     Weight: 40.7 kg (89 lb 11.6 oz)  Body mass index is 18.12 kg/m².     Physical Exam  Vitals and nursing note reviewed.   Constitutional:       General: She is not in acute distress.     Appearance: Normal appearance. She is well-developed and normal weight. She is not ill-appearing, toxic-appearing or diaphoretic.   HENT:      Head: Normocephalic and atraumatic.   Eyes:      General: No scleral icterus.        Right eye: No discharge.         Left eye: No discharge.      Conjunctiva/sclera: Conjunctivae normal.   Neck:      Trachea: No tracheal deviation.   Cardiovascular:      Rate and Rhythm: Normal rate and regular rhythm.      Heart sounds: Normal heart sounds. No murmur heard.     No gallop.   Pulmonary:      Effort: Pulmonary  "effort is normal. No respiratory distress.      Breath sounds: Normal breath sounds. No stridor. No wheezing or rales.   Abdominal:      General: Bowel sounds are normal. There is no distension.      Palpations: Abdomen is soft. There is no mass.      Tenderness: There is no abdominal tenderness (generalized, R>L). There is no guarding.   Musculoskeletal:         General: No deformity. Normal range of motion.      Cervical back: Normal range of motion and neck supple.   Skin:     General: Skin is warm and dry.      Coloration: Skin is not pale.      Findings: No erythema or rash.   Neurological:      General: No focal deficit present.      Mental Status: She is alert and oriented to person, place, and time.      Cranial Nerves: No cranial nerve deficit.      Motor: No abnormal muscle tone.   Psychiatric:         Mood and Affect: Mood normal.         Behavior: Behavior normal.         Thought Content: Thought content normal.         Judgment: Judgment normal.                Significant Labs: All pertinent labs within the past 24 hours have been reviewed.  BMP:   Recent Labs   Lab 02/03/24  0540   GLU 89      K 3.7   *   CO2 21*   BUN 17   CREATININE 0.7   CALCIUM 8.2*   MG 2.0     CBC:   Recent Labs   Lab 02/03/24 0057 02/03/24  0540   WBC 5.91 4.57   HGB 14.7 13.4   HCT 43.9 39.7    184     CMP:   Recent Labs   Lab 02/03/24 0057 02/03/24  0540    143   K 4.6 3.7    113*   CO2 26 21*   GLU 89 89   BUN 23 17   CREATININE 0.8 0.7   CALCIUM 9.4 8.2*   PROT 7.1  --    ALBUMIN 3.7  --    BILITOT 0.5  --    ALKPHOS 53*  --    AST 36  --    ALT 27  --    ANIONGAP 11 9     Urine Culture: No results for input(s): "LABURIN" in the last 48 hours.  Urine Studies:   Recent Labs   Lab 02/03/24  0347   COLORU Yellow   APPEARANCEUA Clear   PHUR 7.0   SPECGRAV <=1.005*   PROTEINUA Negative   GLUCUA Negative   KETONESU Negative   BILIRUBINUA Negative   OCCULTUA Negative   NITRITE Negative "   UROBILINOGEN Negative   LEUKOCYTESUR 1+*   RBCUA 0   WBCUA 5   BACTERIA Rare   SQUAMEPITHEL 1       Significant Imaging: I have reviewed all pertinent imaging results/findings within the past 24 hours.      Imaging Results              CT Abdomen Pelvis With IV Contrast NO Oral Contrast (Final result)  Result time 02/03/24 01:55:43      Final result by Jeanne Guo MD (02/03/24 01:55:43)                   Impression:      Multiple nondilated small bowel loops containing fluid and air fluid levels.  Findings may be related to diarrheal illness and or enteritis.  Other etiology cannot be entirely excluded.    Mild intrahepatic biliary ductal dilatation.  Consider nonemergent outpatient MRCP or ERCP.    Colonic diverticulosis.      Electronically signed by: Jeanne Guo  Date:    02/03/2024  Time:    01:55               Narrative:    EXAMINATION:  CT OF ABDOMEN PELVIS WITH    CLINICAL HISTORY:  Abdominal abscess/infection suspected;    TECHNIQUE:  5 mm enhanced axial images were obtained from the lung bases through the greater trochanters.  Seventy-five mL of Omnipaque 350 was injected.    COMPARISON:  None.    FINDINGS:  Mild intrahepatic biliary ductal dilatation is seen.  There are no definite hepatic lesions.    Spleen, pancreas, and adrenal glands are unremarkable. The gallbladder contains no calcified gallstones.    The renal pelves are prominent or there are extrarenal pelves.    There are nondilated multiple small bowel loops containing fluid and air fluid levels.    There is no definite evidence for abdominal adenopathy or ascites.  There is a tiny fat containing umbilical hernia.    There are no pelvic masses or adenopathy.  Colonic diverticulosis is seen.    There is no free fluid in the pelvis.    There is moderate bibasilar atelectasis.                                     Assessment/Plan:     * Enteritis  - Ms. Alicia Toussaint presents with abdominal pain, gas, and diarrhea   - the diarrhea has  been intermittently relieved by use of Pepto-Bismol and Imodium   - Labs are overall unremarkable   - CT shows enteritis and intrahepatic biliary dilatation   - NPO  - hydrate w/ IVF   - prn bentyl  - will cover with zosyn as procal is mildly elevated, but have greater suspicion this is not bacterial   - C. Diff and stool studies pending   - consult GI         VTE Risk Mitigation (From admission, onward)           Ordered     heparin (porcine) injection 5,000 Units  Every 8 hours         02/03/24 0427     IP VTE HIGH RISK PATIENT  Once         02/03/24 0427     Place sequential compression device  Until discontinued         02/03/24 0427                            Aleshia Guzman PA-C  Department of Hospital Medicine  [facility] - Emergency Dept

## 2024-04-11 NOTE — ASSESSMENT & PLAN NOTE
Patient was having right-sided mid and lower abdominal pain.  Went to the emergency room, diagnosed with mesenteric adenitis.  Symptoms started to improve but then worsened again.  Repeat CT scan showed improvement of the mesenteric adenitis.  Pain is now present in the right side and lower thoracic area I suspect most of his current symptoms were likely related to his back, likely thoracic degenerative disc disease and radiculopathy.  Offered PT but he declined states he is going to start doing his home exercises.  He does have a follow-up scheduled with his pain specialist.  Will prescribe Zanaflex but do not recommend continuing narcotics.

## 2024-04-17 ENCOUNTER — PATIENT MESSAGE (OUTPATIENT)
Dept: PRIMARY CARE CLINIC | Facility: CLINIC | Age: 79
End: 2024-04-17

## 2024-04-17 ENCOUNTER — OFFICE VISIT (OUTPATIENT)
Dept: PRIMARY CARE CLINIC | Facility: CLINIC | Age: 79
End: 2024-04-17
Payer: MEDICARE

## 2024-04-17 VITALS
BODY MASS INDEX: 43.27 KG/M2 | DIASTOLIC BLOOD PRESSURE: 90 MMHG | HEIGHT: 70 IN | SYSTOLIC BLOOD PRESSURE: 128 MMHG | HEART RATE: 73 BPM | OXYGEN SATURATION: 97 % | WEIGHT: 302.25 LBS

## 2024-04-17 DIAGNOSIS — I50.32 CHRONIC HEART FAILURE WITH PRESERVED EJECTION FRACTION (HFPEF): Chronic | ICD-10-CM

## 2024-04-17 DIAGNOSIS — Z95.1 S/P CABG (CORONARY ARTERY BYPASS GRAFT): ICD-10-CM

## 2024-04-17 DIAGNOSIS — R06.02 SHORTNESS OF BREATH: Primary | ICD-10-CM

## 2024-04-17 LAB
OHS QRS DURATION: 138 MS
OHS QTC CALCULATION: 468 MS

## 2024-04-17 PROCEDURE — 1101F PT FALLS ASSESS-DOCD LE1/YR: CPT | Mod: CPTII,S$GLB,, | Performed by: PHYSICIAN ASSISTANT

## 2024-04-17 PROCEDURE — 99417 PROLNG OP E/M EACH 15 MIN: CPT | Mod: S$GLB,,, | Performed by: PHYSICIAN ASSISTANT

## 2024-04-17 PROCEDURE — 3080F DIAST BP >= 90 MM HG: CPT | Mod: CPTII,S$GLB,, | Performed by: PHYSICIAN ASSISTANT

## 2024-04-17 PROCEDURE — 93010 ELECTROCARDIOGRAM REPORT: CPT | Mod: S$GLB,,, | Performed by: INTERNAL MEDICINE

## 2024-04-17 PROCEDURE — 1125F AMNT PAIN NOTED PAIN PRSNT: CPT | Mod: CPTII,S$GLB,, | Performed by: PHYSICIAN ASSISTANT

## 2024-04-17 PROCEDURE — 1157F ADVNC CARE PLAN IN RCRD: CPT | Mod: CPTII,S$GLB,, | Performed by: PHYSICIAN ASSISTANT

## 2024-04-17 PROCEDURE — 99215 OFFICE O/P EST HI 40 MIN: CPT | Mod: S$GLB,,, | Performed by: PHYSICIAN ASSISTANT

## 2024-04-17 PROCEDURE — 99999 PR PBB SHADOW E&M-EST. PATIENT-LVL V: CPT | Mod: PBBFAC,,, | Performed by: PHYSICIAN ASSISTANT

## 2024-04-17 PROCEDURE — 1159F MED LIST DOCD IN RCRD: CPT | Mod: CPTII,S$GLB,, | Performed by: PHYSICIAN ASSISTANT

## 2024-04-17 PROCEDURE — 93005 ELECTROCARDIOGRAM TRACING: CPT | Mod: S$GLB,,, | Performed by: PHYSICIAN ASSISTANT

## 2024-04-17 PROCEDURE — 3074F SYST BP LT 130 MM HG: CPT | Mod: CPTII,S$GLB,, | Performed by: PHYSICIAN ASSISTANT

## 2024-04-17 PROCEDURE — 3288F FALL RISK ASSESSMENT DOCD: CPT | Mod: CPTII,S$GLB,, | Performed by: PHYSICIAN ASSISTANT

## 2024-04-17 RX ORDER — FUROSEMIDE 40 MG/1
80 TABLET ORAL DAILY
Qty: 28 TABLET | Refills: 0 | Status: SHIPPED | OUTPATIENT
Start: 2024-04-17 | End: 2024-05-01

## 2024-04-17 NOTE — PROGRESS NOTES
"Subjective:      Patient ID: Chinedu Roque is a 78 y.o. male.    Vitals:  height is 5' 10" (1.778 m) and weight is 137.1 kg (302 lb 4 oz) (abnormal). His blood pressure is 128/90 (abnormal) and his pulse is 73. His oxygen saturation is 97%.     Chief Complaint: Shortness of Breath, Cough, and Chest Pain (When coughing)    78-year-old male presents with shortness of breath with slight exertion.  Patient is new to me, although established to this clinic.  Patient states symptoms have been going on for some time but became much worse on Tuesday(yesterday).  He can not walk short distances without feeling winded.   He denies any shortness of breath at rest.   Denies any associated chest pain or palpitations.  Denies any worsening of shortness of breath with laying down. He was seen Friday in our office.  X-ray performed which revealed chronic pleural effusion.  BNP was within normal limits.  Patient has been taking 40 mg of Lasix daily.  He also takes spironolactone.  He also mentions chest tightness across upper chest bilaterally, but states this has been going on since he got the bypass.  Patient has never had pulmonary function testing.  Former smoker that quit in 1983.        Constitution: Positive for fatigue. Negative for fever.   Cardiovascular:  Positive for leg swelling and sob on exertion. Negative for chest pain and palpitations.   Respiratory:  Positive for chest tightness (across top of chest, since bypass) and shortness of breath. Negative for cough and sputum production.       Objective:     Physical Exam   Constitutional:  Non-toxic appearance. No distress. obesity  HENT:   Head: Normocephalic and atraumatic.   Ears:   Right Ear: External ear normal.   Left Ear: External ear normal.   Eyes: Conjunctivae are normal. Right eye exhibits no discharge. Left eye exhibits no discharge. Extraocular movement intact   Cardiovascular: Normal rate and regular rhythm.   Pulmonary/Chest: Effort normal. He has no " wheezes. He has no rhonchi. He has no rales.   Musculoskeletal:      Right lower leg: 3+ Pitting Edema present.      Left lower leg: 3+ Pitting Edema present.   Neurological: He is alert.   Skin: Skin is warm, dry, intact and not vesicular.   Psychiatric: His behavior is normal. Mood, judgment and thought content normal.   Nursing note and vitals reviewed.      Assessment:     1. Shortness of breath    2. Chronic heart failure with preserved ejection fraction (HFpEF)    3. S/P CABG (coronary artery bypass graft)        Plan:       Shortness of breath  -     IN OFFICE EKG 12-LEAD (to Muse)    A-flutter, but rate controlled. Left axis deviation.     -     BASIC METABOLIC PANEL; Future; Expected date: 04/17/2024    Will have drawn prior to seeing me in 2 weeks for follow up.    Chronic heart failure with preserved ejection fraction (HFpEF)          Last echo 7/2023        Will see if we can reach out to cardiology to expedite appt for follow up.    S/P CABG (coronary artery bypass graft)           See above    Other orders  -     furosemide (LASIX) 40 MG tablet; Take 2 tablets (80 mg total) by mouth once daily. for 14 days  Dispense: 28 tablet; Refill: 0    I reviewed the case with Dr. Browne. Patient had chest xray and BNP last Friday (5 days ago). Xray did not reveal any significant increasing of left sided pleural effusion. BNP was WNL. Patient denies any SOB with lying down. He does have pitting edema, but does not fee as if this has significantly changed over las 5 days. Patient states he noticed an increase in his SOB yesterday. He mentions upper chest tightness that has been going on since the bypass. No change in this over last couple of days. It is not exacerbated with exertion. Patient has been taking 40 mg Lasix recently. No decreased breath sounds or crackles on auscultation today. O2 sats at 97%. Diastolic elevated. Pulse normal rate. EKG does reveal a flutter with controlled rate. Discussed  increasing Lasix short term. Will need labs and close follow up. Will reach out to Cardiology to see if we can expedite follow up appointment.     My total time spent on this encounter was 138 minutes which included  the following activities: preparing to see the patient, performing a medically appropriate and/or evaluation, counseling and educating the patient and family/caregiver, ordering medications, tests, or procedures, referring and communicating with other healthcare providers, documenting clinical information in the electronic or other health record, and independently interpreting results. This time is independent and non-overlapping.

## 2024-04-17 NOTE — TELEPHONE ENCOUNTER
Spoke with pt, he reports that he has worsened since being seen in the clinic on Friday.  Pt reports that he thought that there was supposed to be some medication change and there was supposed to be some coordination with Dr. Marcelino.  Pt reports that he has not had a lot of salt intake, but he is retaining fluid.  Pt reports that his current weight is 303, and his baseline weight at home is 295.  Pt's weight yesterday was 292.  Pt is taking diuretics.  Pt reports that he ate grilled fish without seasoning and a salad for dinner.  Pt's oxygen saturation is 95% and HR Is 65 on a spot check.  Pt scheduled to see Mariel today at 1000 and was advised to call us back if he has any significant changes between now and then.

## 2024-04-17 NOTE — PATIENT INSTRUCTIONS
This afternoon take another 40 mg  Tomorrow (Thursday-Saturday) 80 mg in am, 40 mg in pm  Sunday through when you see me 80 mg daily  Get lab work done Monday or Tuesday before you see me Wednesday May 1st

## 2024-04-18 ENCOUNTER — TELEPHONE (OUTPATIENT)
Dept: PRIMARY CARE CLINIC | Facility: CLINIC | Age: 79
End: 2024-04-18
Payer: MEDICARE

## 2024-04-22 ENCOUNTER — TELEPHONE (OUTPATIENT)
Dept: PRIMARY CARE CLINIC | Facility: CLINIC | Age: 79
End: 2024-04-22
Payer: MEDICARE

## 2024-05-01 ENCOUNTER — OFFICE VISIT (OUTPATIENT)
Dept: PRIMARY CARE CLINIC | Facility: CLINIC | Age: 79
End: 2024-05-01
Payer: MEDICARE

## 2024-05-01 VITALS
OXYGEN SATURATION: 96 % | HEART RATE: 60 BPM | RESPIRATION RATE: 18 BRPM | SYSTOLIC BLOOD PRESSURE: 140 MMHG | TEMPERATURE: 98 F | BODY MASS INDEX: 42.32 KG/M2 | DIASTOLIC BLOOD PRESSURE: 70 MMHG | WEIGHT: 295 LBS

## 2024-05-01 DIAGNOSIS — R20.2 PARESTHESIAS: ICD-10-CM

## 2024-05-01 DIAGNOSIS — I50.32 CHRONIC HEART FAILURE WITH PRESERVED EJECTION FRACTION (HFPEF): Primary | Chronic | ICD-10-CM

## 2024-05-01 PROCEDURE — 1157F ADVNC CARE PLAN IN RCRD: CPT | Mod: CPTII,S$GLB,, | Performed by: PHYSICIAN ASSISTANT

## 2024-05-01 PROCEDURE — 99999 PR PBB SHADOW E&M-EST. PATIENT-LVL V: CPT | Mod: PBBFAC,,, | Performed by: PHYSICIAN ASSISTANT

## 2024-05-01 PROCEDURE — 1159F MED LIST DOCD IN RCRD: CPT | Mod: CPTII,S$GLB,, | Performed by: PHYSICIAN ASSISTANT

## 2024-05-01 PROCEDURE — 3288F FALL RISK ASSESSMENT DOCD: CPT | Mod: CPTII,S$GLB,, | Performed by: PHYSICIAN ASSISTANT

## 2024-05-01 PROCEDURE — 99213 OFFICE O/P EST LOW 20 MIN: CPT | Mod: S$GLB,,, | Performed by: PHYSICIAN ASSISTANT

## 2024-05-01 PROCEDURE — 3078F DIAST BP <80 MM HG: CPT | Mod: CPTII,S$GLB,, | Performed by: PHYSICIAN ASSISTANT

## 2024-05-01 PROCEDURE — 1101F PT FALLS ASSESS-DOCD LE1/YR: CPT | Mod: CPTII,S$GLB,, | Performed by: PHYSICIAN ASSISTANT

## 2024-05-01 PROCEDURE — 1125F AMNT PAIN NOTED PAIN PRSNT: CPT | Mod: CPTII,S$GLB,, | Performed by: PHYSICIAN ASSISTANT

## 2024-05-01 PROCEDURE — 3077F SYST BP >= 140 MM HG: CPT | Mod: CPTII,S$GLB,, | Performed by: PHYSICIAN ASSISTANT

## 2024-05-01 NOTE — PROGRESS NOTES
Subjective:      Patient ID: Chinedu Roque is a 78 y.o. male.    Vitals:  weight is 133.8 kg (294 lb 15.6 oz). His oral temperature is 98.2 °F (36.8 °C). His blood pressure is 140/70 (abnormal) and his pulse is 60. His respiration is 18 and oxygen saturation is 96%.     Chief Complaint: Follow-up, Shortness of Breath, Numbness, and Peripheral Neuropathy    78-year-old male presents for follow-up of shortness a breath.  Patient was seen in proximally 2 weeks ago.  Was noted to have 3+ pitting edema bilaterally.  At last visit he was encouraged to increase Lasix to 80 mg daily.  Patient states since that time his shortness a breath has improved greatly.  He feels much better.    Of note today he also mentions bilateral upper extremity neuropathy.  This has been going on for some time.    Follow-up  Associated symptoms include numbness.   Shortness of Breath        Respiratory:  Negative for shortness of breath.    Neurological:  Positive for numbness and tingling.      Objective:     Physical Exam   Constitutional: He does not appear ill. No distress.   HENT:   Head: Normocephalic and atraumatic.   Ears:   Right Ear: External ear normal.   Left Ear: External ear normal.   Eyes: Conjunctivae are normal. Right eye exhibits no discharge. Left eye exhibits no discharge. Extraocular movement intact   Pulmonary/Chest: Effort normal and breath sounds normal. He has no wheezes. He has no rhonchi. He has no rales.   Abdominal: Normal appearance.   Musculoskeletal: Normal range of motion.         General: Normal range of motion.      Right lower le+ Edema present.      Left lower le+ Edema present.   Neurological: He is alert.   Skin: Skin is warm, dry and not pale. jaundice  Psychiatric: His behavior is normal. Mood, judgment and thought content normal.   Nursing note and vitals reviewed.      Assessment:     1. Chronic heart failure with preserved ejection fraction (HFpEF)    2. Paresthesias        Plan:        Chronic heart failure with preserved ejection fraction (HFpEF)    May go back down to 40 mg Lasix daily.  His weight has decreased by about 8 lb since last visit.  Breathing has improved dramatically.  Has follow-up scheduled with Cardiology in a couple of weeks.  Patient to notify clinic with any changes or worsening of symptoms.  Monitor weights daily.  If weight gain of 3-5 lb occurs take double dose of Lasix.  Follow up with Cardiology as scheduled.  Will see patient back in 1 month.    Paresthesias    Negative Tinel sign at the elbow.  Spurling's test negative.  I am unsure where patient's paresthesias originating.  Review of x-ray of cervical spine from 2015 shows multilevel degenerative changes with multilevel neural foraminal stenosis.  He has discussed this with Dr. Browne previously.  There was mention of nerve conduction testing.  May also need repeat cervical spine evaluation.  Patient is amenable to pursuing this, but after finishing up with Cardiology recommendations.       My total time spent on this encounter was 24 minutes which included  the following activities: preparing to see the patient, performing a medically appropriate and/or evaluation, counseling and educating the patient and family/caregiver, ordering medications, tests, or procedures, referring and communicating with other healthcare providers, documenting clinical information in the electronic or other health record, and independently interpreting results. This time is independent and non-overlapping.

## 2024-05-01 NOTE — PROGRESS NOTES
Primary Care Provider Appointment   [facility]       Patient ID: Chinedu Roque is a 78 y.o. male.    ASSESSMENT/PLAN by Problem List:    {There are no diagnoses linked to this encounter. (Refresh or delete this SmartLink)}     Follow Up:  ***    @[email or web address]@    Health Maintenance         Date Due Completion Date    RSV Vaccine (Age 60+ and Pregnant patients) (1 - 1-dose 60+ series) Never done ---    TETANUS VACCINE 11/24/2022 11/24/2012    COVID-19 Vaccine (4 - 2023-24 season) 09/01/2023 11/16/2021    Lipid Panel 02/01/2025 2/1/2024            Advance Care Planning     Date: 05/01/2024  {ACP:70880}             Subjective:     Chief Complaint   Patient presents with    Follow-up    Shortness of Breath    Numbness    Peripheral Neuropathy     I have reviewed the information entered by the ancillary staff regarding the chief complaint as well as the related history.    bREATHING A LITTLE BETTER    Follow-up    Shortness of Breath        Patient is a/an 78 y.o.  male     ***    For complete problem list, past medical history, surgical history, social history, etc., see appropriate section in the electronic medical record    Review of Systems   Respiratory:  Positive for shortness of breath.        Objective     Physical Exam  Vitals:    05/01/24 1439   BP: (!) 142/76   BP Location: Right arm   Patient Position: Sitting   BP Method: Large (Manual)   Pulse: 60   Resp: 18   Temp: 98.2 °F (36.8 °C)   TempSrc: Oral   SpO2: 96%   Weight: 133.8 kg (294 lb 15.6 oz)           THIS DOCUMENT WAS MADE IN PART WITH VOICE RECOGNITION SOFTWARE.  OCCASIONALLY THIS SOFTWARE WILL MISINTERPRET WORDS OR PHRASES.

## 2024-05-05 DIAGNOSIS — G89.29 CHRONIC BILATERAL LOW BACK PAIN WITHOUT SCIATICA: ICD-10-CM

## 2024-05-05 DIAGNOSIS — G62.9 PERIPHERAL POLYNEUROPATHY: Primary | ICD-10-CM

## 2024-05-05 DIAGNOSIS — M54.50 CHRONIC BILATERAL LOW BACK PAIN WITHOUT SCIATICA: ICD-10-CM

## 2024-05-06 RX ORDER — GABAPENTIN 300 MG/1
CAPSULE ORAL
Qty: 90 CAPSULE | Refills: 4 | Status: SHIPPED | OUTPATIENT
Start: 2024-05-06

## 2024-05-17 ENCOUNTER — OFFICE VISIT (OUTPATIENT)
Dept: CARDIOLOGY | Facility: CLINIC | Age: 79
End: 2024-05-17
Payer: MEDICARE

## 2024-05-17 VITALS
HEART RATE: 64 BPM | HEIGHT: 70 IN | WEIGHT: 299.63 LBS | SYSTOLIC BLOOD PRESSURE: 166 MMHG | BODY MASS INDEX: 42.9 KG/M2 | DIASTOLIC BLOOD PRESSURE: 91 MMHG

## 2024-05-17 DIAGNOSIS — Z95.1 S/P CABG (CORONARY ARTERY BYPASS GRAFT): ICD-10-CM

## 2024-05-17 DIAGNOSIS — I48.0 PAF (PAROXYSMAL ATRIAL FIBRILLATION): Primary | ICD-10-CM

## 2024-05-17 DIAGNOSIS — I87.2 VENOUS INSUFFICIENCY OF BOTH LOWER EXTREMITIES: ICD-10-CM

## 2024-05-17 DIAGNOSIS — I25.10 CORONARY ARTERY DISEASE INVOLVING NATIVE CORONARY ARTERY OF NATIVE HEART WITHOUT ANGINA PECTORIS: ICD-10-CM

## 2024-05-17 DIAGNOSIS — R94.31 ABNORMAL EKG: ICD-10-CM

## 2024-05-17 DIAGNOSIS — I49.9 IRREGULAR CARDIAC RHYTHM: ICD-10-CM

## 2024-05-17 DIAGNOSIS — I10 PRIMARY HYPERTENSION: ICD-10-CM

## 2024-05-17 DIAGNOSIS — E78.2 MIXED HYPERLIPIDEMIA: Chronic | ICD-10-CM

## 2024-05-17 DIAGNOSIS — I50.32 CHRONIC HEART FAILURE WITH PRESERVED EJECTION FRACTION (HFPEF): Chronic | ICD-10-CM

## 2024-05-17 DIAGNOSIS — G47.33 OSA ON CPAP: ICD-10-CM

## 2024-05-17 LAB
OHS QRS DURATION: 136 MS
OHS QTC CALCULATION: 451 MS

## 2024-05-17 PROCEDURE — 93005 ELECTROCARDIOGRAM TRACING: CPT | Mod: PO

## 2024-05-17 PROCEDURE — 3288F FALL RISK ASSESSMENT DOCD: CPT | Mod: CPTII,S$GLB,, | Performed by: INTERNAL MEDICINE

## 2024-05-17 PROCEDURE — 3080F DIAST BP >= 90 MM HG: CPT | Mod: CPTII,S$GLB,, | Performed by: INTERNAL MEDICINE

## 2024-05-17 PROCEDURE — 1126F AMNT PAIN NOTED NONE PRSNT: CPT | Mod: CPTII,S$GLB,, | Performed by: INTERNAL MEDICINE

## 2024-05-17 PROCEDURE — 99999 PR PBB SHADOW E&M-EST. PATIENT-LVL III: CPT | Mod: PBBFAC,,, | Performed by: INTERNAL MEDICINE

## 2024-05-17 PROCEDURE — 99214 OFFICE O/P EST MOD 30 MIN: CPT | Mod: S$GLB,,, | Performed by: INTERNAL MEDICINE

## 2024-05-17 PROCEDURE — 1157F ADVNC CARE PLAN IN RCRD: CPT | Mod: CPTII,S$GLB,, | Performed by: INTERNAL MEDICINE

## 2024-05-17 PROCEDURE — 93010 ELECTROCARDIOGRAM REPORT: CPT | Mod: S$GLB,,, | Performed by: INTERNAL MEDICINE

## 2024-05-17 PROCEDURE — 3077F SYST BP >= 140 MM HG: CPT | Mod: CPTII,S$GLB,, | Performed by: INTERNAL MEDICINE

## 2024-05-17 PROCEDURE — 1159F MED LIST DOCD IN RCRD: CPT | Mod: CPTII,S$GLB,, | Performed by: INTERNAL MEDICINE

## 2024-05-17 PROCEDURE — 1101F PT FALLS ASSESS-DOCD LE1/YR: CPT | Mod: CPTII,S$GLB,, | Performed by: INTERNAL MEDICINE

## 2024-05-17 NOTE — PROGRESS NOTES
Pt calls stating that she spoke with Kayla and was told that there is Cholestyramine which would cost her $80.00 per two months.  Is this comparible to Colestipol?  Pt has enough medication for now and is ok with waiting until 6/21 for your response. Please advise    Subjective:    Patient ID:  Chinedu Roque is a 78 y.o. male patient here for evaluation Follow-up (sob)      History of Present Illness:  Cardiology follow-up.  History of CABG x3 June 23.  Left atrial appendage ligation.  EF documented past 60%.  Patient presents with a 4-6 week history of PORTER, stable.  EKG last month with a flutter.  Patient remains on oral anticoagulation   With Eliquis 5 b.I.d..  Lasix recently added to medical regime.  Blood pressure moderately elevated today.      No angina.  Stable lower extremity edema.  No PND orthopnea.  Chest x-ray labs end of last month on remarkable.  Pro BNP normal.  Renal function normal.  Chest x-ray with small left pleural effusion.             Review of patient's allergies indicates:  No Known Allergies    Past Medical History:   Diagnosis Date    Anticoagulant long-term use     ASA 81 mg    Arthritis     cervical    BPH (benign prostatic hyperplasia) 03/02/2012    Chronic left shoulder pain     Compression fracture of L2     DDD (degenerative disc disease), lumbar     HTN (hypertension) 03/02/2012    Hx of colonic polyps 2013    Low back pain     Morbid obesity with BMI of 40.0-44.9, adult 03/18/2014    Seasonal allergies     Sleep apnea     compliant with CPAP    Stroke 03/1986     Past Surgical History:   Procedure Laterality Date    ANGIOGRAM, CORONARY, WITH LEFT HEART CATHETERIZATION N/A 06/07/2023    Procedure: Left Heart Cath;  Surgeon: Edgardo Marcelino MD;  Location: [facility] CATH;  Service: Cardiology;  Laterality: N/A;    APPENDECTOMY      CATARACT EXTRACTION EXTRACAPSULAR W/ INTRAOCULAR LENS IMPLANTATION      COLONOSCOPY N/A 06/06/2022    Procedure: COLONOSCOPY;  Surgeon: Jose Bello MD;  Location: [facility] ENDO;  Service: Endoscopy;  Laterality: N/A;    COLONOSCOPY N/A 07/06/2023    Procedure: COLONOSCOPY;  Surgeon: Sb Spaulding MD;  Location: [facility] ENDO;  Service: Endoscopy;  Laterality: N/A;    CORONARY ANGIOGRAPHY N/A 06/07/2023     Procedure: ANGIOGRAM, CORONARY ARTERY;  Surgeon: Edgardo Marcelino MD;  Location: [facility] CATH;  Service: Cardiology;  Laterality: N/A;    CORONARY ARTERY BYPASS GRAFT      CORONARY ARTERY BYPASS GRAFT (CABG) N/A 06/23/2023    Procedure: CORONARY ARTERY BYPASS GRAFT (CABG) X3;  Surgeon: Pricila Clark MD;  Location: [facility] OR;  Service: Cardiothoracic;  Laterality: N/A;    ENDOSCOPIC HARVEST OF VEIN Right 06/23/2023    Procedure: HARVEST-VEIN-ENDOVASCULAR;  Surgeon: Pricila Clark MD;  Location: [facility] OR;  Service: Cardiothoracic;  Laterality: Right;    EPIDURAL BLOCK INJECTION  2015    cervical    EPIDURAL STEROID INJECTION INTO LUMBAR SPINE N/A 06/05/2019    Procedure: Injection-steroid-epidural-lumbar L5/S1 interlaminar;  Surgeon: Riley Segura MD;  Location: [facility] OR;  Service: Pain Management;  Laterality: N/A;    EPIDURAL STEROID INJECTION INTO LUMBAR SPINE N/A 09/13/2019    Procedure: Injection-steroid-epidural-lumbar;  Surgeon: Riley Segura MD;  Location: [facility] OR;  Service: Pain Management;  Laterality: N/A;  L5/S1 interlaminar to the right    EPIDURAL STEROID INJECTION INTO LUMBAR SPINE N/A 06/02/2020    Procedure: Injection-steroid-epidural-lumbar L5/S1 to right;  Surgeon: Riley Segura MD;  Location: [facility] OR;  Service: Pain Management;  Laterality: N/A;    EXCLUSION, LEFT ATRIAL APPENDAGE, OPEN, AS PART OF OPEN CHEST SURGERY N/A 06/23/2023    Procedure: EXCLUSION, LEFT ATRIAL APPENDAGE, OPEN, AS PART OF OPEN CHEST SURGERY;  Surgeon: Pricila Clark MD;  Location: [facility] OR;  Service: Cardiothoracic;  Laterality: N/A;    EXPLORATION OF MEDIASTINUM N/A 06/23/2023    Procedure: EXPLORATION, MEDIASTINUM - MEDIASTINAL RE-EXPLORATION TO CONTROL POST-OP BLEEDING;  Surgeon: Pricila Clark MD;  Location: [facility] OR;  Service: Cardiothoracic;  Laterality: N/A;    Facet Steroid injection       Pain management    HERNIA REPAIR      Ventral    INJECTION OF ANESTHETIC AGENT AROUND MEDIAL BRANCH NERVES INNERVATING LUMBAR  FACET JOINT Right 03/21/2023    Procedure: Block-nerve-medial branch-lumbar L3/4, L4/5, L5/S1;  Surgeon: Riley Segura MD;  Location: [facility];  Service: Pain Management;  Laterality: Right;    INSERTION OF DORSAL COLUMN NERVE STIMULATOR FOR TRIAL N/A 02/10/2021    Procedure: INSERTION, NEUROSTIMULATOR, SPINAL CORD, DORSAL COLUMN, FOR TRIAL;  Surgeon: Riley Segura MD;  Location: [facility] OR;  Service: Pain Management;  Laterality: N/A;    INSERTION OF NEUROSTIMULATOR OF DORSAL COLUMN OF SPINAL CORD N/A 03/01/2021    Procedure: INSERTION, NEUROSTIMULATOR, SPINAL CORD, DORSAL COLUMN;  Surgeon: Riley Segura MD;  Location: [facility] OR;  Service: Pain Management;  Laterality: N/A;    KNEE SURGERY      bilateral    LAPAROSCOPIC CHOLECYSTECTOMY N/A 11/29/2023    Procedure: CHOLECYSTECTOMY, LAPAROSCOPIC;  Surgeon: Louisa Leo MD;  Location: [facility];  Service: General;  Laterality: N/A;    RADIOFREQUENCY ABLATION  2015    lumbar nerve    RADIOFREQUENCY ABLATION OF LUMBAR MEDIAL BRANCH NERVE AT SINGLE LEVEL Right 04/11/2019    Procedure: Radiofrequency Ablation, Nerve, Spinal, Lumbar, Medial Branch, L1,2,3,4,5;  Surgeon: Riley Segura MD;  Location: [facility] OR;  Service: Pain Management;  Laterality: Right;    TONSILLECTOMY      TRANSFORAMINAL EPIDURAL INJECTION OF STEROID Right 11/13/2020    Procedure: Injection,steroid,epidural,transforaminal approach, l3/4 and l5/s1;  Surgeon: Riley Segura MD;  Location: [facility] OR;  Service: Pain Management;  Laterality: Right;    TRANSFORAMINAL EPIDURAL INJECTION OF STEROID Left 06/24/2021    Procedure: Injection,steroid,epidural,transforaminal approach L5/S1;  Surgeon: Riley Segura MD;  Location: [facility] OR;  Service: Pain Management;  Laterality: Left;    TRANSFORAMINAL EPIDURAL INJECTION OF STEROID Right 09/22/2022    Procedure: Injection,steroid,epidural,transforaminal approach L5/S1;  Surgeon: Riley Segura MD;  Location: [facility] OR;  Service: Pain  Management;  Laterality: Right;    TRANSFORAMINAL EPIDURAL INJECTION OF STEROID Right 10/25/2022    Procedure: Injection,steroid,epidural,transforaminal approach L2/3 and L3/4;  Surgeon: Riley Segura MD;  Location: [facility];  Service: Pain Management;  Laterality: Right;    VERTEBROPLASTY       Social History     Tobacco Use    Smoking status: Former     Current packs/day: 0.00     Average packs/day: 1.5 packs/day for 24.0 years (36.0 ttl pk-yrs)     Types: Cigarettes     Start date: 10/21/1959     Quit date: 3/19/1983     Years since quittin.1     Passive exposure: Past    Smokeless tobacco: Never   Substance Use Topics    Alcohol use: No    Drug use: No        Review of Systems:    As noted in HPI in addition      REVIEW OF SYSTEMS  Review of Systems   Constitutional: Negative for decreased appetite, diaphoresis, night sweats, weight gain and weight loss.   HENT:  Negative for nosebleeds and odynophagia.    Eyes:  Negative for double vision and photophobia.   Cardiovascular:  Positive for dyspnea on exertion. Negative for chest pain, claudication, cyanosis, irregular heartbeat, leg swelling, near-syncope, orthopnea, palpitations, paroxysmal nocturnal dyspnea and syncope.   Respiratory:  Positive for shortness of breath. Negative for cough, hemoptysis and wheezing.    Hematologic/Lymphatic: Negative for adenopathy.   Skin:  Negative for flushing, skin cancer and suspicious lesions.   Musculoskeletal:  Negative for gout, myalgias and neck pain.   Gastrointestinal:  Negative for abdominal pain, heartburn, hematemesis and hematochezia.   Genitourinary:  Negative for bladder incontinence, hesitancy and nocturia.   Neurological:  Negative for focal weakness, headaches, light-headedness and paresthesias.   Psychiatric/Behavioral:  Negative for memory loss and substance abuse.               Objective:        Vitals:    24 1017   BP: (!) 166/91   Pulse: 64       Lab Results   Component Value Date    WBC  6.64 03/21/2024    HGB 13.1 (L) 03/21/2024    HCT 41.1 03/21/2024     03/21/2024    CHOL 113 (L) 02/01/2024    TRIG 52 02/01/2024    HDL 51 02/01/2024    ALT 29 02/01/2024    AST 33 02/01/2024     04/29/2024    K 3.9 04/29/2024     04/29/2024    CREATININE 0.99 04/29/2024    BUN 16 04/29/2024    CO2 29 04/29/2024    TSH 0.890 11/20/2023    PSA 4.5 (H) 03/22/2022    INR 1.1 08/04/2023    HGBA1C 5.9 (H) 03/21/2024        ECHOCARDIOGRAM RESULTS  Results for orders placed during the hospital encounter of 07/08/23    Echo    Interpretation Summary  · The left ventricle is normal in size with normal systolic function.  · The estimated ejection fraction is 60%.  · Normal right ventricular size with low normal right ventricular systolic function.  · Mild to moderate tricuspid regurgitation.  · Intermediate central venous pressure (8 mmHg).  · The estimated PA systolic pressure is 46 mmHg.  · There is mild pulmonary hypertension.  · 3 mL of intravenous Optison contrast was used during the study    Results for orders placed during the hospital encounter of 06/07/23    Cardiac catheterization    Conclusion    The ejection fraction was calculated to be 65%.    The left ventricular end diastolic pressure was normal.    The pre-procedure left ventricular end diastolic pressure was 14.    The post-procedure left ventricular end diastolic pressure was 14.    The estimated blood loss was none.    High-grade ostial left main, ostial LAD and ostial circumflex disease as described.  Left dominant system.  Refer to CTS for surgical revascularization.  EF normal.    The procedure log was documented by No documenter listed and verified by Edgardo Marcelino MD.    Date: 6/7/2023  Time: 10:58 AM          CURRENT/PREVIOUS VISIT EKG  Results for orders placed or performed in visit on 04/17/24   IN OFFICE EKG 12-LEAD (to [place])    Collection Time: 04/17/24 10:40 AM   Result Value Ref Range    QRS Duration 138 ms    [facility] QTC  Calculation 468 ms    Narrative    Test Reason : R06.02,    Vent. Rate : 060 BPM     Atrial Rate : 227 BPM     P-R Int : 000 ms          QRS Dur : 138 ms      QT Int : 468 ms       P-R-T Axes : 087 -53 037 degrees     QTc Int : 468 ms    Atrial flutter  Left axis deviation  Nonspecific intraventricular block  Abnormal ECG  When compared with ECG of 24-JAN-2024 11:26,  Atrial flutter has replaced Sinus rhythm  QRS duration has increased  ST no longer depressed in Inferior leads  ST elevation has replaced ST depression in Anterior leads  QT has lengthened  Confirmed by Holli Ramirez MD ([id number]) on 4/17/2024 11:50:52 AM    Referred By:             Confirmed By:Holli Ramirez MD     No valid procedures specified.   Results for orders placed during the hospital encounter of 03/22/23    Nuclear Stress - Cardiology Interpreted    Interpretation Summary    The ECG portion of the study is abnormal but not diagnostic.    The test was stopped because the patient experienced A-V block.    2nd degree AV Block confirmed by [facility] PA-C    Resting images obtained only.  Apical thinning noted.  No stress images.  Abnormal baseline EKG is reported.  Inconclusive study.    No valid procedures specified.    PHYSICAL EXAM  GENERAL: well built, well nourished, well-developed in no apparent distress alert and oriented.   HEENT: Normocephalic. Pupils normal and conjunctivae normal.  Mucous membranes normal, no cyanosis or icterus, trachea central,no pallor or icterus is noted..   NECK: No JVD. No bruit..   THYROID: Thyroid not enlarged. No nodules present..   CARDIAC:  Normal S1-S2.  No murmur rub click or gallop.  PMI nondisplaced.  CHEST ANATOMY: normal.   LUNGS: Clear to auscultation. No wheezing or rhonchi..   ABDOMEN: Soft no masses or organomegaly.  No abdomen pulsations or bruits.  Normal bowel sounds. No pulsations and no masses felt, No guarding or rebound.   URINARY: No arnold catheter   EXTREMITIES: No cyanosis, clubbing or edema  noted at this time., no calf tenderness bilaterally.   PERIPHERAL VASCULAR SYSTEM: Good palpable distal pulses.  2+ femoral, popliteal and pedal pulses.  No bruits    CENTRAL NERVOUS SYSTEM: No focal motor or sensory deficits noted.   SKIN: Skin without lesions, moist, well perfused.   MUSCLE STRENGTH & TONE: No noteable weakness, atrophy or abnormal movement    I HAVE REVIEWED :    The vital signs, nurses notes, and all the pertinent radiology and labs.         Current Outpatient Medications   Medication Instructions    acetaminophen (TYLENOL) 500 mg, Oral, Every 6 hours PRN    apixaban (ELIQUIS) 5 mg, Oral, 2 times daily    aspirin (ECOTRIN) 81 mg, Oral, Daily    atorvastatin (LIPITOR) 20 mg, Oral, Daily    b complex vitamins tablet 1 tablet, Oral, Daily    calcium citrate-vitamin D3 315-200 mg (CITRACAL+D) 315 mg-5 mcg (200 unit) per tablet 1 tablet, Oral, Daily    colchicine (COLCRYS) 0.6 mg, Oral, 2 times daily    finasteride (PROSCAR) 5 mg, Oral, Daily    furosemide (LASIX) 20 mg, Oral, Nightly PRN, Take nightly PRN weight gain of 2 lbs in 24 hours or 3 lbs in 72 hours    furosemide (LASIX) 40 mg, Oral, Daily    gabapentin (NEURONTIN) 300 MG capsule TAKE ONE IN AM, AND TWO IN PM    hydrALAZINE (APRESOLINE) 25 mg, Oral, Every 12 hours    losartan (COZAAR) 100 mg, Oral    nystatin (MYCOSTATIN) cream Topical (Top), 2 times daily    oxyCODONE-acetaminophen (PERCOCET) 5-325 mg per tablet 1 tablet, Oral, Every 6 hours PRN    polyethylene glycol (GLYCOLAX) 17 g, Oral, Daily PRN    senna-docusate 8.6-50 mg (PERICOLACE) 8.6-50 mg per tablet 1 tablet, Oral, 2 times daily    spironolactone (ALDACTONE) 25 mg, Oral, Daily    tamsulosin (FLOMAX) 0.4 mg Cap TAKE 1 CAPSULE BY MOUTH EVERY DAY    tiZANidine (ZANAFLEX) 2 MG tablet Take 1-2 po TID prn muscle spasm          Assessment:     CABG 2023 with left atrial appendage ligation.  Perioperative complications of DVT PE.  Remains on Eliquis 5 b.I.d.     Hypertension    dyslipidemia     New onset recurrent a flutter by EKG 04/17/2024.    Plan:       EKG today  with atrial fibrillation, slow ventricular response, 55.    Schedule mary/DC cardioversion.  Flutter documented since previous EKG of 4/17/2024.  Rule out underlying sick sinus syndrome.        No follow-ups on file.

## 2024-05-17 NOTE — PATIENT INSTRUCTIONS
Cardioversion    Arrive for your procedure at:  [facility]      FASTING:  You MAY NOT have anything to eat or drink AFTER MIDNIGHT.  If your procedure is scheduled in the afternoon, you may have a LIGHT BREAKFAST BEFORE 6:00 A.M.  For example: Two slices of toast; black coffee or black tea.    MEDICATIONS:  You may take your regular morning medications with a small sip of water.     Hold or adjust the following:  Fluid pills.  Diabetes medications.  Please hold GLP-1 Drugs such as Semiglutide, Ozempic, Wegovy, and Monjaro 1 week prior to procedure.      Continue: Coumadin, Plavix, Effient, Aspirin, Anti-coagulants, Blood thinners    Please refer to pre-op instructions received from [facility].

## 2024-05-21 ENCOUNTER — OFFICE VISIT (OUTPATIENT)
Dept: PAIN MEDICINE | Facility: CLINIC | Age: 79
End: 2024-05-21
Payer: MEDICARE

## 2024-05-21 VITALS
HEIGHT: 70 IN | DIASTOLIC BLOOD PRESSURE: 79 MMHG | BODY MASS INDEX: 43.37 KG/M2 | SYSTOLIC BLOOD PRESSURE: 170 MMHG | WEIGHT: 302.94 LBS | HEART RATE: 59 BPM

## 2024-05-21 DIAGNOSIS — M54.16 LUMBAR RADICULOPATHY, CHRONIC: Primary | ICD-10-CM

## 2024-05-21 DIAGNOSIS — M54.2 CERVICALGIA: ICD-10-CM

## 2024-05-21 DIAGNOSIS — M54.16 LUMBAR RADICULITIS: ICD-10-CM

## 2024-05-21 DIAGNOSIS — G89.4 CHRONIC PAIN SYNDROME: ICD-10-CM

## 2024-05-21 DIAGNOSIS — M51.36 DDD (DEGENERATIVE DISC DISEASE), LUMBAR: ICD-10-CM

## 2024-05-21 DIAGNOSIS — M53.3 SACROILIAC JOINT PAIN: ICD-10-CM

## 2024-05-21 PROCEDURE — 99214 OFFICE O/P EST MOD 30 MIN: CPT | Mod: S$GLB,,, | Performed by: PHYSICIAN ASSISTANT

## 2024-05-21 PROCEDURE — 1125F AMNT PAIN NOTED PAIN PRSNT: CPT | Mod: CPTII,S$GLB,, | Performed by: PHYSICIAN ASSISTANT

## 2024-05-21 PROCEDURE — 3077F SYST BP >= 140 MM HG: CPT | Mod: CPTII,S$GLB,, | Performed by: PHYSICIAN ASSISTANT

## 2024-05-21 PROCEDURE — 1157F ADVNC CARE PLAN IN RCRD: CPT | Mod: CPTII,S$GLB,, | Performed by: PHYSICIAN ASSISTANT

## 2024-05-21 PROCEDURE — 1101F PT FALLS ASSESS-DOCD LE1/YR: CPT | Mod: CPTII,S$GLB,, | Performed by: PHYSICIAN ASSISTANT

## 2024-05-21 PROCEDURE — 1160F RVW MEDS BY RX/DR IN RCRD: CPT | Mod: CPTII,S$GLB,, | Performed by: PHYSICIAN ASSISTANT

## 2024-05-21 PROCEDURE — 3288F FALL RISK ASSESSMENT DOCD: CPT | Mod: CPTII,S$GLB,, | Performed by: PHYSICIAN ASSISTANT

## 2024-05-21 PROCEDURE — 99999 PR PBB SHADOW E&M-EST. PATIENT-LVL V: CPT | Mod: PBBFAC,,, | Performed by: PHYSICIAN ASSISTANT

## 2024-05-21 PROCEDURE — 3078F DIAST BP <80 MM HG: CPT | Mod: CPTII,S$GLB,, | Performed by: PHYSICIAN ASSISTANT

## 2024-05-21 PROCEDURE — 1159F MED LIST DOCD IN RCRD: CPT | Mod: CPTII,S$GLB,, | Performed by: PHYSICIAN ASSISTANT

## 2024-05-21 RX ORDER — TIZANIDINE 2 MG/1
TABLET ORAL
Qty: 30 TABLET | Refills: 1 | Status: SHIPPED | OUTPATIENT
Start: 2024-05-21

## 2024-05-27 ENCOUNTER — TELEPHONE (OUTPATIENT)
Dept: PAIN MEDICINE | Facility: CLINIC | Age: 79
End: 2024-05-27
Payer: MEDICARE

## 2024-05-29 ENCOUNTER — TELEPHONE (OUTPATIENT)
Dept: PRIMARY CARE CLINIC | Facility: CLINIC | Age: 79
End: 2024-05-29
Payer: MEDICARE

## 2024-05-29 NOTE — PROGRESS NOTES
This note was completed with dictation software and grammatical errors may exist.    CC:Back pain    HPI: The patient is a 78-year-old man with a history of hypertension, obesity and low back pain who presents in referral from Dr. Winters for chronic right low back pain.  He returns in follow-up today with multiple pain complaints.  He reports since having coronary artery bypass graft he has been having numbness in 4th and 5th digits bilaterally.  He is also complaining of aching in his right low back as well as numbness and tingling in both feet.  His pain is significantly worse with activity and improved with sitting.  He describes some weakness in his legs but attributes this to his knees.  He denies incontinence.  He has been taking gabapentin without significant relief.  He does report some relief with tizanidine but does need a refill.    Pain intervention history: He done physical therapy in January, 2014 with moderate relief but not sustained.  He takes Relafen, tramadol, baclofen and chlorzoxazone as needed with mild relief.  He had undergone what sounds like a series of epidurals several years ago with no major relief. The patient is status post a right side L2/3, L3/4, L4/5 and L5/S1 facet joint injection on 10/28/14 with 50% relief of his back pain for 1 month.  He is status post radiofrequency ablation of the right L1, 2, 3, 4 and 5 medial branch nerves on 3/18/15 with 75% relief.  He is status post C7-T1 cervical interlaminar epidural steroid injection on 5/11/15 with 70% relief.  He is status post right L1, 2, 3, 4 and 5 medial branch radiofrequency ablation on 7/5/16 with 50% relief.   He is status post right L1, 2, 3, 4 and 5 medial branch radiofrequency ablation on 10/17/17 with about 50% relief additionally, later reported almost complete relief lasting a year.  He is status post right L1, 2, 3, 4 and 5 medial branch radiofrequency ablation on 04/11/2019 with mild relief.   He is status post  "L5/S1 interlaminar epidural steroid injection on 06/05/2019 with 80% relief.  He is status post L5/S1 interlaminar epidural steroid injection on 09/13/2019 with 50% relief lasting about 6 months.  He is status post L5/S1 interlaminar epidural steroid injection to the right on 06/02/2020 with minimal relief.  He is status post right L3/4 and L5/S1 transforaminal epidural steroid injection on 11/13/2020 with 0% relief.   He is status post left L5/S1 transforaminal epidural steroid injection on 06/24/2021 with 50% relief. He is status post right L5/S1 transforaminal LEATHA on 09/22/2022 with no relief. He is status post right L2/3 and L3/4 transforaminal LEATHA on 10/25/2022 with no relief.  He is status post right L3/4, L4/5 and L5/S1 diagnostic medial branch nerve block with 0% relief on 03/21/2023.     Spine surgeries:    Antineuropathics:  NSAIDs:  Physical therapy:  Antidepressants:  Muscle relaxers:  Opioids:  Hydrocodone 7.5   Antiplatelets/Anticoagulants:        ROS:He reports back pain.  Balance of review of systems is negative.    Medical, surgical, family and social history reviewed elsewhere in record.    Medications/Allergies: See med card    Vitals:    05/21/24 1028   BP: (!) 170/79   Pulse: (!) 59   Weight: (!) 137.4 kg (302 lb 14.6 oz)   Height: 5' 10" (1.778 m)   PainSc:   6   PainLoc: Back     Body mass index is 43.46 kg/m².        Physical exam:  Gen: A and O x3, pleasant, well-groomed  Skin: No rashes or obvious lesions  HEENT: PERRLA, no obvious deformities on ears or in canals.   CVS: Regular rate and rhythm, normal S1 and S2, no murmurs.  Resp: Clear to auscultation bilaterally, no wheezes or rales.  Abdomen: Soft, NT/ND, normal bowel sounds present.  Musculoskeletal:    Neuro:  Upper extremities 5/5 strength bilaterally  Lower extremities: 5/5 strength bilaterally  Reflexes:  Biceps 1+, triceps 0+, brachioradialis 0+ Patellar 1+, Achilles 0+ bilaterally.  Sensory:  Intact and symmetrical to light " touch and pinprick in C2-T1 L2-S1 dermatomes bilaterally.    Cervical spine:  Range of motion is mildly reduced with flexion, extension and lateral rotation without increased pain.  Myofascial exam: No major tenderness to palpation to the cervical paraspinous muscles.    Lumbar spine:  Lumbar spine: ROM is moderately reduced with flexion extension and oblique extension with increased pain on only on extension especially oblique extension to the right side.  Myofascial exam:  Mild tenderness to palpation to the right lower lumbar paraspinous muscles.      Imagin14 Xray L-spine  There is diffuse osteopenia. Mild to moderate chronic compression fracture with anterior wedging and evidence of vertebroplasty at L2. minimal left convex curvature in the upper lumbar region. No spondylolisthesis. Mild concavity of the superior endplate of L4 which could be small compression fracture or Schmorl's node. No additional fracture.   There is moderate degenerative change within the lumbar spine with anterior osteophyte formation most prominent at L4-L5 and L2- L3 and L1 - L2, also present in the lower thoracic region with flowing ossification anteriorly suggesting diffuse idiopathic sclerotic hyperostosis. There is also mild decreased intervertebral disk space height and endplate sclerosis the lower thoracic and upper lumbar regions. Due to the degree of osseous mineralization, it is difficult to evaluate for any possible pars defects. Moderate sclerosis suggesting facet arthropathy in the lumbar spine present and there is mild sclerosis, likely degenerative in nature involving the sacroiliac joints.    10/7/14 MRI lumbar spine: At L1/2 there is mild spinal stenosis secondary to facet hypertrophy and disc bulging.  At L2/3 there is minimal spinal stenosis secondary to retropulsion of L2 compression fracture, appearance of prior kyphoplasty present.  There is minimal disc bulging but there is also some facet hypertrophy at  this level.  At L3/4 there is facet and ligamentum hypertrophy, minimal disc bulging causing mild spinal stenosis and neuroforaminal narrowing on the left greater than the right side.  At L4/5 there is moderate hypertrophic facet and ligamentum hypertrophy with mild left foraminal narrowing compared to the right side.  There is no spinal stenosis at this level.  At L5/S1 there is a broad-based disc bulge that extends to the left side more than the right side along with asymmetric left sided facet hypertrophy and ligamentum flavum hypertrophy causing moderate left foraminal stenosis.    4/15/15 outside open MRI cervical spine [facility]  C3-4 posterior disc bulge producing mild bilateral foraminal narrowing  C4-5 posterior disc bulge producing mild bilateral foraminal narrowing, possible tiny annular tear in the posterior disc, anterior thecal sac deformity with no evidence of spinal stenosis  C5-6 circumferential disc bulge producing moderate to severe bilateral foraminal narrowing, effacement of the bilateral lateral recesses worse to the right, anterior thecal sac deformity with effacement of the anterior subarachnoid space, mild spinal canal stenosis  C6-7 circumferential disc bulge producing moderate to severe bilateral foraminal narrowing with mild spinal canal stenosis  C7-T1 not completely included but appears to have a circumferential disc bulge with shallow midline disc protrusion with possible mild spinal canal stenosis and bilateral foraminal narrowing    CT L-spine:  No acute fracture or traumatic subluxation.  Alignment is relatively maintained.  Vertebroplasty changes are at L2.  There is mild, chronic appearing loss of height of the L4 vertebral body.  There is partial osseous fusion at L2-3.  Multilevel facet hypertrophy, osteophyte formation and disc space narrowing are present.   L1-2: Facet hypertrophy with mild right neural foraminal and spinal canal stenosis.   L2-3: Posterior disc osteophyte and  facet hypertrophy results in mild to moderate right and mild left neural foraminal stenosis with mild to moderate spinal canal stenosis.   L3-4: Posterior disc osteophyte and facet hypertrophy results in moderate bilateral neural foraminal stenosis with mild to moderate spinal canal stenosis.   L4-5: Broad-based disc osteophyte and facet hypertrophy results in severe left and moderate right neural foraminal stenosis with mild to moderate spinal canal stenosis.   L5-S1: Posterior disc osteophyte eccentric to the left and facet hypertrophy results in moderate to severe bilateral neural foraminal stenosis with mild to moderate spinal canal stenosis.  Paravertebral soft tissues are normal.  Spinal cord stimulator wires into the spinal canal at the T12-L1 level.    12/15/22 CT myelogram L-spine:  T12-L1: Mild disc bulge with likely right central protrusion.  Mild right and minimal left narrowing of the lateral recesses.  No significant spinal canal or foraminal stenosis.   L1-L2: Mild disc bulge.  Mild right and minimal left narrowing of the lateral recesses.  No significant spinal canal stenosis.  Minimal right foraminal stenosis.   L2-L3: Trace retrolisthesis.  Mild disc bulge and posterior osteophytic ridging.  Mild bilateral facet hypertrophy.  Minimal narrowing of the bilateral lateral recesses.  Mild right and minimal left foraminal stenosis.   L3-L4: Trace retrolisthesis.  Mild disc bulge.  Mild left and minimal right narrowing of the lateral recesses.  No significant spinal canal stenosis.  Mild-moderate bilateral foraminal stenosis.   L4-L5: Retrolisthesis.  Uncovering the disc mild disc bulge.  Mild bilateral facet hypertrophy.  Ligamentum flavum thickening.  Mild narrowing of the bilateral lateral recesses.  Mild spinal canal stenosis.  Moderate left and mild-moderate right foraminal stenosis.   L5-S1: Mild disc bulge and posterior osteophytic ridging.  Moderate left and mild right facet hypertrophy.  Moderate  left and mild right narrowing of the lateral recesses.  No significant spinal canal stenosis.  Moderate right and mild-moderate left foraminal stenosis.   Bilateral dorsal paraspinal muscular atrophy in the lower lumbar spine.   Spinal cord stimulator leads are present entering the spinal canal at the T12-L1 interlaminar space and extending cranially above the field of view.   Layering dependent stones within the partially visualized urinary bladder, similar to prior study.  Mild-moderate atherosclerotic calcifications.   Postoperative changes of a previous L2 kyphoplasty with methylmethacrylate present.  There is loss of lumbar vertebral body height at all levels throughout the lumbar spine, progressive within the superior endplate of L4 when compared to the previous study of 12/10/2022.    1/30/23 Xray L-spine:  A spinal stimulator device is partially imaged.  There is a mild compression fracture of L2 status post vertebroplasty.  There is a mild compression fracture of L4 without significant change.  Mild retrolisthesis of L3 on L4 is present.  There is mild loss of disc height at L2-3 and L5-S1.  Moderate lower lumbar facet arthropathy is present there is heavy calcification of the aorta.    Assessment:  The patient is a 78-year-old man with a history of hypertension, obesity and low back pain who presents in referral from Dr. Winters for chronic right low back pain.   1. Lumbar radiculopathy, chronic  CT Lumbar Spine Without Contrast      2. Cervicalgia  CT Cervical Spine Without Contrast      3. Sacroiliac joint pain        4. DDD (degenerative disc disease), lumbar        5. Lumbar radiculitis        6. Chronic pain syndrome                Plan:  1. I am going to order a CT of the cervical and lumbar spine.  We will have this done after his upcoming CORINNA.  2. I have contacted the spinal cord stimulator representative from [facility] to contact the patient for reprogramming.    3. I have refilled  tizanidine.  We can increase gabapentin in the future if necessary.    4. We can consider a sacroiliac joint injection.    5. He will follow-up to review the images and discuss recommendations.

## 2024-05-31 ENCOUNTER — TELEPHONE (OUTPATIENT)
Dept: CARDIOLOGY | Facility: CLINIC | Age: 79
End: 2024-05-31
Payer: MEDICARE

## 2024-05-31 NOTE — TELEPHONE ENCOUNTER
----- Message from Demetria Cramer sent at 5/31/2024 10:58 AM CDT -----  Regarding: Needs return call  Type: Needs Medical Advice  Who Called:  Pt    Best Call Back Number: [phone]    Additional Information: Pt has upcoming surgery and he was trying to find out if he needs to stop taking his eloquis or not please call to teresita

## 2024-06-03 ENCOUNTER — OFFICE VISIT (OUTPATIENT)
Dept: PODIATRY | Facility: CLINIC | Age: 79
End: 2024-06-03
Payer: MEDICARE

## 2024-06-03 VITALS — WEIGHT: 295 LBS | BODY MASS INDEX: 42.23 KG/M2 | HEIGHT: 70 IN

## 2024-06-03 DIAGNOSIS — I73.9 PERIPHERAL VASCULAR DISEASE: ICD-10-CM

## 2024-06-03 DIAGNOSIS — G60.9 IDIOPATHIC PERIPHERAL NEUROPATHY: ICD-10-CM

## 2024-06-03 DIAGNOSIS — B35.1 ONYCHOMYCOSIS: Primary | ICD-10-CM

## 2024-06-03 PROCEDURE — 99499 UNLISTED E&M SERVICE: CPT | Mod: S$GLB,,, | Performed by: PODIATRIST

## 2024-06-03 PROCEDURE — 11721 DEBRIDE NAIL 6 OR MORE: CPT | Mod: Q9,S$GLB,, | Performed by: PODIATRIST

## 2024-06-03 PROCEDURE — 99999 PR PBB SHADOW E&M-EST. PATIENT-LVL III: CPT | Mod: PBBFAC,,, | Performed by: PODIATRIST

## 2024-06-03 RX ORDER — ZOSTER VACCINE RECOMBINANT, ADJUVANTED 50 MCG/0.5
KIT INTRAMUSCULAR
COMMUNITY
Start: 2024-01-01

## 2024-06-03 NOTE — PROGRESS NOTES
Subjective:      Patient ID: Chinedu Roque is a 78 y.o. male.    Chief Complaint: Nail Care      Chinedu is a 78 y.o. male who presents to the clinic for evaluation and treatment of high risk feet. Chinedu has a past medical history of Anticoagulant long-term use, Arthritis, BPH (benign prostatic hyperplasia) (03/02/2012), Chronic left shoulder pain, Compression fracture of L2, DDD (degenerative disc disease), lumbar, HTN (hypertension) (03/02/2012), colonic polyps (2013), Low back pain, Morbid obesity with BMI of 40.0-44.9, adult (03/18/2014), Seasonal allergies, Sleep apnea, and Stroke (03/1986). The patient's chief complaint is long, thick toenails. This patient has documented high risk feet requiring routine maintenance secondary to peripheral vascular disease. No acute pedal changes since last visit    PCP: Hernando Browne MD        Current shoe gear:  Casual shoes    Last encounter in this department: Visit date not found    Hemoglobin A1C   Date Value Ref Range Status   03/21/2024 5.9 (H) 0.0 - 5.6 % Final     Comment:     Reference Interval:  5.0 - 5.6 Normal   5.7 - 6.4 High Risk   > 6.5 Diabetic      Hgb A1c results are standardized based on the (NGSP) National   Glycohemoglobin Standardization Program.      Hemoglobin A1C levels are related to mean serum/plasma glucose   during the preceding 2-3 months.        06/19/2023 5.5 0.0 - 5.6 % Final     Comment:     Reference Interval:  5.0 - 5.6 Normal   5.7 - 6.4 High Risk   > 6.5 Diabetic      Hgb A1c results are standardized based on the (NGSP) National   Glycohemoglobin Standardization Program.      Hemoglobin A1C levels are related to mean serum/plasma glucose   during the preceding 2-3 months.        03/06/2023 5.7 (H) 4.0 - 5.6 % Final     Comment:     ADA Screening Guidelines:  5.7-6.4%  Consistent with prediabetes  >or=6.5%  Consistent with diabetes    High levels of fetal hemoglobin interfere with the HbA1C  assay. Heterozygous hemoglobin  variants (HbS, HgC, etc)do  not significantly interfere with this assay.   However, presence of multiple variants may affect accuracy.           Review of Systems   Constitutional: Negative for chills and fever.   Cardiovascular:  Positive for leg swelling. Negative for claudication.   Respiratory:  Negative for shortness of breath.    Skin:  Positive for nail changes. Negative for itching and rash.   Musculoskeletal:  Negative for muscle cramps, muscle weakness and myalgias.   Gastrointestinal:  Negative for nausea and vomiting.   Neurological:  Positive for numbness. Negative for focal weakness, loss of balance and paresthesias.           Objective:      Physical Exam  Constitutional:       General: He is not in acute distress.     Appearance: He is well-developed. He is not diaphoretic.   Cardiovascular:      Pulses:           Dorsalis pedis pulses are 1+ on the right side and 1+ on the left side.        Posterior tibial pulses are 1+ on the right side and 1+ on the left side.      Comments: 3 sec capillary refill time to toes 1-5 bilateral. Feet are warm to touch proximally with distal cooling b/l. There is no hair growth on the feet and toes b/l. There is 2+ edema b/l with some varicosities present b/l.     Musculoskeletal:      Comments: Equinus noted b/l ankles with < 10 deg DF noted. MMT 5/5 in DF/PF/Inv/Ev resistance with no reproduction of pain in any direction. Passive range of motion of ankle and pedal joints is painless b/l.     Skin:     General: Skin is warm and dry.      Coloration: Skin is not pale.      Findings: No abrasion, bruising, burn, ecchymosis, erythema, laceration, lesion, petechiae or rash.      Nails: There is no clubbing.      Comments: Skin is thin shiny and atrophic bilateral    Nails 1-5 bilateral are thick 3-4 mm, long 3-6 mm, and discolored with subungual debris     Neurological:      Mental Status: He is alert and oriented to person, place, and time.      Sensory: No sensory  deficit.      Motor: No tremor, atrophy or abnormal muscle tone.      Comments: Negative tinel sign bilateral.   Psychiatric:         Behavior: Behavior normal.               Assessment:       Encounter Diagnoses   Name Primary?    Onychomycosis Yes    Peripheral vascular disease     Idiopathic peripheral neuropathy                  Plan:       Chinedu was seen today for nail care.    Diagnoses and all orders for this visit:    Onychomycosis    Peripheral vascular disease    Idiopathic peripheral neuropathy              I counseled the patient on his conditions, their implications and medical management.        - Shoe inspection. Patient instructed on proper foot hygeine. We discussed wearing proper shoe gear, daily foot inspections, never walking without protective shoe gear, never putting sharp instruments to feet, routine podiatric nail visits every 3 months.      - With patient's permission, nails were aggressively reduced and debrided x 10 to their soft tissue attachment mechanically and with electric , removing all offending nail and debris. Patient relates relief following the procedure. He will continue to monitor the areas daily, inspect his feet, wear protective shoe gear when ambulatory, moisturizer to maintain skin integrity and follow in this office in approximately 2-3 months, sooner p.r.n.    Patient will obtain over the counter arch supports and wear them in shoes whenever possible.  Athletic shoes intended for walking or running are usually best.      Rubio Ramirez DPM

## 2024-06-07 ENCOUNTER — OFFICE VISIT (OUTPATIENT)
Dept: PRIMARY CARE CLINIC | Facility: CLINIC | Age: 79
End: 2024-06-07
Payer: MEDICARE

## 2024-06-07 VITALS
WEIGHT: 295.5 LBS | RESPIRATION RATE: 18 BRPM | SYSTOLIC BLOOD PRESSURE: 144 MMHG | BODY MASS INDEX: 42.4 KG/M2 | TEMPERATURE: 98 F | OXYGEN SATURATION: 97 % | HEART RATE: 63 BPM | DIASTOLIC BLOOD PRESSURE: 72 MMHG

## 2024-06-07 DIAGNOSIS — I48.0 PAF (PAROXYSMAL ATRIAL FIBRILLATION): Primary | ICD-10-CM

## 2024-06-07 DIAGNOSIS — R73.09 ELEVATED HEMOGLOBIN A1C: ICD-10-CM

## 2024-06-07 DIAGNOSIS — E78.2 MIXED HYPERLIPIDEMIA: Chronic | ICD-10-CM

## 2024-06-07 DIAGNOSIS — I10 PRIMARY HYPERTENSION: ICD-10-CM

## 2024-06-07 DIAGNOSIS — M54.2 CERVICALGIA: ICD-10-CM

## 2024-06-07 DIAGNOSIS — I50.32 CHRONIC HEART FAILURE WITH PRESERVED EJECTION FRACTION (HFPEF): Chronic | ICD-10-CM

## 2024-06-07 PROCEDURE — 99999 PR PBB SHADOW E&M-EST. PATIENT-LVL V: CPT | Mod: PBBFAC,,, | Performed by: PHYSICIAN ASSISTANT

## 2024-06-07 PROCEDURE — 1159F MED LIST DOCD IN RCRD: CPT | Mod: CPTII,S$GLB,, | Performed by: PHYSICIAN ASSISTANT

## 2024-06-07 PROCEDURE — 3078F DIAST BP <80 MM HG: CPT | Mod: CPTII,S$GLB,, | Performed by: PHYSICIAN ASSISTANT

## 2024-06-07 PROCEDURE — 3077F SYST BP >= 140 MM HG: CPT | Mod: CPTII,S$GLB,, | Performed by: PHYSICIAN ASSISTANT

## 2024-06-07 PROCEDURE — 1126F AMNT PAIN NOTED NONE PRSNT: CPT | Mod: CPTII,S$GLB,, | Performed by: PHYSICIAN ASSISTANT

## 2024-06-07 PROCEDURE — 1101F PT FALLS ASSESS-DOCD LE1/YR: CPT | Mod: CPTII,S$GLB,, | Performed by: PHYSICIAN ASSISTANT

## 2024-06-07 PROCEDURE — 99215 OFFICE O/P EST HI 40 MIN: CPT | Mod: S$GLB,,, | Performed by: PHYSICIAN ASSISTANT

## 2024-06-07 PROCEDURE — 3288F FALL RISK ASSESSMENT DOCD: CPT | Mod: CPTII,S$GLB,, | Performed by: PHYSICIAN ASSISTANT

## 2024-06-07 PROCEDURE — 1157F ADVNC CARE PLAN IN RCRD: CPT | Mod: CPTII,S$GLB,, | Performed by: PHYSICIAN ASSISTANT

## 2024-06-07 RX ORDER — ATORVASTATIN CALCIUM 20 MG/1
20 TABLET, FILM COATED ORAL DAILY
Qty: 90 TABLET | Refills: 3 | Status: SHIPPED | OUTPATIENT
Start: 2024-06-07 | End: 2025-06-07

## 2024-06-07 RX ORDER — LOSARTAN POTASSIUM 100 MG/1
100 TABLET ORAL DAILY
Qty: 90 TABLET | Refills: 0 | Status: SHIPPED | OUTPATIENT
Start: 2024-06-07 | End: 2024-09-05

## 2024-06-07 NOTE — ASSESSMENT & PLAN NOTE
Patient denies any shortness a breath or cough.  States that his symptoms have drastically improved since earlier this year.  He does still have some bilateral lower extremity edema.  He is still taking Lasix regularly.  Will order BNP to assess electrolytes.

## 2024-06-07 NOTE — ASSESSMENT & PLAN NOTE
Last lipid panel in February.  At that time well controlled.  It appears that his statin prescription will be expiring today.  Have refilled this and sent to pharmacy.  Will continue to monitor.

## 2024-06-07 NOTE — ASSESSMENT & PLAN NOTE
Patient does not believe he has been taking his losartan.  Prescription sent today.  Discussed importance of keeping blood pressure within parameters.  Will continue to follow.

## 2024-06-07 NOTE — PROGRESS NOTES
Primary Care Provider Appointment   [facility]       Patient ID: Chinedu Roque is a 78 y.o. male.    ASSESSMENT/PLAN by Problem List:    1. PAF (paroxysmal atrial fibrillation)  Assessment & Plan:  Status post CORINNA with cardioversion.  Patient feels well.  Regular rate and rhythm today.  Patient denies any chest pain or palpitations.  Keep follow-up with cardiology as scheduled.      2. Chronic heart failure with preserved ejection fraction (HFpEF)  Assessment & Plan:  Patient denies any shortness a breath or cough.  States that his symptoms have drastically improved since earlier this year.  He does still have some bilateral lower extremity edema.  He is still taking Lasix regularly.  Will order BNP to assess electrolytes.      3. Mixed hyperlipidemia  Assessment & Plan:  Last lipid panel in February.  At that time well controlled.  It appears that his statin prescription will be expiring today.  Have refilled this and sent to pharmacy.  Will continue to monitor.    Orders:  -     atorvastatin (LIPITOR) 20 MG tablet; Take 1 tablet (20 mg total) by mouth once daily.  Dispense: 90 tablet; Refill: 3    4. Primary hypertension  Assessment & Plan:  Patient does not believe he has been taking his losartan.  Prescription sent today.  Discussed importance of keeping blood pressure within parameters.  Will continue to follow.    Orders:  -     losartan (COZAAR) 100 MG tablet; Take 1 tablet (100 mg total) by mouth once daily.  Dispense: 90 tablet; Refill: 0  -     BASIC METABOLIC PANEL; Future; Expected date: 06/07/2024    5. Cervicalgia  Assessment & Plan:  Being evaluated by pain medicine.  Will be having CT of cervical and lumbar spine coming up.  We will continue to follow.      6. Elevated hemoglobin A1c  -     HEMOGLOBIN A1C; Future; Expected date: 06/07/2024         Follow Up:  2 months    My total time spent on this encounter was 45 minutes which included  the following  activities: preparing to see the patient, performing a medically appropriate and/or evaluation, counseling and educating the patient and family/caregiver, ordering medications, tests, or procedures, referring and communicating with other healthcare providers, documenting clinical information in the electronic or other health record, and independently interpreting results. This time is independent and non-overlapping.    Health Maintenance         Date Due Completion Date    RSV Vaccine (Age 60+ and Pregnant patients) (1 - 1-dose 60+ series) Never done ---    TETANUS VACCINE 11/24/2022 11/24/2012    COVID-19 Vaccine (4 - 2023-24 season) 09/01/2023 11/16/2021    Lipid Panel 02/01/2025 2/1/2024            Advance Care Planning     Date: 06/07/2024  Currently in Epic             Subjective:     Chief Complaint   Patient presents with    Follow-up     1 month follow up visit    Blister     Blister on left leg that appeared and wants it drained     I have reviewed the information entered by the ancillary staff regarding the chief complaint as well as the related history.    78-year-old male presents for follow-up.  Has been doing well since his last visit.  He underwent CORINNA with cardioversion since last visit.  States he feels well.  He denies any chest pain or palpitations.  Has follow-up scheduled with Cardiology.  While reviewing his medications, he is unsure if he is taking losartan.  Blood pressure is slightly elevated today.  He also believes he has not been taking atorvastatin.  Refills for both will be sent today.  Since last visit patient has seen pain management.  He will be having CT of both cervical and lumbar spine coming up.        Patient is a/an 78 y.o.  male       For complete problem list, past medical history, surgical history, social history, etc., see appropriate section in the electronic medical record    Review of Systems   Respiratory:  Negative for cough and shortness of breath.    Cardiovascular:   Negative for chest pain and palpitations.   Skin:         Blister anterior left shin   All other systems reviewed and are negative.      Objective     Physical Exam  Vitals and nursing note reviewed.   Constitutional:       General: He is not in acute distress.     Appearance: Normal appearance. He is not ill-appearing.   HENT:      Head: Normocephalic and atraumatic.      Right Ear: External ear normal.      Left Ear: External ear normal.   Eyes:      General:         Right eye: No discharge.         Left eye: No discharge.      Extraocular Movements: Extraocular movements intact.      Conjunctiva/sclera: Conjunctivae normal.   Cardiovascular:      Rate and Rhythm: Normal rate and regular rhythm.   Pulmonary:      Effort: Pulmonary effort is normal. No respiratory distress.      Breath sounds: No wheezing or rales.   Musculoskeletal:      Right lower leg: Edema present.      Left lower leg: Edema present.   Skin:     General: Skin is warm and dry.      Coloration: Skin is not jaundiced or pale.   Neurological:      Mental Status: He is alert.   Psychiatric:         Mood and Affect: Mood normal.         Behavior: Behavior normal.         Thought Content: Thought content normal.         Judgment: Judgment normal.       Vitals:    06/07/24 1305   BP: (!) 144/72   BP Location: Left arm   Patient Position: Sitting   BP Method: Large (Manual)   Pulse: 63   Resp: 18   Temp: 98.4 °F (36.9 °C)   TempSrc: Oral   SpO2: 97%   Weight: 134.1 kg (295 lb 8.4 oz)           THIS DOCUMENT WAS MADE IN PART WITH VOICE RECOGNITION SOFTWARE.  OCCASIONALLY THIS SOFTWARE WILL MISINTERPRET WORDS OR PHRASES.

## 2024-06-07 NOTE — ASSESSMENT & PLAN NOTE
Being evaluated by pain medicine.  Will be having CT of cervical and lumbar spine coming up.  We will continue to follow.

## 2024-06-07 NOTE — ASSESSMENT & PLAN NOTE
Status post CORINNA with cardioversion.  Patient feels well.  Regular rate and rhythm today.  Patient denies any chest pain or palpitations.  Keep follow-up with cardiology as scheduled.

## 2024-06-10 ENCOUNTER — HOSPITAL ENCOUNTER (OUTPATIENT)
Dept: RADIOLOGY | Facility: HOSPITAL | Age: 79
Discharge: HOME | End: 2024-06-10
Attending: PHYSICIAN ASSISTANT
Payer: MEDICARE

## 2024-06-10 DIAGNOSIS — M54.16 LUMBAR RADICULOPATHY, CHRONIC: ICD-10-CM

## 2024-06-10 DIAGNOSIS — M54.2 CERVICALGIA: ICD-10-CM

## 2024-06-10 PROCEDURE — 72131 CT LUMBAR SPINE W/O DYE: CPT | Mod: TC,PO

## 2024-06-10 PROCEDURE — 72125 CT NECK SPINE W/O DYE: CPT | Mod: 26,,, | Performed by: RADIOLOGY

## 2024-06-10 PROCEDURE — 72125 CT NECK SPINE W/O DYE: CPT | Mod: TC,PO

## 2024-06-10 PROCEDURE — 72131 CT LUMBAR SPINE W/O DYE: CPT | Mod: 26,,, | Performed by: RADIOLOGY

## 2024-06-11 NOTE — ASSESSMENT & PLAN NOTE
As he is recovering from multiple surgeries, his weight is starting to go up.  Reinforced healthy nutrition.  Continue regular exercise.   Patient escorted to 5 room in stable condition. Name, birthdate and allergies verified with patient.

## 2024-06-13 ENCOUNTER — TELEPHONE (OUTPATIENT)
Dept: PAIN MEDICINE | Facility: CLINIC | Age: 79
End: 2024-06-13

## 2024-06-13 ENCOUNTER — OFFICE VISIT (OUTPATIENT)
Dept: PAIN MEDICINE | Facility: CLINIC | Age: 79
End: 2024-06-13
Payer: MEDICARE

## 2024-06-13 VITALS
HEART RATE: 89 BPM | SYSTOLIC BLOOD PRESSURE: 115 MMHG | HEIGHT: 70 IN | WEIGHT: 296.75 LBS | DIASTOLIC BLOOD PRESSURE: 66 MMHG | BODY MASS INDEX: 42.48 KG/M2

## 2024-06-13 DIAGNOSIS — M51.36 DDD (DEGENERATIVE DISC DISEASE), LUMBAR: ICD-10-CM

## 2024-06-13 DIAGNOSIS — M48.061 SPINAL STENOSIS OF LUMBAR REGION WITHOUT NEUROGENIC CLAUDICATION: ICD-10-CM

## 2024-06-13 DIAGNOSIS — G56.23 ULNAR NEUROPATHY OF BOTH UPPER EXTREMITIES: ICD-10-CM

## 2024-06-13 DIAGNOSIS — M50.30 DDD (DEGENERATIVE DISC DISEASE), CERVICAL: ICD-10-CM

## 2024-06-13 DIAGNOSIS — M53.3 SACROILIAC JOINT PAIN: Primary | ICD-10-CM

## 2024-06-13 PROCEDURE — 99999 PR PBB SHADOW E&M-EST. PATIENT-LVL IV: CPT | Mod: PBBFAC,,, | Performed by: PHYSICIAN ASSISTANT

## 2024-06-13 PROCEDURE — 1101F PT FALLS ASSESS-DOCD LE1/YR: CPT | Mod: CPTII,S$GLB,, | Performed by: PHYSICIAN ASSISTANT

## 2024-06-13 PROCEDURE — 1157F ADVNC CARE PLAN IN RCRD: CPT | Mod: CPTII,S$GLB,, | Performed by: PHYSICIAN ASSISTANT

## 2024-06-13 PROCEDURE — 3078F DIAST BP <80 MM HG: CPT | Mod: CPTII,S$GLB,, | Performed by: PHYSICIAN ASSISTANT

## 2024-06-13 PROCEDURE — 1125F AMNT PAIN NOTED PAIN PRSNT: CPT | Mod: CPTII,S$GLB,, | Performed by: PHYSICIAN ASSISTANT

## 2024-06-13 PROCEDURE — 3074F SYST BP LT 130 MM HG: CPT | Mod: CPTII,S$GLB,, | Performed by: PHYSICIAN ASSISTANT

## 2024-06-13 PROCEDURE — 1159F MED LIST DOCD IN RCRD: CPT | Mod: CPTII,S$GLB,, | Performed by: PHYSICIAN ASSISTANT

## 2024-06-13 PROCEDURE — 1160F RVW MEDS BY RX/DR IN RCRD: CPT | Mod: CPTII,S$GLB,, | Performed by: PHYSICIAN ASSISTANT

## 2024-06-13 PROCEDURE — 3288F FALL RISK ASSESSMENT DOCD: CPT | Mod: CPTII,S$GLB,, | Performed by: PHYSICIAN ASSISTANT

## 2024-06-13 PROCEDURE — 99214 OFFICE O/P EST MOD 30 MIN: CPT | Mod: S$GLB,,, | Performed by: PHYSICIAN ASSISTANT

## 2024-06-13 NOTE — TELEPHONE ENCOUNTER
Physician - Dr Segura    Type of Procedure/Injection - Sacroiliac Joint Steroid Injection           Laterality - Right      Anxiolysis- Local      Need to hold medication - No      N/A      Clearance needed - No      Follow up - 4 week

## 2024-06-13 NOTE — PROGRESS NOTES
This note was completed with dictation software and grammatical errors may exist.    CC:Back pain    HPI: The patient is a 78-year-old man with a history of hypertension, obesity and low back pain who presents in referral from Dr. Winters for chronic right low back pain.  He returns in follow-up today to review his cervical and lumbar spine CT results.  He continues to have numbness in his 4th and 5th digits bilaterally.  Denies major neck pain but his hand symptoms affect his ability to use his hands effectively with daily activities.  Main complaint is right-sided low back pain, upper buttock pain.  He denies any pain on the left, denies any radiation into his legs.  Pain is constant, using his spinal cord stimulator with unknown relief.    Pain intervention history: He done physical therapy in January, 2014 with moderate relief but not sustained.  He takes Relafen, tramadol, baclofen and chlorzoxazone as needed with mild relief.  He had undergone what sounds like a series of epidurals several years ago with no major relief. The patient is status post a right side L2/3, L3/4, L4/5 and L5/S1 facet joint injection on 10/28/14 with 50% relief of his back pain for 1 month.  He is status post radiofrequency ablation of the right L1, 2, 3, 4 and 5 medial branch nerves on 3/18/15 with 75% relief.  He is status post C7-T1 cervical interlaminar epidural steroid injection on 5/11/15 with 70% relief.  He is status post right L1, 2, 3, 4 and 5 medial branch radiofrequency ablation on 7/5/16 with 50% relief.   He is status post right L1, 2, 3, 4 and 5 medial branch radiofrequency ablation on 10/17/17 with about 50% relief additionally, later reported almost complete relief lasting a year.  He is status post right L1, 2, 3, 4 and 5 medial branch radiofrequency ablation on 04/11/2019 with mild relief.   He is status post L5/S1 interlaminar epidural steroid injection on 06/05/2019 with 80% relief.  He is status post L5/S1  "interlaminar epidural steroid injection on 09/13/2019 with 50% relief lasting about 6 months.  He is status post L5/S1 interlaminar epidural steroid injection to the right on 06/02/2020 with minimal relief.  He is status post right L3/4 and L5/S1 transforaminal epidural steroid injection on 11/13/2020 with 0% relief.   He is status post left L5/S1 transforaminal epidural steroid injection on 06/24/2021 with 50% relief. He is status post right L5/S1 transforaminal LEATHA on 09/22/2022 with no relief. He is status post right L2/3 and L3/4 transforaminal LEATHA on 10/25/2022 with no relief.  He is status post right L3/4, L4/5 and L5/S1 diagnostic medial branch nerve block with 0% relief on 03/21/2023.     Spine surgeries:    Antineuropathics:  NSAIDs:  Physical therapy:  Antidepressants:  Muscle relaxers:  Opioids:  Hydrocodone 7.5   Antiplatelets/Anticoagulants:    ROS:He reports back pain.  Balance of review of systems is negative.    Medical, surgical, family and social history reviewed elsewhere in record.    Medications/Allergies: See med card    Vitals:    06/13/24 0954   BP: 115/66   Pulse: 89   Weight: 134.6 kg (296 lb 11.8 oz)   Height: 5' 10" (1.778 m)   PainSc:   4   PainLoc: Back     Body mass index is 42.58 kg/m².        Physical exam:  Gen: A and O x3, pleasant, well-groomed  Skin: No rashes or obvious lesions  HEENT: PERRLA, no obvious deformities on ears or in canals.   CVS: Regular rate and rhythm, normal S1 and S2, no murmurs.  Resp: Clear to auscultation bilaterally, no wheezes or rales.  Abdomen: Soft, NT/ND, normal bowel sounds present.  Musculoskeletal:    Neuro:  Upper extremities 5/5 strength bilaterally  Lower extremities: 5/5 strength bilaterally  Reflexes:  Biceps 1+, triceps 0+, brachioradialis 0+ Patellar 1+, Achilles 0+ bilaterally.  Sensory:  Intact and symmetrical to light touch and pinprick in C2-T1 L2-S1 dermatomes bilaterally.    Cervical spine:  Range of motion is mildly reduced with " flexion, extension and lateral rotation without increased pain.  Myofascial exam: No major tenderness to palpation to the cervical paraspinous muscles.    Lumbar spine:  Lumbar spine: ROM is moderately reduced with flexion extension and oblique extension with increased pain on only on extension especially oblique extension to the right side.  Ramakrishna's test positive on the right.    Gaenslen test positive on the right.    SI compression test positive on the right.  Myofascial exam:  Mild tenderness to palpation to the right lower lumbar paraspinous muscles, moderate tenderness to palpation over the right sacroiliac joint.      Imagin14 Xray L-spine  There is diffuse osteopenia. Mild to moderate chronic compression fracture with anterior wedging and evidence of vertebroplasty at L2. minimal left convex curvature in the upper lumbar region. No spondylolisthesis. Mild concavity of the superior endplate of L4 which could be small compression fracture or Schmorl's node. No additional fracture.   There is moderate degenerative change within the lumbar spine with anterior osteophyte formation most prominent at L4-L5 and L2- L3 and L1 - L2, also present in the lower thoracic region with flowing ossification anteriorly suggesting diffuse idiopathic sclerotic hyperostosis. There is also mild decreased intervertebral disk space height and endplate sclerosis the lower thoracic and upper lumbar regions. Due to the degree of osseous mineralization, it is difficult to evaluate for any possible pars defects. Moderate sclerosis suggesting facet arthropathy in the lumbar spine present and there is mild sclerosis, likely degenerative in nature involving the sacroiliac joints.    10/7/14 MRI lumbar spine: At L1/2 there is mild spinal stenosis secondary to facet hypertrophy and disc bulging.  At L2/3 there is minimal spinal stenosis secondary to retropulsion of L2 compression fracture, appearance of prior kyphoplasty present.   There is minimal disc bulging but there is also some facet hypertrophy at this level.  At L3/4 there is facet and ligamentum hypertrophy, minimal disc bulging causing mild spinal stenosis and neuroforaminal narrowing on the left greater than the right side.  At L4/5 there is moderate hypertrophic facet and ligamentum hypertrophy with mild left foraminal narrowing compared to the right side.  There is no spinal stenosis at this level.  At L5/S1 there is a broad-based disc bulge that extends to the left side more than the right side along with asymmetric left sided facet hypertrophy and ligamentum flavum hypertrophy causing moderate left foraminal stenosis.    4/15/15 outside open MRI cervical spine [facility]  C3-4 posterior disc bulge producing mild bilateral foraminal narrowing  C4-5 posterior disc bulge producing mild bilateral foraminal narrowing, possible tiny annular tear in the posterior disc, anterior thecal sac deformity with no evidence of spinal stenosis  C5-6 circumferential disc bulge producing moderate to severe bilateral foraminal narrowing, effacement of the bilateral lateral recesses worse to the right, anterior thecal sac deformity with effacement of the anterior subarachnoid space, mild spinal canal stenosis  C6-7 circumferential disc bulge producing moderate to severe bilateral foraminal narrowing with mild spinal canal stenosis  C7-T1 not completely included but appears to have a circumferential disc bulge with shallow midline disc protrusion with possible mild spinal canal stenosis and bilateral foraminal narrowing    CT L-spine:  No acute fracture or traumatic subluxation.  Alignment is relatively maintained.  Vertebroplasty changes are at L2.  There is mild, chronic appearing loss of height of the L4 vertebral body.  There is partial osseous fusion at L2-3.  Multilevel facet hypertrophy, osteophyte formation and disc space narrowing are present.   L1-2: Facet hypertrophy with mild right neural  foraminal and spinal canal stenosis.   L2-3: Posterior disc osteophyte and facet hypertrophy results in mild to moderate right and mild left neural foraminal stenosis with mild to moderate spinal canal stenosis.   L3-4: Posterior disc osteophyte and facet hypertrophy results in moderate bilateral neural foraminal stenosis with mild to moderate spinal canal stenosis.   L4-5: Broad-based disc osteophyte and facet hypertrophy results in severe left and moderate right neural foraminal stenosis with mild to moderate spinal canal stenosis.   L5-S1: Posterior disc osteophyte eccentric to the left and facet hypertrophy results in moderate to severe bilateral neural foraminal stenosis with mild to moderate spinal canal stenosis.  Paravertebral soft tissues are normal.  Spinal cord stimulator wires into the spinal canal at the T12-L1 level.    12/15/22 CT myelogram L-spine:  T12-L1: Mild disc bulge with likely right central protrusion.  Mild right and minimal left narrowing of the lateral recesses.  No significant spinal canal or foraminal stenosis.   L1-L2: Mild disc bulge.  Mild right and minimal left narrowing of the lateral recesses.  No significant spinal canal stenosis.  Minimal right foraminal stenosis.   L2-L3: Trace retrolisthesis.  Mild disc bulge and posterior osteophytic ridging.  Mild bilateral facet hypertrophy.  Minimal narrowing of the bilateral lateral recesses.  Mild right and minimal left foraminal stenosis.   L3-L4: Trace retrolisthesis.  Mild disc bulge.  Mild left and minimal right narrowing of the lateral recesses.  No significant spinal canal stenosis.  Mild-moderate bilateral foraminal stenosis.   L4-L5: Retrolisthesis.  Uncovering the disc mild disc bulge.  Mild bilateral facet hypertrophy.  Ligamentum flavum thickening.  Mild narrowing of the bilateral lateral recesses.  Mild spinal canal stenosis.  Moderate left and mild-moderate right foraminal stenosis.   L5-S1: Mild disc bulge and posterior  osteophytic ridging.  Moderate left and mild right facet hypertrophy.  Moderate left and mild right narrowing of the lateral recesses.  No significant spinal canal stenosis.  Moderate right and mild-moderate left foraminal stenosis.   Bilateral dorsal paraspinal muscular atrophy in the lower lumbar spine.   Spinal cord stimulator leads are present entering the spinal canal at the T12-L1 interlaminar space and extending cranially above the field of view.   Layering dependent stones within the partially visualized urinary bladder, similar to prior study.  Mild-moderate atherosclerotic calcifications.   Postoperative changes of a previous L2 kyphoplasty with methylmethacrylate present.  There is loss of lumbar vertebral body height at all levels throughout the lumbar spine, progressive within the superior endplate of L4 when compared to the previous study of 12/10/2022.    1/30/23 Xray L-spine:  A spinal stimulator device is partially imaged.  There is a mild compression fracture of L2 status post vertebroplasty.  There is a mild compression fracture of L4 without significant change.  Mild retrolisthesis of L3 on L4 is present.  There is mild loss of disc height at L2-3 and L5-S1.  Moderate lower lumbar facet arthropathy is present there is heavy calcification of the aorta.    06/10/2024 CT cervical spine  Bones: Diffuse bony demineralization.  No fractures or bony destructive changes.  Prominent ventral bridging osteophyte complex at C4-C5 and endplate centered subcortical cystic changes in the setting of severe disc height loss at C5-C6.  Additional prominent ventral bridging osteophyte complexes laterally C6-C7.     Alignment: Within normal limits.     Craniocervical junction: Degenerative changes.  No acute process or may     Disc levels:     Degenerative changes including shallow disc osteophyte complexes most notable asymmetric to the right at C5-C6 with there is no more than mild to moderate narrowing of the  right central spinal canal and centrally at C6-C7 producing mild to moderate narrowing of the spinal canal.  No evidence of any high-grade osseous spinal canal narrowing.  Uncovertebral spurring and facet arthrosis producing severe left/moderate right C3-C4, severe right/moderate left C4-C5, severe bilateral C5-C6, and severe bilateral C6-C7 narrowing.     Paraspinal soft tissues: The visualized paraspinal soft tissues and lung apices are within normal limits.    06/10/2024 CT lumbar spine  Bones: Diffuse bony demineralization.  There is a dorsal thoracic spinal stimulator entering the canal at T12-L1 coursing cranially out of the field of view.  Chronic mild compression fracture deformities status post cement augmentation of L2 and severe biconcave compression fracture of L4, both unchanged.  No acute fractures.  Prominent ventral endplate centered bridging osteophytes throughout.  No bony destructive process.     Alignment: Within normal limits.     Disc levels:     Similar multilevel degenerative changes including moderate disc height loss notable at L2-L3 and L5-S1.  Disc bulging and facet arthrosis most pronounced at L3-L4, L4-L5, and L5-S1 with moderate to severe narrowing of the spinal canal and subarticular recesses.  Severe foraminal narrowing on the left at L5-S1 and moderate to severe on the right at L5-S1 and bilaterally at L3-L4 and L4-L5.  Overall no gross interval change.  Vacuum disc phenomenon at L3-L4.     Soft Tissues: Atherosclerotic changes of the aorta iliac distribution.  Partially imaged presumed left renal cyst measuring 2.9 cm.  Presumed left basilar scarring/atelectasis residual from previous pleural effusion as seen on CT 03/27/2024.  Cholecystectomy changes.  Prostatomegaly.    Assessment:  The patient is a 78-year-old man with a history of hypertension, obesity and low back pain who presents in referral from Dr. Winters for chronic right low back pain.   1. Sacroiliac joint pain        2.  Spinal stenosis of lumbar region without neurogenic claudication        3. DDD (degenerative disc disease), lumbar        4. Ulnar neuropathy of both upper extremities  Ambulatory referral/consult to Orthopedics      5. DDD (degenerative disc disease), cervical            Plan:  1. I reviewed his cervical spine CT with him and we discussed that he does not have foraminal stenosis at C7-T1 to explain his current symptoms.  I we will have him follow-up with our hand specialist for further evaluation.  2. I reviewed his lumbar spine CT with him and we discussed that he has had some worsening of stenosis since his CT myelogram when he saw Dr. Puri a couple years ago.  He was not a surgical candidate at that time and we discussed that he is still not likely a good surgical candidate due to his osteoporosis and recent heart troubles.  3. We discussed that on exam he does have sacroiliac joint pain.  He has not had relief with epidural steroid injections in the past so we discussed trying a right sacroiliac joint injection.  He would like to do this.  4. He will contact the [employer] representative for reprogramming.    5. We discussed considering physical therapy and aquatic therapy at [facility].    6. Follow-up in 4 weeks postprocedure or sooner as needed.

## 2024-06-13 NOTE — H&P (VIEW-ONLY)
This note was completed with dictation software and grammatical errors may exist.    CC:Back pain    HPI: The patient is a 78-year-old man with a history of hypertension, obesity and low back pain who presents in referral from Dr. Winters for chronic right low back pain.  He returns in follow-up today to review his cervical and lumbar spine CT results.  He continues to have numbness in his 4th and 5th digits bilaterally.  Denies major neck pain but his hand symptoms affect his ability to use his hands effectively with daily activities.  Main complaint is right-sided low back pain, upper buttock pain.  He denies any pain on the left, denies any radiation into his legs.  Pain is constant, using his spinal cord stimulator with unknown relief.    Pain intervention history: He done physical therapy in January, 2014 with moderate relief but not sustained.  He takes Relafen, tramadol, baclofen and chlorzoxazone as needed with mild relief.  He had undergone what sounds like a series of epidurals several years ago with no major relief. The patient is status post a right side L2/3, L3/4, L4/5 and L5/S1 facet joint injection on 10/28/14 with 50% relief of his back pain for 1 month.  He is status post radiofrequency ablation of the right L1, 2, 3, 4 and 5 medial branch nerves on 3/18/15 with 75% relief.  He is status post C7-T1 cervical interlaminar epidural steroid injection on 5/11/15 with 70% relief.  He is status post right L1, 2, 3, 4 and 5 medial branch radiofrequency ablation on 7/5/16 with 50% relief.   He is status post right L1, 2, 3, 4 and 5 medial branch radiofrequency ablation on 10/17/17 with about 50% relief additionally, later reported almost complete relief lasting a year.  He is status post right L1, 2, 3, 4 and 5 medial branch radiofrequency ablation on 04/11/2019 with mild relief.   He is status post L5/S1 interlaminar epidural steroid injection on 06/05/2019 with 80% relief.  He is status post L5/S1  "interlaminar epidural steroid injection on 09/13/2019 with 50% relief lasting about 6 months.  He is status post L5/S1 interlaminar epidural steroid injection to the right on 06/02/2020 with minimal relief.  He is status post right L3/4 and L5/S1 transforaminal epidural steroid injection on 11/13/2020 with 0% relief.   He is status post left L5/S1 transforaminal epidural steroid injection on 06/24/2021 with 50% relief. He is status post right L5/S1 transforaminal LEATHA on 09/22/2022 with no relief. He is status post right L2/3 and L3/4 transforaminal LEATHA on 10/25/2022 with no relief.  He is status post right L3/4, L4/5 and L5/S1 diagnostic medial branch nerve block with 0% relief on 03/21/2023.     Spine surgeries:    Antineuropathics:  NSAIDs:  Physical therapy:  Antidepressants:  Muscle relaxers:  Opioids:  Hydrocodone 7.5   Antiplatelets/Anticoagulants:    ROS:He reports back pain.  Balance of review of systems is negative.    Medical, surgical, family and social history reviewed elsewhere in record.    Medications/Allergies: See med card    Vitals:    06/13/24 0954   BP: 115/66   Pulse: 89   Weight: 134.6 kg (296 lb 11.8 oz)   Height: 5' 10" (1.778 m)   PainSc:   4   PainLoc: Back     Body mass index is 42.58 kg/m².        Physical exam:  Gen: A and O x3, pleasant, well-groomed  Skin: No rashes or obvious lesions  HEENT: PERRLA, no obvious deformities on ears or in canals.   CVS: Regular rate and rhythm, normal S1 and S2, no murmurs.  Resp: Clear to auscultation bilaterally, no wheezes or rales.  Abdomen: Soft, NT/ND, normal bowel sounds present.  Musculoskeletal:    Neuro:  Upper extremities 5/5 strength bilaterally  Lower extremities: 5/5 strength bilaterally  Reflexes:  Biceps 1+, triceps 0+, brachioradialis 0+ Patellar 1+, Achilles 0+ bilaterally.  Sensory:  Intact and symmetrical to light touch and pinprick in C2-T1 L2-S1 dermatomes bilaterally.    Cervical spine:  Range of motion is mildly reduced with " flexion, extension and lateral rotation without increased pain.  Myofascial exam: No major tenderness to palpation to the cervical paraspinous muscles.    Lumbar spine:  Lumbar spine: ROM is moderately reduced with flexion extension and oblique extension with increased pain on only on extension especially oblique extension to the right side.  Ramakrishna's test positive on the right.    Gaenslen test positive on the right.    SI compression test positive on the right.  Myofascial exam:  Mild tenderness to palpation to the right lower lumbar paraspinous muscles, moderate tenderness to palpation over the right sacroiliac joint.      Imagin14 Xray L-spine  There is diffuse osteopenia. Mild to moderate chronic compression fracture with anterior wedging and evidence of vertebroplasty at L2. minimal left convex curvature in the upper lumbar region. No spondylolisthesis. Mild concavity of the superior endplate of L4 which could be small compression fracture or Schmorl's node. No additional fracture.   There is moderate degenerative change within the lumbar spine with anterior osteophyte formation most prominent at L4-L5 and L2- L3 and L1 - L2, also present in the lower thoracic region with flowing ossification anteriorly suggesting diffuse idiopathic sclerotic hyperostosis. There is also mild decreased intervertebral disk space height and endplate sclerosis the lower thoracic and upper lumbar regions. Due to the degree of osseous mineralization, it is difficult to evaluate for any possible pars defects. Moderate sclerosis suggesting facet arthropathy in the lumbar spine present and there is mild sclerosis, likely degenerative in nature involving the sacroiliac joints.    10/7/14 MRI lumbar spine: At L1/2 there is mild spinal stenosis secondary to facet hypertrophy and disc bulging.  At L2/3 there is minimal spinal stenosis secondary to retropulsion of L2 compression fracture, appearance of prior kyphoplasty present.   There is minimal disc bulging but there is also some facet hypertrophy at this level.  At L3/4 there is facet and ligamentum hypertrophy, minimal disc bulging causing mild spinal stenosis and neuroforaminal narrowing on the left greater than the right side.  At L4/5 there is moderate hypertrophic facet and ligamentum hypertrophy with mild left foraminal narrowing compared to the right side.  There is no spinal stenosis at this level.  At L5/S1 there is a broad-based disc bulge that extends to the left side more than the right side along with asymmetric left sided facet hypertrophy and ligamentum flavum hypertrophy causing moderate left foraminal stenosis.    4/15/15 outside open MRI cervical spine [facility]  C3-4 posterior disc bulge producing mild bilateral foraminal narrowing  C4-5 posterior disc bulge producing mild bilateral foraminal narrowing, possible tiny annular tear in the posterior disc, anterior thecal sac deformity with no evidence of spinal stenosis  C5-6 circumferential disc bulge producing moderate to severe bilateral foraminal narrowing, effacement of the bilateral lateral recesses worse to the right, anterior thecal sac deformity with effacement of the anterior subarachnoid space, mild spinal canal stenosis  C6-7 circumferential disc bulge producing moderate to severe bilateral foraminal narrowing with mild spinal canal stenosis  C7-T1 not completely included but appears to have a circumferential disc bulge with shallow midline disc protrusion with possible mild spinal canal stenosis and bilateral foraminal narrowing    CT L-spine:  No acute fracture or traumatic subluxation.  Alignment is relatively maintained.  Vertebroplasty changes are at L2.  There is mild, chronic appearing loss of height of the L4 vertebral body.  There is partial osseous fusion at L2-3.  Multilevel facet hypertrophy, osteophyte formation and disc space narrowing are present.   L1-2: Facet hypertrophy with mild right neural  foraminal and spinal canal stenosis.   L2-3: Posterior disc osteophyte and facet hypertrophy results in mild to moderate right and mild left neural foraminal stenosis with mild to moderate spinal canal stenosis.   L3-4: Posterior disc osteophyte and facet hypertrophy results in moderate bilateral neural foraminal stenosis with mild to moderate spinal canal stenosis.   L4-5: Broad-based disc osteophyte and facet hypertrophy results in severe left and moderate right neural foraminal stenosis with mild to moderate spinal canal stenosis.   L5-S1: Posterior disc osteophyte eccentric to the left and facet hypertrophy results in moderate to severe bilateral neural foraminal stenosis with mild to moderate spinal canal stenosis.  Paravertebral soft tissues are normal.  Spinal cord stimulator wires into the spinal canal at the T12-L1 level.    12/15/22 CT myelogram L-spine:  T12-L1: Mild disc bulge with likely right central protrusion.  Mild right and minimal left narrowing of the lateral recesses.  No significant spinal canal or foraminal stenosis.   L1-L2: Mild disc bulge.  Mild right and minimal left narrowing of the lateral recesses.  No significant spinal canal stenosis.  Minimal right foraminal stenosis.   L2-L3: Trace retrolisthesis.  Mild disc bulge and posterior osteophytic ridging.  Mild bilateral facet hypertrophy.  Minimal narrowing of the bilateral lateral recesses.  Mild right and minimal left foraminal stenosis.   L3-L4: Trace retrolisthesis.  Mild disc bulge.  Mild left and minimal right narrowing of the lateral recesses.  No significant spinal canal stenosis.  Mild-moderate bilateral foraminal stenosis.   L4-L5: Retrolisthesis.  Uncovering the disc mild disc bulge.  Mild bilateral facet hypertrophy.  Ligamentum flavum thickening.  Mild narrowing of the bilateral lateral recesses.  Mild spinal canal stenosis.  Moderate left and mild-moderate right foraminal stenosis.   L5-S1: Mild disc bulge and posterior  osteophytic ridging.  Moderate left and mild right facet hypertrophy.  Moderate left and mild right narrowing of the lateral recesses.  No significant spinal canal stenosis.  Moderate right and mild-moderate left foraminal stenosis.   Bilateral dorsal paraspinal muscular atrophy in the lower lumbar spine.   Spinal cord stimulator leads are present entering the spinal canal at the T12-L1 interlaminar space and extending cranially above the field of view.   Layering dependent stones within the partially visualized urinary bladder, similar to prior study.  Mild-moderate atherosclerotic calcifications.   Postoperative changes of a previous L2 kyphoplasty with methylmethacrylate present.  There is loss of lumbar vertebral body height at all levels throughout the lumbar spine, progressive within the superior endplate of L4 when compared to the previous study of 12/10/2022.    1/30/23 Xray L-spine:  A spinal stimulator device is partially imaged.  There is a mild compression fracture of L2 status post vertebroplasty.  There is a mild compression fracture of L4 without significant change.  Mild retrolisthesis of L3 on L4 is present.  There is mild loss of disc height at L2-3 and L5-S1.  Moderate lower lumbar facet arthropathy is present there is heavy calcification of the aorta.    06/10/2024 CT cervical spine  Bones: Diffuse bony demineralization.  No fractures or bony destructive changes.  Prominent ventral bridging osteophyte complex at C4-C5 and endplate centered subcortical cystic changes in the setting of severe disc height loss at C5-C6.  Additional prominent ventral bridging osteophyte complexes laterally C6-C7.     Alignment: Within normal limits.     Craniocervical junction: Degenerative changes.  No acute process or may     Disc levels:     Degenerative changes including shallow disc osteophyte complexes most notable asymmetric to the right at C5-C6 with there is no more than mild to moderate narrowing of the  right central spinal canal and centrally at C6-C7 producing mild to moderate narrowing of the spinal canal.  No evidence of any high-grade osseous spinal canal narrowing.  Uncovertebral spurring and facet arthrosis producing severe left/moderate right C3-C4, severe right/moderate left C4-C5, severe bilateral C5-C6, and severe bilateral C6-C7 narrowing.     Paraspinal soft tissues: The visualized paraspinal soft tissues and lung apices are within normal limits.    06/10/2024 CT lumbar spine  Bones: Diffuse bony demineralization.  There is a dorsal thoracic spinal stimulator entering the canal at T12-L1 coursing cranially out of the field of view.  Chronic mild compression fracture deformities status post cement augmentation of L2 and severe biconcave compression fracture of L4, both unchanged.  No acute fractures.  Prominent ventral endplate centered bridging osteophytes throughout.  No bony destructive process.     Alignment: Within normal limits.     Disc levels:     Similar multilevel degenerative changes including moderate disc height loss notable at L2-L3 and L5-S1.  Disc bulging and facet arthrosis most pronounced at L3-L4, L4-L5, and L5-S1 with moderate to severe narrowing of the spinal canal and subarticular recesses.  Severe foraminal narrowing on the left at L5-S1 and moderate to severe on the right at L5-S1 and bilaterally at L3-L4 and L4-L5.  Overall no gross interval change.  Vacuum disc phenomenon at L3-L4.     Soft Tissues: Atherosclerotic changes of the aorta iliac distribution.  Partially imaged presumed left renal cyst measuring 2.9 cm.  Presumed left basilar scarring/atelectasis residual from previous pleural effusion as seen on CT 03/27/2024.  Cholecystectomy changes.  Prostatomegaly.    Assessment:  The patient is a 78-year-old man with a history of hypertension, obesity and low back pain who presents in referral from Dr. Winters for chronic right low back pain.   1. Sacroiliac joint pain        2.  Spinal stenosis of lumbar region without neurogenic claudication        3. DDD (degenerative disc disease), lumbar        4. Ulnar neuropathy of both upper extremities  Ambulatory referral/consult to Orthopedics      5. DDD (degenerative disc disease), cervical            Plan:  1. I reviewed his cervical spine CT with him and we discussed that he does not have foraminal stenosis at C7-T1 to explain his current symptoms.  I we will have him follow-up with our hand specialist for further evaluation.  2. I reviewed his lumbar spine CT with him and we discussed that he has had some worsening of stenosis since his CT myelogram when he saw Dr. Puri a couple years ago.  He was not a surgical candidate at that time and we discussed that he is still not likely a good surgical candidate due to his osteoporosis and recent heart troubles.  3. We discussed that on exam he does have sacroiliac joint pain.  He has not had relief with epidural steroid injections in the past so we discussed trying a right sacroiliac joint injection.  He would like to do this.  4. He will contact the [employer] representative for reprogramming.    5. We discussed considering physical therapy and aquatic therapy at [facility].    6. Follow-up in 4 weeks postprocedure or sooner as needed.

## 2024-06-14 DIAGNOSIS — M46.1 SACROILIITIS: ICD-10-CM

## 2024-06-14 DIAGNOSIS — M53.3 SACROILIAC JOINT PAIN: Primary | ICD-10-CM

## 2024-06-14 RX ORDER — ALPRAZOLAM 1 MG/1
1 TABLET, ORALLY DISINTEGRATING ORAL ONCE AS NEEDED
OUTPATIENT
Start: 2024-06-14 | End: 2035-11-11

## 2024-06-17 ENCOUNTER — OFFICE VISIT (OUTPATIENT)
Dept: CARDIOLOGY | Facility: CLINIC | Age: 79
End: 2024-06-17
Payer: MEDICARE

## 2024-06-17 VITALS
DIASTOLIC BLOOD PRESSURE: 86 MMHG | BODY MASS INDEX: 50.81 KG/M2 | HEART RATE: 66 BPM | HEIGHT: 64 IN | WEIGHT: 297.63 LBS | SYSTOLIC BLOOD PRESSURE: 140 MMHG

## 2024-06-17 DIAGNOSIS — G47.33 OSA ON CPAP: ICD-10-CM

## 2024-06-17 DIAGNOSIS — I48.92 ATRIAL FIBRILLATION AND FLUTTER: ICD-10-CM

## 2024-06-17 DIAGNOSIS — I50.32 CHRONIC HEART FAILURE WITH PRESERVED EJECTION FRACTION (HFPEF): Chronic | ICD-10-CM

## 2024-06-17 DIAGNOSIS — I25.10 CORONARY ARTERY DISEASE INVOLVING NATIVE CORONARY ARTERY OF NATIVE HEART WITHOUT ANGINA PECTORIS: ICD-10-CM

## 2024-06-17 DIAGNOSIS — I70.0 ABDOMINAL AORTIC ATHEROSCLEROSIS: Primary | Chronic | ICD-10-CM

## 2024-06-17 DIAGNOSIS — R94.31 ABNORMAL EKG: ICD-10-CM

## 2024-06-17 DIAGNOSIS — Z95.1 S/P CABG (CORONARY ARTERY BYPASS GRAFT): ICD-10-CM

## 2024-06-17 DIAGNOSIS — I48.91 ATRIAL FIBRILLATION AND FLUTTER: ICD-10-CM

## 2024-06-17 DIAGNOSIS — E78.2 MIXED HYPERLIPIDEMIA: Chronic | ICD-10-CM

## 2024-06-17 PROCEDURE — 99214 OFFICE O/P EST MOD 30 MIN: CPT | Mod: S$GLB,,, | Performed by: INTERNAL MEDICINE

## 2024-06-17 PROCEDURE — 3077F SYST BP >= 140 MM HG: CPT | Mod: CPTII,S$GLB,, | Performed by: INTERNAL MEDICINE

## 2024-06-17 PROCEDURE — 1159F MED LIST DOCD IN RCRD: CPT | Mod: CPTII,S$GLB,, | Performed by: INTERNAL MEDICINE

## 2024-06-17 PROCEDURE — 1157F ADVNC CARE PLAN IN RCRD: CPT | Mod: CPTII,S$GLB,, | Performed by: INTERNAL MEDICINE

## 2024-06-17 PROCEDURE — 99999 PR PBB SHADOW E&M-EST. PATIENT-LVL III: CPT | Mod: PBBFAC,,, | Performed by: INTERNAL MEDICINE

## 2024-06-17 PROCEDURE — 3288F FALL RISK ASSESSMENT DOCD: CPT | Mod: CPTII,S$GLB,, | Performed by: INTERNAL MEDICINE

## 2024-06-17 PROCEDURE — 1126F AMNT PAIN NOTED NONE PRSNT: CPT | Mod: CPTII,S$GLB,, | Performed by: INTERNAL MEDICINE

## 2024-06-17 PROCEDURE — 3079F DIAST BP 80-89 MM HG: CPT | Mod: CPTII,S$GLB,, | Performed by: INTERNAL MEDICINE

## 2024-06-17 PROCEDURE — 1101F PT FALLS ASSESS-DOCD LE1/YR: CPT | Mod: CPTII,S$GLB,, | Performed by: INTERNAL MEDICINE

## 2024-06-17 PROCEDURE — 93005 ELECTROCARDIOGRAM TRACING: CPT | Mod: PO

## 2024-06-17 NOTE — PROGRESS NOTES
Subjective:    Patient ID:  Chinedu Roque is a 78 y.o. male patient here for evaluation Follow-up      History of Present Illness:  CABG 2023 with left atrial appendage ligation.  Remains on Eliquis 5 b.i.d..  Perioperative complications include DVT PE.  Patient is status post mary/DC cardioversion 06/2024.  Uncertain of a flutter.  To be documented by EKG dated 04/17/24  Less dyspnea.  No angina.  No dizziness.  No edema.  Weight stable.       Hypertension, dyslipidemia.          Review of patient's allergies indicates:  No Known Allergies    Past Medical History:   Diagnosis Date    Anticoagulant long-term use     ASA 81 mg    Arthritis     cervical    BPH (benign prostatic hyperplasia) 03/02/2012    Chronic left shoulder pain     Compression fracture of L2     DDD (degenerative disc disease), lumbar     HTN (hypertension) 03/02/2012    Hx of colonic polyps 2013    Low back pain     Morbid obesity with BMI of 40.0-44.9, adult 03/18/2014    Seasonal allergies     Sleep apnea     compliant with CPAP    Stroke 03/1986     Past Surgical History:   Procedure Laterality Date    ANGIOGRAM, CORONARY, WITH LEFT HEART CATHETERIZATION N/A 06/07/2023    Procedure: Left Heart Cath;  Surgeon: Edgardo Marcelino MD;  Location: [facility] CATH;  Service: Cardiology;  Laterality: N/A;    APPENDECTOMY      CATARACT EXTRACTION EXTRACAPSULAR W/ INTRAOCULAR LENS IMPLANTATION      COLONOSCOPY N/A 06/06/2022    Procedure: COLONOSCOPY;  Surgeon: Jose Bello MD;  Location: [facility] ENDO;  Service: Endoscopy;  Laterality: N/A;    COLONOSCOPY N/A 07/06/2023    Procedure: COLONOSCOPY;  Surgeon: Sb Spaulding MD;  Location: [facility] ENDO;  Service: Endoscopy;  Laterality: N/A;    CORONARY ANGIOGRAPHY N/A 06/07/2023    Procedure: ANGIOGRAM, CORONARY ARTERY;  Surgeon: Edgardo Marcelino MD;  Location: [facility] CATH;  Service: Cardiology;  Laterality: N/A;    CORONARY ARTERY BYPASS GRAFT      CORONARY ARTERY BYPASS GRAFT (CABG) N/A 06/23/2023     Procedure: CORONARY ARTERY BYPASS GRAFT (CABG) X3;  Surgeon: Pricila Clark MD;  Location: [facility] OR;  Service: Cardiothoracic;  Laterality: N/A;    ENDOSCOPIC HARVEST OF VEIN Right 06/23/2023    Procedure: HARVEST-VEIN-ENDOVASCULAR;  Surgeon: Pricila Clark MD;  Location: [facility] OR;  Service: Cardiothoracic;  Laterality: Right;    EPIDURAL BLOCK INJECTION  2015    cervical    EPIDURAL STEROID INJECTION INTO LUMBAR SPINE N/A 06/05/2019    Procedure: Injection-steroid-epidural-lumbar L5/S1 interlaminar;  Surgeon: Riley Segura MD;  Location: [facility] OR;  Service: Pain Management;  Laterality: N/A;    EPIDURAL STEROID INJECTION INTO LUMBAR SPINE N/A 09/13/2019    Procedure: Injection-steroid-epidural-lumbar;  Surgeon: Riley Segura MD;  Location: [facility] OR;  Service: Pain Management;  Laterality: N/A;  L5/S1 interlaminar to the right    EPIDURAL STEROID INJECTION INTO LUMBAR SPINE N/A 06/02/2020    Procedure: Injection-steroid-epidural-lumbar L5/S1 to right;  Surgeon: Riley Segura MD;  Location: [facility] OR;  Service: Pain Management;  Laterality: N/A;    EXCLUSION, LEFT ATRIAL APPENDAGE, OPEN, AS PART OF OPEN CHEST SURGERY N/A 06/23/2023    Procedure: EXCLUSION, LEFT ATRIAL APPENDAGE, OPEN, AS PART OF OPEN CHEST SURGERY;  Surgeon: Pricila Clark MD;  Location: [facility] OR;  Service: Cardiothoracic;  Laterality: N/A;    EXPLORATION OF MEDIASTINUM N/A 06/23/2023    Procedure: EXPLORATION, MEDIASTINUM - MEDIASTINAL RE-EXPLORATION TO CONTROL POST-OP BLEEDING;  Surgeon: Pricila Clark MD;  Location: [facility] OR;  Service: Cardiothoracic;  Laterality: N/A;    Facet Steroid injection       Pain management    HERNIA REPAIR      Ventral    INJECTION OF ANESTHETIC AGENT AROUND MEDIAL BRANCH NERVES INNERVATING LUMBAR FACET JOINT Right 03/21/2023    Procedure: Block-nerve-medial branch-lumbar L3/4, L4/5, L5/S1;  Surgeon: Riley Segura MD;  Location: [facility];  Service: Pain Management;  Laterality: Right;    INSERTION OF DORSAL  COLUMN NERVE STIMULATOR FOR TRIAL N/A 02/10/2021    Procedure: INSERTION, NEUROSTIMULATOR, SPINAL CORD, DORSAL COLUMN, FOR TRIAL;  Surgeon: Riley Segura MD;  Location: [facility] OR;  Service: Pain Management;  Laterality: N/A;    INSERTION OF NEUROSTIMULATOR OF DORSAL COLUMN OF SPINAL CORD N/A 03/01/2021    Procedure: INSERTION, NEUROSTIMULATOR, SPINAL CORD, DORSAL COLUMN;  Surgeon: Riley Segura MD;  Location: [facility];  Service: Pain Management;  Laterality: N/A;    KNEE SURGERY      bilateral    LAPAROSCOPIC CHOLECYSTECTOMY N/A 11/29/2023    Procedure: CHOLECYSTECTOMY, LAPAROSCOPIC;  Surgeon: Louisa Leo MD;  Location: [facility];  Service: General;  Laterality: N/A;    RADIOFREQUENCY ABLATION  2015    lumbar nerve    RADIOFREQUENCY ABLATION OF LUMBAR MEDIAL BRANCH NERVE AT SINGLE LEVEL Right 04/11/2019    Procedure: Radiofrequency Ablation, Nerve, Spinal, Lumbar, Medial Branch, L1,2,3,4,5;  Surgeon: Riley Segura MD;  Location: [facility] OR;  Service: Pain Management;  Laterality: Right;    TONSILLECTOMY      TRANSESOPHAGEAL ECHOCARDIOGRAM WITH POSSIBLE CARDIOVERSION (CORINNA W/ POSS CARDIOVERSION) N/A 6/4/2024    Procedure: TRANSESOPHAGEAL ECHOCARDIOGRAM WITH POSSIBLE CARDIOVERSION (CORINNA W/ POSS CARDIOVERSION);  Surgeon: Edgardo Marcelino MD;  Location: [facility];  Service: Cardiology;  Laterality: N/A;    TRANSFORAMINAL EPIDURAL INJECTION OF STEROID Right 11/13/2020    Procedure: Injection,steroid,epidural,transforaminal approach, l3/4 and l5/s1;  Surgeon: Riley Segura MD;  Location: [facility] OR;  Service: Pain Management;  Laterality: Right;    TRANSFORAMINAL EPIDURAL INJECTION OF STEROID Left 06/24/2021    Procedure: Injection,steroid,epidural,transforaminal approach L5/S1;  Surgeon: iRley Segura MD;  Location: [facility] OR;  Service: Pain Management;  Laterality: Left;    TRANSFORAMINAL EPIDURAL INJECTION OF STEROID Right 09/22/2022    Procedure: Injection,steroid,epidural,transforaminal  approach L5/S1;  Surgeon: Riley Segura MD;  Location: [facility];  Service: Pain Management;  Laterality: Right;    TRANSFORAMINAL EPIDURAL INJECTION OF STEROID Right 10/25/2022    Procedure: Injection,steroid,epidural,transforaminal approach L2/3 and L3/4;  Surgeon: Riley Segura MD;  Location: [facility];  Service: Pain Management;  Laterality: Right;    VERTEBROPLASTY       Social History     Tobacco Use    Smoking status: Former     Current packs/day: 0.00     Average packs/day: 1.5 packs/day for 24.0 years (36.0 ttl pk-yrs)     Types: Cigarettes     Start date: 10/21/1959     Quit date: 3/19/1983     Years since quittin.2     Passive exposure: Past    Smokeless tobacco: Never   Substance Use Topics    Alcohol use: No    Drug use: No        Review of Systems:    As noted in HPI in addition      REVIEW OF SYSTEMS  Review of Systems   Constitutional: Negative for decreased appetite, diaphoresis, night sweats, weight gain and weight loss.   HENT:  Negative for nosebleeds and odynophagia.    Eyes:  Negative for double vision and photophobia.   Cardiovascular:  Negative for chest pain, claudication, cyanosis, dyspnea on exertion, irregular heartbeat, leg swelling, near-syncope, orthopnea, palpitations, paroxysmal nocturnal dyspnea and syncope.   Respiratory:  Negative for cough, hemoptysis, shortness of breath and wheezing.    Hematologic/Lymphatic: Negative for adenopathy.   Skin:  Negative for flushing, skin cancer and suspicious lesions.   Musculoskeletal:  Negative for gout, myalgias and neck pain.   Gastrointestinal:  Negative for abdominal pain, heartburn, hematemesis and hematochezia.   Genitourinary:  Negative for bladder incontinence, hesitancy and nocturia.   Neurological:  Negative for focal weakness, headaches, light-headedness and paresthesias.   Psychiatric/Behavioral:  Negative for memory loss and substance abuse.               Objective:        Vitals:    24 1112   BP: (!) 140/86    Pulse: 66       Lab Results   Component Value Date    WBC 6.64 03/21/2024    HGB 13.1 (L) 03/21/2024    HCT 41.1 03/21/2024     03/21/2024    CHOL 113 (L) 02/01/2024    TRIG 52 02/01/2024    HDL 51 02/01/2024    ALT 29 02/01/2024    AST 33 02/01/2024     04/29/2024    K 3.9 04/29/2024     04/29/2024    CREATININE 0.99 04/29/2024    BUN 16 04/29/2024    CO2 29 04/29/2024    TSH 0.890 11/20/2023    PSA 4.5 (H) 03/22/2022    INR 1.1 08/04/2023    HGBA1C 5.9 (H) 03/21/2024        ECHOCARDIOGRAM RESULTS  Results for orders placed during the hospital encounter of 06/04/24    Transesophageal echo (CORINNA) with possible cardioversion    Interpretation Summary    Left Ventricle: The left ventricle is normal in size. Normal wall thickness. There is normal systolic function with a visually estimated ejection fraction of 55 - 60%.    Right Ventricle: Normal right ventricular cavity size. Wall thickness is normal. Systolic function is normal.    Left Atrium: Left atrium is dilated.    Mitral Valve: There is no stenosis.    Tricuspid Valve: There is moderate regurgitation.    Results for orders placed during the hospital encounter of 06/07/23    Cardiac catheterization    Conclusion    The ejection fraction was calculated to be 65%.    The left ventricular end diastolic pressure was normal.    The pre-procedure left ventricular end diastolic pressure was 14.    The post-procedure left ventricular end diastolic pressure was 14.    The estimated blood loss was none.    High-grade ostial left main, ostial LAD and ostial circumflex disease as described.  Left dominant system.  Refer to CTS for surgical revascularization.  EF normal.    The procedure log was documented by No documenter listed and verified by Edgardo Marcelino MD.    Date: 6/7/2023  Time: 10:58 AM          CURRENT/PREVIOUS VISIT EKG  Results for orders placed or performed during the hospital encounter of 06/04/24   EKG 12-LEAD    Collection Time:  06/04/24  2:13 PM   Result Value Ref Range    QRS Duration 130 ms    [facility] QTC Calculation 470 ms    Narrative    Test Reason : I48.0,    Vent. Rate : 072 BPM     Atrial Rate : 094 BPM     P-R Int : 000 ms          QRS Dur : 130 ms      QT Int : 430 ms       P-R-T Axes : 000 -54 066 degrees     QTc Int : 470 ms    Sinus rhythm with A-V dissociation and Wide QRS rhythm with occasional   Premature ventricular complexes  Left axis deviation  Nonspecific intraventricular block  Cannot rule out Anterior infarct ,age undetermined  Abnormal ECG  When compared with ECG of 17-MAY-2024 09:25,  Wide QRS rhythm has replaced Atrial fibrillation  Confirmed by Edgardo Marcelino MD ([id number]) on 6/6/2024 4:58:51 PM    Referred By:             Confirmed By:Edgardo Marcelino MD     No valid procedures specified.   Results for orders placed during the hospital encounter of 03/22/23    Nuclear Stress - Cardiology Interpreted    Interpretation Summary    The ECG portion of the study is abnormal but not diagnostic.    The test was stopped because the patient experienced A-V block.    2nd degree AV Block confirmed by [facility] Chau PA-C    Resting images obtained only.  Apical thinning noted.  No stress images.  Abnormal baseline EKG is reported.  Inconclusive study.    No valid procedures specified.    PHYSICAL EXAM  GENERAL: well built, well nourished, well-developed in no apparent distress alert and oriented.   HEENT: Normocephalic. Pupils normal and conjunctivae normal.  Mucous membranes normal, no cyanosis or icterus, trachea central,no pallor or icterus is noted..   NECK: No JVD. No bruit..   THYROID: Thyroid not enlarged. No nodules present..   CARDIAC:  Normal S1-S2.  No murmur rub click or gallop.  PMI nondisplaced.  Irregular rhythm.  LUNGS: Clear to auscultation. No wheezing or rhonchi..   ABDOMEN: Soft no masses or organomegaly.  No abdomen pulsations or bruits.  Normal bowel sounds. No pulsations and no masses felt, No guarding or  rebound.   URINARY: No arnold catheter   EXTREMITIES: No cyanosis, clubbing or edema noted at this time., no calf tenderness bilaterally.   PERIPHERAL VASCULAR SYSTEM: Good palpable distal pulses.  2+ femoral, popliteal and pedal pulses.  No bruits    CENTRAL NERVOUS SYSTEM: No focal motor or sensory deficits noted.   SKIN: Skin without lesions, moist, well perfused.   MUSCLE STRENGTH & TONE: No noteable weakness, atrophy or abnormal movement    I HAVE REVIEWED :    The vital signs, nurses notes, and all the pertinent radiology and labs.         Current Outpatient Medications   Medication Instructions    acetaminophen (TYLENOL) 500 mg, Oral, Every 6 hours PRN    apixaban (ELIQUIS) 5 mg, Oral, 2 times daily    aspirin (ECOTRIN) 81 mg, Oral, Daily    atorvastatin (LIPITOR) 20 mg, Oral, Daily    b complex vitamins tablet 1 tablet, Oral, Daily    calcium citrate-vitamin D3 315-200 mg (CITRACAL+D) 315 mg-5 mcg (200 unit) per tablet 1 tablet, Oral, Daily    colchicine (COLCRYS) 0.6 mg, Oral, 2 times daily    finasteride (PROSCAR) 5 mg, Oral, Daily    furosemide (LASIX) 20 mg, Oral, Nightly PRN, Take nightly PRN weight gain of 2 lbs in 24 hours or 3 lbs in 72 hours    furosemide (LASIX) 40 mg, Oral, Daily    gabapentin (NEURONTIN) 300 MG capsule TAKE ONE IN AM, AND TWO IN PM    hydrALAZINE (APRESOLINE) 25 mg, Oral, Every 12 hours    losartan (COZAAR) 100 mg, Oral, Daily    nystatin (MYCOSTATIN) cream Topical (Top), 2 times daily    oxyCODONE-acetaminophen (PERCOCET) 5-325 mg per tablet 1 tablet, Oral, Every 6 hours PRN    polyethylene glycol (GLYCOLAX) 17 g, Oral, Daily PRN    senna-docusate 8.6-50 mg (PERICOLACE) 8.6-50 mg per tablet 1 tablet, Oral, 2 times daily    SHINGRIX, PF, 50 mcg/0.5 mL injection     spironolactone (ALDACTONE) 25 mg, Oral, Daily    tamsulosin (FLOMAX) 0.4 mg Cap TAKE 1 CAPSULE BY MOUTH EVERY DAY    tiZANidine (ZANAFLEX) 2 MG tablet Take 1-2 po TID prn muscle spasm          Assessment:   Adams/DC  cardioversion for post op CABG  A flutter 06/04/2024.  Ejection fraction normal.    History of COMPLICATING perioperative CABG DVT PE.  CABG June 20, 2023.  History of left atrial appendage ligation.  Re-exploration, mediastinal bleeding, resolved.    Hypertension, dyslipidemia    Plan:   EKG today with normal sinus rhythm.  PACs.  Sinus bradycardia.  Second-degree Mobitz 1 heart block.    Patient asymptomatic  Continue anticoagulation  Holter monitor see me again in 1 month    Six-month    No follow-ups on file.

## 2024-06-18 ENCOUNTER — HOSPITAL ENCOUNTER (OUTPATIENT)
Dept: CARDIOLOGY | Facility: HOSPITAL | Age: 79
Discharge: HOME | End: 2024-06-18
Attending: INTERNAL MEDICINE
Payer: MEDICARE

## 2024-06-18 DIAGNOSIS — Z95.1 S/P CABG (CORONARY ARTERY BYPASS GRAFT): ICD-10-CM

## 2024-06-18 DIAGNOSIS — I48.92 ATRIAL FIBRILLATION AND FLUTTER: ICD-10-CM

## 2024-06-18 DIAGNOSIS — I48.91 ATRIAL FIBRILLATION AND FLUTTER: ICD-10-CM

## 2024-06-18 DIAGNOSIS — R94.31 ABNORMAL EKG: ICD-10-CM

## 2024-06-18 PROCEDURE — 93226 XTRNL ECG REC<48 HR SCAN A/R: CPT | Mod: PO

## 2024-06-28 ENCOUNTER — HOSPITAL ENCOUNTER (OUTPATIENT)
Dept: RADIOLOGY | Facility: HOSPITAL | Age: 79
Discharge: HOME | End: 2024-06-28
Attending: ANESTHESIOLOGY | Admitting: ANESTHESIOLOGY
Payer: MEDICARE

## 2024-06-28 ENCOUNTER — HOSPITAL ENCOUNTER (OUTPATIENT)
Facility: HOSPITAL | Age: 79
Discharge: HOME | End: 2024-06-28
Attending: ANESTHESIOLOGY | Admitting: ANESTHESIOLOGY
Payer: MEDICARE

## 2024-06-28 DIAGNOSIS — M54.50 LOWER BACK PAIN: ICD-10-CM

## 2024-06-28 DIAGNOSIS — M46.1 SACROILIITIS: ICD-10-CM

## 2024-06-28 DIAGNOSIS — M53.3 SACROILIAC JOINT PAIN: ICD-10-CM

## 2024-06-28 PROCEDURE — 76000 FLUOROSCOPY <1 HR PHYS/QHP: CPT | Mod: TC,PO

## 2024-06-28 PROCEDURE — 25500020 PHARM REV CODE 255: Mod: PO | Performed by: ANESTHESIOLOGY

## 2024-06-28 PROCEDURE — 27096 INJECT SACROILIAC JOINT: CPT | Mod: PO,RT | Performed by: ANESTHESIOLOGY

## 2024-06-28 PROCEDURE — 27096 INJECT SACROILIAC JOINT: CPT | Mod: RT,,, | Performed by: ANESTHESIOLOGY

## 2024-06-28 PROCEDURE — 25000003 PHARM REV CODE 250: Mod: PO | Performed by: ANESTHESIOLOGY

## 2024-06-28 PROCEDURE — 63600175 PHARM REV CODE 636 W HCPCS: Mod: JZ,JG,PO | Performed by: ANESTHESIOLOGY

## 2024-06-28 RX ORDER — METHYLPREDNISOLONE ACETATE 80 MG/ML
INJECTION, SUSPENSION INTRA-ARTICULAR; INTRALESIONAL; INTRAMUSCULAR; SOFT TISSUE
Status: DISCONTINUED | OUTPATIENT
Start: 2024-06-28 | End: 2024-06-28 | Stop reason: HOSPADM

## 2024-06-28 RX ORDER — BUPIVACAINE HYDROCHLORIDE 2.5 MG/ML
INJECTION, SOLUTION EPIDURAL; INFILTRATION; INTRACAUDAL
Status: DISCONTINUED | OUTPATIENT
Start: 2024-06-28 | End: 2024-06-28 | Stop reason: HOSPADM

## 2024-06-28 RX ORDER — LIDOCAINE HYDROCHLORIDE 10 MG/ML
INJECTION INFILTRATION; PERINEURAL
Status: DISCONTINUED | OUTPATIENT
Start: 2024-06-28 | End: 2024-06-28 | Stop reason: HOSPADM

## 2024-06-28 RX ORDER — ALPRAZOLAM 0.5 MG/1
1 TABLET, ORALLY DISINTEGRATING ORAL ONCE AS NEEDED
Status: COMPLETED | OUTPATIENT
Start: 2024-06-28 | End: 2024-06-28

## 2024-06-28 RX ADMIN — ALPRAZOLAM 1 MG: 0.5 TABLET, ORALLY DISINTEGRATING ORAL at 09:06

## 2024-06-28 NOTE — OP NOTE
PROCEDURE:  Right sacroiliac joint injection utilizing fluoroscopy.    DIAGNOSIS: Right sided sacroiliitis.  Post op diagnosis: same    PHYSICIAN: Riley Segura MD    MEDICATIONS INJECTED:  Methylprednisone 80mg and 2ml Bupivacaine 0.25%.    LOCAL ANESTHETIC USED: Lidocaine 1%,4ml total.     SEDATION MEDICATIONS: none    ESTIMATED BLOOD LOSS: none    COMPLICATIONS: none    TECHNIQUE:   Time-out taken to identify patient and procedure side prior to starting the procedure. After placing the patient in the prone position, the patient was prepped and draped in the usual sterile fashion using ChloraPrep and sterile towels.  The area was determined under fluoroscopy in the AP view.  Lidocaine was injected by raising a wheal and going down to the periosteum using a 25-gauge 1.5 inch needle.  The 3.5 inch 22-gauge spinal needle was introduced into inferior opening of the right sacroiliac joint.  Negative pressure applied to confirm no intravascular placement.   The medication was then injected slowly. The patient tolerated the procedure well.    The patient was monitored after the procedure.  The patient was given post procedure and discharge instructions to follow at home. The patient was discharged in a stable condition

## 2024-06-28 NOTE — DISCHARGE SUMMARY
[facility] - Surgery  Discharge Note  Short Stay    Procedure(s) (LRB):  INJECTION,SACROILIAC JOINT (Right)      OUTCOME: Patient tolerated treatment/procedure well without complication and is now ready for discharge.    DISPOSITION: Home or Self Care    FINAL DIAGNOSIS:  Sacroiliitis    FOLLOWUP: In clinic    DISCHARGE INSTRUCTIONS:    Discharge Procedure Orders   ACCEPT - Ambulatory referral/consult to Heart Failure Transitional Care Clinic   Standing Status: Future   Referral Priority: Routine Referral Type: Consultation   Referral Reason: Specialty Services Required   Requested Specialty: Cardiology   Number of Visits Requested: 1     Diet Adult Regular     No dressing needed     Notify your health care provider if you experience any of the following:  temperature >100.4     Activity as tolerated

## 2024-07-01 ENCOUNTER — DOCUMENTATION ONLY (OUTPATIENT)
Dept: CARDIOLOGY | Facility: CLINIC | Age: 79
End: 2024-07-01
Payer: MEDICARE

## 2024-07-01 ENCOUNTER — PATIENT MESSAGE (OUTPATIENT)
Dept: PRIMARY CARE CLINIC | Facility: CLINIC | Age: 79
End: 2024-07-01
Payer: MEDICARE

## 2024-07-01 VITALS
WEIGHT: 297 LBS | BODY MASS INDEX: 50.71 KG/M2 | HEART RATE: 79 BPM | RESPIRATION RATE: 17 BRPM | HEIGHT: 64 IN | OXYGEN SATURATION: 97 % | SYSTOLIC BLOOD PRESSURE: 147 MMHG | DIASTOLIC BLOOD PRESSURE: 73 MMHG | TEMPERATURE: 98 F

## 2024-07-01 RX ORDER — FUROSEMIDE 20 MG/1
20 TABLET ORAL NIGHTLY PRN
Qty: 30 TABLET | Refills: 0 | Status: SHIPPED | OUTPATIENT
Start: 2024-07-01 | End: 2025-07-01

## 2024-07-01 RX ORDER — FUROSEMIDE 40 MG/1
40 TABLET ORAL DAILY
Qty: 90 TABLET | Refills: 1 | Status: SHIPPED | OUTPATIENT
Start: 2024-07-01 | End: 2024-12-28

## 2024-07-01 NOTE — PROGRESS NOTES
[facility] ([facility]) Team notified of pt referral via Ambulatory Referral to Heart Failure Transitional Care ([id number]).    Patient screened today 07/01/2024 by provider and RN for enrollment to program.      Pt was deemed not a candidate for enrollment at this time related to patient primary presenting complaint would not benefit from Heart Failure Transitional Care Program.  R/O PP hip injection    Pt will require additional follow up planning per primary team.     If pt status, diagnosis, or treatment plan changes , please place AMB referral to [facility] ([id number]) for HFT[facility] enrollment re-evalution.

## 2024-07-03 ENCOUNTER — OFFICE VISIT (OUTPATIENT)
Dept: ORTHOPEDICS | Facility: CLINIC | Age: 79
End: 2024-07-03
Payer: MEDICARE

## 2024-07-03 ENCOUNTER — TELEPHONE (OUTPATIENT)
Dept: PRIMARY CARE CLINIC | Facility: CLINIC | Age: 79
End: 2024-07-03
Payer: MEDICARE

## 2024-07-03 DIAGNOSIS — G56.23 ULNAR NEUROPATHY OF BOTH UPPER EXTREMITIES: ICD-10-CM

## 2024-07-03 PROCEDURE — 99999 PR PBB SHADOW E&M-EST. PATIENT-LVL III: CPT | Mod: PBBFAC,,, | Performed by: ORTHOPAEDIC SURGERY

## 2024-07-03 NOTE — TELEPHONE ENCOUNTER
[facility] is requesting refill on Lasix x90 days, but Rx already refilled x30 days on 7/1/24.   
normal. S2 is normal.Pulses are equal bilaterally. Musculo: Patient with tenderness over her right lateral ribs #8 through 10. There is no ecchymosis hematoma crepitus or obvious deformity noted. There is no flank pain. Patient is able to perform deep breathing and full range of motion of her torso but this does cause increased pain. Patient moves extremities without pain or limitation. No pedal edema. Abdomen: Soft nontender without distention. No rebound rigidity or guarding. Skin: Skin is warm and dry. Neuro: Alert and oriented x3. Speech is articulate and fluent. Psych: Patient's mood and affect is appropriate to situation. Testing:           Medical Decision Making:           Clinical Impression:   Rae Seo was seen today for chest pain. Diagnoses and all orders for this visit:    Pain in rib  -     XR RIBS RIGHT INCLUDE CHEST (MIN 3 VIEWS); Future    Contusion of rib on right side, initial encounter  -     traMADol (ULTRAM) 50 MG tablet; Take 1 tablet by mouth every 4 hours as needed for Pain for up to 3 days. Intended supply: 3 days. Take lowest dose possible to manage pain        The patient is to call for any concerns or return if any of the signs or symptoms worsen. The patient is to follow-up with PCP in the next 2-3 days for repeat evaluation repeat assessment or go directly to the emergency department.      SIGNATURE: Justin Clifford PA-C

## 2024-07-03 NOTE — PROGRESS NOTES
7/3/2024    Chief Complaint:  Chief Complaint   Patient presents with    Left Hand - Pain, Numbness    Right Hand - Numbness, Pain       HPI:  Chinedu Roque is a 78 y.o. male, who presents to clinic today he was complaining of numbness and tingling to bilateral hands.  This has mainly in the ulnar distribution.  States that this started after his bypass surgery approximately a year ago.  He was also complaining of some numbness in the median distribution as well.  He was here today for evaluation and treatment options.    PMHX:  Past Medical History:   Diagnosis Date    Anticoagulant long-term use     ASA 81 mg    Arthritis     cervical    BPH (benign prostatic hyperplasia) 03/02/2012    Chronic left shoulder pain     Compression fracture of L2     DDD (degenerative disc disease), lumbar     HTN (hypertension) 03/02/2012    Hx of colonic polyps 2013    Low back pain     Morbid obesity with BMI of 40.0-44.9, adult 03/18/2014    Seasonal allergies     Sleep apnea     compliant with CPAP    Stroke 03/1986       PSHX:  Past Surgical History:   Procedure Laterality Date    ANGIOGRAM, CORONARY, WITH LEFT HEART CATHETERIZATION N/A 06/07/2023    Procedure: Left Heart Cath;  Surgeon: Edgardo Marcelino MD;  Location: [facility] CATH;  Service: Cardiology;  Laterality: N/A;    APPENDECTOMY      CATARACT EXTRACTION EXTRACAPSULAR W/ INTRAOCULAR LENS IMPLANTATION      COLONOSCOPY N/A 06/06/2022    Procedure: COLONOSCOPY;  Surgeon: Jose Bello MD;  Location: [facility] ENDO;  Service: Endoscopy;  Laterality: N/A;    COLONOSCOPY N/A 07/06/2023    Procedure: COLONOSCOPY;  Surgeon: Sb Spaulding MD;  Location: [facility] ENDO;  Service: Endoscopy;  Laterality: N/A;    CORONARY ANGIOGRAPHY N/A 06/07/2023    Procedure: ANGIOGRAM, CORONARY ARTERY;  Surgeon: Edgardo Marcelino MD;  Location: [facility] CATH;  Service: Cardiology;  Laterality: N/A;    CORONARY ARTERY BYPASS GRAFT      CORONARY ARTERY BYPASS GRAFT (CABG) N/A 06/23/2023     Procedure: CORONARY ARTERY BYPASS GRAFT (CABG) X3;  Surgeon: Pricila Clark MD;  Location: [facility] OR;  Service: Cardiothoracic;  Laterality: N/A;    ENDOSCOPIC HARVEST OF VEIN Right 06/23/2023    Procedure: HARVEST-VEIN-ENDOVASCULAR;  Surgeon: Pricila Clark MD;  Location: [facility] OR;  Service: Cardiothoracic;  Laterality: Right;    EPIDURAL BLOCK INJECTION  2015    cervical    EPIDURAL STEROID INJECTION INTO LUMBAR SPINE N/A 06/05/2019    Procedure: Injection-steroid-epidural-lumbar L5/S1 interlaminar;  Surgeon: Riley Segura MD;  Location: [facility] OR;  Service: Pain Management;  Laterality: N/A;    EPIDURAL STEROID INJECTION INTO LUMBAR SPINE N/A 09/13/2019    Procedure: Injection-steroid-epidural-lumbar;  Surgeon: Rliey Segura MD;  Location: [facility] OR;  Service: Pain Management;  Laterality: N/A;  L5/S1 interlaminar to the right    EPIDURAL STEROID INJECTION INTO LUMBAR SPINE N/A 06/02/2020    Procedure: Injection-steroid-epidural-lumbar L5/S1 to right;  Surgeon: Riley Segura MD;  Location: [facility] OR;  Service: Pain Management;  Laterality: N/A;    EXCLUSION, LEFT ATRIAL APPENDAGE, OPEN, AS PART OF OPEN CHEST SURGERY N/A 06/23/2023    Procedure: EXCLUSION, LEFT ATRIAL APPENDAGE, OPEN, AS PART OF OPEN CHEST SURGERY;  Surgeon: Pricila Clark MD;  Location: [facility] OR;  Service: Cardiothoracic;  Laterality: N/A;    EXPLORATION OF MEDIASTINUM N/A 06/23/2023    Procedure: EXPLORATION, MEDIASTINUM - MEDIASTINAL RE-EXPLORATION TO CONTROL POST-OP BLEEDING;  Surgeon: Pricila Clark MD;  Location: [facility] OR;  Service: Cardiothoracic;  Laterality: N/A;    Facet Steroid injection       Pain management    HERNIA REPAIR      Ventral    INJECTION OF ANESTHETIC AGENT AROUND MEDIAL BRANCH NERVES INNERVATING LUMBAR FACET JOINT Right 03/21/2023    Procedure: Block-nerve-medial branch-lumbar L3/4, L4/5, L5/S1;  Surgeon: Riley Segura MD;  Location: [facility];  Service: Pain Management;  Laterality: Right;    INJECTION, SACROILIAC  JOINT Right 6/28/2024    Procedure: INJECTION,SACROILIAC JOINT;  Surgeon: Riley Segura MD;  Location: [facility] OR;  Service: Pain Management;  Laterality: Right;    INSERTION OF DORSAL COLUMN NERVE STIMULATOR FOR TRIAL N/A 02/10/2021    Procedure: INSERTION, NEUROSTIMULATOR, SPINAL CORD, DORSAL COLUMN, FOR TRIAL;  Surgeon: Riley Segura MD;  Location: [facility] OR;  Service: Pain Management;  Laterality: N/A;    INSERTION OF NEUROSTIMULATOR OF DORSAL COLUMN OF SPINAL CORD N/A 03/01/2021    Procedure: INSERTION, NEUROSTIMULATOR, SPINAL CORD, DORSAL COLUMN;  Surgeon: Riley Segura MD;  Location: [facility] OR;  Service: Pain Management;  Laterality: N/A;    KNEE SURGERY      bilateral    LAPAROSCOPIC CHOLECYSTECTOMY N/A 11/29/2023    Procedure: CHOLECYSTECTOMY, LAPAROSCOPIC;  Surgeon: Louisa Leo MD;  Location: [facility];  Service: General;  Laterality: N/A;    RADIOFREQUENCY ABLATION  2015    lumbar nerve    RADIOFREQUENCY ABLATION OF LUMBAR MEDIAL BRANCH NERVE AT SINGLE LEVEL Right 04/11/2019    Procedure: Radiofrequency Ablation, Nerve, Spinal, Lumbar, Medial Branch, L1,2,3,4,5;  Surgeon: Riley Segura MD;  Location: [facility] OR;  Service: Pain Management;  Laterality: Right;    TONSILLECTOMY      TRANSESOPHAGEAL ECHOCARDIOGRAM WITH POSSIBLE CARDIOVERSION (CORINNA W/ POSS CARDIOVERSION) N/A 6/4/2024    Procedure: TRANSESOPHAGEAL ECHOCARDIOGRAM WITH POSSIBLE CARDIOVERSION (CORINNA W/ POSS CARDIOVERSION);  Surgeon: Edgardo Marcelino MD;  Location: [facility];  Service: Cardiology;  Laterality: N/A;    TRANSFORAMINAL EPIDURAL INJECTION OF STEROID Right 11/13/2020    Procedure: Injection,steroid,epidural,transforaminal approach, l3/4 and l5/s1;  Surgeon: Riley Segura MD;  Location: [facility] OR;  Service: Pain Management;  Laterality: Right;    TRANSFORAMINAL EPIDURAL INJECTION OF STEROID Left 06/24/2021    Procedure: Injection,steroid,epidural,transforaminal approach L5/S1;  Surgeon: Riley Segura MD;   Location: [facility] OR;  Service: Pain Management;  Laterality: Left;    TRANSFORAMINAL EPIDURAL INJECTION OF STEROID Right 2022    Procedure: Injection,steroid,epidural,transforaminal approach L5/S1;  Surgeon: Riley Segura MD;  Location: [facility];  Service: Pain Management;  Laterality: Right;    TRANSFORAMINAL EPIDURAL INJECTION OF STEROID Right 10/25/2022    Procedure: Injection,steroid,epidural,transforaminal approach L2/3 and L3/4;  Surgeon: Riley Segura MD;  Location: [facility];  Service: Pain Management;  Laterality: Right;    VERTEBROPLASTY         FMHX:  Family History   Problem Relation Name Age of Onset    COPD Father      Hypertension Brother      Kidney disease Brother      Alzheimer's disease Mother      No Known Problems Daughter      Hypertension Son      Diabetes Son          pre-diabetic    No Known Problems Daughter      No Known Problems Daughter         SOCHX:  Social History     Tobacco Use    Smoking status: Former     Current packs/day: 0.00     Average packs/day: 1.5 packs/day for 24.0 years (36.0 ttl pk-yrs)     Types: Cigarettes     Start date: 10/21/1959     Quit date: 3/19/1983     Years since quittin.3     Passive exposure: Past    Smokeless tobacco: Never   Substance Use Topics    Alcohol use: No       ALLERGIES:  Patient has no known allergies.    CURRENT MEDICATIONS:  Current Outpatient Medications on File Prior to Visit   Medication Sig Dispense Refill    acetaminophen (TYLENOL) 500 MG tablet Take 500 mg by mouth every 6 (six) hours as needed for Pain.      apixaban (ELIQUIS) 5 mg Tab Take 1 tablet (5 mg total) by mouth 2 (two) times daily. 180 tablet 0    aspirin (ECOTRIN) 81 MG EC tablet Take 1 tablet (81 mg total) by mouth once daily. 30 tablet 11    atorvastatin (LIPITOR) 20 MG tablet Take 1 tablet (20 mg total) by mouth once daily. 90 tablet 3    b complex vitamins tablet Take 1 tablet by mouth once daily.      calcium citrate-vitamin D3 315-200 mg  (CITRACAL+D) 315 mg-5 mcg (200 unit) per tablet Take 1 tablet by mouth once daily.      colchicine, gout, (COLCRYS) 0.6 mg tablet Take 1 tablet (0.6 mg total) by mouth 2 (two) times daily. 60 tablet 11    finasteride (PROSCAR) 5 mg tablet Take 1 tablet (5 mg total) by mouth once daily. 90 tablet 3    furosemide (LASIX) 20 MG tablet Take 1 tablet (20 mg total) by mouth nightly as needed (weight gain). Take nightly PRN weight gain of 2 lbs in 24 hours or 3 lbs in 72 hours 30 tablet 0    furosemide (LASIX) 40 MG tablet Take 1 tablet (40 mg total) by mouth once daily. 90 tablet 1    gabapentin (NEURONTIN) 300 MG capsule TAKE ONE IN AM, AND TWO IN PM 90 capsule 4    hydrALAZINE (APRESOLINE) 25 MG tablet Take 1 tablet (25 mg total) by mouth every 12 (twelve) hours. 60 tablet 11    losartan (COZAAR) 100 MG tablet Take 1 tablet (100 mg total) by mouth once daily. 90 tablet 0    nystatin (MYCOSTATIN) cream Apply topically 2 (two) times daily. 60 g 1    oxyCODONE-acetaminophen (PERCOCET) 5-325 mg per tablet Take 1 tablet by mouth every 6 (six) hours as needed for Pain. 20 tablet 0    polyethylene glycol (GLYCOLAX) 17 gram PwPk Take 17 g by mouth daily as needed. 30 packet 1    senna-docusate 8.6-50 mg (PERICOLACE) 8.6-50 mg per tablet Take 1 tablet by mouth 2 (two) times daily. 30 tablet 1    SHINGRIX, PF, 50 mcg/0.5 mL injection       spironolactone (ALDACTONE) 25 MG tablet Take 1 tablet (25 mg total) by mouth once daily. 90 tablet 1    tamsulosin (FLOMAX) 0.4 mg Cap TAKE 1 CAPSULE BY MOUTH EVERY DAY 90 capsule 3    tiZANidine (ZANAFLEX) 2 MG tablet Take 1-2 po TID prn muscle spasm 30 tablet 1     Current Facility-Administered Medications on File Prior to Visit   Medication Dose Route Frequency Provider Last Rate Last Admin    sodium hyaluronate (EUFLEXXA) 10 mg/mL(mw 2.4 -3.6 million) Syrg 20 mg  20 mg Intra-articular  Michelet Covarrubias MD   20 mg at 05/14/18 1046    sodium hyaluronate (EUFLEXXA) 10 mg/mL(mw 2.4 -3.6  million) Syrg 20 mg  20 mg Intra-articular  Michelet Covarrubias MD   20 mg at 05/14/18 1046       REVIEW OF SYSTEMS:  Review of Systems   Constitutional: Negative.    HENT: Negative.     Eyes: Negative.    Respiratory: Negative.     Cardiovascular: Negative.    Gastrointestinal: Negative.    Genitourinary: Negative.    Musculoskeletal:  Positive for joint pain.   Skin: Negative.    Neurological:  Positive for tingling and weakness.   Endo/Heme/Allergies: Negative.    Psychiatric/Behavioral: Negative.       GENERAL PHYSICAL EXAM:   There were no vitals taken for this visit.   GEN: well developed, well nourished, no acute distress   HENT: Normocephalic, atraumatic   EYES: No discharge, conjunctiva normal   NECK: Supple, non-tender   PULM: No wheezing, no respiratory distress   CV: RRR   ABD: Soft, non-tender    ORTHO EXAM:   Examination of bilateral upper extremities reveals that there is no edema.  There is no major skin change.  Palpation does not produce tenderness.  He was grossly intact sensation in the median distribution bilaterally.  He is decreased sensation in the ulnar distribution bilaterally.  He has 5/5 intrinsic and thenar muscular strength bilaterally.  He has negative Tinel's over the carpal tunnels and the cubital tunnels.  Has a negative Durkan's test.  Has 2+ radial pulse    RADIOLOGY:   None    ASSESSMENT:   Bilateral ulnar neuropathy    PLAN:  1. Will send the patient for EMG and nerve conduction study to assess for site of compression.  I feel like this is either compressive neuropathy or neuropathy due to hypoperfusion during his previous surgery.  We will use a nerve conduction study to assess for any other areas of compressive neuropathy as well     2.  He will follow up with me as soon as the nerve conduction study is completed to discuss further treatment options

## 2024-07-12 ENCOUNTER — TELEPHONE (OUTPATIENT)
Dept: CARDIOLOGY | Facility: CLINIC | Age: 79
End: 2024-07-12
Payer: MEDICARE

## 2024-07-12 NOTE — TELEPHONE ENCOUNTER
----- Message from Adan Fisher sent at 7/12/2024  9:24 AM CDT -----  Contact: self  Type: Needs Medical Advice  Who Called:  PT    Best Call Back Number: [phone]   Additional Information: PT accidentally canceled 07/18 appt.  He would like to keept it.  Please contact to advise.   forehead

## 2024-07-15 ENCOUNTER — OFFICE VISIT (OUTPATIENT)
Dept: CARDIOLOGY | Facility: CLINIC | Age: 79
End: 2024-07-15
Payer: MEDICARE

## 2024-07-15 VITALS
SYSTOLIC BLOOD PRESSURE: 130 MMHG | BODY MASS INDEX: 51.08 KG/M2 | HEIGHT: 64 IN | DIASTOLIC BLOOD PRESSURE: 73 MMHG | WEIGHT: 299.19 LBS | HEART RATE: 80 BPM

## 2024-07-15 DIAGNOSIS — Z95.1 S/P CABG (CORONARY ARTERY BYPASS GRAFT): ICD-10-CM

## 2024-07-15 DIAGNOSIS — G47.33 OSA ON CPAP: ICD-10-CM

## 2024-07-15 DIAGNOSIS — E78.2 MIXED HYPERLIPIDEMIA: Chronic | ICD-10-CM

## 2024-07-15 DIAGNOSIS — I10 PRIMARY HYPERTENSION: ICD-10-CM

## 2024-07-15 DIAGNOSIS — I65.23 BILATERAL CAROTID ARTERY STENOSIS: Chronic | ICD-10-CM

## 2024-07-15 DIAGNOSIS — I70.0 ABDOMINAL AORTIC ATHEROSCLEROSIS: Primary | Chronic | ICD-10-CM

## 2024-07-15 DIAGNOSIS — I87.2 VENOUS INSUFFICIENCY OF BOTH LOWER EXTREMITIES: ICD-10-CM

## 2024-07-15 DIAGNOSIS — E66.01 MORBID OBESITY WITH BMI OF 40.0-44.9, ADULT: ICD-10-CM

## 2024-07-15 PROCEDURE — 1126F AMNT PAIN NOTED NONE PRSNT: CPT | Mod: CPTII,S$GLB,, | Performed by: INTERNAL MEDICINE

## 2024-07-15 PROCEDURE — 99214 OFFICE O/P EST MOD 30 MIN: CPT | Mod: S$GLB,,, | Performed by: INTERNAL MEDICINE

## 2024-07-15 PROCEDURE — 99999 PR PBB SHADOW E&M-EST. PATIENT-LVL III: CPT | Mod: PBBFAC,,, | Performed by: INTERNAL MEDICINE

## 2024-07-15 PROCEDURE — 1101F PT FALLS ASSESS-DOCD LE1/YR: CPT | Mod: CPTII,S$GLB,, | Performed by: INTERNAL MEDICINE

## 2024-07-15 PROCEDURE — 1157F ADVNC CARE PLAN IN RCRD: CPT | Mod: CPTII,S$GLB,, | Performed by: INTERNAL MEDICINE

## 2024-07-15 PROCEDURE — 3078F DIAST BP <80 MM HG: CPT | Mod: CPTII,S$GLB,, | Performed by: INTERNAL MEDICINE

## 2024-07-15 PROCEDURE — 3075F SYST BP GE 130 - 139MM HG: CPT | Mod: CPTII,S$GLB,, | Performed by: INTERNAL MEDICINE

## 2024-07-15 PROCEDURE — 1159F MED LIST DOCD IN RCRD: CPT | Mod: CPTII,S$GLB,, | Performed by: INTERNAL MEDICINE

## 2024-07-15 PROCEDURE — 3288F FALL RISK ASSESSMENT DOCD: CPT | Mod: CPTII,S$GLB,, | Performed by: INTERNAL MEDICINE

## 2024-07-15 NOTE — PROGRESS NOTES
Subjective:    Patient ID:  Chinedu Roque is a 78 y.o. male patient here for evaluation Follow-up      History of Present Illness:  Follow up visit.  Holter monitor with continued intermittent second-degree Mobitz 1 heart block.  Low heart rates of 40.  No pauses greater than 2-1/2 seconds.  No recorded a fib/flutter.  Remains on Eliquis 5 b.i.d.    CABG 2023 with left atrial appendage ligation.  Complicating DVT PE.    Denies angina, dyspnea.  No palpitations.  Denies syncope/presyncope.             Review of patient's allergies indicates:  No Known Allergies    Past Medical History:   Diagnosis Date    Anticoagulant long-term use     ASA 81 mg    Arthritis     cervical    BPH (benign prostatic hyperplasia) 03/02/2012    Chronic left shoulder pain     Compression fracture of L2     DDD (degenerative disc disease), lumbar     HTN (hypertension) 03/02/2012    Hx of colonic polyps 2013    Low back pain     Morbid obesity with BMI of 40.0-44.9, adult 03/18/2014    Seasonal allergies     Sleep apnea     compliant with CPAP    Stroke 03/1986     Past Surgical History:   Procedure Laterality Date    ANGIOGRAM, CORONARY, WITH LEFT HEART CATHETERIZATION N/A 06/07/2023    Procedure: Left Heart Cath;  Surgeon: Edgardo Marcelino MD;  Location: [facility] CATH;  Service: Cardiology;  Laterality: N/A;    APPENDECTOMY      CATARACT EXTRACTION EXTRACAPSULAR W/ INTRAOCULAR LENS IMPLANTATION      COLONOSCOPY N/A 06/06/2022    Procedure: COLONOSCOPY;  Surgeon: Jose Bello MD;  Location: [facility] ENDO;  Service: Endoscopy;  Laterality: N/A;    COLONOSCOPY N/A 07/06/2023    Procedure: COLONOSCOPY;  Surgeon: Sb Spaulding MD;  [facility];  Service: Endoscopy;  Laterality: N/A;    CORONARY ANGIOGRAPHY N/A 06/07/2023    Procedure: ANGIOGRAM, CORONARY ARTERY;  Surgeon: Edgardo Marcelino MD;  Location: [facility] CATH;  Service: Cardiology;  Laterality: N/A;    CORONARY ARTERY BYPASS GRAFT      CORONARY ARTERY BYPASS GRAFT (CABG)  N/A 06/23/2023    Procedure: CORONARY ARTERY BYPASS GRAFT (CABG) X3;  Surgeon: Pricila Clark MD;  Location: [facility] OR;  Service: Cardiothoracic;  Laterality: N/A;    ENDOSCOPIC HARVEST OF VEIN Right 06/23/2023    Procedure: HARVEST-VEIN-ENDOVASCULAR;  Surgeon: Pricila Clark MD;  Location: [facility] OR;  Service: Cardiothoracic;  Laterality: Right;    EPIDURAL BLOCK INJECTION  2015    cervical    EPIDURAL STEROID INJECTION INTO LUMBAR SPINE N/A 06/05/2019    Procedure: Injection-steroid-epidural-lumbar L5/S1 interlaminar;  Surgeon: Riley Segura MD;  Location: [facility] OR;  Service: Pain Management;  Laterality: N/A;    EPIDURAL STEROID INJECTION INTO LUMBAR SPINE N/A 09/13/2019    Procedure: Injection-steroid-epidural-lumbar;  Surgeon: Riley Segura MD;  Location: [facility] OR;  Service: Pain Management;  Laterality: N/A;  L5/S1 interlaminar to the right    EPIDURAL STEROID INJECTION INTO LUMBAR SPINE N/A 06/02/2020    Procedure: Injection-steroid-epidural-lumbar L5/S1 to right;  Surgeon: Riley Segura MD;  Location: [facility] OR;  Service: Pain Management;  Laterality: N/A;    EXCLUSION, LEFT ATRIAL APPENDAGE, OPEN, AS PART OF OPEN CHEST SURGERY N/A 06/23/2023    Procedure: EXCLUSION, LEFT ATRIAL APPENDAGE, OPEN, AS PART OF OPEN CHEST SURGERY;  Surgeon: Pricila Clark MD;  Location: [facility] OR;  Service: Cardiothoracic;  Laterality: N/A;    EXPLORATION OF MEDIASTINUM N/A 06/23/2023    Procedure: EXPLORATION, MEDIASTINUM - MEDIASTINAL RE-EXPLORATION TO CONTROL POST-OP BLEEDING;  Surgeon: Pricila Clark MD;  Location: [facility] OR;  Service: Cardiothoracic;  Laterality: N/A;    Facet Steroid injection       Pain management    HERNIA REPAIR      Ventral    INJECTION OF ANESTHETIC AGENT AROUND MEDIAL BRANCH NERVES INNERVATING LUMBAR FACET JOINT Right 03/21/2023    Procedure: Block-nerve-medial branch-lumbar L3/4, L4/5, L5/S1;  Surgeon: Riley Segura MD;  Location: [facility];  Service: Pain Management;  Laterality: Right;     INJECTION, SACROILIAC JOINT Right 6/28/2024    Procedure: INJECTION,SACROILIAC JOINT;  Surgeon: Riley Segura MD;  Location: [facility] OR;  Service: Pain Management;  Laterality: Right;    INSERTION OF DORSAL COLUMN NERVE STIMULATOR FOR TRIAL N/A 02/10/2021    Procedure: INSERTION, NEUROSTIMULATOR, SPINAL CORD, DORSAL COLUMN, FOR TRIAL;  Surgeon: Riley Segura MD;  Location: [facility] OR;  Service: Pain Management;  Laterality: N/A;    INSERTION OF NEUROSTIMULATOR OF DORSAL COLUMN OF SPINAL CORD N/A 03/01/2021    Procedure: INSERTION, NEUROSTIMULATOR, SPINAL CORD, DORSAL COLUMN;  Surgeon: Riley Segura MD;  Location: [facility] OR;  Service: Pain Management;  Laterality: N/A;    KNEE SURGERY      bilateral    LAPAROSCOPIC CHOLECYSTECTOMY N/A 11/29/2023    Procedure: CHOLECYSTECTOMY, LAPAROSCOPIC;  Surgeon: Louisa Leo MD;  Location: [facility];  Service: General;  Laterality: N/A;    RADIOFREQUENCY ABLATION  2015    lumbar nerve    RADIOFREQUENCY ABLATION OF LUMBAR MEDIAL BRANCH NERVE AT SINGLE LEVEL Right 04/11/2019    Procedure: Radiofrequency Ablation, Nerve, Spinal, Lumbar, Medial Branch, L1,2,3,4,5;  Surgeon: Riley Segura MD;  Location: [facility] OR;  Service: Pain Management;  Laterality: Right;    TONSILLECTOMY      TRANSESOPHAGEAL ECHOCARDIOGRAM WITH POSSIBLE CARDIOVERSION (CORINNA W/ POSS CARDIOVERSION) N/A 6/4/2024    Procedure: TRANSESOPHAGEAL ECHOCARDIOGRAM WITH POSSIBLE CARDIOVERSION (CORINNA W/ POSS CARDIOVERSION);  Surgeon: Edgardo Marcelino MD;  Location: [facility];  Service: Cardiology;  Laterality: N/A;    TRANSFORAMINAL EPIDURAL INJECTION OF STEROID Right 11/13/2020    Procedure: Injection,steroid,epidural,transforaminal approach, l3/4 and l5/s1;  Surgeon: Riley Segura MD;  Location: [facility] OR;  Service: Pain Management;  Laterality: Right;    TRANSFORAMINAL EPIDURAL INJECTION OF STEROID Left 06/24/2021    Procedure: Injection,steroid,epidural,transforaminal approach L5/S1;  Surgeon:  Riley Segura MD;  Location: [facility] OR;  Service: Pain Management;  Laterality: Left;    TRANSFORAMINAL EPIDURAL INJECTION OF STEROID Right 2022    Procedure: Injection,steroid,epidural,transforaminal approach L5/S1;  Surgeon: Riley Segura MD;  Location: [facility] OR;  Service: Pain Management;  Laterality: Right;    TRANSFORAMINAL EPIDURAL INJECTION OF STEROID Right 10/25/2022    Procedure: Injection,steroid,epidural,transforaminal approach L2/3 and L3/4;  Surgeon: Riley Segura MD;  Location: [facility];  Service: Pain Management;  Laterality: Right;    VERTEBROPLASTY       Social History     Tobacco Use    Smoking status: Former     Current packs/day: 0.00     Average packs/day: 1.5 packs/day for 24.0 years (36.0 ttl pk-yrs)     Types: Cigarettes     Start date: 10/21/1959     Quit date: 3/19/1983     Years since quittin.3     Passive exposure: Past    Smokeless tobacco: Never   Substance Use Topics    Alcohol use: No    Drug use: No        Review of Systems:    As noted in HPI in addition      REVIEW OF SYSTEMS  Review of Systems   Constitutional: Negative for decreased appetite, diaphoresis, night sweats, weight gain and weight loss.   HENT:  Negative for nosebleeds and odynophagia.    Eyes:  Negative for double vision and photophobia.   Cardiovascular:  Negative for chest pain, claudication, cyanosis, dyspnea on exertion, irregular heartbeat, leg swelling, near-syncope, orthopnea, palpitations, paroxysmal nocturnal dyspnea and syncope.   Respiratory:  Negative for cough, hemoptysis, shortness of breath and wheezing.    Hematologic/Lymphatic: Negative for adenopathy.   Skin:  Negative for flushing, skin cancer and suspicious lesions.   Musculoskeletal:  Negative for gout, myalgias and neck pain.   Gastrointestinal:  Negative for abdominal pain, heartburn, hematemesis and hematochezia.   Genitourinary:  Negative for bladder incontinence, hesitancy and nocturia.   Neurological:  Negative for  focal weakness, headaches, light-headedness and paresthesias.   Psychiatric/Behavioral:  Negative for memory loss and substance abuse.               Objective:        Vitals:    07/15/24 1132   BP: 130/73   Pulse: 80       Lab Results   Component Value Date    WBC 6.64 03/21/2024    HGB 13.1 (L) 03/21/2024    HCT 41.1 03/21/2024     03/21/2024    CHOL 113 (L) 02/01/2024    TRIG 52 02/01/2024    HDL 51 02/01/2024    ALT 29 02/01/2024    AST 33 02/01/2024     04/29/2024    K 3.9 04/29/2024     04/29/2024    CREATININE 0.99 04/29/2024    BUN 16 04/29/2024    CO2 29 04/29/2024    TSH 0.890 11/20/2023    PSA 4.5 (H) 03/22/2022    INR 1.1 08/04/2023    HGBA1C 5.9 (H) 03/21/2024        ECHOCARDIOGRAM RESULTS  Results for orders placed during the hospital encounter of 06/04/24    Transesophageal echo (CORINNA) with possible cardioversion    Interpretation Summary    Left Ventricle: The left ventricle is normal in size. Normal wall thickness. There is normal systolic function with a visually estimated ejection fraction of 55 - 60%.    Right Ventricle: Normal right ventricular cavity size. Wall thickness is normal. Systolic function is normal.    Left Atrium: Left atrium is dilated.    Mitral Valve: There is no stenosis.    Tricuspid Valve: There is moderate regurgitation.    Results for orders placed during the hospital encounter of 06/07/23    Cardiac catheterization    Conclusion    The ejection fraction was calculated to be 65%.    The left ventricular end diastolic pressure was normal.    The pre-procedure left ventricular end diastolic pressure was 14.    The post-procedure left ventricular end diastolic pressure was 14.    The estimated blood loss was none.    High-grade ostial left main, ostial LAD and ostial circumflex disease as described.  Left dominant system.  Refer to CTS for surgical revascularization.  EF normal.    The procedure log was documented by No documenter listed and verified by Edgardo  BILL Marcelino MD.    Date: 6/7/2023  Time: 10:58 AM          CURRENT/PREVIOUS VISIT EKG  Results for orders placed or performed during the hospital encounter of 06/04/24   EKG 12-LEAD    Collection Time: 06/04/24  2:13 PM   Result Value Ref Range    QRS Duration 130 ms    [facility]HS QTC Calculation 470 ms    Narrative    Test Reason : I48.0,    Vent. Rate : 072 BPM     Atrial Rate : 094 BPM     P-R Int : 000 ms          QRS Dur : 130 ms      QT Int : 430 ms       P-R-T Axes : 000 -54 066 degrees     QTc Int : 470 ms    Sinus rhythm with A-V dissociation and Wide QRS rhythm with occasional   Premature ventricular complexes  Left axis deviation  Nonspecific intraventricular block  Cannot rule out Anterior infarct ,age undetermined  Abnormal ECG  When compared with ECG of 17-MAY-2024 09:25,  Wide QRS rhythm has replaced Atrial fibrillation  Confirmed by Chari VEE, Edgardo KEARNEY ([id number]) on 6/6/2024 4:58:51 PM    Referred By:             Confirmed By:Edgardo Marcelino MD     No valid procedures specified.   Results for orders placed during the hospital encounter of 03/22/23    Nuclear Stress - Cardiology Interpreted    Interpretation Summary    The ECG portion of the study is abnormal but not diagnostic.    The test was stopped because the patient experienced A-V block.    2nd degree AV Block confirmed by [facility] Chau PA-C    Resting images obtained only.  Apical thinning noted.  No stress images.  Abnormal baseline EKG is reported.  Inconclusive study.    No valid procedures specified.    PHYSICAL EXAM  GENERAL: well built, well nourished, well-developed in no apparent distress alert and oriented.   HEENT: Normocephalic. Pupils normal and conjunctivae normal.  Mucous membranes normal, no cyanosis or icterus, trachea central,no pallor or icterus is noted..   NECK: No JVD. No bruit..   THYROID: Thyroid not enlarged. No nodules present..   CARDIAC:  Normal S1-S2.  No murmur rub click or gallop.  PMI nondisplaced.  CHEST ANATOMY: normal.    LUNGS: Clear to auscultation. No wheezing or rhonchi..   ABDOMEN: Soft no masses or organomegaly.  No abdomen pulsations or bruits.  Normal bowel sounds. No pulsations and no masses felt, No guarding or rebound.   URINARY: No arnold catheter   EXTREMITIES: No cyanosis, clubbing or edema noted at this time., no calf tenderness bilaterally.   PERIPHERAL VASCULAR SYSTEM: Good palpable distal pulses.  2+ femoral, popliteal and pedal pulses.  No bruits    CENTRAL NERVOUS SYSTEM: No focal motor or sensory deficits noted.   SKIN: Skin without lesions, moist, well perfused.   MUSCLE STRENGTH & TONE: No noteable weakness, atrophy or abnormal movement    I HAVE REVIEWED :    The vital signs, nurses notes, and all the pertinent radiology and labs.         Current Outpatient Medications   Medication Instructions    acetaminophen (TYLENOL) 500 mg, Oral, Every 6 hours PRN    apixaban (ELIQUIS) 5 mg, Oral, 2 times daily    aspirin (ECOTRIN) 81 mg, Oral, Daily    atorvastatin (LIPITOR) 20 mg, Oral, Daily    b complex vitamins tablet 1 tablet, Oral, Daily    calcium citrate-vitamin D3 315-200 mg (CITRACAL+D) 315 mg-5 mcg (200 unit) per tablet 1 tablet, Oral, Daily    colchicine (COLCRYS) 0.6 mg, Oral, 2 times daily    finasteride (PROSCAR) 5 mg, Oral, Daily    furosemide (LASIX) 40 mg, Oral, Daily    furosemide (LASIX) 20 mg, Oral, Nightly PRN, Take nightly PRN weight gain of 2 lbs in 24 hours or 3 lbs in 72 hours    gabapentin (NEURONTIN) 300 MG capsule TAKE ONE IN AM, AND TWO IN PM    hydrALAZINE (APRESOLINE) 25 mg, Oral, Every 12 hours    losartan (COZAAR) 100 mg, Oral, Daily    nystatin (MYCOSTATIN) cream Topical (Top), 2 times daily    oxyCODONE-acetaminophen (PERCOCET) 5-325 mg per tablet 1 tablet, Oral, Every 6 hours PRN    polyethylene glycol (GLYCOLAX) 17 g, Oral, Daily PRN    senna-docusate 8.6-50 mg (PERICOLACE) 8.6-50 mg per tablet 1 tablet, Oral, 2 times daily    SHINGRIX, PF, 50 mcg/0.5 mL injection     spironolactone  (ALDACTONE) 25 mg, Oral, Daily    tamsulosin (FLOMAX) 0.4 mg Cap TAKE 1 CAPSULE BY MOUTH EVERY DAY    tiZANidine (ZANAFLEX) 2 MG tablet Take 1-2 po TID prn muscle spasm          Assessment:   CABG 2023.  Left atrial appendage ligation.  Complicating perioperative DVT PE.  Resolved.  Remains on oral anticoagulation with Eliquis 5 b.i.d.    Abnormal Holter monitor with presence of second-degree Mobitz 1 heart block.  Low heart rates of 40.  No significant pauses.  No recurrent a flutter    Plan:     Continue present medications.  Follow up again with me in 4 months.  Call if symptomatology occurs via syncope/presyncope or sudden onset dyspnea        No follow-ups on file.        Keep dressing dry, clean, intact, for 2 days. After 2 days, remove dressing and leave steri strips on. You can shower with steri strips on.  If steri-strip falls off after shower its OK, if not you leave it there, it will fall off by itself in 2-3 days.

## 2024-07-24 ENCOUNTER — OFFICE VISIT (OUTPATIENT)
Dept: PHYSICAL MEDICINE AND REHAB | Facility: CLINIC | Age: 79
End: 2024-07-24
Payer: MEDICARE

## 2024-07-24 DIAGNOSIS — G56.23 CUBITAL TUNNEL SYNDROME, BILATERAL: ICD-10-CM

## 2024-07-24 DIAGNOSIS — G56.03 BILATERAL CARPAL TUNNEL SYNDROME: ICD-10-CM

## 2024-07-24 PROCEDURE — 95886 MUSC TEST DONE W/N TEST COMP: CPT | Mod: S$GLB,,, | Performed by: PHYSICAL MEDICINE & REHABILITATION

## 2024-07-24 PROCEDURE — 95911 NRV CNDJ TEST 9-10 STUDIES: CPT | Mod: S$GLB,,, | Performed by: PHYSICAL MEDICINE & REHABILITATION

## 2024-07-24 PROCEDURE — 99999 PR PBB SHADOW E&M-EST. PATIENT-LVL II: CPT | Mod: PBBFAC,,, | Performed by: PHYSICAL MEDICINE & REHABILITATION

## 2024-07-24 PROCEDURE — 99499 UNLISTED E&M SERVICE: CPT | Mod: S$GLB,,, | Performed by: PHYSICAL MEDICINE & REHABILITATION

## 2024-07-24 NOTE — PROGRESS NOTES
[facility]   [facility]  [address] [place] [address]  [phone]             Full Name: Chinedu Roque Gender: Male  Patient ID: [id number] YOB: 1945      Visit Date: 7/24/2024 10:17 AM  Age: 78 Years  Examining Physician: Margaret Sampson D.O.   Referring Physician: Gigi Ridley M.D.   Technologist: JOANNA Kelley   Height: 5 feet 10 inch  History: Patient complains of numbness 4th and 5th digit numbness, bilaterally; he feels this started after Bypass surgery   7/202  Hx spinal cord stimulator  Taking blood thinner.  Taking blood thinner.      Sensory NCS      Nerve / Sites Rec. Site Onset Lat Peak Lat NP Amp Segments Distance Velocity     ms ms µV  cm m/s   L Median - Digit III (Antidromic)      Wrist Dig III 3.58 4.44 7.6 Wrist - Dig III 14 39      Ref.   ?3.60 ?15.0 Ref.  ?0       Sensory Nerve Conduction Study       Nerve / Sites Rec. Site Onset Lat Peak Lat NP Amp PP Amp Segments Distance Velocity Comment     ms ms µV µV  cm m/s    R Median - Dig III (Antidromic)      Wrist Index 3.91 4.79 6.6 11.9 Wrist - Index 14 36       Ref.  ?3.20 ?4.00   Ref.  ?0    L Ulnar - Dig V (Antidromic)      Wrist Dig V NR NR NR NR Wrist - Dig V 14 NR       Ref.  ?3.10 ?4.00   Ref.      R Ulnar - Dig V (Antidromic)      Wrist Dig V 4.17 5.00 9.9 12.4 Wrist - Dig V 14 34       Ref.  ?3.10 ?4.00   Ref.      L Radial - Superficial (Antidromic)      Forearm Wrist 1.67 2.24 22.7 12.4 Forearm - Wrist 10 60       Ref.  ?2.20 ?2.80  ?11.0 Ref.  ?0    R Radial - Superficial (Antidromic)      Forearm Wrist 1.46 2.19 21.5 14.4 Forearm - Wrist 10 69       Ref.  ?2.20 ?2.80 ?7.0 ?11.0 Ref.          Motor Nerve Conduction Study       Nerve / Sites Muscle Latency Ref. Amplitude Ref. Segments Dist. Lat Diff Velocity Ref. Comments     ms ms mV mV  cm ms m/s m/s    L Median - APB      Wrist APB 5.19 ?4.70 5.6 ?4.2 Wrist - APB 8          Elbow APB 9.90  3.5  Elbow  - Wrist 22 4.71 46.7 ?47.0    R Median - APB      Wrist APB 4.25 ?4.70 5.5  Wrist - APB 8          Elbow APB 8.98  3.4  Elbow - Wrist 22.5 4.73 47.6 ?47.0    L Ulnar - ADM      Wrist ADM 4.04 ?3.70 3.0 ?7.9 Wrist - ADM 8          B.Elbow ADM 8.35  2.5  B.Elbow - Wrist 20.5 4.31 47.5 ?52.0       A.Elbow ADM 12.56  1.5  A.Elbow - B.Elbow 11 4.21 26.1 ?43.0    R Ulnar - ADM      Wrist ADM 3.60 ?3.70 4.5 ?7.9 Wrist - ADM 8          B.Elbow ADM 8.04  3.3  B.Elbow - Wrist 21 4.44 47.3 ?52.0       A.Elbow ADM 10.65  2.3  A.Elbow - B.Elbow 11 2.60 42.2 ?43.0        EMG Summary Table     Spontaneous MUAP Recruitment   Muscle IA Fib PSW Fasc CRD Amp Dur. PPP Pattern   L. Deltoid N None None None None N N N N   L. Biceps brachii N None None None None N N N N   L. Triceps brachii N None None None None N N N N   L. Extensor digitorum communis N None None None None N N N N   L. Pronator teres N None None None None N N N N   L. First dorsal interosseous N 3+ 3+ None None N N N Reduced   L. Abductor pollicis brevis N None None None None N N N N   R. Deltoid N None None None None N N N N   R. Biceps brachii N None None None None N N N N   R. Triceps brachii N None None None None N N N N   R. Extensor digitorum communis N None None None None N N N N   R. Pronator teres N None None None None N N N N   R. First dorsal interosseous N 3+ 3+ None None N N N Reduced   R. Abductor pollicis brevis N None None None None N N N N       Summary    The motor conduction test was performed on 4 nerve(s). The results were normal in 1 nerve(s): R Median - APB. Results outside the specified normal range were found in 3 nerve(s), as follows:  In the L Median - APB study  the take off latency result was increased for Wrist stimulation  the take off velocity result was reduced for Elbow - Wrist segment  In the L Ulnar - ADM study  the take off latency result was increased for Wrist stimulation  the peak amplitude result was reduced for Wrist  stimulation  the take off velocity result was reduced for B.Elbow - Wrist segment  the take off velocity result was reduced for A.Elbow - B.Elbow segment  In the R Ulnar - ADM study  the peak amplitude result was reduced for Wrist stimulation  the take off velocity result was reduced for B.Elbow - Wrist segment  the take off velocity result was reduced for A.Elbow - B.Elbow segment    The sensory conduction test was performed on 6 nerve(s). The results were normal in 2 nerve(s): L Radial - Superficial (Antidromic), R Radial - Superficial (Antidromic). Results outside the specified normal range were found in 4 nerve(s), as follows:  In the L Median - Digit III (Antidromic) study  the peak latency result was increased for Wrist stimulation  the peak amplitude result was reduced for Wrist stimulation  In the R Median - Dig III (Antidromic) study  the peak latency result was increased for Wrist stimulation  In the L Ulnar - Dig V (Antidromic) study  the response was considered absent for Wrist stimulation  In the R Ulnar - Dig V (Antidromic) study  the peak latency result was increased for Wrist stimulation    The needle EMG examination was performed in 14 muscles. It was normal in 12 muscle(s): L. Deltoid, L. Biceps brachii, L. Triceps brachii, L. Extensor digitorum communis, L. Pronator teres, L. Abductor pollicis brevis, R. Deltoid, R. Biceps brachii, R. Triceps brachii, R. Extensor digitorum communis, R. Pronator teres, R. Abductor pollicis brevis. The study was abnormal in 2 muscle(s), with the following distribution:  Abnormal spontaneous/insertional activity was found in L. First dorsal interosseous, R. First dorsal interosseous.  Abnormal interference pattern was found in L. First dorsal interosseous, R. First dorsal interosseous.         Impression:  Abnormal study.   Severe bilateral cubital tunnel syndrome, with active denervation and neuropathic motor units.   Moderate left carpal tunnel syndrome, without  active denervation.   Mild right carpal tunnel syndrome, without active denervation.   No electrophysiologic evidence of bilateral cervical radiculopathy/plexopathy, or peripheral sensory neuropathy affecting bilateral upper extremities.     ------------------------------  Margaret Sampson, DO

## 2024-07-26 ENCOUNTER — OFFICE VISIT (OUTPATIENT)
Dept: PAIN MEDICINE | Facility: CLINIC | Age: 79
End: 2024-07-26
Payer: MEDICARE

## 2024-07-26 VITALS
HEIGHT: 64 IN | DIASTOLIC BLOOD PRESSURE: 64 MMHG | BODY MASS INDEX: 52.36 KG/M2 | HEART RATE: 42 BPM | WEIGHT: 306.69 LBS | SYSTOLIC BLOOD PRESSURE: 134 MMHG

## 2024-07-26 DIAGNOSIS — G56.00 CARPAL TUNNEL SYNDROME, UNSPECIFIED LATERALITY: ICD-10-CM

## 2024-07-26 DIAGNOSIS — M53.3 SACROILIAC JOINT PAIN: Primary | ICD-10-CM

## 2024-07-26 DIAGNOSIS — M48.061 SPINAL STENOSIS OF LUMBAR REGION WITHOUT NEUROGENIC CLAUDICATION: ICD-10-CM

## 2024-07-26 DIAGNOSIS — M46.1 SACROILIITIS: ICD-10-CM

## 2024-07-26 DIAGNOSIS — G56.20 CUBITAL TUNNEL SYNDROME, UNSPECIFIED LATERALITY: ICD-10-CM

## 2024-07-26 DIAGNOSIS — G56.23 ULNAR NEUROPATHY OF BOTH UPPER EXTREMITIES: ICD-10-CM

## 2024-07-26 DIAGNOSIS — G89.4 CHRONIC PAIN SYNDROME: ICD-10-CM

## 2024-07-26 PROCEDURE — 99999 PR PBB SHADOW E&M-EST. PATIENT-LVL III: CPT | Mod: PBBFAC,,,

## 2024-07-26 NOTE — PROGRESS NOTES
This note was completed with dictation software and grammatical errors may exist.    CC:Back pain    HPI: The patient is a 78-year-old man with a history of hypertension, obesity and low back pain who presents in referral from Dr. Winters for chronic right low back pain.  He returns in follow-up today to review his cervical and lumbar spine CT results.  He is status post right SI joint injection on 06/28/2024 with 80% relief lasting 1 week.  Today's reporting return of his right-sided lower back pain mainly located in his right buttock.  No further radiating pain, no numbness or tingling in his legs no weakness or any new changes to his bowel or bladder function.  He continues have numbness and tingling in his upper extremities, recent EMG showed severe bilateral carpal tunnel syndrome and cubital tunnel.  He is yet to have spinal cord stimulator reprogrammed with [employer]        Pain intervention history: He done physical therapy in January, 2014 with moderate relief but not sustained.  He takes Relafen, tramadol, baclofen and chlorzoxazone as needed with mild relief.  He had undergone what sounds like a series of epidurals several years ago with no major relief. The patient is status post a right side L2/3, L3/4, L4/5 and L5/S1 facet joint injection on 10/28/14 with 50% relief of his back pain for 1 month.  He is status post radiofrequency ablation of the right L1, 2, 3, 4 and 5 medial branch nerves on 3/18/15 with 75% relief.  He is status post C7-T1 cervical interlaminar epidural steroid injection on 5/11/15 with 70% relief.  He is status post right L1, 2, 3, 4 and 5 medial branch radiofrequency ablation on 7/5/16 with 50% relief.   He is status post right L1, 2, 3, 4 and 5 medial branch radiofrequency ablation on 10/17/17 with about 50% relief additionally, later reported almost complete relief lasting a year.  He is status post right L1, 2, 3, 4 and 5 medial branch radiofrequency ablation on  "04/11/2019 with mild relief.   He is status post L5/S1 interlaminar epidural steroid injection on 06/05/2019 with 80% relief.  He is status post L5/S1 interlaminar epidural steroid injection on 09/13/2019 with 50% relief lasting about 6 months.  He is status post L5/S1 interlaminar epidural steroid injection to the right on 06/02/2020 with minimal relief.  He is status post right L3/4 and L5/S1 transforaminal epidural steroid injection on 11/13/2020 with 0% relief.   He is status post left L5/S1 transforaminal epidural steroid injection on 06/24/2021 with 50% relief. He is status post right L5/S1 transforaminal LEATHA on 09/22/2022 with no relief. He is status post right L2/3 and L3/4 transforaminal LEATHA on 10/25/2022 with no relief.  He is status post right L3/4, L4/5 and L5/S1 diagnostic medial branch nerve block with 0% relief on 03/21/2023. He is status post right SI joint injection on 06/28/2024 with 80% relief lasting 1 week.    Spine surgeries:    Antineuropathics:  NSAIDs:  Physical therapy:  Antidepressants:  Muscle relaxers:  Opioids:  Hydrocodone 7.5   Antiplatelets/Anticoagulants:    ROS:He reports back pain.  Balance of review of systems is negative.    Medical, surgical, family and social history reviewed elsewhere in record.    Medications/Allergies: See med card    Vitals:    07/26/24 1001   BP: 134/64   Pulse: (!) 42   Weight: (!) 139.1 kg (306 lb 10.6 oz)   Height: 5' 4" (1.626 m)   PainSc:   5   PainLoc: Back     Body mass index is 52.64 kg/m².        Physical exam:  Gen: A and O x3, pleasant, well-groomed  Skin: No rashes or obvious lesions  HEENT: PERRLA, no obvious deformities on ears or in canals.   CVS: Regular rate and rhythm, normal S1 and S2, no murmurs.  Resp: Clear to auscultation bilaterally, no wheezes or rales.  Abdomen: Soft, NT/ND, normal bowel sounds present.  Musculoskeletal:    Neuro:  Lower extremities: 5/5 strength bilaterally  Reflexes:  Biceps 1+, triceps 0+, brachioradialis 0+ " Patellar 1+, Achilles 0+ bilaterally.  Sensory:  Intact and symmetrical to light touch and pinprick in C2-T1 L2-S1 dermatomes bilaterally.      Lumbar spine:  Lumbar spine: ROM is moderately reduced with flexion extension and oblique extension with increased pain on only on extension especially oblique extension to the right side.  Ramakrishna's test positive on the right.    Gaenslen test positive on the right.    SI compression test positive on the right.  Myofascial exam:  Mild tenderness to palpation to the right lower lumbar paraspinous muscles, moderate tenderness to palpation over the right sacroiliac joint.      Imagin14 Xray L-spine  There is diffuse osteopenia. Mild to moderate chronic compression fracture with anterior wedging and evidence of vertebroplasty at L2. minimal left convex curvature in the upper lumbar region. No spondylolisthesis. Mild concavity of the superior endplate of L4 which could be small compression fracture or Schmorl's node. No additional fracture.   There is moderate degenerative change within the lumbar spine with anterior osteophyte formation most prominent at L4-L5 and L2- L3 and L1 - L2, also present in the lower thoracic region with flowing ossification anteriorly suggesting diffuse idiopathic sclerotic hyperostosis. There is also mild decreased intervertebral disk space height and endplate sclerosis the lower thoracic and upper lumbar regions. Due to the degree of osseous mineralization, it is difficult to evaluate for any possible pars defects. Moderate sclerosis suggesting facet arthropathy in the lumbar spine present and there is mild sclerosis, likely degenerative in nature involving the sacroiliac joints.    10/7/14 MRI lumbar spine: At L1/2 there is mild spinal stenosis secondary to facet hypertrophy and disc bulging.  At L2/3 there is minimal spinal stenosis secondary to retropulsion of L2 compression fracture, appearance of prior kyphoplasty present.  There is  minimal disc bulging but there is also some facet hypertrophy at this level.  At L3/4 there is facet and ligamentum hypertrophy, minimal disc bulging causing mild spinal stenosis and neuroforaminal narrowing on the left greater than the right side.  At L4/5 there is moderate hypertrophic facet and ligamentum hypertrophy with mild left foraminal narrowing compared to the right side.  There is no spinal stenosis at this level.  At L5/S1 there is a broad-based disc bulge that extends to the left side more than the right side along with asymmetric left sided facet hypertrophy and ligamentum flavum hypertrophy causing moderate left foraminal stenosis.    4/15/15 outside open MRI cervical spine [facility]  C3-4 posterior disc bulge producing mild bilateral foraminal narrowing  C4-5 posterior disc bulge producing mild bilateral foraminal narrowing, possible tiny annular tear in the posterior disc, anterior thecal sac deformity with no evidence of spinal stenosis  C5-6 circumferential disc bulge producing moderate to severe bilateral foraminal narrowing, effacement of the bilateral lateral recesses worse to the right, anterior thecal sac deformity with effacement of the anterior subarachnoid space, mild spinal canal stenosis  C6-7 circumferential disc bulge producing moderate to severe bilateral foraminal narrowing with mild spinal canal stenosis  C7-T1 not completely included but appears to have a circumferential disc bulge with shallow midline disc protrusion with possible mild spinal canal stenosis and bilateral foraminal narrowing    CT L-spine:  No acute fracture or traumatic subluxation.  Alignment is relatively maintained.  Vertebroplasty changes are at L2.  There is mild, chronic appearing loss of height of the L4 vertebral body.  There is partial osseous fusion at L2-3.  Multilevel facet hypertrophy, osteophyte formation and disc space narrowing are present.   L1-2: Facet hypertrophy with mild right neural foraminal  and spinal canal stenosis.   L2-3: Posterior disc osteophyte and facet hypertrophy results in mild to moderate right and mild left neural foraminal stenosis with mild to moderate spinal canal stenosis.   L3-4: Posterior disc osteophyte and facet hypertrophy results in moderate bilateral neural foraminal stenosis with mild to moderate spinal canal stenosis.   L4-5: Broad-based disc osteophyte and facet hypertrophy results in severe left and moderate right neural foraminal stenosis with mild to moderate spinal canal stenosis.   L5-S1: Posterior disc osteophyte eccentric to the left and facet hypertrophy results in moderate to severe bilateral neural foraminal stenosis with mild to moderate spinal canal stenosis.  Paravertebral soft tissues are normal.  Spinal cord stimulator wires into the spinal canal at the T12-L1 level.    12/15/22 CT myelogram L-spine:  T12-L1: Mild disc bulge with likely right central protrusion.  Mild right and minimal left narrowing of the lateral recesses.  No significant spinal canal or foraminal stenosis.   L1-L2: Mild disc bulge.  Mild right and minimal left narrowing of the lateral recesses.  No significant spinal canal stenosis.  Minimal right foraminal stenosis.   L2-L3: Trace retrolisthesis.  Mild disc bulge and posterior osteophytic ridging.  Mild bilateral facet hypertrophy.  Minimal narrowing of the bilateral lateral recesses.  Mild right and minimal left foraminal stenosis.   L3-L4: Trace retrolisthesis.  Mild disc bulge.  Mild left and minimal right narrowing of the lateral recesses.  No significant spinal canal stenosis.  Mild-moderate bilateral foraminal stenosis.   L4-L5: Retrolisthesis.  Uncovering the disc mild disc bulge.  Mild bilateral facet hypertrophy.  Ligamentum flavum thickening.  Mild narrowing of the bilateral lateral recesses.  Mild spinal canal stenosis.  Moderate left and mild-moderate right foraminal stenosis.   L5-S1: Mild disc bulge and posterior osteophytic  ridging.  Moderate left and mild right facet hypertrophy.  Moderate left and mild right narrowing of the lateral recesses.  No significant spinal canal stenosis.  Moderate right and mild-moderate left foraminal stenosis.   Bilateral dorsal paraspinal muscular atrophy in the lower lumbar spine.   Spinal cord stimulator leads are present entering the spinal canal at the T12-L1 interlaminar space and extending cranially above the field of view.   Layering dependent stones within the partially visualized urinary bladder, similar to prior study.  Mild-moderate atherosclerotic calcifications.   Postoperative changes of a previous L2 kyphoplasty with methylmethacrylate present.  There is loss of lumbar vertebral body height at all levels throughout the lumbar spine, progressive within the superior endplate of L4 when compared to the previous study of 12/10/2022.    1/30/23 Xray L-spine:  A spinal stimulator device is partially imaged.  There is a mild compression fracture of L2 status post vertebroplasty.  There is a mild compression fracture of L4 without significant change.  Mild retrolisthesis of L3 on L4 is present.  There is mild loss of disc height at L2-3 and L5-S1.  Moderate lower lumbar facet arthropathy is present there is heavy calcification of the aorta.    06/10/2024 CT cervical spine  Bones: Diffuse bony demineralization.  No fractures or bony destructive changes.  Prominent ventral bridging osteophyte complex at C4-C5 and endplate centered subcortical cystic changes in the setting of severe disc height loss at C5-C6.  Additional prominent ventral bridging osteophyte complexes laterally C6-C7.     Alignment: Within normal limits.     Craniocervical junction: Degenerative changes.  No acute process or may     Disc levels:     Degenerative changes including shallow disc osteophyte complexes most notable asymmetric to the right at C5-C6 with there is no more than mild to moderate narrowing of the right central  spinal canal and centrally at C6-C7 producing mild to moderate narrowing of the spinal canal.  No evidence of any high-grade osseous spinal canal narrowing.  Uncovertebral spurring and facet arthrosis producing severe left/moderate right C3-C4, severe right/moderate left C4-C5, severe bilateral C5-C6, and severe bilateral C6-C7 narrowing.     Paraspinal soft tissues: The visualized paraspinal soft tissues and lung apices are within normal limits.    06/10/2024 CT lumbar spine  Bones: Diffuse bony demineralization.  There is a dorsal thoracic spinal stimulator entering the canal at T12-L1 coursing cranially out of the field of view.  Chronic mild compression fracture deformities status post cement augmentation of L2 and severe biconcave compression fracture of L4, both unchanged.  No acute fractures.  Prominent ventral endplate centered bridging osteophytes throughout.  No bony destructive process.     Alignment: Within normal limits.     Disc levels:     Similar multilevel degenerative changes including moderate disc height loss notable at L2-L3 and L5-S1.  Disc bulging and facet arthrosis most pronounced at L3-L4, L4-L5, and L5-S1 with moderate to severe narrowing of the spinal canal and subarticular recesses.  Severe foraminal narrowing on the left at L5-S1 and moderate to severe on the right at L5-S1 and bilaterally at L3-L4 and L4-L5.  Overall no gross interval change.  Vacuum disc phenomenon at L3-L4.     Soft Tissues: Atherosclerotic changes of the aorta iliac distribution.  Partially imaged presumed left renal cyst measuring 2.9 cm.  Presumed left basilar scarring/atelectasis residual from previous pleural effusion as seen on CT 03/27/2024.  Cholecystectomy changes.  Prostatomegaly.    Assessment:  The patient is a 78-year-old man with a history of hypertension, obesity and low back pain who presents in referral from Dr. Winters for chronic right low back pain.   1. Sacroiliac joint pain        2. Sacroiliitis         3. Spinal stenosis of lumbar region without neurogenic claudication        4. Ulnar neuropathy of both upper extremities        5. Cubital tunnel syndrome, unspecified laterality        6. Carpal tunnel syndrome, unspecified laterality        7. Chronic pain syndrome              Plan:  He found good relief with the right SI joint injection however it was short lasting.  Discussed that his pain is likely originating from his right SI joint.  He may benefit from a right SI joint fusion.  Discussed to confirm pain is from SI joint, we would need to schedule a SI joint block prior to fusion.  At this time, he would like to hold off and see how he responds to reprogramming of his spinal cord stimulator.  He is following up with Orthopedics for his carpal tunnel and cubital tunnel syndrome.  Follow up as needed.  If right buttock pain persists or next step would be right SI joint block and consider SI joint fusion.

## 2024-07-31 ENCOUNTER — OFFICE VISIT (OUTPATIENT)
Dept: ORTHOPEDICS | Facility: CLINIC | Age: 79
End: 2024-07-31
Payer: MEDICARE

## 2024-07-31 DIAGNOSIS — G56.23 ULNAR NEUROPATHY OF BOTH UPPER EXTREMITIES: Primary | ICD-10-CM

## 2024-07-31 PROCEDURE — 1157F ADVNC CARE PLAN IN RCRD: CPT | Mod: CPTII,S$GLB,, | Performed by: ORTHOPAEDIC SURGERY

## 2024-07-31 PROCEDURE — 99213 OFFICE O/P EST LOW 20 MIN: CPT | Mod: S$GLB,,, | Performed by: ORTHOPAEDIC SURGERY

## 2024-07-31 NOTE — PROGRESS NOTES
Mr Roque returns to clinic today.  Has a history of bilateral ulnar neuropathy.  This has began after cardiac surgery.  He has been sent for nerve conduction study in his here today for follow-up     Physical exam: Examination of bilateral upper extremities reveals that there is numbness in the ulnar distributions.  There is obvious muscle atrophy specifically on the left side in the intrinsics of the hand.  He has negative Tinel's overlying elbows he does have 3-4 out of intrinsic muscular strength he was capillary refill less 2 seconds     EMG/nerve conduction study: Nerve conduction study has been reviewed.  It was consistent with severe bilateral cubital tunnel syndrome.  He was also mild carpal tunnel syndrome noted.      Assessment: Bilateral cubital tunnel syndrome with ulnar neuropathy     Plan:     1. We have discussed treatments.  We have discussed the possibility of cubital tunnel and carpal tunnel releases.  The patient states that he was having back problems as well and he was recently had cardiac surgery and therefore he would like to continue to monitor this.  I do feel like this is a reasonable decision.      2.  Will continue to monitor.  If he has any worsening of symptoms or changes in his thoughts we can discuss the possibility of surgery at that time

## 2024-08-01 ENCOUNTER — TELEPHONE (OUTPATIENT)
Dept: PAIN MEDICINE | Facility: CLINIC | Age: 79
End: 2024-08-01
Payer: MEDICARE

## 2024-08-01 ENCOUNTER — TELEPHONE (OUTPATIENT)
Dept: PAIN MEDICINE | Facility: CLINIC | Age: 79
End: 2024-08-01

## 2024-08-01 RX ORDER — HYDROCODONE BITARTRATE AND ACETAMINOPHEN 5; 325 MG/1; MG/1
1 TABLET ORAL EVERY 8 HOURS PRN
Qty: 21 TABLET | Refills: 0 | Status: SHIPPED | OUTPATIENT
Start: 2024-08-01

## 2024-08-01 NOTE — TELEPHONE ENCOUNTER
Spoke with patient, scheduled procedure. Reviewed pre-op instructions. Patient verbalized understanding.

## 2024-08-01 NOTE — TELEPHONE ENCOUNTER
Patient recently seen 7/26 he is having severe back pain 10/10 and asking for his injection to be scheduled. Please advise or something called in for pain.

## 2024-08-01 NOTE — TELEPHONE ENCOUNTER
Please schedule patient for the following procedure:    Physician - Dr Segura    Type of Procedure/Injection - Sacroiliac Joint Block with only local no steroids        Laterality - Right      Anxiolysis- Local      Need to hold medication - No      N/A      Clearance needed - No      Follow up - phone call next day

## 2024-08-06 ENCOUNTER — OFFICE VISIT (OUTPATIENT)
Dept: PRIMARY CARE CLINIC | Facility: CLINIC | Age: 79
End: 2024-08-06
Payer: MEDICARE

## 2024-08-06 VITALS
BODY MASS INDEX: 52.35 KG/M2 | HEART RATE: 52 BPM | TEMPERATURE: 98 F | DIASTOLIC BLOOD PRESSURE: 68 MMHG | OXYGEN SATURATION: 95 % | SYSTOLIC BLOOD PRESSURE: 122 MMHG | WEIGHT: 305 LBS

## 2024-08-06 DIAGNOSIS — E66.01 SEVERE OBESITY (BMI >= 40): ICD-10-CM

## 2024-08-06 DIAGNOSIS — R73.09 ELEVATED HEMOGLOBIN A1C: ICD-10-CM

## 2024-08-06 DIAGNOSIS — I50.32 CHRONIC HEART FAILURE WITH PRESERVED EJECTION FRACTION (HFPEF): Primary | ICD-10-CM

## 2024-08-06 DIAGNOSIS — G47.00 INSOMNIA, UNSPECIFIED TYPE: ICD-10-CM

## 2024-08-06 DIAGNOSIS — I87.2 VENOUS INSUFFICIENCY OF BOTH LOWER EXTREMITIES: ICD-10-CM

## 2024-08-06 DIAGNOSIS — E78.2 MIXED HYPERLIPIDEMIA: ICD-10-CM

## 2024-08-06 DIAGNOSIS — I10 PRIMARY HYPERTENSION: ICD-10-CM

## 2024-08-06 PROCEDURE — 1101F PT FALLS ASSESS-DOCD LE1/YR: CPT | Mod: CPTII,S$GLB,, | Performed by: FAMILY MEDICINE

## 2024-08-06 PROCEDURE — 3288F FALL RISK ASSESSMENT DOCD: CPT | Mod: CPTII,S$GLB,, | Performed by: FAMILY MEDICINE

## 2024-08-06 PROCEDURE — 1125F AMNT PAIN NOTED PAIN PRSNT: CPT | Mod: CPTII,S$GLB,, | Performed by: FAMILY MEDICINE

## 2024-08-06 PROCEDURE — 3078F DIAST BP <80 MM HG: CPT | Mod: CPTII,S$GLB,, | Performed by: FAMILY MEDICINE

## 2024-08-06 PROCEDURE — 3074F SYST BP LT 130 MM HG: CPT | Mod: CPTII,S$GLB,, | Performed by: FAMILY MEDICINE

## 2024-08-06 PROCEDURE — 1157F ADVNC CARE PLAN IN RCRD: CPT | Mod: CPTII,S$GLB,, | Performed by: FAMILY MEDICINE

## 2024-08-06 PROCEDURE — 99999 PR PBB SHADOW E&M-EST. PATIENT-LVL V: CPT | Mod: PBBFAC,,, | Performed by: FAMILY MEDICINE

## 2024-08-06 PROCEDURE — 1159F MED LIST DOCD IN RCRD: CPT | Mod: CPTII,S$GLB,, | Performed by: FAMILY MEDICINE

## 2024-08-06 PROCEDURE — 99214 OFFICE O/P EST MOD 30 MIN: CPT | Mod: S$GLB,,, | Performed by: FAMILY MEDICINE

## 2024-08-06 PROCEDURE — 1160F RVW MEDS BY RX/DR IN RCRD: CPT | Mod: CPTII,S$GLB,, | Performed by: FAMILY MEDICINE

## 2024-08-07 RX ORDER — HYDRALAZINE HYDROCHLORIDE 25 MG/1
25 TABLET, FILM COATED ORAL EVERY 12 HOURS
Qty: 180 TABLET | Refills: 1 | Status: SHIPPED | OUTPATIENT
Start: 2024-08-07 | End: 2025-02-03

## 2024-08-12 ENCOUNTER — TELEPHONE (OUTPATIENT)
Dept: PRIMARY CARE CLINIC | Facility: CLINIC | Age: 79
End: 2024-08-12
Payer: MEDICARE

## 2024-08-12 NOTE — TELEPHONE ENCOUNTER
----- Message from Nel Whipple sent at 8/12/2024 11:51 AM CDT -----  Regarding: Advice  Jude called if you can call him back @ [phone]   Need someone to check if he get infected.     Thanks    Nel

## 2024-08-12 NOTE — TELEPHONE ENCOUNTER
Spoke with pt, he reports that he wants Dr. Tyler to assess his leg from the blister that ruptured last week to make sure that it's not infected.  Appt scheduled on Wednesday at 1440 as pt declined early morning appt available tomorrow.  Advised pt that he could try sending a picture via [employer] and pt reports that he will attempt to do so.

## 2024-08-14 ENCOUNTER — OFFICE VISIT (OUTPATIENT)
Dept: PRIMARY CARE CLINIC | Facility: CLINIC | Age: 79
End: 2024-08-14
Payer: MEDICARE

## 2024-08-14 VITALS
HEIGHT: 71 IN | OXYGEN SATURATION: 97 % | DIASTOLIC BLOOD PRESSURE: 72 MMHG | TEMPERATURE: 99 F | SYSTOLIC BLOOD PRESSURE: 138 MMHG | WEIGHT: 304 LBS | HEART RATE: 60 BPM | BODY MASS INDEX: 42.56 KG/M2

## 2024-08-14 DIAGNOSIS — L97.919 VENOUS ULCER OF RIGHT LEG: Primary | ICD-10-CM

## 2024-08-14 DIAGNOSIS — R73.09 ELEVATED HEMOGLOBIN A1C: ICD-10-CM

## 2024-08-14 DIAGNOSIS — I10 PRIMARY HYPERTENSION: ICD-10-CM

## 2024-08-14 DIAGNOSIS — R73.03 PRE-DIABETES: ICD-10-CM

## 2024-08-14 DIAGNOSIS — I83.019 VENOUS ULCER OF RIGHT LEG: Primary | ICD-10-CM

## 2024-08-14 DIAGNOSIS — I50.32 CHRONIC HEART FAILURE WITH PRESERVED EJECTION FRACTION (HFPEF): Chronic | ICD-10-CM

## 2024-08-14 LAB
ALBUMIN/CREAT UR: 6.7 UG/MG (ref 0–30)
CREAT UR-MCNC: 119 MG/DL (ref 23–375)
MICROALBUMIN UR DL<=1MG/L-MCNC: 8 UG/ML

## 2024-08-14 PROCEDURE — 3288F FALL RISK ASSESSMENT DOCD: CPT | Mod: CPTII,S$GLB,, | Performed by: FAMILY MEDICINE

## 2024-08-14 PROCEDURE — 99999 PR PBB SHADOW E&M-EST. PATIENT-LVL V: CPT | Mod: PBBFAC,,, | Performed by: FAMILY MEDICINE

## 2024-08-14 PROCEDURE — 82043 UR ALBUMIN QUANTITATIVE: CPT | Performed by: FAMILY MEDICINE

## 2024-08-14 PROCEDURE — 1160F RVW MEDS BY RX/DR IN RCRD: CPT | Mod: CPTII,S$GLB,, | Performed by: FAMILY MEDICINE

## 2024-08-14 PROCEDURE — 3078F DIAST BP <80 MM HG: CPT | Mod: CPTII,S$GLB,, | Performed by: FAMILY MEDICINE

## 2024-08-14 PROCEDURE — 1125F AMNT PAIN NOTED PAIN PRSNT: CPT | Mod: CPTII,S$GLB,, | Performed by: FAMILY MEDICINE

## 2024-08-14 PROCEDURE — 82570 ASSAY OF URINE CREATININE: CPT | Performed by: FAMILY MEDICINE

## 2024-08-14 PROCEDURE — 3075F SYST BP GE 130 - 139MM HG: CPT | Mod: CPTII,S$GLB,, | Performed by: FAMILY MEDICINE

## 2024-08-14 PROCEDURE — 1157F ADVNC CARE PLAN IN RCRD: CPT | Mod: CPTII,S$GLB,, | Performed by: FAMILY MEDICINE

## 2024-08-14 PROCEDURE — 1101F PT FALLS ASSESS-DOCD LE1/YR: CPT | Mod: CPTII,S$GLB,, | Performed by: FAMILY MEDICINE

## 2024-08-14 PROCEDURE — 99214 OFFICE O/P EST MOD 30 MIN: CPT | Mod: S$GLB,,, | Performed by: FAMILY MEDICINE

## 2024-08-14 PROCEDURE — 1159F MED LIST DOCD IN RCRD: CPT | Mod: CPTII,S$GLB,, | Performed by: FAMILY MEDICINE

## 2024-08-14 NOTE — PATIENT INSTRUCTIONS
Cleanse wound with wound cleanser.  Pat dry with gauze and allow to air dry.  Apply Medi honey generously to affected area.  Cover with Telfa Non-Adherent dressing.  Wrap snuggly, but not too tight, with Bulkee Lite Conforming Gauze Bandages.   Wrap snuggly, but not too tight, with ACE wrap.  Call with any concerns.

## 2024-08-14 NOTE — PROGRESS NOTES
Primary Care Provider Appointment   [facility] [place]       Patient ID: Chinedu Roque is a 78 y.o. male.    ASSESSMENT/PLAN by Problem List:    1. Pre-diabetes  -     Microalbumin/creatinine urine ratio  -     Ambulatory referral/consult to Optometry; Future; Expected date: 08/21/2024    2. Chronic heart failure with preserved ejection fraction (HFpEF)  Assessment & Plan:  Cost of Jardiance too high (>170 for 30 day supply), will try patient assistance      3. Venous ulcer of right leg  Assessment & Plan:  Shallow wound on the right leg which presented initially as a blister and now a small ulcer.  It has become darker in color but does appear to be healing and I do not see sign of infection.  There are few surrounding lesions now a small blister to the top left and a small abrasion on the right side.  Neither appear infected either.  All wounds were cleaned and redressed today.  Instructions given.  He will continue to follow-up until healed.  Return early next week      4. Elevated hemoglobin A1c  -     Ambulatory referral/consult to Optometry; Future; Expected date: 08/21/2024    5. Primary hypertension  -     Ambulatory referral/consult to Optometry; Future; Expected date: 08/21/2024         Follow Up:  One week        Subjective:     Chief Complaint   Patient presents with    Follow-up     Patient states he has a blister on his right ankle that popped on 8/6 and wants to make sure it is not infected. He states he takes his blood pressure every now and then.      I have reviewed the information entered by the ancillary staff regarding the chief complaint as well as the related history.    HPI    Patient is a/an 78 y.o.  male     Patient presents for early follow-up because of wound on the right leg.  He has developed a blister next to the original wound and an abrasion on the right.  No fever or infection.  No further drainage.    For complete problem list, past medical  "history, surgical history, social history, etc., see appropriate section in the electronic medical record    Review of Systems   HENT: Negative.     Respiratory:  Positive for shortness of breath (Stable).    Cardiovascular:  Positive for leg swelling. Negative for chest pain.   Genitourinary: Negative.    Musculoskeletal:  Positive for arthralgias.   Skin:  Positive for wound.   Neurological:  Positive for numbness.       Objective     Physical Exam  Constitutional:       Appearance: He is obese. He is not ill-appearing.   HENT:      Head: Normocephalic and atraumatic.   Cardiovascular:      Rate and Rhythm: Regular rhythm. Bradycardia present.      Heart sounds: Normal heart sounds.      Comments: 2-3+ bilateral ankle edema and pretibial edema  Pulmonary:      Effort: No respiratory distress.      Breath sounds: No wheezing.      Comments: Mildly diminished breath sounds, overall improving  Musculoskeletal:      Comments: Large blister medial right lower leg.  Spontaneously ruptured upon cleaning and changing bandage.  RN completed bandage and wound care   Skin:     Comments: Fairly large roughly 8 cm x 5 cm wound anteromedial right lower leg.  Relatively shallow, still some dried skin on top from the original blister that has now drained.  There is a small blister superior left if this and some abrasions to the right of it.  No surrounding erythema, minimal active drainage   Neurological:      Mental Status: He is alert.       Vitals:    08/14/24 1441   BP: 138/72   BP Location: Right arm   Patient Position: Sitting   BP Method: Large (Manual)   Pulse: 60   Temp: 98.9 °F (37.2 °C)   TempSrc: Oral   SpO2: 97%   Weight: (!) 137.9 kg (304 lb 0.2 oz)   Height: 5' 11" (1.803 m)           THIS DOCUMENT WAS MADE IN PART WITH VOICE RECOGNITION SOFTWARE.  OCCASIONALLY THIS SOFTWARE WILL MISINTERPRET WORDS OR PHRASES.  "

## 2024-08-19 ENCOUNTER — OFFICE VISIT (OUTPATIENT)
Dept: PRIMARY CARE CLINIC | Facility: CLINIC | Age: 79
End: 2024-08-19
Payer: MEDICARE

## 2024-08-19 VITALS
DIASTOLIC BLOOD PRESSURE: 70 MMHG | WEIGHT: 304.81 LBS | SYSTOLIC BLOOD PRESSURE: 138 MMHG | HEIGHT: 71 IN | BODY MASS INDEX: 42.67 KG/M2 | TEMPERATURE: 98 F

## 2024-08-19 DIAGNOSIS — L97.919 VENOUS ULCER OF RIGHT LEG: ICD-10-CM

## 2024-08-19 DIAGNOSIS — I87.2 VENOUS INSUFFICIENCY OF BOTH LOWER EXTREMITIES: Primary | ICD-10-CM

## 2024-08-19 DIAGNOSIS — I83.019 VENOUS ULCER OF RIGHT LEG: ICD-10-CM

## 2024-08-19 PROCEDURE — 1125F AMNT PAIN NOTED PAIN PRSNT: CPT | Mod: CPTII,S$GLB,, | Performed by: FAMILY MEDICINE

## 2024-08-19 PROCEDURE — 3075F SYST BP GE 130 - 139MM HG: CPT | Mod: CPTII,S$GLB,, | Performed by: FAMILY MEDICINE

## 2024-08-19 PROCEDURE — 1101F PT FALLS ASSESS-DOCD LE1/YR: CPT | Mod: CPTII,S$GLB,, | Performed by: FAMILY MEDICINE

## 2024-08-19 PROCEDURE — 1157F ADVNC CARE PLAN IN RCRD: CPT | Mod: CPTII,S$GLB,, | Performed by: FAMILY MEDICINE

## 2024-08-19 PROCEDURE — 1159F MED LIST DOCD IN RCRD: CPT | Mod: CPTII,S$GLB,, | Performed by: FAMILY MEDICINE

## 2024-08-19 PROCEDURE — 3078F DIAST BP <80 MM HG: CPT | Mod: CPTII,S$GLB,, | Performed by: FAMILY MEDICINE

## 2024-08-19 PROCEDURE — 3288F FALL RISK ASSESSMENT DOCD: CPT | Mod: CPTII,S$GLB,, | Performed by: FAMILY MEDICINE

## 2024-08-19 PROCEDURE — 1160F RVW MEDS BY RX/DR IN RCRD: CPT | Mod: CPTII,S$GLB,, | Performed by: FAMILY MEDICINE

## 2024-08-19 PROCEDURE — 99213 OFFICE O/P EST LOW 20 MIN: CPT | Mod: S$GLB,,, | Performed by: FAMILY MEDICINE

## 2024-08-19 PROCEDURE — 99999 PR PBB SHADOW E&M-EST. PATIENT-LVL V: CPT | Mod: PBBFAC,,, | Performed by: FAMILY MEDICINE

## 2024-08-19 NOTE — PROGRESS NOTES
Primary Care Provider Appointment   [facility] [place]       Patient ID: Chinedu Roque is a 78 y.o. male.    ASSESSMENT/PLAN by Problem List:    1. Venous insufficiency of both lower extremities    2. Venous ulcer of right leg     Improving.  Although few small abrasions to the right side of the wound and one new blister above the wound present.  Continue home wound care, regular dressing changes.     Follow Up:  Four weeks, nurse visit later this week for wound check    Subjective:     Chief Complaint   Patient presents with    Follow-up     Right leg wound    Wound Check     I have reviewed the information entered by the ancillary staff regarding the chief complaint as well as the related history.    HPI    Patient is a/an 78 y.o.  male     Patient is following up with wound on the right leg.  He does report improvement but a new small blister has popped up near this and there is a small abrasion or skin tear to the right of this.    For complete problem list, past medical history, surgical history, social history, etc., see appropriate section in the electronic medical record    Review of Systems   Constitutional:  Negative for chills and fever.   HENT: Negative.     Respiratory:  Positive for shortness of breath (Stable).    Cardiovascular:  Positive for leg swelling. Negative for chest pain.   Genitourinary: Negative.    Musculoskeletal:  Positive for arthralgias.   Skin:  Positive for wound.   Neurological:  Positive for numbness.       Objective     Physical Exam  Constitutional:       General: He is not in acute distress.     Appearance: He is obese.   HENT:      Head: Normocephalic and atraumatic.   Cardiovascular:      Rate and Rhythm: Regular rhythm. Bradycardia present.      Heart sounds: Normal heart sounds.      Comments: 2+ bilateral ankle edema  Pulmonary:      Effort: No respiratory distress.      Breath sounds: No wheezing.      Comments: Mildly diminished  "breath sounds, overall improving  Skin:     Comments: See image.  The original wound underneath the large blister appears to be healing well.  There is a small blister superior to the right of this and some small abrasions or skin tears to the left.  Swelling is slightly better but still present.  No sign of cellulitis.   Neurological:      Mental Status: He is alert.       Vitals:    08/19/24 1351   BP: 138/70   BP Location: Right arm   Patient Position: Sitting   BP Method: Large (Manual)   Temp: 98.1 °F (36.7 °C)   Weight: (!) 138.3 kg (304 lb 12.6 oz)   Height: 5' 11" (1.803 m)           THIS DOCUMENT WAS MADE IN PART WITH VOICE RECOGNITION SOFTWARE.  OCCASIONALLY THIS SOFTWARE WILL MISINTERPRET WORDS OR PHRASES.  "

## 2024-08-22 ENCOUNTER — CLINICAL SUPPORT (OUTPATIENT)
Dept: PRIMARY CARE CLINIC | Facility: CLINIC | Age: 79
End: 2024-08-22
Payer: MEDICARE

## 2024-08-22 VITALS
RESPIRATION RATE: 17 BRPM | SYSTOLIC BLOOD PRESSURE: 112 MMHG | WEIGHT: 307 LBS | HEART RATE: 61 BPM | OXYGEN SATURATION: 96 % | HEIGHT: 71 IN | DIASTOLIC BLOOD PRESSURE: 58 MMHG | TEMPERATURE: 98 F | BODY MASS INDEX: 42.98 KG/M2

## 2024-08-22 DIAGNOSIS — Z51.89 ENCOUNTER FOR WOUND CARE: Primary | ICD-10-CM

## 2024-08-22 PROCEDURE — 99999 PR PBB SHADOW E&M-EST. PATIENT-LVL IV: CPT | Mod: PBBFAC,,,

## 2024-08-22 NOTE — PROGRESS NOTES
Pt presents for wound care to E.  Wounds are healing exceptionally well with pt's daughter providing daily wound care on days when pt is not seen in clinic.  Wounds cleansed with wound cleanser, pat dried with sterile gauze and allowed to dry.  Medi-honey applied and covered with non-stick gauze.  Site wrapped with ACE wrap snuggly, but not overly tight.  Pt tolerated procedure well and voices no complaints.  Pt scheduled to RTC next Tuesday.

## 2024-08-25 NOTE — ASSESSMENT & PLAN NOTE
Shallow wound on the right leg which presented initially as a blister and now a small ulcer.  It has become darker in color but does appear to be healing and I do not see sign of infection.  There are few surrounding lesions now a small blister to the top left and a small abrasion on the right side.  Neither appear infected either.  All wounds were cleaned and redressed today.  Instructions given.  He will continue to follow-up until healed.  Return early next week

## 2024-08-27 ENCOUNTER — CLINICAL SUPPORT (OUTPATIENT)
Dept: PRIMARY CARE CLINIC | Facility: CLINIC | Age: 79
End: 2024-08-27
Payer: MEDICARE

## 2024-08-27 VITALS
DIASTOLIC BLOOD PRESSURE: 58 MMHG | HEIGHT: 71 IN | OXYGEN SATURATION: 96 % | HEART RATE: 60 BPM | WEIGHT: 301.38 LBS | SYSTOLIC BLOOD PRESSURE: 116 MMHG | TEMPERATURE: 98 F | BODY MASS INDEX: 42.19 KG/M2 | RESPIRATION RATE: 16 BRPM

## 2024-08-27 DIAGNOSIS — Z51.89 ENCOUNTER FOR WOUND CARE: Primary | ICD-10-CM

## 2024-08-27 PROCEDURE — 99999 PR PBB SHADOW E&M-EST. PATIENT-LVL IV: CPT | Mod: PBBFAC,,,

## 2024-08-27 NOTE — PROGRESS NOTES
Pt presents for wound care to [facility] and wounds are completely healed.  Upon arrival, pt is noted to have a new skin tear to the LLE just proximal to his ankle that is covered with a large bandaid.  Bandaid was saturated in wound cleanser and gently removed maintaining pt's skin integrity.  Wound to the LLE cleansed with wound cleanser, pat dried with sterile gauze and allowed to dry.  Medi-honey applied to skin tear and large, sterile bandaid applied.  Pt tolerated procedure well and voices no complaints.  Pt will call if he develops any new wounds and will maintain wound care to the LLE at home.  Requested Dr. Tyler assess wound healing and he states that healed RLE skin can remain open to air.

## 2024-08-29 RX ORDER — TAMSULOSIN HYDROCHLORIDE 0.4 MG/1
CAPSULE ORAL
Qty: 90 CAPSULE | Refills: 3 | Status: SHIPPED | OUTPATIENT
Start: 2024-08-29

## 2024-09-01 ENCOUNTER — PATIENT MESSAGE (OUTPATIENT)
Dept: PAIN MEDICINE | Facility: CLINIC | Age: 79
End: 2024-09-01
Payer: MEDICARE

## 2024-09-03 ENCOUNTER — TELEPHONE (OUTPATIENT)
Dept: PAIN MEDICINE | Facility: CLINIC | Age: 79
End: 2024-09-03
Payer: MEDICARE

## 2024-09-03 ENCOUNTER — TELEPHONE (OUTPATIENT)
Dept: SURGERY | Facility: HOSPITAL | Age: 79
End: 2024-09-03
Payer: MEDICARE

## 2024-09-03 ENCOUNTER — TELEPHONE (OUTPATIENT)
Dept: UROLOGY | Facility: CLINIC | Age: 79
End: 2024-09-03
Payer: MEDICARE

## 2024-09-03 NOTE — TELEPHONE ENCOUNTER
----- Message from Kalee Higgins RN sent at 9/2/2024  8:56 AM CDT -----  Regarding: [facility] ER visit f/u 9/1/2024 Dx: Urinary retention, arnold catheter placed  Please call patient to schedule an appointment for further evaluation and treatment of urinary retention. Arnold catheter placed during ER visit.     Thanks,    Kalee Higgins RN, BSN  ED Navigator/Case Management  [phone]

## 2024-09-03 NOTE — TELEPHONE ENCOUNTER
Patient is scheduled for a SI joint block with Dr. Segura tomorrow. He states that he was recently seen in the ER for urinary retention. He was placed on antibiotics and has a urinary catheter. Please reach out to patient and advise if this will interfere with scheduled procedure. Thank you!

## 2024-09-04 ENCOUNTER — PATIENT MESSAGE (OUTPATIENT)
Dept: PAIN MEDICINE | Facility: CLINIC | Age: 79
End: 2024-09-04
Payer: MEDICARE

## 2024-09-04 ENCOUNTER — HOSPITAL ENCOUNTER (OUTPATIENT)
Dept: RADIOLOGY | Facility: HOSPITAL | Age: 79
Discharge: HOME | End: 2024-09-04
Attending: ANESTHESIOLOGY | Admitting: ANESTHESIOLOGY
Payer: MEDICARE

## 2024-09-04 DIAGNOSIS — M54.50 LOWER BACK PAIN: ICD-10-CM

## 2024-09-04 NOTE — TELEPHONE ENCOUNTER
Spoke with pt informed him that Dr. Garcia would like to hold off on his procedure d/t antibiotics and catheter informed pt to notify clinic when hs is complete    Fluconazole Counseling:  Patient counseled regarding adverse effects of fluconazole including but not limited to headache, diarrhea, nausea, upset stomach, liver function test abnormalities, taste disturbance, and stomach pain.  There is a rare possibility of liver failure that can occur when taking fluconazole.  The patient understands that monitoring of LFTs and kidney function test may be required, especially at baseline. The patient verbalized understanding of the proper use and possible adverse effects of fluconazole.  All of the patient's questions and concerns were addressed.

## 2024-09-04 NOTE — TELEPHONE ENCOUNTER
Yes, we need to hold off on the injection until he has completed the antibiotics and no longer has a catheter

## 2024-09-05 ENCOUNTER — TELEPHONE (OUTPATIENT)
Dept: PAIN MEDICINE | Facility: CLINIC | Age: 79
End: 2024-09-05
Payer: MEDICARE

## 2024-09-05 ENCOUNTER — LAB VISIT (OUTPATIENT)
Dept: LAB | Facility: HOSPITAL | Age: 79
End: 2024-09-05
Attending: FAMILY MEDICINE
Payer: MEDICARE

## 2024-09-05 ENCOUNTER — OFFICE VISIT (OUTPATIENT)
Dept: PRIMARY CARE CLINIC | Facility: CLINIC | Age: 79
End: 2024-09-05
Payer: MEDICARE

## 2024-09-05 ENCOUNTER — TELEPHONE (OUTPATIENT)
Dept: PRIMARY CARE CLINIC | Facility: CLINIC | Age: 79
End: 2024-09-05
Payer: MEDICARE

## 2024-09-05 DIAGNOSIS — R31.9 URINARY TRACT INFECTION WITH HEMATURIA, SITE UNSPECIFIED: ICD-10-CM

## 2024-09-05 DIAGNOSIS — I95.9 HYPOTENSION, UNSPECIFIED HYPOTENSION TYPE: ICD-10-CM

## 2024-09-05 DIAGNOSIS — R31.9 URINARY TRACT INFECTION WITH HEMATURIA, SITE UNSPECIFIED: Primary | ICD-10-CM

## 2024-09-05 DIAGNOSIS — N39.0 URINARY TRACT INFECTION WITH HEMATURIA, SITE UNSPECIFIED: Primary | ICD-10-CM

## 2024-09-05 DIAGNOSIS — N39.0 URINARY TRACT INFECTION WITH HEMATURIA, SITE UNSPECIFIED: ICD-10-CM

## 2024-09-05 DIAGNOSIS — R00.1 BRADYCARDIA: ICD-10-CM

## 2024-09-05 LAB
ANION GAP SERPL CALC-SCNC: 10 MMOL/L (ref 8–16)
BASOPHILS # BLD AUTO: 0.03 K/UL (ref 0–0.2)
BASOPHILS NFR BLD: 0.6 % (ref 0–1.9)
BUN SERPL-MCNC: 22 MG/DL (ref 8–23)
CALCIUM SERPL-MCNC: 9.3 MG/DL (ref 8.7–10.5)
CHLORIDE SERPL-SCNC: 106 MMOL/L (ref 95–110)
CO2 SERPL-SCNC: 22 MMOL/L (ref 23–29)
CREAT SERPL-MCNC: 1.3 MG/DL (ref 0.5–1.4)
DIFFERENTIAL METHOD BLD: ABNORMAL
EOSINOPHIL # BLD AUTO: 0.2 K/UL (ref 0–0.5)
EOSINOPHIL NFR BLD: 4 % (ref 0–8)
ERYTHROCYTE [DISTWIDTH] IN BLOOD BY AUTOMATED COUNT: 14.8 % (ref 11.5–14.5)
EST. GFR  (NO RACE VARIABLE): 56 ML/MIN/1.73 M^2
GLUCOSE SERPL-MCNC: 89 MG/DL (ref 70–110)
HCT VFR BLD AUTO: 38.6 % (ref 40–54)
HGB BLD-MCNC: 13 G/DL (ref 14–18)
IMM GRANULOCYTES # BLD AUTO: 0.02 K/UL (ref 0–0.04)
IMM GRANULOCYTES NFR BLD AUTO: 0.4 % (ref 0–0.5)
LYMPHOCYTES # BLD AUTO: 1.6 K/UL (ref 1–4.8)
LYMPHOCYTES NFR BLD: 29.6 % (ref 18–48)
MCH RBC QN AUTO: 30.6 PG (ref 27–31)
MCHC RBC AUTO-ENTMCNC: 33.7 G/DL (ref 32–36)
MCV RBC AUTO: 91 FL (ref 82–98)
MONOCYTES # BLD AUTO: 0.9 K/UL (ref 0.3–1)
MONOCYTES NFR BLD: 17.1 % (ref 4–15)
NEUTROPHILS # BLD AUTO: 2.6 K/UL (ref 1.8–7.7)
NEUTROPHILS NFR BLD: 48.3 % (ref 38–73)
NRBC BLD-RTO: 0 /100 WBC
PLATELET # BLD AUTO: 185 K/UL (ref 150–450)
PMV BLD AUTO: 10 FL (ref 9.2–12.9)
POTASSIUM SERPL-SCNC: 4.3 MMOL/L (ref 3.5–5.1)
RBC # BLD AUTO: 4.25 M/UL (ref 4.6–6.2)
SODIUM SERPL-SCNC: 138 MMOL/L (ref 136–145)
WBC # BLD AUTO: 5.44 K/UL (ref 3.9–12.7)

## 2024-09-05 PROCEDURE — 3074F SYST BP LT 130 MM HG: CPT | Mod: CPTII,S$GLB,, | Performed by: PHYSICIAN ASSISTANT

## 2024-09-05 PROCEDURE — 36415 COLL VENOUS BLD VENIPUNCTURE: CPT | Mod: PO | Performed by: PHYSICIAN ASSISTANT

## 2024-09-05 PROCEDURE — 1101F PT FALLS ASSESS-DOCD LE1/YR: CPT | Mod: CPTII,S$GLB,, | Performed by: PHYSICIAN ASSISTANT

## 2024-09-05 PROCEDURE — 99215 OFFICE O/P EST HI 40 MIN: CPT | Mod: S$GLB,,, | Performed by: PHYSICIAN ASSISTANT

## 2024-09-05 PROCEDURE — 3078F DIAST BP <80 MM HG: CPT | Mod: CPTII,S$GLB,, | Performed by: PHYSICIAN ASSISTANT

## 2024-09-05 PROCEDURE — 85025 COMPLETE CBC W/AUTO DIFF WBC: CPT | Mod: PO | Performed by: PHYSICIAN ASSISTANT

## 2024-09-05 PROCEDURE — 3288F FALL RISK ASSESSMENT DOCD: CPT | Mod: CPTII,S$GLB,, | Performed by: PHYSICIAN ASSISTANT

## 2024-09-05 PROCEDURE — 1125F AMNT PAIN NOTED PAIN PRSNT: CPT | Mod: CPTII,S$GLB,, | Performed by: PHYSICIAN ASSISTANT

## 2024-09-05 PROCEDURE — 99999 PR PBB SHADOW E&M-EST. PATIENT-LVL V: CPT | Mod: PBBFAC,,, | Performed by: PHYSICIAN ASSISTANT

## 2024-09-05 PROCEDURE — 80048 BASIC METABOLIC PNL TOTAL CA: CPT | Mod: PO | Performed by: PHYSICIAN ASSISTANT

## 2024-09-05 PROCEDURE — 1157F ADVNC CARE PLAN IN RCRD: CPT | Mod: CPTII,S$GLB,, | Performed by: PHYSICIAN ASSISTANT

## 2024-09-05 PROCEDURE — 1159F MED LIST DOCD IN RCRD: CPT | Mod: CPTII,S$GLB,, | Performed by: PHYSICIAN ASSISTANT

## 2024-09-05 NOTE — PROGRESS NOTES
"Subjective:      Patient ID: Chinedu Roque is a 78 y.o. male.    Vitals:  height is 5' 11" (1.803 m) and weight is 126 kg (277 lb 12.5 oz). His blood pressure is 91/54 (abnormal) and his pulse is 37 (abnormal). His oxygen saturation is 96%.     Chief Complaint: Urinary Retention (Started tues of last week, pt thinks the new medication dr. Bran started him on is the cause of the issue. ) and Dizziness (Tired/)    [age] year old male presents for urgent visit today for problems with catheter.  On August 31st patient was seen in the ED for acute exacerbation of low back pain as well as urinary retention.  Catheter was placed at that time.  Urinalysis did not reveal any infection.  Culture was sent off.  Patient presented to the ED again next day because of pain around the catheter as well as leakage.  Urinalysis that day showed large amount of blood and small amount of leukocytes.  Again culture was sent.  Catheter was changed with some relief after change.  Patient then reported again to the ER on September 3rd with continued leakage as well as pain around the glans.  Urinalysis on this visit showed large amounts of blood, leukocytes and nitrites.  Patient was given 1 g of Rocephin.  He was discharged with Keflex.  Again culture was sent out.  Since that time patient has continued with pain in the urethra near the glans as well as leakage around the catheter.  Patient was scheduled to see Urology next week, but due to discomfort presented to our office.  Patient does still have low back pain.  He is complaining of fatigue today.  He denies any fevers at home.        Constitution: Positive for fatigue. Negative for fever.   Cardiovascular:  Negative for chest pain and palpitations.   Genitourinary:  Positive for hematuria, penile discharge and penile pain. Negative for flank pain.      Objective:     Physical Exam   Constitutional:  Non-toxic appearance. obesity  HENT:   Head: Normocephalic and atraumatic.   Ears: "   Right Ear: External ear normal.   Left Ear: External ear normal.   Cardiovascular: Regular rhythm and normal pulses. Bradycardia present. Exam reveals distant heart sounds.   Pulmonary/Chest: Effort normal. No respiratory distress.   Genitourinary: Penile erythema (Mild around glans) present. Discharge (Yellow from urethral orifice) found.   Neurological: He is alert.   Skin: Skin is not diaphoretic.   Psychiatric: His behavior is normal. Mood, judgment and thought content normal.   Nursing note and vitals reviewed.chaperone present (Ally Hsu RN)         Assessment:     1. Urinary tract infection with hematuria, site unspecified    2. Bradycardia    3. Hypotension, unspecified hypotension type        Plan:       Urinary tract infection with hematuria, site unspecified  Reviewed results of 3 different cultures.  All 3 cultures grew out E coli.  First and 3rd culture showed pan sensitivity to the E coli.  Second culture showed resistance to ampicillin.  The 3rd culture also grew out Enterococcus faecalis.  It was shown to be sensitive to ampicillin, Macrobid and vancomycin.  Discussed with Dr. Browne who suggested switch to amoxicillin.  Amoxicillin 875 mg b.i.d. x7 days sent to [facility] on [address] in [place].    Patient is not having any flank pain, however on review of labs it was noted that creatinine rehana from around 1-1.34.  Last lab was performed on September 2nd (3 days ago).  We will drawn other BNP to make sure that renal function has not decreased.  Will also get a CBC to make sure that white blood cell count has not become elevated or anemia severely worsened.    Blood in the urine could be a combination of of trauma and infection.  Patient is on a blood thinner, so not surprising to see the blood in his urine today.  Will continue to monitor this.    -     CBC W/ AUTO DIFFERENTIAL; Future; Expected date: 09/05/2024  -     BASIC METABOLIC PANEL; Future; Expected date:  09/05/2024    Bradycardia    Initial pulse registered at 31.  Repeat at 37.  Manual performed by Dr. Browne at 40.  Patient states that he has been staying hydrated.  He does appear to be a little dry though.  We have instructed him to hold his Lasix for a couple of days as his weight has also dropped significantly over the last week.    Hypotension, unspecified hypotension type    Contributing to patient's fatigue.  Again we will have him hold the Lasix for a few days.    The patient did not want to return to the ER.  Case was discussed with and Dr. Browne also saw the patient.  It was decided that patient should return home with this change of antibiotic and close monitoring overnight.  If either of the labs come back unusual will need to go to ED.    As of this morning (9/6) patient is still feeling fatigued.  I did have him take his pulse and blood pressure which he called back and reported to be 102/55 with a heart rate of 37.  We contacted cardiology office this morning to see if patient could be seen today.  They have ordered an EKG for review to be done this afternoon.    My total time spent on this encounter was 60 minutes which included  the following activities: preparing to see the patient, performing a medically appropriate and/or evaluation, counseling and educating the patient and family/caregiver, ordering medications, tests, or procedures, referring and communicating with other healthcare providers, documenting clinical information in the electronic or other health record, and independently interpreting results. This time is independent and non-overlapping.

## 2024-09-05 NOTE — TELEPHONE ENCOUNTER
Called pt, no answer. Not able to leave message.   Called to see if pt could come in for 3pm today.

## 2024-09-06 ENCOUNTER — TELEPHONE (OUTPATIENT)
Dept: PRIMARY CARE CLINIC | Facility: CLINIC | Age: 79
End: 2024-09-06
Payer: MEDICARE

## 2024-09-06 ENCOUNTER — TELEPHONE (OUTPATIENT)
Dept: PAIN MEDICINE | Facility: CLINIC | Age: 79
End: 2024-09-06
Payer: MEDICARE

## 2024-09-06 ENCOUNTER — HOSPITAL ENCOUNTER (OUTPATIENT)
Dept: CARDIOLOGY | Facility: HOSPITAL | Age: 79
Discharge: HOME | End: 2024-09-06
Payer: MEDICARE

## 2024-09-06 ENCOUNTER — TELEPHONE (OUTPATIENT)
Dept: CARDIOLOGY | Facility: CLINIC | Age: 79
End: 2024-09-06
Payer: MEDICARE

## 2024-09-06 VITALS
BODY MASS INDEX: 38.89 KG/M2 | HEIGHT: 71 IN | HEART RATE: 37 BPM | WEIGHT: 277.75 LBS | OXYGEN SATURATION: 96 % | DIASTOLIC BLOOD PRESSURE: 54 MMHG | SYSTOLIC BLOOD PRESSURE: 91 MMHG

## 2024-09-06 DIAGNOSIS — R00.1 BRADYCARDIA: ICD-10-CM

## 2024-09-06 DIAGNOSIS — R00.1 BRADYCARDIA: Primary | ICD-10-CM

## 2024-09-06 LAB
OHS QRS DURATION: 134 MS
OHS QTC CALCULATION: 453 MS

## 2024-09-06 PROCEDURE — 93005 ELECTROCARDIOGRAM TRACING: CPT | Mod: PO

## 2024-09-06 NOTE — TELEPHONE ENCOUNTER
Spoke with patient and informed him of appointment with Dr. Marcelino on 9/16/24. Patient agreeable and verbalized understanding.

## 2024-09-06 NOTE — TELEPHONE ENCOUNTER
Informed by Mariel Moran PA-C that she spoke with pt this morning and he is c/o being weak and fatigued.  Vitals this morning are as follows: 102/55, HR 37 and pulse ox is 90%.  Mariel requested that I see if pt can be seen by his cardiology dept today.  Called [facility] and spoke with Betsy Robledo. She consulted with Elham Chau PA-C and Elham gave orders for an outpt EKG to be done today.  Order placed and unable to be scheduled with my access.  Called EKG dept and spoke with Earline Lizarraga.  Earline informed me that pt can come in today at 0930, 1045 or 1230.  Called pt and confirmed that he can go today at 1230.  Message sent to Earline updating her to the pt's confirmed appt and another message sent to Betsy updating her of pt's EKG time.  Mariel and Dr. Tyler updated as well.

## 2024-09-08 ENCOUNTER — PATIENT MESSAGE (OUTPATIENT)
Dept: PRIMARY CARE CLINIC | Facility: CLINIC | Age: 79
End: 2024-09-08
Payer: MEDICARE

## 2024-09-09 ENCOUNTER — TELEPHONE (OUTPATIENT)
Dept: UROLOGY | Facility: CLINIC | Age: 79
End: 2024-09-09
Payer: MEDICARE

## 2024-09-09 NOTE — TELEPHONE ENCOUNTER
----- Message from Elisabet Zuñiga sent at 9/9/2024  9:59 AM CDT -----  Type:  Patient Returning Call    Who Called:  pt  Who Left Message for Patient:  Trinity Crowder  Does the patient know what this is regarding?:  yes   Best Call Back Number:  [phone]  Additional Information:  Please call back to advise. Thanks.

## 2024-09-10 DIAGNOSIS — M54.50 CHRONIC BILATERAL LOW BACK PAIN WITHOUT SCIATICA: ICD-10-CM

## 2024-09-10 DIAGNOSIS — G62.9 PERIPHERAL POLYNEUROPATHY: ICD-10-CM

## 2024-09-10 DIAGNOSIS — G89.29 CHRONIC BILATERAL LOW BACK PAIN WITHOUT SCIATICA: ICD-10-CM

## 2024-09-10 RX ORDER — GABAPENTIN 300 MG/1
CAPSULE ORAL
Qty: 90 CAPSULE | Refills: 4 | Status: SHIPPED | OUTPATIENT
Start: 2024-09-10

## 2024-09-13 ENCOUNTER — OFFICE VISIT (OUTPATIENT)
Dept: UROLOGY | Facility: CLINIC | Age: 79
End: 2024-09-13
Payer: MEDICARE

## 2024-09-13 VITALS — BODY MASS INDEX: 42.01 KG/M2 | HEIGHT: 71 IN | WEIGHT: 300.06 LBS

## 2024-09-13 DIAGNOSIS — R33.8 BENIGN PROSTATIC HYPERPLASIA WITH URINARY RETENTION: Primary | ICD-10-CM

## 2024-09-13 DIAGNOSIS — N40.1 BENIGN PROSTATIC HYPERPLASIA WITH URINARY RETENTION: Primary | ICD-10-CM

## 2024-09-13 PROCEDURE — 99999 PR PBB SHADOW E&M-EST. PATIENT-LVL IV: CPT | Mod: PBBFAC,,,

## 2024-09-13 RX ORDER — AMOXICILLIN 875 MG/1
875 TABLET, FILM COATED ORAL 2 TIMES DAILY
COMMUNITY
Start: 2024-09-05

## 2024-09-13 RX ORDER — DOXYCYCLINE HYCLATE 100 MG
100 TABLET ORAL 2 TIMES DAILY
Qty: 20 TABLET | Refills: 0 | Status: SHIPPED | OUTPATIENT
Start: 2024-09-13 | End: 2024-09-23

## 2024-09-13 RX ORDER — FLUCONAZOLE 150 MG/1
150 TABLET ORAL ONCE
COMMUNITY
Start: 2024-09-10

## 2024-09-13 NOTE — PROGRESS NOTES
[facility] Note  Staff: NURY Forbes    PCP: MD Pavan    Chief Complaint: Voiding Trial    Subjective:        HPI: Chinedu Roque is a 78 y.o. male new patient to me presents today for voiding trial. He is accompanied by his son. He is established to our office and has been followed by Dr. Rangel. He has a long history of BPH and is currently taking tamsulosin 0.4mg and finasteride 5mg daily. He presented to the ED on 8/31/2024 with difficulty urinating. A arnold catheter was inserted and he is here today for voiding trial.    We discussed he is on maximum pharmaceutical management for BPH and if he fails VT will need further evaluation with cystoscopy and TRUS.    He denies hematuria, fever, and constipation.     Questions asked the pt during ov today:  Dysuria: No  Gross Hematuria:No    Last PSA Screening:   Lab Results   Component Value Date    PSA 4.5 (H) 03/22/2022    PSA 7.1 (H) 08/03/2017    PSA 7.9 (H) 04/23/2016    PSADIAG 2.4 01/28/2021    PSADIAG 3.3 01/07/2020    PSADIAG 3.7 07/26/2019         REVIEW OF SYSTEMS:  Review of Systems   Constitutional: Negative.  Negative for chills and fever.   Cardiovascular:  Positive for leg swelling.   Gastrointestinal: Negative.  Negative for abdominal pain, constipation, diarrhea, nausea and vomiting.   Genitourinary: Negative.  Negative for dysuria, flank pain and hematuria.   Musculoskeletal: Negative.  Negative for back pain.   Neurological:  Positive for weakness.       PMHx:  Past Medical History:   Diagnosis Date    Anticoagulant long-term use     ASA 81 mg    Arthritis     cervical    BPH (benign prostatic hyperplasia) 03/02/2012    Chronic left shoulder pain     Compression fracture of L2     DDD (degenerative disc disease), lumbar     HTN (hypertension) 03/02/2012    Hx of colonic polyps 2013    Low back pain     Morbid obesity with BMI of 40.0-44.9, adult 03/18/2014    Seasonal allergies     Sleep apnea     compliant  with CPAP    Stroke 03/1986       PSHx:  Past Surgical History:   Procedure Laterality Date    ANGIOGRAM, CORONARY, WITH LEFT HEART CATHETERIZATION N/A 06/07/2023    Procedure: Left Heart Cath;  Surgeon: Edgardo Marcelino MD;  Location: [facility] CATH;  Service: Cardiology;  Laterality: N/A;    APPENDECTOMY      CATARACT EXTRACTION EXTRACAPSULAR W/ INTRAOCULAR LENS IMPLANTATION      COLONOSCOPY N/A 06/06/2022    Procedure: COLONOSCOPY;  Surgeon: Jose Bello MD;  Location: [facility] ENDO;  Service: Endoscopy;  Laterality: N/A;    COLONOSCOPY N/A 07/06/2023    Procedure: COLONOSCOPY;  Surgeon: Sb Spaulding MD;  [facility];  Service: Endoscopy;  Laterality: N/A;    CORONARY ANGIOGRAPHY N/A 06/07/2023    Procedure: ANGIOGRAM, CORONARY ARTERY;  Surgeon: Edgardo Marcelino MD;  Location: [facility] CATH;  Service: Cardiology;  Laterality: N/A;    CORONARY ARTERY BYPASS GRAFT      CORONARY ARTERY BYPASS GRAFT (CABG) N/A 06/23/2023    Procedure: CORONARY ARTERY BYPASS GRAFT (CABG) X3;  Surgeon: Pricila Clark MD;  Location: [facility] OR;  Service: Cardiothoracic;  Laterality: N/A;    ENDOSCOPIC HARVEST OF VEIN Right 06/23/2023    Procedure: HARVEST-VEIN-ENDOVASCULAR;  Surgeon: Pricila Clark MD;  Location: [facility] OR;  Service: Cardiothoracic;  Laterality: Right;    EPIDURAL BLOCK INJECTION  2015    cervical    EPIDURAL STEROID INJECTION INTO LUMBAR SPINE N/A 06/05/2019    Procedure: Injection-steroid-epidural-lumbar L5/S1 interlaminar;  Surgeon: Riley Segura MD;  Location: [facility] OR;  Service: Pain Management;  Laterality: N/A;    EPIDURAL STEROID INJECTION INTO LUMBAR SPINE N/A 09/13/2019    Procedure: Injection-steroid-epidural-lumbar;  Surgeon: Riley Segura MD;  Location: [facility] OR;  Service: Pain Management;  Laterality: N/A;  L5/S1 interlaminar to the right    EPIDURAL STEROID INJECTION INTO LUMBAR SPINE N/A 06/02/2020    Procedure: Injection-steroid-epidural-lumbar L5/S1 to right;  Surgeon: Riley Segura  MD;  Location: [facility] OR;  Service: Pain Management;  Laterality: N/A;    EXCLUSION, LEFT ATRIAL APPENDAGE, OPEN, AS PART OF OPEN CHEST SURGERY N/A 06/23/2023    Procedure: EXCLUSION, LEFT ATRIAL APPENDAGE, OPEN, AS PART OF OPEN CHEST SURGERY;  Surgeon: Pricila Clark MD;  Location: [facility] OR;  Service: Cardiothoracic;  Laterality: N/A;    EXPLORATION OF MEDIASTINUM N/A 06/23/2023    Procedure: EXPLORATION, MEDIASTINUM - MEDIASTINAL RE-EXPLORATION TO CONTROL POST-OP BLEEDING;  Surgeon: Pricila Clark MD;  Location: [facility] OR;  Service: Cardiothoracic;  Laterality: N/A;    Facet Steroid injection       Pain management    HERNIA REPAIR      Ventral    INJECTION OF ANESTHETIC AGENT AROUND MEDIAL BRANCH NERVES INNERVATING LUMBAR FACET JOINT Right 03/21/2023    Procedure: Block-nerve-medial branch-lumbar L3/4, L4/5, L5/S1;  Surgeon: Riley Segura MD;  Location: [facility];  Service: Pain Management;  Laterality: Right;    INJECTION, SACROILIAC JOINT Right 6/28/2024    Procedure: INJECTION,SACROILIAC JOINT;  Surgeon: Riley Segura MD;  Location: [facility] OR;  Service: Pain Management;  Laterality: Right;    INSERTION OF DORSAL COLUMN NERVE STIMULATOR FOR TRIAL N/A 02/10/2021    Procedure: INSERTION, NEUROSTIMULATOR, SPINAL CORD, DORSAL COLUMN, FOR TRIAL;  Surgeon: Riley Segura MD;  Location: [facility] OR;  Service: Pain Management;  Laterality: N/A;    INSERTION OF NEUROSTIMULATOR OF DORSAL COLUMN OF SPINAL CORD N/A 03/01/2021    Procedure: INSERTION, NEUROSTIMULATOR, SPINAL CORD, DORSAL COLUMN;  Surgeon: Riley Segura MD;  Location: [facility] OR;  Service: Pain Management;  Laterality: N/A;    KNEE SURGERY      bilateral    LAPAROSCOPIC CHOLECYSTECTOMY N/A 11/29/2023    Procedure: CHOLECYSTECTOMY, LAPAROSCOPIC;  Surgeon: Louisa Leo MD;  Location: [facility] OR;  Service: General;  Laterality: N/A;    RADIOFREQUENCY ABLATION  2015    lumbar nerve    RADIOFREQUENCY ABLATION OF LUMBAR MEDIAL BRANCH NERVE AT SINGLE LEVEL  Right 04/11/2019    Procedure: Radiofrequency Ablation, Nerve, Spinal, Lumbar, Medial Branch, L1,2,3,4,5;  Surgeon: Riley Segura MD;  Location: [facility];  Service: Pain Management;  Laterality: Right;    TONSILLECTOMY      TRANSESOPHAGEAL ECHOCARDIOGRAM WITH POSSIBLE CARDIOVERSION (CORINNA W/ POSS CARDIOVERSION) N/A 6/4/2024    Procedure: TRANSESOPHAGEAL ECHOCARDIOGRAM WITH POSSIBLE CARDIOVERSION (CORINNA W/ POSS CARDIOVERSION);  Surgeon: Edgardo Marcelino MD;  Location: [facility];  Service: Cardiology;  Laterality: N/A;    TRANSFORAMINAL EPIDURAL INJECTION OF STEROID Right 11/13/2020    Procedure: Injection,steroid,epidural,transforaminal approach, l3/4 and l5/s1;  Surgeon: Riley Segura MD;  Location: [facility];  Service: Pain Management;  Laterality: Right;    TRANSFORAMINAL EPIDURAL INJECTION OF STEROID Left 06/24/2021    Procedure: Injection,steroid,epidural,transforaminal approach L5/S1;  Surgeon: Riley Segura MD;  Location: [facility];  Service: Pain Management;  Laterality: Left;    TRANSFORAMINAL EPIDURAL INJECTION OF STEROID Right 09/22/2022    Procedure: Injection,steroid,epidural,transforaminal approach L5/S1;  Surgeon: Riley Segura MD;  Location: [facility];  Service: Pain Management;  Laterality: Right;    TRANSFORAMINAL EPIDURAL INJECTION OF STEROID Right 10/25/2022    Procedure: Injection,steroid,epidural,transforaminal approach L2/3 and L3/4;  Surgeon: Riley Segura MD;  Location: [facility];  Service: Pain Management;  Laterality: Right;    VERTEBROPLASTY         Fam Hx:   malignancies: No    kidney stones: No     Soc Hx:  Lives in [place]    Allergies:  Patient has no known allergies.    Medications: reviewed     Objective:   There were no vitals filed for this visit.    Physical Exam  Constitutional:       Appearance: Normal appearance.   Pulmonary:      Effort: Pulmonary effort is normal.   Abdominal:      General: There is no distension.      Palpations: Abdomen is soft.       Tenderness: There is no abdominal tenderness.   Musculoskeletal:         General: Normal range of motion.      Cervical back: Normal range of motion.      Right lower leg: Edema present.      Left lower leg: Edema present.      Comments: In wheelchair   Skin:     General: Skin is warm.   Neurological:      Mental Status: He is oriented to person, place, and time.   Psychiatric:         Mood and Affect: Mood normal.         Behavior: Behavior normal.             Assessment:       1. Benign prostatic hyperplasia with urinary retention          Plan:     Voiding trial today- catheter removed by myself in office today, pt and son advised to RTC today by 3pm if unable to urinate  Continue tamsulosin 0.4mg and finasteride 5mg daily as prescribed  Doxycycline 100mg BID x 10 days prescribed empirically    F/u as needed    [facility]: Active    NURY Forbes

## 2024-09-16 ENCOUNTER — OFFICE VISIT (OUTPATIENT)
Dept: CARDIOLOGY | Facility: CLINIC | Age: 79
End: 2024-09-16
Payer: MEDICARE

## 2024-09-16 VITALS
HEIGHT: 65 IN | SYSTOLIC BLOOD PRESSURE: 127 MMHG | DIASTOLIC BLOOD PRESSURE: 74 MMHG | BODY MASS INDEX: 49.85 KG/M2 | WEIGHT: 299.19 LBS | HEART RATE: 50 BPM

## 2024-09-16 DIAGNOSIS — G47.33 OSA ON CPAP: ICD-10-CM

## 2024-09-16 DIAGNOSIS — E66.01 SEVERE OBESITY (BMI >= 40): ICD-10-CM

## 2024-09-16 DIAGNOSIS — Z95.1 S/P CABG (CORONARY ARTERY BYPASS GRAFT): ICD-10-CM

## 2024-09-16 DIAGNOSIS — I70.0 ABDOMINAL AORTIC ATHEROSCLEROSIS: Primary | Chronic | ICD-10-CM

## 2024-09-16 DIAGNOSIS — E78.2 MIXED HYPERLIPIDEMIA: Chronic | ICD-10-CM

## 2024-09-16 DIAGNOSIS — R94.31 ABNORMAL EKG: ICD-10-CM

## 2024-09-16 DIAGNOSIS — I25.10 CORONARY ARTERY DISEASE INVOLVING NATIVE CORONARY ARTERY OF NATIVE HEART WITHOUT ANGINA PECTORIS: ICD-10-CM

## 2024-09-16 DIAGNOSIS — I48.0 PAF (PAROXYSMAL ATRIAL FIBRILLATION): ICD-10-CM

## 2024-09-16 DIAGNOSIS — I65.23 BILATERAL CAROTID ARTERY STENOSIS: Chronic | ICD-10-CM

## 2024-09-16 DIAGNOSIS — I50.32 CHRONIC HEART FAILURE WITH PRESERVED EJECTION FRACTION (HFPEF): Chronic | ICD-10-CM

## 2024-09-16 PROCEDURE — 3288F FALL RISK ASSESSMENT DOCD: CPT | Mod: CPTII,S$GLB,, | Performed by: INTERNAL MEDICINE

## 2024-09-16 PROCEDURE — 1159F MED LIST DOCD IN RCRD: CPT | Mod: CPTII,S$GLB,, | Performed by: INTERNAL MEDICINE

## 2024-09-16 PROCEDURE — 3074F SYST BP LT 130 MM HG: CPT | Mod: CPTII,S$GLB,, | Performed by: INTERNAL MEDICINE

## 2024-09-16 PROCEDURE — 99214 OFFICE O/P EST MOD 30 MIN: CPT | Mod: S$GLB,,, | Performed by: INTERNAL MEDICINE

## 2024-09-16 PROCEDURE — 3078F DIAST BP <80 MM HG: CPT | Mod: CPTII,S$GLB,, | Performed by: INTERNAL MEDICINE

## 2024-09-16 PROCEDURE — 1157F ADVNC CARE PLAN IN RCRD: CPT | Mod: CPTII,S$GLB,, | Performed by: INTERNAL MEDICINE

## 2024-09-16 PROCEDURE — 1101F PT FALLS ASSESS-DOCD LE1/YR: CPT | Mod: CPTII,S$GLB,, | Performed by: INTERNAL MEDICINE

## 2024-09-16 PROCEDURE — 1126F AMNT PAIN NOTED NONE PRSNT: CPT | Mod: CPTII,S$GLB,, | Performed by: INTERNAL MEDICINE

## 2024-09-16 PROCEDURE — 99999 PR PBB SHADOW E&M-EST. PATIENT-LVL IV: CPT | Mod: PBBFAC,,, | Performed by: INTERNAL MEDICINE

## 2024-09-16 NOTE — PROGRESS NOTES
Subjective:    Patient ID:  Chinedu Roque is a 78 y.o. male patient here for evaluation Follow-up      History of Present Illness:  Follow-up visit.  Coronary disease.  CABG 2023 with left atrial appendage ligation complicating DVT PE.  Patient's suffers with chronic lower extremity vascular insufficiency.    Monitoring arrhythmia.  Past documented second-degree Mobitz 1 heart block asymptomatic.  No significant pauses.  Uses CPAP at night.    No angina, stable dyspnea.  No PND orthopnea.  Sleeping flat in his bed.  Ambulates with difficulty,  but does walk daily.             Review of patient's allergies indicates:  No Known Allergies    Past Medical History:   Diagnosis Date    Anticoagulant long-term use     ASA 81 mg    Arthritis     cervical    BPH (benign prostatic hyperplasia) 03/02/2012    Chronic left shoulder pain     Compression fracture of L2     DDD (degenerative disc disease), lumbar     HTN (hypertension) 03/02/2012    Hx of colonic polyps 2013    Low back pain     Morbid obesity with BMI of 40.0-44.9, adult 03/18/2014    Seasonal allergies     Sleep apnea     compliant with CPAP    Stroke 03/1986     Past Surgical History:   Procedure Laterality Date    ANGIOGRAM, CORONARY, WITH LEFT HEART CATHETERIZATION N/A 06/07/2023    Procedure: Left Heart Cath;  Surgeon: Edgardo Marcelino MD;  Location: [facility] CATH;  Service: Cardiology;  Laterality: N/A;    APPENDECTOMY      CATARACT EXTRACTION EXTRACAPSULAR W/ INTRAOCULAR LENS IMPLANTATION      COLONOSCOPY N/A 06/06/2022    Procedure: COLONOSCOPY;  Surgeon: Jose Bello MD;  Location: [facility] ENDO;  Service: Endoscopy;  Laterality: N/A;    COLONOSCOPY N/A 07/06/2023    Procedure: COLONOSCOPY;  Surgeon: Sb Spaulding MD;  Location: [facility] ENDO;  Service: Endoscopy;  Laterality: N/A;    CORONARY ANGIOGRAPHY N/A 06/07/2023    Procedure: ANGIOGRAM, CORONARY ARTERY;  Surgeon: Edgardo Marcelino MD;  Location: [facility] CATH;  Service: Cardiology;  Laterality:  N/A;    CORONARY ARTERY BYPASS GRAFT      CORONARY ARTERY BYPASS GRAFT (CABG) N/A 06/23/2023    Procedure: CORONARY ARTERY BYPASS GRAFT (CABG) X3;  Surgeon: Pricila Clark MD;  Location: [facility] OR;  Service: Cardiothoracic;  Laterality: N/A;    ENDOSCOPIC HARVEST OF VEIN Right 06/23/2023    Procedure: HARVEST-VEIN-ENDOVASCULAR;  Surgeon: Pricila Clark MD;  Location: [facility] OR;  Service: Cardiothoracic;  Laterality: Right;    EPIDURAL BLOCK INJECTION  2015    cervical    EPIDURAL STEROID INJECTION INTO LUMBAR SPINE N/A 06/05/2019    Procedure: Injection-steroid-epidural-lumbar L5/S1 interlaminar;  Surgeon: Riley Segura MD;  Location: [facility] OR;  Service: Pain Management;  Laterality: N/A;    EPIDURAL STEROID INJECTION INTO LUMBAR SPINE N/A 09/13/2019    Procedure: Injection-steroid-epidural-lumbar;  Surgeon: Riley Segura MD;  Location: [facility] OR;  Service: Pain Management;  Laterality: N/A;  L5/S1 interlaminar to the right    EPIDURAL STEROID INJECTION INTO LUMBAR SPINE N/A 06/02/2020    Procedure: Injection-steroid-epidural-lumbar L5/S1 to right;  Surgeon: Riley Segura MD;  Location: [facility] OR;  Service: Pain Management;  Laterality: N/A;    EXCLUSION, LEFT ATRIAL APPENDAGE, OPEN, AS PART OF OPEN CHEST SURGERY N/A 06/23/2023    Procedure: EXCLUSION, LEFT ATRIAL APPENDAGE, OPEN, AS PART OF OPEN CHEST SURGERY;  Surgeon: Pricila Clark MD;  Location: [facility] OR;  Service: Cardiothoracic;  Laterality: N/A;    EXPLORATION OF MEDIASTINUM N/A 06/23/2023    Procedure: EXPLORATION, MEDIASTINUM - MEDIASTINAL RE-EXPLORATION TO CONTROL POST-OP BLEEDING;  Surgeon: Pricila Clark MD;  Location: [facility] OR;  Service: Cardiothoracic;  Laterality: N/A;    Facet Steroid injection       Pain management    HERNIA REPAIR      Ventral    INJECTION OF ANESTHETIC AGENT AROUND MEDIAL BRANCH NERVES INNERVATING LUMBAR FACET JOINT Right 03/21/2023    Procedure: Block-nerve-medial branch-lumbar L3/4, L4/5, L5/S1;  Surgeon: Riley Segura MD;   Location: [facility];  Service: Pain Management;  Laterality: Right;    INJECTION, SACROILIAC JOINT Right 6/28/2024    Procedure: INJECTION,SACROILIAC JOINT;  Surgeon: Riley Segura MD;  Location: [facility] OR;  Service: Pain Management;  Laterality: Right;    INSERTION OF DORSAL COLUMN NERVE STIMULATOR FOR TRIAL N/A 02/10/2021    Procedure: INSERTION, NEUROSTIMULATOR, SPINAL CORD, DORSAL COLUMN, FOR TRIAL;  Surgeon: Riley Segura MD;  Location: [facility] OR;  Service: Pain Management;  Laterality: N/A;    INSERTION OF NEUROSTIMULATOR OF DORSAL COLUMN OF SPINAL CORD N/A 03/01/2021    Procedure: INSERTION, NEUROSTIMULATOR, SPINAL CORD, DORSAL COLUMN;  Surgeon: Riley Segura MD;  Location: [facility];  Service: Pain Management;  Laterality: N/A;    KNEE SURGERY      bilateral    LAPAROSCOPIC CHOLECYSTECTOMY N/A 11/29/2023    Procedure: CHOLECYSTECTOMY, LAPAROSCOPIC;  Surgeon: Louisa Leo MD;  Location: [facility];  Service: General;  Laterality: N/A;    RADIOFREQUENCY ABLATION  2015    lumbar nerve    RADIOFREQUENCY ABLATION OF LUMBAR MEDIAL BRANCH NERVE AT SINGLE LEVEL Right 04/11/2019    Procedure: Radiofrequency Ablation, Nerve, Spinal, Lumbar, Medial Branch, L1,2,3,4,5;  Surgeon: Riley Segura MD;  Location: [facility];  Service: Pain Management;  Laterality: Right;    TONSILLECTOMY      TRANSESOPHAGEAL ECHOCARDIOGRAM WITH POSSIBLE CARDIOVERSION (CORINNA W/ POSS CARDIOVERSION) N/A 6/4/2024    Procedure: TRANSESOPHAGEAL ECHOCARDIOGRAM WITH POSSIBLE CARDIOVERSION (CORINNA W/ POSS CARDIOVERSION);  Surgeon: Edgardo Marcelino MD;  Location: [facility];  Service: Cardiology;  Laterality: N/A;    TRANSFORAMINAL EPIDURAL INJECTION OF STEROID Right 11/13/2020    Procedure: Injection,steroid,epidural,transforaminal approach, l3/4 and l5/s1;  Surgeon: Riley Segura MD;  Location: [facility];  Service: Pain Management;  Laterality: Right;    TRANSFORAMINAL EPIDURAL INJECTION OF STEROID Left 06/24/2021    Procedure:  Injection,steroid,epidural,transforaminal approach L5/S1;  Surgeon: Riley Segura MD;  Location: [facility] OR;  Service: Pain Management;  Laterality: Left;    TRANSFORAMINAL EPIDURAL INJECTION OF STEROID Right 2022    Procedure: Injection,steroid,epidural,transforaminal approach L5/S1;  Surgeon: Riley Segura MD;  Location: [facility] OR;  Service: Pain Management;  Laterality: Right;    TRANSFORAMINAL EPIDURAL INJECTION OF STEROID Right 10/25/2022    Procedure: Injection,steroid,epidural,transforaminal approach L2/3 and L3/4;  Surgeon: Riley Segura MD;  Location: [facility];  Service: Pain Management;  Laterality: Right;    VERTEBROPLASTY       Social History     Tobacco Use    Smoking status: Former     Current packs/day: 0.00     Average packs/day: 1.5 packs/day for 24.0 years (36.0 ttl pk-yrs)     Types: Cigarettes     Start date: 10/21/1959     Quit date: 3/19/1983     Years since quittin.5     Passive exposure: Past    Smokeless tobacco: Never   Substance Use Topics    Alcohol use: No    Drug use: No        Review of Systems:    As noted in HPI in addition      REVIEW OF SYSTEMS  Review of Systems   Constitutional: Negative for decreased appetite, diaphoresis, night sweats, weight gain and weight loss.   HENT:  Negative for nosebleeds and odynophagia.    Eyes:  Negative for double vision and photophobia.   Cardiovascular:  Positive for leg swelling. Negative for chest pain, claudication, cyanosis, dyspnea on exertion, irregular heartbeat, near-syncope, orthopnea, palpitations, paroxysmal nocturnal dyspnea and syncope.   Respiratory:  Negative for cough, hemoptysis, shortness of breath and wheezing.    Hematologic/Lymphatic: Negative for adenopathy.   Skin:  Negative for flushing, skin cancer and suspicious lesions.   Musculoskeletal:  Negative for gout, myalgias and neck pain.   Gastrointestinal:  Negative for abdominal pain, heartburn, hematemesis and hematochezia.   Genitourinary:  Negative  for bladder incontinence, hesitancy and nocturia.   Neurological:  Negative for focal weakness, headaches, light-headedness and paresthesias.   Psychiatric/Behavioral:  Negative for memory loss and substance abuse.               Objective:        Vitals:    09/16/24 1136   BP: 127/74   Pulse: (!) 50       Lab Results   Component Value Date    WBC 5.44 09/05/2024    HGB 13.0 (L) 09/05/2024    HCT 38.6 (L) 09/05/2024     09/05/2024    CHOL 101 (L) 08/29/2024    TRIG 47 08/29/2024    HDL 50 08/29/2024    ALT 33 09/02/2024    AST 40 09/02/2024     09/05/2024    K 4.3 09/05/2024     09/05/2024    CREATININE 1.3 09/05/2024    BUN 22 09/05/2024    CO2 22 (L) 09/05/2024    TSH 0.890 11/20/2023    PSA 4.5 (H) 03/22/2022    INR 1.1 08/04/2023    HGBA1C 5.9 (H) 08/29/2024        ECHOCARDIOGRAM RESULTS  Results for orders placed during the hospital encounter of 06/04/24    Transesophageal echo (CORINNA) with possible cardioversion    Interpretation Summary    Left Ventricle: The left ventricle is normal in size. Normal wall thickness. There is normal systolic function with a visually estimated ejection fraction of 55 - 60%.    Right Ventricle: Normal right ventricular cavity size. Wall thickness is normal. Systolic function is normal.    Left Atrium: Left atrium is dilated.    Mitral Valve: There is no stenosis.    Tricuspid Valve: There is moderate regurgitation.    Results for orders placed during the hospital encounter of 06/07/23    Cardiac catheterization    Conclusion    The ejection fraction was calculated to be 65%.    The left ventricular end diastolic pressure was normal.    The pre-procedure left ventricular end diastolic pressure was 14.    The post-procedure left ventricular end diastolic pressure was 14.    The estimated blood loss was none.    High-grade ostial left main, ostial LAD and ostial circumflex disease as described.  Left dominant system.  Refer to CTS for surgical revascularization.  EF  normal.    The procedure log was documented by No documenter listed and verified by Edgardo Marcelino MD.    Date: 6/7/2023  Time: 10:58 AM          CURRENT/PREVIOUS VISIT EKG  Results for orders placed or performed during the hospital encounter of 09/06/24   SCHEDULED EKG 12-LEAD (to Muse)    Collection Time: 09/06/24 12:09 PM   Result Value Ref Range    QRS Duration 134 ms    [facility] QTC Calculation 453 ms    Narrative    Test Reason : R00.1,    Vent. Rate : 052 BPM     Atrial Rate : 071 BPM     P-R Int : 000 ms          QRS Dur : 134 ms      QT Int : 488 ms       P-R-T Axes : 055 -43 000 degrees     QTc Int : 453 ms    Sinus rhythm with 2nd degree A-V block (Mobitz I)  Left axis deviation  Nonspecific intraventricular block  Abnormal ECG  When compared with ECG of 17-JUN-2024 10:28,  No significant change was found    Confirmed by Carlos Dikcerson MD ([id number]) on 9/6/2024 5:07:17 PM    Referred By: IAN ALVES           Confirmed By:Carlos Dickerson MD     No valid procedures specified.   Results for orders placed during the hospital encounter of 03/22/23    Nuclear Stress - Cardiology Interpreted    Interpretation Summary    The ECG portion of the study is abnormal but not diagnostic.    The test was stopped because the patient experienced A-V block.    2nd degree AV Block confirmed by UMM Alves PA-C    Resting images obtained only.  Apical thinning noted.  No stress images.  Abnormal baseline EKG is reported.  Inconclusive study.    No valid procedures specified.    PHYSICAL EXAM  GENERAL: well built, well nourished, well-developed in no apparent distress alert and oriented.   HEENT: Normocephalic. Pupils normal and conjunctivae normal.  Mucous membranes normal, no cyanosis or icterus, trachea central,no pallor or icterus is noted..   NECK: No JVD. No bruit..   THYROID: Thyroid not enlarged. No nodules present..   CARDIAC:  Normal S1-S2.  No murmur rub click or gallop.  PMI nondisplaced.  LUNGS: Clear to auscultation. No  wheezing or rhonchi..   ABDOMEN: Soft no masses or organomegaly.  No abdomen pulsations or bruits.  Normal bowel sounds. No pulsations and no masses felt, No guarding or rebound.   URINARY: No arnold catheter   EXTREMITIES: No cyanosis, clubbing or edema noted at this time., no calf tenderness bilaterally.   PERIPHERAL VASCULAR SYSTEM: Good palpable distal pulses.  2+ femoral, popliteal and pedal pulses.  No bruits    CENTRAL NERVOUS SYSTEM: No focal motor or sensory deficits noted.   SKIN: Skin without lesions, moist, well perfused.   MUSCLE STRENGTH & TONE: No noteable weakness, atrophy or abnormal movement    I HAVE REVIEWED :    The vital signs, nurses notes, and all the pertinent radiology and labs.         Current Outpatient Medications   Medication Instructions    acetaminophen (TYLENOL) 500 mg, Oral, Every 6 hours PRN    amoxicillin (AMOXIL) 875 mg, Oral, 2 times daily    apixaban (ELIQUIS) 5 mg, Oral, 2 times daily    atorvastatin (LIPITOR) 20 mg, Oral, Daily    b complex vitamins tablet 1 tablet, Oral, Daily    bacitracin 500 unit/gram Oint Topical (Top), 2 times daily, Apply to the tip of the penis    calcium citrate-vitamin D3 315-200 mg (CITRACAL+D) 315 mg-5 mcg (200 unit) per tablet 1 tablet, Oral, Daily    colchicine (COLCRYS) 0.6 mg, Oral, 2 times daily    doxycycline (VIBRA-TABS) 100 mg, Oral, 2 times daily    empagliflozin (JARDIANCE) 10 mg, Oral, Daily    finasteride (PROSCAR) 5 mg, Oral, Daily    fluconazole (DIFLUCAN) 150 mg, Oral, Once    furosemide (LASIX) 40 mg, Oral, Daily    furosemide (LASIX) 20 mg, Oral, Nightly PRN, Take nightly PRN weight gain of 2 lbs in 24 hours or 3 lbs in 72 hours    gabapentin (NEURONTIN) 300 MG capsule TAKE ONE IN AM, AND TWO IN PM    hydrALAZINE (APRESOLINE) 25 mg, Oral, Every 12 hours    HYDROcodone-acetaminophen (NORCO) 5-325 mg per tablet 1 tablet, Oral, Every 8 hours PRN    losartan (COZAAR) 100 mg, Oral, Daily    nystatin (MYCOSTATIN) cream Topical (Top), 2  times daily    oxyCODONE-acetaminophen (PERCOCET) 5-325 mg per tablet 1 tablet, Oral, Every 6 hours PRN    polyethylene glycol (GLYCOLAX) 17 g, Oral, Daily PRN    senna-docusate 8.6-50 mg (PERICOLACE) 8.6-50 mg per tablet 1 tablet, Oral, 2 times daily    SHINGRIX, PF, 50 mcg/0.5 mL injection     spironolactone (ALDACTONE) 25 mg, Oral, Daily    tamsulosin (FLOMAX) 0.4 mg Cap TAKE 1 CAPSULE BY MOUTH EVERY DAY          Assessment:   CABG 2022.  Left atrial appendage ligation.  Complicating DVT PE.  Lower extremity venous insufficiency.  Remains on Eliquis 5 b.i.d..    Holter monitor with second-degree Mobitz 1 heart block, asymptomatic.  No significant pauses.    ADRIANNE/CPAP.    Plan:   No change current medications.  Exam review of systems stable.    Six-month          No follow-ups on file.

## 2024-09-18 ENCOUNTER — OFFICE VISIT (OUTPATIENT)
Dept: PRIMARY CARE CLINIC | Facility: CLINIC | Age: 79
End: 2024-09-18
Payer: MEDICARE

## 2024-09-18 ENCOUNTER — HOSPITAL ENCOUNTER (OUTPATIENT)
Dept: RADIOLOGY | Facility: HOSPITAL | Age: 79
Discharge: HOME | End: 2024-09-18
Attending: PHYSICIAN ASSISTANT
Payer: MEDICARE

## 2024-09-18 ENCOUNTER — TELEPHONE (OUTPATIENT)
Dept: PRIMARY CARE CLINIC | Facility: CLINIC | Age: 79
End: 2024-09-18
Payer: MEDICARE

## 2024-09-18 VITALS
TEMPERATURE: 98 F | RESPIRATION RATE: 16 BRPM | DIASTOLIC BLOOD PRESSURE: 62 MMHG | HEART RATE: 45 BPM | SYSTOLIC BLOOD PRESSURE: 124 MMHG | OXYGEN SATURATION: 97 %

## 2024-09-18 DIAGNOSIS — R60.0 EDEMA OF RIGHT LOWER EXTREMITY: ICD-10-CM

## 2024-09-18 DIAGNOSIS — M54.50 LOW BACK PAIN, UNSPECIFIED BACK PAIN LATERALITY, UNSPECIFIED CHRONICITY, UNSPECIFIED WHETHER SCIATICA PRESENT: ICD-10-CM

## 2024-09-18 DIAGNOSIS — T14.8XXA SKIN AVULSION: ICD-10-CM

## 2024-09-18 DIAGNOSIS — R60.0 EDEMA OF RIGHT LOWER EXTREMITY: Primary | ICD-10-CM

## 2024-09-18 PROCEDURE — 1157F ADVNC CARE PLAN IN RCRD: CPT | Mod: CPTII,S$GLB,, | Performed by: PHYSICIAN ASSISTANT

## 2024-09-18 PROCEDURE — 93971 EXTREMITY STUDY: CPT | Mod: 26,RT,, | Performed by: RADIOLOGY

## 2024-09-18 PROCEDURE — 99999 PR PBB SHADOW E&M-EST. PATIENT-LVL III: CPT | Mod: PBBFAC,,, | Performed by: PHYSICIAN ASSISTANT

## 2024-09-18 PROCEDURE — 3074F SYST BP LT 130 MM HG: CPT | Mod: CPTII,S$GLB,, | Performed by: PHYSICIAN ASSISTANT

## 2024-09-18 PROCEDURE — 3078F DIAST BP <80 MM HG: CPT | Mod: CPTII,S$GLB,, | Performed by: PHYSICIAN ASSISTANT

## 2024-09-18 PROCEDURE — 1101F PT FALLS ASSESS-DOCD LE1/YR: CPT | Mod: CPTII,S$GLB,, | Performed by: PHYSICIAN ASSISTANT

## 2024-09-18 PROCEDURE — 1125F AMNT PAIN NOTED PAIN PRSNT: CPT | Mod: CPTII,S$GLB,, | Performed by: PHYSICIAN ASSISTANT

## 2024-09-18 PROCEDURE — 99214 OFFICE O/P EST MOD 30 MIN: CPT | Mod: S$GLB,,, | Performed by: PHYSICIAN ASSISTANT

## 2024-09-18 PROCEDURE — 3288F FALL RISK ASSESSMENT DOCD: CPT | Mod: CPTII,S$GLB,, | Performed by: PHYSICIAN ASSISTANT

## 2024-09-18 PROCEDURE — 93971 EXTREMITY STUDY: CPT | Mod: TC,PO,RT

## 2024-09-18 RX ORDER — TIZANIDINE 2 MG/1
TABLET ORAL
Qty: 30 TABLET | Refills: 0 | Status: SHIPPED | OUTPATIENT
Start: 2024-09-18

## 2024-09-18 NOTE — TELEPHONE ENCOUNTER
Spoke with pt, he reports that he has concerns about his R lower leg being infected.  Pt has been cleaning it at home with wound cleanser and applying medi-honey to it as well.  Pt would like to be seen in clinic today.  Pt scheduled to see Mariel today at 1540.

## 2024-09-18 NOTE — PROGRESS NOTES
Primary Care Provider Appointment   [facility] [place]       Patient ID: Chinedu Roque is a 78 y.o. male.    ASSESSMENT/PLAN by Problem List:    1. Edema of right lower extremity  -     US Lower Extremity Veins Right; Future; Expected date: 09/18/2024    Patient is on Eliquis twice daily and does take his medication as directed. Significant swelling of right lower extremity especially in comparison to left. US ordered and found to be negative for DVT. Continue Eliquis as prescribed.    2. Low back pain, unspecified back pain laterality, unspecified chronicity, unspecified whether sciatica present    Refill request for Tizanidine. Patient aware r/v/b.    3. Skin avulsion     Wound cleansed and dressed. Wound care discussed with patient. Patient is already taking doxycycline for UTI. No need to change/add antibiotic.     Other orders  -     tiZANidine (ZANAFLEX) 2 MG tablet; TAKE 1-2 TABLETS EVERY 8 HOURS AS NEEDED FOR MUSCLE SPASM  Dispense: 30 tablet; Refill: 0         Follow Up:  As scheduled    My total time spent on this encounter was 30 minutes which included  the following activities: preparing to see the patient, performing a medically appropriate and/or evaluation, counseling and educating the patient and family/caregiver, ordering medications, tests, or procedures, referring and communicating with other healthcare providers, documenting clinical information in the electronic or other health record, and independently interpreting results. This time is independent and non-overlapping.        Subjective:     Chief Complaint   Patient presents with    Dressing Change     Wound noted to RLE; wound care provided     I have reviewed the information entered by the ancillary staff regarding the chief complaint as well as the related history.    78-year-old male presents for urgent same-day visit to evaluate wounds sustained to right lower extremity.        Patient is a/an 78 y.o.   male       For complete problem list, past medical history, surgical history, social history, etc., see appropriate section in the electronic medical record    Review of Systems   Genitourinary:  Negative for difficulty urinating and penile pain.   Skin:  Positive for wound.   All other systems reviewed and are negative.      Objective     Physical Exam  Vitals and nursing note reviewed.   Constitutional:       Appearance: He is not toxic-appearing or diaphoretic.   HENT:      Head: Normocephalic and atraumatic.      Right Ear: External ear normal.      Left Ear: External ear normal.   Eyes:      General:         Right eye: No discharge.         Left eye: No discharge.      Extraocular Movements: Extraocular movements intact.      Conjunctiva/sclera: Conjunctivae normal.   Pulmonary:      Effort: Pulmonary effort is normal. No respiratory distress.   Musculoskeletal:      Right lower leg: Edema (significant) present.      Left lower leg: Edema present.   Skin:     General: Skin is dry.      Coloration: Skin is not jaundiced or pale.      Comments: Skin avulsion approximately 3 cm in diameter to anterior lower right leg. No purulent discharge. Minimal surrounding erythema.   Neurological:      Mental Status: He is alert.       Vitals:    09/18/24 1510   BP: 124/62   BP Location: Left arm   Patient Position: Sitting   BP Method: Large (Manual)   Pulse: (!) 45   Resp: 16   Temp: 97.9 °F (36.6 °C)   TempSrc: Oral   SpO2: 97%           THIS DOCUMENT WAS MADE IN PART WITH VOICE RECOGNITION SOFTWARE.  OCCASIONALLY THIS SOFTWARE WILL MISINTERPRET WORDS OR PHRASES.

## 2024-09-20 ENCOUNTER — TELEPHONE (OUTPATIENT)
Dept: PAIN MEDICINE | Facility: CLINIC | Age: 79
End: 2024-09-20
Payer: MEDICARE

## 2024-09-25 ENCOUNTER — OFFICE VISIT (OUTPATIENT)
Dept: OPTOMETRY | Facility: CLINIC | Age: 79
End: 2024-09-25
Payer: MEDICARE

## 2024-09-25 DIAGNOSIS — R73.03 PRE-DIABETES: Primary | ICD-10-CM

## 2024-09-25 DIAGNOSIS — I10 PRIMARY HYPERTENSION: ICD-10-CM

## 2024-09-25 DIAGNOSIS — R73.09 ELEVATED HEMOGLOBIN A1C: ICD-10-CM

## 2024-09-25 DIAGNOSIS — Z96.1 PSEUDOPHAKIA OF BOTH EYES: ICD-10-CM

## 2024-09-25 DIAGNOSIS — H52.7 REFRACTIVE ERROR: ICD-10-CM

## 2024-09-25 PROCEDURE — 3288F FALL RISK ASSESSMENT DOCD: CPT | Mod: CPTII,S$GLB,, | Performed by: OPTOMETRIST

## 2024-09-25 PROCEDURE — 1157F ADVNC CARE PLAN IN RCRD: CPT | Mod: CPTII,S$GLB,, | Performed by: OPTOMETRIST

## 2024-09-25 PROCEDURE — 1159F MED LIST DOCD IN RCRD: CPT | Mod: CPTII,S$GLB,, | Performed by: OPTOMETRIST

## 2024-09-25 PROCEDURE — 92015 DETERMINE REFRACTIVE STATE: CPT | Mod: S$GLB,,, | Performed by: OPTOMETRIST

## 2024-09-25 PROCEDURE — 92004 COMPRE OPH EXAM NEW PT 1/>: CPT | Mod: S$GLB,,, | Performed by: OPTOMETRIST

## 2024-09-25 PROCEDURE — 1126F AMNT PAIN NOTED NONE PRSNT: CPT | Mod: CPTII,S$GLB,, | Performed by: OPTOMETRIST

## 2024-09-25 PROCEDURE — 99999 PR PBB SHADOW E&M-EST. PATIENT-LVL III: CPT | Mod: PBBFAC,,, | Performed by: OPTOMETRIST

## 2024-09-25 PROCEDURE — 1160F RVW MEDS BY RX/DR IN RCRD: CPT | Mod: CPTII,S$GLB,, | Performed by: OPTOMETRIST

## 2024-09-25 PROCEDURE — 1101F PT FALLS ASSESS-DOCD LE1/YR: CPT | Mod: CPTII,S$GLB,, | Performed by: OPTOMETRIST

## 2024-09-25 NOTE — PROGRESS NOTES
HPI     Eye Problem     Additional comments: Blur ou at dist, x mos, no assoc pain or red, no   relief over time, constant  Wants progressive rx           Comments    Referral - ocular health visit [facility] FERNANDO 3-4 years ago    Pt complains of blurred va OU. Pt noticed blurred va year after cataract   sx. Pt denies gtts, floaters and flashes.           Last edited by Mirza Rico, OD on 9/25/2024  3:19 PM.            Assessment /Plan     For exam results, see Encounter Report.    Pre-diabetes  -     Ambulatory referral/consult to Optometry    Elevated hemoglobin A1c  -     Ambulatory referral/consult to Optometry    Primary hypertension  -     Ambulatory referral/consult to Optometry    Pseudophakia of both eyes    Refractive error      1,2. No diabetic retinopathy, no csme. Return in 1 year for dilated eye exam.  3. No ocular signs of htn, Monitor condition. Patient to report any changes. RTC 1 year recheck.  4. Monitor condition. Patient to report any changes. RTC 1 year recheck.     5. New Spectacle Rx given, discussed different options for glasses. RTC 1 year routine eye exam.

## 2024-09-26 ENCOUNTER — OFFICE VISIT (OUTPATIENT)
Dept: PRIMARY CARE CLINIC | Facility: CLINIC | Age: 79
End: 2024-09-26
Payer: MEDICARE

## 2024-09-26 ENCOUNTER — PATIENT MESSAGE (OUTPATIENT)
Dept: PHARMACY | Facility: CLINIC | Age: 79
End: 2024-09-26
Payer: MEDICARE

## 2024-09-26 ENCOUNTER — TELEPHONE (OUTPATIENT)
Dept: PHARMACY | Facility: CLINIC | Age: 79
End: 2024-09-26
Payer: MEDICARE

## 2024-09-26 VITALS
SYSTOLIC BLOOD PRESSURE: 132 MMHG | HEART RATE: 84 BPM | DIASTOLIC BLOOD PRESSURE: 74 MMHG | WEIGHT: 302.5 LBS | OXYGEN SATURATION: 95 % | HEIGHT: 65 IN | BODY MASS INDEX: 50.4 KG/M2

## 2024-09-26 DIAGNOSIS — L97.919 VENOUS ULCER OF RIGHT LEG: ICD-10-CM

## 2024-09-26 DIAGNOSIS — G89.29 CHRONIC BILATERAL LOW BACK PAIN WITHOUT SCIATICA: Primary | ICD-10-CM

## 2024-09-26 DIAGNOSIS — B37.2 CUTANEOUS CANDIDIASIS: ICD-10-CM

## 2024-09-26 DIAGNOSIS — M54.50 CHRONIC BILATERAL LOW BACK PAIN WITHOUT SCIATICA: Primary | ICD-10-CM

## 2024-09-26 DIAGNOSIS — I83.019 VENOUS ULCER OF RIGHT LEG: ICD-10-CM

## 2024-09-26 DIAGNOSIS — I50.32 CHRONIC HEART FAILURE WITH PRESERVED EJECTION FRACTION (HFPEF): ICD-10-CM

## 2024-09-26 DIAGNOSIS — G57.12 MERALGIA PARESTHETICA OF LEFT SIDE: Primary | ICD-10-CM

## 2024-09-26 PROCEDURE — 3075F SYST BP GE 130 - 139MM HG: CPT | Mod: CPTII,S$GLB,, | Performed by: FAMILY MEDICINE

## 2024-09-26 PROCEDURE — 3078F DIAST BP <80 MM HG: CPT | Mod: CPTII,S$GLB,, | Performed by: FAMILY MEDICINE

## 2024-09-26 PROCEDURE — 1159F MED LIST DOCD IN RCRD: CPT | Mod: CPTII,S$GLB,, | Performed by: FAMILY MEDICINE

## 2024-09-26 PROCEDURE — 1157F ADVNC CARE PLAN IN RCRD: CPT | Mod: CPTII,S$GLB,, | Performed by: FAMILY MEDICINE

## 2024-09-26 PROCEDURE — 1125F AMNT PAIN NOTED PAIN PRSNT: CPT | Mod: CPTII,S$GLB,, | Performed by: FAMILY MEDICINE

## 2024-09-26 PROCEDURE — 3288F FALL RISK ASSESSMENT DOCD: CPT | Mod: CPTII,S$GLB,, | Performed by: FAMILY MEDICINE

## 2024-09-26 PROCEDURE — 99999 PR PBB SHADOW E&M-EST. PATIENT-LVL V: CPT | Mod: PBBFAC,,, | Performed by: FAMILY MEDICINE

## 2024-09-26 PROCEDURE — 99214 OFFICE O/P EST MOD 30 MIN: CPT | Mod: S$GLB,,, | Performed by: FAMILY MEDICINE

## 2024-09-26 PROCEDURE — 1160F RVW MEDS BY RX/DR IN RCRD: CPT | Mod: CPTII,S$GLB,, | Performed by: FAMILY MEDICINE

## 2024-09-26 PROCEDURE — 1101F PT FALLS ASSESS-DOCD LE1/YR: CPT | Mod: CPTII,S$GLB,, | Performed by: FAMILY MEDICINE

## 2024-09-26 RX ORDER — TIZANIDINE 2 MG/1
TABLET ORAL
Qty: 30 TABLET | Refills: 1 | Status: SHIPPED | OUTPATIENT
Start: 2024-09-26

## 2024-09-26 RX ORDER — NYSTATIN 100000 U/G
CREAM TOPICAL 2 TIMES DAILY
Qty: 90 G | Refills: 1 | Status: SHIPPED | OUTPATIENT
Start: 2024-09-26

## 2024-09-26 NOTE — PROGRESS NOTES
Primary Care Provider Appointment   [facility]       Patient ID: Chinedu Roque is a 78 y.o. male.    ASSESSMENT/PLAN by Problem List:    1. Meralgia paresthetica of left side  Assessment & Plan:  New finding.  Reassurance given.  Recommend stretching, massage the area where the lateral femoral cutaneous nerve passes under the inguinal ligament.  Could consider PT      2. Chronic heart failure with preserved ejection fraction (HFpEF)  Assessment & Plan:  Symptoms have been fairly stable.  Although the cost of Jardiance maybe prohibitive.  Will request consultation with pharmacy assistance.  Otherwise continue all current medications as long as possible    Orders:  -     Ambulatory referral/consult to Pharmacy Assistance; Future; Expected date: 10/03/2024    3. Venous ulcer of right leg    4. Cutaneous candidiasis  Assessment & Plan:  Nystatin cream.  Discussed cleaning, hygiene, keeping dry.  If not responding switch to coverage for dermatophyte.      Other orders  -     nystatin (MYCOSTATIN) cream; Apply topically 2 (two) times daily.  Dispense: 90 g; Refill: 1         Follow Up:  Four weeks        Subjective:     Chief Complaint   Patient presents with    Back Pain     Side    Leg Problem     Numbness on the left thigh area.      I have reviewed the information entered by the ancillary staff regarding the chief complaint as well as the related history.    HPI    Patient is a/an 78 y.o.  male     Legs are improving, persistent mild swelling but wounds improving  Rash in groin  Pharm assistance for Jardiance  Trouble sleeping, THC gloria helping  Numbness left lateral thigh  See above for all details listed    For complete problem list, past medical history, surgical history, social history, etc., see appropriate section in the electronic medical record    Review of Systems   Constitutional:  Negative for chills and fever.   HENT: Negative.     Respiratory:  Positive for  "shortness of breath (Stable).    Cardiovascular:  Positive for leg swelling. Negative for chest pain.   Genitourinary: Negative.    Musculoskeletal:  Positive for arthralgias.   Skin:  Positive for rash and wound.   Neurological:  Positive for numbness.       Objective     Physical Exam  Constitutional:       General: He is not in acute distress.     Appearance: He is obese. He is not toxic-appearing.   HENT:      Head: Normocephalic and atraumatic.   Cardiovascular:      Rate and Rhythm: Regular rhythm. Bradycardia present.      Heart sounds: Normal heart sounds.      Comments: 2+ bilateral ankle edema  Pulmonary:      Effort: No respiratory distress.      Breath sounds: No wheezing or rales.      Comments: Mildly diminished breath sounds, overall improving  Skin:     Comments: Lower extremity wounds improving, treated by nurse today   Neurological:      Mental Status: He is alert.       Vitals:    09/26/24 1314   BP: 132/74   BP Location: Right arm   Patient Position: Sitting   BP Method: Large (Manual)   Pulse: 84   SpO2: 95%   Weight: (!) 137.2 kg (302 lb 7.5 oz)   Height: 5' 5" (1.651 m)           THIS DOCUMENT WAS MADE IN PART WITH VOICE RECOGNITION SOFTWARE.  OCCASIONALLY THIS SOFTWARE WILL MISINTERPRET WORDS OR PHRASES.    "

## 2024-09-30 RX ORDER — FUROSEMIDE 40 MG/1
40 TABLET ORAL
Qty: 90 TABLET | Refills: 1 | Status: SHIPPED | OUTPATIENT
Start: 2024-09-30

## 2024-09-30 RX ORDER — APIXABAN 5 MG/1
5 TABLET, FILM COATED ORAL 2 TIMES DAILY
Qty: 180 TABLET | Refills: 1 | Status: SHIPPED | OUTPATIENT
Start: 2024-09-30

## 2024-10-02 ENCOUNTER — TELEPHONE (OUTPATIENT)
Dept: PHARMACY | Facility: CLINIC | Age: 79
End: 2024-10-02
Payer: MEDICARE

## 2024-10-02 ENCOUNTER — TELEPHONE (OUTPATIENT)
Dept: PRIMARY CARE CLINIC | Facility: CLINIC | Age: 79
End: 2024-10-02
Payer: MEDICARE

## 2024-10-02 DIAGNOSIS — I50.32 CHRONIC HEART FAILURE WITH PRESERVED EJECTION FRACTION (HFPEF): Primary | ICD-10-CM

## 2024-10-02 DIAGNOSIS — I48.0 PAF (PAROXYSMAL ATRIAL FIBRILLATION): ICD-10-CM

## 2024-10-02 NOTE — TELEPHONE ENCOUNTER
----- Message from Britta sent at 10/2/2024 11:53 AM CDT -----  Type:  Needs Medical Advice    Who Called:  Pt    Would the patient rather a call back or a response via [facility]?  Call back    Best Call Back Number:  [phone]    Additional Information:  Pt is asking to speak with someone.   Please call back to advise. Thanks!

## 2024-10-02 NOTE — TELEPHONE ENCOUNTER
Spoke with pt, he reports that a 3 month supply of Eliquis is $174 and since he is in the [facility], it is now over $400 and he cannot afford this.  Consulted with Dr. Tyler and he gave an order to put in a referral for pharmacy assistance.

## 2024-10-02 NOTE — LETTER
October 3, 2024    Chinedu CAT Jude  [address] [place] [address]             [facility] Pharmacy Assistance  [address]  [facility] [address]  Fax: [phone]   Dear Mr. Roque,      My name is Hilario Sen   I am reaching out on behalf of [facility]s Pharmacy Patient Assistance Team after receiving a referral from your Provider inquiring about assistance with your medication. I tried to contact you on 10/3/2024 . Sorry we missed you. Our goal is to assist qualified patients with financial assistance for their medications to better help enrollment for their medications to better help you achieve your health goals.      Please note that enrollment into available support will require the following documents:      Proof of household Income (such as social security statement, 1099 form, pension statement or 3 consecutive pay stubs)  Copy of all insurance cards (front and back)  Print out from your insurance or pharmacy showing how much you have spent on prescriptions this year  Completed Medication Access Center Authorization Forms       Please reach out to my phone number below if you are still in need of assistance with your medications. Follow-up attempt to reach you through MyChart or letter will be made in 5 business days. We look forward to hearing from you soon!    Thank you for choosing [facility] for your healthcare needs      Sincerely  Hilario KHAN @[phone]  Pharmacy Patient Assistance Team  [address]  [id number]  [place] [address]  Fax: [phone]  Email: [email or web address]

## 2024-10-03 NOTE — TELEPHONE ENCOUNTER
We have reached out to Mr. Roque to inform him of the [email or web address]  application process for Eliquis and whats required to apply.  He did not answer.         I left a voicemail   I mailed a letter      Follow-up will be made in 5 business days.    Hilario Sen   Pharmacy Patient Assistance Team

## 2024-10-04 PROBLEM — B35.6 TINEA CRURIS: Status: ACTIVE | Noted: 2024-10-04

## 2024-10-04 PROBLEM — G57.12 MERALGIA PARESTHETICA OF LEFT SIDE: Status: ACTIVE | Noted: 2024-10-04

## 2024-10-04 PROBLEM — B37.2 CUTANEOUS CANDIDIASIS: Status: ACTIVE | Noted: 2024-10-04

## 2024-10-04 NOTE — ASSESSMENT & PLAN NOTE
Symptoms have been fairly stable.  Although the cost of Jardiance maybe prohibitive.  Will request consultation with pharmacy assistance.  Otherwise continue all current medications as long as possible

## 2024-10-04 NOTE — ASSESSMENT & PLAN NOTE
Nystatin cream.  Discussed cleaning, hygiene, keeping dry.  If not responding switch to coverage for dermatophyte.

## 2024-10-04 NOTE — ASSESSMENT & PLAN NOTE
New finding.  Reassurance given.  Recommend stretching, massage the area where the lateral femoral cutaneous nerve passes under the inguinal ligament.  Could consider PT

## 2024-10-07 ENCOUNTER — TELEPHONE (OUTPATIENT)
Dept: PODIATRY | Facility: CLINIC | Age: 79
End: 2024-10-07

## 2024-10-07 ENCOUNTER — OFFICE VISIT (OUTPATIENT)
Dept: PODIATRY | Facility: CLINIC | Age: 79
End: 2024-10-07
Payer: MEDICARE

## 2024-10-07 VITALS — WEIGHT: 302.5 LBS | HEIGHT: 65 IN | BODY MASS INDEX: 50.4 KG/M2

## 2024-10-07 DIAGNOSIS — I73.9 PERIPHERAL VASCULAR DISEASE: Primary | ICD-10-CM

## 2024-10-07 DIAGNOSIS — G60.9 IDIOPATHIC PERIPHERAL NEUROPATHY: ICD-10-CM

## 2024-10-07 DIAGNOSIS — B35.1 ONYCHOMYCOSIS: ICD-10-CM

## 2024-10-07 PROCEDURE — 11721 DEBRIDE NAIL 6 OR MORE: CPT | Mod: Q9,S$GLB,, | Performed by: PODIATRIST

## 2024-10-07 PROCEDURE — 99499 UNLISTED E&M SERVICE: CPT | Mod: S$GLB,,, | Performed by: PODIATRIST

## 2024-10-07 PROCEDURE — 99999 PR PBB SHADOW E&M-EST. PATIENT-LVL III: CPT | Mod: PBBFAC,,, | Performed by: PODIATRIST

## 2024-10-07 NOTE — TELEPHONE ENCOUNTER
Spoke with the patient to confirm appointment date as being 2/3/245 @ 1:45 pm. He expressed understanding of the message.

## 2024-10-07 NOTE — PROGRESS NOTES
Subjective:      Patient ID: Chinedu Roque is a 78 y.o. male.    Chief Complaint: Nail Care ( Nail care)      Chinedu is a 78 y.o. male who presents to the clinic for evaluation and treatment of high risk feet. Chinedu has a past medical history of Anticoagulant long-term use, Arthritis, BPH (benign prostatic hyperplasia) (03/02/2012), Chronic left shoulder pain, Compression fracture of L2, DDD (degenerative disc disease), lumbar, HTN (hypertension) (03/02/2012), colonic polyps (2013), Low back pain, Morbid obesity with BMI of 40.0-44.9, adult (03/18/2014), Seasonal allergies, Sleep apnea, and Stroke (03/1986). The patient's chief complaint is long, thick toenails. This patient has documented high risk feet requiring routine maintenance secondary to peripheral vascular disease. No acute pedal changes since last visit    PCP: Hernando Browne MD        Current shoe gear:  Casual shoes    Last encounter in this department: Visit date not found    Hemoglobin A1C   Date Value Ref Range Status   08/29/2024 5.9 (H) 0.0 - 5.6 % Final     Comment:     Reference Interval:  5.0 - 5.6 Normal   5.7 - 6.4 High Risk   > 6.5 Diabetic      Hgb A1c results are standardized based on the (NGSP) National   Glycohemoglobin Standardization Program.      Hemoglobin A1C levels are related to mean serum/plasma glucose   during the preceding 2-3 months.        03/21/2024 5.9 (H) 0.0 - 5.6 % Final     Comment:     Reference Interval:  5.0 - 5.6 Normal   5.7 - 6.4 High Risk   > 6.5 Diabetic      Hgb A1c results are standardized based on the (NGSP) National   Glycohemoglobin Standardization Program.      Hemoglobin A1C levels are related to mean serum/plasma glucose   during the preceding 2-3 months.        06/19/2023 5.5 0.0 - 5.6 % Final     Comment:     Reference Interval:  5.0 - 5.6 Normal   5.7 - 6.4 High Risk   > 6.5 Diabetic      Hgb A1c results are standardized based on the (NGSP) National   Glycohemoglobin Standardization  Program.      Hemoglobin A1C levels are related to mean serum/plasma glucose   during the preceding 2-3 months.              Review of Systems   Constitutional: Negative for chills and fever.   Cardiovascular:  Positive for leg swelling. Negative for claudication.   Respiratory:  Negative for shortness of breath.    Skin:  Positive for nail changes. Negative for itching and rash.   Musculoskeletal:  Negative for muscle cramps, muscle weakness and myalgias.   Gastrointestinal:  Negative for nausea and vomiting.   Neurological:  Positive for numbness. Negative for focal weakness, loss of balance and paresthesias.           Objective:      Physical Exam  Constitutional:       General: He is not in acute distress.     Appearance: He is well-developed. He is not diaphoretic.   Cardiovascular:      Pulses:           Dorsalis pedis pulses are 1+ on the right side and 1+ on the left side.        Posterior tibial pulses are 1+ on the right side and 1+ on the left side.      Comments: 3 sec capillary refill time to toes 1-5 bilateral. Feet are warm to touch proximally with distal cooling b/l. There is no hair growth on the feet and toes b/l. There is 2+ edema b/l with some varicosities present b/l.     Musculoskeletal:      Comments: Equinus noted b/l ankles with < 10 deg DF noted. MMT 5/5 in DF/PF/Inv/Ev resistance with no reproduction of pain in any direction. Passive range of motion of ankle and pedal joints is painless b/l.     Skin:     General: Skin is warm and dry.      Coloration: Skin is not pale.      Findings: No abrasion, bruising, burn, ecchymosis, erythema, laceration, lesion, petechiae or rash.      Nails: There is no clubbing.      Comments: Skin is thin shiny and atrophic bilateral    Nails 1-5 bilateral are thick 3-4 mm, long 3-6 mm, and discolored with subungual debris     Neurological:      Mental Status: He is alert and oriented to person, place, and time.      Sensory: No sensory deficit.      Motor: No  tremor, atrophy or abnormal muscle tone.      Comments: Negative tinel sign bilateral.   Psychiatric:         Behavior: Behavior normal.               Assessment:       Encounter Diagnoses   Name Primary?    Peripheral vascular disease Yes    Idiopathic peripheral neuropathy     Onychomycosis                    Plan:       Chinedu was seen today for nail care.    Diagnoses and all orders for this visit:    Peripheral vascular disease    Idiopathic peripheral neuropathy    Onychomycosis                I counseled the patient on his conditions, their implications and medical management.        - Shoe inspection. Patient instructed on proper foot hygeine. We discussed wearing proper shoe gear, daily foot inspections, never walking without protective shoe gear, never putting sharp instruments to feet, routine podiatric nail visits every 3 months.      - With patient's permission, nails were aggressively reduced and debrided x 10 to their soft tissue attachment mechanically and with electric , removing all offending nail and debris. Patient relates relief following the procedure. He will continue to monitor the areas daily, inspect his feet, wear protective shoe gear when ambulatory, moisturizer to maintain skin integrity and follow in this office in approximately 2-3 months, sooner p.r.n.    Patient will obtain over the counter arch supports and wear them in shoes whenever possible.  Athletic shoes intended for walking or running are usually best.      Rubio Ramirez DPM

## 2024-10-08 ENCOUNTER — OFFICE VISIT (OUTPATIENT)
Dept: PAIN MEDICINE | Facility: CLINIC | Age: 79
End: 2024-10-08
Payer: MEDICARE

## 2024-10-08 ENCOUNTER — TELEPHONE (OUTPATIENT)
Dept: PAIN MEDICINE | Facility: CLINIC | Age: 79
End: 2024-10-08

## 2024-10-08 VITALS
SYSTOLIC BLOOD PRESSURE: 154 MMHG | HEIGHT: 65 IN | DIASTOLIC BLOOD PRESSURE: 74 MMHG | WEIGHT: 300.25 LBS | BODY MASS INDEX: 50.02 KG/M2 | HEART RATE: 90 BPM

## 2024-10-08 DIAGNOSIS — M46.1 SACROILIITIS: Primary | ICD-10-CM

## 2024-10-08 DIAGNOSIS — M53.3 SACROILIAC JOINT PAIN: ICD-10-CM

## 2024-10-08 DIAGNOSIS — M48.061 SPINAL STENOSIS OF LUMBAR REGION WITHOUT NEUROGENIC CLAUDICATION: ICD-10-CM

## 2024-10-08 PROCEDURE — 3077F SYST BP >= 140 MM HG: CPT | Mod: CPTII,S$GLB,,

## 2024-10-08 PROCEDURE — 1157F ADVNC CARE PLAN IN RCRD: CPT | Mod: CPTII,S$GLB,,

## 2024-10-08 PROCEDURE — 3288F FALL RISK ASSESSMENT DOCD: CPT | Mod: CPTII,S$GLB,,

## 2024-10-08 PROCEDURE — 99999 PR PBB SHADOW E&M-EST. PATIENT-LVL IV: CPT | Mod: PBBFAC,,,

## 2024-10-08 PROCEDURE — 1101F PT FALLS ASSESS-DOCD LE1/YR: CPT | Mod: CPTII,S$GLB,,

## 2024-10-08 PROCEDURE — 1125F AMNT PAIN NOTED PAIN PRSNT: CPT | Mod: CPTII,S$GLB,,

## 2024-10-08 PROCEDURE — 99214 OFFICE O/P EST MOD 30 MIN: CPT | Mod: S$GLB,,,

## 2024-10-08 PROCEDURE — 1159F MED LIST DOCD IN RCRD: CPT | Mod: CPTII,S$GLB,,

## 2024-10-08 PROCEDURE — 3078F DIAST BP <80 MM HG: CPT | Mod: CPTII,S$GLB,,

## 2024-10-08 NOTE — PROGRESS NOTES
This note was completed with dictation software and grammatical errors may exist.    CC:Back pain    HPI: The patient is a 78-year-old man with a history of hypertension, obesity and low back pain who presents in referral from Dr. Winters for chronic right low back pain.  He returns for follow-up with continued right buttock pain, 7/10, constant.  No further radiating pain, new numbness, weakness or bowel or bladder incontinence.  Since last office visit, he was scheduled for right SI joint block however had to cancel due to weather, cellulitis on legs and UTI.  He has been evaluated by Urology, completed antibiotics in his no longer having any symptoms of cellulitis or a urinary tract infection.          Pain intervention history: He done physical therapy in January, 2014 with moderate relief but not sustained.  He takes Relafen, tramadol, baclofen and chlorzoxazone as needed with mild relief.  He had undergone what sounds like a series of epidurals several years ago with no major relief. The patient is status post a right side L2/3, L3/4, L4/5 and L5/S1 facet joint injection on 10/28/14 with 50% relief of his back pain for 1 month.  He is status post radiofrequency ablation of the right L1, 2, 3, 4 and 5 medial branch nerves on 3/18/15 with 75% relief.  He is status post C7-T1 cervical interlaminar epidural steroid injection on 5/11/15 with 70% relief.  He is status post right L1, 2, 3, 4 and 5 medial branch radiofrequency ablation on 7/5/16 with 50% relief.   He is status post right L1, 2, 3, 4 and 5 medial branch radiofrequency ablation on 10/17/17 with about 50% relief additionally, later reported almost complete relief lasting a year.  He is status post right L1, 2, 3, 4 and 5 medial branch radiofrequency ablation on 04/11/2019 with mild relief.   He is status post L5/S1 interlaminar epidural steroid injection on 06/05/2019 with 80% relief.  He is status post L5/S1 interlaminar epidural steroid  "injection on 09/13/2019 with 50% relief lasting about 6 months.  He is status post L5/S1 interlaminar epidural steroid injection to the right on 06/02/2020 with minimal relief.  He is status post right L3/4 and L5/S1 transforaminal epidural steroid injection on 11/13/2020 with 0% relief.   He is status post left L5/S1 transforaminal epidural steroid injection on 06/24/2021 with 50% relief. He is status post right L5/S1 transforaminal LEATHA on 09/22/2022 with no relief. He is status post right L2/3 and L3/4 transforaminal LEATHA on 10/25/2022 with no relief.  He is status post right L3/4, L4/5 and L5/S1 diagnostic medial branch nerve block with 0% relief on 03/21/2023. He is status post right SI joint injection on 06/28/2024 with 80% relief lasting 1 week.    Spine surgeries:    Antineuropathics:  NSAIDs:  Physical therapy:  Antidepressants:  Muscle relaxers:  Opioids:  Hydrocodone 7.5   Antiplatelets/Anticoagulants:    ROS:He reports back pain.  Balance of review of systems is negative.    Medical, surgical, family and social history reviewed elsewhere in record.    Medications/Allergies: See med card    Vitals:    10/08/24 1643   BP: (!) 154/74   Pulse: 90   Weight: (!) 136.2 kg (300 lb 4.3 oz)   Height: 5' 5" (1.651 m)   PainSc:   7   PainLoc: Back       Body mass index is 49.97 kg/m².        Physical exam:  Gen: A and O x3, pleasant, well-groomed  Skin: No rashes or obvious lesions  HEENT: PERRLA, no obvious deformities on ears or in canals.   CVS: Regular rate and rhythm, normal S1 and S2, no murmurs.  Resp: Clear to auscultation bilaterally, no wheezes or rales.  Abdomen: Soft, NT/ND, normal bowel sounds present.  Musculoskeletal:    Neuro:  Lower extremities: 5/5 strength bilaterally  Reflexes:  Biceps 1+, triceps 0+, brachioradialis 0+ Patellar 1+, Achilles 0+ bilaterally.  Sensory:  Intact and symmetrical to light touch and pinprick in C2-T1 L2-S1 dermatomes bilaterally.      Lumbar spine:  Lumbar spine: ROM is " moderately reduced with flexion extension and oblique extension with increased pain on only on extension especially oblique extension to the right side.  Ramakrishna's test positive on the right.    Gaenslen test positive on the right.    SI compression test positive on the right.  Myofascial exam:  Mild tenderness to palpation to the right lower lumbar paraspinous muscles, moderate tenderness to palpation over the right sacroiliac joint.      Imagin14 Xray L-spine  There is diffuse osteopenia. Mild to moderate chronic compression fracture with anterior wedging and evidence of vertebroplasty at L2. minimal left convex curvature in the upper lumbar region. No spondylolisthesis. Mild concavity of the superior endplate of L4 which could be small compression fracture or Schmorl's node. No additional fracture.   There is moderate degenerative change within the lumbar spine with anterior osteophyte formation most prominent at L4-L5 and L2- L3 and L1 - L2, also present in the lower thoracic region with flowing ossification anteriorly suggesting diffuse idiopathic sclerotic hyperostosis. There is also mild decreased intervertebral disk space height and endplate sclerosis the lower thoracic and upper lumbar regions. Due to the degree of osseous mineralization, it is difficult to evaluate for any possible pars defects. Moderate sclerosis suggesting facet arthropathy in the lumbar spine present and there is mild sclerosis, likely degenerative in nature involving the sacroiliac joints.    10/7/14 MRI lumbar spine: At L1/2 there is mild spinal stenosis secondary to facet hypertrophy and disc bulging.  At L2/3 there is minimal spinal stenosis secondary to retropulsion of L2 compression fracture, appearance of prior kyphoplasty present.  There is minimal disc bulging but there is also some facet hypertrophy at this level.  At L3/4 there is facet and ligamentum hypertrophy, minimal disc bulging causing mild spinal stenosis and  neuroforaminal narrowing on the left greater than the right side.  At L4/5 there is moderate hypertrophic facet and ligamentum hypertrophy with mild left foraminal narrowing compared to the right side.  There is no spinal stenosis at this level.  At L5/S1 there is a broad-based disc bulge that extends to the left side more than the right side along with asymmetric left sided facet hypertrophy and ligamentum flavum hypertrophy causing moderate left foraminal stenosis.    4/15/15 outside open MRI cervical spine Premier  C3-4 posterior disc bulge producing mild bilateral foraminal narrowing  C4-5 posterior disc bulge producing mild bilateral foraminal narrowing, possible tiny annular tear in the posterior disc, anterior thecal sac deformity with no evidence of spinal stenosis  C5-6 circumferential disc bulge producing moderate to severe bilateral foraminal narrowing, effacement of the bilateral lateral recesses worse to the right, anterior thecal sac deformity with effacement of the anterior subarachnoid space, mild spinal canal stenosis  C6-7 circumferential disc bulge producing moderate to severe bilateral foraminal narrowing with mild spinal canal stenosis  C7-T1 not completely included but appears to have a circumferential disc bulge with shallow midline disc protrusion with possible mild spinal canal stenosis and bilateral foraminal narrowing    CT L-spine:  No acute fracture or traumatic subluxation.  Alignment is relatively maintained.  Vertebroplasty changes are at L2.  There is mild, chronic appearing loss of height of the L4 vertebral body.  There is partial osseous fusion at L2-3.  Multilevel facet hypertrophy, osteophyte formation and disc space narrowing are present.   L1-2: Facet hypertrophy with mild right neural foraminal and spinal canal stenosis.   L2-3: Posterior disc osteophyte and facet hypertrophy results in mild to moderate right and mild left neural foraminal stenosis with mild to moderate  spinal canal stenosis.   L3-4: Posterior disc osteophyte and facet hypertrophy results in moderate bilateral neural foraminal stenosis with mild to moderate spinal canal stenosis.   L4-5: Broad-based disc osteophyte and facet hypertrophy results in severe left and moderate right neural foraminal stenosis with mild to moderate spinal canal stenosis.   L5-S1: Posterior disc osteophyte eccentric to the left and facet hypertrophy results in moderate to severe bilateral neural foraminal stenosis with mild to moderate spinal canal stenosis.  Paravertebral soft tissues are normal.  Spinal cord stimulator wires into the spinal canal at the T12-L1 level.    12/15/22 CT myelogram L-spine:  T12-L1: Mild disc bulge with likely right central protrusion.  Mild right and minimal left narrowing of the lateral recesses.  No significant spinal canal or foraminal stenosis.   L1-L2: Mild disc bulge.  Mild right and minimal left narrowing of the lateral recesses.  No significant spinal canal stenosis.  Minimal right foraminal stenosis.   L2-L3: Trace retrolisthesis.  Mild disc bulge and posterior osteophytic ridging.  Mild bilateral facet hypertrophy.  Minimal narrowing of the bilateral lateral recesses.  Mild right and minimal left foraminal stenosis.   L3-L4: Trace retrolisthesis.  Mild disc bulge.  Mild left and minimal right narrowing of the lateral recesses.  No significant spinal canal stenosis.  Mild-moderate bilateral foraminal stenosis.   L4-L5: Retrolisthesis.  Uncovering the disc mild disc bulge.  Mild bilateral facet hypertrophy.  Ligamentum flavum thickening.  Mild narrowing of the bilateral lateral recesses.  Mild spinal canal stenosis.  Moderate left and mild-moderate right foraminal stenosis.   L5-S1: Mild disc bulge and posterior osteophytic ridging.  Moderate left and mild right facet hypertrophy.  Moderate left and mild right narrowing of the lateral recesses.  No significant spinal canal stenosis.  Moderate right and  mild-moderate left foraminal stenosis.   Bilateral dorsal paraspinal muscular atrophy in the lower lumbar spine.   Spinal cord stimulator leads are present entering the spinal canal at the T12-L1 interlaminar space and extending cranially above the field of view.   Layering dependent stones within the partially visualized urinary bladder, similar to prior study.  Mild-moderate atherosclerotic calcifications.   Postoperative changes of a previous L2 kyphoplasty with methylmethacrylate present.  There is loss of lumbar vertebral body height at all levels throughout the lumbar spine, progressive within the superior endplate of L4 when compared to the previous study of 12/10/2022.    1/30/23 Xray L-spine:  A spinal stimulator device is partially imaged.  There is a mild compression fracture of L2 status post vertebroplasty.  There is a mild compression fracture of L4 without significant change.  Mild retrolisthesis of L3 on L4 is present.  There is mild loss of disc height at L2-3 and L5-S1.  Moderate lower lumbar facet arthropathy is present there is heavy calcification of the aorta.    06/10/2024 CT cervical spine  Bones: Diffuse bony demineralization.  No fractures or bony destructive changes.  Prominent ventral bridging osteophyte complex at C4-C5 and endplate centered subcortical cystic changes in the setting of severe disc height loss at C5-C6.  Additional prominent ventral bridging osteophyte complexes laterally C6-C7.     Alignment: Within normal limits.     Craniocervical junction: Degenerative changes.  No acute process or may     Disc levels:     Degenerative changes including shallow disc osteophyte complexes most notable asymmetric to the right at C5-C6 with there is no more than mild to moderate narrowing of the right central spinal canal and centrally at C6-C7 producing mild to moderate narrowing of the spinal canal.  No evidence of any high-grade osseous spinal canal narrowing.  Uncovertebral spurring  and facet arthrosis producing severe left/moderate right C3-C4, severe right/moderate left C4-C5, severe bilateral C5-C6, and severe bilateral C6-C7 narrowing.     Paraspinal soft tissues: The visualized paraspinal soft tissues and lung apices are within normal limits.    06/10/2024 CT lumbar spine  Bones: Diffuse bony demineralization.  There is a dorsal thoracic spinal stimulator entering the canal at T12-L1 coursing cranially out of the field of view.  Chronic mild compression fracture deformities status post cement augmentation of L2 and severe biconcave compression fracture of L4, both unchanged.  No acute fractures.  Prominent ventral endplate centered bridging osteophytes throughout.  No bony destructive process.     Alignment: Within normal limits.     Disc levels:     Similar multilevel degenerative changes including moderate disc height loss notable at L2-L3 and L5-S1.  Disc bulging and facet arthrosis most pronounced at L3-L4, L4-L5, and L5-S1 with moderate to severe narrowing of the spinal canal and subarticular recesses.  Severe foraminal narrowing on the left at L5-S1 and moderate to severe on the right at L5-S1 and bilaterally at L3-L4 and L4-L5.  Overall no gross interval change.  Vacuum disc phenomenon at L3-L4.     Soft Tissues: Atherosclerotic changes of the aorta iliac distribution.  Partially imaged presumed left renal cyst measuring 2.9 cm.  Presumed left basilar scarring/atelectasis residual from previous pleural effusion as seen on CT 03/27/2024.  Cholecystectomy changes.  Prostatomegaly.    Assessment:  The patient is a 78-year-old man with a history of hypertension, obesity and low back pain who presents in referral from Dr. Winters for chronic right low back pain.   1. Sacroiliitis        2. Sacroiliac joint pain        3. Spinal stenosis of lumbar region without neurogenic claudication                Plan:  Previously he found good relief with the right SI joint injection with 80% relief  however it was short lasting.  At this time, for his continued pain I would like to schedule him for a right SI joint block with strictly local anesthetic and no steroids.  If successful we will proceed with SI joint fusion.  Prescription for hydrocodone 5/325 mg 21 tablets sent to Dr. Segura today for approval. I have reviewed the [place] Board of Pharmacy website and there are no abberancies.

## 2024-10-08 NOTE — TELEPHONE ENCOUNTER
Please schedule patient for the following procedure:    Can potentially just reschedule as this was previously canceled due to weather and UTI.    Physician - Dr Segura    Type of Procedure/Injection - Sacroiliac Joint test block with local anesthetic only no steroids        Laterality - Right      Anxiolysis- Local      Need to hold medication - No      N/A      Clearance needed - No      Follow up - phone call next day

## 2024-10-10 RX ORDER — HYDROCODONE BITARTRATE AND ACETAMINOPHEN 5; 325 MG/1; MG/1
1 TABLET ORAL EVERY 8 HOURS PRN
Qty: 21 TABLET | Refills: 0 | Status: SHIPPED | OUTPATIENT
Start: 2024-10-10

## 2024-10-23 ENCOUNTER — HOSPITAL ENCOUNTER (OUTPATIENT)
Facility: HOSPITAL | Age: 79
Discharge: HOME | End: 2024-10-23
Attending: ANESTHESIOLOGY | Admitting: ANESTHESIOLOGY
Payer: MEDICARE

## 2024-10-23 ENCOUNTER — PATIENT MESSAGE (OUTPATIENT)
Dept: PHARMACY | Facility: CLINIC | Age: 79
End: 2024-10-23
Payer: MEDICARE

## 2024-10-23 ENCOUNTER — HOSPITAL ENCOUNTER (OUTPATIENT)
Dept: RADIOLOGY | Facility: HOSPITAL | Age: 79
Discharge: HOME | End: 2024-10-23
Attending: ANESTHESIOLOGY | Admitting: ANESTHESIOLOGY
Payer: MEDICARE

## 2024-10-23 ENCOUNTER — TELEPHONE (OUTPATIENT)
Dept: PAIN MEDICINE | Facility: CLINIC | Age: 79
End: 2024-10-23
Payer: MEDICARE

## 2024-10-23 VITALS
WEIGHT: 300 LBS | BODY MASS INDEX: 42 KG/M2 | OXYGEN SATURATION: 98 % | DIASTOLIC BLOOD PRESSURE: 85 MMHG | HEIGHT: 71 IN | TEMPERATURE: 98 F | SYSTOLIC BLOOD PRESSURE: 162 MMHG | HEART RATE: 68 BPM | RESPIRATION RATE: 18 BRPM

## 2024-10-23 DIAGNOSIS — M54.50 LOWER BACK PAIN: ICD-10-CM

## 2024-10-23 DIAGNOSIS — M46.1 SACROILIITIS: ICD-10-CM

## 2024-10-23 LAB — GLUCOSE SERPL-MCNC: 85 MG/DL (ref 70–110)

## 2024-10-23 PROCEDURE — 25000003 PHARM REV CODE 250: Mod: PO | Performed by: ANESTHESIOLOGY

## 2024-10-23 PROCEDURE — 82962 GLUCOSE BLOOD TEST: CPT | Mod: PO | Performed by: ANESTHESIOLOGY

## 2024-10-23 PROCEDURE — 27096 INJECT SACROILIAC JOINT: CPT | Mod: KX,RT,, | Performed by: ANESTHESIOLOGY

## 2024-10-23 PROCEDURE — 27096 INJECT SACROILIAC JOINT: CPT | Mod: PO,RT | Performed by: ANESTHESIOLOGY

## 2024-10-23 RX ORDER — ALPRAZOLAM 0.5 MG/1
1 TABLET, ORALLY DISINTEGRATING ORAL ONCE AS NEEDED
Status: COMPLETED | OUTPATIENT
Start: 2024-10-23 | End: 2024-10-23

## 2024-10-23 RX ADMIN — ALPRAZOLAM 0.5 MG: 0.5 TABLET, ORALLY DISINTEGRATING ORAL at 03:10

## 2024-10-23 NOTE — TELEPHONE ENCOUNTER
Please contact this patient on 10/24, he received SI joint injection as a diagnostic test with only bupivicaine so this is like a medial branch block. If he gets relief for 6 hours, this tells us that his pain is coming from his SI joint and we may do an SI joint fusion

## 2024-10-28 ENCOUNTER — TELEPHONE (OUTPATIENT)
Dept: PAIN MEDICINE | Facility: CLINIC | Age: 79
End: 2024-10-28
Payer: MEDICARE

## 2024-10-29 ENCOUNTER — PATIENT MESSAGE (OUTPATIENT)
Dept: PHARMACY | Facility: CLINIC | Age: 79
End: 2024-10-29
Payer: MEDICARE

## 2024-10-31 ENCOUNTER — OFFICE VISIT (OUTPATIENT)
Dept: PRIMARY CARE CLINIC | Facility: CLINIC | Age: 79
End: 2024-10-31
Payer: MEDICARE

## 2024-10-31 VITALS
HEART RATE: 58 BPM | WEIGHT: 301.25 LBS | HEIGHT: 71 IN | SYSTOLIC BLOOD PRESSURE: 130 MMHG | BODY MASS INDEX: 42.17 KG/M2 | DIASTOLIC BLOOD PRESSURE: 60 MMHG

## 2024-10-31 DIAGNOSIS — I87.2 VENOUS INSUFFICIENCY OF BOTH LOWER EXTREMITIES: ICD-10-CM

## 2024-10-31 DIAGNOSIS — I10 PRIMARY HYPERTENSION: ICD-10-CM

## 2024-10-31 DIAGNOSIS — E66.01 SEVERE OBESITY (BMI >= 40): ICD-10-CM

## 2024-10-31 DIAGNOSIS — I50.32 CHRONIC HEART FAILURE WITH PRESERVED EJECTION FRACTION (HFPEF): Primary | Chronic | ICD-10-CM

## 2024-10-31 DIAGNOSIS — M51.369 DEGENERATION OF INTERVERTEBRAL DISC OF LUMBAR REGION, UNSPECIFIED WHETHER PAIN PRESENT: Chronic | ICD-10-CM

## 2024-10-31 PROCEDURE — 1157F ADVNC CARE PLAN IN RCRD: CPT | Mod: CPTII,S$GLB,, | Performed by: FAMILY MEDICINE

## 2024-10-31 PROCEDURE — 99999 PR PBB SHADOW E&M-EST. PATIENT-LVL IV: CPT | Mod: PBBFAC,,, | Performed by: FAMILY MEDICINE

## 2024-10-31 PROCEDURE — 1125F AMNT PAIN NOTED PAIN PRSNT: CPT | Mod: CPTII,S$GLB,, | Performed by: FAMILY MEDICINE

## 2024-10-31 PROCEDURE — 3075F SYST BP GE 130 - 139MM HG: CPT | Mod: CPTII,S$GLB,, | Performed by: FAMILY MEDICINE

## 2024-10-31 PROCEDURE — 3288F FALL RISK ASSESSMENT DOCD: CPT | Mod: CPTII,S$GLB,, | Performed by: FAMILY MEDICINE

## 2024-10-31 PROCEDURE — 1159F MED LIST DOCD IN RCRD: CPT | Mod: CPTII,S$GLB,, | Performed by: FAMILY MEDICINE

## 2024-10-31 PROCEDURE — 1101F PT FALLS ASSESS-DOCD LE1/YR: CPT | Mod: CPTII,S$GLB,, | Performed by: FAMILY MEDICINE

## 2024-10-31 PROCEDURE — 99214 OFFICE O/P EST MOD 30 MIN: CPT | Mod: S$GLB,,, | Performed by: FAMILY MEDICINE

## 2024-10-31 PROCEDURE — 1160F RVW MEDS BY RX/DR IN RCRD: CPT | Mod: CPTII,S$GLB,, | Performed by: FAMILY MEDICINE

## 2024-10-31 PROCEDURE — 3078F DIAST BP <80 MM HG: CPT | Mod: CPTII,S$GLB,, | Performed by: FAMILY MEDICINE

## 2024-11-03 NOTE — ASSESSMENT & PLAN NOTE
2+ bilateral edema with venous insufficiency changes, no ulcerations.  Patient to continue to work on management with weight loss, exercise, low-sodium

## 2024-11-03 NOTE — PROGRESS NOTES
Primary Care Provider Appointment   [facility], [place]       Patient ID: Chinedu Roque is a 78 y.o. male.    ASSESSMENT/PLAN by Problem List:    Assessment & Plan    Assessed patient's response to recent median branch nerve block performed by Dr. Segura  Evaluated cardiac function and fluid status, noting stable condition without signs of fluid buildup or significant dyspnea  Will temporarily discontinue Jardiance due to cost, with plan to closely monitor for signs of fluid retention or worsening heart failure symptoms  Continued Eliquis for stroke prevention    BACK PAIN:  - Explained the purpose and process of median branch nerve blocks and potential ablation procedure for back pain management.  - Discussed insurance requirements for multiple nerve blocks before approving ablation depending on plan.  Patient reported significant improvement for several hours after his initial procedure.  Patient has reached out to pain management for next step    HEART FAILURE:  - Provided information on the importance of SGLT2 inhibitors (like Jardiance) in proactive heart failure management.  - Chinedu to monitor weight daily.  - Chinedu to maintain low-salt diet.  - Chinedu to continue efforts to reduce weight.  - Chinedu to stay vigilant for signs of fluid retention or increased dyspnea.  - Discontinued Jardiance due to cost constraints.    ANTICOAGULATION THERAPY:  - Explained the risks associated with discontinuing anticoagulation therapy (Eliquis).  - Discussed the differences between various anticoagulants (Eliquis, Pradaxa, Coumadin) and their management requirements.  - Continued Eliquis at current dose for anticoagulation.    FOLLOW UP:  - Follow up in middle of December, likely with Mariel.  - Contact the office sooner if experiencing increased dyspnea, sudden weight gain (5-8 lbs), or other concerning symptoms.  - Contact the office if prescription assistance program for Jardiance  "is approved.         ORDERS, CODING, ADDITIONAL DOCUMENTATION RELATED TO THIS ENCOUNTER:  1. Chronic heart failure with preserved ejection fraction (HFpEF)    2. Primary hypertension  Assessment & Plan:  Stable and satisfactory.      3. Severe obesity (BMI >= 40)    4. Venous insufficiency of both lower extremities  Assessment & Plan:  2+ bilateral edema with venous insufficiency changes, no ulcerations.  Patient to continue to work on management with weight loss, exercise, low-sodium      5. Degeneration of intervertebral disc of lumbar region, unspecified whether pain present           Follow Up:  4-6 weeks        Subjective:       Fatigue  Associated symptoms include fatigue. Pertinent negatives include no chest pain.       Patient is a/an 78 y.o.  male       For complete problem list, past medical history, surgical history, social history, etc., see appropriate section in the electronic medical record    Patient is here with a son who participated in the conversation    History of Present Illness    CHIEF COMPLAINT:  Chinedu presents today for follow-up of multiple chronic conditions including congestive heart failure, hypertension, coronary disease, and chronic back problems.    BACK PAIN:  He reports ongoing back pain. He recently received a median branch nerve block injection on the right side, which provided pain relief for approximately 6 hours after the procedure. He is currently awaiting follow-up from Dr. Segura regarding the next steps in his treatment plan, which may include an ablation procedure. He experiences significant morning pain, describing it as feeling like a "knife stuck in" his right lower back. The pain improves with movement and walking around. The primary source of pain is located in the lower back, extending around to the right side. He also notes some pain higher up in the back.    CARDIOVASCULAR:  He denies recent problems with his heart or breathing. He reports no significant " shortness of breath or fluid buildup.    GENITOURINARY:  He reports good urinary function with strong urine flow. He denies any urinary problems.    MEDICATIONS:  He expresses concerns about the cost of Jardiance and inquires about less expensive alternatives. He has not refilled his Jardiance prescription due to financial considerations. He reports having filled a three-month supply of Eliquis, acknowledging the necessity of continuing this medication despite its cost. He understands the importance of Eliquis for preventing blood clots and stroke, prioritizing it over Jardiance in terms of adherence.    DIET AND WEIGHT MANAGEMENT:  He reports daily weight monitoring as instructed. He acknowledges sensitivity to salt intake and its potential impact on his health. He demonstrates understanding of the importance of maintaining a healthy diet and reducing weight, but expresses difficulty in adhering to dietary changes, particularly given his previous eating habits.      ROS:  Respiratory: -shortness of breath  Genitourinary: -difficulty urinating  Musculoskeletal: -muscle pain, +back pain       Review of Systems   Constitutional:  Positive for fatigue.   Cardiovascular:  Positive for leg swelling. Negative for chest pain.   Hematological: Negative.    Psychiatric/Behavioral: Negative.         Objective     Physical Exam  Constitutional:       General: He is not in acute distress.     Appearance: He is obese. He is not toxic-appearing.   HENT:      Head: Normocephalic and atraumatic.   Eyes:      Conjunctiva/sclera: Conjunctivae normal.   Cardiovascular:      Rate and Rhythm: Regular rhythm. Bradycardia present.      Heart sounds: Normal heart sounds.      Comments: 2+ bilateral ankle edema  Pulmonary:      Effort: No respiratory distress.      Breath sounds: No wheezing or rales.      Comments: Mildly diminished breath sounds, overall improving  Skin:     Comments: Lower extremity wounds improving, treated by nurse  "today   Neurological:      Mental Status: He is alert.      Comments: Ambulatory       Vitals:    10/31/24 1536   BP: 130/60   Pulse: (!) 58   Weight: (!) 136.7 kg (301 lb 4.1 oz)   Height: 5' 11" (1.803 m)     Physical Exam                This note was generated with the assistance of ambient listening technology. Verbal consent was obtained by the patient and accompanying visitor(s) for the recording of patient appointment to facilitate this note. I attest to having reviewed and edited the generated note for accuracy, though some syntax or spelling errors may persist. Please contact the author of this note for any clarification.  Parts of the note were also generated with voice recognition software.  Occasionally this software will misinterpreted words or phrases    "

## 2024-11-05 ENCOUNTER — TELEPHONE (OUTPATIENT)
Dept: PAIN MEDICINE | Facility: CLINIC | Age: 79
End: 2024-11-05
Payer: MEDICARE

## 2024-11-05 DIAGNOSIS — M53.3 SACROILIAC JOINT PAIN: ICD-10-CM

## 2024-11-05 DIAGNOSIS — G54.1 LUMBOSACRAL PLEXUS DISORDERS: Primary | ICD-10-CM

## 2024-11-05 NOTE — TELEPHONE ENCOUNTER
He had a positive response to the SI joint injection and so we are going to try to get him set up with an SI joint fusion.  We will need to get a CT scan of his pelvis, I have placed the order.  Once he has had this we can proceed.

## 2024-11-06 NOTE — TELEPHONE ENCOUNTER
Attempted to call resident no voice mail set up to leave a message to inform pt of Dr. Segura's recommendation and the scheduled CT scan

## 2024-11-12 ENCOUNTER — TELEPHONE (OUTPATIENT)
Dept: PRIMARY CARE CLINIC | Facility: CLINIC | Age: 79
End: 2024-11-12
Payer: MEDICARE

## 2024-11-12 ENCOUNTER — HOSPITAL ENCOUNTER (OUTPATIENT)
Dept: RADIOLOGY | Facility: HOSPITAL | Age: 79
Discharge: HOME | End: 2024-11-12
Attending: ANESTHESIOLOGY
Payer: MEDICARE

## 2024-11-12 DIAGNOSIS — G54.1 LUMBOSACRAL PLEXUS DISORDERS: ICD-10-CM

## 2024-11-12 DIAGNOSIS — M53.3 SACROILIAC JOINT PAIN: ICD-10-CM

## 2024-11-12 PROCEDURE — 72192 CT PELVIS W/O DYE: CPT | Mod: TC,PO

## 2024-11-12 PROCEDURE — 72192 CT PELVIS W/O DYE: CPT | Mod: 26,,, | Performed by: RADIOLOGY

## 2024-11-12 NOTE — TELEPHONE ENCOUNTER
Patient called with complaints of back pain and wanted to be seen by Dr Browne today. I let patient know MD is out of the office, but inquired if he reached out to his pain management MD and he said he hadn't yet. Patient was advised to call PM for advisement and he voiced understanding.

## 2024-11-13 ENCOUNTER — TELEPHONE (OUTPATIENT)
Dept: UROLOGY | Facility: CLINIC | Age: 79
End: 2024-11-13
Payer: MEDICARE

## 2024-11-13 NOTE — TELEPHONE ENCOUNTER
----- Message from PAMELA Granda sent at 11/13/2024 12:21 PM CST -----  Regarding: [facility] ER visit f/u 11/12/2024 Dx: Bladder stone  Please call patient to schedule an appointment for further eval/treatment of bladder stone.     Thanks,    Kalee Higgins RN, BSN  ED Navigator/Case Management  [phone]

## 2024-11-14 ENCOUNTER — TELEPHONE (OUTPATIENT)
Dept: PAIN MEDICINE | Facility: CLINIC | Age: 79
End: 2024-11-14
Payer: MEDICARE

## 2024-11-14 DIAGNOSIS — M46.1 SACROILIITIS: Primary | ICD-10-CM

## 2024-11-14 DIAGNOSIS — Z79.02 ANTIPLATELET OR ANTITHROMBOTIC LONG-TERM USE: ICD-10-CM

## 2024-11-14 RX ORDER — CEFAZOLIN SODIUM 2 G/50ML
2 SOLUTION INTRAVENOUS
OUTPATIENT
Start: 2024-11-14

## 2024-11-14 RX ORDER — HYDROCODONE BITARTRATE AND ACETAMINOPHEN 5; 325 MG/1; MG/1
1 TABLET ORAL EVERY 8 HOURS PRN
Qty: 21 TABLET | Refills: 0 | Status: SHIPPED | OUTPATIENT
Start: 2024-11-14

## 2024-11-14 RX ORDER — SODIUM CHLORIDE, SODIUM LACTATE, POTASSIUM CHLORIDE, CALCIUM CHLORIDE 600; 310; 30; 20 MG/100ML; MG/100ML; MG/100ML; MG/100ML
INJECTION, SOLUTION INTRAVENOUS CONTINUOUS
OUTPATIENT
Start: 2024-11-14

## 2024-11-14 NOTE — TELEPHONE ENCOUNTER
Spoke with patient and scheduled SI joint fusion for 12/6. E-consult put in and 7 day f/u made with [facility].    Patient is in 10/10 pain and is requesting refill of norco. Last script 10/10/24 and LOV 10/8/24. He went to ER on 11/12 and patient states they gave him morphine and something stronger

## 2024-11-14 NOTE — TELEPHONE ENCOUNTER
----- Message from PAMELA Granda sent at 11/13/2024 11:04 AM CST -----  Regarding: [facility] ER visit f/u 11/12/2024 Dx: thoracic radiculopathy  Please call patient to schedule an appointment for further eval/treatment of Thoracic radiculopathy.     Thanks,    Kalee Higgins RN, BSN  ED Navigator/Case Management  [phone]   Labor Progress Note        SUBJECTIVE      Reports getting more uncomfortable, requesting epidural.  Asked by RN to consider IUPC due to difficulty tracing contractions.     OBJECTIVE      Vitals:    10/09/20 1446   BP: 105/60   Pulse: 62   Resp:    Temp:         SVE:    Dilation:  4.5 (10/09/20 1738)  Effacement:  90 (10/09/20 1738)  Station:  -1 (10/09/20 1738)  Consistency:  Soft (10/09/20 1217)  Presentation:  Vertex (10/09/20 1738)     Fetal Surveillance:  Fetal Heart Rate  Mode: External US (10/09/20 1630)  Baseline: 135 bpm (10/09/20 1630)  Classification:    Variability: Moderate (10/09/20 1630)  Pattern: Accelerations (10/09/20 1615)     Uterine Activity  Mode: [place];Palpation (10/09/20 1630)  Frequency: unable to trace (10/09/20 1630)  Duration: 40-70 (10/09/20 1430)  Quality: Moderate (10/09/20 1630)  Resting Tone: Soft (10/09/20 1630)     Membrane Status: Ruptured (10/09/20 1617)  Sac Identifier: Sac 1 (10/09/20 1617)  Rupture Type: Spontaneous (10/09/20 1617)  Rupture Date: 10/09/20 (10/09/20 1617)  Rupture Time: 1617 (10/09/20 1617)  Fluid Color: Clear (10/09/20 1617)  Fluid Odor: Normal (10/09/20 1617)  Fluid Amount: Moderate (10/09/20 1617)     ASSESSMENT      39w1d IUP, 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  FHR Interpretation: Category I (10/09/20 1630)     PLAN      IUPC placed without difficulty.  Continue pitocin per protocol.     Jessica Taylor MD   [place] (In Mg/Kg): 150

## 2024-11-15 ENCOUNTER — TELEPHONE (OUTPATIENT)
Dept: UROLOGY | Facility: CLINIC | Age: 79
End: 2024-11-15
Payer: MEDICARE

## 2024-11-15 NOTE — TELEPHONE ENCOUNTER
----- Message from PAMELA Granda sent at 11/15/2024  8:37 AM CST -----  Regarding: [facility] ER visit f/u 11/12/2024 Dx: Bladder stone  Please call patient to schedule an appointment for further evaluation and treatment of bladder stone. (2nd request)    Thanks,    Kalee Higgins RN, BSN  ED Navigator/Case Management  [phone]   09-Jun-2021 06:25

## 2024-11-19 ENCOUNTER — OFFICE VISIT (OUTPATIENT)
Dept: PRIMARY CARE CLINIC | Facility: CLINIC | Age: 79
End: 2024-11-19
Payer: MEDICARE

## 2024-11-19 VITALS
TEMPERATURE: 98 F | HEIGHT: 71 IN | OXYGEN SATURATION: 97 % | BODY MASS INDEX: 42.61 KG/M2 | WEIGHT: 304.38 LBS | HEART RATE: 65 BPM | DIASTOLIC BLOOD PRESSURE: 70 MMHG | SYSTOLIC BLOOD PRESSURE: 148 MMHG

## 2024-11-19 DIAGNOSIS — R06.02 SOB (SHORTNESS OF BREATH): ICD-10-CM

## 2024-11-19 DIAGNOSIS — I50.32 CHRONIC HEART FAILURE WITH PRESERVED EJECTION FRACTION (HFPEF): Chronic | ICD-10-CM

## 2024-11-19 DIAGNOSIS — N21.0 BLADDER STONES: ICD-10-CM

## 2024-11-19 DIAGNOSIS — M54.50 CHRONIC BILATERAL LOW BACK PAIN WITHOUT SCIATICA: ICD-10-CM

## 2024-11-19 DIAGNOSIS — G89.29 CHRONIC BILATERAL LOW BACK PAIN WITHOUT SCIATICA: ICD-10-CM

## 2024-11-19 DIAGNOSIS — R35.1 BENIGN PROSTATIC HYPERPLASIA WITH NOCTURIA: ICD-10-CM

## 2024-11-19 DIAGNOSIS — I10 PRIMARY HYPERTENSION: ICD-10-CM

## 2024-11-19 DIAGNOSIS — N40.1 BENIGN PROSTATIC HYPERPLASIA WITH NOCTURIA: ICD-10-CM

## 2024-11-19 DIAGNOSIS — Z09 HOSPITAL DISCHARGE FOLLOW-UP: Primary | ICD-10-CM

## 2024-11-19 PROCEDURE — 3288F FALL RISK ASSESSMENT DOCD: CPT | Mod: CPTII,S$GLB,, | Performed by: PHYSICIAN ASSISTANT

## 2024-11-19 PROCEDURE — 1159F MED LIST DOCD IN RCRD: CPT | Mod: CPTII,S$GLB,, | Performed by: PHYSICIAN ASSISTANT

## 2024-11-19 PROCEDURE — 99215 OFFICE O/P EST HI 40 MIN: CPT | Mod: S$GLB,,, | Performed by: PHYSICIAN ASSISTANT

## 2024-11-19 PROCEDURE — 1101F PT FALLS ASSESS-DOCD LE1/YR: CPT | Mod: CPTII,S$GLB,, | Performed by: PHYSICIAN ASSISTANT

## 2024-11-19 PROCEDURE — 99999 PR PBB SHADOW E&M-EST. PATIENT-LVL V: CPT | Mod: PBBFAC,,, | Performed by: PHYSICIAN ASSISTANT

## 2024-11-19 PROCEDURE — 3077F SYST BP >= 140 MM HG: CPT | Mod: CPTII,S$GLB,, | Performed by: PHYSICIAN ASSISTANT

## 2024-11-19 PROCEDURE — 3078F DIAST BP <80 MM HG: CPT | Mod: CPTII,S$GLB,, | Performed by: PHYSICIAN ASSISTANT

## 2024-11-19 PROCEDURE — 1125F AMNT PAIN NOTED PAIN PRSNT: CPT | Mod: CPTII,S$GLB,, | Performed by: PHYSICIAN ASSISTANT

## 2024-11-19 PROCEDURE — 1157F ADVNC CARE PLAN IN RCRD: CPT | Mod: CPTII,S$GLB,, | Performed by: PHYSICIAN ASSISTANT

## 2024-11-19 RX ORDER — TIZANIDINE 2 MG/1
TABLET ORAL
Qty: 20 TABLET | Refills: 0 | Status: SHIPPED | OUTPATIENT
Start: 2024-11-19

## 2024-11-19 RX ORDER — VALSARTAN 320 MG/1
320 TABLET ORAL DAILY
Qty: 90 TABLET | Refills: 3 | Status: SHIPPED | OUTPATIENT
Start: 2024-11-19 | End: 2025-11-19

## 2024-11-19 RX ORDER — TIZANIDINE 2 MG/1
TABLET ORAL
Qty: 20 TABLET | Refills: 0 | Status: SHIPPED | OUTPATIENT
Start: 2024-11-19 | End: 2024-11-19

## 2024-11-19 NOTE — PROGRESS NOTES
"Subjective:      Patient ID: Chinedu Roque is a 78 y.o. male.    Vitals:  height is 5' 11" (1.803 m) and weight is 138 kg (304 lb 5.5 oz) (abnormal). His oral temperature is 98.2 °F (36.8 °C). His blood pressure is 148/70 (abnormal) and his pulse is 65. His oxygen saturation is 97%.     Chief Complaint: Follow-up (Patient is here for a hospital follow up. ) and Shortness of Breath (Patient c/o SOB x 1 week. )    78 year old male presents for hospital follow up.         Constitution: Negative for chills and fever.   HENT:  Negative for postnasal drip and sore throat.    Cardiovascular:  Positive for leg swelling. Negative for chest pain and palpitations.   Respiratory:  Positive for shortness of breath. Negative for cough.    Gastrointestinal:  Negative for nausea and vomiting.   Genitourinary:  Negative for dysuria and pelvic pain.   Musculoskeletal:  Positive for back pain.      Objective:     Physical Exam   Constitutional:  Non-toxic appearance.   HENT:   Head: Normocephalic and atraumatic.   Ears:   Right Ear: External ear normal.   Left Ear: External ear normal.   Eyes: Conjunctivae are normal. Right eye exhibits no discharge. Left eye exhibits no discharge. Extraocular movement intact   Cardiovascular: Normal rate and regular rhythm.   Pulmonary/Chest: Effort normal. He has no wheezes. He has no rhonchi. He has no rales.   Abdominal: Normal appearance.   Musculoskeletal:      Right lower le+ Edema present.      Left lower le+ Edema present.   Neurological: He is alert.   Skin: Skin is warm, dry, not diaphoretic and not pale. jaundice  Psychiatric: His behavior is normal. Mood, judgment and thought content normal.   Nursing note and vitals reviewed.      Assessment:     1. Hospital discharge follow-up    2. Bladder stones    3. Benign prostatic hyperplasia with nocturia    4. Chronic bilateral low back pain without sciatica    5. SOB (shortness of breath)    6. Chronic heart failure with preserved " ejection fraction (HFpEF)    7. Primary hypertension        Plan:       Hospital discharge follow-up     Patient has no questions but discharge.  He does have follow-up set with Urology.    Bladder stones     Reviewed CT imaging showing bladder stones.  Measurements per radiology were 11 mm and 8 mm.  Discussed that Urology will decide if any treatment is needed.  Advised patient to keep this appointment.    Benign prostatic hyperplasia with nocturia     Patient does still experience sensation of incomplete voiding.  He does have BPH.  He has also had many trips to the ER earlier this year stemming from episode of urinary retention.  He is enquiring if the bladder stones are responsible for this.  Advised that more likely the BPH was responsible for this and the bladder stones may be sequelae of his BPH.    Chronic bilateral low back pain without sciatica     Patient requesting refill of tizanidine.     -     tiZANidine (ZANAFLEX) 2 MG tablet; TAKE 1-2 TABLETS EVERY 8 HOURS AS NEEDED FOR MUSCLE SPASM  Dispense: 20 tablet; Refill: 0    SOB (shortness of breath)  -     X-Ray Chest PA And Lateral; Future; Expected date: 11/19/2024    Chronic heart failure with preserved ejection fraction (HFpEF)     Patient was complaining of shortness of breath that has worsened recently.  He does have history of CHF as well as pleural effusions. I do not appreciate any crackles, rales or wheezing. He does have lower extremity edema, but denies worsening recently. O2 sats at 97%. He is a former smoker. No PFTs on file. Will do chest xray out of an abundance of precaution. Noted 4.3 lb weight gain over baseline, so I discussed extra Lasix as previously directed while waiting on xray.     Primary hypertension     Elevated today. Has been elevated recently. Will change Losartan to Valsartan today. I have asked patient to keep a 2 week BP log. I will have him return in 2 weeks to evaluate log and effectiveness of the Valsartan.     Other  orders  -     valsartan (DIOVAN) 320 MG tablet; Take 1 tablet (320 mg total) by mouth once daily.  Dispense: 90 tablet; Refill: 3    My total time spent on this encounter was over 40 minutes which included  the following activities: preparing to see the patient, performing a medically appropriate and/or evaluation, counseling and educating the patient and family/caregiver, ordering medications, tests, or procedures, referring and communicating with other healthcare providers, documenting clinical information in the electronic or other health record, and independently interpreting results. This time is independent and non-overlapping.

## 2024-11-22 ENCOUNTER — TELEPHONE (OUTPATIENT)
Dept: PRIMARY CARE CLINIC | Facility: CLINIC | Age: 79
End: 2024-11-22
Payer: MEDICARE

## 2024-11-22 ENCOUNTER — TELEPHONE (OUTPATIENT)
Dept: CARDIOLOGY | Facility: CLINIC | Age: 79
End: 2024-11-22
Payer: MEDICARE

## 2024-11-22 ENCOUNTER — HOSPITAL ENCOUNTER (OUTPATIENT)
Dept: RADIOLOGY | Facility: HOSPITAL | Age: 79
Discharge: HOME | End: 2024-11-22
Attending: PHYSICIAN ASSISTANT
Payer: MEDICARE

## 2024-11-22 ENCOUNTER — OFFICE VISIT (OUTPATIENT)
Dept: UROLOGY | Facility: CLINIC | Age: 79
End: 2024-11-22
Payer: MEDICARE

## 2024-11-22 VITALS — HEIGHT: 71 IN | BODY MASS INDEX: 42.28 KG/M2 | WEIGHT: 302 LBS

## 2024-11-22 DIAGNOSIS — R06.02 SOB (SHORTNESS OF BREATH): ICD-10-CM

## 2024-11-22 DIAGNOSIS — N13.8 ENLARGED PROSTATE WITH URINARY OBSTRUCTION: ICD-10-CM

## 2024-11-22 DIAGNOSIS — R33.9 URINARY RETENTION: ICD-10-CM

## 2024-11-22 DIAGNOSIS — N21.0 BLADDER STONE: Primary | ICD-10-CM

## 2024-11-22 DIAGNOSIS — N40.1 ENLARGED PROSTATE WITH URINARY OBSTRUCTION: ICD-10-CM

## 2024-11-22 LAB
BILIRUBIN, UA POC OHS: NEGATIVE
BLOOD, UA POC OHS: NEGATIVE
CLARITY, UA POC OHS: CLEAR
COLOR, UA POC OHS: YELLOW
GLUCOSE, UA POC OHS: NEGATIVE
KETONES, UA POC OHS: NEGATIVE
LEUKOCYTES, UA POC OHS: NEGATIVE
NITRITE, UA POC OHS: NEGATIVE
PH, UA POC OHS: 6
PROTEIN, UA POC OHS: NEGATIVE
SPECIFIC GRAVITY, UA POC OHS: 1.01
UROBILINOGEN, UA POC OHS: 0.2

## 2024-11-22 PROCEDURE — 71046 X-RAY EXAM CHEST 2 VIEWS: CPT | Mod: 26,,, | Performed by: RADIOLOGY

## 2024-11-22 PROCEDURE — 71046 X-RAY EXAM CHEST 2 VIEWS: CPT | Mod: TC,FY,PO

## 2024-11-22 PROCEDURE — 99999 PR PBB SHADOW E&M-EST. PATIENT-LVL III: CPT | Mod: PBBFAC,,, | Performed by: UROLOGY

## 2024-11-22 NOTE — PROGRESS NOTES
Subjective:       Patient ID: Chinedu Roque is a 78 y.o. male.    Chief Complaint: No chief complaint on file.    HPI    78-year-old with BPH.  He is on maximal medical therapy with Flomax and finasteride.  He has had recent episodes of retention requiring catheterization.  He was recently hospitalized for low back pain.  A CT scan was obtained which did note herniated disc in his lumbar spine as well as multiple bladder stones.  Today he says he is voiding without any difficulty.  The pain has mostly resolved.  Urine dipstick shows negative for all components.      Current Outpatient Medications:     acetaminophen (TYLENOL) 500 MG tablet, Take 500 mg by mouth every 6 (six) hours as needed for Pain., Disp: , Rfl:     atorvastatin (LIPITOR) 20 MG tablet, Take 1 tablet (20 mg total) by mouth once daily., Disp: 90 tablet, Rfl: 3    b complex vitamins tablet, Take 1 tablet by mouth once daily., Disp: , Rfl:     bacitracin 500 unit/gram Oint, Apply topically 2 (two) times daily. Apply to the tip of the penis, Disp: 30 g, Rfl: 0    ELIQUIS 5 mg Tab, TAKE 1 TABLET BY MOUTH TWICE A DAY, Disp: 180 tablet, Rfl: 1    finasteride (PROSCAR) 5 mg tablet, Take 1 tablet (5 mg total) by mouth once daily., Disp: 90 tablet, Rfl: 3    furosemide (LASIX) 20 MG tablet, Take 1 tablet (20 mg total) by mouth nightly as needed (weight gain). Take nightly PRN weight gain of 2 lbs in 24 hours or 3 lbs in 72 hours, Disp: 30 tablet, Rfl: 0    furosemide (LASIX) 40 MG tablet, TAKE 1 TABLET BY MOUTH EVERY DAY, Disp: 90 tablet, Rfl: 1    gabapentin (NEURONTIN) 300 MG capsule, TAKE ONE IN AM, AND TWO IN PM, Disp: 90 capsule, Rfl: 4    hydrALAZINE (APRESOLINE) 25 MG tablet, Take 1 tablet (25 mg total) by mouth every 12 (twelve) hours., Disp: 180 tablet, Rfl: 1    HYDROcodone-acetaminophen (NORCO) 5-325 mg per tablet, Take 1 tablet by mouth every 8 (eight) hours as needed for Pain., Disp: 21 tablet, Rfl: 0    nystatin (MYCOSTATIN) cream, Apply  topically 2 (two) times daily., Disp: 90 g, Rfl: 1    polyethylene glycol (GLYCOLAX) 17 gram PwPk, Take 17 g by mouth daily as needed., Disp: 30 packet, Rfl: 1    senna-docusate 8.6-50 mg (PERICOLACE) 8.6-50 mg per tablet, Take 1 tablet by mouth 2 (two) times daily., Disp: 30 tablet, Rfl: 1    spironolactone (ALDACTONE) 25 MG tablet, Take 1 tablet (25 mg total) by mouth once daily., Disp: 90 tablet, Rfl: 1    tamsulosin (FLOMAX) 0.4 mg Cap, TAKE 1 CAPSULE BY MOUTH EVERY DAY, Disp: 90 capsule, Rfl: 3    tiZANidine (ZANAFLEX) 2 MG tablet, TAKE 1-2 TABLETS EVERY 8 HOURS AS NEEDED FOR MUSCLE SPASM, Disp: 20 tablet, Rfl: 0    valsartan (DIOVAN) 320 MG tablet, Take 1 tablet (320 mg total) by mouth once daily., Disp: 90 tablet, Rfl: 3    empagliflozin (JARDIANCE) 10 mg tablet, Take 1 tablet (10 mg total) by mouth once daily. (Patient not taking: Reported on 11/22/2024), Disp: 30 tablet, Rfl: 5  No current facility-administered medications for this visit.    Facility-Administered Medications Ordered in Other Visits:     sodium hyaluronate (EUFLEXXA) 10 mg/mL(mw 2.4 -3.6 million) Syrg 20 mg, 20 mg, Intra-articular, , Michelet Covarrubias MD, 20 mg at 05/14/18 1046    sodium hyaluronate (EUFLEXXA) 10 mg/mL(mw 2.4 -3.6 million) Syrg 20 mg, 20 mg, Intra-articular, , Michelet Covarrubias MD, 20 mg at 05/14/18 1046      Review of Systems   Constitutional:  Negative for fever.   Genitourinary:  Negative for dysuria and hematuria.       Objective:      Physical Exam  Vitals reviewed.   Constitutional:       Appearance: He is well-developed.   Pulmonary:      Effort: Pulmonary effort is normal.   Skin:     Findings: No rash.   Neurological:      Mental Status: He is alert and oriented to person, place, and time.         Assessment:       1. Bladder stone    2. Enlarged prostate with urinary obstruction    3. Urinary retention        Plan:       Bladder stone  -     POCT Urinalysis(Instrument)    Enlarged prostate with urinary  obstruction    Urinary retention      BPH with bladder stones.  We discussed the only additional treatment would be surgical.  We discussed TURP.  He wants to consider and will call after the holidays to schedule outpatient cystoscopy if he elects to proceed.  Also he would need to hold Eliquis for a proximally a week.  For now continue Flomax and finasteride.  Okay to double Flomax in the interim.     I personally reviewed the CT scan images and made an independent interpretation of the test completed by another healthcare professional.

## 2024-11-22 NOTE — TELEPHONE ENCOUNTER
----- Message from THIAGO Verdugo sent at 11/22/2024 10:36 AM CST -----  Please call patient and let him know there is a little fluid in his lungs likely related to CHF. Ascertain whether he has increased lasix since our visit and if this has improved symptoms. Also need to know if he is having any fevers.

## 2024-11-22 NOTE — TELEPHONE ENCOUNTER
Spoke with pt, discussed your message in its entirety, pt verbalized understanding.  Pt reports that he took one extra dose of lasix since his last visit on 11/19 and that he is going to take another extra dose today.  Pt reports improvement in symptoms when he takes the extra lasix.  Pt reports mild shortness of breath with exertion and states that it happens when he goes from the house to the mailbox.  Pt denies any fever at this time.

## 2024-11-23 ENCOUNTER — E-CONSULT (OUTPATIENT)
Dept: CARDIOLOGY | Facility: CLINIC | Age: 79
End: 2024-11-23
Payer: MEDICARE

## 2024-11-23 DIAGNOSIS — Z01.810 PREOP CARDIOVASCULAR EXAM: Primary | ICD-10-CM

## 2024-11-23 PROCEDURE — 99451 NTRPROF PH1/NTRNET/EHR 5/>: CPT | Mod: S$GLB,,, | Performed by: INTERNAL MEDICINE

## 2024-11-23 NOTE — CONSULTS
[facility]  Response for E-Consult     Patient Name: Chinedu Roque  MRN: [id number]  Primary Care Provider: Hernando Browne MD   Requesting Provider: Riley Segura MD  E-Consult to General Cardiology  Consult performed by: Johnny Alas MD  Consult ordered by: Riley Segura MD          77 yo male E consult for preop clearance of SI joint fusion.  The chart reviewed.  PMH CAD CHF AFIB    Plan  Elevated periop risk of CV events for non-high risk procedure.  Ok to proceed the scheduled procedure without further cardiac study.  OK to hold eliquis 3 days before the procedure and resume postop once hemodynamically stable      Total time of Consultation: 10 minute    I did not speak to the requesting provider verbally about this.     *This eConsult is based on the clinical data available to me and is furnished without benefit of a physical examination. The eConsult will need to be interpreted in light of any clinical issues or changes in patient status not available to me at the time of filing this eConsults. Significant changes in patient condition or level of acuity should result in immediate formal consultation and reevaluation. Please alert me if you have further questions.    Thank you for this eConsult referral.     Johnny Alas MD  [facility]

## 2024-12-04 ENCOUNTER — OFFICE VISIT (OUTPATIENT)
Dept: PRIMARY CARE CLINIC | Facility: CLINIC | Age: 79
End: 2024-12-04
Payer: MEDICARE

## 2024-12-04 VITALS
OXYGEN SATURATION: 96 % | WEIGHT: 300.31 LBS | BODY MASS INDEX: 42.04 KG/M2 | SYSTOLIC BLOOD PRESSURE: 132 MMHG | HEIGHT: 71 IN | HEART RATE: 61 BPM | DIASTOLIC BLOOD PRESSURE: 68 MMHG

## 2024-12-04 DIAGNOSIS — I50.32 CHRONIC HEART FAILURE WITH PRESERVED EJECTION FRACTION (HFPEF): Chronic | ICD-10-CM

## 2024-12-04 DIAGNOSIS — I10 PRIMARY HYPERTENSION: Primary | ICD-10-CM

## 2024-12-04 PROCEDURE — 99999 PR PBB SHADOW E&M-EST. PATIENT-LVL III: CPT | Mod: PBBFAC,,, | Performed by: PHYSICIAN ASSISTANT

## 2024-12-04 PROCEDURE — 3078F DIAST BP <80 MM HG: CPT | Mod: CPTII,S$GLB,, | Performed by: PHYSICIAN ASSISTANT

## 2024-12-04 PROCEDURE — 99213 OFFICE O/P EST LOW 20 MIN: CPT | Mod: S$GLB,,, | Performed by: PHYSICIAN ASSISTANT

## 2024-12-04 PROCEDURE — 1159F MED LIST DOCD IN RCRD: CPT | Mod: CPTII,S$GLB,, | Performed by: PHYSICIAN ASSISTANT

## 2024-12-04 PROCEDURE — 1123F ACP DISCUSS/DSCN MKR DOCD: CPT | Mod: CPTII,S$GLB,, | Performed by: PHYSICIAN ASSISTANT

## 2024-12-04 PROCEDURE — 3075F SYST BP GE 130 - 139MM HG: CPT | Mod: CPTII,S$GLB,, | Performed by: PHYSICIAN ASSISTANT

## 2024-12-04 NOTE — ASSESSMENT & PLAN NOTE
Symptoms have improved since taking Lasix and he is now down 3 lbs. His leg swelling has also improved. Discussed monitoring of weight and SOB. May indicate when he needs to take the lasix. Patient verbalizes understanding.

## 2024-12-04 NOTE — PROGRESS NOTES
Primary Care Provider Appointment   [facility] [place]       Patient ID: Chinedu Roque is a 78 y.o. male.    ASSESSMENT/PLAN by Problem List:    1. Primary hypertension  Assessment & Plan:  Well controlled today. Patient states he has been sticking to low sodium diet. I will have him bring in his BP machine at next visit for us to compare. Continue Valsartan.      2. Chronic heart failure with preserved ejection fraction (HFpEF)  Assessment & Plan:  Symptoms have improved since taking Lasix and he is now down 3 lbs. His leg swelling has also improved. Discussed monitoring of weight and SOB. May indicate when he needs to take the lasix. Patient verbalizes understanding.           Follow Up:  1 month as scheduled. To bring BP machine.    My total time spent on this encounter was 21 minutes which included  the following activities: preparing to see the patient, performing a medically appropriate and/or evaluation, counseling and educating the patient and family/caregiver, ordering medications, tests, or procedures, referring and communicating with other healthcare providers, documenting clinical information in the electronic or other health record, and independently interpreting results. This time is independent and non-overlapping.      Advance Care Planning     Date: 12/04/2024    ACP Reviewed/No Changes  Voluntary advance care planning discussion had today with patient. Previously completed LW in electronic medical record is current, no changes made.      A total of 2 min was spent on advance care planning, goals of care discussion, emotional support, formulating and communicating prognosis and exploring burden/benefit of various approaches of treatment. This discussion occurred on a fully voluntary basis with the verbal consent of the patient and/or family.             Subjective:     Chief Complaint   Patient presents with    Follow-up     Bp check     I have reviewed the  information entered by the ancillary staff regarding the chief complaint as well as the related history.    78 year old male presents for 2 week follow up after changing BP medications. At last visit we decided to discontinue the Losartan and start Valsartan. Home blood pressure have been up and down. There have been measurements as low as 96/63 and as high as 178/80. Today pressure in clinic is 132/68. Patient has not brought in his machine to be checked that we are aware of. He denies any recent fever, chills, nausea, vomiting, chest pain or palpitations. Tolerating the new medicine well.    He has been Taking Lasix. His SOB has greatly improved. Leg swelling has improved.        Patient is a/an 78 y.o.  male       For complete problem list, past medical history, surgical history, social history, etc., see appropriate section in the electronic medical record    Review of Systems   Constitutional:  Negative for chills and fever.   Cardiovascular:  Negative for chest pain and palpitations.   Gastrointestinal:  Negative for nausea and vomiting.   All other systems reviewed and are negative.      Objective     Physical Exam  Vitals and nursing note reviewed.   Constitutional:       Appearance: Normal appearance. He is not toxic-appearing or diaphoretic.   HENT:      Head: Normocephalic and atraumatic.      Right Ear: External ear normal.      Left Ear: External ear normal.   Eyes:      General:         Right eye: No discharge.         Left eye: No discharge.      Extraocular Movements: Extraocular movements intact.      Conjunctiva/sclera: Conjunctivae normal.   Cardiovascular:      Rate and Rhythm: Normal rate and regular rhythm.   Pulmonary:      Effort: Pulmonary effort is normal. No respiratory distress.      Breath sounds: No wheezing, rhonchi or rales.   Musculoskeletal:      Right lower leg: No edema.      Left lower leg: No edema.   Skin:     General: Skin is warm and dry.      Coloration: Skin is not  "jaundiced or pale.   Neurological:      General: No focal deficit present.      Mental Status: He is alert.   Psychiatric:         Mood and Affect: Mood normal.         Behavior: Behavior normal.         Thought Content: Thought content normal.         Judgment: Judgment normal.       Vitals:    12/04/24 1257   BP: 132/68   BP Location: Left arm   Patient Position: Sitting   Pulse: 61   SpO2: 96%   Weight: (!) 136.2 kg (300 lb 5.3 oz)   Height: 5' 11" (1.803 m)           THIS DOCUMENT WAS MADE IN PART WITH VOICE RECOGNITION SOFTWARE.  OCCASIONALLY THIS SOFTWARE WILL MISINTERPRET WORDS OR PHRASES.  "

## 2024-12-04 NOTE — ASSESSMENT & PLAN NOTE
Well controlled today. Patient states he has been sticking to low sodium diet. I will have him bring in his BP machine at next visit for us to compare. Continue Valsartan.

## 2024-12-05 ENCOUNTER — ANESTHESIA EVENT (OUTPATIENT)
Dept: SURGERY | Facility: HOSPITAL | Age: 79
End: 2024-12-05
Payer: MEDICARE

## 2024-12-05 RX ORDER — OXYCODONE HYDROCHLORIDE 5 MG/1
5 TABLET ORAL
OUTPATIENT
Start: 2024-12-05

## 2024-12-05 RX ORDER — SODIUM CHLORIDE 0.9 % (FLUSH) 0.9 %
3 SYRINGE (ML) INJECTION
OUTPATIENT
Start: 2024-12-05

## 2024-12-06 ENCOUNTER — HOSPITAL ENCOUNTER (OUTPATIENT)
Facility: HOSPITAL | Age: 79
Discharge: HOME | End: 2024-12-06
Attending: ANESTHESIOLOGY | Admitting: ANESTHESIOLOGY
Payer: MEDICARE

## 2024-12-06 ENCOUNTER — ANESTHESIA (OUTPATIENT)
Dept: SURGERY | Facility: HOSPITAL | Age: 79
End: 2024-12-06
Payer: MEDICARE

## 2024-12-06 ENCOUNTER — HOSPITAL ENCOUNTER (OUTPATIENT)
Dept: RADIOLOGY | Facility: HOSPITAL | Age: 79
Discharge: HOME | End: 2024-12-06
Attending: ANESTHESIOLOGY
Payer: MEDICARE

## 2024-12-06 VITALS
BODY MASS INDEX: 42.28 KG/M2 | WEIGHT: 302 LBS | HEART RATE: 77 BPM | SYSTOLIC BLOOD PRESSURE: 129 MMHG | TEMPERATURE: 97 F | RESPIRATION RATE: 13 BRPM | DIASTOLIC BLOOD PRESSURE: 62 MMHG | HEIGHT: 71 IN | OXYGEN SATURATION: 96 %

## 2024-12-06 DIAGNOSIS — M46.1 SACROILIITIS: ICD-10-CM

## 2024-12-06 DIAGNOSIS — M54.50 LOWER BACK PAIN: ICD-10-CM

## 2024-12-06 PROCEDURE — C1713 ANCHOR/SCREW BN/BN,TIS/BN: HCPCS | Mod: PO | Performed by: ANESTHESIOLOGY

## 2024-12-06 PROCEDURE — 27800903 OPTIME MED/SURG SUP & DEVICES OTHER IMPLANTS: Mod: PO | Performed by: ANESTHESIOLOGY

## 2024-12-06 PROCEDURE — 71000033 HC RECOVERY, INTIAL HOUR: Mod: PO | Performed by: ANESTHESIOLOGY

## 2024-12-06 PROCEDURE — 27279 ARTHRD SI JT PLMT TARTCLR DV: CPT | Mod: RT,,, | Performed by: ANESTHESIOLOGY

## 2024-12-06 PROCEDURE — 36000711: Mod: PO | Performed by: ANESTHESIOLOGY

## 2024-12-06 PROCEDURE — 63600175 PHARM REV CODE 636 W HCPCS: Mod: PO | Performed by: ANESTHESIOLOGY

## 2024-12-06 PROCEDURE — 36000710: Mod: PO | Performed by: ANESTHESIOLOGY

## 2024-12-06 PROCEDURE — 63600175 PHARM REV CODE 636 W HCPCS: Mod: PO | Performed by: NURSE ANESTHETIST, CERTIFIED REGISTERED

## 2024-12-06 PROCEDURE — 37000008 HC ANESTHESIA 1ST 15 MINUTES: Mod: PO | Performed by: ANESTHESIOLOGY

## 2024-12-06 PROCEDURE — 37000009 HC ANESTHESIA EA ADD 15 MINS: Mod: PO | Performed by: ANESTHESIOLOGY

## 2024-12-06 DEVICE — IMPLANTABLE DEVICE: Type: IMPLANTABLE DEVICE | Site: BACK | Status: FUNCTIONAL

## 2024-12-06 RX ORDER — DEXAMETHASONE SODIUM PHOSPHATE 4 MG/ML
INJECTION, SOLUTION INTRA-ARTICULAR; INTRALESIONAL; INTRAMUSCULAR; INTRAVENOUS; SOFT TISSUE
Status: DISCONTINUED | OUTPATIENT
Start: 2024-12-06 | End: 2024-12-06

## 2024-12-06 RX ORDER — MIDAZOLAM HYDROCHLORIDE 1 MG/ML
INJECTION INTRAMUSCULAR; INTRAVENOUS
Status: DISCONTINUED | OUTPATIENT
Start: 2024-12-06 | End: 2024-12-06

## 2024-12-06 RX ORDER — CEFAZOLIN SODIUM 1 G/3ML
2 INJECTION, POWDER, FOR SOLUTION INTRAMUSCULAR; INTRAVENOUS
Status: COMPLETED | OUTPATIENT
Start: 2024-12-06 | End: 2024-12-06

## 2024-12-06 RX ORDER — ACETAMINOPHEN 10 MG/ML
INJECTION, SOLUTION INTRAVENOUS
Status: DISCONTINUED | OUTPATIENT
Start: 2024-12-06 | End: 2024-12-06

## 2024-12-06 RX ORDER — SODIUM CHLORIDE, SODIUM LACTATE, POTASSIUM CHLORIDE, CALCIUM CHLORIDE 600; 310; 30; 20 MG/100ML; MG/100ML; MG/100ML; MG/100ML
INJECTION, SOLUTION INTRAVENOUS CONTINUOUS
Status: DISCONTINUED | OUTPATIENT
Start: 2024-12-06 | End: 2024-12-06 | Stop reason: HOSPADM

## 2024-12-06 RX ORDER — LIDOCAINE HYDROCHLORIDE 10 MG/ML
1 INJECTION, SOLUTION EPIDURAL; INFILTRATION; INTRACAUDAL; PERINEURAL ONCE
Status: DISCONTINUED | OUTPATIENT
Start: 2024-12-06 | End: 2024-12-06 | Stop reason: HOSPADM

## 2024-12-06 RX ORDER — LIDOCAINE HYDROCHLORIDE AND EPINEPHRINE 10; 10 UG/ML; MG/ML
INJECTION, SOLUTION INFILTRATION; PERINEURAL
Status: DISCONTINUED | OUTPATIENT
Start: 2024-12-06 | End: 2024-12-06 | Stop reason: HOSPADM

## 2024-12-06 RX ORDER — FENTANYL CITRATE 50 UG/ML
INJECTION, SOLUTION INTRAMUSCULAR; INTRAVENOUS
Status: DISCONTINUED | OUTPATIENT
Start: 2024-12-06 | End: 2024-12-06

## 2024-12-06 RX ORDER — HYDROCODONE BITARTRATE AND ACETAMINOPHEN 10; 325 MG/1; MG/1
1 TABLET ORAL EVERY 6 HOURS PRN
Qty: 20 TABLET | Refills: 0 | Status: SHIPPED | OUTPATIENT
Start: 2024-12-06 | End: 2025-01-05

## 2024-12-06 RX ORDER — PROPOFOL 10 MG/ML
VIAL (ML) INTRAVENOUS
Status: DISCONTINUED | OUTPATIENT
Start: 2024-12-06 | End: 2024-12-06

## 2024-12-06 RX ORDER — PROPOFOL 10 MG/ML
VIAL (ML) INTRAVENOUS CONTINUOUS PRN
Status: DISCONTINUED | OUTPATIENT
Start: 2024-12-06 | End: 2024-12-06

## 2024-12-06 RX ORDER — LIDOCAINE HYDROCHLORIDE 20 MG/ML
INJECTION INTRAVENOUS
Status: DISCONTINUED | OUTPATIENT
Start: 2024-12-06 | End: 2024-12-06

## 2024-12-06 RX ADMIN — FENTANYL CITRATE 25 MCG: 50 INJECTION, SOLUTION INTRAMUSCULAR; INTRAVENOUS at 09:12

## 2024-12-06 RX ADMIN — PROPOFOL 25 MG: 10 INJECTION, EMULSION INTRAVENOUS at 09:12

## 2024-12-06 RX ADMIN — DEXAMETHASONE SODIUM PHOSPHATE 4 MG: 4 INJECTION, SOLUTION INTRAMUSCULAR; INTRAVENOUS at 09:12

## 2024-12-06 RX ADMIN — LIDOCAINE HYDROCHLORIDE 100 MG: 20 INJECTION INTRAVENOUS at 09:12

## 2024-12-06 RX ADMIN — PROPOFOL 100 MCG/KG/MIN: 10 INJECTION, EMULSION INTRAVENOUS at 09:12

## 2024-12-06 RX ADMIN — MIDAZOLAM HYDROCHLORIDE 1 MG: 2 INJECTION, SOLUTION INTRAMUSCULAR; INTRAVENOUS at 10:12

## 2024-12-06 RX ADMIN — CEFAZOLIN 3 G: 330 INJECTION, POWDER, FOR SOLUTION INTRAMUSCULAR; INTRAVENOUS at 09:12

## 2024-12-06 RX ADMIN — DEXAMETHASONE SODIUM PHOSPHATE 8 MG: 4 INJECTION, SOLUTION INTRAMUSCULAR; INTRAVENOUS at 09:12

## 2024-12-06 RX ADMIN — MIDAZOLAM HYDROCHLORIDE 1 MG: 2 INJECTION, SOLUTION INTRAMUSCULAR; INTRAVENOUS at 09:12

## 2024-12-06 RX ADMIN — FENTANYL CITRATE 25 MCG: 50 INJECTION, SOLUTION INTRAMUSCULAR; INTRAVENOUS at 10:12

## 2024-12-06 RX ADMIN — ACETAMINOPHEN 1000 MG: 10 INJECTION INTRAVENOUS at 09:12

## 2024-12-06 NOTE — OP NOTE
Date of Procedure: 12/06/2024    Procedure: Right SI Joint Fusion    Pre-op diagnosis: Sacroiliitis [M46.1]    Post-op diagnosis: Sacroiliitis [M46.1]     Physician: Riley Segura MD  Assistant: None    Anesthesia: MAC per anesthesia    EBL: <30ml    Specimens: None    All medications, allergies, and relevant histories were reviewed. No recent antibiotics or infections.  A time-out was taken to verify the correct patient, procedure, laterality, and appropriate medications/allergies.    PREOPERATIVE ANTIBIOTICS: 2 g ancef  IV given 30 minutes prior to incision, see anesthesia record for time.    OPERATIVE PROCEDURE: The patient was brought to the operating room suite and MAC anesthesia  was performed. The patient was positioned prone on the operation table. The surgical site was prepped and draped with chlorhexidine. The image intensifier (C-arm) was brought into position and the right SIJ was brought in view . The superior pole of the sacroiliac joint was identified by contralateral cephalo-oblique fluoroscopy. A 22 gauge, 3 1/2 inch spinal needle was slowly advanced through the sacroiliac joint capsule under fluoroscopic guidance. The needle position was confirmed using oblique, AP and lateral fluoroscopic imaging. a 1.5cm posterior midline incision over the S2 foramen lateral to SIJ. Performed soft tissue dissection down and through blunt finger dissection was able to identify the posterior superior Iliac spine. After passing a guidewire the same through the SIJ. I prepared the sacroiliac joint for a structural allograft implant by dilating, broaching and rasping the sacroiliac joint. Once joint was prepared, I inserted the structural allograft implant into the joint and packed orthobiologics behind the implant to increase opportunity for bone fusion.Once again this was confirmed using oblique, AP and lateral fluoroscopic imaging.  I concluded surgery by performing standard suture closing technique using 2.0  vicryl  followed by 4.0 monocryl.     A sterile dressing was applied. No specimens collected.    Patient was taken to recovery in a stable condition with no change in neurological exam      COMPLICATIONS: There were no complications.     Future Management:   Follow up with my clinic in 1 week or sooner if needed

## 2024-12-06 NOTE — ANESTHESIA POSTPROCEDURE EVALUATION
Anesthesia Post Evaluation    Patient: Chinedu Roque    Procedure(s) Performed: Procedure(s) (LRB):  FUSION, SACROILIAC JOINT, PERCUTANEOUS, W/O TRANSFIXATION DEVICE (Right)    Final Anesthesia Type: MAC      Patient location during evaluation: PACU  Patient participation: Yes- Able to Participate  Level of consciousness: awake and alert  Post-procedure vital signs: reviewed and stable  Pain management: adequate  Airway patency: patent    PONV status at discharge: No PONV  Anesthetic complications: no      Cardiovascular status: blood pressure returned to baseline  Respiratory status: unassisted and room air  Hydration status: euvolemic  Follow-up not needed.              Vitals Value Taken Time   /58 12/06/24 1037   Temp 36.2 °C (97.2 °F) 12/06/24 0833   Pulse 89 12/06/24 1037   Resp 17 12/06/24 1037   SpO2 94 % 12/06/24 1037         No case tracking events are documented in the log.      Pain/Cooper Score: Cooper Score: 9 (12/6/2024 10:38 AM)

## 2024-12-06 NOTE — ANESTHESIA PREPROCEDURE EVALUATION
12/06/2024  Chinedu Roque is a 78 y.o., male.      Pre-op Assessment          Review of Systems  Cardiovascular:     Hypertension   CAD                       Shortness of Breath    Coronary Artery Disease:                            Hypertension     Atrial Fibrillation     Pulmonary:      Shortness of breath  Sleep Apnea     Obstructive Sleep Apnea (ADRIANNE).           Musculoskeletal:  Arthritis        Arthritis          Neurological:   CVA Neuromuscular Disease,           Arthritis              CVA - Cerebrovasular Accident               Neuromuscular Disease       Physical Exam  General: Well nourished        Anesthesia Plan  Type of Anesthesia, risks & benefits discussed:    Anesthesia Type: MAC  Intra-op Monitoring Plan: Standard ASA Monitors  Post Op Pain Control Plan: multimodal analgesia and IV/PO Opioids PRN  Induction:  IV  Informed Consent: Informed consent signed with the Patient and all parties understand the risks and agree with anesthesia plan.  All questions answered.   ASA Score: 3  Day of Surgery Review of History & Physical: H&P Update referred to the surgeon/provider.    Ready For Surgery From Anesthesia Perspective.     .

## 2024-12-06 NOTE — TRANSFER OF CARE
"Anesthesia Transfer of Care Note    Patient: Chinedu Roque    Procedure(s) Performed: Procedure(s) (LRB):  FUSION, SACROILIAC JOINT, PERCUTANEOUS, W/O TRANSFIXATION DEVICE (Right)    Patient location: PACU    Anesthesia Type: MAC    Transport from OR: Transported from OR on room air with adequate spontaneous ventilation    Post pain: adequate analgesia    Post assessment: no apparent anesthetic complications and tolerated procedure well    Post vital signs: stable    Level of consciousness: oriented, alert and awake    Nausea/Vomiting: no nausea/vomiting    Complications: none    Transfer of care protocol was followed      Last vitals: Visit Vitals  BP (!) 151/71 (BP Location: Left arm, Patient Position: Sitting)   Pulse 67   Temp 36.2 °C (97.2 °F) (Temporal)   Resp 18   Ht 5' 11" (1.803 m)   Wt (!) 137 kg (302 lb)   SpO2 95%   BMI 42.12 kg/m²     "

## 2024-12-06 NOTE — DISCHARGE SUMMARY
[facility] - Surgery  Discharge Note  Short Stay    Procedure(s) (LRB):  FUSION, SACROILIAC JOINT, PERCUTANEOUS, W/O TRANSFIXATION DEVICE (Right)      OUTCOME: Patient tolerated treatment/procedure well without complication and is now ready for discharge.    DISPOSITION: Home or Self Care    FINAL DIAGNOSIS:  Sacroiliitis    FOLLOWUP: In clinic    DISCHARGE INSTRUCTIONS:    Discharge Procedure Orders   Diet Adult Regular     No dressing needed     Notify your health care provider if you experience any of the following:  temperature >100.4     Activity as tolerated

## 2024-12-06 NOTE — PLAN OF CARE
PT arrived to PACU via stretcher. NAD Noted. VSS. Discharge instructions reviewed. Pt verbalized understanding

## 2024-12-06 NOTE — H&P
CC: Back pain    HPI: The patient is a 79yo man with a history of sacroilitis here for right SI joint fusion. There are no major changes in history and physical from 10/8/24.    Past Medical History:   Diagnosis Date    Anticoagulant long-term use     ASA 81 mg    Arthritis     cervical    BPH (benign prostatic hyperplasia) 03/02/2012    Chronic heart failure with preserved ejection fraction (HFpEF)     Chronic left shoulder pain     Compression fracture of L2     DDD (degenerative disc disease), lumbar     HTN (hypertension) 03/02/2012    Hx of colonic polyps 2013    Low back pain     Morbid obesity with BMI of 40.0-44.9, adult 03/18/2014    Seasonal allergies     Sleep apnea     compliant with CPAP    Stroke 03/1986       Past Surgical History:   Procedure Laterality Date    ANGIOGRAM, CORONARY, WITH LEFT HEART CATHETERIZATION N/A 06/07/2023    Procedure: Left Heart Cath;  Surgeon: Edgardo Marcelino MD;  Location: [facility] CATH;  Service: Cardiology;  Laterality: N/A;    APPENDECTOMY      CATARACT EXTRACTION EXTRACAPSULAR W/ INTRAOCULAR LENS IMPLANTATION      COLONOSCOPY N/A 06/06/2022    Procedure: COLONOSCOPY;  Surgeon: Jose Bello MD;  Location: [facility] ENDO;  Service: Endoscopy;  Laterality: N/A;    COLONOSCOPY N/A 07/06/2023    Procedure: COLONOSCOPY;  Surgeon: Sb Spaulding MD;  Location: [facility] ENDO;  Service: Endoscopy;  Laterality: N/A;    CORONARY ANGIOGRAPHY N/A 06/07/2023    Procedure: ANGIOGRAM, CORONARY ARTERY;  Surgeon: Edgardo Marcelino MD;  Location: [facility] CATH;  Service: Cardiology;  Laterality: N/A;    CORONARY ARTERY BYPASS GRAFT      CORONARY ARTERY BYPASS GRAFT (CABG) N/A 06/23/2023    Procedure: CORONARY ARTERY BYPASS GRAFT (CABG) X3;  Surgeon: Pricila Clark MD;  Location: [facility] OR;  Service: Cardiothoracic;  Laterality: N/A;    ENDOSCOPIC HARVEST OF VEIN Right 06/23/2023    Procedure: HARVEST-VEIN-ENDOVASCULAR;  Surgeon: Pricila Clark MD;  Location: [facility] OR;  Service: Cardiothoracic;   Laterality: Right;    EPIDURAL BLOCK INJECTION  2015    cervical    EPIDURAL STEROID INJECTION INTO LUMBAR SPINE N/A 06/05/2019    Procedure: Injection-steroid-epidural-lumbar L5/S1 interlaminar;  Surgeon: Riley Segura MD;  Location: [facility];  Service: Pain Management;  Laterality: N/A;    EPIDURAL STEROID INJECTION INTO LUMBAR SPINE N/A 09/13/2019    Procedure: Injection-steroid-epidural-lumbar;  Surgeon: Riley Segura MD;  Location: [facility] OR;  Service: Pain Management;  Laterality: N/A;  L5/S1 interlaminar to the right    EPIDURAL STEROID INJECTION INTO LUMBAR SPINE N/A 06/02/2020    Procedure: Injection-steroid-epidural-lumbar L5/S1 to right;  Surgeon: Riley Segura MD;  Location: [facility];  Service: Pain Management;  Laterality: N/A;    EXCLUSION, LEFT ATRIAL APPENDAGE, OPEN, AS PART OF OPEN CHEST SURGERY N/A 06/23/2023    Procedure: EXCLUSION, LEFT ATRIAL APPENDAGE, OPEN, AS PART OF OPEN CHEST SURGERY;  Surgeon: Pricila Clark MD;  Location: [facility] OR;  Service: Cardiothoracic;  Laterality: N/A;    EXPLORATION OF MEDIASTINUM N/A 06/23/2023    Procedure: EXPLORATION, MEDIASTINUM - MEDIASTINAL RE-EXPLORATION TO CONTROL POST-OP BLEEDING;  Surgeon: Pricila Clark MD;  Location: [facility] OR;  Service: Cardiothoracic;  Laterality: N/A;    Facet Steroid injection       Pain management    HERNIA REPAIR      Ventral    INJECTION OF ANESTHETIC AGENT AROUND MEDIAL BRANCH NERVES INNERVATING LUMBAR FACET JOINT Right 03/21/2023    Procedure: Block-nerve-medial branch-lumbar L3/4, L4/5, L5/S1;  Surgeon: Riley Segura MD;  Location: [facility];  Service: Pain Management;  Laterality: Right;    INJECTION OF ANESTHETIC AGENT INTO SACROILIAC JOINT Right 10/23/2024    Procedure: BLOCK, SACROILIAC JOINT;  Surgeon: Riley Segura MD;  Location: [facility] OR;  Service: Pain Management;  Laterality: Right;  local only, no steroid    INJECTION, SACROILIAC JOINT Right 6/28/2024    Procedure: INJECTION,SACROILIAC JOINT;   Surgeon: Riley Segura MD;  Location: [facility] OR;  Service: Pain Management;  Laterality: Right;    INSERTION OF DORSAL COLUMN NERVE STIMULATOR FOR TRIAL N/A 02/10/2021    Procedure: INSERTION, NEUROSTIMULATOR, SPINAL CORD, DORSAL COLUMN, FOR TRIAL;  Surgeon: Riley Segura MD;  Location: [facility] OR;  Service: Pain Management;  Laterality: N/A;    INSERTION OF NEUROSTIMULATOR OF DORSAL COLUMN OF SPINAL CORD N/A 03/01/2021    Procedure: INSERTION, NEUROSTIMULATOR, SPINAL CORD, DORSAL COLUMN;  Surgeon: Riley Segura MD;  Location: [facility];  Service: Pain Management;  Laterality: N/A;    KNEE SURGERY      bilateral    LAPAROSCOPIC CHOLECYSTECTOMY N/A 11/29/2023    Procedure: CHOLECYSTECTOMY, LAPAROSCOPIC;  Surgeon: Louisa Leo MD;  Location: [facility];  Service: General;  Laterality: N/A;    RADIOFREQUENCY ABLATION  2015    lumbar nerve    RADIOFREQUENCY ABLATION OF LUMBAR MEDIAL BRANCH NERVE AT SINGLE LEVEL Right 04/11/2019    Procedure: Radiofrequency Ablation, Nerve, Spinal, Lumbar, Medial Branch, L1,2,3,4,5;  Surgeon: Riley Segura MD;  Location: [facility];  Service: Pain Management;  Laterality: Right;    TONSILLECTOMY      TRANSESOPHAGEAL ECHOCARDIOGRAM WITH POSSIBLE CARDIOVERSION (CORINNA W/ POSS CARDIOVERSION) N/A 6/4/2024    Procedure: TRANSESOPHAGEAL ECHOCARDIOGRAM WITH POSSIBLE CARDIOVERSION (CORINNA W/ POSS CARDIOVERSION);  Surgeon: Edgardo Marcelino MD;  Location: [facility];  Service: Cardiology;  Laterality: N/A;    TRANSFORAMINAL EPIDURAL INJECTION OF STEROID Right 11/13/2020    Procedure: Injection,steroid,epidural,transforaminal approach, l3/4 and l5/s1;  Surgeon: Riley Segura MD;  Location: [facility] OR;  Service: Pain Management;  Laterality: Right;    TRANSFORAMINAL EPIDURAL INJECTION OF STEROID Left 06/24/2021    Procedure: Injection,steroid,epidural,transforaminal approach L5/S1;  Surgeon: Riley Segura MD;  Location: [facility] OR;  Service: Pain Management;  Laterality: Left;     TRANSFORAMINAL EPIDURAL INJECTION OF STEROID Right 2022    Procedure: Injection,steroid,epidural,transforaminal approach L5/S1;  Surgeon: Riley Segura MD;  Location: [facility];  Service: Pain Management;  Laterality: Right;    TRANSFORAMINAL EPIDURAL INJECTION OF STEROID Right 10/25/2022    Procedure: Injection,steroid,epidural,transforaminal approach L2/3 and L3/4;  Surgeon: Riley Segura MD;  Location: [facility];  Service: Pain Management;  Laterality: Right;    VERTEBROPLASTY         Family History   Problem Relation Name Age of Onset    Alzheimer's disease Mother      COPD Father      Hypertension Brother      Kidney disease Brother      No Known Problems Daughter      No Known Problems Daughter      No Known Problems Daughter      Hypertension Son      Diabetes Son          pre-diabetic    Amblyopia Neg Hx      Blindness Neg Hx      Cataracts Neg Hx      Glaucoma Neg Hx      Retinal detachment Neg Hx      Macular degeneration Neg Hx      Strabismus Neg Hx         Social History     Socioeconomic History    Marital status:    Tobacco Use    Smoking status: Former     Current packs/day: 0.00     Average packs/day: 1.5 packs/day for 24.0 years (36.0 ttl pk-yrs)     Types: Cigarettes     Start date: 10/21/1959     Quit date: 3/19/1983     Years since quittin.7     Passive exposure: Past    Smokeless tobacco: Never   Substance and Sexual Activity    Alcohol use: No    Drug use: No   Social History Narrative    Retired - Worked for the [employer].      Social Drivers of Health     Financial Resource Strain: Low Risk  (2024)    Overall Financial Resource Strain (CARDIA)     Difficulty of Paying Living Expenses: Not very hard   Food Insecurity: No Food Insecurity (2024)    Hunger Vital Sign     Worried About Running Out of Food in the Last Year: Never true     Ran Out of Food in the Last Year: Never true   Transportation Needs: No Transportation Needs (2023)    PRAPARE -  "Transportation     Lack of Transportation (Medical): No     Lack of Transportation (Non-Medical): No   Physical Activity: Insufficiently Active (9/8/2024)    Exercise Vital Sign     Days of Exercise per Week: 4 days     Minutes of Exercise per Session: 20 min   Stress: Stress Concern Present (9/8/2024)    [employer] [place] [employer] - Occupational Stress Questionnaire     Feeling of Stress : To some extent   Housing Stability: Low Risk  (11/28/2023)    Housing Stability Vital Sign     Unable to Pay for Housing in the Last Year: No     Number of Places Lived in the Last Year: 1     Unstable Housing in the Last Year: No       No current facility-administered medications for this encounter.     Facility-Administered Medications Ordered in Other Encounters   Medication Dose Route Frequency Provider Last Rate Last Admin    sodium hyaluronate (EUFLEXXA) 10 mg/mL(mw 2.4 -3.6 million) Syrg 20 mg  20 mg Intra-articular  Michelet Covarrubias MD   20 mg at 05/14/18 1046    sodium hyaluronate (EUFLEXXA) 10 mg/mL(mw 2.4 -3.6 million) Syrg 20 mg  20 mg Intra-articular  Michelet Covarrubias MD   20 mg at 05/14/18 1046       Review of patient's allergies indicates:  No Known Allergies    Vitals:    12/02/24 1314 12/06/24 0833   BP:  (!) 151/71   Pulse:  67   Resp:  18   Temp:  97.2 °F (36.2 °C)   TempSrc:  Temporal   SpO2:  95%   Weight: (!) 137 kg (302 lb)    Height: 5' 11" (1.803 m)        ASA 2, Mallampati 2    REVIEW OF SYSTEMS:     GENERAL: No weight loss, malaise or fevers.  HEENT:  No recent changes in vision or hearing  NECK: Negative for lumps, no difficulty with swallowing.  RESPIRATORY: Negative for cough, wheezing or shortness of breath, patient denies any recent URI.  CARDIOVASCULAR: Negative for chest pain, leg swelling or palpitations.  GI: Negative for abdominal discomfort, blood in stools or black stools or change in bowel habits.  MUSCULOSKELETAL: See HPI.  SKIN: Negative for lesions, rash, and " itching.  PSYCH: No suicidal or homicidal ideations, no current mood disturbances.  HEMATOLOGY/LYMPHOLOGY: Negative for prolonged bleeding, bruising easily or swollen nodes. Patient is not currently taking any anti-coagulants  ENDO: No history of diabetes or thyroid dysfunction  NEURO: No history of syncope, paralysis, seizures or tremors.All other reviewed and negative other than HPI.    Physical exam:  Gen: A and O x3, pleasant, well-groomed  Skin: No rashes or obvious lesions  HEENT: PERRLA, no obvious deformities on ears or in canals. No thyroid masses, trachea midline, no palpable lymph nodes in neck, axilla.  CVS: Regular rate and rhythm, normal S1 and S2, no murmurs.  Resp: Clear to auscultation bilaterally.  Abdomen: Soft, NT/ND, normal bowel sounds present.  Musculoskeletal/Neuro: Moving all extremities    Assessment:  Sacroiliitis  -     Place in Outpatient; Standing  -     Vital signs; Standing  -     Place 18-22 gauage peripheral IV ; Standing  -     Verify informed consent; Standing  -     Notify physician ; Standing  -     Notify physician ; Standing  -     Notify physician (specify); Standing  -     Diet NPO; Standing  -     lactated ringers infusion  -     ceFAZolin injection 2 g    Other orders  -     Vital signs; Standing  -     Insert peripheral IV; Standing  -     Use 1% lidocaine at IV site; Standing  -     electrolyte-S (ISOLYTE)  -     Notify Anesthesiologist if Patient on Home Insulin Pump; Standing  -     LIDOcaine (PF) 10 mg/ml (1%) injection 10 mg  -     Admit to Phase I recovery, transfer to phase II level of care when Cooper score is 9 out of 10; Standing  -     Vital signs; Standing  -     oxyCODONE immediate release tablet 5 mg  -     Intake and output Per protocol; Standing  -     Apply warming blanket; Standing  -     Discharge home from Phase II when PADSS scoring system score is 9 to 10 on PADSS Scoring system met; Standing  -     POCT glucose; Standing  -     Notify  Anesthesiologist; Standing  -     Notify anesthesiologist after 2 hours if Phase II level of care criteria not met; Standing  -     Notify Physician - Potential Need of Opioid Reversal; Standing  -     sodium chloride 0.9% flush 3 mL  -     IP VTE LOW RISK PATIENT; Standing

## 2024-12-12 ENCOUNTER — OFFICE VISIT (OUTPATIENT)
Dept: PAIN MEDICINE | Facility: CLINIC | Age: 79
End: 2024-12-12
Payer: MEDICARE

## 2024-12-12 VITALS
WEIGHT: 299.94 LBS | HEIGHT: 71 IN | SYSTOLIC BLOOD PRESSURE: 129 MMHG | DIASTOLIC BLOOD PRESSURE: 60 MMHG | HEART RATE: 63 BPM | BODY MASS INDEX: 41.99 KG/M2

## 2024-12-12 DIAGNOSIS — M48.061 SPINAL STENOSIS OF LUMBAR REGION WITHOUT NEUROGENIC CLAUDICATION: ICD-10-CM

## 2024-12-12 DIAGNOSIS — G89.4 CHRONIC PAIN SYNDROME: ICD-10-CM

## 2024-12-12 DIAGNOSIS — M53.3 SACROILIAC JOINT PAIN: Primary | ICD-10-CM

## 2024-12-12 PROCEDURE — 99999 PR PBB SHADOW E&M-EST. PATIENT-LVL IV: CPT | Mod: PBBFAC,,, | Performed by: PHYSICIAN ASSISTANT

## 2024-12-12 NOTE — PROGRESS NOTES
This note was completed with dictation software and grammatical errors may exist.    CC:Back pain    HPI: The patient is a 78-year-old man with a history of hypertension, obesity and low back pain who presents in referral from Dr. Winters for chronic right low back pain.  He is status post right sacroiliac joint fusion with PainTeq on 12/06/2024 with at least 75% relief.  His remaining back pain and SI joint pain is mild.  He does report some incision pain and some bruising around his incision.  He denies any fever, chills, weakness or numbness.  He does feel that bending and walking is much easier, states he is walking straighter.  He continues to ambulate with a cane for balance but is overall very pleased with the results.    Pain intervention history: He done physical therapy in January, 2014 with moderate relief but not sustained.  He takes Relafen, tramadol, baclofen and chlorzoxazone as needed with mild relief.  He had undergone what sounds like a series of epidurals several years ago with no major relief. The patient is status post a right side L2/3, L3/4, L4/5 and L5/S1 facet joint injection on 10/28/14 with 50% relief of his back pain for 1 month.  He is status post radiofrequency ablation of the right L1, 2, 3, 4 and 5 medial branch nerves on 3/18/15 with 75% relief.  He is status post C7-T1 cervical interlaminar epidural steroid injection on 5/11/15 with 70% relief.  He is status post right L1, 2, 3, 4 and 5 medial branch radiofrequency ablation on 7/5/16 with 50% relief.   He is status post right L1, 2, 3, 4 and 5 medial branch radiofrequency ablation on 10/17/17 with about 50% relief additionally, later reported almost complete relief lasting a year.  He is status post right L1, 2, 3, 4 and 5 medial branch radiofrequency ablation on 04/11/2019 with mild relief.   He is status post L5/S1 interlaminar epidural steroid injection on 06/05/2019 with 80% relief.  He is status post L5/S1 interlaminar  "epidural steroid injection on 2019 with 50% relief lasting about 6 months.  He is status post L5/S1 interlaminar epidural steroid injection to the right on 2020 with minimal relief.  He is status post right L3/4 and L5/S1 transforaminal epidural steroid injection on 2020 with 0% relief.   He is status post left L5/S1 transforaminal epidural steroid injection on 2021 with 50% relief. He is status post right L5/S1 transforaminal LEATHA on 2022 with no relief. He is status post right L2/3 and L3/4 transforaminal LEATHA on 10/25/2022 with no relief.  He is status post right L3/4, L4/5 and L5/S1 diagnostic medial branch nerve block with 0% relief on 2023. He is status post right SI joint injection on 2024 with 80% relief lasting 1 week.  He is status post right sacroiliac joint fusion with PainTeq on 2024 with at least 75% relief.      Spine surgeries:    Antineuropathics:  NSAIDs:  Physical therapy:  Antidepressants:  Muscle relaxers:  Opioids:  Hydrocodone 7.5   Antiplatelets/Anticoagulants:    ROS:He reports back pain.  Balance of review of systems is negative.    Medical, surgical, family and social history reviewed elsewhere in record.    Medications/Allergies: See med card    Vitals:    24 1053   BP: 129/60   Pulse: 63   Weight: 136 kg (299 lb 15 oz)   Height: 5' 11" (1.803 m)   PainSc:   2   PainLoc: Back       Body mass index is 41.83 kg/m².        Physical exam:  Gen: A and O x3, pleasant, well-groomed  Skin: No rashes or obvious lesions  HEENT: PERRLA, no obvious deformities on ears or in canals.   CVS: Regular rate and rhythm, normal S1 and S2, no murmurs.  Resp: Clear to auscultation bilaterally, no wheezes or rales.  Abdomen: Soft, NT/ND, normal bowel sounds present.  Musculoskeletal:  Antalgic gait, ambulating with a cane.    Incision site intact, no erythema or drainage.  Ecchymosis surrounding the incision.    Imagin14 Xray L-spine  There is diffuse " osteopenia. Mild to moderate chronic compression fracture with anterior wedging and evidence of vertebroplasty at L2. minimal left convex curvature in the upper lumbar region. No spondylolisthesis. Mild concavity of the superior endplate of L4 which could be small compression fracture or Schmorl's node. No additional fracture.   There is moderate degenerative change within the lumbar spine with anterior osteophyte formation most prominent at L4-L5 and L2- L3 and L1 - L2, also present in the lower thoracic region with flowing ossification anteriorly suggesting diffuse idiopathic sclerotic hyperostosis. There is also mild decreased intervertebral disk space height and endplate sclerosis the lower thoracic and upper lumbar regions. Due to the degree of osseous mineralization, it is difficult to evaluate for any possible pars defects. Moderate sclerosis suggesting facet arthropathy in the lumbar spine present and there is mild sclerosis, likely degenerative in nature involving the sacroiliac joints.    10/7/14 MRI lumbar spine: At L1/2 there is mild spinal stenosis secondary to facet hypertrophy and disc bulging.  At L2/3 there is minimal spinal stenosis secondary to retropulsion of L2 compression fracture, appearance of prior kyphoplasty present.  There is minimal disc bulging but there is also some facet hypertrophy at this level.  At L3/4 there is facet and ligamentum hypertrophy, minimal disc bulging causing mild spinal stenosis and neuroforaminal narrowing on the left greater than the right side.  At L4/5 there is moderate hypertrophic facet and ligamentum hypertrophy with mild left foraminal narrowing compared to the right side.  There is no spinal stenosis at this level.  At L5/S1 there is a broad-based disc bulge that extends to the left side more than the right side along with asymmetric left sided facet hypertrophy and ligamentum flavum hypertrophy causing moderate left foraminal stenosis.    4/15/15 outside  open MRI cervical spine [facility]  C3-4 posterior disc bulge producing mild bilateral foraminal narrowing  C4-5 posterior disc bulge producing mild bilateral foraminal narrowing, possible tiny annular tear in the posterior disc, anterior thecal sac deformity with no evidence of spinal stenosis  C5-6 circumferential disc bulge producing moderate to severe bilateral foraminal narrowing, effacement of the bilateral lateral recesses worse to the right, anterior thecal sac deformity with effacement of the anterior subarachnoid space, mild spinal canal stenosis  C6-7 circumferential disc bulge producing moderate to severe bilateral foraminal narrowing with mild spinal canal stenosis  C7-T1 not completely included but appears to have a circumferential disc bulge with shallow midline disc protrusion with possible mild spinal canal stenosis and bilateral foraminal narrowing    CT L-spine:  No acute fracture or traumatic subluxation.  Alignment is relatively maintained.  Vertebroplasty changes are at L2.  There is mild, chronic appearing loss of height of the L4 vertebral body.  There is partial osseous fusion at L2-3.  Multilevel facet hypertrophy, osteophyte formation and disc space narrowing are present.   L1-2: Facet hypertrophy with mild right neural foraminal and spinal canal stenosis.   L2-3: Posterior disc osteophyte and facet hypertrophy results in mild to moderate right and mild left neural foraminal stenosis with mild to moderate spinal canal stenosis.   L3-4: Posterior disc osteophyte and facet hypertrophy results in moderate bilateral neural foraminal stenosis with mild to moderate spinal canal stenosis.   L4-5: Broad-based disc osteophyte and facet hypertrophy results in severe left and moderate right neural foraminal stenosis with mild to moderate spinal canal stenosis.   L5-S1: Posterior disc osteophyte eccentric to the left and facet hypertrophy results in moderate to severe bilateral neural foraminal stenosis  with mild to moderate spinal canal stenosis.  Paravertebral soft tissues are normal.  Spinal cord stimulator wires into the spinal canal at the T12-L1 level.    12/15/22 CT myelogram L-spine:  T12-L1: Mild disc bulge with likely right central protrusion.  Mild right and minimal left narrowing of the lateral recesses.  No significant spinal canal or foraminal stenosis.   L1-L2: Mild disc bulge.  Mild right and minimal left narrowing of the lateral recesses.  No significant spinal canal stenosis.  Minimal right foraminal stenosis.   L2-L3: Trace retrolisthesis.  Mild disc bulge and posterior osteophytic ridging.  Mild bilateral facet hypertrophy.  Minimal narrowing of the bilateral lateral recesses.  Mild right and minimal left foraminal stenosis.   L3-L4: Trace retrolisthesis.  Mild disc bulge.  Mild left and minimal right narrowing of the lateral recesses.  No significant spinal canal stenosis.  Mild-moderate bilateral foraminal stenosis.   L4-L5: Retrolisthesis.  Uncovering the disc mild disc bulge.  Mild bilateral facet hypertrophy.  Ligamentum flavum thickening.  Mild narrowing of the bilateral lateral recesses.  Mild spinal canal stenosis.  Moderate left and mild-moderate right foraminal stenosis.   L5-S1: Mild disc bulge and posterior osteophytic ridging.  Moderate left and mild right facet hypertrophy.  Moderate left and mild right narrowing of the lateral recesses.  No significant spinal canal stenosis.  Moderate right and mild-moderate left foraminal stenosis.   Bilateral dorsal paraspinal muscular atrophy in the lower lumbar spine.   Spinal cord stimulator leads are present entering the spinal canal at the T12-L1 interlaminar space and extending cranially above the field of view.   Layering dependent stones within the partially visualized urinary bladder, similar to prior study.  Mild-moderate atherosclerotic calcifications.   Postoperative changes of a previous L2 kyphoplasty with methylmethacrylate  present.  There is loss of lumbar vertebral body height at all levels throughout the lumbar spine, progressive within the superior endplate of L4 when compared to the previous study of 12/10/2022.    1/30/23 Xray L-spine:  A spinal stimulator device is partially imaged.  There is a mild compression fracture of L2 status post vertebroplasty.  There is a mild compression fracture of L4 without significant change.  Mild retrolisthesis of L3 on L4 is present.  There is mild loss of disc height at L2-3 and L5-S1.  Moderate lower lumbar facet arthropathy is present there is heavy calcification of the aorta.    06/10/2024 CT cervical spine  Bones: Diffuse bony demineralization.  No fractures or bony destructive changes.  Prominent ventral bridging osteophyte complex at C4-C5 and endplate centered subcortical cystic changes in the setting of severe disc height loss at C5-C6.  Additional prominent ventral bridging osteophyte complexes laterally C6-C7.     Alignment: Within normal limits.     Craniocervical junction: Degenerative changes.  No acute process or may     Disc levels:     Degenerative changes including shallow disc osteophyte complexes most notable asymmetric to the right at C5-C6 with there is no more than mild to moderate narrowing of the right central spinal canal and centrally at C6-C7 producing mild to moderate narrowing of the spinal canal.  No evidence of any high-grade osseous spinal canal narrowing.  Uncovertebral spurring and facet arthrosis producing severe left/moderate right C3-C4, severe right/moderate left C4-C5, severe bilateral C5-C6, and severe bilateral C6-C7 narrowing.     Paraspinal soft tissues: The visualized paraspinal soft tissues and lung apices are within normal limits.    06/10/2024 CT lumbar spine  Bones: Diffuse bony demineralization.  There is a dorsal thoracic spinal stimulator entering the canal at T12-L1 coursing cranially out of the field of view.  Chronic mild compression  fracture deformities status post cement augmentation of L2 and severe biconcave compression fracture of L4, both unchanged.  No acute fractures.  Prominent ventral endplate centered bridging osteophytes throughout.  No bony destructive process.     Alignment: Within normal limits.     Disc levels:     Similar multilevel degenerative changes including moderate disc height loss notable at L2-L3 and L5-S1.  Disc bulging and facet arthrosis most pronounced at L3-L4, L4-L5, and L5-S1 with moderate to severe narrowing of the spinal canal and subarticular recesses.  Severe foraminal narrowing on the left at L5-S1 and moderate to severe on the right at L5-S1 and bilaterally at L3-L4 and L4-L5.  Overall no gross interval change.  Vacuum disc phenomenon at L3-L4.     Soft Tissues: Atherosclerotic changes of the aorta iliac distribution.  Partially imaged presumed left renal cyst measuring 2.9 cm.  Presumed left basilar scarring/atelectasis residual from previous pleural effusion as seen on CT 03/27/2024.  Cholecystectomy changes.  Prostatomegaly.    Assessment:  The patient is a 78-year-old man with a history of hypertension, obesity and low back pain who presents in referral from Dr. Winters for chronic right low back pain.   1. Sacroiliac joint pain        2. Spinal stenosis of lumbar region without neurogenic claudication        3. Chronic pain syndrome                Plan:  1. He had excellent relief following the right sacroiliac joint fusion.  His remaining pain is tolerable so he will follow-up on an as-needed basis.

## 2024-12-31 ENCOUNTER — TELEPHONE (OUTPATIENT)
Dept: CARDIOLOGY | Facility: CLINIC | Age: 79
End: 2024-12-31
Payer: MEDICARE

## 2025-01-02 ENCOUNTER — OFFICE VISIT (OUTPATIENT)
Dept: PRIMARY CARE CLINIC | Facility: CLINIC | Age: 80
End: 2025-01-02
Payer: MEDICARE

## 2025-01-02 VITALS
WEIGHT: 295.5 LBS | HEIGHT: 71 IN | BODY MASS INDEX: 41.37 KG/M2 | DIASTOLIC BLOOD PRESSURE: 68 MMHG | SYSTOLIC BLOOD PRESSURE: 132 MMHG | OXYGEN SATURATION: 98 % | HEART RATE: 75 BPM

## 2025-01-02 DIAGNOSIS — I10 PRIMARY HYPERTENSION: ICD-10-CM

## 2025-01-02 DIAGNOSIS — I48.0 PAF (PAROXYSMAL ATRIAL FIBRILLATION): ICD-10-CM

## 2025-01-02 DIAGNOSIS — M51.362 DEGENERATION OF INTERVERTEBRAL DISC OF LUMBAR REGION WITH DISCOGENIC BACK PAIN AND LOWER EXTREMITY PAIN: ICD-10-CM

## 2025-01-02 DIAGNOSIS — I50.32 CHRONIC HEART FAILURE WITH PRESERVED EJECTION FRACTION (HFPEF): ICD-10-CM

## 2025-01-02 DIAGNOSIS — E66.01 SEVERE OBESITY (BMI >= 40): ICD-10-CM

## 2025-01-02 DIAGNOSIS — I25.10 CORONARY ARTERY DISEASE INVOLVING NATIVE CORONARY ARTERY OF NATIVE HEART WITHOUT ANGINA PECTORIS: Primary | ICD-10-CM

## 2025-01-02 PROCEDURE — 99999 PR PBB SHADOW E&M-EST. PATIENT-LVL V: CPT | Mod: PBBFAC,,, | Performed by: FAMILY MEDICINE

## 2025-01-02 PROCEDURE — 99214 OFFICE O/P EST MOD 30 MIN: CPT | Mod: S$GLB,,, | Performed by: FAMILY MEDICINE

## 2025-01-02 NOTE — PROGRESS NOTES
Primary Care Provider Appointment   [facility] [place]       Patient ID: Chinedu Roque is a 79 y.o. male.    ASSESSMENT/PLAN by Problem List:    Assessment & Plan    IMPRESSION:  - Assessed chronic conditions including CHF, CAD, hypertension, obesity, and atrial fibrillation; overall stable  - Evaluated recent weight loss  - Reviewed CT abdomen from November 12th showing bladder stones, enlarged prostate, and bladder wall thickening; determined bladder stones unlikely cause of patient's pain  - Attributed patient's pain to spinal arthritis based on CT findings  - Considered physical therapy to address muscle-related pain and improve mobility before gym workouts  - Discussed Eliquis continuation; deferred decision to cardiologist, his concern is cost    OBESITY (BMI >= 40):  - Acknowledged patient's recent 5-pound weight loss.  - Encouraged continued focus on healthy nutrition and increased physical activity.  - Recommend physical therapy before initiating gym workouts to safely build strength and improve mobility.    HEART FAILURE WITH PRESERVED EJECTION FRACTION (HFPEF):  - Continued current medications for CHF, which appears fairly stable.  - Instructed patient to continue monitoring daily weights.  - Noted Jardiance discontinuation due to cost and insurance issues.  - Will consider resuming Jardiance if insurance coverage has changed this year.    (PAROXYSMAL ATRIAL FIBRILLATION):  - Observed regular heart rate, suggesting no current atrial fibrillation.  - Continued Eliquis 5 mg BID as prescribed by cardiology.  - advised he will likely need to continue this but should have ongoing discussions with Cardiology.  His concern is cost of the medication    VEINS OF RIGHT LOWER EXTREMITY WITH ULCER OF UNSPECIFIED SITE:  - Examined legs for fluid accumulation, wounds, or sores.  - Observed good condition with only minimal swelling and no wounds or sores present.    ARTERY DISEASE  (CAD):  - Continue current medications for CAD.  - Noted patient's history of coronary artery disease, status post CABG, with no reports of unstable angina.    HYPERTENSION:  - Continue current medications for hypertension.  - Noted patient's history of primary hypertension, which has been stable and well-controlled.    ARTHRITIS:  - Explained CT findings, including spinal arthritis and bone spurs in the lower spine.  - Noted patient's report of pain in the lower back and left buttock, rated 5-6/10 when getting up from bed, improving to 2-3/10 with movement.  - Explained that pain is likely due to arthritis affecting multiple structures in the back.  - Discussed how addressing muscle issues through therapy can help manage pain even with underlying arthritis.  - Demonstrated stretching exercises to improve mobility and reduce pain.  - Perform chair stretching exercises multiple times daily:       JOINT FUSION:  - Noted patient's history of SI joint fusion procedure with Dr. Segura.  - Acknowledged that SI joints moving on themselves can cause significant pain.  - Suggested stretching muscles and improving mobility to help with pain management post-procedure.    ENLARGEMENT AND BLADDER STONES:  - Explained CT findings, including bladder stones, enlarged prostate, and thickened bladder wall.  - Confirmed no current symptoms of urinary tract infection or cancer, and no blood in urine or burning sensation during urination.  - Reviewed previous urology consultation, noting urologist's suggestion for potential cystoscopy and further intervention if prostate problems worsen.    THERAPY REFERRAL:  - Referred patient to physical therapy at [facility] location on [facility].  - Instructed office staff to call patient   to schedule physical therapy appointment.    Follow-up  - Scheduled follow-up visit in 2 months.         ORDERS, CODING, ADDITIONAL DOCUMENTATION RELATED TO THIS ENCOUNTER:  1. Coronary artery disease involving  native coronary artery of native heart without angina pectoris    2. Severe obesity (BMI >= 40)    3. Chronic heart failure with preserved ejection fraction (HFpEF)    4. PAF (paroxysmal atrial fibrillation)    5. Primary hypertension    6. Degeneration of intervertebral disc of lumbar region with discogenic back pain and lower extremity pain  -     Ambulatory Referral/Consult to Physical Therapy; Future; Expected date: 01/09/2025       Advance Care Planning     Date: 01/02/2025    ACP Reviewed/No Changes  Voluntary advance care planning discussion had today with patient. Previously completed HCPOA and LW in electronic medical record is current, no changes made.      A total of less than five min was spent on advance care planning, goals of care discussion, emotional support, formulating and communicating prognosis and exploring burden/benefit of various approaches of treatment. This discussion occurred on a fully voluntary basis with the verbal consent of the patient and/or family.               Subjective:       HPI    Patient is a/an 79 y.o.  male     For complete problem list, past medical history, surgical history, social history, etc., see appropriate section in the electronic medical record    History of Present Illness    CHIEF COMPLAINT:  79-year-old male presents today for follow-up of multiple chronic conditions.    CARDIOVASCULAR:  He has chronic congestive heart failure with preserved EF and discontinued Jardiance due to cost concerns. He has a history of coronary artery disease status post CABG without recent unstable angina. He also has atrial fibrillation with previous cardioversion and continues Eliquis 5mg twice daily per cardiology. His hypertension remains stable and well controlled.    MUSCULOSKELETAL:  He underwent SI joint fusion with improved but continued back pain. He experiences left buttock pain that predated the SI joint fusion. He also reports stabbing pain below right rib cage when  rising after lying down for 15-20 minutes, which improves with ambulation and is not present when sleeping in recliner position. CT imaging shows spinal arthritis and bone spurs.    GENITOURINARY:  He has bladder stones without associated pain. He has age-related prostate enlargement with associated bladder wall thickening.    MEDICAL HISTORY:  He has severe obesity with BMI >40, though reports recent 5-pound weight loss. 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    SOCIAL HISTORY:  He plans to resume exercising at [employer] with his son after previously discontinuing gym attendance due to pain symptoms.      ROS:  General: +weight loss  Gastrointestinal: -abdominal pain  Musculoskeletal: +back pain       Review of Systems   Constitutional:  Negative for fatigue, fever and unexpected weight change.   Eyes:  Negative for visual disturbance.   Respiratory:  Negative for shortness of breath and wheezing.    Cardiovascular:  Positive for leg swelling. Negative for chest pain.   Gastrointestinal: Negative.  Negative for abdominal pain, blood in stool and constipation.   Genitourinary:  Negative for difficulty urinating, dysuria, frequency and hematuria.   Musculoskeletal:  Positive for arthralgias and back pain.   Psychiatric/Behavioral: Negative.         Objective     Physical Exam  Constitutional:       General: He is not in acute distress.     Appearance: He is obese. He is not toxic-appearing.   HENT:      Head: Normocephalic and atraumatic.   Eyes:      Conjunctiva/sclera: Conjunctivae normal.   Cardiovascular:      Rate and Rhythm: Normal rate and regular rhythm.      Heart sounds: Normal heart sounds.      Comments: 1+ bilateral ankle edema  Pulmonary:      Effort: No respiratory distress.      Breath sounds: No wheezing or rales.      Comments: Lower breath sounds diminished bilaterally  Neurological:      Mental Status: He is alert.      Comments: Ambulatory, gait antalgic       Vitals:  "   01/02/25 1451   BP: 132/68   BP Location: Right arm   Patient Position: Sitting   Pulse: 75   SpO2: 98%   Weight: 134.1 kg (295 lb 8.4 oz)   Height: 5' 11" (1.803 m)     Physical Exam                This note was generated with the assistance of ambient listening technology. Verbal consent was obtained by the patient and accompanying visitor(s) for the recording of patient appointment to facilitate this note. I attest to having reviewed and edited the generated note for accuracy, though some syntax or spelling errors may persist. Please contact the author of this note for any clarification.  Parts of the note were also generated with voice recognition software.  Occasionally this software will misinterpreted words or phrases    "

## 2025-01-03 ENCOUNTER — TELEPHONE (OUTPATIENT)
Dept: PRIMARY CARE CLINIC | Facility: CLINIC | Age: 80
End: 2025-01-03
Payer: MEDICARE

## 2025-01-08 DIAGNOSIS — I48.0 PAF (PAROXYSMAL ATRIAL FIBRILLATION): Primary | ICD-10-CM

## 2025-01-10 PROBLEM — I83.019 VENOUS ULCER OF RIGHT LEG: Status: RESOLVED | Noted: 2024-08-14 | Resolved: 2025-01-10

## 2025-01-10 PROBLEM — L97.919 VENOUS ULCER OF RIGHT LEG: Status: RESOLVED | Noted: 2024-08-14 | Resolved: 2025-01-10

## 2025-01-14 ENCOUNTER — OFFICE VISIT (OUTPATIENT)
Dept: PAIN MEDICINE | Facility: CLINIC | Age: 80
End: 2025-01-14
Payer: MEDICARE

## 2025-01-14 ENCOUNTER — TELEPHONE (OUTPATIENT)
Dept: PAIN MEDICINE | Facility: CLINIC | Age: 80
End: 2025-01-14

## 2025-01-14 ENCOUNTER — TELEPHONE (OUTPATIENT)
Dept: PAIN MEDICINE | Facility: CLINIC | Age: 80
End: 2025-01-14
Payer: MEDICARE

## 2025-01-14 VITALS
HEIGHT: 71 IN | WEIGHT: 297.06 LBS | HEART RATE: 73 BPM | SYSTOLIC BLOOD PRESSURE: 131 MMHG | BODY MASS INDEX: 41.59 KG/M2 | DIASTOLIC BLOOD PRESSURE: 63 MMHG

## 2025-01-14 DIAGNOSIS — T14.8XXA MUSCLE STRAIN: ICD-10-CM

## 2025-01-14 DIAGNOSIS — M53.3 SACROILIAC JOINT PAIN: Primary | ICD-10-CM

## 2025-01-14 DIAGNOSIS — M79.18 MYOFASCIAL PAIN: ICD-10-CM

## 2025-01-14 PROCEDURE — 99999 PR PBB SHADOW E&M-EST. PATIENT-LVL IV: CPT | Mod: PBBFAC,,, | Performed by: PHYSICIAN ASSISTANT

## 2025-01-14 PROCEDURE — 3078F DIAST BP <80 MM HG: CPT | Mod: CPTII,S$GLB,, | Performed by: PHYSICIAN ASSISTANT

## 2025-01-14 PROCEDURE — 1125F AMNT PAIN NOTED PAIN PRSNT: CPT | Mod: CPTII,S$GLB,, | Performed by: PHYSICIAN ASSISTANT

## 2025-01-14 PROCEDURE — 3288F FALL RISK ASSESSMENT DOCD: CPT | Mod: CPTII,S$GLB,, | Performed by: PHYSICIAN ASSISTANT

## 2025-01-14 PROCEDURE — 1157F ADVNC CARE PLAN IN RCRD: CPT | Mod: CPTII,S$GLB,, | Performed by: PHYSICIAN ASSISTANT

## 2025-01-14 PROCEDURE — 1101F PT FALLS ASSESS-DOCD LE1/YR: CPT | Mod: CPTII,S$GLB,, | Performed by: PHYSICIAN ASSISTANT

## 2025-01-14 PROCEDURE — 1160F RVW MEDS BY RX/DR IN RCRD: CPT | Mod: CPTII,S$GLB,, | Performed by: PHYSICIAN ASSISTANT

## 2025-01-14 PROCEDURE — 3075F SYST BP GE 130 - 139MM HG: CPT | Mod: CPTII,S$GLB,, | Performed by: PHYSICIAN ASSISTANT

## 2025-01-14 PROCEDURE — 99214 OFFICE O/P EST MOD 30 MIN: CPT | Mod: S$GLB,,, | Performed by: PHYSICIAN ASSISTANT

## 2025-01-14 PROCEDURE — 1159F MED LIST DOCD IN RCRD: CPT | Mod: CPTII,S$GLB,, | Performed by: PHYSICIAN ASSISTANT

## 2025-01-14 NOTE — TELEPHONE ENCOUNTER
Called and spoke to pt as requested through the portal for lower back pain .   An appt was offered and scheduled with Pavan Lynch today

## 2025-01-14 NOTE — TELEPHONE ENCOUNTER
Physician - Dr Segura    Type of Procedure/Injection - Sacroiliac Joint Steroid Injection           Laterality - Left      Priority - Normal      Anxiolysis- Local      Need to hold medication - No      N/A    None      Clearance needed - No      Follow up - 4 week

## 2025-01-15 ENCOUNTER — CLINICAL SUPPORT (OUTPATIENT)
Dept: REHABILITATION | Facility: HOSPITAL | Age: 80
End: 2025-01-15
Payer: MEDICARE

## 2025-01-15 DIAGNOSIS — M53.3 SACROILIAC JOINT PAIN: Primary | ICD-10-CM

## 2025-01-15 DIAGNOSIS — R29.898 WEAKNESS OF BOTH LOWER EXTREMITIES: Primary | ICD-10-CM

## 2025-01-15 DIAGNOSIS — M46.1 SACROILIITIS: ICD-10-CM

## 2025-01-15 DIAGNOSIS — M53.3 SACROILIAC JOINT PAIN: ICD-10-CM

## 2025-01-15 DIAGNOSIS — M79.18 MYOFASCIAL PAIN: ICD-10-CM

## 2025-01-15 DIAGNOSIS — T14.8XXA MUSCLE STRAIN: ICD-10-CM

## 2025-01-15 DIAGNOSIS — M53.86 DECREASED RANGE OF MOTION OF INTERVERTEBRAL DISCS OF LUMBAR SPINE: ICD-10-CM

## 2025-01-15 PROCEDURE — 97530 THERAPEUTIC ACTIVITIES: CPT | Mod: PO

## 2025-01-15 PROCEDURE — 97161 PT EVAL LOW COMPLEX 20 MIN: CPT | Mod: PO

## 2025-01-15 RX ORDER — ALPRAZOLAM 1 MG/1
1 TABLET, ORALLY DISINTEGRATING ORAL ONCE AS NEEDED
OUTPATIENT
Start: 2025-01-15 | End: 2036-06-13

## 2025-01-15 NOTE — PLAN OF CARE
[facility]   Physical Therapy Initial Evaluation      Name: Chinedu Roque  Clinic Number: [id number]    Therapy Diagnosis: No diagnosis found.     Physician: Martin Tay *    Physician Orders: PT Eval and Treat   Medical Diagnosis from Referral:   M53.3 (ICD-10-CM) - Sacroiliac joint pain   T14.8XXA (ICD-10-CM) - Muscle strain   M79.18 (ICD-10-CM) - Myofascial pain     Evaluation Date: 1/15/2025  Authorization Period Expiration: 12/31/2025  Plan of Care Expiration: 3/12/2025  Progress Note Due: 2/15/2025  Date of Surgery: NA  Visit # / Visits authorized: 1/ 1   FOTO: 1/ 3    Precautions: Standard     Time In: 1357  Time Out: 1437  Total Billable Time: 40 minutes    Subjective     Date of onset: 10+ years    History of current condition - Chinedu reports: a history of lower back pain for the past 10 years. He reports receiving an R SI joint fusion on 12/6/2024. He states that is the R side continues to improve, he will have the L side fused.     Falls: None    Imaging: See EPIC    Prior Therapy: none  Social History: lives with daughter   Occupation: not currently employed  Prior Level of Function: ind  Current Level of Function: ind c pain    Pain:  Current 7/10, worst 10/10, best 4/10   Location: bilateral lower back   Description: Throbbing and Sharp  Aggravating Factors: Bending  Easing Factors: rest    Patients goals: improve overall function     Medical History:   Past Medical History:   Diagnosis Date    Anticoagulant long-term use     ASA 81 mg    Arthritis     cervical    BPH (benign prostatic hyperplasia) 03/02/2012    Chronic heart failure with preserved ejection fraction (HFpEF)     Chronic left shoulder pain     Compression fracture of L2     DDD (degenerative disc disease), lumbar     HTN (hypertension) 03/02/2012    Hx of colonic polyps 2013    Low back pain     Morbid obesity with BMI of 40.0-44.9, adult 03/18/2014    Seasonal allergies     Sleep apnea      compliant with CPAP    Stroke 03/1986       Surgical History:   Chinedu Roque  has a past surgical history that includes Knee surgery; Appendectomy; Vertebroplasty; Hernia repair; Facet Steroid injection ; Epidural block injection (2015); Radiofrequency ablation (2015); Radiofrequency ablation of lumbar medial branch nerve at single level (Right, 04/11/2019); Epidural steroid injection into lumbar spine (N/A, 06/05/2019); Epidural steroid injection into lumbar spine (N/A, 09/13/2019); Epidural steroid injection into lumbar spine (N/A, 06/02/2020); Transforaminal epidural injection of steroid (Right, 11/13/2020); Insertion of dorsal column nerve stimulator for trial (N/A, 02/10/2021); Insertion of neurostimulator of dorsal column of spinal cord (N/A, 03/01/2021); Transforaminal epidural injection of steroid (Left, 06/24/2021); Colonoscopy (N/A, 06/06/2022); Transforaminal epidural injection of steroid (Right, 09/22/2022); Transforaminal epidural injection of steroid (Right, 10/25/2022); Injection of anesthetic agent around medial branch nerves innervating lumbar facet joint (Right, 03/21/2023); ANGIOGRAM, CORONARY, WITH LEFT HEART CATHETERIZATION (N/A, 06/07/2023); Coronary angiography (N/A, 06/07/2023); Tonsillectomy; Cataract extraction, extracapsular w/ intraocular lens implant; Coronary Artery Bypass Graft (CABG) (N/A, 06/23/2023); Endoscopic harvest of vein (Right, 06/23/2023); exclusion, left atrial appendage, open, as part of open chest surgery (N/A, 06/23/2023); Exploration of mediastinum (N/A, 06/23/2023); Colonoscopy (N/A, 07/06/2023); Laparoscopic cholecystectomy (N/A, 11/29/2023); Coronary artery bypass graft; transesophageal echocardiogram with possible cardioversion (mary w/ poss cardioversion) (N/A, 6/4/2024); injection, sacroiliac joint (Right, 6/28/2024); Injection of anesthetic agent into sacroiliac joint (Right, 10/23/2024); and fusion, sacroiliac joint, percutaneous, w/o transfixation device  (Right, 12/6/2024).    Medications:   Chinedu has a current medication list which includes the following prescription(s): acetaminophen, apixaban, atorvastatin, b complex vitamins, bacitracin, finasteride, furosemide, furosemide, gabapentin, hydralazine, hydrocodone-acetaminophen, nystatin, polyethylene glycol, senna-docusate 8.6-50 mg, spironolactone, tamsulosin, tizanidine, and valsartan, and the following Facility-Administered Medications: sodium hyaluronate (euflexxa) and sodium hyaluronate (euflexxa).    Allergies:   Review of patient's allergies indicates:  No Known Allergies     Objective      Eval performed seated in    LUMBAR SPINE AROM:   Flexion: 75% limit in chair   Extension: Full limit   Left Sidebend: 75% limit   Right Sidebend: 75% limit   Left Rotation: Full limit   Right Rotation: Full limit       LOWER EXTREMITY passive range of motion NT this date due to chair eval      LOWER EXTREMITY STRENGTH:   Left Right   Quadriceps 4+/5 4+/5   Hamstrings 4+/5 4+/5     Iliopsoas 4/5 4/5   Hip abd 4/5 4/5   Hip add 4/5 4/5   Hip Ext NT NT       DTR's:   Left Right   Patella Tendon 2+ 2+   Achilles Tendon 2+ 2+     Dermatomes: Sensation: Light Touch: Intact  Myotomes: WFL  Flexibility: decreased globally BLE        GAIT: Chinedu ambulates with SPC with independently.     GAIT DEVIATIONS: Chinedu displays decreased step length      Intake Outcome Measure for FOTO lumbar Survey    Therapist reviewed FOTO scores for Chinedu Roque on 1/15/2025.   FOTO report - see Media section or FOTO account episode details.    Intake Score: 65% limit         Treatment     Total Treatment time (time-based codes) separate from Evaluation: 15 minutes     Chinedu received the treatments listed below:      therapeutic activities to improve functional performance for 15  minutes, including:  Education on HEP to include:  Standing marching  3 way ball roll out        Patient Education and Home Exercises     Education provided:   -  on HEP and POC    Written Home Exercises Provided: Yes. Exercises were reviewed and Chinedu was able to demonstrate them prior to the end of the session.  Chinedu demonstrated good  understanding of the education provided. See EMR under Patient Instructions for exercises provided during therapy sessions.    Assessment     Chinedu is a 79 y.o. male referred to outpatient Physical Therapy with a medical diagnosis of   M53.3 (ICD-10-CM) - Sacroiliac joint pain   T14.8XXA (ICD-10-CM) - Muscle strain   M79.18 (ICD-10-CM) - Myofascial pain   Patient presents with increased pain and decreased range of motion, strength, and flexibility. These deficits limit the patient from performing everyday activities to include walking, standing, bending, jumping, jogging, and navigating stairs without pain or limitations. Pt presents with s/s consistent with flexion biased LBP secondary to degenerative lumbar changes at this time. Pt with highest deficits in lumbar range of motion and proximal BLE strength. These deficits were addressed with a HEP with good overall performance. Due to these deficits, pt will benefit from skilled PT services to improve mobility, control pain, and restore overall function.        Patient prognosis is Good.   Patient will benefit from skilled outpatient Physical Therapy to address the deficits stated above and in the chart below, provide patient /family education, and to maximize patientt's level of independence.     Plan of care discussed with patient: Yes  Patient's spiritual, cultural and educational needs considered and patient is agreeable to the plan of care and goals as stated below:     Anticipated Barriers for therapy:     Medical Necessity is demonstrated by the following  History  Co-morbidities and personal factors that may impact the plan of care [x] LOW: no personal factors / co-morbidities  [] MODERATE: 1-2 personal factors / co-morbidities  [] HIGH: 3+ personal factors /  co-morbidities    Moderate / High Support Documentation:   Co-morbidities affecting plan of care:     Personal Factors:   no deficits     Examination  Body Structures and Functions, activity limitations and participation restrictions that may impact the plan of care [x] LOW: addressing 1-2 elements  [] MODERATE: 3+ elements  [] HIGH: 4+ elements (please support below)    Moderate / High Support Documentation:      Clinical Presentation [x] LOW: stable  [] MODERATE: Evolving  [] HIGH: Unstable     Decision Making/ Complexity Score: low       Goals:  Short Term Goals: 4 weeks   Pt to report worst pain 6/10  Pt to improve lumbar range of motion by 25%  Pt to improve BLE strength by 1/2 grade  Pt to improve FOTO by 10%    Long Term Goals: 8 weeks   Pt to report worst pain 2/10  Pt to improve lumbar range of motion by 50%  Pt to improve BLE strength by 1 grade  Pt to improve FOTO by 20%    Plan     Plan of care Certification: 1/15/2025 to 3/12/2025.    Outpatient Physical Therapy 2 times weekly for 8 weeks to include the following interventions: Cervical/Lumbar Traction, Gait Training, Manual Therapy, Moist Heat/ Ice, Neuromuscular Re-ed, Patient Education, Self Care, Therapeutic Activities, and Therapeutic Exercise.       Deion Ross, PT        Physician's Signature: _________________________________________ Date: ________________

## 2025-01-20 ENCOUNTER — PATIENT MESSAGE (OUTPATIENT)
Dept: PAIN MEDICINE | Facility: CLINIC | Age: 80
End: 2025-01-20
Payer: MEDICARE

## 2025-01-24 NOTE — TELEPHONE ENCOUNTER
Spoke with patient and rescheduled procedure. Pre service notified of insurance change and follow up appointment rescheduled.

## 2025-01-26 NOTE — PROGRESS NOTES
This note was completed with dictation software and grammatical errors may exist.    CC:Back pain    HPI: The patient is a 79-year-old man with a history of hypertension, obesity and low back pain who presents in referral from Dr. Winters for chronic right low back pain.  He returns in follow-up today with left low back and left buttock pain.  He reports ongoing relief from his SI fusion on the right side last month.  He is now experiencing the same pain on the left.  He points to the left sacroiliac joint.  He also has another pain in the right lateral abdomen, reports seeing primary care who told him it was muscular.  He denies any radicular symptoms.  He denies weakness or numbness.    Pain intervention history: He done physical therapy in January, 2014 with moderate relief but not sustained.  He takes Relafen, tramadol, baclofen and chlorzoxazone as needed with mild relief.  He had undergone what sounds like a series of epidurals several years ago with no major relief. The patient is status post a right side L2/3, L3/4, L4/5 and L5/S1 facet joint injection on 10/28/14 with 50% relief of his back pain for 1 month.  He is status post radiofrequency ablation of the right L1, 2, 3, 4 and 5 medial branch nerves on 3/18/15 with 75% relief.  He is status post C7-T1 cervical interlaminar epidural steroid injection on 5/11/15 with 70% relief.  He is status post right L1, 2, 3, 4 and 5 medial branch radiofrequency ablation on 7/5/16 with 50% relief.   He is status post right L1, 2, 3, 4 and 5 medial branch radiofrequency ablation on 10/17/17 with about 50% relief additionally, later reported almost complete relief lasting a year.  He is status post right L1, 2, 3, 4 and 5 medial branch radiofrequency ablation on 04/11/2019 with mild relief.   He is status post L5/S1 interlaminar epidural steroid injection on 06/05/2019 with 80% relief.  He is status post L5/S1 interlaminar epidural steroid injection on 09/13/2019 with 50%  "relief lasting about 6 months.  He is status post L5/S1 interlaminar epidural steroid injection to the right on 06/02/2020 with minimal relief.  He is status post right L3/4 and L5/S1 transforaminal epidural steroid injection on 11/13/2020 with 0% relief.   He is status post left L5/S1 transforaminal epidural steroid injection on 06/24/2021 with 50% relief. He is status post right L5/S1 transforaminal LEATHA on 09/22/2022 with no relief. He is status post right L2/3 and L3/4 transforaminal LEATHA on 10/25/2022 with no relief.  He is status post right L3/4, L4/5 and L5/S1 diagnostic medial branch nerve block with 0% relief on 03/21/2023. He is status post right SI joint injection on 06/28/2024 with 80% relief lasting 1 week.  He is status post right sacroiliac joint fusion with PainTeq on 12/06/2024 with at least 75% relief.      Spine surgeries:    Antineuropathics:  NSAIDs:  Physical therapy:  Antidepressants:  Muscle relaxers:  Opioids:  Hydrocodone 7.5   Antiplatelets/Anticoagulants:    ROS:He reports back pain.  Balance of review of systems is negative.    Medical, surgical, family and social history reviewed elsewhere in record.    Medications/Allergies: See med card    Vitals:    01/14/25 1535   BP: 131/63   Pulse: 73   Weight: 134.8 kg (297 lb 1.1 oz)   Height: 5' 11" (1.803 m)   PainSc:   8   PainLoc: Back       Body mass index is 41.43 kg/m².        Physical exam:  Gen: A and O x3, pleasant, well-groomed  Skin: No rashes or obvious lesions  HEENT: PERRLA, no obvious deformities on ears or in canals.   CVS: Regular rate and rhythm, normal S1 and S2, no murmurs.  Resp: Clear to auscultation bilaterally, no wheezes or rales.  Abdomen: Soft, NT/ND, normal bowel sounds present.  Musculoskeletal:  Antalgic gait, ambulating with a cane.    Lower extremity strength 5/5 bilaterally  Ramakrishna's test positive on the left  Gaenslen's test positive on the left  SI compression test positive on the left  Tender to palpation " along the left sacroiliac joint  Tenderness to palpation along the right lateral abdomen    Imagin14 Xray L-spine  There is diffuse osteopenia. Mild to moderate chronic compression fracture with anterior wedging and evidence of vertebroplasty at L2. minimal left convex curvature in the upper lumbar region. No spondylolisthesis. Mild concavity of the superior endplate of L4 which could be small compression fracture or Schmorl's node. No additional fracture.   There is moderate degenerative change within the lumbar spine with anterior osteophyte formation most prominent at L4-L5 and L2- L3 and L1 - L2, also present in the lower thoracic region with flowing ossification anteriorly suggesting diffuse idiopathic sclerotic hyperostosis. There is also mild decreased intervertebral disk space height and endplate sclerosis the lower thoracic and upper lumbar regions. Due to the degree of osseous mineralization, it is difficult to evaluate for any possible pars defects. Moderate sclerosis suggesting facet arthropathy in the lumbar spine present and there is mild sclerosis, likely degenerative in nature involving the sacroiliac joints.    10/7/14 MRI lumbar spine: At L1/2 there is mild spinal stenosis secondary to facet hypertrophy and disc bulging.  At L2/3 there is minimal spinal stenosis secondary to retropulsion of L2 compression fracture, appearance of prior kyphoplasty present.  There is minimal disc bulging but there is also some facet hypertrophy at this level.  At L3/4 there is facet and ligamentum hypertrophy, minimal disc bulging causing mild spinal stenosis and neuroforaminal narrowing on the left greater than the right side.  At L4/5 there is moderate hypertrophic facet and ligamentum hypertrophy with mild left foraminal narrowing compared to the right side.  There is no spinal stenosis at this level.  At L5/S1 there is a broad-based disc bulge that extends to the left side more than the right side along  with asymmetric left sided facet hypertrophy and ligamentum flavum hypertrophy causing moderate left foraminal stenosis.    4/15/15 outside open MRI cervical spine Premier  C3-4 posterior disc bulge producing mild bilateral foraminal narrowing  C4-5 posterior disc bulge producing mild bilateral foraminal narrowing, possible tiny annular tear in the posterior disc, anterior thecal sac deformity with no evidence of spinal stenosis  C5-6 circumferential disc bulge producing moderate to severe bilateral foraminal narrowing, effacement of the bilateral lateral recesses worse to the right, anterior thecal sac deformity with effacement of the anterior subarachnoid space, mild spinal canal stenosis  C6-7 circumferential disc bulge producing moderate to severe bilateral foraminal narrowing with mild spinal canal stenosis  C7-T1 not completely included but appears to have a circumferential disc bulge with shallow midline disc protrusion with possible mild spinal canal stenosis and bilateral foraminal narrowing    CT L-spine:  No acute fracture or traumatic subluxation.  Alignment is relatively maintained.  Vertebroplasty changes are at L2.  There is mild, chronic appearing loss of height of the L4 vertebral body.  There is partial osseous fusion at L2-3.  Multilevel facet hypertrophy, osteophyte formation and disc space narrowing are present.   L1-2: Facet hypertrophy with mild right neural foraminal and spinal canal stenosis.   L2-3: Posterior disc osteophyte and facet hypertrophy results in mild to moderate right and mild left neural foraminal stenosis with mild to moderate spinal canal stenosis.   L3-4: Posterior disc osteophyte and facet hypertrophy results in moderate bilateral neural foraminal stenosis with mild to moderate spinal canal stenosis.   L4-5: Broad-based disc osteophyte and facet hypertrophy results in severe left and moderate right neural foraminal stenosis with mild to moderate spinal canal stenosis.    L5-S1: Posterior disc osteophyte eccentric to the left and facet hypertrophy results in moderate to severe bilateral neural foraminal stenosis with mild to moderate spinal canal stenosis.  Paravertebral soft tissues are normal.  Spinal cord stimulator wires into the spinal canal at the T12-L1 level.    12/15/22 CT myelogram L-spine:  T12-L1: Mild disc bulge with likely right central protrusion.  Mild right and minimal left narrowing of the lateral recesses.  No significant spinal canal or foraminal stenosis.   L1-L2: Mild disc bulge.  Mild right and minimal left narrowing of the lateral recesses.  No significant spinal canal stenosis.  Minimal right foraminal stenosis.   L2-L3: Trace retrolisthesis.  Mild disc bulge and posterior osteophytic ridging.  Mild bilateral facet hypertrophy.  Minimal narrowing of the bilateral lateral recesses.  Mild right and minimal left foraminal stenosis.   L3-L4: Trace retrolisthesis.  Mild disc bulge.  Mild left and minimal right narrowing of the lateral recesses.  No significant spinal canal stenosis.  Mild-moderate bilateral foraminal stenosis.   L4-L5: Retrolisthesis.  Uncovering the disc mild disc bulge.  Mild bilateral facet hypertrophy.  Ligamentum flavum thickening.  Mild narrowing of the bilateral lateral recesses.  Mild spinal canal stenosis.  Moderate left and mild-moderate right foraminal stenosis.   L5-S1: Mild disc bulge and posterior osteophytic ridging.  Moderate left and mild right facet hypertrophy.  Moderate left and mild right narrowing of the lateral recesses.  No significant spinal canal stenosis.  Moderate right and mild-moderate left foraminal stenosis.   Bilateral dorsal paraspinal muscular atrophy in the lower lumbar spine.   Spinal cord stimulator leads are present entering the spinal canal at the T12-L1 interlaminar space and extending cranially above the field of view.   Layering dependent stones within the partially visualized urinary bladder, similar to  prior study.  Mild-moderate atherosclerotic calcifications.   Postoperative changes of a previous L2 kyphoplasty with methylmethacrylate present.  There is loss of lumbar vertebral body height at all levels throughout the lumbar spine, progressive within the superior endplate of L4 when compared to the previous study of 12/10/2022.    1/30/23 Xray L-spine:  A spinal stimulator device is partially imaged.  There is a mild compression fracture of L2 status post vertebroplasty.  There is a mild compression fracture of L4 without significant change.  Mild retrolisthesis of L3 on L4 is present.  There is mild loss of disc height at L2-3 and L5-S1.  Moderate lower lumbar facet arthropathy is present there is heavy calcification of the aorta.    06/10/2024 CT cervical spine  Bones: Diffuse bony demineralization.  No fractures or bony destructive changes.  Prominent ventral bridging osteophyte complex at C4-C5 and endplate centered subcortical cystic changes in the setting of severe disc height loss at C5-C6.  Additional prominent ventral bridging osteophyte complexes laterally C6-C7.     Alignment: Within normal limits.     Craniocervical junction: Degenerative changes.  No acute process or may     Disc levels:     Degenerative changes including shallow disc osteophyte complexes most notable asymmetric to the right at C5-C6 with there is no more than mild to moderate narrowing of the right central spinal canal and centrally at C6-C7 producing mild to moderate narrowing of the spinal canal.  No evidence of any high-grade osseous spinal canal narrowing.  Uncovertebral spurring and facet arthrosis producing severe left/moderate right C3-C4, severe right/moderate left C4-C5, severe bilateral C5-C6, and severe bilateral C6-C7 narrowing.     Paraspinal soft tissues: The visualized paraspinal soft tissues and lung apices are within normal limits.    06/10/2024 CT lumbar spine  Bones: Diffuse bony demineralization.  There is a  dorsal thoracic spinal stimulator entering the canal at T12-L1 coursing cranially out of the field of view.  Chronic mild compression fracture deformities status post cement augmentation of L2 and severe biconcave compression fracture of L4, both unchanged.  No acute fractures.  Prominent ventral endplate centered bridging osteophytes throughout.  No bony destructive process.     Alignment: Within normal limits.     Disc levels:     Similar multilevel degenerative changes including moderate disc height loss notable at L2-L3 and L5-S1.  Disc bulging and facet arthrosis most pronounced at L3-L4, L4-L5, and L5-S1 with moderate to severe narrowing of the spinal canal and subarticular recesses.  Severe foraminal narrowing on the left at L5-S1 and moderate to severe on the right at L5-S1 and bilaterally at L3-L4 and L4-L5.  Overall no gross interval change.  Vacuum disc phenomenon at L3-L4.     Soft Tissues: Atherosclerotic changes of the aorta iliac distribution.  Partially imaged presumed left renal cyst measuring 2.9 cm.  Presumed left basilar scarring/atelectasis residual from previous pleural effusion as seen on CT 03/27/2024.  Cholecystectomy changes.  Prostatomegaly.    Assessment:  The patient is a 79-year-old man with a history of hypertension, obesity and low back pain who presents in referral from Dr. Winters for chronic right low back pain.   1. Sacroiliac joint pain  Ambulatory Referral/Consult to Physical Therapy/Occupational Therapy      2. Muscle strain  Ambulatory Referral/Consult to Physical Therapy/Occupational Therapy      3. Myofascial pain  Ambulatory Referral/Consult to Physical Therapy/Occupational Therapy              Plan:  1. I will schedule the patient for a left sacroiliac joint injection.  If he has relief with this but not long-lasting we will schedule him for a left SI joint injection with local anesthetic only in preparation to consider a left sacroiliac joint fusion.    2. I completed orders  for physical therapy for his muscle strain.  He would like to do an evaluation only to learn some exercises and stretches that he can do on his own.    3. Follow-up in 4 weeks postprocedure or sooner as needed.

## 2025-01-30 RX ORDER — HYDRALAZINE HYDROCHLORIDE 25 MG/1
25 TABLET, FILM COATED ORAL EVERY 12 HOURS
Qty: 180 TABLET | Refills: 1 | Status: SHIPPED | OUTPATIENT
Start: 2025-01-30 | End: 2025-07-29

## 2025-02-03 ENCOUNTER — OFFICE VISIT (OUTPATIENT)
Dept: PODIATRY | Facility: CLINIC | Age: 80
End: 2025-02-03
Payer: MEDICARE

## 2025-02-03 VITALS — WEIGHT: 297.19 LBS | BODY MASS INDEX: 41.6 KG/M2 | HEIGHT: 71 IN

## 2025-02-03 DIAGNOSIS — G60.9 IDIOPATHIC PERIPHERAL NEUROPATHY: ICD-10-CM

## 2025-02-03 DIAGNOSIS — I73.9 PERIPHERAL VASCULAR DISEASE: Primary | ICD-10-CM

## 2025-02-03 DIAGNOSIS — B35.1 ONYCHOMYCOSIS: ICD-10-CM

## 2025-02-03 PROCEDURE — 11721 DEBRIDE NAIL 6 OR MORE: CPT | Mod: Q9,S$GLB,, | Performed by: PODIATRIST

## 2025-02-03 PROCEDURE — 99999 PR PBB SHADOW E&M-EST. PATIENT-LVL III: CPT | Mod: PBBFAC,,, | Performed by: PODIATRIST

## 2025-02-03 PROCEDURE — 99499 UNLISTED E&M SERVICE: CPT | Mod: S$GLB,,, | Performed by: PODIATRIST

## 2025-02-03 NOTE — PROGRESS NOTES
Subjective:      Patient ID: Chinedu Roque is a 79 y.o. male.    Chief Complaint: No chief complaint on file.      Chinedu is a 79 y.o. male who presents to the clinic for evaluation and treatment of high risk feet. Chinedu has a past medical history of Anticoagulant long-term use, Arthritis, BPH (benign prostatic hyperplasia) (03/02/2012), Chronic heart failure with preserved ejection fraction (HFpEF), Chronic left shoulder pain, Compression fracture of L2, DDD (degenerative disc disease), lumbar, HTN (hypertension) (03/02/2012), colonic polyps (2013), Low back pain, Morbid obesity with BMI of 40.0-44.9, adult (03/18/2014), Seasonal allergies, Sleep apnea, and Stroke (03/1986). The patient's chief complaint is long, thick toenails. This patient has documented high risk feet requiring routine maintenance secondary to peripheral vascular disease. No acute pedal changes since last visit    PCP: Hernando Browne MD        Current shoe gear:  Casual shoes    Last encounter in this department: Visit date not found    Hemoglobin A1C   Date Value Ref Range Status   08/29/2024 5.9 (H) 0.0 - 5.6 % Final     Comment:     Reference Interval:  5.0 - 5.6 Normal   5.7 - 6.4 High Risk   > 6.5 Diabetic      Hgb A1c results are standardized based on the ([employer]) National   Glycohemoglobin Standardization Program.      Hemoglobin A1C levels are related to mean serum/plasma glucose   during the preceding 2-3 months.        03/21/2024 5.9 (H) 0.0 - 5.6 % Final     Comment:     Reference Interval:  5.0 - 5.6 Normal   5.7 - 6.4 High Risk   > 6.5 Diabetic      Hgb A1c results are standardized based on the ([employer]) National   Glycohemoglobin Standardization Program.      Hemoglobin A1C levels are related to mean serum/plasma glucose   during the preceding 2-3 months.        06/19/2023 5.5 0.0 - 5.6 % Final     Comment:     Reference Interval:  5.0 - 5.6 Normal   5.7 - 6.4 High Risk   > 6.5 Diabetic      Hgb A1c results are  standardized based on the ([employer]) National   Glycohemoglobin Standardization Program.      Hemoglobin A1C levels are related to mean serum/plasma glucose   during the preceding 2-3 months.              Review of Systems   Constitutional: Negative for chills and fever.   Cardiovascular:  Positive for leg swelling. Negative for claudication.   Respiratory:  Negative for shortness of breath.    Skin:  Positive for nail changes. Negative for itching and rash.   Musculoskeletal:  Negative for muscle cramps, muscle weakness and myalgias.   Gastrointestinal:  Negative for nausea and vomiting.   Neurological:  Positive for numbness. Negative for focal weakness, loss of balance and paresthesias.           Objective:      Physical Exam  Constitutional:       General: He is not in acute distress.     Appearance: He is well-developed. He is not diaphoretic.   Cardiovascular:      Pulses:           Dorsalis pedis pulses are 1+ on the right side and 1+ on the left side.        Posterior tibial pulses are 1+ on the right side and 1+ on the left side.      Comments: 3 sec capillary refill time to toes 1-5 bilateral. Feet are warm to touch proximally with distal cooling b/l. There is no hair growth on the feet and toes b/l. There is 2+ edema b/l with some varicosities present b/l.     Musculoskeletal:      Comments: Equinus noted b/l ankles with < 10 deg DF noted. MMT 5/5 in DF/PF/Inv/Ev resistance with no reproduction of pain in any direction. Passive range of motion of ankle and pedal joints is painless b/l.     Skin:     General: Skin is warm and dry.      Coloration: Skin is not pale.      Findings: No abrasion, bruising, burn, ecchymosis, erythema, laceration, lesion, petechiae or rash.      Nails: There is no clubbing.      Comments: Skin is thin shiny and atrophic bilateral    Nails 1-5 bilateral are thick 3-4 mm, long 3-6 mm, and discolored with subungual debris     Neurological:      Mental Status: He is alert and oriented  to person, place, and time.      Sensory: No sensory deficit.      Motor: No tremor, atrophy or abnormal muscle tone.      Comments: Negative tinel sign bilateral.   Psychiatric:         Behavior: Behavior normal.               Assessment:       Encounter Diagnoses   Name Primary?    Peripheral vascular disease Yes    Idiopathic peripheral neuropathy     Onychomycosis            Plan:       Diagnoses and all orders for this visit:    Peripheral vascular disease    Idiopathic peripheral neuropathy    Onychomycosis          I counseled the patient on his conditions, their implications and medical management.        - Shoe inspection. Patient instructed on proper foot hygeine. We discussed wearing proper shoe gear, daily foot inspections, never walking without protective shoe gear, never putting sharp instruments to feet, routine podiatric nail visits every 3 months.      - With patient's permission, nails were aggressively reduced and debrided x 10 to their soft tissue attachment mechanically and with electric , removing all offending nail and debris. Patient relates relief following the procedure. He will continue to monitor the areas daily, inspect his feet, wear protective shoe gear when ambulatory, moisturizer to maintain skin integrity and follow in this office in approximately 2-3 months, sooner p.r.n.    Patient will obtain over the counter arch supports and wear them in shoes whenever possible.  Athletic shoes intended for walking or running are usually best.      Rubio Ramirez DPM

## 2025-02-04 ENCOUNTER — OFFICE VISIT (OUTPATIENT)
Dept: PRIMARY CARE CLINIC | Facility: CLINIC | Age: 80
End: 2025-02-04
Payer: MEDICARE

## 2025-02-04 ENCOUNTER — TELEPHONE (OUTPATIENT)
Dept: PRIMARY CARE CLINIC | Facility: CLINIC | Age: 80
End: 2025-02-04

## 2025-02-04 VITALS
HEART RATE: 76 BPM | OXYGEN SATURATION: 98 % | BODY MASS INDEX: 42.5 KG/M2 | WEIGHT: 303.56 LBS | HEIGHT: 71 IN | TEMPERATURE: 99 F

## 2025-02-04 DIAGNOSIS — U07.1 COVID-19 VIRUS DETECTED: ICD-10-CM

## 2025-02-04 DIAGNOSIS — U07.1 COVID-19 VIRUS INFECTION: ICD-10-CM

## 2025-02-04 DIAGNOSIS — J98.8 CONGESTION OF RESPIRATORY TRACT: Primary | ICD-10-CM

## 2025-02-04 LAB
CTP QC/QA: YES
CTP QC/QA: YES
FLUAV AG NPH QL: NEGATIVE
FLUBV AG NPH QL: NEGATIVE
SARS-COV-2 RDRP RESP QL NAA+PROBE: POSITIVE

## 2025-02-04 PROCEDURE — 87804 INFLUENZA ASSAY W/OPTIC: CPT | Mod: QW,S$GLB,, | Performed by: FAMILY MEDICINE

## 2025-02-04 PROCEDURE — 3288F FALL RISK ASSESSMENT DOCD: CPT | Mod: CPTII,S$GLB,, | Performed by: FAMILY MEDICINE

## 2025-02-04 PROCEDURE — 99999 PR PBB SHADOW E&M-EST. PATIENT-LVL IV: CPT | Mod: PBBFAC,,, | Performed by: FAMILY MEDICINE

## 2025-02-04 PROCEDURE — 99214 OFFICE O/P EST MOD 30 MIN: CPT | Mod: S$GLB,,, | Performed by: FAMILY MEDICINE

## 2025-02-04 PROCEDURE — 87635 SARS-COV-2 COVID-19 AMP PRB: CPT | Mod: QW,S$GLB,, | Performed by: FAMILY MEDICINE

## 2025-02-04 PROCEDURE — 1125F AMNT PAIN NOTED PAIN PRSNT: CPT | Mod: CPTII,S$GLB,, | Performed by: FAMILY MEDICINE

## 2025-02-04 PROCEDURE — 1159F MED LIST DOCD IN RCRD: CPT | Mod: CPTII,S$GLB,, | Performed by: FAMILY MEDICINE

## 2025-02-04 PROCEDURE — 1160F RVW MEDS BY RX/DR IN RCRD: CPT | Mod: CPTII,S$GLB,, | Performed by: FAMILY MEDICINE

## 2025-02-04 PROCEDURE — 1157F ADVNC CARE PLAN IN RCRD: CPT | Mod: CPTII,S$GLB,, | Performed by: FAMILY MEDICINE

## 2025-02-04 PROCEDURE — 1101F PT FALLS ASSESS-DOCD LE1/YR: CPT | Mod: CPTII,S$GLB,, | Performed by: FAMILY MEDICINE

## 2025-02-04 NOTE — PROGRESS NOTES
Primary Care Provider Appointment   [facility]       Patient ID: Chinedu Roque is a 79 y.o. male.    ASSESSMENT/PLAN by Problem List:    Assessment & Plan              1. Congestion of respiratory tract  -     POCT Influenza A/B  -     POCT COVID-19 Rapid Screening    2. COVID-19 virus infection    Other orders  -     molnupiravir 200 mg capsule (EUA); Take 4 capsules (800 mg total) by mouth every 12 (twelve) hours. for 5 days  Dispense: 40 capsule; Refill: 0       Instructions, discussed his diagnosis of COVID-19.  Discussed typical and possible symptoms as well as course.  Patient is not a candidate for Paxlovid because of his current medication in particular the apixaban.  But will prescribe molnupiravir, if covered by his insurance.  Recommend adequate hydration, may take Tylenol if needed.  He should closely watch his respiratory status.  If he has significant shortness of breath especially if there is a drop in his home pulse oximetry monitoring he should let us know and are go to the emergency room.  Right now his lungs are clear so I do not prescribe any bronchodilators but should he develop some wheezing without any significant respiratory distress I will consider adding bronchodilator.    Also he mentioned some fatigue in his legs when walking, this started before he developed respiratory symptoms but could be related.  Not convinced this is claudication will see if this improves as he recovers if not, consider vascular studies.  But I see no signs of acute ischemia at this time    Follow Up:  Phone check later this week.  Otherwise follow in one month as scheduled    Subjective:       Cough  Associated symptoms include a fever. Pertinent negatives include no chest pain, shortness of breath or wheezing.   Fever   Associated symptoms include coughing. Pertinent negatives include no chest pain or wheezing.       Patient is a/an 79 y.o.  male     For complete problem  list, past medical history, surgical history, social history, etc., see appropriate section in the electronic medical record    History of Present Illness              Patient reports he began feeling poorly yesterday subjective fever and chills and some nasal congestion.  No chest pain or shortness of breath, no wheezing.  Did not actually measure temperature.  No obvious sick contacts.      He does state for few days he has had some fatigue in his legs when walking.  Not so much pain or claudication just a muscles in the legs seem tired.  This started at least a couple of days before he developed the respiratory symptoms.    Review of Systems   Constitutional:  Positive for fever.   Respiratory:  Positive for cough. Negative for shortness of breath and wheezing.    Cardiovascular:  Negative for chest pain and leg swelling.   Gastrointestinal: Negative.    Musculoskeletal:  Positive for arthralgias and back pain.   Psychiatric/Behavioral: Negative.         Objective     Physical Exam  Constitutional:       General: He is not in acute distress.     Appearance: He is obese. He is not toxic-appearing.   HENT:      Head: Normocephalic and atraumatic.      Nose: Congestion present.      Mouth/Throat:      Pharynx: No oropharyngeal exudate.   Eyes:      Conjunctiva/sclera: Conjunctivae normal.   Cardiovascular:      Rate and Rhythm: Normal rate and regular rhythm.      Heart sounds: Normal heart sounds.      Comments: Mildly diminished breath sounds.  There is no wheezing, there is no distress.  He is comfortable on room air while wearing a mask.    1+ ankle edema, venous insufficiency changes, no cyanosis,  Pulmonary:      Effort: No respiratory distress.      Breath sounds: No wheezing or rales.      Comments: Lower breath sounds diminished bilaterally  Neurological:      Mental Status: He is alert.      Comments: Ambulatory, gait antalgic       Vitals:    02/04/25 0941   Pulse: 76   Temp: 99.2 °F (37.3 °C)   SpO2:  "98%   Weight: (!) 137.7 kg (303 lb 9.2 oz)   Height: 5' 11" (1.803 m)     Physical Exam              "

## 2025-02-05 NOTE — TELEPHONE ENCOUNTER
Patient states he is feeling better today. He says that he just feels like he has a bad cold with a runny nose and headache today.

## 2025-02-06 ENCOUNTER — PATIENT MESSAGE (OUTPATIENT)
Dept: PAIN MEDICINE | Facility: CLINIC | Age: 80
End: 2025-02-06
Payer: MEDICARE

## 2025-02-06 ENCOUNTER — TELEPHONE (OUTPATIENT)
Dept: SURGERY | Facility: HOSPITAL | Age: 80
End: 2025-02-06
Payer: MEDICARE

## 2025-02-06 NOTE — TELEPHONE ENCOUNTER
Pt is scheduled for Sacroiliac joint injection on Weds., 2/12/2025. Pt just tested + for COVID on Tues., 2/4/2024. Please call pt to reschedule. Thank you.

## 2025-02-09 ENCOUNTER — PATIENT MESSAGE (OUTPATIENT)
Dept: CARDIOLOGY | Facility: CLINIC | Age: 80
End: 2025-02-09
Payer: MEDICARE

## 2025-02-11 DIAGNOSIS — G62.9 PERIPHERAL POLYNEUROPATHY: ICD-10-CM

## 2025-02-11 DIAGNOSIS — G89.29 CHRONIC BILATERAL LOW BACK PAIN WITHOUT SCIATICA: ICD-10-CM

## 2025-02-11 DIAGNOSIS — M54.50 CHRONIC BILATERAL LOW BACK PAIN WITHOUT SCIATICA: ICD-10-CM

## 2025-02-11 RX ORDER — FINASTERIDE 5 MG/1
5 TABLET, FILM COATED ORAL DAILY
Qty: 90 TABLET | Refills: 3 | Status: SHIPPED | OUTPATIENT
Start: 2025-02-11

## 2025-02-11 RX ORDER — GABAPENTIN 300 MG/1
CAPSULE ORAL
Qty: 90 CAPSULE | Refills: 4 | Status: SHIPPED | OUTPATIENT
Start: 2025-02-11

## 2025-02-21 ENCOUNTER — OFFICE VISIT (OUTPATIENT)
Dept: CARDIOLOGY | Facility: CLINIC | Age: 80
End: 2025-02-21
Payer: MEDICARE

## 2025-02-21 VITALS
HEART RATE: 53 BPM | HEIGHT: 71 IN | DIASTOLIC BLOOD PRESSURE: 73 MMHG | SYSTOLIC BLOOD PRESSURE: 147 MMHG | BODY MASS INDEX: 43.06 KG/M2 | WEIGHT: 307.56 LBS

## 2025-02-21 DIAGNOSIS — I48.0 PAF (PAROXYSMAL ATRIAL FIBRILLATION): Chronic | ICD-10-CM

## 2025-02-21 DIAGNOSIS — Z95.1 S/P CABG (CORONARY ARTERY BYPASS GRAFT): ICD-10-CM

## 2025-02-21 DIAGNOSIS — E66.01 SEVERE OBESITY (BMI >= 40): ICD-10-CM

## 2025-02-21 DIAGNOSIS — I65.23 BILATERAL CAROTID ARTERY STENOSIS: Chronic | ICD-10-CM

## 2025-02-21 DIAGNOSIS — Z86.711 HISTORY OF PULMONARY EMBOLUS (PE): ICD-10-CM

## 2025-02-21 DIAGNOSIS — E78.2 MIXED HYPERLIPIDEMIA: Chronic | ICD-10-CM

## 2025-02-21 DIAGNOSIS — I10 PRIMARY HYPERTENSION: ICD-10-CM

## 2025-02-21 DIAGNOSIS — I25.10 CORONARY ARTERY DISEASE INVOLVING NATIVE CORONARY ARTERY OF NATIVE HEART WITHOUT ANGINA PECTORIS: Primary | ICD-10-CM

## 2025-02-21 DIAGNOSIS — I50.32 CHRONIC HEART FAILURE WITH PRESERVED EJECTION FRACTION (HFPEF): Chronic | ICD-10-CM

## 2025-02-21 RX ORDER — FUROSEMIDE 20 MG/1
20 TABLET ORAL NIGHTLY PRN
Qty: 30 TABLET | Refills: 0 | Status: SHIPPED | OUTPATIENT
Start: 2025-02-21 | End: 2026-02-21

## 2025-02-21 NOTE — PROGRESS NOTES
[facility]  Date: 2/21/25    Patient: Chinedu Roque, 1945, [id number]  Primary Care Provider: Hernando Browne MD     Chief Complaint: Follow up     Subjective:       Chinedu Roque is a 79 y.o. male who presents for follow up. They follow with Dr. Marcelino.    CBC, CMP, lipid panel stable. At last office visit, patient doing well from cardiac standpoint for which medications were continued as is.     Since then, patient without active cardiac complaints. Some BLE edema- utilizing additional furosemide once per week when eating salty foods. Average -140s at home. Weight up-trending. Denies chest discomfort, dyspnea, palpitations, dizziness, syncope, orthopnea, PND.    Focused Past History includes:  Coronary artery disease s/p CABG 2023  Chronic heart failure with preserved ejection fraction   CORINNA 6/2024: LEF 55-60%, LAE, surgical LAAO without flow within VANE, mod TR  Paroxysmal atrial fibrillation s/p surgical LAAO 2023  48 hr Holter 6/2024: predominantly sinus rhythm with second degree type I AVB, PAF with controlled rates, 2.4% PVC   Second degree type I AVB  Carotid artery disease   Hypertension  Hyperlipidemia   History of DVT/PE  In setting of postop CABG  Obesity    Current Outpatient Medications   Medication Sig    acetaminophen (TYLENOL) 500 MG tablet Take 500 mg by mouth every 6 (six) hours as needed for Pain.    atorvastatin (LIPITOR) 20 MG tablet Take 1 tablet (20 mg total) by mouth once daily.    b complex vitamins tablet Take 1 tablet by mouth once daily.    bacitracin 500 unit/gram Oint Apply topically 2 (two) times daily. Apply to the tip of the penis    finasteride (PROSCAR) 5 mg tablet Take 1 tablet (5 mg total) by mouth once daily.    furosemide (LASIX) 40 MG tablet TAKE 1 TABLET BY MOUTH EVERY DAY    gabapentin (NEURONTIN) 300 MG capsule Take one in am, and two in PM    hydrALAZINE (APRESOLINE) 25 MG tablet TAKE 1 TABLET (25 MG TOTAL) BY MOUTH EVERY 12  (TWELVE) HOURS    HYDROcodone-acetaminophen (NORCO) 5-325 mg per tablet Take 1 tablet by mouth every 8 (eight) hours as needed for Pain.    polyethylene glycol (GLYCOLAX) 17 gram PwPk Take 17 g by mouth daily as needed.    senna-docusate 8.6-50 mg (PERICOLACE) 8.6-50 mg per tablet Take 1 tablet by mouth 2 (two) times daily.    spironolactone (ALDACTONE) 25 MG tablet Take 1 tablet (25 mg total) by mouth once daily.    tamsulosin (FLOMAX) 0.4 mg Cap TAKE 1 CAPSULE BY MOUTH EVERY DAY    tiZANidine (ZANAFLEX) 2 MG tablet TAKE 1-2 TABLETS EVERY 8 HOURS AS NEEDED FOR MUSCLE SPASM    valsartan (DIOVAN) 320 MG tablet Take 1 tablet (320 mg total) by mouth once daily.    apixaban (ELIQUIS) 5 mg Tab Take 1 tablet (5 mg total) by mouth 2 (two) times daily.    furosemide (LASIX) 20 MG tablet Take 1 tablet (20 mg total) by mouth nightly as needed (weight gain). Take nightly PRN weight gain of 2 lbs in 24 hours or 3 lbs in 72 hours    nystatin (MYCOSTATIN) cream Apply topically 2 (two) times daily. (Patient not taking: Reported on 2/21/2025)     No current facility-administered medications for this visit.     Facility-Administered Medications Ordered in Other Visits   Medication    sodium hyaluronate (EUFLEXXA) 10 mg/mL(mw 2.4 -3.6 million) Syrg 20 mg    sodium hyaluronate (EUFLEXXA) 10 mg/mL(mw 2.4 -3.6 million) Syrg 20 mg            Objective       Review of Systems  Constitutional: negative for fevers, night sweats, and weight loss  Eyes: negative for visual disturbance, diplopia  Respiratory: negative for cough, hemoptysis, sputum, and wheezing  Cardiovascular: see HPI  Gastrointestinal: negative for abdominal pain, bright red blood per rectum, change in bowel habits, dysphagia, melena, and reflux symptoms  Genitourinary:negative for dysuria, frequency, and hematuria  Hematologic/lymphatic: negative for bleeding, easy bruising, and lymphadenopathy  Musculoskeletal:negative for arthralgias, back pain, and  myalgias  Neurological: negative for gait problems, paresthesia, speech problems, vertigo, and weakness  Behavioral/Psych: negative for excessive alcohol consumption, illegal drug usage, and sleep disturbance    -------------------------------------    Anticoagulant long-term use    ASA 81 mg    Arthritis    cervical    BPH (benign prostatic hyperplasia)    Chronic heart failure with preserved ejection fraction (HFpEF)    Chronic left shoulder pain    Compression fracture of L2    DDD (degenerative disc disease), lumbar    HTN (hypertension)    Hx of colonic polyps    Low back pain    Morbid obesity with BMI of 40.0-44.9, adult    Seasonal allergies    Sleep apnea    compliant with CPAP    Stroke     ----------------------------    Angiogram, coronary, with left heart catheterization    Procedure: Left Heart Cath;  Surgeon: Edgardo Marcelino MD;  Location: [facility] CATH;  Service: Cardiology;  Laterality: N/A;    Appendectomy    Cataract extraction extracapsular w/ intraocular lens implantation    Colonoscopy    Procedure: COLONOSCOPY;  Surgeon: Jose Bello MD;  Location: [facility] ENDO;  Service: Endoscopy;  Laterality: N/A;    Colonoscopy    Procedure: COLONOSCOPY;  Surgeon: Sb Spaulding MD;  Location: [facility] ENDO;  Service: Endoscopy;  Laterality: N/A;    Coronary angiography    Procedure: ANGIOGRAM, CORONARY ARTERY;  Surgeon: Edgardo Marcelino MD;  Location: [facility] CATH;  Service: Cardiology;  Laterality: N/A;    Coronary artery bypass graft    Coronary artery bypass graft (cabg)    Procedure: CORONARY ARTERY BYPASS GRAFT (CABG) X3;  Surgeon: Pricila Clark MD;  Location: [facility] OR;  Service: Cardiothoracic;  Laterality: N/A;    Endoscopic harvest of vein    Procedure: HARVEST-VEIN-ENDOVASCULAR;  Surgeon: Pricila Clark MD;  Location: [facility] OR;  Service: Cardiothoracic;  Laterality: Right;    Epidural block injection    cervical    Epidural steroid injection into lumbar spine    Procedure:  Injection-steroid-epidural-lumbar L5/S1 interlaminar;  Surgeon: Riley Segura MD;  Location: [facility] OR;  Service: Pain Management;  Laterality: N/A;    Epidural steroid injection into lumbar spine    Procedure: Injection-steroid-epidural-lumbar;  Surgeon: Riley Segura MD;  Location: [facility] OR;  Service: Pain Management;  Laterality: N/A;  L5/S1 interlaminar to the right    Epidural steroid injection into lumbar spine    Procedure: Injection-steroid-epidural-lumbar L5/S1 to right;  Surgeon: Riley Segura MD;  Location: [facility] OR;  Service: Pain Management;  Laterality: N/A;    Exclusion, left atrial appendage, open, as part of open chest surgery    Procedure: EXCLUSION, LEFT ATRIAL APPENDAGE, OPEN, AS PART OF OPEN CHEST SURGERY;  Surgeon: Pricila Clark MD;  Location: [facility] OR;  Service: Cardiothoracic;  Laterality: N/A;    Exploration of mediastinum    Procedure: EXPLORATION, MEDIASTINUM - MEDIASTINAL RE-EXPLORATION TO CONTROL POST-OP BLEEDING;  Surgeon: Pricila Clark MD;  Location: [facility] OR;  Service: Cardiothoracic;  Laterality: N/A;    Facet steroid injection     Pain management    Fusion, sacroiliac joint, percutaneous, w/o transfixation device    Procedure: FUSION, SACROILIAC JOINT, PERCUTANEOUS, W/O TRANSFIXATION DEVICE;  Surgeon: Riley Segura MD;  Location: [facility] OR;  Service: Pain Management;  Laterality: Right;  normal priorty. PainTeq    Hernia repair    Ventral    Injection of anesthetic agent around medial branch nerves innervating lumbar facet joint    Procedure: Block-nerve-medial branch-lumbar L3/4, L4/5, L5/S1;  Surgeon: Riley Segura MD;  Location: [facility];  Service: Pain Management;  Laterality: Right;    Injection of anesthetic agent into sacroiliac joint    Procedure: BLOCK, SACROILIAC JOINT;  Surgeon: Riley Segura MD;  Location: [facility] OR;  Service: Pain Management;  Laterality: Right;  local only, no steroid    Injection, sacroiliac joint    Procedure:  INJECTION,SACROILIAC JOINT;  Surgeon: Riley Segura MD;  Location: [facility] OR;  Service: Pain Management;  Laterality: Right;    Insertion of dorsal column nerve stimulator for trial    Procedure: INSERTION, NEUROSTIMULATOR, SPINAL CORD, DORSAL COLUMN, FOR TRIAL;  Surgeon: Riley Segura MD;  Location: [facility] OR;  Service: Pain Management;  Laterality: N/A;    Insertion of neurostimulator of dorsal column of spinal cord    Procedure: INSERTION, NEUROSTIMULATOR, SPINAL CORD, DORSAL COLUMN;  Surgeon: Riley Segura MD;  Location: [facility] OR;  Service: Pain Management;  Laterality: N/A;    Knee surgery    bilateral    Laparoscopic cholecystectomy    Procedure: CHOLECYSTECTOMY, LAPAROSCOPIC;  Surgeon: Louisa Leo MD;  Location: [facility];  Service: General;  Laterality: N/A;    Radiofrequency ablation    lumbar nerve    Radiofrequency ablation of lumbar medial branch nerve at single level    Procedure: Radiofrequency Ablation, Nerve, Spinal, Lumbar, Medial Branch, L1,2,3,4,5;  Surgeon: Riley Segura MD;  Location: [facility];  Service: Pain Management;  Laterality: Right;    Tonsillectomy    Transesophageal echocardiogram with possible cardioversion (mary w/ poss cardioversion)    Procedure: TRANSESOPHAGEAL ECHOCARDIOGRAM WITH POSSIBLE CARDIOVERSION (MARY W/ POSS CARDIOVERSION);  Surgeon: Edgardo Marcelino MD;  Location: [facility];  Service: Cardiology;  Laterality: N/A;    Transforaminal epidural injection of steroid    Procedure: Injection,steroid,epidural,transforaminal approach, l3/4 and l5/s1;  Surgeon: Riley Segura MD;  Location: [facility] OR;  Service: Pain Management;  Laterality: Right;    Transforaminal epidural injection of steroid    Procedure: Injection,steroid,epidural,transforaminal approach L5/S1;  Surgeon: Riley Segura MD;  Location: [facility] OR;  Service: Pain Management;  Laterality: Left;    Transforaminal epidural injection of steroid    Procedure:  "Injection,steroid,epidural,transforaminal approach L5/S1;  Surgeon: Riley Segura MD;  Location: [facility] OR;  Service: Pain Management;  Laterality: Right;    Transforaminal epidural injection of steroid    Procedure: Injection,steroid,epidural,transforaminal approach L2/3 and L3/4;  Surgeon: Riley Segura MD;  Location: [facility];  Service: Pain Management;  Laterality: Right;    Vertebroplasty        Family History   Problem Relation Name Age of Onset    Alzheimer's disease Mother      COPD Father      Hypertension Brother      Kidney disease Brother      No Known Problems Daughter      No Known Problems Daughter      No Known Problems Daughter      Hypertension Son      Diabetes Son          pre-diabetic    Amblyopia Neg Hx      Blindness Neg Hx      Cataracts Neg Hx      Glaucoma Neg Hx      Retinal detachment Neg Hx      Macular degeneration Neg Hx      Strabismus Neg Hx       Social History[1]    Physical Exam  BP (!) 147/73 (BP Location: Left arm, Patient Position: Sitting)   Pulse (!) 53   Ht 5' 11" (1.803 m)   Wt (!) 139.5 kg (307 lb 8.7 oz)   BMI 42.89 kg/m²   Body surface area is 2.64 meters squared.  Body mass index is 42.89 kg/m².    General appearance: alert, appears stated age, cooperative, and no distress  Head: Normocephalic, without obvious abnormality, atraumatic  Neck: no carotid bruit, no JVD, and supple, symmetrical, trachea midline  Lungs: clear to auscultation bilaterally  Heart: regular rate and rhythm; S1, S2 normal, no murmur, click, rub or gallop  Abdomen: soft, non-tender, no distended  Extremities: extremities atraumatic, trace non-pitting edema bilaterally  Skin: warm, no cyanosis, no pathologic ecchymosis in exposed portions  Neurologic: Grossly normal. A&O x3      Lab Review   Lab Results   Component Value Date    WBC 8.43 11/12/2024    HGB 14.5 11/12/2024    HCT 43.4 11/12/2024    MCV 92 11/12/2024     11/12/2024         BMP  Lab Results   Component Value Date    "  11/12/2024    K 4.1 11/12/2024     11/12/2024    CO2 27 11/12/2024    BUN 15 11/12/2024    CREATININE 0.90 11/12/2024    CALCIUM 9.5 11/12/2024    ANIONGAP 6 11/12/2024    ESTGFRAFRICA >60.0 03/22/2022    EGFRNONAA >60.0 03/22/2022       Lab Results   Component Value Date    ALBUMIN 4.3 11/12/2024       Lab Results   Component Value Date    ALT 39 11/12/2024    AST 46 11/12/2024    ALKPHOS 87 11/12/2024    BILITOT 1.7 (H) 11/12/2024       Lab Results   Component Value Date    TSH 0.890 11/20/2023       Lab Results   Component Value Date    CHOL 101 (L) 08/29/2024    CHOL 113 (L) 02/01/2024    CHOL 159 03/06/2023     Lab Results   Component Value Date    HDL 50 08/29/2024    HDL 51 02/01/2024    HDL 44 03/06/2023     Lab Results   Component Value Date    LDLCALC 41.6 (L) 08/29/2024    LDLCALC 51.6 (L) 02/01/2024    LDLCALC 98.2 03/06/2023     Lab Results   Component Value Date    TRIG 47 08/29/2024    TRIG 52 02/01/2024    TRIG 84 03/06/2023     Lab Results   Component Value Date    CHOLHDL 49.5 08/29/2024    CHOLHDL 45.1 02/01/2024    CHOLHDL 27.7 03/06/2023                Assessment & Plan:     This is a 79 y.o. male with CAD s/p CABG, chronic HFpEF, PAF s/p LAAO, second degree type 1 AVB, carotid artery disease, HTN, HLD, obesity, history of VTE, ADRIANNE. No active cardiac complaints.     1. Coronary artery disease s/p CABG 2023  - No anginal equivalents   - Continue ASA 81 mg qd   - Continue atorvastatin 20 mg qd     2. Chronic heart failure with preserved ejection fraction   - Euvolemic on exam  - Continue valsartan 320 mg qd   - Continue spironolactone 25 mg qd   - Continue furosemide 40 mg qd with instructions to take additional 20 mg qd prn for volume     3. Paroxysmal atrial fibrillation s/p surgical LAAO  - Rate controlled today in clinic  - Asymptomatic   - Continue apixaban 5 mg BID    4. Second degree type 1 AVB  - Asymptomatic   - Avoid AVN blocking agents     5. Bilateral carotid artery  stenosis  - Asymptomatic   - Continue ASA 81 mg qd   - Continue atorvastatin 20 mg qd     6. Primary hypertension  - Borderline  - Continue valsartan 320 mg qd   - Continue spironolactone 25 mg qd   - Continue furosemide 40 mg qd with instructions to take additional 20 mg qd prn for volume   - Continue hydralazine 25 mg BID  - Monitor BP at home    7. Mixed hyperlipidemia  - LDL 41.6, HDL 50  - Continue ASA 81 mg qd   - Continue atorvastatin 20 mg qd     8. History of DVT/PE  - Postop CABG- DVT and bilateral PE requiring chronic anticoagulation   - Continue apixaban 5 mg BID    9. Severe obesity   - Weight   - Low level/low impact aerobic exercise 5x's/wk recommended.   - Heart healthy diet and risk factor modification discussed with patient.     Emphasized the importance of modifying lifestyle related risk factors including smoking cessation, limiting alcohol intake, exercise, diet most resembling a Mediterranean diet.    Please follow up in 6 months or sooner if needed.      Silvana Escobar PA-C  [facility]  Office: [phone]                 [1]   Social History  Tobacco Use    Smoking status: Former     Current packs/day: 0.00     Average packs/day: 1.5 packs/day for 24.0 years (36.0 ttl pk-yrs)     Types: Cigarettes     Start date: 10/21/1959     Quit date: 3/19/1983     Years since quittin.9     Passive exposure: Past    Smokeless tobacco: Never   Substance Use Topics    Alcohol use: No    Drug use: No

## 2025-02-24 ENCOUNTER — HOSPITAL ENCOUNTER (OUTPATIENT)
Dept: RADIOLOGY | Facility: HOSPITAL | Age: 80
Discharge: HOME | End: 2025-02-24
Attending: ANESTHESIOLOGY | Admitting: ANESTHESIOLOGY
Payer: MEDICARE

## 2025-02-24 ENCOUNTER — HOSPITAL ENCOUNTER (OUTPATIENT)
Facility: HOSPITAL | Age: 80
Discharge: HOME | End: 2025-02-24
Attending: ANESTHESIOLOGY | Admitting: ANESTHESIOLOGY
Payer: MEDICARE

## 2025-02-24 DIAGNOSIS — M53.3 SACROILIAC JOINT PAIN: ICD-10-CM

## 2025-02-24 DIAGNOSIS — M46.1 SACROILIITIS: ICD-10-CM

## 2025-02-24 DIAGNOSIS — M54.50 LOWER BACK PAIN: ICD-10-CM

## 2025-02-24 PROCEDURE — 25000003 PHARM REV CODE 250: Mod: PO | Performed by: ANESTHESIOLOGY

## 2025-02-24 PROCEDURE — 27096 INJECT SACROILIAC JOINT: CPT | Mod: LT,,, | Performed by: ANESTHESIOLOGY

## 2025-02-24 PROCEDURE — 63600175 PHARM REV CODE 636 W HCPCS: Mod: JZ,TB,PO | Performed by: ANESTHESIOLOGY

## 2025-02-24 PROCEDURE — 27096 INJECT SACROILIAC JOINT: CPT | Mod: PO,LT | Performed by: ANESTHESIOLOGY

## 2025-02-24 RX ORDER — BUPIVACAINE HYDROCHLORIDE 2.5 MG/ML
INJECTION, SOLUTION EPIDURAL; INFILTRATION; INTRACAUDAL
Status: DISCONTINUED | OUTPATIENT
Start: 2025-02-24 | End: 2025-02-24 | Stop reason: HOSPADM

## 2025-02-24 RX ORDER — ALPRAZOLAM 0.5 MG/1
1 TABLET, ORALLY DISINTEGRATING ORAL ONCE AS NEEDED
Status: COMPLETED | OUTPATIENT
Start: 2025-02-24 | End: 2025-02-24

## 2025-02-24 RX ORDER — LIDOCAINE HYDROCHLORIDE 10 MG/ML
INJECTION, SOLUTION EPIDURAL; INFILTRATION; INTRACAUDAL; PERINEURAL
Status: DISCONTINUED | OUTPATIENT
Start: 2025-02-24 | End: 2025-02-24 | Stop reason: HOSPADM

## 2025-02-24 RX ORDER — METHYLPREDNISOLONE ACETATE 80 MG/ML
INJECTION, SUSPENSION INTRA-ARTICULAR; INTRALESIONAL; INTRAMUSCULAR; SOFT TISSUE
Status: DISCONTINUED | OUTPATIENT
Start: 2025-02-24 | End: 2025-02-24 | Stop reason: HOSPADM

## 2025-02-24 RX ADMIN — ALPRAZOLAM 0.5 MG: 0.5 TABLET, ORALLY DISINTEGRATING ORAL at 04:02

## 2025-02-24 NOTE — DISCHARGE SUMMARY
[facility] - Surgery  Discharge Note  Short Stay    Procedure(s) (LRB):  INJECTION,SACROILIAC JOINT (Left)      OUTCOME: Patient tolerated treatment/procedure well without complication and is now ready for discharge.    DISPOSITION: Home or Self Care    FINAL DIAGNOSIS:  Sacroiliitis    FOLLOWUP: In clinic    DISCHARGE INSTRUCTIONS:    Discharge Procedure Orders   Diet Adult Regular     No dressing needed     Notify your health care provider if you experience any of the following:  temperature >100.4     Activity as tolerated           16-Oct-2021

## 2025-02-24 NOTE — H&P
CC: Back pain    HPI: The patient is a 80yo man with a history of sacroiliac joint pain here for left SI joint injection. There are no major changes in history and physical from 1/14/25.    Past Medical History:   Diagnosis Date    Anticoagulant long-term use     ASA 81 mg    Arthritis     cervical    BPH (benign prostatic hyperplasia) 03/02/2012    Chronic heart failure with preserved ejection fraction (HFpEF)     Chronic left shoulder pain     Compression fracture of L2     DDD (degenerative disc disease), lumbar     HTN (hypertension) 03/02/2012    Hx of colonic polyps 2013    Low back pain     Morbid obesity with BMI of 40.0-44.9, adult 03/18/2014    Seasonal allergies     Sleep apnea     compliant with CPAP    Stroke 03/1986       Past Surgical History:   Procedure Laterality Date    ANGIOGRAM, CORONARY, WITH LEFT HEART CATHETERIZATION N/A 06/07/2023    Procedure: Left Heart Cath;  Surgeon: Edgardo Marcelino MD;  Location: [facility] CATH;  Service: Cardiology;  Laterality: N/A;    APPENDECTOMY      CATARACT EXTRACTION EXTRACAPSULAR W/ INTRAOCULAR LENS IMPLANTATION      COLONOSCOPY N/A 06/06/2022    Procedure: COLONOSCOPY;  Surgeon: Jose Bello MD;  Location: [facility] ENDO;  Service: Endoscopy;  Laterality: N/A;    COLONOSCOPY N/A 07/06/2023    Procedure: COLONOSCOPY;  Surgeon: Sb Spaulding MD;  Location: [facility] ENDO;  Service: Endoscopy;  Laterality: N/A;    CORONARY ANGIOGRAPHY N/A 06/07/2023    Procedure: ANGIOGRAM, CORONARY ARTERY;  Surgeon: Edgardo Marcelino MD;  Location: [facility] CATH;  Service: Cardiology;  Laterality: N/A;    CORONARY ARTERY BYPASS GRAFT      CORONARY ARTERY BYPASS GRAFT (CABG) N/A 06/23/2023    Procedure: CORONARY ARTERY BYPASS GRAFT (CABG) X3;  Surgeon: Pricila Clark MD;  Location: [facility] OR;  Service: Cardiothoracic;  Laterality: N/A;    ENDOSCOPIC HARVEST OF VEIN Right 06/23/2023    Procedure: HARVEST-VEIN-ENDOVASCULAR;  Surgeon: Pricila Clark MD;  Location: [facility] OR;  Service:  Cardiothoracic;  Laterality: Right;    EPIDURAL BLOCK INJECTION  2015    cervical    EPIDURAL STEROID INJECTION INTO LUMBAR SPINE N/A 06/05/2019    Procedure: Injection-steroid-epidural-lumbar L5/S1 interlaminar;  Surgeon: Riley Segura MD;  Location: [facility] OR;  Service: Pain Management;  Laterality: N/A;    EPIDURAL STEROID INJECTION INTO LUMBAR SPINE N/A 09/13/2019    Procedure: Injection-steroid-epidural-lumbar;  Surgeon: Riley eSgura MD;  Location: [facility] OR;  Service: Pain Management;  Laterality: N/A;  L5/S1 interlaminar to the right    EPIDURAL STEROID INJECTION INTO LUMBAR SPINE N/A 06/02/2020    Procedure: Injection-steroid-epidural-lumbar L5/S1 to right;  Surgeon: Riley Segura MD;  Location: [facility] OR;  Service: Pain Management;  Laterality: N/A;    EXCLUSION, LEFT ATRIAL APPENDAGE, OPEN, AS PART OF OPEN CHEST SURGERY N/A 06/23/2023    Procedure: EXCLUSION, LEFT ATRIAL APPENDAGE, OPEN, AS PART OF OPEN CHEST SURGERY;  Surgeon: Pricila Clark MD;  Location: [facility] OR;  Service: Cardiothoracic;  Laterality: N/A;    EXPLORATION OF MEDIASTINUM N/A 06/23/2023    Procedure: EXPLORATION, MEDIASTINUM - MEDIASTINAL RE-EXPLORATION TO CONTROL POST-OP BLEEDING;  Surgeon: Pricila Clark MD;  Location: [facility] OR;  Service: Cardiothoracic;  Laterality: N/A;    Facet Steroid injection       Pain management    FUSION, SACROILIAC JOINT, PERCUTANEOUS, W/O TRANSFIXATION DEVICE Right 12/6/2024    Procedure: FUSION, SACROILIAC JOINT, PERCUTANEOUS, W/O TRANSFIXATION DEVICE;  Surgeon: Riley Segura MD;  Location: [facility] OR;  Service: Pain Management;  Laterality: Right;  normal priorty. PainTeq    HERNIA REPAIR      Ventral    INJECTION OF ANESTHETIC AGENT AROUND MEDIAL BRANCH NERVES INNERVATING LUMBAR FACET JOINT Right 03/21/2023    Procedure: Block-nerve-medial branch-lumbar L3/4, L4/5, L5/S1;  Surgeon: Riley Segura MD;  Location: [facility];  Service: Pain Management;  Laterality: Right;    INJECTION OF  ANESTHETIC AGENT INTO SACROILIAC JOINT Right 10/23/2024    Procedure: BLOCK, SACROILIAC JOINT;  Surgeon: Riley Segura MD;  Location: [facility] OR;  Service: Pain Management;  Laterality: Right;  local only, no steroid    INJECTION, SACROILIAC JOINT Right 6/28/2024    Procedure: INJECTION,SACROILIAC JOINT;  Surgeon: Riley Segura MD;  Location: [facility] OR;  Service: Pain Management;  Laterality: Right;    INSERTION OF DORSAL COLUMN NERVE STIMULATOR FOR TRIAL N/A 02/10/2021    Procedure: INSERTION, NEUROSTIMULATOR, SPINAL CORD, DORSAL COLUMN, FOR TRIAL;  Surgeon: Riley Segura MD;  Location: [facility] OR;  Service: Pain Management;  Laterality: N/A;    INSERTION OF NEUROSTIMULATOR OF DORSAL COLUMN OF SPINAL CORD N/A 03/01/2021    Procedure: INSERTION, NEUROSTIMULATOR, SPINAL CORD, DORSAL COLUMN;  Surgeon: Riley Segura MD;  Location: [facility] OR;  Service: Pain Management;  Laterality: N/A;    KNEE SURGERY      bilateral    LAPAROSCOPIC CHOLECYSTECTOMY N/A 11/29/2023    Procedure: CHOLECYSTECTOMY, LAPAROSCOPIC;  Surgeon: Louisa Leo MD;  Location: [facility];  Service: General;  Laterality: N/A;    RADIOFREQUENCY ABLATION  2015    lumbar nerve    RADIOFREQUENCY ABLATION OF LUMBAR MEDIAL BRANCH NERVE AT SINGLE LEVEL Right 04/11/2019    Procedure: Radiofrequency Ablation, Nerve, Spinal, Lumbar, Medial Branch, L1,2,3,4,5;  Surgeon: Riley Segura MD;  Location: [facility] OR;  Service: Pain Management;  Laterality: Right;    TONSILLECTOMY      TRANSESOPHAGEAL ECHOCARDIOGRAM WITH POSSIBLE CARDIOVERSION (CORINNA W/ POSS CARDIOVERSION) N/A 6/4/2024    Procedure: TRANSESOPHAGEAL ECHOCARDIOGRAM WITH POSSIBLE CARDIOVERSION (CORINNA W/ POSS CARDIOVERSION);  Surgeon: Edgardo Marcelino MD;  Location: [facility];  Service: Cardiology;  Laterality: N/A;    TRANSFORAMINAL EPIDURAL INJECTION OF STEROID Right 11/13/2020    Procedure: Injection,steroid,epidural,transforaminal approach, l3/4 and l5/s1;  Surgeon: Riley CASTLE  MD Colton;  Location: [facility] OR;  Service: Pain Management;  Laterality: Right;    TRANSFORAMINAL EPIDURAL INJECTION OF STEROID Left 2021    Procedure: Injection,steroid,epidural,transforaminal approach L5/S1;  Surgeon: Riley Segura MD;  Location: [facility] OR;  Service: Pain Management;  Laterality: Left;    TRANSFORAMINAL EPIDURAL INJECTION OF STEROID Right 2022    Procedure: Injection,steroid,epidural,transforaminal approach L5/S1;  Surgeon: Riley Segura MD;  Location: [facility] OR;  Service: Pain Management;  Laterality: Right;    TRANSFORAMINAL EPIDURAL INJECTION OF STEROID Right 10/25/2022    Procedure: Injection,steroid,epidural,transforaminal approach L2/3 and L3/4;  Surgeon: Riley Segura MD;  Location: [facility];  Service: Pain Management;  Laterality: Right;    VERTEBROPLASTY         Family History   Problem Relation Name Age of Onset    Alzheimer's disease Mother      COPD Father      Hypertension Brother      Kidney disease Brother      No Known Problems Daughter      No Known Problems Daughter      No Known Problems Daughter      Hypertension Son      Diabetes Son          pre-diabetic    Amblyopia Neg Hx      Blindness Neg Hx      Cataracts Neg Hx      Glaucoma Neg Hx      Retinal detachment Neg Hx      Macular degeneration Neg Hx      Strabismus Neg Hx         Social History     Socioeconomic History    Marital status:    Tobacco Use    Smoking status: Former     Current packs/day: 0.00     Average packs/day: 1.5 packs/day for 24.0 years (36.0 ttl pk-yrs)     Types: Cigarettes     Start date: 10/21/1959     Quit date: 3/19/1983     Years since quittin.9     Passive exposure: Past    Smokeless tobacco: Never   Substance and Sexual Activity    Alcohol use: No    Drug use: No   Social History Narrative    Retired - Worked for the [employer].      Social Drivers of Health     Financial Resource Strain: Low Risk  (2024)    Overall Financial Resource Strain (CARDIA)      "Difficulty of Paying Living Expenses: Not very hard   Food Insecurity: No Food Insecurity (9/8/2024)    Hunger Vital Sign     Worried About Running Out of Food in the Last Year: Never true     Ran Out of Food in the Last Year: Never true   Transportation Needs: No Transportation Needs (11/28/2023)    PRAPARE - Transportation     Lack of Transportation (Medical): No     Lack of Transportation (Non-Medical): No   Physical Activity: Insufficiently Active (9/8/2024)    Exercise Vital Sign     Days of Exercise per Week: 4 days     Minutes of Exercise per Session: 20 min   Stress: Stress Concern Present (9/8/2024)    [employer] - Occupational Stress Questionnaire     Feeling of Stress : To some extent   Housing Stability: Low Risk  (11/28/2023)    Housing Stability Vital Sign     Unable to Pay for Housing in the Last Year: No     Number of Places Lived in the Last Year: 1     Unstable Housing in the Last Year: No       Current Medications[1]    Review of patient's allergies indicates:  No Known Allergies    Vitals:    02/19/25 1108 02/24/25 1617   BP:  (!) 172/70   Pulse:  (!) 56   Resp:  16   Temp:  98 °F (36.7 °C)   SpO2:  99%   Weight: (!) 137.4 kg (303 lb)    Height: 5' 11" (1.803 m)        ASA 2, Mallampati 2    REVIEW OF SYSTEMS:     GENERAL: No weight loss, malaise or fevers.  HEENT:  No recent changes in vision or hearing  NECK: Negative for lumps, no difficulty with swallowing.  RESPIRATORY: Negative for cough, wheezing or shortness of breath, patient denies any recent URI.  CARDIOVASCULAR: Negative for chest pain, leg swelling or palpitations.  GI: Negative for abdominal discomfort, blood in stools or black stools or change in bowel habits.  MUSCULOSKELETAL: See HPI.  SKIN: Negative for lesions, rash, and itching.  PSYCH: No suicidal or homicidal ideations, no current mood disturbances.  HEMATOLOGY/LYMPHOLOGY: Negative for prolonged bleeding, bruising easily or swollen nodes. " Patient is not currently taking any anti-coagulants  ENDO: No history of diabetes or thyroid dysfunction  NEURO: No history of syncope, paralysis, seizures or tremors.All other reviewed and negative other than HPI.    Physical exam:  Gen: A and O x3, pleasant, well-groomed  Skin: No rashes or obvious lesions  HEENT: PERRLA, no obvious deformities on ears or in canals. No thyroid masses, trachea midline, no palpable lymph nodes in neck, axilla.  CVS: Regular rate and rhythm, normal S1 and S2, no murmurs.  Resp: Clear to auscultation bilaterally.  Abdomen: Soft, NT/ND, normal bowel sounds present.  Musculoskeletal/Neuro: Moving all extremities    Assessment:  Sacroiliac joint pain  -     Place in Outpatient; Standing  -     Vital signs; Standing  -     Verify informed consent; Standing  -     Notify physician ; Standing  -     Notify physician ; Standing  -     Notify physician (specify); Standing  -     Diet NPO; Standing  -     alprazolam ODT dissolvable tablet 1 mg    Sacroiliitis    Other orders  -     IP VTE LOW RISK PATIENT; Standing               [1]   No current facility-administered medications for this encounter.     Facility-Administered Medications Ordered in Other Encounters   Medication Dose Route Frequency Provider Last Rate Last Admin    sodium hyaluronate (EUFLEXXA) 10 mg/mL(mw 2.4 -3.6 million) Syrg 20 mg  20 mg Intra-articular  Michelet Covarrubias MD   20 mg at 05/14/18 1046    sodium hyaluronate (EUFLEXXA) 10 mg/mL(mw 2.4 -3.6 million) Syrg 20 mg  20 mg Intra-articular  Michelet Covarrubias MD   20 mg at 05/14/18 1046

## 2025-02-24 NOTE — OP NOTE
PROCEDURE:  Left sacroiliac joint injection utilizing fluoroscopy.    DIAGNOSIS: Left sided sacroiliitis.  Post op diagnosis: same    PHYSICIAN: Riley Segura MD    MEDICATIONS INJECTED:  Methylprednisone 80mg and 2ml Bupivacaine 0.25%.    LOCAL ANESTHETIC USED: Lidocaine 1%,4ml total.     SEDATION MEDICATIONS: none    ESTIMATED BLOOD LOSS: none    COMPLICATIONS: none    TECHNIQUE:   Time-out taken to identify patient and procedure side prior to starting the procedure. After placing the patient in the prone position, the patient was prepped and draped in the usual sterile fashion using ChloraPrep and sterile towels.  The area was determined under fluoroscopy in the AP view.  Lidocaine was injected by raising a wheal and going down to the periosteum using a 25-gauge 1.5 inch needle.  The 3.5 inch 22-gauge spinal needle was introduced into inferior opening of the left sacroiliac joint.  Negative pressure applied to confirm no intravascular placement.   The medication was then injected slowly. The patient tolerated the procedure well.    The patient was monitored after the procedure.  The patient was given post procedure and discharge instructions to follow at home. The patient was discharged in a stable condition

## 2025-02-25 VITALS
OXYGEN SATURATION: 97 % | RESPIRATION RATE: 16 BRPM | HEART RATE: 68 BPM | HEIGHT: 71 IN | SYSTOLIC BLOOD PRESSURE: 157 MMHG | TEMPERATURE: 98 F | WEIGHT: 303 LBS | BODY MASS INDEX: 42.42 KG/M2 | DIASTOLIC BLOOD PRESSURE: 64 MMHG

## 2025-03-03 ENCOUNTER — OFFICE VISIT (OUTPATIENT)
Dept: PRIMARY CARE CLINIC | Facility: CLINIC | Age: 80
End: 2025-03-03
Payer: MEDICARE

## 2025-03-03 VITALS
HEART RATE: 70 BPM | SYSTOLIC BLOOD PRESSURE: 134 MMHG | WEIGHT: 295.75 LBS | OXYGEN SATURATION: 98 % | BODY MASS INDEX: 41.25 KG/M2 | DIASTOLIC BLOOD PRESSURE: 74 MMHG

## 2025-03-03 DIAGNOSIS — G89.29 CHRONIC BILATERAL LOW BACK PAIN WITHOUT SCIATICA: ICD-10-CM

## 2025-03-03 DIAGNOSIS — R73.09 ELEVATED GLUCOSE: ICD-10-CM

## 2025-03-03 DIAGNOSIS — M54.50 CHRONIC BILATERAL LOW BACK PAIN WITHOUT SCIATICA: ICD-10-CM

## 2025-03-03 DIAGNOSIS — I10 PRIMARY HYPERTENSION: ICD-10-CM

## 2025-03-03 DIAGNOSIS — I50.32 CHRONIC HEART FAILURE WITH PRESERVED EJECTION FRACTION (HFPEF): Primary | Chronic | ICD-10-CM

## 2025-03-03 DIAGNOSIS — E66.01 SEVERE OBESITY (BMI >= 40): ICD-10-CM

## 2025-03-03 PROCEDURE — 1125F AMNT PAIN NOTED PAIN PRSNT: CPT | Mod: CPTII,S$GLB,, | Performed by: FAMILY MEDICINE

## 2025-03-03 PROCEDURE — 3075F SYST BP GE 130 - 139MM HG: CPT | Mod: CPTII,S$GLB,, | Performed by: FAMILY MEDICINE

## 2025-03-03 PROCEDURE — 1159F MED LIST DOCD IN RCRD: CPT | Mod: CPTII,S$GLB,, | Performed by: FAMILY MEDICINE

## 2025-03-03 PROCEDURE — 3078F DIAST BP <80 MM HG: CPT | Mod: CPTII,S$GLB,, | Performed by: FAMILY MEDICINE

## 2025-03-03 PROCEDURE — 99999 PR PBB SHADOW E&M-EST. PATIENT-LVL IV: CPT | Mod: PBBFAC,,, | Performed by: FAMILY MEDICINE

## 2025-03-03 PROCEDURE — 1160F RVW MEDS BY RX/DR IN RCRD: CPT | Mod: CPTII,S$GLB,, | Performed by: FAMILY MEDICINE

## 2025-03-03 PROCEDURE — 1123F ACP DISCUSS/DSCN MKR DOCD: CPT | Mod: CPTII,S$GLB,, | Performed by: FAMILY MEDICINE

## 2025-03-03 PROCEDURE — 99214 OFFICE O/P EST MOD 30 MIN: CPT | Mod: S$GLB,,, | Performed by: FAMILY MEDICINE

## 2025-03-03 PROCEDURE — 3288F FALL RISK ASSESSMENT DOCD: CPT | Mod: CPTII,S$GLB,, | Performed by: FAMILY MEDICINE

## 2025-03-03 PROCEDURE — 1101F PT FALLS ASSESS-DOCD LE1/YR: CPT | Mod: CPTII,S$GLB,, | Performed by: FAMILY MEDICINE

## 2025-03-03 RX ORDER — TIZANIDINE 2 MG/1
TABLET ORAL
Qty: 20 TABLET | Refills: 0 | Status: SHIPPED | OUTPATIENT
Start: 2025-03-03

## 2025-03-03 NOTE — PROGRESS NOTES
Primary Care Provider Appointment   [facility], [place]       Patient ID: Chinedu Roque is a 79 y.o. male.    ASSESSMENT/PLAN by Problem List:    Assessment & Plan    IMPRESSION:  - Reviewed recent sacroiliac injection procedure by Dr. Segura, which provided temporary pain relief  - Noted ongoing side and groin pain  - Considered potential for future sacroiliac fusion on the affected side  - Assessed effectiveness of current pain management strategies  - Evaluated need for physical therapy once pain is better controlled  - Observed mild pitting edema and venous insufficiency in legs, without concerning ulcerations  - Auscultated lungs, noting faint crackles at the bases, likely chronic  - Planned to reassess cholesterol and blood sugar due to past mild elevations    MUSCLE SPASMS:  - Prescribed tizanidine (Zanaflex) for muscle relaxation, 20 pills.  - Instructed the patient not to take tizanidine before driving or with other sedating medications.  - Advised to take tizanidine at bedtime or when staying in one place for an extended period.  - Noted that the patient experienced a severe cramp in the calf.    CHRONIC PAIN:  - Mr. Roque reports ongoing pain in the side and groin area.  - Noted that the patient had a numbing procedure on Monday which provided temporary relief until Friday.  - Reviewed previous sacroiliac injection and fusion procedures.  - Acknowledged that fixing one or two things in the back rarely eliminates all pain.  - Suggested considering therapy in the future for strengthening muscles and improving mobility.  - Mr. Roque is using a ball for stretching exercises at home and trying to stay active.  - Mr. Roque reports pain in the side and groin area.  - Noted that the patient had sacroiliac fusion on one side.    VENOUS INSUFFICIENCY:  - Observed hyperpigmentation, varicose veins, and mild pitting edema in the legs.  - Recommend applying lotion daily to prevent  cracking and infection.  - Observed signs of venous insufficiency and mild pitting edema in the legs.  - Mr. Roque to apply lotion to legs daily to prevent skin cracking and potential infections.    INSOMNIA:  - Emphasized the importance of proper sleep hygiene, including the impact of caffeine on sleep quality.  - Instructed the patient to limit caffeine intake to before 11:00 AM.  - Discussed the mechanism of melatonin and its potential benefits for sleep.  - Discontinued current melatonin regimen (10 mg before bed).  - Prescribed new melatonin regimen: 1 mg taken 3-4 hours before desired bedtime (around 7:00 PM).  - Advised to try the new regimen for several consecutive days to assess effectiveness.  - Mr. Roque reports difficulty falling asleep, taking 3-4 hours to fall asleep after going to bed.  - Noted that the patient's insomnia is not due to pain, but mental activity.  - Advised exposure to natural sunlight twice daily to regulate circadian rhythm.  - Mr. Roque to expose self to natural sunlight 2 times daily, once in the morning and once before sunset.    WEIGHT MANAGEMENT:  - Mr. Roque's weight fluctuates between 291 and 300 lbs.  - Noted that the patient's weight has decreased from previous measurements.  - Emphasized the importance of weight loss for overall health improvement.  - Recommend a low carbohydrate diet, emphasizing lean protein, fiber, and complex carbohydrates while limiting sugars and starchy foods.  - Educated on the benefits of a low carbohydrate diet for weight management and overall health.  - Explained the difference between simple and complex carbohydrates and their effects on blood sugar and weight.  - Recommend following a low carbohydrate diet:  - - Limit sugar intake  - - Reduce consumption of starchy processed carbohydrates (white bread, pasta, rice, potatoes)  - - Increase intake of whole   grains, high-fiber foods, vegetables, and legumes  - - Focus on lean protein  sources    HFpEF  Other than edema this appears stable and compensated.  Feel edema is more related to dependent edema and not CHF exacerbation.  Reinforced monitoring daily weights, sodium restriction, reporting worsening symptoms    LABS:  - Ordered labs for Wednesday, including cholesterol and blood sugar tests.  - Instructed the patient to walk-in for labs on Wednesday.  - Planned to check cholesterol and blood sugar in the next visit.    FOLLOW UP:  - Scheduled a follow-up visit in 3 weeks.  - Scheduled follow-up when labs results are available.         CODING, ORDERS, AND ADDITIONAL DOCUMENTATION RELATED TO THIS ENCOUNTER:  (note that the diagnoses and clinical summaries above were generated with the assistance of [employer] software and represents my assessment and plan.  This software does not always generate the precise nor most specific diagnosis codes.  Therefore the specific diagnosis codes and orders for billing purposes are detailed below along with any additional documentation if needed)      1. Chronic heart failure with preserved ejection fraction (HFpEF)    2. Primary hypertension  -     Comprehensive Metabolic Panel; Future; Expected date: 03/03/2025  -     CBC Auto Differential; Future; Expected date: 03/03/2025  -     Lipid Panel; Future; Expected date: 03/03/2025  -     Hemoglobin A1C; Future; Expected date: 03/03/2025    3. Severe obesity (BMI >= 40)    4. Chronic bilateral low back pain without sciatica  -     tiZANidine (ZANAFLEX) 2 MG tablet; TAKE 1-2 TABLETS EVERY 8 HOURS AS NEEDED FOR MUSCLE SPASM  Dispense: 20 tablet; Refill: 0    5. Elevated glucose  -     Hemoglobin A1C; Future; Expected date: 03/03/2025     Advance Care Planning     Date: 03/03/2025    ACP Reviewed/No Changes  Voluntary advance care planning discussion had today with patient. Previously completed HCPOA in electronic medical record is current, no changes made.      A total of less than five min was spent on  advance care planning, goals of care discussion, emotional support, formulating and communicating prognosis and exploring burden/benefit of various approaches of treatment. This discussion occurred on a fully voluntary basis with the verbal consent of the patient and/or family.               Subjective:       HPI    Patient is a/an 79 y.o.  male     For complete problem list, past medical history, surgical history, social history, etc., see appropriate section in the electronic medical record    History of Present Illness    CHIEF COMPLAINT:  Mr. Roque presents today for follow up of side pain    PAIN HISTORY:  He underwent a numbing procedure on Monday which provided complete pain relief until Friday. He reports side pain that radiates to the groin. He has history of sacroiliac injection and previous sacroiliac fusion on contralateral side. He has taken hydrocodone which caused dizziness throughout the day. Previously, tizanidine provided some symptom relief.    SLEEP:  He reports difficulty falling asleep, taking 3-3.5 hours to fall asleep after going to bed at 10:30 PM. He denies pain as cause of insomnia, attributing sleep difficulties to mental activity. Melatonin 10mg taken one hour before bedtime has not been effective.    WEIGHT:  His weight fluctuates between 291-300 lbs, with lower measurements in the middle of the night and higher measurements in the morning. He uses a digital scale obtained during previous cardiac monitoring. Weight has remained relatively stable recently with slight decrease from previous measurements.    SOCIAL HISTORY:  He consumes two cups of coffee daily, one in the morning and one at noon.    GENITOURINARY:  He denies any urinary problems and reports good urine flow.    CARDIOVASCULAR:  He had a cardiology appointment last week with 6-month follow-up scheduled.      ROS:  Genitourinary: -difficulty urinating  Musculoskeletal: +muscle pain  Neurological: +dizziness  Psychiatric:  +sleep difficulty       Review of Systems    Objective     Physical Exam  Constitutional:       General: He is not in acute distress.     Appearance: He is obese. He is not toxic-appearing.   HENT:      Head: Normocephalic and atraumatic.      Mouth/Throat:      Pharynx: No oropharyngeal exudate.   Eyes:      Conjunctiva/sclera: Conjunctivae normal.   Cardiovascular:      Rate and Rhythm: Normal rate and regular rhythm.      Heart sounds: Normal heart sounds.      Comments: 1+ bilateral pitting edema with venous insufficiency changes no skin breakdown noted  Pulmonary:      Comments: Lower breath sounds diminished bilaterally  Neurological:      Mental Status: He is alert.      Comments: Ambulatory, gait antalgic       Vitals:    03/03/25 1438   BP: 134/74   BP Location: Right arm   Patient Position: Sitting   Pulse: 70   SpO2: 98%   Weight: 134.1 kg (295 lb 12 oz)     Physical Exam                This note was generated with the assistance of ambient listening technology. Verbal consent was obtained by the patient and accompanying visitor(s) for the recording of patient appointment to facilitate this note. I attest to having reviewed and edited the generated note for accuracy, though some syntax or spelling errors may persist. Please contact the author of this note for any clarification.  Parts of the note were also generated with voice recognition software.  Occasionally this software will misinterpreted words or phrases

## 2025-03-04 NOTE — TELEPHONE ENCOUNTER
----- Message from Anastasia Michael sent at 9/5/2024  1:34 PM CDT -----  Contact: pt  Type: Needs Medical Advice         Who Called: pt  Best Call Back Number:[phone]  Additional Information: Requesting a call back regarding pt returned office call and asking for a call back please   Please Advise- Thank you  
no

## 2025-03-05 ENCOUNTER — RESULTS FOLLOW-UP (OUTPATIENT)
Dept: PRIMARY CARE CLINIC | Facility: CLINIC | Age: 80
End: 2025-03-05
Payer: MEDICARE

## 2025-03-05 NOTE — TELEPHONE ENCOUNTER
----- Message from Vanna Rausch MD sent at 3/5/2025  3:46 PM CST -----  Regarding his recent labs:   Hgb A1c is remarkably improved. Great job and keep up whatever he's doing which is likely limiting carb intake. Went from 5.9 to 5.5 which is now in the normal range.   Cholesterol looks amazing. And is stable.   CBC shows mild and chronic anemia. As he was iron deficient a year ago, and his recent ferritin (storage iron) is low, may want to consider taking an otc iron supplement and to be discussed with dr botello at next appointment but very mild.   CMP shows normal lytes, normal glucose, calcium, protein, kidney and liver function  Overall, labs look great but kuddos on that hgb A1c !  ----- Message -----  From: [facility] [facility]  Sent: 3/5/2025  10:21 AM CST  To: Hernando Browne MD

## 2025-03-05 NOTE — PROGRESS NOTES
Regarding his recent labs:   Hgb A1c is remarkably improved. Great job and keep up whatever he's doing which is likely limiting carb intake. Went from 5.9 to 5.5 which is now in the normal range.   Cholesterol looks amazing. And is stable.   CBC shows mild and chronic anemia. As he was iron deficient a year ago, and his recent ferritin (storage iron) is low, may want to consider taking an otc iron supplement and to be discussed with dr botello at next appointment but very mild.   CMP shows normal lytes, normal glucose, calcium, protein, kidney and liver function  Overall, labs look great but kuddos on that hgb A1c !

## 2025-03-13 ENCOUNTER — TELEPHONE (OUTPATIENT)
Dept: PRIMARY CARE CLINIC | Facility: CLINIC | Age: 80
End: 2025-03-13
Payer: MEDICARE

## 2025-03-13 RX ORDER — TIRZEPATIDE 2.5 MG/.5ML
2.5 INJECTION, SOLUTION SUBCUTANEOUS
Qty: 2 ML | Refills: 2 | Status: CANCELLED | OUTPATIENT
Start: 2025-03-13

## 2025-03-13 NOTE — TELEPHONE ENCOUNTER
Sammi, outpt CM with [facility], called and reports that pt inquired about bariatric services.  Pt is interested in an injectable weight loss medication.  Sammi also discussed pt's A1C and that he does not have a dx of diabetes.  Pt was prediabetic 6 months ago; however, his A1C has improved since then and is down to 5.5 as of last week.    Please advise.  Order pended.

## 2025-03-24 DIAGNOSIS — Z00.00 ENCOUNTER FOR MEDICARE ANNUAL WELLNESS EXAM: ICD-10-CM

## 2025-03-25 ENCOUNTER — TELEPHONE (OUTPATIENT)
Dept: PAIN MEDICINE | Facility: CLINIC | Age: 80
End: 2025-03-25

## 2025-03-25 ENCOUNTER — OFFICE VISIT (OUTPATIENT)
Dept: PAIN MEDICINE | Facility: CLINIC | Age: 80
End: 2025-03-25
Payer: MEDICARE

## 2025-03-25 VITALS
SYSTOLIC BLOOD PRESSURE: 133 MMHG | HEART RATE: 65 BPM | HEIGHT: 71 IN | BODY MASS INDEX: 41.89 KG/M2 | WEIGHT: 299.19 LBS | DIASTOLIC BLOOD PRESSURE: 62 MMHG

## 2025-03-25 DIAGNOSIS — M53.3 SACROILIAC JOINT PAIN: Primary | ICD-10-CM

## 2025-03-25 DIAGNOSIS — M54.6 PAIN IN THORACIC SPINE: ICD-10-CM

## 2025-03-25 PROCEDURE — 99214 OFFICE O/P EST MOD 30 MIN: CPT | Mod: S$GLB,,, | Performed by: PHYSICIAN ASSISTANT

## 2025-03-25 PROCEDURE — 1160F RVW MEDS BY RX/DR IN RCRD: CPT | Mod: CPTII,S$GLB,, | Performed by: PHYSICIAN ASSISTANT

## 2025-03-25 PROCEDURE — 3288F FALL RISK ASSESSMENT DOCD: CPT | Mod: CPTII,S$GLB,, | Performed by: PHYSICIAN ASSISTANT

## 2025-03-25 PROCEDURE — 99999 PR PBB SHADOW E&M-EST. PATIENT-LVL IV: CPT | Mod: PBBFAC,,, | Performed by: PHYSICIAN ASSISTANT

## 2025-03-25 PROCEDURE — 1125F AMNT PAIN NOTED PAIN PRSNT: CPT | Mod: CPTII,S$GLB,, | Performed by: PHYSICIAN ASSISTANT

## 2025-03-25 PROCEDURE — 3078F DIAST BP <80 MM HG: CPT | Mod: CPTII,S$GLB,, | Performed by: PHYSICIAN ASSISTANT

## 2025-03-25 PROCEDURE — 3075F SYST BP GE 130 - 139MM HG: CPT | Mod: CPTII,S$GLB,, | Performed by: PHYSICIAN ASSISTANT

## 2025-03-25 PROCEDURE — 1157F ADVNC CARE PLAN IN RCRD: CPT | Mod: CPTII,S$GLB,, | Performed by: PHYSICIAN ASSISTANT

## 2025-03-25 PROCEDURE — 1101F PT FALLS ASSESS-DOCD LE1/YR: CPT | Mod: CPTII,S$GLB,, | Performed by: PHYSICIAN ASSISTANT

## 2025-03-25 PROCEDURE — 1159F MED LIST DOCD IN RCRD: CPT | Mod: CPTII,S$GLB,, | Performed by: PHYSICIAN ASSISTANT

## 2025-03-25 NOTE — TELEPHONE ENCOUNTER
Physician - Dr Segura    Type of Procedure/Injection - SI joint injection with local only           Laterality - Left      Priority - Normal      Anxiolysis- Local      Need to hold medication - No      N/A    None      Clearance needed - No      Follow up - phone call next day

## 2025-03-26 DIAGNOSIS — M46.1 SACROILIITIS: Primary | ICD-10-CM

## 2025-03-26 RX ORDER — ALPRAZOLAM 1 MG/1
1 TABLET, ORALLY DISINTEGRATING ORAL ONCE AS NEEDED
OUTPATIENT
Start: 2025-03-26 | End: 2036-08-22

## 2025-03-26 NOTE — H&P (VIEW-ONLY)
This note was completed with dictation software and grammatical errors may exist.    CC:Back pain    HPI: The patient is a 79-year-old man with a history of hypertension, obesity and low back pain who presents in referral from Dr. Winters for chronic right low back pain.  He is status post left sacroiliac joint injection on 02/24/2025 with 5 days of almost 100% relief.  His pain completely returned and is located in the left buttock.  He also has continued pain in the right lower thoracic region, right lateral abdomen.  He went to 1 visit of physical therapy but has not returned.  He has done some home exercises without relief.  This pain is severe when he 1st gets up in the morning but typically improves with movement.  His left buttock pain is constant and worse with activity.  He denies weakness or numbness.    Pain intervention history: He done physical therapy in January, 2014 with moderate relief but not sustained.  He takes Relafen, tramadol, baclofen and chlorzoxazone as needed with mild relief.  He had undergone what sounds like a series of epidurals several years ago with no major relief. The patient is status post a right side L2/3, L3/4, L4/5 and L5/S1 facet joint injection on 10/28/14 with 50% relief of his back pain for 1 month.  He is status post radiofrequency ablation of the right L1, 2, 3, 4 and 5 medial branch nerves on 3/18/15 with 75% relief.  He is status post C7-T1 cervical interlaminar epidural steroid injection on 5/11/15 with 70% relief.  He is status post right L1, 2, 3, 4 and 5 medial branch radiofrequency ablation on 7/5/16 with 50% relief.   He is status post right L1, 2, 3, 4 and 5 medial branch radiofrequency ablation on 10/17/17 with about 50% relief additionally, later reported almost complete relief lasting a year.  He is status post right L1, 2, 3, 4 and 5 medial branch radiofrequency ablation on 04/11/2019 with mild relief.   He is status post L5/S1 interlaminar epidural steroid  "injection on 06/05/2019 with 80% relief.  He is status post L5/S1 interlaminar epidural steroid injection on 09/13/2019 with 50% relief lasting about 6 months.  He is status post L5/S1 interlaminar epidural steroid injection to the right on 06/02/2020 with minimal relief.  He is status post right L3/4 and L5/S1 transforaminal epidural steroid injection on 11/13/2020 with 0% relief.   He is status post left L5/S1 transforaminal epidural steroid injection on 06/24/2021 with 50% relief. He is status post right L5/S1 transforaminal LEATHA on 09/22/2022 with no relief. He is status post right L2/3 and L3/4 transforaminal LEATHA on 10/25/2022 with no relief.  He is status post right L3/4, L4/5 and L5/S1 diagnostic medial branch nerve block with 0% relief on 03/21/2023. He is status post right SI joint injection on 06/28/2024 with 80% relief lasting 1 week.  He is status post right sacroiliac joint fusion with PainTeq on 12/06/2024 with at least 75% relief.      Spine surgeries:    Antineuropathics:  NSAIDs:  Physical therapy:  Antidepressants:  Muscle relaxers:  Opioids:  Hydrocodone 7.5   Antiplatelets/Anticoagulants:    ROS:He reports back pain.  Balance of review of systems is negative.    Medical, surgical, family and social history reviewed elsewhere in record.    Medications/Allergies: See med card    Vitals:    03/25/25 1504   BP: 133/62   Pulse: 65   Weight: 135.7 kg (299 lb 2.6 oz)   Height: 5' 11" (1.803 m)   PainSc:   7   PainLoc: Back       Body mass index is 41.72 kg/m².        Physical exam:  Gen: A and O x3, pleasant, well-groomed  Skin: No rashes or obvious lesions  HEENT: PERRLA, no obvious deformities on ears or in canals.   CVS: Regular rate and rhythm, normal S1 and S2, no murmurs.  Resp: Clear to auscultation bilaterally, no wheezes or rales.  Abdomen: Soft, NT/ND, normal bowel sounds present.  Musculoskeletal:  Antalgic gait, ambulating with a cane.    Lower extremity strength 5/5 bilaterally  Ramakrishna's " test positive on the left  Gaenslen's test positive on the left  SI compression test positive on the left  Tender to palpation along the left sacroiliac joint  Tenderness to palpation along the right lateral abdomen, right lower thoracic paraspinous muscles.    Imagin14 Xray L-spine  There is diffuse osteopenia. Mild to moderate chronic compression fracture with anterior wedging and evidence of vertebroplasty at L2. minimal left convex curvature in the upper lumbar region. No spondylolisthesis. Mild concavity of the superior endplate of L4 which could be small compression fracture or Schmorl's node. No additional fracture.   There is moderate degenerative change within the lumbar spine with anterior osteophyte formation most prominent at L4-L5 and L2- L3 and L1 - L2, also present in the lower thoracic region with flowing ossification anteriorly suggesting diffuse idiopathic sclerotic hyperostosis. There is also mild decreased intervertebral disk space height and endplate sclerosis the lower thoracic and upper lumbar regions. Due to the degree of osseous mineralization, it is difficult to evaluate for any possible pars defects. Moderate sclerosis suggesting facet arthropathy in the lumbar spine present and there is mild sclerosis, likely degenerative in nature involving the sacroiliac joints.    10/7/14 MRI lumbar spine: At L1/2 there is mild spinal stenosis secondary to facet hypertrophy and disc bulging.  At L2/3 there is minimal spinal stenosis secondary to retropulsion of L2 compression fracture, appearance of prior kyphoplasty present.  There is minimal disc bulging but there is also some facet hypertrophy at this level.  At L3/4 there is facet and ligamentum hypertrophy, minimal disc bulging causing mild spinal stenosis and neuroforaminal narrowing on the left greater than the right side.  At L4/5 there is moderate hypertrophic facet and ligamentum hypertrophy with mild left foraminal narrowing compared  to the right side.  There is no spinal stenosis at this level.  At L5/S1 there is a broad-based disc bulge that extends to the left side more than the right side along with asymmetric left sided facet hypertrophy and ligamentum flavum hypertrophy causing moderate left foraminal stenosis.    4/15/15 outside open MRI cervical spine [facility]  C3-4 posterior disc bulge producing mild bilateral foraminal narrowing  C4-5 posterior disc bulge producing mild bilateral foraminal narrowing, possible tiny annular tear in the posterior disc, anterior thecal sac deformity with no evidence of spinal stenosis  C5-6 circumferential disc bulge producing moderate to severe bilateral foraminal narrowing, effacement of the bilateral lateral recesses worse to the right, anterior thecal sac deformity with effacement of the anterior subarachnoid space, mild spinal canal stenosis  C6-7 circumferential disc bulge producing moderate to severe bilateral foraminal narrowing with mild spinal canal stenosis  C7-T1 not completely included but appears to have a circumferential disc bulge with shallow midline disc protrusion with possible mild spinal canal stenosis and bilateral foraminal narrowing    CT L-spine:  No acute fracture or traumatic subluxation.  Alignment is relatively maintained.  Vertebroplasty changes are at L2.  There is mild, chronic appearing loss of height of the L4 vertebral body.  There is partial osseous fusion at L2-3.  Multilevel facet hypertrophy, osteophyte formation and disc space narrowing are present.   L1-2: Facet hypertrophy with mild right neural foraminal and spinal canal stenosis.   L2-3: Posterior disc osteophyte and facet hypertrophy results in mild to moderate right and mild left neural foraminal stenosis with mild to moderate spinal canal stenosis.   L3-4: Posterior disc osteophyte and facet hypertrophy results in moderate bilateral neural foraminal stenosis with mild to moderate spinal canal stenosis.   L4-5:  Broad-based disc osteophyte and facet hypertrophy results in severe left and moderate right neural foraminal stenosis with mild to moderate spinal canal stenosis.   L5-S1: Posterior disc osteophyte eccentric to the left and facet hypertrophy results in moderate to severe bilateral neural foraminal stenosis with mild to moderate spinal canal stenosis.  Paravertebral soft tissues are normal.  Spinal cord stimulator wires into the spinal canal at the T12-L1 level.    12/15/22 CT myelogram L-spine:  T12-L1: Mild disc bulge with likely right central protrusion.  Mild right and minimal left narrowing of the lateral recesses.  No significant spinal canal or foraminal stenosis.   L1-L2: Mild disc bulge.  Mild right and minimal left narrowing of the lateral recesses.  No significant spinal canal stenosis.  Minimal right foraminal stenosis.   L2-L3: Trace retrolisthesis.  Mild disc bulge and posterior osteophytic ridging.  Mild bilateral facet hypertrophy.  Minimal narrowing of the bilateral lateral recesses.  Mild right and minimal left foraminal stenosis.   L3-L4: Trace retrolisthesis.  Mild disc bulge.  Mild left and minimal right narrowing of the lateral recesses.  No significant spinal canal stenosis.  Mild-moderate bilateral foraminal stenosis.   L4-L5: Retrolisthesis.  Uncovering the disc mild disc bulge.  Mild bilateral facet hypertrophy.  Ligamentum flavum thickening.  Mild narrowing of the bilateral lateral recesses.  Mild spinal canal stenosis.  Moderate left and mild-moderate right foraminal stenosis.   L5-S1: Mild disc bulge and posterior osteophytic ridging.  Moderate left and mild right facet hypertrophy.  Moderate left and mild right narrowing of the lateral recesses.  No significant spinal canal stenosis.  Moderate right and mild-moderate left foraminal stenosis.   Bilateral dorsal paraspinal muscular atrophy in the lower lumbar spine.   Spinal cord stimulator leads are present entering the spinal canal at  the T12-L1 interlaminar space and extending cranially above the field of view.   Layering dependent stones within the partially visualized urinary bladder, similar to prior study.  Mild-moderate atherosclerotic calcifications.   Postoperative changes of a previous L2 kyphoplasty with methylmethacrylate present.  There is loss of lumbar vertebral body height at all levels throughout the lumbar spine, progressive within the superior endplate of L4 when compared to the previous study of 12/10/2022.    1/30/23 Xray L-spine:  A spinal stimulator device is partially imaged.  There is a mild compression fracture of L2 status post vertebroplasty.  There is a mild compression fracture of L4 without significant change.  Mild retrolisthesis of L3 on L4 is present.  There is mild loss of disc height at L2-3 and L5-S1.  Moderate lower lumbar facet arthropathy is present there is heavy calcification of the aorta.    06/10/2024 CT cervical spine  Bones: Diffuse bony demineralization.  No fractures or bony destructive changes.  Prominent ventral bridging osteophyte complex at C4-C5 and endplate centered subcortical cystic changes in the setting of severe disc height loss at C5-C6.  Additional prominent ventral bridging osteophyte complexes laterally C6-C7.     Alignment: Within normal limits.     Craniocervical junction: Degenerative changes.  No acute process or may     Disc levels:     Degenerative changes including shallow disc osteophyte complexes most notable asymmetric to the right at C5-C6 with there is no more than mild to moderate narrowing of the right central spinal canal and centrally at C6-C7 producing mild to moderate narrowing of the spinal canal.  No evidence of any high-grade osseous spinal canal narrowing.  Uncovertebral spurring and facet arthrosis producing severe left/moderate right C3-C4, severe right/moderate left C4-C5, severe bilateral C5-C6, and severe bilateral C6-C7 narrowing.     Paraspinal soft  tissues: The visualized paraspinal soft tissues and lung apices are within normal limits.    06/10/2024 CT lumbar spine  Bones: Diffuse bony demineralization.  There is a dorsal thoracic spinal stimulator entering the canal at T12-L1 coursing cranially out of the field of view.  Chronic mild compression fracture deformities status post cement augmentation of L2 and severe biconcave compression fracture of L4, both unchanged.  No acute fractures.  Prominent ventral endplate centered bridging osteophytes throughout.  No bony destructive process.     Alignment: Within normal limits.     Disc levels:     Similar multilevel degenerative changes including moderate disc height loss notable at L2-L3 and L5-S1.  Disc bulging and facet arthrosis most pronounced at L3-L4, L4-L5, and L5-S1 with moderate to severe narrowing of the spinal canal and subarticular recesses.  Severe foraminal narrowing on the left at L5-S1 and moderate to severe on the right at L5-S1 and bilaterally at L3-L4 and L4-L5.  Overall no gross interval change.  Vacuum disc phenomenon at L3-L4.     Soft Tissues: Atherosclerotic changes of the aorta iliac distribution.  Partially imaged presumed left renal cyst measuring 2.9 cm.  Presumed left basilar scarring/atelectasis residual from previous pleural effusion as seen on CT 03/27/2024.  Cholecystectomy changes.  Prostatomegaly.    Assessment:  The patient is a 79-year-old man with a history of hypertension, obesity and low back pain who presents in referral from Dr. Winters for chronic right low back pain.   1. Sacroiliac joint pain        2. Pain in thoracic spine  CT Thoracic Spine Without Contrast              Plan:  1. He had 5 days of relief following the left sacroiliac joint steroid injection.  I will schedule him for a left sacroiliac joint injection with local anesthetic only.  We will contact him the day after and if he had significant pain relief for the duration of the anesthetic we will schedule  him for a left sacroiliac joint fusion with PainTeq.  He reports ongoing relief following his right sacroiliac joint fusion.    2. In regards to his right lateral abdominal pain, right lower thoracic pain, we discussed that this may be related to disc degeneration and foraminal narrowing in this area.  Since he has gone to physical therapy for an evaluation and done his home exercise program without improvement I am going to order a thoracic spine CT.  If appropriate we can consider an injection.  We will review the CT when he follows up after his sacroiliac joint procedures.    3. Follow-up 7 days postop or sooner as needed.

## 2025-04-01 ENCOUNTER — HOSPITAL ENCOUNTER (OUTPATIENT)
Dept: RADIOLOGY | Facility: HOSPITAL | Age: 80
Discharge: HOME | End: 2025-04-01
Attending: PHYSICIAN ASSISTANT
Payer: MEDICARE

## 2025-04-01 DIAGNOSIS — M54.6 PAIN IN THORACIC SPINE: ICD-10-CM

## 2025-04-01 PROCEDURE — 72128 CT CHEST SPINE W/O DYE: CPT | Mod: TC,PO

## 2025-04-01 PROCEDURE — 72128 CT CHEST SPINE W/O DYE: CPT | Mod: 26,,, | Performed by: RADIOLOGY

## 2025-04-07 ENCOUNTER — HOSPITAL ENCOUNTER (OUTPATIENT)
Dept: RADIOLOGY | Facility: HOSPITAL | Age: 80
Discharge: HOME | End: 2025-04-07
Attending: ANESTHESIOLOGY | Admitting: ANESTHESIOLOGY
Payer: MEDICARE

## 2025-04-07 ENCOUNTER — HOSPITAL ENCOUNTER (OUTPATIENT)
Facility: HOSPITAL | Age: 80
Discharge: HOME | End: 2025-04-07
Attending: ANESTHESIOLOGY | Admitting: ANESTHESIOLOGY
Payer: MEDICARE

## 2025-04-07 VITALS
OXYGEN SATURATION: 96 % | WEIGHT: 299 LBS | TEMPERATURE: 98 F | BODY MASS INDEX: 41.86 KG/M2 | RESPIRATION RATE: 15 BRPM | HEIGHT: 71 IN | DIASTOLIC BLOOD PRESSURE: 73 MMHG | SYSTOLIC BLOOD PRESSURE: 158 MMHG | HEART RATE: 59 BPM

## 2025-04-07 DIAGNOSIS — M46.1 SACROILIITIS: ICD-10-CM

## 2025-04-07 DIAGNOSIS — M54.50 LOWER BACK PAIN: ICD-10-CM

## 2025-04-07 PROCEDURE — 63600175 PHARM REV CODE 636 W HCPCS: Mod: PO | Performed by: ANESTHESIOLOGY

## 2025-04-07 PROCEDURE — 27096 INJECT SACROILIAC JOINT: CPT | Mod: PO,LT | Performed by: ANESTHESIOLOGY

## 2025-04-07 PROCEDURE — 27096 INJECT SACROILIAC JOINT: CPT | Mod: LT,,, | Performed by: ANESTHESIOLOGY

## 2025-04-07 RX ORDER — BUPIVACAINE HYDROCHLORIDE 2.5 MG/ML
INJECTION, SOLUTION EPIDURAL; INFILTRATION; INTRACAUDAL; PERINEURAL
Status: DISCONTINUED | OUTPATIENT
Start: 2025-04-07 | End: 2025-04-07 | Stop reason: HOSPADM

## 2025-04-07 RX ORDER — LIDOCAINE HYDROCHLORIDE 10 MG/ML
INJECTION, SOLUTION EPIDURAL; INFILTRATION; INTRACAUDAL; PERINEURAL
Status: DISCONTINUED | OUTPATIENT
Start: 2025-04-07 | End: 2025-04-07 | Stop reason: HOSPADM

## 2025-04-07 RX ORDER — ALPRAZOLAM 0.5 MG/1
1 TABLET, ORALLY DISINTEGRATING ORAL ONCE AS NEEDED
Status: DISCONTINUED | OUTPATIENT
Start: 2025-04-07 | End: 2025-04-07 | Stop reason: HOSPADM

## 2025-04-07 NOTE — DISCHARGE SUMMARY
[facility] - Surgery  Discharge Note  Short Stay    Procedure(s) (LRB):  INJECTION,SACROILIAC JOINT No steroid, diagnostic (Left)      OUTCOME: Patient tolerated treatment/procedure well without complication and is now ready for discharge.    DISPOSITION: Home or Self Care    FINAL DIAGNOSIS:  Sacroiliitis    FOLLOWUP: In clinic    DISCHARGE INSTRUCTIONS:    Discharge Procedure Orders   Diet Adult Regular     No dressing needed     Notify your health care provider if you experience any of the following:  temperature >100.4     Activity as tolerated

## 2025-04-07 NOTE — OP NOTE
PROCEDURE:  Diagnostic Left sacroiliac joint injection with local anesthetic utilizing fluoroscopy.    DIAGNOSIS: Left sided sacroiliitis.  Post op diagnosis: same    PHYSICIAN: Riley Segura MD    MEDICATIONS INJECTED:  4ml Bupivacaine 0.25%.    LOCAL ANESTHETIC USED: Lidocaine 1%,2ml total.     SEDATION MEDICATIONS: nonbe    ESTIMATED BLOOD LOSS: none    COMPLICATIONS: none    TECHNIQUE:   Time-out taken to identify patient and procedure side prior to starting the procedure. After placing the patient in the prone position, the patient was prepped and draped in the usual sterile fashion using ChloraPrep and sterile towels.  The area was determined under fluoroscopy in the AP view.  Lidocaine was injected by raising a wheal and going down to the periosteum using a 25-gauge 1.5 inch needle.  The 3.5 inch 22-gauge spinal needle was introduced into inferior opening of the left sacroiliac joint.  Negative pressure applied to confirm no intravascular placement.   The medication was then injected slowly. The patient tolerated the procedure well.    The patient was monitored after the procedure.  The patient was given post procedure and discharge instructions to follow at home. The patient was discharged in a stable condition

## 2025-04-08 ENCOUNTER — TELEPHONE (OUTPATIENT)
Dept: PAIN MEDICINE | Facility: CLINIC | Age: 80
End: 2025-04-08
Payer: MEDICARE

## 2025-04-10 ENCOUNTER — TELEPHONE (OUTPATIENT)
Dept: PRIMARY CARE CLINIC | Facility: CLINIC | Age: 80
End: 2025-04-10
Payer: MEDICARE

## 2025-04-10 NOTE — TELEPHONE ENCOUNTER
Called pt to see about moving his appt scheduled on 6/3 to a sooner date (ASAP) as pt needs to be seen sooner according to his last worry score and MD's request.

## 2025-04-11 DIAGNOSIS — M46.1 SACROILIITIS: Primary | ICD-10-CM

## 2025-04-11 RX ORDER — SODIUM CHLORIDE, SODIUM LACTATE, POTASSIUM CHLORIDE, CALCIUM CHLORIDE 600; 310; 30; 20 MG/100ML; MG/100ML; MG/100ML; MG/100ML
INJECTION, SOLUTION INTRAVENOUS CONTINUOUS
OUTPATIENT
Start: 2025-04-11

## 2025-04-11 NOTE — TELEPHONE ENCOUNTER
Regarding your  diagnostic left SI joint injection, For Date of Service:  4/7 with Dr. Segura.    Please answer the following questions:    1. What percentage of pain relief did you receive following the block, from 0-100%? 100%    2. How many hours did pain relief last following the block?  8    3. During this time please describe in detail the activities you were able to do? Raking leaves in the yard. Walking for extended period of time with no pain.    4. Pain score from 0-10 pre procedure - 7     5. Pain score from 0-10 during block  - 1    6. Return to baseline pain score of 0-10 once the block wore off -  4    Spoke with patient to obtain answers to post diagnostic Left SI joint injection. Appropriate response for duration of anesthetic. Informed patient answers will be sent to provider for orders for next procedure.     
Dr. Segura,    Were the orders below supposed to be for an SI joint fusion? If so, what vendor are you using? Thanks!  
Hi Dr. Segura,    Just spoke with patient regarding diagnostic left SI joint injection from yesterday. If fusion is the next step, please send orders. Thanks!  
Physician - Dr Segura    Type of Procedure/Injection - Sacroiliac Joint Steroid Injection           Laterality - Left      Priority - Normal      Anxiolysis- MAC      Need to hold medication - Yes      Eliquis for 3 days    None      Clearance needed - Yes      Follow up - 7 day post op    
show

## 2025-04-15 ENCOUNTER — E-CONSULT (OUTPATIENT)
Dept: CARDIOLOGY | Facility: CLINIC | Age: 80
End: 2025-04-15
Payer: MEDICARE

## 2025-04-15 ENCOUNTER — TELEPHONE (OUTPATIENT)
Dept: PAIN MEDICINE | Facility: CLINIC | Age: 80
End: 2025-04-15
Payer: MEDICARE

## 2025-04-15 DIAGNOSIS — Z01.810 PRE-OPERATIVE CARDIOVASCULAR EXAMINATION: Primary | ICD-10-CM

## 2025-04-15 DIAGNOSIS — Z79.02 ANTIPLATELET OR ANTITHROMBOTIC LONG-TERM USE: Primary | ICD-10-CM

## 2025-04-15 NOTE — CONSULTS
[facility] - Cardiology  Response for E-Consult     Patient Name: Chinedu Roque  MRN: [id number]  Primary Care Provider: Hernando Browne MD   Requesting Provider: Riley Segura MD  E-Consult to General Cardiology  Consult performed by: Kike Chavez MD  Consult ordered by: Riley Segura MD          Chart reviewed personally.    No evidence seen in Epic of [facility] within the last month.    As long as no significant chest pain or shortness of breath with exertion, patient is at acceptable risk to proceed from a Cardiology standpoint.    Okay to hold Eliquis 48-72 hours prior to procedure, restart as soon as safe postprocedure.    Additional future steps to consider: NA    Total time of Consultation: 5 minute    I did not speak to the requesting provider verbally about this.     *This eConsult is based on the clinical data available to me and is furnished without benefit of a physical examination. The eConsult will need to be interpreted in light of any clinical issues or changes in patient status not available to me at the time of filing this eConsults. Significant changes in patient condition or level of acuity should result in immediate formal consultation and reevaluation. Please alert me if you have further questions.    Thank you for this eConsult referral.     Kike Chavez MD  [facility] - Cardiology

## 2025-04-25 ENCOUNTER — OFFICE VISIT (OUTPATIENT)
Dept: PRIMARY CARE CLINIC | Facility: CLINIC | Age: 80
End: 2025-04-25
Payer: MEDICARE

## 2025-04-25 VITALS
DIASTOLIC BLOOD PRESSURE: 68 MMHG | OXYGEN SATURATION: 96 % | HEART RATE: 66 BPM | BODY MASS INDEX: 42.22 KG/M2 | WEIGHT: 302.75 LBS | SYSTOLIC BLOOD PRESSURE: 132 MMHG

## 2025-04-25 DIAGNOSIS — R60.0 LOWER EXTREMITY EDEMA: Primary | ICD-10-CM

## 2025-04-25 DIAGNOSIS — S81.801A WOUND OF RIGHT LOWER EXTREMITY, INITIAL ENCOUNTER: ICD-10-CM

## 2025-04-25 PROCEDURE — 99999 PR PBB SHADOW E&M-EST. PATIENT-LVL III: CPT | Mod: PBBFAC,,, | Performed by: PHYSICIAN ASSISTANT

## 2025-04-25 RX ORDER — CEPHALEXIN 500 MG/1
500 CAPSULE ORAL 3 TIMES DAILY
Qty: 15 CAPSULE | Refills: 0 | Status: SHIPPED | OUTPATIENT
Start: 2025-04-25 | End: 2025-04-30

## 2025-04-25 NOTE — PROGRESS NOTES
Subjective:      Patient ID: Chinedu Roque is a 79 y.o. male.    Vitals:  weight is 137.3 kg (302 lb 11.8 oz) (abnormal). His blood pressure is 132/68 and his pulse is 66. His oxygen saturation is 96%.     Chief Complaint: Leg Swelling (Dog scratched right lower leg. )    79 year old male presents with concerns about injury to right lower extremity. Patient states he went off his low sodium diet and had crawfish last Friday. This caused significant swelling which has actually improved. Patient states that his dog jumped on his leg and one of the dogs nails scratched him. He has been using medi-honey on the wound. He is concerned about infection.        Constitution: Negative for chills and fever.   Cardiovascular:  Positive for leg swelling.   Gastrointestinal:  Negative for nausea and vomiting.   Skin:  Positive for wound and erythema.      Objective:     Physical Exam   Constitutional: He does not appear ill. No distress.   HENT:   Head: Normocephalic and atraumatic.   Ears:   Right Ear: External ear normal.   Left Ear: External ear normal.   Eyes: Conjunctivae are normal. Right eye exhibits no discharge. Left eye exhibits no discharge. Extraocular movement intact   Cardiovascular: Normal rate and regular rhythm.   Pulmonary/Chest: Effort normal. No respiratory distress.   Abdominal: Normal appearance.   Musculoskeletal: Normal range of motion.         General: Normal range of motion.      Right lower leg: Edema (minimal below calf) present.      Left lower leg: Edema (minimal below calf) present.   Neurological: He is alert.   Skin: Skin is warm, dry and not pale. erythema         Comments: Excoriated area about 1 cm to anterior right lower leg. There is minimal surrounding erythema. No discharge. Area is slightly warm. no jaundice  Psychiatric: His behavior is normal. Mood, judgment and thought content normal.   Nursing note and vitals reviewed.      Assessment:     1. Lower extremity edema    2. Wound of  "right lower extremity, initial encounter        Plan:       Lower extremity edema    Wound of right lower extremity, initial encounter    Other orders  -     cephALEXin (KEFLEX) 500 MG capsule; Take 1 capsule (500 mg total) by mouth 3 (three) times daily. for 5 days  Dispense: 15 capsule; Refill: 0    Edema is at a minimal right now. Patient has been elevating his legs and watching sodium intake closely. There is some erythema and warmth surrounding the wound. Difficult to say if this is from wound vs edema. Will cover with Keflex aand patient will call me in 5 days to let me know if redness has improved. Can continue to use medi-honey. Continue to elevate legs. Need to watch sodium closely and if he "cheats" should consider pushing fluids and taking extra lasix. Patient voices understanding and agrees with plan.              "

## 2025-04-26 DIAGNOSIS — I50.32 CHRONIC HEART FAILURE WITH PRESERVED EJECTION FRACTION (HFPEF): Chronic | ICD-10-CM

## 2025-04-28 RX ORDER — FUROSEMIDE 20 MG/1
TABLET ORAL
Qty: 30 TABLET | Refills: 0 | Status: SHIPPED | OUTPATIENT
Start: 2025-04-28

## 2025-05-09 ENCOUNTER — TELEPHONE (OUTPATIENT)
Dept: PAIN MEDICINE | Facility: CLINIC | Age: 80
End: 2025-05-09
Payer: MEDICARE

## 2025-05-09 NOTE — TELEPHONE ENCOUNTER
----- Message from Zulma sent at 5/9/2025 12:04 PM CDT -----  Type:  Needs Medical AdviceWho Called: kev peralta Symptoms (please be specific):  How long has patient had these symptoms:  Pharmacy name and phone #:  Would the patient rather a call back or a response via [facility]? callBest Call Back Number: [phone] Information: would like to speak with office about mutual pt   Please call back to advise. Thanks!

## 2025-05-12 ENCOUNTER — TELEPHONE (OUTPATIENT)
Dept: PAIN MEDICINE | Facility: CLINIC | Age: 80
End: 2025-05-12
Payer: MEDICARE

## 2025-05-12 DIAGNOSIS — M53.3 SACROILIAC JOINT PAIN: ICD-10-CM

## 2025-05-12 NOTE — TELEPHONE ENCOUNTER
----- Message from Earline sent at 5/12/2025  7:25 AM CDT -----  Contact: Henrry  Type: Needs Medical AdviceWho Called:  Henrry Brock (shoe) Additional Information: calling for a peer to peer with Dr Segura, please call by 10am eastern time as he is with an  external review company, [phone]

## 2025-05-12 NOTE — TELEPHONE ENCOUNTER
Reviewer said based off of medicare guidelines, patient needs to be seen for 6 months conservative treatment for his left side SI pain and his first complaint was 1/14/25.  He said we could resubmit when we have that six months of conservative treatment. Spoke with patient and scheduled follow up for 6/16 and patient will continue at home treatment (exercises, ice, medication), and will try physical therapy if he's up to it pain wise, which hasn't been the case for the past few months. He's also requesting refill of hydrocodone because he has three pills left

## 2025-05-13 RX ORDER — HYDROCODONE BITARTRATE AND ACETAMINOPHEN 5; 325 MG/1; MG/1
1 TABLET ORAL EVERY 8 HOURS PRN
Qty: 21 TABLET | Refills: 0 | Status: SHIPPED | OUTPATIENT
Start: 2025-05-13

## 2025-06-03 ENCOUNTER — OFFICE VISIT (OUTPATIENT)
Dept: PRIMARY CARE CLINIC | Facility: CLINIC | Age: 80
End: 2025-06-03
Payer: MEDICARE

## 2025-06-03 VITALS
HEIGHT: 71 IN | HEART RATE: 63 BPM | SYSTOLIC BLOOD PRESSURE: 134 MMHG | BODY MASS INDEX: 42.9 KG/M2 | DIASTOLIC BLOOD PRESSURE: 62 MMHG | OXYGEN SATURATION: 98 % | WEIGHT: 306.44 LBS

## 2025-06-03 DIAGNOSIS — M51.369 DEGENERATION OF INTERVERTEBRAL DISC OF LUMBAR REGION, UNSPECIFIED WHETHER PAIN PRESENT: ICD-10-CM

## 2025-06-03 DIAGNOSIS — I50.32 CHRONIC HEART FAILURE WITH PRESERVED EJECTION FRACTION (HFPEF): Chronic | ICD-10-CM

## 2025-06-03 DIAGNOSIS — Z95.1 S/P CABG (CORONARY ARTERY BYPASS GRAFT): ICD-10-CM

## 2025-06-03 DIAGNOSIS — I25.10 CORONARY ARTERY DISEASE INVOLVING NATIVE CORONARY ARTERY OF NATIVE HEART WITHOUT ANGINA PECTORIS: ICD-10-CM

## 2025-06-03 DIAGNOSIS — E78.2 MIXED HYPERLIPIDEMIA: Chronic | ICD-10-CM

## 2025-06-03 DIAGNOSIS — I10 PRIMARY HYPERTENSION: Primary | ICD-10-CM

## 2025-06-03 DIAGNOSIS — R73.09 ELEVATED GLUCOSE: ICD-10-CM

## 2025-06-03 PROCEDURE — 3075F SYST BP GE 130 - 139MM HG: CPT | Mod: CPTII,S$GLB,, | Performed by: FAMILY MEDICINE

## 2025-06-03 PROCEDURE — 99214 OFFICE O/P EST MOD 30 MIN: CPT | Mod: S$GLB,,, | Performed by: FAMILY MEDICINE

## 2025-06-03 PROCEDURE — 1170F FXNL STATUS ASSESSED: CPT | Mod: CPTII,S$GLB,, | Performed by: FAMILY MEDICINE

## 2025-06-03 PROCEDURE — 3078F DIAST BP <80 MM HG: CPT | Mod: CPTII,S$GLB,, | Performed by: FAMILY MEDICINE

## 2025-06-03 PROCEDURE — 3288F FALL RISK ASSESSMENT DOCD: CPT | Mod: CPTII,S$GLB,, | Performed by: FAMILY MEDICINE

## 2025-06-03 PROCEDURE — 1160F RVW MEDS BY RX/DR IN RCRD: CPT | Mod: CPTII,S$GLB,, | Performed by: FAMILY MEDICINE

## 2025-06-03 PROCEDURE — 99999 PR PBB SHADOW E&M-EST. PATIENT-LVL IV: CPT | Mod: PBBFAC,,, | Performed by: FAMILY MEDICINE

## 2025-06-03 PROCEDURE — 1101F PT FALLS ASSESS-DOCD LE1/YR: CPT | Mod: CPTII,S$GLB,, | Performed by: FAMILY MEDICINE

## 2025-06-03 PROCEDURE — 1125F AMNT PAIN NOTED PAIN PRSNT: CPT | Mod: CPTII,S$GLB,, | Performed by: FAMILY MEDICINE

## 2025-06-03 PROCEDURE — 1157F ADVNC CARE PLAN IN RCRD: CPT | Mod: CPTII,S$GLB,, | Performed by: FAMILY MEDICINE

## 2025-06-03 PROCEDURE — 1159F MED LIST DOCD IN RCRD: CPT | Mod: CPTII,S$GLB,, | Performed by: FAMILY MEDICINE

## 2025-06-03 RX ORDER — SPIRONOLACTONE 25 MG/1
25 TABLET ORAL DAILY
Qty: 90 TABLET | Refills: 1 | Status: SHIPPED | OUTPATIENT
Start: 2025-06-03

## 2025-06-03 NOTE — PROGRESS NOTES
[facility] Care Provider Appointment   [facility]       Patient ID: Chinedu Roque is a 79 y.o. male.    ASSESSMENT/PLAN by Problem List:    Assessment & Plan    IMPRESSION:  - HTN stable; continued current medications.  - CAD s/p CABG stable; continued atorvastatin 20 mg.  - HFpEF clinically stable; on lasix and valsartan.  He apparently ran out of spironolactone in his not been taking this, prescription refilled  - HLD controlled with atorvastatin 20 mg; recent LDL 45.  - Impaired glucose tolerance improved; A1C decreased from 5.9% to 5.5%.  - Back pain; noted muscle weakness in addition to spinal issues.    ## HEART FAILURE:  - Continue Lasix and valsartan for heart failure management.  - Restart spironolactone to reduce risk of exacerbation and assist with blood pressure control if tolerable.  Currently not on an SGLT2 inhibitor secondary to insurance/cost  - Mr. Roque's clinical status confirms stability with heart failure with preserved EF   - Mr. Roque to continue monitoring daily weights.    ## HYPERTENSION:  - Blood pressure is stable and satisfactory on current regimen.  - Continue Lasix and valsartan, with the addition of spironolactone for enhanced blood pressure control.    ## BACK PAIN:  - Mr. Roque reports bilateral back pain, somewhat relieved by a procedure on one side.  - Current pain management includes hydrocodone and acetaminophen for nerve-related pain.  - Recommend back exercises for muscle engagement and support, gentle exercises that he can do at home.  Certainly consider trial of physical therapy again when he is ready    ## WEIGHT MANAGEMENT:  - Mr. Hayden weight continues to gradually increase.  - Recommend dietary changes include reducing consumption of junk food and sugary snacks like cookies, and substituting refined carbohydrates (e.g., white rice, potatoes) with whole grains, vegetables, or complex carbohydrates like legumes.    ## GENERAL  MANAGEMENT:  - Continue atorvastatin 20 mg and all other current medications without changes.  - Labs ordered to be completed before next visit.    ## FOLLOW-UP:  - Return in 3 months.         CODING, ORDERS, AND ADDITIONAL DOCUMENTATION RELATED TO THIS ENCOUNTER:  (note that the diagnoses and clinical summaries above were generated with the assistance of [employer] software and represents my assessment and plan.  This software does not always generate the precise nor most specific diagnosis codes.  Therefore the specific diagnosis codes and orders for billing purposes are detailed below along with any additional documentation if needed)      1. Primary hypertension  -     Comprehensive Metabolic Panel; Future; Expected date: 06/03/2025    2. Coronary artery disease involving native coronary artery of native heart without angina pectoris  -     Lipid Panel; Future; Expected date: 06/03/2025    3. S/P CABG (coronary artery bypass graft)    4. Chronic heart failure with preserved ejection fraction (HFpEF)  -     CBC Auto Differential; Future; Expected date: 06/03/2025    5. Mixed hyperlipidemia  -     Comprehensive Metabolic Panel; Future; Expected date: 06/03/2025  -     Lipid Panel; Future; Expected date: 06/03/2025    6. Degeneration of intervertebral disc of lumbar region, unspecified whether pain present    7. Elevated glucose  -     Hemoglobin A1C; Future; Expected date: 06/03/2025    Other orders  -     spironolactone (ALDACTONE) 25 MG tablet; Take 1 tablet (25 mg total) by mouth once daily.  Dispense: 90 tablet; Refill: 1           Follow Up:  Three months    Subjective:       HPI    Patient is a/an 79 y.o.  male     For complete problem list, past medical history, surgical history, social history, etc., see appropriate section in the electronic medical record    History of Present Illness    CHIEF COMPLAINT:  Mr. Roque presents today for follow up.    MUSCULOSKELETAL:  He reports bilateral back pain,  "with partial improvement following a procedure on one side. The left side procedure is pending insurance authorization. He has bilateral knee pain described as "bone on bone." These conditions limit his ability to walk distances. He takes hydrocodone (half pill twice weekly) and Tylenol for pain management, with some relief. No medication refills needed at this time.    WEIGHT MANAGEMENT:  He reports progressive weight gain despite prior improvement, attributing it to increased consumption of snacks and junk food.    DENTAL:  He has ongoing dental issues including a dislodged crown from two years ago and a recently extracted broken tooth. He currently has a temporary crown in place and has a follow-up dental appointment scheduled.    MEDICATIONS:  He recently ran out of spironolactone but has since refilled it. He denies any adverse effects from the medication.      ROS:  Musculoskeletal: +joint pain, +back pain, +pain with movement, +limb pain  Head: +teeth problems, +tooth pain       Review of Systems    Objective     Physical Exam  Constitutional:       General: He is not in acute distress.     Appearance: He is obese. He is not ill-appearing.   HENT:      Head: Normocephalic and atraumatic.   Eyes:      Conjunctiva/sclera: Conjunctivae normal.   Cardiovascular:      Rate and Rhythm: Normal rate and regular rhythm.      Heart sounds: Normal heart sounds.      Comments: 1+ bilateral pitting edema with venous insufficiency changes no skin breakdown noted  Pulmonary:      Comments: Lower breath sounds diminished bilaterally  Neurological:      Mental Status: He is alert.      Comments: Ambulatory, gait antalgic       Vitals:    06/03/25 1447   BP: 134/62   BP Location: Left arm   Patient Position: Sitting   Pulse: 63   SpO2: 98%   Weight: (!) 139 kg (306 lb 7 oz)   Height: 5' 11" (1.803 m)     Physical Exam                This note was generated with the assistance of ambient listening technology. Verbal consent was " obtained by the patient and accompanying visitor(s) for the recording of patient appointment to facilitate this note. I attest to having reviewed and edited the generated note for accuracy, though some syntax or spelling errors may persist. Please contact the author of this note for any clarification.  Parts of the note were also generated with voice recognition software.  Occasionally this software will misinterpreted words or phrases

## 2025-06-11 DIAGNOSIS — I25.10 CORONARY ARTERY DISEASE INVOLVING NATIVE CORONARY ARTERY OF NATIVE HEART WITHOUT ANGINA PECTORIS: ICD-10-CM

## 2025-06-11 DIAGNOSIS — I50.32 CHRONIC HEART FAILURE WITH PRESERVED EJECTION FRACTION (HFPEF): Primary | ICD-10-CM

## 2025-06-11 DIAGNOSIS — R60.0 EDEMA OF RIGHT LOWER EXTREMITY: ICD-10-CM

## 2025-06-11 RX ORDER — FUROSEMIDE 40 MG/1
40 TABLET ORAL DAILY
Qty: 90 TABLET | Refills: 1 | Status: SHIPPED | OUTPATIENT
Start: 2025-06-11

## 2025-06-13 ENCOUNTER — OFFICE VISIT (OUTPATIENT)
Dept: PAIN MEDICINE | Facility: CLINIC | Age: 80
End: 2025-06-13
Payer: MEDICARE

## 2025-06-13 VITALS
WEIGHT: 304.38 LBS | SYSTOLIC BLOOD PRESSURE: 149 MMHG | BODY MASS INDEX: 42.45 KG/M2 | HEART RATE: 54 BPM | DIASTOLIC BLOOD PRESSURE: 72 MMHG

## 2025-06-13 DIAGNOSIS — M25.562 CHRONIC PAIN OF BOTH KNEES: ICD-10-CM

## 2025-06-13 DIAGNOSIS — G89.29 CHRONIC PAIN OF BOTH KNEES: ICD-10-CM

## 2025-06-13 DIAGNOSIS — G89.4 CHRONIC PAIN SYNDROME: ICD-10-CM

## 2025-06-13 DIAGNOSIS — M46.1 SACROILIITIS: Primary | ICD-10-CM

## 2025-06-13 DIAGNOSIS — M25.561 CHRONIC PAIN OF BOTH KNEES: ICD-10-CM

## 2025-06-13 PROCEDURE — 1101F PT FALLS ASSESS-DOCD LE1/YR: CPT | Mod: CPTII,S$GLB,, | Performed by: PHYSICIAN ASSISTANT

## 2025-06-13 PROCEDURE — 99215 OFFICE O/P EST HI 40 MIN: CPT | Mod: S$GLB,,, | Performed by: PHYSICIAN ASSISTANT

## 2025-06-13 PROCEDURE — 1157F ADVNC CARE PLAN IN RCRD: CPT | Mod: CPTII,S$GLB,, | Performed by: PHYSICIAN ASSISTANT

## 2025-06-13 PROCEDURE — 1159F MED LIST DOCD IN RCRD: CPT | Mod: CPTII,S$GLB,, | Performed by: PHYSICIAN ASSISTANT

## 2025-06-13 PROCEDURE — 3078F DIAST BP <80 MM HG: CPT | Mod: CPTII,S$GLB,, | Performed by: PHYSICIAN ASSISTANT

## 2025-06-13 PROCEDURE — 3288F FALL RISK ASSESSMENT DOCD: CPT | Mod: CPTII,S$GLB,, | Performed by: PHYSICIAN ASSISTANT

## 2025-06-13 PROCEDURE — 1125F AMNT PAIN NOTED PAIN PRSNT: CPT | Mod: CPTII,S$GLB,, | Performed by: PHYSICIAN ASSISTANT

## 2025-06-13 PROCEDURE — 99999 PR PBB SHADOW E&M-EST. PATIENT-LVL IV: CPT | Mod: PBBFAC,,, | Performed by: PHYSICIAN ASSISTANT

## 2025-06-13 PROCEDURE — 1160F RVW MEDS BY RX/DR IN RCRD: CPT | Mod: CPTII,S$GLB,, | Performed by: PHYSICIAN ASSISTANT

## 2025-06-13 PROCEDURE — 3077F SYST BP >= 140 MM HG: CPT | Mod: CPTII,S$GLB,, | Performed by: PHYSICIAN ASSISTANT

## 2025-06-17 ENCOUNTER — OFFICE VISIT (OUTPATIENT)
Dept: PHYSICAL MEDICINE AND REHAB | Facility: CLINIC | Age: 80
End: 2025-06-17
Payer: MEDICARE

## 2025-06-17 ENCOUNTER — TELEPHONE (OUTPATIENT)
Dept: PHYSICAL MEDICINE AND REHAB | Facility: CLINIC | Age: 80
End: 2025-06-17
Payer: MEDICARE

## 2025-06-17 VITALS
BODY MASS INDEX: 41.82 KG/M2 | WEIGHT: 299.81 LBS | DIASTOLIC BLOOD PRESSURE: 62 MMHG | SYSTOLIC BLOOD PRESSURE: 132 MMHG | HEART RATE: 59 BPM

## 2025-06-17 DIAGNOSIS — M17.0 BILATERAL PRIMARY OSTEOARTHRITIS OF KNEE: ICD-10-CM

## 2025-06-17 DIAGNOSIS — M25.561 CHRONIC PAIN OF BOTH KNEES: Primary | ICD-10-CM

## 2025-06-17 DIAGNOSIS — G89.29 CHRONIC PAIN OF BOTH KNEES: Primary | ICD-10-CM

## 2025-06-17 DIAGNOSIS — M25.562 CHRONIC PAIN OF BOTH KNEES: Primary | ICD-10-CM

## 2025-06-17 PROCEDURE — 3078F DIAST BP <80 MM HG: CPT | Mod: CPTII,S$GLB,, | Performed by: PHYSICAL MEDICINE & REHABILITATION

## 2025-06-17 PROCEDURE — 3075F SYST BP GE 130 - 139MM HG: CPT | Mod: CPTII,S$GLB,, | Performed by: PHYSICAL MEDICINE & REHABILITATION

## 2025-06-17 PROCEDURE — 1159F MED LIST DOCD IN RCRD: CPT | Mod: CPTII,S$GLB,, | Performed by: PHYSICAL MEDICINE & REHABILITATION

## 2025-06-17 PROCEDURE — 99214 OFFICE O/P EST MOD 30 MIN: CPT | Mod: S$GLB,,, | Performed by: PHYSICAL MEDICINE & REHABILITATION

## 2025-06-17 PROCEDURE — 1160F RVW MEDS BY RX/DR IN RCRD: CPT | Mod: CPTII,S$GLB,, | Performed by: PHYSICAL MEDICINE & REHABILITATION

## 2025-06-17 PROCEDURE — 1157F ADVNC CARE PLAN IN RCRD: CPT | Mod: CPTII,S$GLB,, | Performed by: PHYSICAL MEDICINE & REHABILITATION

## 2025-06-17 PROCEDURE — 99999 PR PBB SHADOW E&M-EST. PATIENT-LVL IV: CPT | Mod: PBBFAC,,, | Performed by: PHYSICAL MEDICINE & REHABILITATION

## 2025-06-17 PROCEDURE — 1125F AMNT PAIN NOTED PAIN PRSNT: CPT | Mod: CPTII,S$GLB,, | Performed by: PHYSICAL MEDICINE & REHABILITATION

## 2025-06-17 NOTE — PROGRESS NOTES
[facility]    Consulting Provider: Martin Tay    CHIEF COMPLAINT:   Chief Complaint   Patient presents with    Knee Pain     Pt. c/o of b/l knee pain, says pain when standing and walking.       HISTORY OF PRESENT ILLNESS: Chinedu Roque is a 79 y.o. male who presents to me for evaluation and management of bilateral knee pain.     Today reports, chronic bilateral knee pain without noted traumatic event. Pain to anterior/medial joint line worse with prolonged ambulation. No noted swelling or significant popping/clicking. Previously seen by Ortho with attempted steroid injections with only a few days worth of relief. Here to discuss other options for management.     Medications: tylenol, gabapentin, norco, tizanidine  Injections:   - bilateral knee steroids 12/04/2023  Procedures:   - arthroscopic knee surgery  Therapies: PT  Bracing: none  DME:     Review of Systems   Constitutional: Negative for fever.   HENT: Negative for drooling.    Eyes: Negative for discharge.   Respiratory: Negative for choking.    Cardiovascular: Negative for chest pain.   Genitourinary: Negative for flank pain.   Skin: Negative for wound.   Allergic/Immunologic: Negative for immunocompromised state.   Neurological: Negative for tremors and syncope.   Psychiatric/Behavioral: Negative for behavioral problems.     Past Medical History:   Past Medical History:   Diagnosis Date    Anticoagulant long-term use     ASA 81 mg    Arthritis     cervical    BPH (benign prostatic hyperplasia) 03/02/2012    Chronic heart failure with preserved ejection fraction (HFpEF)     Chronic left shoulder pain     Compression fracture of L2     DDD (degenerative disc disease), lumbar     HTN (hypertension) 03/02/2012    Hx of colonic polyps 2013    Low back pain     Morbid obesity with BMI of 40.0-44.9, adult 03/18/2014    Seasonal allergies     Sleep apnea     compliant with CPAP    Stroke 03/1986        Past Surgical History:   Past Surgical History:   Procedure Laterality Date    ANGIOGRAM, CORONARY, WITH LEFT HEART CATHETERIZATION N/A 06/07/2023    Procedure: Left Heart Cath;  Surgeon: Edgardo Marcelino MD;  Location: [facility] CATH;  Service: Cardiology;  Laterality: N/A;    APPENDECTOMY      CATARACT EXTRACTION EXTRACAPSULAR W/ INTRAOCULAR LENS IMPLANTATION      COLONOSCOPY N/A 06/06/2022    Procedure: COLONOSCOPY;  Surgeon: Jose Bello MD;  Location: [facility] ENDO;  Service: Endoscopy;  Laterality: N/A;    COLONOSCOPY N/A 07/06/2023    Procedure: COLONOSCOPY;  Surgeon: Sb Spaulding MD;  [facility];  Service: Endoscopy;  Laterality: N/A;    CORONARY ANGIOGRAPHY N/A 06/07/2023    Procedure: ANGIOGRAM, CORONARY ARTERY;  Surgeon: Edgardo Marcelino MD;  Location: [facility] CATH;  Service: Cardiology;  Laterality: N/A;    CORONARY ARTERY BYPASS GRAFT      CORONARY ARTERY BYPASS GRAFT (CABG) N/A 06/23/2023    Procedure: CORONARY ARTERY BYPASS GRAFT (CABG) X3;  Surgeon: Pricila Clark MD;  Location: [facility] OR;  Service: Cardiothoracic;  Laterality: N/A;    ENDOSCOPIC HARVEST OF VEIN Right 06/23/2023    Procedure: HARVEST-VEIN-ENDOVASCULAR;  Surgeon: Pricila Clark MD;  Location: [facility] OR;  Service: Cardiothoracic;  Laterality: Right;    EPIDURAL BLOCK INJECTION  2015    cervical    EPIDURAL STEROID INJECTION INTO LUMBAR SPINE N/A 06/05/2019    Procedure: Injection-steroid-epidural-lumbar L5/S1 interlaminar;  Surgeon: Riley Segura MD;  Location: [facility] OR;  Service: Pain Management;  Laterality: N/A;    EPIDURAL STEROID INJECTION INTO LUMBAR SPINE N/A 09/13/2019    Procedure: Injection-steroid-epidural-lumbar;  Surgeon: Riley Segura MD;  Location: [facility] OR;  Service: Pain Management;  Laterality: N/A;  L5/S1 interlaminar to the right    EPIDURAL STEROID INJECTION INTO LUMBAR SPINE N/A 06/02/2020    Procedure: Injection-steroid-epidural-lumbar L5/S1 to right;  Surgeon: Riley Segura MD;   Location: [facility] OR;  Service: Pain Management;  Laterality: N/A;    EXCLUSION, LEFT ATRIAL APPENDAGE, OPEN, AS PART OF OPEN CHEST SURGERY N/A 06/23/2023    Procedure: EXCLUSION, LEFT ATRIAL APPENDAGE, OPEN, AS PART OF OPEN CHEST SURGERY;  Surgeon: Pricila Clark MD;  Location: [facility];  Service: Cardiothoracic;  Laterality: N/A;    EXPLORATION OF MEDIASTINUM N/A 06/23/2023    Procedure: EXPLORATION, MEDIASTINUM - MEDIASTINAL RE-EXPLORATION TO CONTROL POST-OP BLEEDING;  Surgeon: Pricila Clark MD;  Location: [facility] OR;  Service: Cardiothoracic;  Laterality: N/A;    Facet Steroid injection       Pain management    FUSION, SACROILIAC JOINT, PERCUTANEOUS, W/O TRANSFIXATION DEVICE Right 12/6/2024    Procedure: FUSION, SACROILIAC JOINT, PERCUTANEOUS, W/O TRANSFIXATION DEVICE;  Surgeon: Riley Segura MD;  Location: [facility] OR;  Service: Pain Management;  Laterality: Right;  normal priorty. PainTeq    HERNIA REPAIR      Ventral    INJECTION OF ANESTHETIC AGENT AROUND MEDIAL BRANCH NERVES INNERVATING LUMBAR FACET JOINT Right 03/21/2023    Procedure: Block-nerve-medial branch-lumbar L3/4, L4/5, L5/S1;  Surgeon: Riley Segura MD;  Location: [facility];  Service: Pain Management;  Laterality: Right;    INJECTION OF ANESTHETIC AGENT INTO SACROILIAC JOINT Right 10/23/2024    Procedure: BLOCK, SACROILIAC JOINT;  Surgeon: Riley Segura MD;  Location: [facility] OR;  Service: Pain Management;  Laterality: Right;  local only, no steroid    INJECTION, SACROILIAC JOINT Right 6/28/2024    Procedure: INJECTION,SACROILIAC JOINT;  Surgeon: Riley Segura MD;  Location: [facility] OR;  Service: Pain Management;  Laterality: Right;    INJECTION, SACROILIAC JOINT Left 2/24/2025    Procedure: INJECTION,SACROILIAC JOINT;  Surgeon: Riley Segura MD;  Location: [facility] OR;  Service: Pain Management;  Laterality: Left;  normal    INJECTION, SACROILIAC JOINT Left 4/7/2025    Procedure: INJECTION,SACROILIAC JOINT No steroid, diagnostic;   Surgeon: Riley Segura MD;  Location: [facility] OR;  Service: Pain Management;  Laterality: Left;    INSERTION OF DORSAL COLUMN NERVE STIMULATOR FOR TRIAL N/A 02/10/2021    Procedure: INSERTION, NEUROSTIMULATOR, SPINAL CORD, DORSAL COLUMN, FOR TRIAL;  Surgeon: Riley Segura MD;  Location: [facility] OR;  Service: Pain Management;  Laterality: N/A;    INSERTION OF NEUROSTIMULATOR OF DORSAL COLUMN OF SPINAL CORD N/A 03/01/2021    Procedure: INSERTION, NEUROSTIMULATOR, SPINAL CORD, DORSAL COLUMN;  Surgeon: Rilye Segura MD;  Location: [facility];  Service: Pain Management;  Laterality: N/A;    KNEE SURGERY      bilateral    LAPAROSCOPIC CHOLECYSTECTOMY N/A 11/29/2023    Procedure: CHOLECYSTECTOMY, LAPAROSCOPIC;  Surgeon: Louisa Leo MD;  Location: [facility];  Service: General;  Laterality: N/A;    RADIOFREQUENCY ABLATION  2015    lumbar nerve    RADIOFREQUENCY ABLATION OF LUMBAR MEDIAL BRANCH NERVE AT SINGLE LEVEL Right 04/11/2019    Procedure: Radiofrequency Ablation, Nerve, Spinal, Lumbar, Medial Branch, L1,2,3,4,5;  Surgeon: Riley Segura MD;  Location: [facility];  Service: Pain Management;  Laterality: Right;    TONSILLECTOMY      TRANSESOPHAGEAL ECHOCARDIOGRAM WITH POSSIBLE CARDIOVERSION (CORINNA W/ POSS CARDIOVERSION) N/A 6/4/2024    Procedure: TRANSESOPHAGEAL ECHOCARDIOGRAM WITH POSSIBLE CARDIOVERSION (CORINNA W/ POSS CARDIOVERSION);  Surgeon: Edgardo Marcelino MD;  Location: [facility];  Service: Cardiology;  Laterality: N/A;    TRANSFORAMINAL EPIDURAL INJECTION OF STEROID Right 11/13/2020    Procedure: Injection,steroid,epidural,transforaminal approach, l3/4 and l5/s1;  Surgeon: Riley Segura MD;  Location: [facility] OR;  Service: Pain Management;  Laterality: Right;    TRANSFORAMINAL EPIDURAL INJECTION OF STEROID Left 06/24/2021    Procedure: Injection,steroid,epidural,transforaminal approach L5/S1;  Surgeon: Riley Segura MD;  Location: [facility] OR;  Service: Pain Management;  Laterality: Left;     TRANSFORAMINAL EPIDURAL INJECTION OF STEROID Right 2022    Procedure: Injection,steroid,epidural,transforaminal approach L5/S1;  Surgeon: Riley Segura MD;  Location: [facility];  Service: Pain Management;  Laterality: Right;    TRANSFORAMINAL EPIDURAL INJECTION OF STEROID Right 10/25/2022    Procedure: Injection,steroid,epidural,transforaminal approach L2/3 and L3/4;  Surgeon: Riley Segura MD;  Location: [facility];  Service: Pain Management;  Laterality: Right;    VERTEBROPLASTY         Family History:   Family History   Problem Relation Name Age of Onset    Alzheimer's disease Mother      COPD Father      Hypertension Brother      Kidney disease Brother      No Known Problems Daughter      No Known Problems Daughter      No Known Problems Daughter      Hypertension Son      Diabetes Son          pre-diabetic    Amblyopia Neg Hx      Blindness Neg Hx      Cataracts Neg Hx      Glaucoma Neg Hx      Retinal detachment Neg Hx      Macular degeneration Neg Hx      Strabismus Neg Hx         Medications: Medications Ordered Prior to Encounter[1]    Allergies: Review of patient's allergies indicates:  No Known Allergies    Social History:   Social History     Socioeconomic History    Marital status:    Tobacco Use    Smoking status: Former     Current packs/day: 0.00     Average packs/day: 1.5 packs/day for 24.0 years (36.0 ttl pk-yrs)     Types: Cigarettes     Start date: 10/21/1959     Quit date: 3/19/1983     Years since quittin.2     Passive exposure: Past    Smokeless tobacco: Never   Substance and Sexual Activity    Alcohol use: No    Drug use: No   Social History Narrative    Retired - Worked for the [employer].      Social Drivers of Health     Financial Resource Strain: High Risk (2025)    Received from [facility] SDOH Screening     In the past year, have you been unable to get any of the following when you really needed them? choose all that apply.: Internet     In the  past year, have you been unable to get any of the following when you really needed them? choose all that apply.: Clothing     In the past year, have you been unable to get any of the following when you really needed them? choose all that apply.: Medicine or health care   Food Insecurity: No Food Insecurity (9/8/2024)    Hunger Vital Sign     Worried About Running Out of Food in the Last Year: Never true     Ran Out of Food in the Last Year: Never true   Transportation Needs: No Transportation Needs (11/28/2023)    PRAPARE - Transportation     Lack of Transportation (Medical): No     Lack of Transportation (Non-Medical): No   Physical Activity: Insufficiently Active (9/8/2024)    Exercise Vital Sign     Days of Exercise per Week: 4 days     Minutes of Exercise per Session: 20 min   Stress: Stress Concern Present (9/8/2024)    [employer] [place] [employer] - Occupational Stress Questionnaire     Feeling of Stress : To some extent   Housing Stability: High Risk (1/26/2025)    Received from [facility] SDOH Screening     In the past year, have you been unable to get any of the following when you really needed them? choose all that apply.: Utilities (electric, gas, and water)       PHYSICAL EXAMINATION:   Vitals:    06/17/25 1319   Weight: 136 kg (299 lb 13.2 oz)     Constitutional: No apparent distress. Pleasant.  HENT: Trachea midline.  Head: Normocephalic and atraumatic.   Eyes: Right eye exhibits no discharge. Left eye exhibits no discharge. No scleral icterus.   CV: Well perfused.   Pulmonary/Chest: Effort normal. No respiratory distress.   Abdominal: There is no guarding.   Neurological: Awake, alert and cooperative.  SKIN: Intact no apparent lesions, cut, ulcers or abrasions  EXT:  No cyanosis, clubbing, or edema.  SENSORY: Intact to light touch in the bilateral lower extemities.  KNEE EXAMINATION:  MUSCULOSKELETAL:   Muscle Strength:(0-5)                             Right     Left  Hip  Flexors                5  5  Knee Extensors          5  5  Knee Flexors              5  5  Ankle Dorsiflex           5  5  Ankle Planterflex        5  5  EHL                            5  5    Reflexes: (0-4+/4)   Right     Left  Patellar(L4)  2+  2+  Ankle(S1)  2+  2+       INSPECTION:                                                                           Right                Left        Localized/Generalized swelling:                     -                       -  Muscle contours normal and symmetrical:     +                      +  Patellas symetrical and level:                         +                      +  Ecchymoses:                                                   -                       -  Erythema:                                                        -                       -  Gross deformity:                                             -                       -     RANGE OF MOTION:           Right                Left  Flexion:                       Full                  Full        Extension:                   Full                  Full  Other:      TENDERNESS:                        Right                Left  Medial Tibial Plateau:             -                       -  Lateral Tibial Plateau:             -                       -  Medial Femoral Condyle:        -                       -  Lateral Femoral Condyle:       -                       -  Head of the Fibula:                 -                       -  Medial joint line:                      +                      +  Lateral joint line:                      -                       -  Patella:                                    +                       +  Medial collateral lig:                -                       -  Lateral collateral lig:                -                       -  Pes Anserine:                         -                       -     SOFT TISSUE PALPATION:                                       Right                 "Left  Baker's cyst:                -                       -             Effusion:                      -                       -  Ballottement:               -                       -  Other:                          -                       -     IMAGING:  XR bilateral knee 12/04/2023 with severe tricompartmental osteoarthritis, kellgren benito grade 4.     Data Reviewed: X-ray  Supportive Actions: Independent visualization of images or test specimens.    ASSESSMENT:   1. Chronic pain of both knees    2. Bilateral primary osteoarthritis of knee        PLAN:   1. Time was spent reviewing the above diagnosis in depth with Chinedu today, including acute management and rehabilitation.     2. We discussed options for management of his pain would be to consider injection of either short acting steroid, long acting steroid (Zilretta), hyaluronic acid, or cryoneurolysis to peripheral nerves (iovera). The use, benefits, risks, and expectations of all of these types of injections was discussed at length with him today. At this time, he elects to proceed with ultrasound-guided bilateral intra-articular knee hyaluronic acid injection(s). We will submit for insurance approval and have patient return to clinic for completion of procedure.       3. If less than ideal response from above we will consider zilretta as patient historically has less than ideal response to short acting steroids and/or also consider iovera.     RTC for planned bilateral knee HA injections.     This is a consult from Martin Tay. Please see the "Communications" section of Epic to see how the consulting physician received the report of today's findings and recommendations. If it's an [facility] provider, it will be forwarded to his/her "in basket".       [1]   Current Outpatient Medications on File Prior to Visit   Medication Sig Dispense Refill    acetaminophen (TYLENOL) 500 MG tablet Take 500 mg by mouth every 6 (six) hours as needed for " Pain.      apixaban (ELIQUIS) 5 mg Tab Take 1 tablet (5 mg total) by mouth 2 (two) times daily. 180 tablet 1    atorvastatin (LIPITOR) 20 MG tablet Take 1 tablet (20 mg total) by mouth once daily. 90 tablet 3    b complex vitamins tablet Take 1 tablet by mouth once daily.      bacitracin 500 unit/gram Oint Apply topically 2 (two) times daily. Apply to the tip of the penis 30 g 0    finasteride (PROSCAR) 5 mg tablet Take 1 tablet (5 mg total) by mouth once daily. 90 tablet 3    furosemide (LASIX) 40 MG tablet Take 1 tablet (40 mg total) by mouth once daily. 90 tablet 1    gabapentin (NEURONTIN) 300 MG capsule Take one in am, and two in PM 90 capsule 4    hydrALAZINE (APRESOLINE) 25 MG tablet TAKE 1 TABLET (25 MG TOTAL) BY MOUTH EVERY 12 (TWELVE) HOURS 180 tablet 1    HYDROcodone-acetaminophen (NORCO) 5-325 mg per tablet Take 1 tablet by mouth every 8 (eight) hours as needed for Pain. 21 tablet 0    nystatin (MYCOSTATIN) cream Apply topically 2 (two) times daily. 90 g 1    polyethylene glycol (GLYCOLAX) 17 gram PwPk Take 17 g by mouth daily as needed. 30 packet 1    senna-docusate 8.6-50 mg (PERICOLACE) 8.6-50 mg per tablet Take 1 tablet by mouth 2 (two) times daily. 30 tablet 1    spironolactone (ALDACTONE) 25 MG tablet Take 1 tablet (25 mg total) by mouth once daily. 90 tablet 1    tamsulosin (FLOMAX) 0.4 mg Cap TAKE 1 CAPSULE BY MOUTH EVERY DAY 90 capsule 3    tiZANidine (ZANAFLEX) 2 MG tablet TAKE 1-2 TABLETS EVERY 8 HOURS AS NEEDED FOR MUSCLE SPASM 20 tablet 0    valsartan (DIOVAN) 320 MG tablet Take 1 tablet (320 mg total) by mouth once daily. 90 tablet 3     Current Facility-Administered Medications on File Prior to Visit   Medication Dose Route Frequency Provider Last Rate Last Admin    sodium hyaluronate (EUFLEXXA) 10 mg/mL(mw 2.4 -3.6 million) Syrg 20 mg  20 mg Intra-articular  Michelet Covarrubias MD   20 mg at 05/14/18 1046    sodium hyaluronate (EUFLEXXA) 10 mg/mL(mw 2.4 -3.6 million) Syrg 20 mg  20 mg  Intra-articular  Michelet Covarrubias MD   20 mg at 05/14/18 1045

## 2025-06-21 NOTE — PROGRESS NOTES
This note was completed with dictation software and grammatical errors may exist.    CC:Back pain    HPI: The patient is a 79-year-old man with a history of hypertension, obesity and low back pain who presents in referral from Dr. Winters for chronic right low back pain.    History of Present Illness    Patient presents for follow-up of left-sided pain ongoing for approximately 5 months, with initial treatment starting on January 14th. He reports persistent left-sided pain that has worsened and is now affecting the buttock area and causing numbness in the entire side of the leg. Pain has significantly impacted his quality of life, making walking difficult and requiring a cane to prevent falling. The right side is also hurting, but not as severely as before. Conservative treatments including medication, injection, and home exercises have not been helpful. PT in the past was also ineffective.    He describes pain in the right lower abdomen/flank area, particularly severe when getting up in the morning or going to bed, stating it becomes extremely intense. Pain improves slightly with movement but remains sore, sometimes extending as far as the lower abdomen but not reaching the groin.    He also reports longstanding knee pain, describing his knees as severely arthritic. Previous knee injections did not provide relief. He expresses concern that lack of exercise due to other pain issues is exacerbating his knee problems.    Patient is on Hydrocodone, which he uses sparingly. He has a few pills remaining.         Pain intervention history: He done physical therapy in January, 2014 with moderate relief but not sustained.  He takes Relafen, tramadol, baclofen and chlorzoxazone as needed with mild relief.  He had undergone what sounds like a series of epidurals several years ago with no major relief. The patient is status post a right side L2/3, L3/4, L4/5 and L5/S1 facet joint injection on 10/28/14 with 50% relief of his  back pain for 1 month.  He is status post radiofrequency ablation of the right L1, 2, 3, 4 and 5 medial branch nerves on 3/18/15 with 75% relief.  He is status post C7-T1 cervical interlaminar epidural steroid injection on 5/11/15 with 70% relief.  He is status post right L1, 2, 3, 4 and 5 medial branch radiofrequency ablation on 7/5/16 with 50% relief.   He is status post right L1, 2, 3, 4 and 5 medial branch radiofrequency ablation on 10/17/17 with about 50% relief additionally, later reported almost complete relief lasting a year.  He is status post right L1, 2, 3, 4 and 5 medial branch radiofrequency ablation on 04/11/2019 with mild relief.   He is status post L5/S1 interlaminar epidural steroid injection on 06/05/2019 with 80% relief.  He is status post L5/S1 interlaminar epidural steroid injection on 09/13/2019 with 50% relief lasting about 6 months.  He is status post L5/S1 interlaminar epidural steroid injection to the right on 06/02/2020 with minimal relief.  He is status post right L3/4 and L5/S1 transforaminal epidural steroid injection on 11/13/2020 with 0% relief.   He is status post left L5/S1 transforaminal epidural steroid injection on 06/24/2021 with 50% relief. He is status post right L5/S1 transforaminal LEATHA on 09/22/2022 with no relief. He is status post right L2/3 and L3/4 transforaminal LEATHA on 10/25/2022 with no relief.  He is status post right L3/4, L4/5 and L5/S1 diagnostic medial branch nerve block with 0% relief on 03/21/2023. He is status post right SI joint injection on 06/28/2024 with 80% relief lasting 1 week.  He is status post right sacroiliac joint fusion with PainTeq on 12/06/2024 with at least 75% relief. He is status post left sacroiliac joint injection on 02/24/2025 with 5 days of almost 100% relief.  He is status post left sacroiliac joint diagnostic injection with local anesthetic only on 04/07/2025 with 100% relief lasting 8 hours.    Spine  surgeries:    Antineuropathics:  NSAIDs:  Physical therapy:  Antidepressants:  Muscle relaxers:  Opioids:  Hydrocodone 7.5   Antiplatelets/Anticoagulants:    ROS:He reports back pain.  Balance of review of systems is negative.    Medical, surgical, family and social history reviewed elsewhere in record.    Medications/Allergies: See med card    Vitals:    25 1046   BP: (!) 149/72   Pulse: (!) 54   Weight: (!) 138 kg (304 lb 5.5 oz)   PainSc:   7   PainLoc: Back       Body mass index is 42.45 kg/m².        Physical exam:  Gen: A and O x3, pleasant, well-groomed  Skin: No rashes or obvious lesions  HEENT: PERRLA, no obvious deformities on ears or in canals.   CVS: Regular rate and rhythm, normal S1 and S2, no murmurs.  Resp: Clear to auscultation bilaterally, no wheezes or rales.  Abdomen: Soft, NT/ND, normal bowel sounds present.  Musculoskeletal:  Antalgic gait, ambulating with a cane.    Lower extremity strength 5/5 bilaterally  Ramakrishna's test positive on the left  Gaenslen's test positive on the left  SI compression test positive on the left  Tender to palpation along the left sacroiliac joint  Tenderness to palpation along the right lateral abdomen, right lower thoracic paraspinous muscles.    Imagin14 Xray L-spine  There is diffuse osteopenia. Mild to moderate chronic compression fracture with anterior wedging and evidence of vertebroplasty at L2. minimal left convex curvature in the upper lumbar region. No spondylolisthesis. Mild concavity of the superior endplate of L4 which could be small compression fracture or Schmorl's node. No additional fracture.   There is moderate degenerative change within the lumbar spine with anterior osteophyte formation most prominent at L4-L5 and L2- L3 and L1 - L2, also present in the lower thoracic region with flowing ossification anteriorly suggesting diffuse idiopathic sclerotic hyperostosis. There is also mild decreased intervertebral disk space height and  endplate sclerosis the lower thoracic and upper lumbar regions. Due to the degree of osseous mineralization, it is difficult to evaluate for any possible pars defects. Moderate sclerosis suggesting facet arthropathy in the lumbar spine present and there is mild sclerosis, likely degenerative in nature involving the sacroiliac joints.    10/7/14 MRI lumbar spine: At L1/2 there is mild spinal stenosis secondary to facet hypertrophy and disc bulging.  At L2/3 there is minimal spinal stenosis secondary to retropulsion of L2 compression fracture, appearance of prior kyphoplasty present.  There is minimal disc bulging but there is also some facet hypertrophy at this level.  At L3/4 there is facet and ligamentum hypertrophy, minimal disc bulging causing mild spinal stenosis and neuroforaminal narrowing on the left greater than the right side.  At L4/5 there is moderate hypertrophic facet and ligamentum hypertrophy with mild left foraminal narrowing compared to the right side.  There is no spinal stenosis at this level.  At L5/S1 there is a broad-based disc bulge that extends to the left side more than the right side along with asymmetric left sided facet hypertrophy and ligamentum flavum hypertrophy causing moderate left foraminal stenosis.    4/15/15 outside open MRI cervical spine [facility]  C3-4 posterior disc bulge producing mild bilateral foraminal narrowing  C4-5 posterior disc bulge producing mild bilateral foraminal narrowing, possible tiny annular tear in the posterior disc, anterior thecal sac deformity with no evidence of spinal stenosis  C5-6 circumferential disc bulge producing moderate to severe bilateral foraminal narrowing, effacement of the bilateral lateral recesses worse to the right, anterior thecal sac deformity with effacement of the anterior subarachnoid space, mild spinal canal stenosis  C6-7 circumferential disc bulge producing moderate to severe bilateral foraminal narrowing with mild spinal canal  stenosis  C7-T1 not completely included but appears to have a circumferential disc bulge with shallow midline disc protrusion with possible mild spinal canal stenosis and bilateral foraminal narrowing    CT L-spine:  No acute fracture or traumatic subluxation.  Alignment is relatively maintained.  Vertebroplasty changes are at L2.  There is mild, chronic appearing loss of height of the L4 vertebral body.  There is partial osseous fusion at L2-3.  Multilevel facet hypertrophy, osteophyte formation and disc space narrowing are present.   L1-2: Facet hypertrophy with mild right neural foraminal and spinal canal stenosis.   L2-3: Posterior disc osteophyte and facet hypertrophy results in mild to moderate right and mild left neural foraminal stenosis with mild to moderate spinal canal stenosis.   L3-4: Posterior disc osteophyte and facet hypertrophy results in moderate bilateral neural foraminal stenosis with mild to moderate spinal canal stenosis.   L4-5: Broad-based disc osteophyte and facet hypertrophy results in severe left and moderate right neural foraminal stenosis with mild to moderate spinal canal stenosis.   L5-S1: Posterior disc osteophyte eccentric to the left and facet hypertrophy results in moderate to severe bilateral neural foraminal stenosis with mild to moderate spinal canal stenosis.  Paravertebral soft tissues are normal.  Spinal cord stimulator wires into the spinal canal at the T12-L1 level.    12/15/22 CT myelogram L-spine:  T12-L1: Mild disc bulge with likely right central protrusion.  Mild right and minimal left narrowing of the lateral recesses.  No significant spinal canal or foraminal stenosis.   L1-L2: Mild disc bulge.  Mild right and minimal left narrowing of the lateral recesses.  No significant spinal canal stenosis.  Minimal right foraminal stenosis.   L2-L3: Trace retrolisthesis.  Mild disc bulge and posterior osteophytic ridging.  Mild bilateral facet hypertrophy.  Minimal narrowing of  the bilateral lateral recesses.  Mild right and minimal left foraminal stenosis.   L3-L4: Trace retrolisthesis.  Mild disc bulge.  Mild left and minimal right narrowing of the lateral recesses.  No significant spinal canal stenosis.  Mild-moderate bilateral foraminal stenosis.   L4-L5: Retrolisthesis.  Uncovering the disc mild disc bulge.  Mild bilateral facet hypertrophy.  Ligamentum flavum thickening.  Mild narrowing of the bilateral lateral recesses.  Mild spinal canal stenosis.  Moderate left and mild-moderate right foraminal stenosis.   L5-S1: Mild disc bulge and posterior osteophytic ridging.  Moderate left and mild right facet hypertrophy.  Moderate left and mild right narrowing of the lateral recesses.  No significant spinal canal stenosis.  Moderate right and mild-moderate left foraminal stenosis.   Bilateral dorsal paraspinal muscular atrophy in the lower lumbar spine.   Spinal cord stimulator leads are present entering the spinal canal at the T12-L1 interlaminar space and extending cranially above the field of view.   Layering dependent stones within the partially visualized urinary bladder, similar to prior study.  Mild-moderate atherosclerotic calcifications.   Postoperative changes of a previous L2 kyphoplasty with methylmethacrylate present.  There is loss of lumbar vertebral body height at all levels throughout the lumbar spine, progressive within the superior endplate of L4 when compared to the previous study of 12/10/2022.    1/30/23 Xray L-spine:  A spinal stimulator device is partially imaged.  There is a mild compression fracture of L2 status post vertebroplasty.  There is a mild compression fracture of L4 without significant change.  Mild retrolisthesis of L3 on L4 is present.  There is mild loss of disc height at L2-3 and L5-S1.  Moderate lower lumbar facet arthropathy is present there is heavy calcification of the aorta.    06/10/2024 CT cervical spine  Bones: Diffuse bony demineralization.   No fractures or bony destructive changes.  Prominent ventral bridging osteophyte complex at C4-C5 and endplate centered subcortical cystic changes in the setting of severe disc height loss at C5-C6.  Additional prominent ventral bridging osteophyte complexes laterally C6-C7.     Alignment: Within normal limits.     Craniocervical junction: Degenerative changes.  No acute process or may     Disc levels:     Degenerative changes including shallow disc osteophyte complexes most notable asymmetric to the right at C5-C6 with there is no more than mild to moderate narrowing of the right central spinal canal and centrally at C6-C7 producing mild to moderate narrowing of the spinal canal.  No evidence of any high-grade osseous spinal canal narrowing.  Uncovertebral spurring and facet arthrosis producing severe left/moderate right C3-C4, severe right/moderate left C4-C5, severe bilateral C5-C6, and severe bilateral C6-C7 narrowing.     Paraspinal soft tissues: The visualized paraspinal soft tissues and lung apices are within normal limits.    06/10/2024 CT lumbar spine  Bones: Diffuse bony demineralization.  There is a dorsal thoracic spinal stimulator entering the canal at T12-L1 coursing cranially out of the field of view.  Chronic mild compression fracture deformities status post cement augmentation of L2 and severe biconcave compression fracture of L4, both unchanged.  No acute fractures.  Prominent ventral endplate centered bridging osteophytes throughout.  No bony destructive process.     Alignment: Within normal limits.     Disc levels:     Similar multilevel degenerative changes including moderate disc height loss notable at L2-L3 and L5-S1.  Disc bulging and facet arthrosis most pronounced at L3-L4, L4-L5, and L5-S1 with moderate to severe narrowing of the spinal canal and subarticular recesses.  Severe foraminal narrowing on the left at L5-S1 and moderate to severe on the right at L5-S1 and bilaterally at L3-L4 and  L4-L5.  Overall no gross interval change.  Vacuum disc phenomenon at L3-L4.     Soft Tissues: Atherosclerotic changes of the aorta iliac distribution.  Partially imaged presumed left renal cyst measuring 2.9 cm.  Presumed left basilar scarring/atelectasis residual from previous pleural effusion as seen on CT 03/27/2024.  Cholecystectomy changes.  Prostatomegaly.    Assessment:  The patient is a 79-year-old man with a history of hypertension, obesity and low back pain who presents in referral from Dr. Winters for chronic right low back pain.   1. Sacroiliitis        2. Chronic pain of both knees  Ambulatory referral/consult to Physical Medicine Rehab      3. Chronic pain syndrome                Plan:  1. He continues to have severe left sacroiliac joint pain despite conservative treatment.  His insurance requires 6 months of conservative treatment prior to considering minimally invasive left sacroiliac joint fusion.  2. I will have him follow-up in 1 month which will be 6 months after initiating treatment for the left SI joint.  We will then schedule left SI fusion with Radha.  He has already had a left sacroiliac joint injection with steroid giving him almost 100% relief for 5 days and a left sacroiliac joint diagnostic injection with local anesthetic only giving him 100% relief lasting 8 hours.  3. I placed a referral to PM&R for his knees.    4. We may consider a right L1/2 transforaminal epidural steroid injection in the future.  We reviewed his lumbar spine imaging together.    This note was generated with the assistance of ambient listening technology. Verbal consent was obtained by the patient and accompanying visitor(s) for the recording of patient appointment to facilitate this note. I attest to having reviewed and edited the generated note for accuracy, though some syntax or spelling errors may persist. Please contact the author of this note for any clarification.

## 2025-07-07 DIAGNOSIS — E78.2 MIXED HYPERLIPIDEMIA: Chronic | ICD-10-CM

## 2025-07-07 RX ORDER — ATORVASTATIN CALCIUM 20 MG/1
20 TABLET, FILM COATED ORAL DAILY
Qty: 100 TABLET | Refills: 3 | Status: SHIPPED | OUTPATIENT
Start: 2025-07-07 | End: 2026-08-11

## 2025-07-07 NOTE — TELEPHONE ENCOUNTER
----- Message from Ping sent at 7/7/2025 10:54 AM CDT -----  Regarding: javon needed - atorvastatin  [phone] - call back ; wanting to know if he's till need to continue taking     atorvastatin (LIPITOR) 20 MG tablet and if yes need to send new script        Send to    [facility]/pharmacy #[id number] - [place] - [address]  [address] [address] [place] [address]  Phone: [phone] Fax: [phone]  Hours: Not open 24 hours          Ping

## 2025-07-09 ENCOUNTER — TELEPHONE (OUTPATIENT)
Dept: FAMILY MEDICINE | Facility: CLINIC | Age: 80
End: 2025-07-09
Payer: MEDICARE

## 2025-07-09 ENCOUNTER — PATIENT MESSAGE (OUTPATIENT)
Dept: FAMILY MEDICINE | Facility: CLINIC | Age: 80
End: 2025-07-09
Payer: MEDICARE

## 2025-07-09 ENCOUNTER — CLINICAL SUPPORT (OUTPATIENT)
Dept: PHYSICAL MEDICINE AND REHAB | Facility: CLINIC | Age: 80
End: 2025-07-09
Payer: MEDICARE

## 2025-07-09 VITALS
DIASTOLIC BLOOD PRESSURE: 63 MMHG | HEART RATE: 61 BPM | SYSTOLIC BLOOD PRESSURE: 139 MMHG | WEIGHT: 303.56 LBS | BODY MASS INDEX: 42.34 KG/M2

## 2025-07-09 DIAGNOSIS — M17.0 BILATERAL PRIMARY OSTEOARTHRITIS OF KNEE: Primary | ICD-10-CM

## 2025-07-09 DIAGNOSIS — M25.561 CHRONIC PAIN OF BOTH KNEES: ICD-10-CM

## 2025-07-09 DIAGNOSIS — M25.562 CHRONIC PAIN OF BOTH KNEES: ICD-10-CM

## 2025-07-09 DIAGNOSIS — G89.29 CHRONIC PAIN OF BOTH KNEES: ICD-10-CM

## 2025-07-09 PROCEDURE — 99999 PR PBB SHADOW E&M-EST. PATIENT-LVL III: CPT | Mod: PBBFAC,,, | Performed by: PHYSICAL MEDICINE & REHABILITATION

## 2025-07-09 NOTE — PROCEDURES
Large Joint Aspiration/Injection: bilateral knee    Date/Time: 7/9/2025 11:00 AM    Performed by: Margaret Sampson, DO  Authorized by: Margaret Sampson, DO    Consent Done?:  Yes (Verbal)  Indications:  Arthritis, diagnostic evaluation and pain  Site marked: the procedure site was marked    Timeout: prior to procedure the correct patient, procedure, and site was verified    Prep: patient was prepped and draped in usual sterile fashion    Local anesthesia used?: No (ethyl chloride spray)      Details:  Needle Size:  21 G  Ultrasonic Guidance for needle placement?: Yes    Images are saved and documented.  Approach:  Lateral  Location:  Knee  Laterality:  Bilateral  Site:  Bilateral knee  Medications (Right):  48 mg hylan g-f 20 48 mg/6 mL  Medications (Left):  48 mg hylan g-f 20 48 mg/6 mL  Patient tolerance:  Patient tolerated the procedure well with no immediate complications     Ultrasound guidance was used for correct needle placement, the images were saved will be uploaded to EMR.

## 2025-07-09 NOTE — PROGRESS NOTES
[facility] PROCEDURE NOTE    CHIEF COMPLAINT:   Chief Complaint   Patient presents with    Knee Pain     Both        HISTORY OF PRESENT ILLNESS: Chinedu Roque is a 79 y.o. male who presents to me for planned procedure/injection of hyaluronic acid for management of the below noted diagnosis.     Medications: Medications Ordered Prior to Encounter[1]    Allergies: Review of patient's allergies indicates:  No Known Allergies    Vitals:   Vitals:    07/09/25 1053   BP: 139/63   Pulse: 61       ASSESSMENT:   1. Bilateral primary osteoarthritis of knee    2. Chronic pain of both knees        PLAN:   1. Procedure/injection was completed for management of above diagnosis.    2. Please see procedure note from today's visit with further details.     Bilateral knee BALBUENA    Will submit for approval of zilretta at 1 month f/u    RTC 1 month to assess response and consider bilateral zilretta injections.        [1]   Current Outpatient Medications on File Prior to Visit   Medication Sig Dispense Refill    apixaban (ELIQUIS) 5 mg Tab Take 1 tablet (5 mg total) by mouth 2 (two) times daily. 180 tablet 1    atorvastatin (LIPITOR) 20 MG tablet Take 1 tablet (20 mg total) by mouth once daily. 100 tablet 3    b complex vitamins tablet Take 1 tablet by mouth once daily.      bacitracin 500 unit/gram Oint Apply topically 2 (two) times daily. Apply to the tip of the penis 30 g 0    finasteride (PROSCAR) 5 mg tablet Take 1 tablet (5 mg total) by mouth once daily. 90 tablet 3    furosemide (LASIX) 40 MG tablet Take 1 tablet (40 mg total) by mouth once daily. 90 tablet 1    gabapentin (NEURONTIN) 300 MG capsule Take one in am, and two in PM 90 capsule 4    hydrALAZINE (APRESOLINE) 25 MG tablet TAKE 1 TABLET (25 MG TOTAL) BY MOUTH EVERY 12 (TWELVE) HOURS 180 tablet 1    HYDROcodone-acetaminophen (NORCO) 5-325 mg per tablet Take 1 tablet by mouth every 8 (eight) hours as needed for Pain. 21 tablet 0     nystatin (MYCOSTATIN) cream Apply topically 2 (two) times daily. 90 g 1    polyethylene glycol (GLYCOLAX) 17 gram PwPk Take 17 g by mouth daily as needed. 30 packet 1    senna-docusate 8.6-50 mg (PERICOLACE) 8.6-50 mg per tablet Take 1 tablet by mouth 2 (two) times daily. 30 tablet 1    spironolactone (ALDACTONE) 25 MG tablet Take 1 tablet (25 mg total) by mouth once daily. 90 tablet 1    tamsulosin (FLOMAX) 0.4 mg Cap TAKE 1 CAPSULE BY MOUTH EVERY DAY 90 capsule 3    tiZANidine (ZANAFLEX) 2 MG tablet TAKE 1-2 TABLETS EVERY 8 HOURS AS NEEDED FOR MUSCLE SPASM 20 tablet 0    valsartan (DIOVAN) 320 MG tablet Take 1 tablet (320 mg total) by mouth once daily. 90 tablet 3     Current Facility-Administered Medications on File Prior to Visit   Medication Dose Route Frequency Provider Last Rate Last Admin    sodium hyaluronate (EUFLEXXA) 10 mg/mL(mw 2.4 -3.6 million) Syrg 20 mg  20 mg Intra-articular  Michelet Covarrubias MD   20 mg at 05/14/18 1046    sodium hyaluronate (EUFLEXXA) 10 mg/mL(mw 2.4 -3.6 million) Syrg 20 mg  20 mg Intra-articular  Michelet Covarrubias MD   20 mg at 05/14/18 1046

## 2025-07-14 ENCOUNTER — PATIENT MESSAGE (OUTPATIENT)
Dept: FAMILY MEDICINE | Facility: CLINIC | Age: 80
End: 2025-07-14
Payer: MEDICARE

## 2025-07-14 ENCOUNTER — OFFICE VISIT (OUTPATIENT)
Dept: PAIN MEDICINE | Facility: CLINIC | Age: 80
End: 2025-07-14
Payer: MEDICARE

## 2025-07-14 ENCOUNTER — TELEPHONE (OUTPATIENT)
Dept: PAIN MEDICINE | Facility: CLINIC | Age: 80
End: 2025-07-14

## 2025-07-14 VITALS
DIASTOLIC BLOOD PRESSURE: 60 MMHG | HEART RATE: 65 BPM | BODY MASS INDEX: 42.14 KG/M2 | WEIGHT: 302.13 LBS | SYSTOLIC BLOOD PRESSURE: 130 MMHG

## 2025-07-14 DIAGNOSIS — M48.061 SPINAL STENOSIS OF LUMBAR REGION WITHOUT NEUROGENIC CLAUDICATION: ICD-10-CM

## 2025-07-14 DIAGNOSIS — G89.4 CHRONIC PAIN SYNDROME: ICD-10-CM

## 2025-07-14 DIAGNOSIS — M53.3 SACROILIAC JOINT PAIN: ICD-10-CM

## 2025-07-14 DIAGNOSIS — M54.16 LUMBAR RADICULOPATHY: Primary | ICD-10-CM

## 2025-07-14 PROCEDURE — 1157F ADVNC CARE PLAN IN RCRD: CPT | Mod: CPTII,S$GLB,, | Performed by: PHYSICIAN ASSISTANT

## 2025-07-14 PROCEDURE — 1101F PT FALLS ASSESS-DOCD LE1/YR: CPT | Mod: CPTII,S$GLB,, | Performed by: PHYSICIAN ASSISTANT

## 2025-07-14 PROCEDURE — 3075F SYST BP GE 130 - 139MM HG: CPT | Mod: CPTII,S$GLB,, | Performed by: PHYSICIAN ASSISTANT

## 2025-07-14 PROCEDURE — 1159F MED LIST DOCD IN RCRD: CPT | Mod: CPTII,S$GLB,, | Performed by: PHYSICIAN ASSISTANT

## 2025-07-14 PROCEDURE — 99999 PR PBB SHADOW E&M-EST. PATIENT-LVL III: CPT | Mod: PBBFAC,,, | Performed by: PHYSICIAN ASSISTANT

## 2025-07-14 PROCEDURE — 99214 OFFICE O/P EST MOD 30 MIN: CPT | Mod: S$GLB,,, | Performed by: PHYSICIAN ASSISTANT

## 2025-07-14 PROCEDURE — 1160F RVW MEDS BY RX/DR IN RCRD: CPT | Mod: CPTII,S$GLB,, | Performed by: PHYSICIAN ASSISTANT

## 2025-07-14 PROCEDURE — 3288F FALL RISK ASSESSMENT DOCD: CPT | Mod: CPTII,S$GLB,, | Performed by: PHYSICIAN ASSISTANT

## 2025-07-14 PROCEDURE — 3078F DIAST BP <80 MM HG: CPT | Mod: CPTII,S$GLB,, | Performed by: PHYSICIAN ASSISTANT

## 2025-07-14 PROCEDURE — 1125F AMNT PAIN NOTED PAIN PRSNT: CPT | Mod: CPTII,S$GLB,, | Performed by: PHYSICIAN ASSISTANT

## 2025-07-14 RX ORDER — HYDROCODONE BITARTRATE AND ACETAMINOPHEN 5; 325 MG/1; MG/1
1 TABLET ORAL EVERY 8 HOURS PRN
Qty: 21 TABLET | Refills: 0 | Status: CANCELLED | OUTPATIENT
Start: 2025-07-14

## 2025-07-14 NOTE — TELEPHONE ENCOUNTER
Pt seen in clinic, please send Rx.  I have reviewed the [place] Board of Pharmacy website and there are no abberancies.

## 2025-07-14 NOTE — TELEPHONE ENCOUNTER
Physician - Dr Canales (Dr Segura patient)      Type of Procedure/Injection - Lumbar Transforaminal Epidural  L1/2           Laterality - Right      Priority - Normal      Anxiolysis - Local      Fasting - NPO after midnight      Need to hold medication - Yes      Aspirin or aspirin containing products for 6 days and Eliquis for 3 days      Clearance needed - Yes      Follow up - 2 week

## 2025-07-15 RX ORDER — HYDROCODONE BITARTRATE AND ACETAMINOPHEN 5; 325 MG/1; MG/1
1 TABLET ORAL EVERY 8 HOURS PRN
Qty: 21 TABLET | Refills: 0 | Status: SHIPPED | OUTPATIENT
Start: 2025-07-15

## 2025-07-15 NOTE — TELEPHONE ENCOUNTER
Dr. Canales, Patient was seen in clinic by Pavan. Is he ok to schedule with you for an LEATHA? Thanks

## 2025-07-15 NOTE — PROGRESS NOTES
This note was completed with dictation software and grammatical errors may exist.    CC:Back pain    HPI: The patient is a 79-year-old man with a history of hypertension, obesity and low back pain who presents in referral from Dr. Winters for chronic right low back pain.      Patient presents for follow-up of left SI joint pain, ongoing for at least 6 months. He now reports more discomfort on the right lateral abdomen than the left sacroiliac joint. Pain is localized to the right rib cage and abdomen, wrapping around from the upper lumbar region. He describes severe pain when moving in bed, particularly when turning from his back to his side or getting up to use the bathroom. Pain wakes him at 2:00 AM.              Pain intervention history: He done physical therapy in January, 2014 with moderate relief but not sustained.  He takes Relafen, tramadol, baclofen and chlorzoxazone as needed with mild relief.  He had undergone what sounds like a series of epidurals several years ago with no major relief. The patient is status post a right side L2/3, L3/4, L4/5 and L5/S1 facet joint injection on 10/28/14 with 50% relief of his back pain for 1 month.  He is status post radiofrequency ablation of the right L1, 2, 3, 4 and 5 medial branch nerves on 3/18/15 with 75% relief.  He is status post C7-T1 cervical interlaminar epidural steroid injection on 5/11/15 with 70% relief.  He is status post right L1, 2, 3, 4 and 5 medial branch radiofrequency ablation on 7/5/16 with 50% relief.   He is status post right L1, 2, 3, 4 and 5 medial branch radiofrequency ablation on 10/17/17 with about 50% relief additionally, later reported almost complete relief lasting a year.  He is status post right L1, 2, 3, 4 and 5 medial branch radiofrequency ablation on 04/11/2019 with mild relief.   He is status post L5/S1 interlaminar epidural steroid injection on 06/05/2019 with 80% relief.  He is status post L5/S1 interlaminar epidural steroid  injection on 09/13/2019 with 50% relief lasting about 6 months.  He is status post L5/S1 interlaminar epidural steroid injection to the right on 06/02/2020 with minimal relief.  He is status post right L3/4 and L5/S1 transforaminal epidural steroid injection on 11/13/2020 with 0% relief.   He is status post left L5/S1 transforaminal epidural steroid injection on 06/24/2021 with 50% relief. He is status post right L5/S1 transforaminal LEATHA on 09/22/2022 with no relief. He is status post right L2/3 and L3/4 transforaminal LEATHA on 10/25/2022 with no relief.  He is status post right L3/4, L4/5 and L5/S1 diagnostic medial branch nerve block with 0% relief on 03/21/2023. He is status post right SI joint injection on 06/28/2024 with 80% relief lasting 1 week.  He is status post right sacroiliac joint fusion with PainTeq on 12/06/2024 with at least 75% relief. He is status post left sacroiliac joint injection on 02/24/2025 with 5 days of almost 100% relief.  He is status post left sacroiliac joint diagnostic injection with local anesthetic only on 04/07/2025 with 100% relief lasting 8 hours.    Spine surgeries:    Antineuropathics:  NSAIDs:  Physical therapy:  Antidepressants:  Muscle relaxers:  Opioids:  Hydrocodone 7.5   Antiplatelets/Anticoagulants:    ROS:He reports back pain.  Balance of review of systems is negative.    Medical, surgical, family and social history reviewed elsewhere in record.    Medications/Allergies: See med card    Vitals:    07/14/25 1452   BP: 130/60   Pulse: 65   Weight: (!) 137.1 kg (302 lb 2.3 oz)   PainSc:   7   PainLoc: Back       Body mass index is 42.14 kg/m².        Physical exam:  Gen: A and O x3, pleasant, well-groomed  Skin: No rashes or obvious lesions  HEENT: PERRLA, no obvious deformities on ears or in canals.   CVS: Regular rate and rhythm, normal S1 and S2, no murmurs.  Resp: Clear to auscultation bilaterally, no wheezes or rales.  Abdomen: Soft, NT/ND, normal bowel sounds  present.  Musculoskeletal:  Antalgic gait, ambulating with a cane.    Lower extremity strength 5/5 bilaterally  Ramakrishna's test positive on the left  Gaenslen's test positive on the left  SI compression test positive on the left  Tender to palpation along the left sacroiliac joint  Tenderness to palpation along the right lateral abdomen, right lower thoracic paraspinous muscles.    Imagin14 Xray L-spine  There is diffuse osteopenia. Mild to moderate chronic compression fracture with anterior wedging and evidence of vertebroplasty at L2. minimal left convex curvature in the upper lumbar region. No spondylolisthesis. Mild concavity of the superior endplate of L4 which could be small compression fracture or Schmorl's node. No additional fracture.   There is moderate degenerative change within the lumbar spine with anterior osteophyte formation most prominent at L4-L5 and L2- L3 and L1 - L2, also present in the lower thoracic region with flowing ossification anteriorly suggesting diffuse idiopathic sclerotic hyperostosis. There is also mild decreased intervertebral disk space height and endplate sclerosis the lower thoracic and upper lumbar regions. Due to the degree of osseous mineralization, it is difficult to evaluate for any possible pars defects. Moderate sclerosis suggesting facet arthropathy in the lumbar spine present and there is mild sclerosis, likely degenerative in nature involving the sacroiliac joints.    10/7/14 MRI lumbar spine: At L1/2 there is mild spinal stenosis secondary to facet hypertrophy and disc bulging.  At L2/3 there is minimal spinal stenosis secondary to retropulsion of L2 compression fracture, appearance of prior kyphoplasty present.  There is minimal disc bulging but there is also some facet hypertrophy at this level.  At L3/4 there is facet and ligamentum hypertrophy, minimal disc bulging causing mild spinal stenosis and neuroforaminal narrowing on the left greater than the right  side.  At L4/5 there is moderate hypertrophic facet and ligamentum hypertrophy with mild left foraminal narrowing compared to the right side.  There is no spinal stenosis at this level.  At L5/S1 there is a broad-based disc bulge that extends to the left side more than the right side along with asymmetric left sided facet hypertrophy and ligamentum flavum hypertrophy causing moderate left foraminal stenosis.    4/15/15 outside open MRI cervical spine [facility]  C3-4 posterior disc bulge producing mild bilateral foraminal narrowing  C4-5 posterior disc bulge producing mild bilateral foraminal narrowing, possible tiny annular tear in the posterior disc, anterior thecal sac deformity with no evidence of spinal stenosis  C5-6 circumferential disc bulge producing moderate to severe bilateral foraminal narrowing, effacement of the bilateral lateral recesses worse to the right, anterior thecal sac deformity with effacement of the anterior subarachnoid space, mild spinal canal stenosis  C6-7 circumferential disc bulge producing moderate to severe bilateral foraminal narrowing with mild spinal canal stenosis  C7-T1 not completely included but appears to have a circumferential disc bulge with shallow midline disc protrusion with possible mild spinal canal stenosis and bilateral foraminal narrowing    CT L-spine:  No acute fracture or traumatic subluxation.  Alignment is relatively maintained.  Vertebroplasty changes are at L2.  There is mild, chronic appearing loss of height of the L4 vertebral body.  There is partial osseous fusion at L2-3.  Multilevel facet hypertrophy, osteophyte formation and disc space narrowing are present.   L1-2: Facet hypertrophy with mild right neural foraminal and spinal canal stenosis.   L2-3: Posterior disc osteophyte and facet hypertrophy results in mild to moderate right and mild left neural foraminal stenosis with mild to moderate spinal canal stenosis.   L3-4: Posterior disc osteophyte and  facet hypertrophy results in moderate bilateral neural foraminal stenosis with mild to moderate spinal canal stenosis.   L4-5: Broad-based disc osteophyte and facet hypertrophy results in severe left and moderate right neural foraminal stenosis with mild to moderate spinal canal stenosis.   L5-S1: Posterior disc osteophyte eccentric to the left and facet hypertrophy results in moderate to severe bilateral neural foraminal stenosis with mild to moderate spinal canal stenosis.  Paravertebral soft tissues are normal.  Spinal cord stimulator wires into the spinal canal at the T12-L1 level.    12/15/22 CT myelogram L-spine:  T12-L1: Mild disc bulge with likely right central protrusion.  Mild right and minimal left narrowing of the lateral recesses.  No significant spinal canal or foraminal stenosis.   L1-L2: Mild disc bulge.  Mild right and minimal left narrowing of the lateral recesses.  No significant spinal canal stenosis.  Minimal right foraminal stenosis.   L2-L3: Trace retrolisthesis.  Mild disc bulge and posterior osteophytic ridging.  Mild bilateral facet hypertrophy.  Minimal narrowing of the bilateral lateral recesses.  Mild right and minimal left foraminal stenosis.   L3-L4: Trace retrolisthesis.  Mild disc bulge.  Mild left and minimal right narrowing of the lateral recesses.  No significant spinal canal stenosis.  Mild-moderate bilateral foraminal stenosis.   L4-L5: Retrolisthesis.  Uncovering the disc mild disc bulge.  Mild bilateral facet hypertrophy.  Ligamentum flavum thickening.  Mild narrowing of the bilateral lateral recesses.  Mild spinal canal stenosis.  Moderate left and mild-moderate right foraminal stenosis.   L5-S1: Mild disc bulge and posterior osteophytic ridging.  Moderate left and mild right facet hypertrophy.  Moderate left and mild right narrowing of the lateral recesses.  No significant spinal canal stenosis.  Moderate right and mild-moderate left foraminal stenosis.   Bilateral dorsal  paraspinal muscular atrophy in the lower lumbar spine.   Spinal cord stimulator leads are present entering the spinal canal at the T12-L1 interlaminar space and extending cranially above the field of view.   Layering dependent stones within the partially visualized urinary bladder, similar to prior study.  Mild-moderate atherosclerotic calcifications.   Postoperative changes of a previous L2 kyphoplasty with methylmethacrylate present.  There is loss of lumbar vertebral body height at all levels throughout the lumbar spine, progressive within the superior endplate of L4 when compared to the previous study of 12/10/2022.    1/30/23 Xray L-spine:  A spinal stimulator device is partially imaged.  There is a mild compression fracture of L2 status post vertebroplasty.  There is a mild compression fracture of L4 without significant change.  Mild retrolisthesis of L3 on L4 is present.  There is mild loss of disc height at L2-3 and L5-S1.  Moderate lower lumbar facet arthropathy is present there is heavy calcification of the aorta.    06/10/2024 CT cervical spine  Bones: Diffuse bony demineralization.  No fractures or bony destructive changes.  Prominent ventral bridging osteophyte complex at C4-C5 and endplate centered subcortical cystic changes in the setting of severe disc height loss at C5-C6.  Additional prominent ventral bridging osteophyte complexes laterally C6-C7.     Alignment: Within normal limits.     Craniocervical junction: Degenerative changes.  No acute process or may     Disc levels:     Degenerative changes including shallow disc osteophyte complexes most notable asymmetric to the right at C5-C6 with there is no more than mild to moderate narrowing of the right central spinal canal and centrally at C6-C7 producing mild to moderate narrowing of the spinal canal.  No evidence of any high-grade osseous spinal canal narrowing.  Uncovertebral spurring and facet arthrosis producing severe left/moderate right  C3-C4, severe right/moderate left C4-C5, severe bilateral C5-C6, and severe bilateral C6-C7 narrowing.     Paraspinal soft tissues: The visualized paraspinal soft tissues and lung apices are within normal limits.    06/10/2024 CT lumbar spine  Bones: Diffuse bony demineralization.  There is a dorsal thoracic spinal stimulator entering the canal at T12-L1 coursing cranially out of the field of view.  Chronic mild compression fracture deformities status post cement augmentation of L2 and severe biconcave compression fracture of L4, both unchanged.  No acute fractures.  Prominent ventral endplate centered bridging osteophytes throughout.  No bony destructive process.     Alignment: Within normal limits.     Disc levels:     Similar multilevel degenerative changes including moderate disc height loss notable at L2-L3 and L5-S1.  Disc bulging and facet arthrosis most pronounced at L3-L4, L4-L5, and L5-S1 with moderate to severe narrowing of the spinal canal and subarticular recesses.  Severe foraminal narrowing on the left at L5-S1 and moderate to severe on the right at L5-S1 and bilaterally at L3-L4 and L4-L5.  Overall no gross interval change.  Vacuum disc phenomenon at L3-L4.     Soft Tissues: Atherosclerotic changes of the aorta iliac distribution.  Partially imaged presumed left renal cyst measuring 2.9 cm.  Presumed left basilar scarring/atelectasis residual from previous pleural effusion as seen on CT 03/27/2024.  Cholecystectomy changes.  Prostatomegaly.    Assessment:  The patient is a 79-year-old man with a history of hypertension, obesity and low back pain who presents in referral from Dr. Winters for chronic right low back pain.   1. Lumbar radiculopathy        2. Sacroiliac joint pain        3. Spinal stenosis of lumbar region without neurogenic claudication        4. Chronic pain syndrome                Plan:  1. We discussed he is experiencing radicular pain in an L1 pattern on the right side likely due to  foraminal stenosis at L1/2.  I am going to schedule him for a right L1/2 transforaminal epidural steroid injection.  2. We discussed that he did well following the right sacroiliac joint fusion in the past and he is experiencing left sacroiliac joint pain as well and he has failed conservative treatment for the last 6 months.  When he returns following his epidural steroid injection we will schedule a left SI fusion with Radha.  He has already had a left sacroiliac joint injection with steroid giving him almost 100% relief for 5 days and a left sacroiliac joint diagnostic injection with local anesthetic only giving him 100% relief lasting 8 hours.  3. Dr. Segura refilled hydrocodone which she takes sparingly for severe pain only.  I have reviewed the [place] Board of Pharmacy website and there are no abberancies.    4. Follow-up in 2 weeks postprocedure or sooner as needed.    This note was generated with the assistance of ambient listening technology. Verbal consent was obtained by the patient and accompanying visitor(s) for the recording of patient appointment to facilitate this note. I attest to having reviewed and edited the generated note for accuracy, though some syntax or spelling errors may persist. Please contact the author of this note for any clarification.

## 2025-07-16 ENCOUNTER — PATIENT MESSAGE (OUTPATIENT)
Dept: PAIN MEDICINE | Facility: CLINIC | Age: 80
End: 2025-07-16
Payer: MEDICARE

## 2025-07-16 NOTE — TELEPHONE ENCOUNTER
Attempted to reach patient to schedule. No answer and no voicemail set up yet. "[employer] message sent to patient.

## 2025-07-23 ENCOUNTER — E-CONSULT (OUTPATIENT)
Dept: CARDIOLOGY | Facility: CLINIC | Age: 80
End: 2025-07-23
Payer: MEDICARE

## 2025-07-23 DIAGNOSIS — Z01.810 PRE-OPERATIVE CARDIOVASCULAR EXAMINATION: Primary | ICD-10-CM

## 2025-07-23 DIAGNOSIS — Z79.02 ANTIPLATELET OR ANTITHROMBOTIC LONG-TERM USE: ICD-10-CM

## 2025-07-23 DIAGNOSIS — M54.16 LUMBAR RADICULOPATHY: Primary | ICD-10-CM

## 2025-07-23 RX ORDER — ALPRAZOLAM 1 MG/1
1 TABLET, ORALLY DISINTEGRATING ORAL ONCE AS NEEDED
OUTPATIENT
Start: 2025-07-23 | End: 2036-12-19

## 2025-07-23 NOTE — CONSULTS
[place] - Cardiology  Response for E-Consult     Patient Name: Chinedu Roque  MRN: [id number]  Primary Care Provider: Hernando Browne MD   Requesting Provider: Vickie Canales MD  E-Consult to General Cardiology  Consult performed by: Kike Chavez MD  Consult ordered by: Vickie Canales MD          Chart reviewed personally.      No evidence seen in Epic of DCCV within the last month.    No evidence seen in Epic of percutaneous coronary or peripheral arterial intervention within the last year.    As long as no significant chest pain or shortness of breath with exertion, patient is at acceptable risk to proceed from a Cardiology standpoint.    Okay to hold Eliquis 48-72 hours prior to procedure, restart as soon as safe postprocedure.    Okay to hold aspirin 5-7 days prior to procedure, restart as soon as safe postprocedure.        Additional future steps to consider: NA    Total time of Consultation: 5 minute    I did not speak to the requesting provider verbally about this.     *This eConsult is based on the clinical data available to me and is furnished without benefit of a physical examination. The eConsult will need to be interpreted in light of any clinical issues or changes in patient status not available to me at the time of filing this eConsults. Significant changes in patient condition or level of acuity should result in immediate formal consultation and reevaluation. Please alert me if you have further questions.    Thank you for this eConsult referral.     Kike Chavez MD  [facility]

## 2025-07-25 ENCOUNTER — TELEPHONE (OUTPATIENT)
Dept: PAIN MEDICINE | Facility: CLINIC | Age: 80
End: 2025-07-25
Payer: MEDICARE

## 2025-07-25 ENCOUNTER — TELEPHONE (OUTPATIENT)
Dept: SURGERY | Facility: HOSPITAL | Age: 80
End: 2025-07-25
Payer: MEDICARE

## 2025-07-25 RX ORDER — ASPIRIN 81 MG/1
81 TABLET ORAL DAILY
COMMUNITY

## 2025-07-25 NOTE — TELEPHONE ENCOUNTER
Spoke with patient and informed him that MD advises postponing his procedure with her. Advised patient to contact the office once he has his FU appt w/ortho and is cleared to proceed. Patient voiced understanding.

## 2025-07-25 NOTE — TELEPHONE ENCOUNTER
ype:  Needs Medical Advice    Who Called: pt    Would the patient rather a call back or a response via [facility]?  Call back    Best Call Back Number:  [phone]    Additional Information:  has injection scheduled with Ally on 07/31 but also has Knee injections with [facility] on 07/30    Wants to know if okay to do both so close to each other?    Please call to advise    Thanks

## 2025-07-25 NOTE — TELEPHONE ENCOUNTER
"Pt is scheduled for INJECTION, SPINE, LUMBOSACRAL, TRANSFORAMINAL APPROACH L1/2 - Right on Thurs., 7/31/2025. Pt states "I am scheduled for an appt with ortho on Weds., 7/30/2025 & am getting a steroid injection in my knee". Please advise if this will interfere with scheduled procedure. Thank you.   "

## 2025-07-30 ENCOUNTER — CLINICAL SUPPORT (OUTPATIENT)
Dept: PHYSICAL MEDICINE AND REHAB | Facility: CLINIC | Age: 80
End: 2025-07-30
Payer: MEDICARE

## 2025-07-30 ENCOUNTER — PATIENT MESSAGE (OUTPATIENT)
Dept: PAIN MEDICINE | Facility: CLINIC | Age: 80
End: 2025-07-30
Payer: MEDICARE

## 2025-07-30 VITALS
WEIGHT: 299.19 LBS | DIASTOLIC BLOOD PRESSURE: 65 MMHG | SYSTOLIC BLOOD PRESSURE: 139 MMHG | BODY MASS INDEX: 42.93 KG/M2 | HEART RATE: 57 BPM

## 2025-07-30 DIAGNOSIS — M25.561 CHRONIC PAIN OF BOTH KNEES: Primary | ICD-10-CM

## 2025-07-30 DIAGNOSIS — M25.562 CHRONIC PAIN OF BOTH KNEES: Primary | ICD-10-CM

## 2025-07-30 DIAGNOSIS — G89.29 CHRONIC PAIN OF BOTH KNEES: Primary | ICD-10-CM

## 2025-07-31 ENCOUNTER — HOSPITAL ENCOUNTER (OUTPATIENT)
Facility: HOSPITAL | Age: 80
Discharge: HOME | End: 2025-07-31
Attending: STUDENT IN AN ORGANIZED HEALTH CARE EDUCATION/TRAINING PROGRAM | Admitting: STUDENT IN AN ORGANIZED HEALTH CARE EDUCATION/TRAINING PROGRAM
Payer: MEDICARE

## 2025-07-31 ENCOUNTER — HOSPITAL ENCOUNTER (OUTPATIENT)
Dept: RADIOLOGY | Facility: HOSPITAL | Age: 80
Discharge: HOME | End: 2025-07-31
Attending: STUDENT IN AN ORGANIZED HEALTH CARE EDUCATION/TRAINING PROGRAM
Payer: MEDICARE

## 2025-07-31 DIAGNOSIS — M54.16 LUMBAR RADICULOPATHY: ICD-10-CM

## 2025-07-31 DIAGNOSIS — M54.50 LOWER BACK PAIN: ICD-10-CM

## 2025-07-31 DIAGNOSIS — M54.17 LUMBOSACRAL RADICULOPATHY: Primary | ICD-10-CM

## 2025-07-31 PROCEDURE — 64483 NJX AA&/STRD TFRM EPI L/S 1: CPT | Mod: RT,,, | Performed by: STUDENT IN AN ORGANIZED HEALTH CARE EDUCATION/TRAINING PROGRAM

## 2025-07-31 PROCEDURE — 63600175 PHARM REV CODE 636 W HCPCS: Mod: PO | Performed by: STUDENT IN AN ORGANIZED HEALTH CARE EDUCATION/TRAINING PROGRAM

## 2025-07-31 PROCEDURE — 25500020 PHARM REV CODE 255: Mod: PO | Performed by: STUDENT IN AN ORGANIZED HEALTH CARE EDUCATION/TRAINING PROGRAM

## 2025-07-31 PROCEDURE — 25000003 PHARM REV CODE 250: Mod: PO | Performed by: STUDENT IN AN ORGANIZED HEALTH CARE EDUCATION/TRAINING PROGRAM

## 2025-07-31 PROCEDURE — 64483 NJX AA&/STRD TFRM EPI L/S 1: CPT | Mod: PO,RT | Performed by: STUDENT IN AN ORGANIZED HEALTH CARE EDUCATION/TRAINING PROGRAM

## 2025-07-31 RX ORDER — LIDOCAINE HYDROCHLORIDE 10 MG/ML
INJECTION, SOLUTION EPIDURAL; INFILTRATION; INTRACAUDAL; PERINEURAL
Status: DISCONTINUED | OUTPATIENT
Start: 2025-07-31 | End: 2025-07-31 | Stop reason: HOSPADM

## 2025-07-31 RX ORDER — ALPRAZOLAM 0.5 MG/1
1 TABLET, ORALLY DISINTEGRATING ORAL ONCE AS NEEDED
Status: COMPLETED | OUTPATIENT
Start: 2025-07-31 | End: 2025-07-31

## 2025-07-31 RX ORDER — DEXAMETHASONE SODIUM PHOSPHATE 10 MG/ML
INJECTION, SOLUTION INTRA-ARTICULAR; INTRALESIONAL; INTRAMUSCULAR; INTRAVENOUS; SOFT TISSUE
Status: DISCONTINUED | OUTPATIENT
Start: 2025-07-31 | End: 2025-07-31 | Stop reason: HOSPADM

## 2025-07-31 RX ADMIN — ALPRAZOLAM 0.5 MG: 0.5 TABLET, ORALLY DISINTEGRATING ORAL at 10:07

## 2025-07-31 NOTE — OP NOTE
Patient: Chinedu Roque                                                    MRN: [id number]  : 1945                                              Date of procedure: 2025      Pre Procedure Diagnosis: Lumbar, Lumbosacral radiculopathy    Post Procedure Diagnosis: Same    Procedure:   Transforaminal Epidural Steroid Injection Under Fluoroscopy at Right L1-2      Attending: Vickie Canales MD    Local Anesthetic Injected: Lidocaine 1% 6ml    Sedation Medications: See RN note    Estimated Blood Loss: None    Complication: None        Procedure:  After informed consent was obtained, patient was taken to the fluoroscopy suite and placed in a prone position.  The skin was prepped and draped in the usual sterile fashion using chlorhexidine.  Anatomical landmarks were identified with the aid of fluoroscopy, and the skin and subcutaneous tissue overlying the neural foramen was infiltrated with 3mL of 1% Lidocaine using a 25-gauge 1.5 inch needle.  A 22-gauge 5-inch needle was advanced with the aid of fluoroscopy in an oblique view such that the needle tip entered the neural foramen.   Needle placement was confirmed with fluoroscopy in PA and Lateral views.  After a negative aspiration, 0.5mL of contrast was injected.  The contrast delineated the nerve root as well as the epidural spread.  After negative aspiration, an injectate consisting of 0.5mL of 10mg/mL of Dexamethasone, 0.5mL of 1% preservative free lidocaine and 3mL of preservative normal saline was injected.  Patient tolerated the procedure well and all needles were removed intact.  There were no complications.    Patient was observed in recovery and discharged home with written instructions under supervision in stable condition.

## 2025-07-31 NOTE — H&P
CC: back pain    HPI: The patient is a 79 y.o. male with a history of back pain here for R TFESI L1-2. There are no major changes in history and physical from 7/14/25 by THIAGO Altamirano.    Past Medical History:   Diagnosis Date    Anticoagulant long-term use     ASA 81 mg    Arthritis     cervical    BPH (benign prostatic hyperplasia) 03/02/2012    Chronic heart failure with preserved ejection fraction (HFpEF)     Chronic left shoulder pain     Compression fracture of L2     DDD (degenerative disc disease), lumbar     HTN (hypertension) 03/02/2012    Hx of colonic polyps 2013    Low back pain     Morbid obesity with BMI of 40.0-44.9, adult 03/18/2014    Seasonal allergies     Sleep apnea     compliant with CPAP    Stroke 03/1986       Past Surgical History:   Procedure Laterality Date    ANGIOGRAM, CORONARY, WITH LEFT HEART CATHETERIZATION N/A 06/07/2023    Procedure: Left Heart Cath;  Surgeon: Edgardo Marcelino MD;  Location: [facility] CATH;  Service: Cardiology;  Laterality: N/A;    APPENDECTOMY      CATARACT EXTRACTION EXTRACAPSULAR W/ INTRAOCULAR LENS IMPLANTATION      COLONOSCOPY N/A 06/06/2022    Procedure: COLONOSCOPY;  Surgeon: Jose Bello MD;  Location: [facility] ENDO;  Service: Endoscopy;  Laterality: N/A;    COLONOSCOPY N/A 07/06/2023    Procedure: COLONOSCOPY;  Surgeon: Sb Spaulding MD;  Location: [facility] ENDO;  Service: Endoscopy;  Laterality: N/A;    CORONARY ANGIOGRAPHY N/A 06/07/2023    Procedure: ANGIOGRAM, CORONARY ARTERY;  Surgeon: Edgardo Marcelino MD;  Location: [facility] CATH;  Service: Cardiology;  Laterality: N/A;    CORONARY ARTERY BYPASS GRAFT      CORONARY ARTERY BYPASS GRAFT (CABG) N/A 06/23/2023    Procedure: CORONARY ARTERY BYPASS GRAFT (CABG) X3;  Surgeon: Pricila Clark MD;  Location: [facility] OR;  Service: Cardiothoracic;  Laterality: N/A;    ENDOSCOPIC HARVEST OF VEIN Right 06/23/2023    Procedure: HARVEST-VEIN-ENDOVASCULAR;  Surgeon: Pricila Clark MD;  Location: [facility] OR;  Service:  Cardiothoracic;  Laterality: Right;    EPIDURAL BLOCK INJECTION  2015    cervical    EPIDURAL STEROID INJECTION INTO LUMBAR SPINE N/A 06/05/2019    Procedure: Injection-steroid-epidural-lumbar L5/S1 interlaminar;  Surgeon: Riley Segura MD;  Location: [facility] OR;  Service: Pain Management;  Laterality: N/A;    EPIDURAL STEROID INJECTION INTO LUMBAR SPINE N/A 09/13/2019    Procedure: Injection-steroid-epidural-lumbar;  Surgeon: Riley Segura MD;  Location: [facility] OR;  Service: Pain Management;  Laterality: N/A;  L5/S1 interlaminar to the right    EPIDURAL STEROID INJECTION INTO LUMBAR SPINE N/A 06/02/2020    Procedure: Injection-steroid-epidural-lumbar L5/S1 to right;  Surgeon: Riley Segura MD;  Location: [facility] OR;  Service: Pain Management;  Laterality: N/A;    EXCLUSION, LEFT ATRIAL APPENDAGE, OPEN, AS PART OF OPEN CHEST SURGERY N/A 06/23/2023    Procedure: EXCLUSION, LEFT ATRIAL APPENDAGE, OPEN, AS PART OF OPEN CHEST SURGERY;  Surgeon: Pricila Clark MD;  Location: [facility] OR;  Service: Cardiothoracic;  Laterality: N/A;    EXPLORATION OF MEDIASTINUM N/A 06/23/2023    Procedure: EXPLORATION, MEDIASTINUM - MEDIASTINAL RE-EXPLORATION TO CONTROL POST-OP BLEEDING;  Surgeon: Pricila Clark MD;  Location: [facility] OR;  Service: Cardiothoracic;  Laterality: N/A;    Facet Steroid injection       Pain management    FUSION, SACROILIAC JOINT, PERCUTANEOUS, W/O TRANSFIXATION DEVICE Right 12/6/2024    Procedure: FUSION, SACROILIAC JOINT, PERCUTANEOUS, W/O TRANSFIXATION DEVICE;  Surgeon: Riley Segura MD;  Location: [facility] OR;  Service: Pain Management;  Laterality: Right;  normal priorty. PainTeq    HERNIA REPAIR      Ventral    INJECTION OF ANESTHETIC AGENT AROUND MEDIAL BRANCH NERVES INNERVATING LUMBAR FACET JOINT Right 03/21/2023    Procedure: Block-nerve-medial branch-lumbar L3/4, L4/5, L5/S1;  Surgeon: Riley Segura MD;  Location: [facility];  Service: Pain Management;  Laterality: Right;    INJECTION OF  ANESTHETIC AGENT INTO SACROILIAC JOINT Right 10/23/2024    Procedure: BLOCK, SACROILIAC JOINT;  Surgeon: Riley Segura MD;  Location[facility] [place];  Service: Pain Management;  Laterality: Right;  local only, no steroid    INJECTION, SACROILIAC JOINT Right 6/28/2024    Procedure: INJECTION,SACROILIAC JOINT;  Surgeon: Riley Segura MD;  Location: [facility];  Service: Pain Management;  Laterality: Right;    INJECTION, SACROILIAC JOINT Left 2/24/2025    Procedure: INJECTION,SACROILIAC JOINT;  Surgeon: Riley Segura MD;  Location: [facility];  Service: Pain Management;  Laterality: Left;  normal    INJECTION, SACROILIAC JOINT Left 4/7/2025    Procedure: INJECTION,SACROILIAC JOINT No steroid, diagnostic;  Surgeon: Riley Segura MD;  Location: [facility];  Service: Pain Management;  Laterality: Left;    INSERTION OF DORSAL COLUMN NERVE STIMULATOR FOR TRIAL N/A 02/10/2021    Procedure: INSERTION, NEUROSTIMULATOR, SPINAL CORD, DORSAL COLUMN, FOR TRIAL;  Surgeon: Riley Segura MD;  Location: [facility];  Service: Pain Management;  Laterality: N/A;    INSERTION OF NEUROSTIMULATOR OF DORSAL COLUMN OF SPINAL CORD N/A 03/01/2021    Procedure: INSERTION, NEUROSTIMULATOR, SPINAL CORD, DORSAL COLUMN;  Surgeon: Riley Segura MD;  Location: [facility];  Service: Pain Management;  Laterality: N/A;    KNEE SURGERY      bilateral    LAPAROSCOPIC CHOLECYSTECTOMY N/A 11/29/2023    Procedure: CHOLECYSTECTOMY, LAPAROSCOPIC;  Surgeon: Louisa Leo MD;  Location: [facility];  Service: General;  Laterality: N/A;    RADIOFREQUENCY ABLATION  2015    lumbar nerve    RADIOFREQUENCY ABLATION OF LUMBAR MEDIAL BRANCH NERVE AT SINGLE LEVEL Right 04/11/2019    Procedure: Radiofrequency Ablation, Nerve, Spinal, Lumbar, Medial Branch, L1,2,3,4,5;  Surgeon: Riley Segura MD;  Location: [facility];  Service: Pain Management;  Laterality: Right;    TONSILLECTOMY      TRANSESOPHAGEAL ECHOCARDIOGRAM WITH POSSIBLE CARDIOVERSION (CORINNA  W/ POSS CARDIOVERSION) N/A 6/4/2024    Procedure: TRANSESOPHAGEAL ECHOCARDIOGRAM WITH POSSIBLE CARDIOVERSION (CORINNA W/ POSS CARDIOVERSION);  Surgeon: Edgardo Marcelino MD;  Location: [facility];  Service: Cardiology;  Laterality: N/A;    TRANSFORAMINAL EPIDURAL INJECTION OF STEROID Right 11/13/2020    Procedure: Injection,steroid,epidural,transforaminal approach, l3/4 and l5/s1;  Surgeon: Riley Segura MD;  Location: [facility] OR;  Service: Pain Management;  Laterality: Right;    TRANSFORAMINAL EPIDURAL INJECTION OF STEROID Left 06/24/2021    Procedure: Injection,steroid,epidural,transforaminal approach L5/S1;  Surgeon: Riley Segura MD;  Location: [facility] OR;  Service: Pain Management;  Laterality: Left;    TRANSFORAMINAL EPIDURAL INJECTION OF STEROID Right 09/22/2022    Procedure: Injection,steroid,epidural,transforaminal approach L5/S1;  Surgeon: Riley Segura MD;  Location: [facility] OR;  Service: Pain Management;  Laterality: Right;    TRANSFORAMINAL EPIDURAL INJECTION OF STEROID Right 10/25/2022    Procedure: Injection,steroid,epidural,transforaminal approach L2/3 and L3/4;  Surgeon: Riley Segura MD;  Location: [facility];  Service: Pain Management;  Laterality: Right;    VERTEBROPLASTY         Family History   Problem Relation Name Age of Onset    Alzheimer's disease Mother      COPD Father      Hypertension Brother      Kidney disease Brother      No Known Problems Daughter      No Known Problems Daughter      No Known Problems Daughter      Hypertension Son      Diabetes Son          pre-diabetic    Amblyopia Neg Hx      Blindness Neg Hx      Cataracts Neg Hx      Glaucoma Neg Hx      Retinal detachment Neg Hx      Macular degeneration Neg Hx      Strabismus Neg Hx         Social History     Socioeconomic History    Marital status:    Tobacco Use    Smoking status: Former     Current packs/day: 0.00     Average packs/day: 1.5 packs/day for 24.0 years (36.0 ttl pk-yrs)     Types: Cigarettes     " Start date: 10/21/1959     Quit date: 3/19/1983     Years since quittin.3     Passive exposure: Past    Smokeless tobacco: Never   Substance and Sexual Activity    Alcohol use: No    Drug use: No   Social History Narrative    Retired - Worked for the [employer].      Social Drivers of Health     Financial Resource Strain: High Risk (2025)    Received from [facility] SDOH Screening     In the past year, have you been unable to get any of the following when you really needed them? choose all that apply.: Internet     In the past year, have you been unable to get any of the following when you really needed them? choose all that apply.: Clothing     In the past year, have you been unable to get any of the following when you really needed them? choose all that apply.: Medicine or health care   Food Insecurity: No Food Insecurity (2024)    Hunger Vital Sign     Worried About Running Out of Food in the Last Year: Never true     Ran Out of Food in the Last Year: Never true   Transportation Needs: No Transportation Needs (2023)    PRAPARE - Transportation     Lack of Transportation (Medical): No     Lack of Transportation (Non-Medical): No   Physical Activity: Insufficiently Active (2024)    Exercise Vital Sign     Days of Exercise per Week: 4 days     Minutes of Exercise per Session: 20 min   Stress: Stress Concern Present (2024)    [employer] [facility] [employer] - Occupational Stress Questionnaire     Feeling of Stress : To some extent   Housing Stability: High Risk (2025)    Received from [facility] SDOH Screening     In the past year, have you been unable to get any of the following when you really needed them? choose all that apply.: Utilities (electric, gas, and water)       Current Medications[1]    Review of patient's allergies indicates:  No Known Allergies    Vitals:    25 0910   Weight: 134.3 kg (296 lb)   Height: 5' 10" (1.778 m) "       REVIEW OF SYSTEMS:     GENERAL: No weight loss, malaise or fevers.  HEENT:  No recent changes in vision or hearing  NECK: Negative for lumps, no difficulty with swallowing.  RESPIRATORY: Negative for cough, wheezing or shortness of breath, patient denies any recent URI.  CARDIOVASCULAR: Negative for chest pain, leg swelling or palpitations.  GI: Negative for abdominal discomfort, blood in stools or black stools or change in bowel habits.  MUSCULOSKELETAL: See HPI.  SKIN: Negative for lesions, rash, and itching.  PSYCH: No suicidal or homicidal ideations, no current mood disturbances.  HEMATOLOGY/LYMPHOLOGY: Negative for prolonged bleeding, bruising easily or swollen nodes. Patient is not currently taking any anti-coagulants  ENDO: No history of diabetes or thyroid dysfunction  NEURO: No history of syncope, paralysis, seizures or tremors.All other reviewed and negative other than HPI.    Physical exam:  Gen: A and O x3, pleasant, well-groomed  Skin: No rashes or obvious lesions  HEENT: PERRLA, no obvious deformities on ears or in canals. No thyroid masses, trachea midline, no palpable lymph nodes in neck, axilla.  CVS: Regular rate and rhythm, normal S1 and S2, no murmurs.  Resp: Clear to auscultation bilaterally.  Abdomen: Soft, NT/ND, normal bowel sounds present.  Musculoskeletal/Neuro: Moving all extremities    Assessment:  There are no diagnoses linked to this encounter.      PLAN: R TFESI L1-2      This patient has been cleared for surgery in an ambulatory surgical facility    ASA 3,  Mallampatti Score 3  No history of anesthetic complications  Plan for RN IV sedation        [1]   No current facility-administered medications for this encounter.     Facility-Administered Medications Ordered in Other Encounters   Medication Dose Route Frequency Provider Last Rate Last Admin    sodium hyaluronate (EUFLEXXA) 10 mg/mL(mw 2.4 -3.6 million) Syrg 20 mg  20 mg Intra-articular  Michelet Covarrubias MD   20 mg at  05/14/18 1046    sodium hyaluronate (EUFLEXXA) 10 mg/mL(mw 2.4 -3.6 million) Syrg 20 mg  20 mg Intra-articular  Michelet Covarrubias MD   20 mg at 05/14/18 1044

## 2025-07-31 NOTE — DISCHARGE SUMMARY
[facility] - Surgery  Discharge Note  Short Stay    Procedure(s) (LRB):  INJECTION, SPINE, LUMBOSACRAL, TRANSFORAMINAL APPROACH L1/2 (Right)      OUTCOME: Patient tolerated treatment/procedure well without complication and is now ready for discharge.    DISPOSITION: Home or Self Care    FINAL DIAGNOSIS:  <principal problem not specified>    FOLLOWUP: In clinic    DISCHARGE INSTRUCTIONS:    Discharge Procedure Orders   Notify your health care provider if you experience any of the following:  temperature >100.4     Notify your health care provider if you experience any of the following:  severe uncontrolled pain     Notify your health care provider if you experience any of the following:  redness, tenderness, or signs of infection (pain, swelling, redness, odor or green/yellow discharge around incision site)     Activity as tolerated        TIME SPENT ON DISCHARGE: 20 minutes

## 2025-08-01 VITALS
TEMPERATURE: 98 F | HEIGHT: 70 IN | DIASTOLIC BLOOD PRESSURE: 51 MMHG | RESPIRATION RATE: 19 BRPM | OXYGEN SATURATION: 97 % | SYSTOLIC BLOOD PRESSURE: 106 MMHG | WEIGHT: 296 LBS | HEART RATE: 65 BPM | BODY MASS INDEX: 42.37 KG/M2

## 2025-08-01 NOTE — PROGRESS NOTES
NO LOS    Planned injection delayed secondary to upcoming back injections. Will delay for a couple weeks.

## 2025-08-02 PROBLEM — Z71.89 ACP (ADVANCE CARE PLANNING): Status: ACTIVE | Noted: 2025-08-02

## 2025-08-02 PROBLEM — R07.9 CHEST PAIN WITH HIGH RISK FOR CARDIAC ETIOLOGY: Status: ACTIVE | Noted: 2025-08-02

## 2025-08-03 PROBLEM — Z71.89 ACP (ADVANCE CARE PLANNING): Status: RESOLVED | Noted: 2025-08-02 | Resolved: 2025-08-03

## 2025-08-04 ENCOUNTER — PATIENT MESSAGE (OUTPATIENT)
Dept: CARDIOLOGY | Facility: CLINIC | Age: 80
End: 2025-08-04
Payer: MEDICARE

## 2025-08-05 ENCOUNTER — TELEPHONE (OUTPATIENT)
Dept: PRIMARY CARE CLINIC | Facility: CLINIC | Age: 80
End: 2025-08-05
Payer: MEDICARE

## 2025-08-05 NOTE — TELEPHONE ENCOUNTER
Called pt and LM to call back as pt was recently hospitalized.  Need to clarify new PCP listed in chart and if pt plans to est care with PCP currently listed in chart.    13

## 2025-08-06 ENCOUNTER — PATIENT MESSAGE (OUTPATIENT)
Dept: PAIN MEDICINE | Facility: CLINIC | Age: 80
End: 2025-08-06
Payer: MEDICARE

## 2025-08-11 DIAGNOSIS — G89.29 CHRONIC BILATERAL LOW BACK PAIN WITHOUT SCIATICA: ICD-10-CM

## 2025-08-11 DIAGNOSIS — M54.50 CHRONIC BILATERAL LOW BACK PAIN WITHOUT SCIATICA: ICD-10-CM

## 2025-08-11 DIAGNOSIS — G62.9 PERIPHERAL POLYNEUROPATHY: ICD-10-CM

## 2025-08-11 RX ORDER — GABAPENTIN 300 MG/1
CAPSULE ORAL
Qty: 270 CAPSULE | Refills: 0 | Status: SHIPPED | OUTPATIENT
Start: 2025-08-11

## 2025-08-11 RX ORDER — HYDRALAZINE HYDROCHLORIDE 25 MG/1
25 TABLET, FILM COATED ORAL EVERY 12 HOURS
Qty: 180 TABLET | Refills: 0 | Status: SHIPPED | OUTPATIENT
Start: 2025-08-11 | End: 2026-02-07

## 2025-08-12 ENCOUNTER — OFFICE VISIT (OUTPATIENT)
Dept: PRIMARY CARE CLINIC | Facility: CLINIC | Age: 80
End: 2025-08-12
Payer: MEDICARE

## 2025-08-12 VITALS
WEIGHT: 302.38 LBS | BODY MASS INDEX: 43.29 KG/M2 | HEIGHT: 70 IN | HEART RATE: 65 BPM | SYSTOLIC BLOOD PRESSURE: 130 MMHG | OXYGEN SATURATION: 98 % | DIASTOLIC BLOOD PRESSURE: 62 MMHG

## 2025-08-12 DIAGNOSIS — M54.9 BACK PAIN, UNSPECIFIED BACK LOCATION, UNSPECIFIED BACK PAIN LATERALITY, UNSPECIFIED CHRONICITY: ICD-10-CM

## 2025-08-12 DIAGNOSIS — Z09 HOSPITAL DISCHARGE FOLLOW-UP: Primary | ICD-10-CM

## 2025-08-12 DIAGNOSIS — I10 PRIMARY HYPERTENSION: ICD-10-CM

## 2025-08-12 PROCEDURE — 1101F PT FALLS ASSESS-DOCD LE1/YR: CPT | Mod: CPTII,S$GLB,, | Performed by: PHYSICIAN ASSISTANT

## 2025-08-12 PROCEDURE — 1125F AMNT PAIN NOTED PAIN PRSNT: CPT | Mod: CPTII,S$GLB,, | Performed by: PHYSICIAN ASSISTANT

## 2025-08-12 PROCEDURE — 1157F ADVNC CARE PLAN IN RCRD: CPT | Mod: CPTII,S$GLB,, | Performed by: PHYSICIAN ASSISTANT

## 2025-08-12 PROCEDURE — 1159F MED LIST DOCD IN RCRD: CPT | Mod: CPTII,S$GLB,, | Performed by: PHYSICIAN ASSISTANT

## 2025-08-12 PROCEDURE — 3075F SYST BP GE 130 - 139MM HG: CPT | Mod: CPTII,S$GLB,, | Performed by: PHYSICIAN ASSISTANT

## 2025-08-12 PROCEDURE — 99999 PR PBB SHADOW E&M-EST. PATIENT-LVL IV: CPT | Mod: PBBFAC,,, | Performed by: PHYSICIAN ASSISTANT

## 2025-08-12 PROCEDURE — 3078F DIAST BP <80 MM HG: CPT | Mod: CPTII,S$GLB,, | Performed by: PHYSICIAN ASSISTANT

## 2025-08-12 PROCEDURE — 99215 OFFICE O/P EST HI 40 MIN: CPT | Mod: S$GLB,,, | Performed by: PHYSICIAN ASSISTANT

## 2025-08-12 PROCEDURE — 3288F FALL RISK ASSESSMENT DOCD: CPT | Mod: CPTII,S$GLB,, | Performed by: PHYSICIAN ASSISTANT

## 2025-08-14 ENCOUNTER — TELEPHONE (OUTPATIENT)
Dept: PAIN MEDICINE | Facility: CLINIC | Age: 80
End: 2025-08-14

## 2025-08-14 ENCOUNTER — OFFICE VISIT (OUTPATIENT)
Dept: PAIN MEDICINE | Facility: CLINIC | Age: 80
End: 2025-08-14
Payer: MEDICARE

## 2025-08-14 VITALS
HEART RATE: 63 BPM | WEIGHT: 298.31 LBS | BODY MASS INDEX: 42.8 KG/M2 | SYSTOLIC BLOOD PRESSURE: 116 MMHG | DIASTOLIC BLOOD PRESSURE: 56 MMHG

## 2025-08-14 DIAGNOSIS — M47.816 LUMBAR SPONDYLOSIS: Primary | ICD-10-CM

## 2025-08-14 DIAGNOSIS — M46.1 SACROILIITIS: ICD-10-CM

## 2025-08-14 DIAGNOSIS — G89.4 CHRONIC PAIN SYNDROME: ICD-10-CM

## 2025-08-14 PROCEDURE — 1125F AMNT PAIN NOTED PAIN PRSNT: CPT | Mod: CPTII,S$GLB,, | Performed by: PHYSICIAN ASSISTANT

## 2025-08-14 PROCEDURE — 3074F SYST BP LT 130 MM HG: CPT | Mod: CPTII,S$GLB,, | Performed by: PHYSICIAN ASSISTANT

## 2025-08-14 PROCEDURE — 3078F DIAST BP <80 MM HG: CPT | Mod: CPTII,S$GLB,, | Performed by: PHYSICIAN ASSISTANT

## 2025-08-14 PROCEDURE — 99214 OFFICE O/P EST MOD 30 MIN: CPT | Mod: S$GLB,,, | Performed by: PHYSICIAN ASSISTANT

## 2025-08-14 PROCEDURE — 1160F RVW MEDS BY RX/DR IN RCRD: CPT | Mod: CPTII,S$GLB,, | Performed by: PHYSICIAN ASSISTANT

## 2025-08-14 PROCEDURE — 1159F MED LIST DOCD IN RCRD: CPT | Mod: CPTII,S$GLB,, | Performed by: PHYSICIAN ASSISTANT

## 2025-08-14 PROCEDURE — 99999 PR PBB SHADOW E&M-EST. PATIENT-LVL III: CPT | Mod: PBBFAC,,, | Performed by: PHYSICIAN ASSISTANT

## 2025-08-14 PROCEDURE — 1157F ADVNC CARE PLAN IN RCRD: CPT | Mod: CPTII,S$GLB,, | Performed by: PHYSICIAN ASSISTANT

## 2025-08-14 RX ORDER — SODIUM CHLORIDE, SODIUM LACTATE, POTASSIUM CHLORIDE, CALCIUM CHLORIDE 600; 310; 30; 20 MG/100ML; MG/100ML; MG/100ML; MG/100ML
INJECTION, SOLUTION INTRAVENOUS CONTINUOUS
OUTPATIENT
Start: 2025-08-14

## 2025-08-18 ENCOUNTER — CLINICAL SUPPORT (OUTPATIENT)
Dept: PHYSICAL MEDICINE AND REHAB | Facility: CLINIC | Age: 80
End: 2025-08-18
Payer: MEDICARE

## 2025-08-18 VITALS
SYSTOLIC BLOOD PRESSURE: 119 MMHG | BODY MASS INDEX: 43.45 KG/M2 | WEIGHT: 302.81 LBS | DIASTOLIC BLOOD PRESSURE: 58 MMHG | HEART RATE: 58 BPM

## 2025-08-18 DIAGNOSIS — M25.561 CHRONIC PAIN OF BOTH KNEES: ICD-10-CM

## 2025-08-18 DIAGNOSIS — M25.562 CHRONIC PAIN OF BOTH KNEES: ICD-10-CM

## 2025-08-18 DIAGNOSIS — M17.0 BILATERAL PRIMARY OSTEOARTHRITIS OF KNEE: Primary | ICD-10-CM

## 2025-08-18 DIAGNOSIS — G89.29 CHRONIC PAIN OF BOTH KNEES: ICD-10-CM

## 2025-08-18 PROCEDURE — 20611 DRAIN/INJ JOINT/BURSA W/US: CPT | Mod: 50,S$GLB,, | Performed by: PHYSICAL MEDICINE & REHABILITATION

## 2025-08-18 PROCEDURE — 99999 PR PBB SHADOW E&M-EST. PATIENT-LVL III: CPT | Mod: PBBFAC,,, | Performed by: PHYSICAL MEDICINE & REHABILITATION

## 2025-08-18 PROCEDURE — 99499 UNLISTED E&M SERVICE: CPT | Mod: S$GLB,,, | Performed by: PHYSICAL MEDICINE & REHABILITATION

## 2025-08-18 RX ORDER — LIDOCAINE HYDROCHLORIDE 10 MG/ML
1 INJECTION, SOLUTION INFILTRATION; PERINEURAL
Status: DISCONTINUED | OUTPATIENT
Start: 2025-08-18 | End: 2025-08-18 | Stop reason: HOSPADM

## 2025-08-18 RX ADMIN — LIDOCAINE HYDROCHLORIDE 1 ML: 10 INJECTION, SOLUTION INFILTRATION; PERINEURAL at 10:08

## 2025-08-21 ENCOUNTER — OFFICE VISIT (OUTPATIENT)
Dept: CARDIOLOGY | Facility: CLINIC | Age: 80
End: 2025-08-21
Payer: MEDICARE

## 2025-08-21 VITALS
SYSTOLIC BLOOD PRESSURE: 122 MMHG | HEART RATE: 56 BPM | HEIGHT: 70 IN | WEIGHT: 301.13 LBS | DIASTOLIC BLOOD PRESSURE: 62 MMHG | BODY MASS INDEX: 43.11 KG/M2

## 2025-08-21 DIAGNOSIS — I87.2 VENOUS INSUFFICIENCY OF BOTH LOWER EXTREMITIES: ICD-10-CM

## 2025-08-21 DIAGNOSIS — I49.9 IRREGULAR CARDIAC RHYTHM: ICD-10-CM

## 2025-08-21 DIAGNOSIS — Z95.1 S/P CABG (CORONARY ARTERY BYPASS GRAFT): ICD-10-CM

## 2025-08-21 DIAGNOSIS — I70.0 ABDOMINAL AORTIC ATHEROSCLEROSIS: Primary | Chronic | ICD-10-CM

## 2025-08-21 DIAGNOSIS — R94.31 ABNORMAL EKG: ICD-10-CM

## 2025-08-21 DIAGNOSIS — I48.0 PAF (PAROXYSMAL ATRIAL FIBRILLATION): Chronic | ICD-10-CM

## 2025-08-21 PROCEDURE — 3288F FALL RISK ASSESSMENT DOCD: CPT | Mod: CPTII,S$GLB,, | Performed by: INTERNAL MEDICINE

## 2025-08-21 PROCEDURE — 1157F ADVNC CARE PLAN IN RCRD: CPT | Mod: CPTII,S$GLB,, | Performed by: INTERNAL MEDICINE

## 2025-08-21 PROCEDURE — 99999 PR PBB SHADOW E&M-EST. PATIENT-LVL III: CPT | Mod: PBBFAC,,, | Performed by: INTERNAL MEDICINE

## 2025-08-21 PROCEDURE — 99214 OFFICE O/P EST MOD 30 MIN: CPT | Mod: S$GLB,,, | Performed by: INTERNAL MEDICINE

## 2025-08-21 PROCEDURE — 3078F DIAST BP <80 MM HG: CPT | Mod: CPTII,S$GLB,, | Performed by: INTERNAL MEDICINE

## 2025-08-21 PROCEDURE — 1101F PT FALLS ASSESS-DOCD LE1/YR: CPT | Mod: CPTII,S$GLB,, | Performed by: INTERNAL MEDICINE

## 2025-08-21 PROCEDURE — 3074F SYST BP LT 130 MM HG: CPT | Mod: CPTII,S$GLB,, | Performed by: INTERNAL MEDICINE

## 2025-08-21 PROCEDURE — 1126F AMNT PAIN NOTED NONE PRSNT: CPT | Mod: CPTII,S$GLB,, | Performed by: INTERNAL MEDICINE

## 2025-08-21 PROCEDURE — 1159F MED LIST DOCD IN RCRD: CPT | Mod: CPTII,S$GLB,, | Performed by: INTERNAL MEDICINE

## 2025-08-28 ENCOUNTER — OFFICE VISIT (OUTPATIENT)
Dept: PRIMARY CARE CLINIC | Facility: CLINIC | Age: 80
End: 2025-08-28
Payer: MEDICARE

## 2025-08-28 VITALS
BODY MASS INDEX: 42.42 KG/M2 | WEIGHT: 296.31 LBS | HEIGHT: 70 IN | DIASTOLIC BLOOD PRESSURE: 60 MMHG | HEART RATE: 55 BPM | OXYGEN SATURATION: 97 % | SYSTOLIC BLOOD PRESSURE: 118 MMHG

## 2025-08-28 VITALS
DIASTOLIC BLOOD PRESSURE: 60 MMHG | BODY MASS INDEX: 42.42 KG/M2 | SYSTOLIC BLOOD PRESSURE: 118 MMHG | HEART RATE: 55 BPM | OXYGEN SATURATION: 97 % | HEIGHT: 70 IN | WEIGHT: 296.31 LBS

## 2025-08-28 DIAGNOSIS — E78.2 MIXED HYPERLIPIDEMIA: Chronic | ICD-10-CM

## 2025-08-28 DIAGNOSIS — E66.01 SEVERE OBESITY (BMI >= 40): ICD-10-CM

## 2025-08-28 DIAGNOSIS — M51.369 DEGENERATION OF INTERVERTEBRAL DISC OF LUMBAR REGION, UNSPECIFIED WHETHER PAIN PRESENT: Chronic | ICD-10-CM

## 2025-08-28 DIAGNOSIS — R73.09 ELEVATED GLUCOSE: ICD-10-CM

## 2025-08-28 DIAGNOSIS — I10 PRIMARY HYPERTENSION: Primary | ICD-10-CM

## 2025-08-28 DIAGNOSIS — I48.0 PAF (PAROXYSMAL ATRIAL FIBRILLATION): Chronic | ICD-10-CM

## 2025-08-28 DIAGNOSIS — Z00.00 ENCOUNTER FOR MEDICARE ANNUAL WELLNESS EXAM: Primary | ICD-10-CM

## 2025-08-28 DIAGNOSIS — I50.32 CHRONIC HEART FAILURE WITH PRESERVED EJECTION FRACTION (HFPEF): Chronic | ICD-10-CM

## 2025-08-28 DIAGNOSIS — I25.10 CORONARY ARTERY DISEASE INVOLVING NATIVE CORONARY ARTERY OF NATIVE HEART WITHOUT ANGINA PECTORIS: ICD-10-CM

## 2025-08-28 PROCEDURE — 99999 PR PBB SHADOW E&M-EST. PATIENT-LVL III: CPT | Mod: PBBFAC,,, | Performed by: FAMILY MEDICINE

## 2025-09-05 ENCOUNTER — HOSPITAL ENCOUNTER (OUTPATIENT)
Dept: RADIOLOGY | Facility: HOSPITAL | Age: 80
Discharge: HOME | End: 2025-09-05
Attending: ANESTHESIOLOGY
Payer: MEDICARE

## 2025-09-05 DIAGNOSIS — M54.50 LOWER BACK PAIN: ICD-10-CM

## (undated) DEVICE — DRAPE C-ARMOR EQUIPMENT COVER

## (undated) DEVICE — MARKER SKIN STND TIP BLUE BARR

## (undated) DEVICE — SEE MEDLINE ITEM 152622

## (undated) DEVICE — DEVICE FIXATE SUT BAND DUAL PK

## (undated) DEVICE — NDL 18GA X1 1/2 REG BEVEL

## (undated) DEVICE — NEPTUNE 4 PORT MANIFOLD

## (undated) DEVICE — TOWEL OR DISP STRL BLUE 4/PK

## (undated) DEVICE — DRAPE INCISE IOBAN 2 23X17IN

## (undated) DEVICE — SYR GLASS 5CC LUER LOK

## (undated) DEVICE — NDL SPINAL SPINOCAN 22GX3.5

## (undated) DEVICE — SUT VICRYL 0 SH

## (undated) DEVICE — GLOVE SURGICAL LATEX SZ 7

## (undated) DEVICE — SET DECANTER MEDICHOICE

## (undated) DEVICE — FIXATE SUTURING DEVICE

## (undated) DEVICE — ELECTRODE REM PLYHSV RETURN 9

## (undated) DEVICE — SEE MEDLINE ITEM 157131

## (undated) DEVICE — NDL TUOHY EPIDURAL 20G X 3.5

## (undated) DEVICE — SUT SILK 0 SH 30IN BLK BR

## (undated) DEVICE — SPONGE DERMACEA 4X4IN 12PLY

## (undated) DEVICE — PENCIL ROCKER SWITCH 10FT CORD

## (undated) DEVICE — TRAIL KIT

## (undated) DEVICE — GLOVE BIOGEL PI MICRO SZ 7

## (undated) DEVICE — SEE MEDLINE ITEM 146313

## (undated) DEVICE — SEE MEDLINE ITEM 153151

## (undated) DEVICE — SUT 2-0 VICRYL / SH (J417)

## (undated) DEVICE — CHLORAPREP W TINT 26ML APPL

## (undated) DEVICE — TOOL TUNNELING 28CM

## (undated) DEVICE — CHLORAPREP 10.5 ML APPLICATOR

## (undated) DEVICE — DURAPREP SURG SCRUB 26ML

## (undated) DEVICE — REMOVER LOTION

## (undated) DEVICE — APPLICATOR CHLORAPREP CLR 10.5

## (undated) DEVICE — GLOVE SENSICARE PI ALOE 6

## (undated) DEVICE — SEE L#120831

## (undated) DEVICE — SCALPEL #11 BLADE STRL DISP

## (undated) DEVICE — SUT 2/0 36IN COATED VICRYL

## (undated) DEVICE — TIP YANKAUERS BULB NO VENT

## (undated) DEVICE — SUT 0 VICRYL / CT-1

## (undated) DEVICE — SKIN MARKER STER DUAL TIP

## (undated) DEVICE — Device

## (undated) DEVICE — TRAY NERVE BLOCK

## (undated) DEVICE — CABLE EXT SPECTRA 2FT 1X16

## (undated) DEVICE — CANNULA CVD 100MM X 20G

## (undated) DEVICE — CLOSURE SKIN STERI STRIP 1/2X4

## (undated) DEVICE — PAD ELECTROSURGICAL PAT PLATE

## (undated) DEVICE — SOL STRL WATER INJ 1000ML BG

## (undated) DEVICE — SYR 10CC LUER LOCK

## (undated) DEVICE — DRESSING MEPORE ISLAND 31/2X4

## (undated) DEVICE — MARKER SKIN RULER STERILE

## (undated) DEVICE — GAUZE SPONGE 4X4 12PLY

## (undated) DEVICE — CLEANER TIP ELECSURG 2X2IN

## (undated) DEVICE — DRESSING ADHESIVE ISLAND 3 X 6

## (undated) DEVICE — SEE MEDLINE ITEM 157148

## (undated) DEVICE — SUT MONOCYRL 4-0 PS2 UND

## (undated) DEVICE — WRENCH HEXAGONAL 3

## (undated) DEVICE — ADHESIVE MASTISOL VIAL 48/BX

## (undated) DEVICE — HANDLE SURG LIGHT NONRIGID

## (undated) DEVICE — DRAPE C ARM 42 X 120 10/BX

## (undated) DEVICE — ADHESIVE DERMABOND ADVANCED